# Patient Record
Sex: MALE | Race: WHITE | NOT HISPANIC OR LATINO | Employment: OTHER | URBAN - METROPOLITAN AREA
[De-identification: names, ages, dates, MRNs, and addresses within clinical notes are randomized per-mention and may not be internally consistent; named-entity substitution may affect disease eponyms.]

---

## 2017-01-12 ENCOUNTER — PATIENT MESSAGE (OUTPATIENT)
Dept: FAMILY MEDICINE | Facility: CLINIC | Age: 52
End: 2017-01-12

## 2017-01-12 DIAGNOSIS — F41.0 PANIC DISORDER: Chronic | ICD-10-CM

## 2017-01-12 RX ORDER — FLUOXETINE HYDROCHLORIDE 20 MG/1
20 CAPSULE ORAL DAILY
Qty: 30 CAPSULE | Refills: 5 | Status: SHIPPED | OUTPATIENT
Start: 2017-01-12 | End: 2017-01-19 | Stop reason: SDUPTHER

## 2017-01-16 ENCOUNTER — PATIENT MESSAGE (OUTPATIENT)
Dept: FAMILY MEDICINE | Facility: CLINIC | Age: 52
End: 2017-01-16

## 2017-01-17 ENCOUNTER — INITIAL CONSULT (OUTPATIENT)
Dept: SURGERY | Facility: CLINIC | Age: 52
End: 2017-01-17
Payer: MEDICARE

## 2017-01-17 VITALS
HEART RATE: 60 BPM | SYSTOLIC BLOOD PRESSURE: 121 MMHG | HEIGHT: 64 IN | OXYGEN SATURATION: 97 % | DIASTOLIC BLOOD PRESSURE: 80 MMHG | RESPIRATION RATE: 18 BRPM

## 2017-01-17 DIAGNOSIS — R22.1 NECK MASS: ICD-10-CM

## 2017-01-17 DIAGNOSIS — Z01.818 PREOPERATIVE TESTING: Primary | ICD-10-CM

## 2017-01-17 PROCEDURE — 99205 OFFICE O/P NEW HI 60 MIN: CPT | Mod: S$GLB,,, | Performed by: PHYSICIAN ASSISTANT

## 2017-01-17 RX ORDER — SODIUM CHLORIDE 9 MG/ML
INJECTION, SOLUTION INTRAVENOUS CONTINUOUS
Status: CANCELLED | OUTPATIENT
Start: 2017-01-23

## 2017-01-17 NOTE — MR AVS SNAPSHOT
[facility]  [address]  [address]  [place] [address]  Phone: [phone]  Fax: [phone]                  Kuldeep Alamo   2017 2:15 PM   Initial consult    Description:  Male : 1965   Provider:  Yanna Gaytan PA-C   Department:  [facility]           Reason for Visit     Cyst           Diagnoses this Visit        Comments    Preoperative testing    -  Primary     Neck mass                To Do List           Future Appointments        Provider Department Dept Phone    2017 9:40 AM MD Ryan Falk Jr. [facility] [phone]      Goals (5 Years of Data)     None      [facility]     [facility] Care [facility] -  Assistance  Registered nurses in the [facility] provide clinical advisement, health education, appointment booking, and other advisory services.  Call for this free service at [phone].             Medications           Message regarding Medications     Verify the changes and/or additions to your medication regime listed below are the same as discussed with your clinician today.  If any of these changes or additions are incorrect, please notify your healthcare provider.        STOP taking these medications     omeprazole (PRILOSEC) 20 MG capsule TAKE ONE CAPSULE BY MOUTH TWICE A DAY           Verify that the below list of medications is an accurate representation of the medications you are currently taking.  If none reported, the list may be blank. If incorrect, please contact your healthcare provider. Carry this list with you in case of emergency.           Current Medications     alprazolam (XANAX) 0.5 MG tablet Take 1 tablet (0.5 mg total) by mouth 3 (three) times daily as needed for Anxiety.    aspirin 81 MG Chew Take 1 tablet by mouth once daily.     baclofen (LIORESAL) 20 MG tablet TAKE 1.5 TABLETS (30 MG TOTAL) BY MOUTH 3 (THREE) TIMES DAILY.    BD INSULIN PEN NEEDLE UF  "SHORT 31 gauge x 5/16" Ndle USE AS DIRECTED TWICE A DAY    BD INSULIN PEN NEEDLE UF SHORT 31 X 5/16 " Ndle USE AS DIRECTED TWICE A DAY    carvedilol (COREG) 25 MG tablet TAKE 1 TABLET (25 MG TOTAL) BY MOUTH 2 (TWO) TIMES DAILY WITH MEALS.    cloNIDine (CATAPRES) 0.1 MG tablet TAKE 1 TABLET (0.1 MG TOTAL) BY MOUTH EVERY 6 (SIX) HOURS AS NEEDED.    clopidogrel (PLAVIX) 75 mg tablet Take 1 tablet (75 mg total) by mouth once daily.    fenofibrate 160 MG Tab TAKE 1 TABLET (160 MG TOTAL) BY MOUTH ONCE DAILY.    fluoxetine (PROZAC) 20 MG capsule Take 1 capsule (20 mg total) by mouth once daily.    insulin glargine (LANTUS SOLOSTAR) 100 unit/mL (3 mL) InPn pen INJECT 40 UNITS UNDER THE SKIN EVERY EVENING.    lisinopril (PRINIVIL,ZESTRIL) 40 MG tablet Take 1 tablet (40 mg total) by mouth once daily.    nitroGLYCERIN (NITROSTAT) 0.4 MG SL tablet DISSOLVE 1 TAB UNDER TONGUE EVERY 5 MINUTS AT ONSET OF CHEST PAIN-MAX 3 PER 15 MINUTES--GO TO ER    ranitidine (ZANTAC) 150 MG tablet TAKE 1 TABLET (150 MG TOTAL) BY MOUTH 2 (TWO) TIMES DAILY.    metformin (GLUCOPHAGE) 850 MG tablet Take 1 tablet (850 mg total) by mouth 2 (two) times daily with meals.           Clinical Reference Information           Vital Signs - Last Recorded  Most recent update: 1/17/2017  2:21 PM by Umu Narayan MA    BP Pulse Resp Ht SpO2       121/80 (BP Location: Right arm, Patient Position: Sitting, BP Method: Manual) 60 18 5' 4" (1.626 m) 97%       Blood Pressure          Most Recent Value    BP  121/80      Allergies as of 1/17/2017     No Known Drug Allergies      Immunizations Administered on Date of Encounter - 1/17/2017     None      Orders Placed During Today's Visit      Normal Orders This Visit    Case Request Operating Room: EXCISION-MASS of Posterior Neck/Upper Back     Future Labs/Procedures Expected by Expires    EKG 12-lead  As directed 1/17/2018      Instructions    1.  Nothing to eat or drink after midnight Sunday 1/22/2017  2.  Report " to the Surgery Department @ 6:30 am Monday 1/23/2017  3.  You may take your blood pressure medication with a sip of water Monday 1/23/2017 prior to surgery  4.  Stop taking PLAVIX two days prior to surgery ( NO PLAVIX ON Saturday 1/21/2017 or Sunday 1/22/2017)  5.  Call our office with any questions/concerns

## 2017-01-17 NOTE — PROGRESS NOTES
"History & Physical    SUBJECTIVE:     History of Present Illness:  Patient is a 51 y.o. male presents with complaints of a posterior neck/upper back mass.  Patient advises the mass has been present x several years and progressively increasing in size.  Patient also c/o pain at the site.  Patient denies erythema or drainage from the mass.  I have reviewed patient's medical issues, past surgeries, social h/o, medications and allergies with him.    Chief Complaint   Patient presents with    Cyst     Back on neck       Review of patient's allergies indicates:   Allergen Reactions    No known drug allergies        Current Outpatient Prescriptions   Medication Sig Dispense Refill    alprazolam (XANAX) 0.5 MG tablet Take 1 tablet (0.5 mg total) by mouth 3 (three) times daily as needed for Anxiety. 30 tablet 2    aspirin 81 MG Chew Take 1 tablet by mouth once daily.       baclofen (LIORESAL) 20 MG tablet TAKE 1.5 TABLETS (30 MG TOTAL) BY MOUTH 3 (THREE) TIMES DAILY. 135 tablet 11    BD INSULIN PEN NEEDLE UF SHORT 31 gauge x 5/16" Ndle USE AS DIRECTED TWICE A  each 11    BD INSULIN PEN NEEDLE UF SHORT 31 X 5/16 " Ndle USE AS DIRECTED TWICE A  each 12    carvedilol (COREG) 25 MG tablet TAKE 1 TABLET (25 MG TOTAL) BY MOUTH 2 (TWO) TIMES DAILY WITH MEALS. 60 tablet 11    cloNIDine (CATAPRES) 0.1 MG tablet TAKE 1 TABLET (0.1 MG TOTAL) BY MOUTH EVERY 6 (SIX) HOURS AS NEEDED. 30 tablet 5    clopidogrel (PLAVIX) 75 mg tablet Take 1 tablet (75 mg total) by mouth once daily. 90 tablet 3    fenofibrate 160 MG Tab TAKE 1 TABLET (160 MG TOTAL) BY MOUTH ONCE DAILY. 30 tablet 11    fluoxetine (PROZAC) 20 MG capsule Take 1 capsule (20 mg total) by mouth once daily. 30 capsule 5    insulin glargine (LANTUS SOLOSTAR) 100 unit/mL (3 mL) InPn pen INJECT 40 UNITS UNDER THE SKIN EVERY EVENING. 15 Syringe 4    lisinopril (PRINIVIL,ZESTRIL) 40 MG tablet Take 1 tablet (40 mg total) by mouth once daily. 30 tablet 11    " nitroGLYCERIN (NITROSTAT) 0.4 MG SL tablet DISSOLVE 1 TAB UNDER TONGUE EVERY 5 MINUTS AT ONSET OF CHEST PAIN-MAX 3 PER 15 MINUTES--GO TO ER 25 tablet 11    ranitidine (ZANTAC) 150 MG tablet TAKE 1 TABLET (150 MG TOTAL) BY MOUTH 2 (TWO) TIMES DAILY. 60 tablet 11    metformin (GLUCOPHAGE) 850 MG tablet Take 1 tablet (850 mg total) by mouth 2 (two) times daily with meals. 60 tablet 12     No current facility-administered medications for this visit.        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     Past Surgical History   Procedure Laterality Date    Cholecystectomy      Coronary artery bypass graft       2006    Cyst removal      Cardiac catheterization       Family History   Problem Relation Age of Onset    Heart disease Mother     Hypertension Mother     Hypertension Father     Hypertension Sister     Anemia Neg Hx     Arrhythmia Neg Hx     Asthma Neg Hx     Clotting disorder Neg Hx     Fainting Neg Hx     Heart attack Neg Hx     Heart failure Neg Hx     Hyperlipidemia Neg Hx      Social History   Substance Use Topics    Smoking status: Former Smoker    Smokeless tobacco: None    Alcohol use 12.0 oz/week     20 Cans of beer per week        Review of Systems:  Review of Systems   Constitutional: Positive for activity change. Negative for appetite change, chills, diaphoresis, fatigue and fever.        Patient is disabled and in a motorized wheelchair     HENT: Negative for congestion, postnasal drip, rhinorrhea, sinus pressure, sneezing and sore throat.    Respiratory: Positive for shortness of breath (occasionally). Negative for cough, choking, wheezing and stridor.    Cardiovascular: Negative for chest pain and  "palpitations.   Gastrointestinal: Negative for abdominal distention, abdominal pain, constipation, diarrhea, nausea and vomiting.   Musculoskeletal: Positive for arthralgias, back pain, myalgias, neck pain and neck stiffness.   Skin: Negative for color change, pallor, rash and wound.        Golf ball size mass to the posterior neck/upper back     Neurological: Positive for weakness. Negative for dizziness, seizures, speech difficulty, light-headedness and headaches.   Psychiatric/Behavioral: Negative for agitation and confusion. The patient is not nervous/anxious and is not hyperactive.        OBJECTIVE:     Vital Signs (Most Recent)  Pulse: 60 (01/17/17 1418)  Resp: 18 (01/17/17 1418)  BP: 121/80 (01/17/17 1418)  SpO2: 97 % (01/17/17 1418)  5' 4" (1.626 m)        Physical Exam:  Physical Exam   Constitutional: He is oriented to person, place, and time. He appears well-developed and well-nourished. No distress.   HENT:   Head: Normocephalic and atraumatic.   Right Ear: External ear normal.   Left Ear: External ear normal.   Nose: Nose normal.   Eyes: Conjunctivae and EOM are normal. Pupils are equal, round, and reactive to light. No scleral icterus.   Neck: Neck supple. No tracheal deviation present.   Decrease ROM in neck.  Golf ball size mass to the posterior neck/upper back.  TTP, mobile, no erythema, and no drainage noted.   Cardiovascular: Normal rate, regular rhythm and normal heart sounds.  Exam reveals no gallop and no friction rub.    No murmur heard.  Pulmonary/Chest: Effort normal. No stridor. No respiratory distress. He has wheezes (expiratory). He has no rales.   Abdominal: Soft. Bowel sounds are normal. He exhibits no distension. There is no tenderness. There is no guarding.   Musculoskeletal:   Patient has limited ROM.  Disabled and in a motorized wheelchair.   Neurological: He is alert and oriented to person, place, and time.   Skin: Skin is warm and dry. No rash noted. He is not diaphoretic. No " erythema. No pallor.   Psychiatric: He has a normal mood and affect. His behavior is normal. Judgment and thought content normal.   Nursing note and vitals reviewed.      Laboratory  CBC: Reviewed  CMP: Reviewed    Diagnostic Results:  No recent EKG    ASSESSMENT/PLAN:     1.  Posterior Neck/Upper Back Mass   2.  H/o CAD  3.  HTN  4.  IDDM  5.  HLD  6.  COPD  7.  S/P CVA    PLAN:Plan     1.  Excision of Posterior Neck/Upper Back Mass on Monday 1/23/2017.  2.  EKG for preoperative testing.  3.  Dr Cross advised of above.  4.  Patient advised to stop Rx Plavix two days prior to surgery

## 2017-01-17 NOTE — LETTER
January 17, 2017      Owen Frederick Jr., MD  [address]  [facility] [address]           [facility]  [address]  [address]  [facility] [address]  Phone: [phone]  Fax: [phone]          Patient: Kuldeep Alamo   MR Number: [id number]   YOB: 1965   Date of Visit: 1/17/2017       Dear Dr. Owen Frederick Jr.:    Thank you for referring Mr. Kuldeep Alamo to me for evaluation. Attached you will find relevant portions of my assessment and plan of care.    Impression:  Patient evaluated for golf ball size mass to the posterior neck/upper back.      Plan:  Excision of posterior neck mass/upper back on 1/23/2017 with an EKG for preoperative completion.    If you have questions, please do not hesitate to call me. I look forward to following Mr. Kuldeep Alamo along with you.    Thank you for the opportunity to assist in the care of your patient.    Sincerely,      Yanna Gaytan PA-C    Enclosure  CC:  No Recipients    If you would like to receive this communication electronically, please contact [email or web address] or [phone] to request more information on [employer] access.    For providers and/or their staff who would like to refer a patient to [facility], please contact us through our one-stop-shop provider referral line, [facility], at [phone].    If you feel you have received this communication in error or would no longer like to receive these types of communications, please e-mail [email or web address]

## 2017-01-19 ENCOUNTER — OFFICE VISIT (OUTPATIENT)
Dept: FAMILY MEDICINE | Facility: CLINIC | Age: 52
End: 2017-01-19
Payer: MEDICARE

## 2017-01-19 VITALS
DIASTOLIC BLOOD PRESSURE: 64 MMHG | BODY MASS INDEX: 34.33 KG/M2 | OXYGEN SATURATION: 96 % | SYSTOLIC BLOOD PRESSURE: 112 MMHG | HEIGHT: 64 IN | HEART RATE: 57 BPM

## 2017-01-19 DIAGNOSIS — I69.959 HEMIPLEGIA OF NONDOMINANT SIDE, LATE EFFECT OF CEREBROVASCULAR DISEASE: ICD-10-CM

## 2017-01-19 DIAGNOSIS — I63.22 OCCLUSION AND STENOSIS OF BASILAR ARTERY WITH CEREBRAL INFARCTION: ICD-10-CM

## 2017-01-19 DIAGNOSIS — Z74.09 MOBILITY IMPAIRED: Primary | ICD-10-CM

## 2017-01-19 DIAGNOSIS — F41.0 PANIC DISORDER: Chronic | ICD-10-CM

## 2017-01-19 PROCEDURE — 99213 OFFICE O/P EST LOW 20 MIN: CPT | Mod: PBBFAC,PO

## 2017-01-19 PROCEDURE — 99999 PR PBB SHADOW E&M-EST. PATIENT-LVL III: CPT | Mod: PBBFAC,,,

## 2017-01-19 PROCEDURE — 99214 OFFICE O/P EST MOD 30 MIN: CPT | Mod: S$PBB,,,

## 2017-01-19 PROCEDURE — G0372 MD SERVICE REQUIRED FOR PMD: HCPCS | Mod: PBBFAC,PO

## 2017-01-19 PROCEDURE — G0372 MD SERVICE REQUIRED FOR PMD: HCPCS | Mod: S$PBB,,,

## 2017-01-19 RX ORDER — FLUOXETINE HYDROCHLORIDE 20 MG/1
20 CAPSULE ORAL DAILY
Qty: 30 CAPSULE | Refills: 11 | Status: SHIPPED | OUTPATIENT
Start: 2017-01-19 | End: 2018-02-16 | Stop reason: SDUPTHER

## 2017-01-19 NOTE — MR AVS SNAPSHOT
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    Follow-up and Disposition History       These Medications        Disp Refills Start End    fluoxetine (PROZAC) 20 MG capsule 30 capsule 11 2017    Take 1 capsule (20 mg total) by mouth once daily. - Oral    Pharmacy: [facility]/pharmacy #[id number] - [place] - [address] AT [facility] Ph #: [phone]         [facility]     [facility] On Call Nurse Care Line - 24/7 Assistance  Registered nurses in the [facility] Call Center provide clinical advisement, health education, appointment booking, and other advisory services.  Call for this free service at [phone].             Medications           Message regarding Medications     Verify the changes and/or additions to your medication regime listed below are the same as discussed with your clinician today.  If any of these changes or additions are incorrect, please notify your healthcare provider.             Verify that the below list of medications is an accurate representation of the medications you are currently  "taking.  If none reported, the list may be blank. If incorrect, please contact your healthcare provider. Carry this list with you in case of emergency.           Current Medications     alprazolam (XANAX) 0.5 MG tablet Take 1 tablet (0.5 mg total) by mouth 3 (three) times daily as needed for Anxiety.    aspirin 81 MG Chew Take 1 tablet by mouth once daily.     baclofen (LIORESAL) 20 MG tablet TAKE 1.5 TABLETS (30 MG TOTAL) BY MOUTH 3 (THREE) TIMES DAILY.    BD INSULIN PEN NEEDLE UF SHORT 31 gauge x 5/16" Ndle USE AS DIRECTED TWICE A DAY    BD INSULIN PEN NEEDLE UF SHORT 31 X 5/16 " Ndle USE AS DIRECTED TWICE A DAY    carvedilol (COREG) 25 MG tablet TAKE 1 TABLET (25 MG TOTAL) BY MOUTH 2 (TWO) TIMES DAILY WITH MEALS.    cloNIDine (CATAPRES) 0.1 MG tablet TAKE 1 TABLET (0.1 MG TOTAL) BY MOUTH EVERY 6 (SIX) HOURS AS NEEDED.    clopidogrel (PLAVIX) 75 mg tablet Take 1 tablet (75 mg total) by mouth once daily.    fenofibrate 160 MG Tab TAKE 1 TABLET (160 MG TOTAL) BY MOUTH ONCE DAILY.    fluoxetine (PROZAC) 20 MG capsule Take 1 capsule (20 mg total) by mouth once daily.    insulin glargine (LANTUS SOLOSTAR) 100 unit/mL (3 mL) InPn pen INJECT 40 UNITS UNDER THE SKIN EVERY EVENING.    lisinopril (PRINIVIL,ZESTRIL) 40 MG tablet Take 1 tablet (40 mg total) by mouth once daily.    nitroGLYCERIN (NITROSTAT) 0.4 MG SL tablet DISSOLVE 1 TAB UNDER TONGUE EVERY 5 MINUTS AT ONSET OF CHEST PAIN-MAX 3 PER 15 MINUTES--GO TO ER    ranitidine (ZANTAC) 150 MG tablet TAKE 1 TABLET (150 MG TOTAL) BY MOUTH 2 (TWO) TIMES DAILY.    metformin (GLUCOPHAGE) 850 MG tablet Take 1 tablet (850 mg total) by mouth 2 (two) times daily with meals.           Clinical Reference Information           Vital Signs - Last Recorded  Most recent update: 1/19/2017  9:56 AM by Evangelina Manning MA    BP Pulse Ht SpO2 BMI    112/64 (BP Location: Right arm, Patient Position: Sitting, BP Method: Manual) (!) 57 5' 4" (1.626 m) 96% 34.33 kg/m2      Blood Pressure       "    Most Recent Value    BP  112/64      Allergies as of 1/19/2017     No Known Drug Allergies      Immunizations Administered on Date of Encounter - 1/19/2017     None

## 2017-01-19 NOTE — PROGRESS NOTES
Subjective:       Patient ID: Kuldeep Alamo is a 51 y.o. male.    Chief Complaint: forms    · HPI  Completed 50-1 TAR form  · A copy of the signed physician prescription  · A completed and signed [facility] 6181 form (see the following   1. Certificate of Medical Necessity information)  · For listed items: Specific medical justification for each item is   1. requested, using either the CS 6181 form or additional   2. medical documentation, such as physicians notes or therapist documentation relevant to the request.  · Medical records submitted with the TAR for wheelchairs must include the following documentation:   1. List of mobility and seating impairment to be accommodated   2. Equipment currently owned by the recipient, detailed features of the DME item and the date of purchase   3. Verification and documentation that other treatments of lesser mobility devices do not safely accommodate the recipients mobility impairment   4. Verification and documentation that the requested equipment will fit and be usable in all living areas used by recipient   5. An explanation of how the living areas will be accessed by the recipient with the requested equipment   6. Verification and documentation that the recipient and/or caregiver understand how to care for and use the requested equipment   7. Seating evaluation by a qualified therapist/Assistive  (ATP) for the following: neurological conditions; complex orthopedic along with neurological conditions; pediatric wheelchairs.      The patient has mobility impairment due to a CVA with resulting left Hemiplegia. He had a CVA in 2009 and has severe weakness of his left upper and lower extremities. He is unable to stand and is confined to a wheelchair. He lacks the strength in his left arm and leg to self propel a manual wheelchair. His home is handicap accessible with a ramp and he is able to use his wheelchair in his home. His current power wheelchair is  not functional and his batteries are not recharging. His current power wheelchair is over 5 years old. He is unable to use a walker or cane. He will use the chair to allow him to remain independent in his home and to allow him to access his kitchen and bathroom. He is able to safely use a power wheelchair. He lacks the truncal stability to use a scooter or use handlebars. He needs a standard power wheelchair without modifications.               Power Mobility  Claims for power mobility products must include documentation of the   Documentation the following:  · Least costly alternative  · Medical needs of the recipient  · Justification for the proposed item  · All other alternatives that have been investigated for the recipient and the reasons why the alternative items do not meet the medical needs of the recipient                                           Review of Systems   HENT: Positive for voice change.    Eyes: Negative.    Respiratory: Negative.    Cardiovascular: Negative.    Gastrointestinal: Negative.    Endocrine: Negative.    Genitourinary: Negative.    Musculoskeletal: Negative.    Skin: Negative.    Allergic/Immunologic: Negative.    Neurological: Positive for weakness.        Left hemiplegia    Hematological: Negative.    Psychiatric/Behavioral: Negative.    All other systems reviewed and are negative.      Objective:      Vitals:    01/19/17 0952   BP: 112/64   Pulse: (!) 57     Physical Exam   Constitutional: He is oriented to person, place, and time. He appears well-developed and well-nourished. He is cooperative. No distress.   HENT:   Head: Normocephalic and atraumatic.   Right Ear: Hearing, tympanic membrane, external ear and ear canal normal.   Left Ear: Hearing, external ear and ear canal normal.   Nose: Nose normal.   Mouth/Throat: Oropharynx is clear and moist.   Eyes: Conjunctivae are normal. Pupils are equal, round, and reactive to light.   Neck: Normal range of motion. Neck supple. No  thyromegaly present.   Cardiovascular: Normal rate, regular rhythm, normal heart sounds and intact distal pulses.    Pulmonary/Chest: Effort normal and breath sounds normal. No respiratory distress.   Musculoskeletal: Normal range of motion. He exhibits no edema or tenderness.   Lymphadenopathy:     He has no cervical adenopathy.   Neurological: He is alert and oriented to person, place, and time. He has normal strength. Coordination abnormal. Gait normal.   In wheelchair    Skin: Skin is warm, dry and intact. No cyanosis. Nails show no clubbing.   Psychiatric: He has a normal mood and affect. His speech is normal and behavior is normal. Judgment and thought content normal. Cognition and memory are normal.   Vitals reviewed.      Assessment:       1. Mobility impaired    2. Hemiplegia of nondominant side, late effect of cerebrovascular disease    3. 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  Dispense: 30 capsule; Refill: 11      Return if symptoms worsen or fail to improve.

## 2017-01-23 PROBLEM — R22.1 NECK MASS: Status: RESOLVED | Noted: 2017-01-17 | Resolved: 2017-01-23

## 2017-01-29 RX ORDER — CLOPIDOGREL BISULFATE 75 MG/1
TABLET ORAL
Qty: 90 TABLET | Refills: 3 | Status: SHIPPED | OUTPATIENT
Start: 2017-01-29 | End: 2018-01-31 | Stop reason: SDUPTHER

## 2017-02-01 ENCOUNTER — OFFICE VISIT (OUTPATIENT)
Dept: FAMILY MEDICINE | Facility: CLINIC | Age: 52
End: 2017-02-01
Payer: MEDICARE

## 2017-02-01 VITALS
OXYGEN SATURATION: 97 % | BODY MASS INDEX: 34.15 KG/M2 | RESPIRATION RATE: 18 BRPM | HEIGHT: 64 IN | SYSTOLIC BLOOD PRESSURE: 108 MMHG | TEMPERATURE: 98 F | HEART RATE: 53 BPM | WEIGHT: 200 LBS | DIASTOLIC BLOOD PRESSURE: 68 MMHG

## 2017-02-01 DIAGNOSIS — R33.9 URINARY RETENTION: Primary | ICD-10-CM

## 2017-02-01 DIAGNOSIS — S32.010A CLOSED COMPRESSION FRACTURE OF FIRST LUMBAR VERTEBRA: ICD-10-CM

## 2017-02-01 PROCEDURE — 99214 OFFICE O/P EST MOD 30 MIN: CPT | Mod: PBBFAC,PO

## 2017-02-01 PROCEDURE — 99999 PR PBB SHADOW E&M-EST. PATIENT-LVL IV: CPT | Mod: PBBFAC,,,

## 2017-02-01 PROCEDURE — 99214 OFFICE O/P EST MOD 30 MIN: CPT | Mod: S$PBB,,,

## 2017-02-01 RX ORDER — METFORMIN HYDROCHLORIDE 850 MG/1
850 TABLET ORAL 2 TIMES DAILY WITH MEALS
Status: ON HOLD | COMMUNITY
Start: 2017-01-30 | End: 2017-07-25

## 2017-02-01 NOTE — MR AVS SNAPSHOT
[place] [address] [address] [place] [address]  Phone: [phone]  Fax: [phone]                  Kuldeep Alamo   2017 9:20 AM   Office Visit    Description:  Male : 1965   Provider:  Owen Frederick Jr., MD   Department:  [facility]           Reason for Visit     Urinary Tract Infection     Back Pain           Diagnoses this Visit        Comments    Urinary retention    -  Primary     Closed compression fracture of first lumbar vertebra                To Do List           Future Appointments        Provider Department Dept Phone    2017 1:45 PM VÍCTOR Cross MD [facility] [phone]      Goals (5 Years of Data)     None      Follow-Up and Disposition     Return if symptoms worsen or fail to improve.    Follow-up and Disposition History      [facility]     [facility] -  Assistance  Registered nurses in the [facility] provide clinical advisement, health education, appointment booking, and other advisory services.  Call for this free service at [phone].             Medications           Message regarding Medications     Verify the changes and/or additions to your medication regime listed below are the same as discussed with your clinician today.  If any of these changes or additions are incorrect, please notify your healthcare provider.             Verify that the below list of medications is an accurate representation of the medications you are currently taking.  If none reported, the list may be blank. If incorrect, please contact your healthcare provider. Carry this list with you in case of emergency.           Current Medications     alprazolam (XANAX) 0.5 MG tablet Take 1 tablet (0.5 mg total) by mouth 3 (three) times daily as needed for Anxiety.    aspirin 81 MG Chew Take 1 tablet by mouth once daily.     baclofen (LIORESAL) 20 MG tablet TAKE 1.5 TABLETS (30 MG TOTAL) BY MOUTH  "3 (THREE) TIMES DAILY.    BD INSULIN PEN NEEDLE UF SHORT 31 gauge x 5/16" Ndle USE AS DIRECTED TWICE A DAY    BD INSULIN PEN NEEDLE UF SHORT 31 X 5/16 " Ndle USE AS DIRECTED TWICE A DAY    carvedilol (COREG) 25 MG tablet TAKE 1 TABLET (25 MG TOTAL) BY MOUTH 2 (TWO) TIMES DAILY WITH MEALS.    cloNIDine (CATAPRES) 0.1 MG tablet TAKE 1 TABLET (0.1 MG TOTAL) BY MOUTH EVERY 6 (SIX) HOURS AS NEEDED.    clopidogrel (PLAVIX) 75 mg tablet TAKE 1 TABLET (75 MG TOTAL) BY MOUTH ONCE DAILY.    fenofibrate 160 MG Tab TAKE 1 TABLET (160 MG TOTAL) BY MOUTH ONCE DAILY.    fluoxetine (PROZAC) 20 MG capsule Take 1 capsule (20 mg total) by mouth once daily.    insulin glargine (LANTUS SOLOSTAR) 100 unit/mL (3 mL) InPn pen INJECT 40 UNITS UNDER THE SKIN EVERY EVENING.    lisinopril (PRINIVIL,ZESTRIL) 40 MG tablet Take 1 tablet (40 mg total) by mouth once daily.    metformin (GLUCOPHAGE) 850 MG tablet     nitroGLYCERIN (NITROSTAT) 0.4 MG SL tablet DISSOLVE 1 TAB UNDER TONGUE EVERY 5 MINUTS AT ONSET OF CHEST PAIN-MAX 3 PER 15 MINUTES--GO TO ER    ranitidine (ZANTAC) 150 MG tablet TAKE 1 TABLET (150 MG TOTAL) BY MOUTH 2 (TWO) TIMES DAILY.           Clinical Reference Information           Vital Signs - Last Recorded  Most recent update: 2/1/2017  9:37 AM by Leigh Arrington MA    BP Pulse Temp Resp Ht Wt    108/68 (BP Location: Right arm, Patient Position: Sitting, BP Method: Manual) (!) 53 97.6 °F (36.4 °C) 18 5' 4" (1.626 m) 90.7 kg (200 lb)    SpO2 BMI             97% 34.33 kg/m2         Blood Pressure          Most Recent Value    BP  108/68      Allergies as of 2/1/2017     No Known Drug Allergies      Immunizations Administered on Date of Encounter - 2/1/2017     None      Orders Placed During Today's Visit      Normal Orders This Visit    Ambulatory referral to Pain Clinic     Future Labs/Procedures Expected by Expires    MRI Lumbar Spine Without Contrast  2/1/2017 2/1/2018    US Retroperitoneal Complete (Kidney and  2/1/2017 2/1/2018 "

## 2017-02-01 NOTE — PROGRESS NOTES
Subjective:       Patient ID: Kuldeep Alamo is a 51 y.o. male.    Chief Complaint: Urinary Tract Infection and Back Pain    HPI The patient is complaining of severe back pain since the day after having a soft tissue mass removed surgically. Across the mid lower. He is also hurting in the suprapubic region and he hadn't urinated all night. No fever, chills or voiding symptoms. He had a normal BM yesterday. No injury. Non-radiating. No anticholinergics or antihistamines.     Review of Systems   Constitutional: Negative.  Negative for activity change, appetite change, diaphoresis and fever.   HENT: Negative.    Respiratory: Negative.  Negative for shortness of breath.    Cardiovascular: Negative for chest pain.   Gastrointestinal: Negative for abdominal distention and abdominal pain.   Genitourinary: Positive for decreased urine volume. Negative for difficulty urinating, dysuria, flank pain, frequency and hematuria.   Musculoskeletal: Negative.    Psychiatric/Behavioral: Negative.    All other systems reviewed and are negative.      Objective:      Vitals:    02/01/17 0933   BP: 108/68   Pulse: (!) 53   Resp: 18   Temp: 97.6 °F (36.4 °C)     Physical Exam   Constitutional: He is oriented to person, place, and time. He appears well-developed and well-nourished. He is cooperative. No distress.   In wheelchair Needs replacement    HENT:   Head: Normocephalic and atraumatic.   Right Ear: Hearing, tympanic membrane, external ear and ear canal normal.   Left Ear: Hearing, external ear and ear canal normal.   Nose: Nose normal.   Mouth/Throat: Oropharynx is clear and moist.   Eyes: Conjunctivae are normal. Pupils are equal, round, and reactive to light.   Neck: Normal range of motion. Neck supple. No thyromegaly present.   Healing scar posterior neck    Cardiovascular: Normal rate, regular rhythm, normal heart sounds and intact distal pulses.    Pulmonary/Chest: Effort normal and breath sounds normal. No respiratory distress.    Musculoskeletal: Normal range of motion. He exhibits no edema or tenderness.   Lymphadenopathy:     He has no cervical adenopathy.   Neurological: He is alert and oriented to person, place, and time. He has normal strength. Coordination and gait normal.   Skin: Skin is warm, dry and intact. No cyanosis. Nails show no clubbing.   Psychiatric: He has a normal mood and affect. His speech is normal and behavior is normal. Judgment and thought content normal. Cognition and memory are normal.   Vitals reviewed.       Assessment:       1. Urinary retention    2. Closed compression fracture of first lumbar vertebra        Plan:       Urinary retention  -     Cancel: US Retroperitoneal Complete (Kidney and; Future; Expected date: 2/1/17  -     US Retroperitoneal Complete (Kidney and; Future; Expected date: 2/1/17    Closed compression fracture of first lumbar vertebra  -     MRI Lumbar Spine Without Contrast; Future; Expected date: 2/1/17  -     Ambulatory referral to Pain Clinic      Return if symptoms worsen or fail to improve.

## 2017-02-08 ENCOUNTER — PATIENT MESSAGE (OUTPATIENT)
Dept: FAMILY MEDICINE | Facility: CLINIC | Age: 52
End: 2017-02-08

## 2017-02-09 ENCOUNTER — OFFICE VISIT (OUTPATIENT)
Dept: SURGERY | Facility: CLINIC | Age: 52
End: 2017-02-09
Payer: MEDICARE

## 2017-02-09 VITALS
RESPIRATION RATE: 18 BRPM | SYSTOLIC BLOOD PRESSURE: 110 MMHG | OXYGEN SATURATION: 98 % | DIASTOLIC BLOOD PRESSURE: 70 MMHG | HEIGHT: 64 IN | HEART RATE: 60 BPM

## 2017-02-09 DIAGNOSIS — Z98.890 POST-OPERATIVE STATE: Primary | ICD-10-CM

## 2017-02-09 PROCEDURE — 99024 POSTOP FOLLOW-UP VISIT: CPT | Mod: S$GLB,,, | Performed by: EMERGENCY MEDICINE

## 2017-02-09 NOTE — PROGRESS NOTES
Posterior neck mass excised in Jan 2017  Healed nicely  Sutures removed  Wound care reviewed    Path still pending; will follow up on same  RTC as needed

## 2017-02-20 ENCOUNTER — PATIENT MESSAGE (OUTPATIENT)
Dept: FAMILY MEDICINE | Facility: CLINIC | Age: 52
End: 2017-02-20

## 2017-02-28 ENCOUNTER — PATIENT MESSAGE (OUTPATIENT)
Dept: FAMILY MEDICINE | Facility: CLINIC | Age: 52
End: 2017-02-28

## 2017-03-01 NOTE — TELEPHONE ENCOUNTER
It was signed electronically. I will request my staff check into this.   ===View-only below this line===      ----- Message -----     From: Kuldeep Alamo     Sent: 2/28/2017  9:50 AM CST       To: Owen Frederick MD  Subject: Non-Urgent Medical    Doc U not gonna believe dis dey turned down da new request for a new chair bcleandra pickering say U did not sign da face to face examination WAS NOT SIGNED.....lmbo....[facility]

## 2017-03-13 ENCOUNTER — PATIENT MESSAGE (OUTPATIENT)
Dept: FAMILY MEDICINE | Facility: CLINIC | Age: 52
End: 2017-03-13

## 2017-03-14 ENCOUNTER — TELEPHONE (OUTPATIENT)
Dept: FAMILY MEDICINE | Facility: CLINIC | Age: 52
End: 2017-03-14

## 2017-03-14 RX ORDER — HYDROCODONE BITARTRATE AND ACETAMINOPHEN 5; 325 MG/1; MG/1
1 TABLET ORAL EVERY 4 HOURS PRN
Qty: 30 TABLET | Refills: 0 | Status: SHIPPED | OUTPATIENT
Start: 2017-03-14 | End: 2017-03-31 | Stop reason: SDUPTHER

## 2017-03-14 NOTE — TELEPHONE ENCOUNTER
----- Message from Valentine Ag sent at 3/14/2017  3:11 PM CDT -----  SISTER calling on this patient   States got a message today but didn't understand it  Reach at  [phone]   [place]

## 2017-03-14 NOTE — TELEPHONE ENCOUNTER
----- Message from Paty Chavira sent at 3/14/2017  1:15 PM CDT -----  REFILLS:   Patient is requesting a medication refill.   RX name: hydrocodone  Strength:   Directions:   Pharmacy name: [facility] in [place]  Pharmacy phone #:

## 2017-03-22 ENCOUNTER — PATIENT MESSAGE (OUTPATIENT)
Dept: FAMILY MEDICINE | Facility: CLINIC | Age: 52
End: 2017-03-22

## 2017-03-27 ENCOUNTER — PATIENT MESSAGE (OUTPATIENT)
Dept: FAMILY MEDICINE | Facility: CLINIC | Age: 52
End: 2017-03-27

## 2017-03-29 ENCOUNTER — PATIENT MESSAGE (OUTPATIENT)
Dept: FAMILY MEDICINE | Facility: CLINIC | Age: 52
End: 2017-03-29

## 2017-03-31 ENCOUNTER — PATIENT MESSAGE (OUTPATIENT)
Dept: FAMILY MEDICINE | Facility: CLINIC | Age: 52
End: 2017-03-31

## 2017-03-31 RX ORDER — HYDROCODONE BITARTRATE AND ACETAMINOPHEN 5; 325 MG/1; MG/1
1 TABLET ORAL EVERY 4 HOURS PRN
Qty: 30 TABLET | Refills: 0 | Status: SHIPPED | OUTPATIENT
Start: 2017-03-31 | End: 2017-04-17 | Stop reason: SDUPTHER

## 2017-04-04 ENCOUNTER — PATIENT MESSAGE (OUTPATIENT)
Dept: FAMILY MEDICINE | Facility: CLINIC | Age: 52
End: 2017-04-04

## 2017-04-04 DIAGNOSIS — F41.0 PANIC DISORDER: Chronic | ICD-10-CM

## 2017-04-05 RX ORDER — ALPRAZOLAM 0.5 MG/1
0.5 TABLET ORAL 3 TIMES DAILY PRN
Qty: 30 TABLET | Refills: 0 | Status: SHIPPED | OUTPATIENT
Start: 2017-04-05 | End: 2017-05-29 | Stop reason: SDUPTHER

## 2017-04-11 ENCOUNTER — PATIENT MESSAGE (OUTPATIENT)
Dept: FAMILY MEDICINE | Facility: CLINIC | Age: 52
End: 2017-04-11

## 2017-04-12 ENCOUNTER — OFFICE VISIT (OUTPATIENT)
Dept: FAMILY MEDICINE | Facility: CLINIC | Age: 52
End: 2017-04-12
Payer: MEDICARE

## 2017-04-12 VITALS
OXYGEN SATURATION: 94 % | HEART RATE: 79 BPM | SYSTOLIC BLOOD PRESSURE: 90 MMHG | HEIGHT: 64 IN | DIASTOLIC BLOOD PRESSURE: 58 MMHG

## 2017-04-12 DIAGNOSIS — R39.198 DIFFICULTY URINATING: Primary | ICD-10-CM

## 2017-04-12 PROCEDURE — 99999 PR PBB SHADOW E&M-EST. PATIENT-LVL III: CPT | Mod: PBBFAC,,,

## 2017-04-12 PROCEDURE — 99213 OFFICE O/P EST LOW 20 MIN: CPT | Mod: PBBFAC,PO

## 2017-04-12 PROCEDURE — 99213 OFFICE O/P EST LOW 20 MIN: CPT | Mod: S$PBB,,,

## 2017-04-12 RX ORDER — TAMSULOSIN HYDROCHLORIDE 0.4 MG/1
0.4 CAPSULE ORAL DAILY
Qty: 30 CAPSULE | Refills: 11 | Status: SHIPPED | OUTPATIENT
Start: 2017-04-12 | End: 2017-04-18

## 2017-04-12 RX ORDER — CIPROFLOXACIN 500 MG/1
500 TABLET ORAL EVERY 12 HOURS
Qty: 20 TABLET | Refills: 0 | Status: SHIPPED | OUTPATIENT
Start: 2017-04-12 | End: 2017-04-14

## 2017-04-12 NOTE — PROGRESS NOTES
Subjective:       Patient ID: Kuldeep Alamo is a 52 y.o. male.    Chief Complaint: Urinary Tract Infection    HPI The patient presents with complaints of problems urinating since yesterday. He has urge to urinate but is unable. Felt feverish but temp was normal. Slight chills and diaphoresis. Slight headache. Not drinking as much fluids.     Review of Systems   Constitutional: Positive for appetite change, chills and diaphoresis. Negative for activity change and fatigue.   HENT: Negative.    Respiratory: Negative.  Negative for cough and shortness of breath.    Cardiovascular: Negative for chest pain.   Gastrointestinal: Negative for constipation.   Genitourinary: Positive for decreased urine volume, difficulty urinating, dysuria, enuresis, hematuria and urgency. Negative for flank pain, frequency, genital sores, penile pain, scrotal swelling and testicular pain.   Psychiatric/Behavioral: Negative.    All other systems reviewed and are negative.      Objective:      Vitals:    04/12/17 1025   BP: (!) 90/58   Pulse: 79     Physical Exam   Constitutional: He is oriented to person, place, and time. He appears well-developed and well-nourished. He is cooperative. No distress.   HENT:   Head: Normocephalic and atraumatic.   Right Ear: Hearing, tympanic membrane, external ear and ear canal normal.   Left Ear: Hearing, external ear and ear canal normal.   Nose: Nose normal.   Mouth/Throat: Oropharynx is clear and moist.   Eyes: Conjunctivae are normal. Pupils are equal, round, and reactive to light.   Neck: Normal range of motion. Neck supple. No thyromegaly present.   Cardiovascular: Normal rate, regular rhythm, normal heart sounds and intact distal pulses.    Pulmonary/Chest: Effort normal and breath sounds normal. No respiratory distress.   Genitourinary: Penis normal.   Musculoskeletal: Normal range of motion. He exhibits no edema or tenderness.   Lymphadenopathy:     He has no cervical adenopathy.   Neurological: He is  alert and oriented to person, place, and time. He has normal strength. Coordination and gait normal.   Skin: Skin is warm, dry and intact. No cyanosis. Nails show no clubbing.   Psychiatric: He has a normal mood and affect. His speech is normal and behavior is normal. Judgment and thought content normal. Cognition and memory are normal.   Vitals reviewed.      Assessment:       1. Difficulty urinating        Plan:       Difficulty urinating  -     Urine culture; Future; Expected date: 4/12/17  -     Ambulatory referral to Urology  -     Urinalysis; Future; Expected date: 4/12/17    Other orders  -     tamsulosin (FLOMAX) 0.4 mg Cp24; Take 1 capsule (0.4 mg total) by mouth once daily.  Dispense: 30 capsule; Refill: 11  -     ciprofloxacin HCl (CIPRO) 500 MG tablet; Take 1 tablet (500 mg total) by mouth every 12 (twelve) hours.  Dispense: 20 tablet; Refill: 0      Return if symptoms worsen or fail to improve.

## 2017-04-13 ENCOUNTER — PATIENT MESSAGE (OUTPATIENT)
Dept: FAMILY MEDICINE | Facility: CLINIC | Age: 52
End: 2017-04-13

## 2017-04-17 ENCOUNTER — PATIENT MESSAGE (OUTPATIENT)
Dept: FAMILY MEDICINE | Facility: CLINIC | Age: 52
End: 2017-04-17

## 2017-04-17 ENCOUNTER — TELEPHONE (OUTPATIENT)
Dept: UROLOGY | Facility: CLINIC | Age: 52
End: 2017-04-17

## 2017-04-17 RX ORDER — HYDROCODONE BITARTRATE AND ACETAMINOPHEN 5; 325 MG/1; MG/1
1 TABLET ORAL EVERY 4 HOURS PRN
Qty: 30 TABLET | Refills: 0 | Status: SHIPPED | OUTPATIENT
Start: 2017-04-17 | End: 2017-05-04 | Stop reason: SDUPTHER

## 2017-04-17 NOTE — TELEPHONE ENCOUNTER
No appointments available with Dr. Silva this week. Appointment scheduled for 4.18.17 with Dr. Gonzalez.

## 2017-04-17 NOTE — TELEPHONE ENCOUNTER
----- Message from Alethea Vasquez sent at 4/17/2017 10:47 AM CDT -----  Contact: [phone]/Dianne pt's sister   Pt its requesting a hospital follow up for this week , states he has a catheter that needs to be removed this week . Please advise

## 2017-04-18 ENCOUNTER — PATIENT MESSAGE (OUTPATIENT)
Dept: FAMILY MEDICINE | Facility: CLINIC | Age: 52
End: 2017-04-18

## 2017-04-18 ENCOUNTER — OFFICE VISIT (OUTPATIENT)
Dept: UROLOGY | Facility: CLINIC | Age: 52
End: 2017-04-18
Payer: MEDICARE

## 2017-04-18 VITALS
WEIGHT: 190 LBS | HEIGHT: 64 IN | SYSTOLIC BLOOD PRESSURE: 156 MMHG | HEART RATE: 65 BPM | DIASTOLIC BLOOD PRESSURE: 85 MMHG | BODY MASS INDEX: 32.44 KG/M2 | TEMPERATURE: 99 F

## 2017-04-18 DIAGNOSIS — I10 ESSENTIAL HYPERTENSION: ICD-10-CM

## 2017-04-18 DIAGNOSIS — I69.959 HEMIPLEGIA OF NONDOMINANT SIDE, LATE EFFECT OF CEREBROVASCULAR DISEASE: ICD-10-CM

## 2017-04-18 DIAGNOSIS — N39.0 URINARY TRACT INFECTION WITHOUT HEMATURIA, SITE UNSPECIFIED: Primary | ICD-10-CM

## 2017-04-18 PROCEDURE — 99213 OFFICE O/P EST LOW 20 MIN: CPT | Mod: PBBFAC,PO | Performed by: UROLOGY

## 2017-04-18 PROCEDURE — 99204 OFFICE O/P NEW MOD 45 MIN: CPT | Mod: S$PBB,,, | Performed by: UROLOGY

## 2017-04-18 PROCEDURE — 99999 PR PBB SHADOW E&M-EST. PATIENT-LVL III: CPT | Mod: PBBFAC,,, | Performed by: UROLOGY

## 2017-04-18 NOTE — PROGRESS NOTES
HPI:  Kuldeep Alamo is a 52 y.o. year old male that  presents with No chief complaint on file.  .  This patient is referred by his PCP for difficulty voiding.  Patient also recently seen in the emergency room with the same complaint and had documented postvoid residual of 300 cc for which a Isbell catheter was placed.  Patient is currently being treated for urinary tract infection.  Urine culture on the chart is from earlier this month which was negative however the patient had been on antibiotics at the time.    Patient has history of a stroke in 2009 with significant residual.  He uses a scooter however he is able to stand unassisted but is unable to walk.  Patient states that he his baseline voiding pattern is that he stands to void with minimal straining.  Since his stroke he is only needed one catheter prior which also was associated with a urinary tract infection.  Patient had renal ultrasound in February of this year which showed normal kidneys father this month was greater than 60.  Patient has not noticed blood in the Isbell bag but does not like the catheter in place.    The chart list prescription for tamsulosin in for oxybutynin however the patient states that he has not been taking these.    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 He watches soap operas and likes to visit with family and friends. He also has a half-brother             Past Surgical History:   Procedure Laterality Date    CARDIAC CATHETERIZATION      CHOLECYSTECTOMY      CORONARY ARTERY BYPASS GRAFT      2006    CYST REMOVAL       Family History   Problem Relation Age of Onset    Heart disease Mother     Hypertension Mother     Hypertension Father     Cancer Father     Hypertension Sister     Anemia Neg Hx     Arrhythmia Neg Hx     Asthma Neg Hx     Clotting disorder Neg Hx     Fainting Neg Hx     Heart attack Neg Hx     Heart failure Neg Hx     Hyperlipidemia Neg Hx     Prostate cancer Neg Hx     Kidney disease Neg Hx            Review of Systems  The patient has no chest pains.  The patient has no shortness of breath  Patient wears  No glasses.  Neurological consistent with CVA.  All other review of systems are negative.      Physical Exam:  BP (!) 156/85  Pulse 65  Temp 98.8 °F (37.1 °C)  Ht 5' 4" (1.626 m)  Wt 86.2 kg (190 lb)  BMI 32.61 kg/m2  General appearance: alert, cooperative, no distress.  Patient in a scooter  Constitutional:Oriented to person, place, and time.appears well-developed and well-nourished.   HEENT: Normocephalic, atraumatic, neck symmetrical, no nasal discharge   Eyes: conjunctivae/corneas clear, PERRL, EOM's intact  Lungs: clear to auscultation bilaterally, no dullness to percussion bilaterally  Heart: regular rate and rhythm without rub; no displacement " of the PMI   Abdomen: soft, non-tender; bowel sounds normoactive; no organomegaly  :Penis/perineum without lesions, scrotum without rash/cysts, epididymis nontender bilaterally, urethral meatus in normal location normal size, no penile plaques palpated, prostate:    Smooth and minimally enlarged and benign feeling                   seminal vesicles not palpated.  No rectal masses, sphincter tone normal.  Testes equal in size without masses.  Isbell catheter draining clear urine  Extremities: extremities symmetric; no clubbing, cyanosis, or edema  Integument: Skin color, texture, turgor normal; no rashes; hair distrubution normal  Neurologic: Alert and oriented X 3, consistent with CVA with residual   Psychiatric: no pressured speech; normal affect; no evidence of impaired cognition     LABS:    Complete Blood Count  Lab Results   Component Value Date    RBC 3.79 (L) 04/14/2017    HGB 11.7 (L) 04/14/2017    HCT 34.1 (L) 04/14/2017    MCV 90 04/14/2017    MCH 30.9 04/14/2017    MCHC 34.3 04/14/2017    RDW 12.3 04/14/2017     (L) 04/14/2017    MPV 10.5 04/14/2017    GRAN 6.0 04/14/2017    GRAN 82.2 (H) 04/14/2017    LYMPH 0.6 (L) 04/14/2017    LYMPH 8.2 (L) 04/14/2017    MONO 0.6 04/14/2017    MONO 8.7 04/14/2017    EOS 0.1 04/14/2017    BASO 0.01 04/14/2017    EOSINOPHIL 0.8 04/14/2017    BASOPHIL 0.1 04/14/2017    DIFFMETHOD Automated 04/14/2017       Comprehensive Metabolic Panel  Lab Results   Component Value Date     (H) 04/14/2017    BUN 19 04/14/2017    CREATININE 1.00 04/14/2017     04/14/2017    K 4.5 04/14/2017    CL 98 04/14/2017    PROT 7.6 04/12/2017    ALBUMIN 4.2 04/12/2017    BILITOT 3.3 (H) 04/12/2017    AST 24 04/12/2017    ALKPHOS 57 04/12/2017    CO2 27 04/14/2017    ALT 45 (H) 04/12/2017    ANIONGAP 14 04/14/2017    EGFRNONAA >60.0 04/14/2017    ESTGFRAFRICA >60.0 04/14/2017       PSA  No results found for: PSA      Assessment:    ICD-10-CM ICD-9-CM    1. Urinary tract  infection without hematuria, site unspecified N39.0 599.0    2. Essential hypertension I10 401.9    3. Hemiplegia of nondominant side, late effect of cerebrovascular disease I69.959 438.22      The primary encounter diagnosis was Urinary tract infection without hematuria, site unspecified. Diagnoses of Essential hypertension and Hemiplegia of nondominant side, late effect of cerebrovascular disease were also pertinent to this visit.      Plan: 1 urinary tract infection.  Plan.  Patient recommended to complete his current course of antibiotics.    #2.  Hemiplegia with remarkably minimal effects on bladder functioning.  Patient has had only 1 episode of catheterization previously.  GFR is normal as is renal ultrasound.  I think it reasonable to remove his Isbell catheter now.  Patient instructed to follow up with Dr. Silva in 3 months or sooner if issues arise, as Dr. Silva is closer to the patient's home.  at this point I do not feel that tamsulosin for oxybutynin are needed at this time.    #3.Elevated blood pressure.  Plan.  This finding pointed out to the patient.  I recommend that the patient follow up with the patient's PCP for this.    At this point follow-up with me is on a when necessary basis  No orders of the defined types were placed in this encounter.          Lan Gonzalez MD

## 2017-04-18 NOTE — LETTER
April 18, 2017      Lokesh Alamo MD  [address]  [facility] [address]           [facility]  [address] [place] [address]  [place] [address]  Phone: [phone]          Patient: Kuldeep Alamo   MR Number: [id number]   YOB: 1965   Date of Visit: 4/18/2017       Dear Dr. Lokesh Alamo:    Thank you for referring Kuldeep Alamo to me for evaluation. Attached you will find relevant portions of my assessment and plan of care.    If you have questions, please do not hesitate to call me. I look forward to following Kuldeep Alamo along with you.    Sincerely,    Lan Gonzalez MD    Enclosure  CC:  No Recipients    If you would like to receive this communication electronically, please contact [email or web address] or [phone] to request more information on [employer] Link access.    For providers and/or their staff who would like to refer a patient to [facility], please contact us through our one-stop-shop provider referral line, [facility], at [phone].    If you feel you have received this communication in error or would no longer like to receive these types of communications, please e-mail [email or web address]

## 2017-04-18 NOTE — MR AVS SNAPSHOT
"    [facility]  [address] [place], [place] [address]  [place] [address]  Phone: [phone]                  Kuldeep Alamo   2017 9:30 AM   Office Visit    Description:  Male : 1965   Provider:  Lan Gonzalez MD   Department:  [facility]                To Do List           Future Appointments        Provider Department Dept Phone    2017 11:00 AM Gabriel Camarillo MD [facility]-Inter[facility] Card [phone]      Goals (5 Years of Data)     None      [facility]     [facility]  [facility]istance  Unless otherwise directed by your provider, please contact [facility], our nurse care line that is available for  assistance.     Registered nurses in the [facility] provide: appointment scheduling, clinical advisement, health education, and other advisory services.  Call: [phone] (toll free)               Medications           Message regarding Medications     Verify the changes and/or additions to your medication regime listed below are the same as discussed with your clinician today.  If any of these changes or additions are incorrect, please notify your healthcare provider.             Verify that the below list of medications is an accurate representation of the medications you are currently taking.  If none reported, the list may be blank. If incorrect, please contact your healthcare provider. Carry this list with you in case of emergency.           Current Medications     alprazolam (XANAX) 0.5 MG tablet Take 1 tablet (0.5 mg total) by mouth 3 (three) times daily as needed for Anxiety.    aspirin 81 MG Chew Take 1 tablet by mouth once daily.     baclofen (LIORESAL) 20 MG tablet TAKE 1.5 TABLETS (30 MG TOTAL) BY MOUTH 3 (THREE) TIMES DAILY.    BD INSULIN PEN NEEDLE UF SHORT 31 gauge x 5/16" Ndle USE AS DIRECTED TWICE A DAY    BD INSULIN PEN NEEDLE UF SHORT 31 X 5/16 " Ndle USE AS DIRECTED TWICE A DAY    carvedilol (COREG) 25 MG tablet TAKE 1 " "TABLET (25 MG TOTAL) BY MOUTH 2 (TWO) TIMES DAILY WITH MEALS.    cephALEXin (KEFLEX) 500 MG capsule Take 1 capsule (500 mg total) by mouth 4 (four) times daily.    cloNIDine (CATAPRES) 0.1 MG tablet TAKE 1 TABLET (0.1 MG TOTAL) BY MOUTH EVERY 6 (SIX) HOURS AS NEEDED.    clopidogrel (PLAVIX) 75 mg tablet TAKE 1 TABLET (75 MG TOTAL) BY MOUTH ONCE DAILY.    fenofibrate 160 MG Tab TAKE 1 TABLET (160 MG TOTAL) BY MOUTH ONCE DAILY.    fluoxetine (PROZAC) 20 MG capsule Take 1 capsule (20 mg total) by mouth once daily.    hydrocodone-acetaminophen 5-325mg (NORCO) 5-325 mg per tablet Take 1 tablet by mouth every 4 (four) hours as needed for Pain.    lisinopril (PRINIVIL,ZESTRIL) 40 MG tablet Take 1 tablet (40 mg total) by mouth once daily.    metformin (GLUCOPHAGE) 850 MG tablet     nitroGLYCERIN (NITROSTAT) 0.4 MG SL tablet DISSOLVE 1 TAB UNDER TONGUE EVERY 5 MINUTS AT ONSET OF CHEST PAIN-MAX 3 PER 15 MINUTES--GO TO ER    oxybutynin (DITROPAN) 5 MG Tab Take 1 tablet (5 mg total) by mouth 3 (three) times daily.    ranitidine (ZANTAC) 150 MG tablet TAKE 1 TABLET (150 MG TOTAL) BY MOUTH 2 (TWO) TIMES DAILY.    tamsulosin (FLOMAX) 0.4 mg Cp24 Take 1 capsule (0.4 mg total) by mouth once daily.           Clinical Reference Information           Your Vitals Were     BP Pulse Temp Height Weight BMI    156/85 65 98.8 °F (37.1 °C) 5' 4" (1.626 m) 86.2 kg (190 lb) 32.61 kg/m2      Blood Pressure          Most Recent Value    BP  (!)  156/85      Allergies as of 4/18/2017     No Known Drug Allergies      Immunizations Administered on Date of Encounter - 4/18/2017     None      Language Assistance Services     ATTENTION: Language assistance services are available, free of charge. Please call [phone].      ATENCIÓN: Si jairla kari, tiene a valdivia disposición servicios gratuitos de asistencia lingüística. Llame al [phone].     [facility] Ý: N?u b?n nói Ti?ng Vi?t, có các d?ch v? h? tr? ngôn ng? mi?n phí dành cho b?n. G?i s? " [phone].         [facility] complies with applicable Federal civil rights laws and does not discriminate on the basis of race, color, national origin, age, disability, or sex.

## 2017-04-20 ENCOUNTER — PATIENT MESSAGE (OUTPATIENT)
Dept: FAMILY MEDICINE | Facility: CLINIC | Age: 52
End: 2017-04-20

## 2017-04-25 ENCOUNTER — PATIENT MESSAGE (OUTPATIENT)
Dept: FAMILY MEDICINE | Facility: CLINIC | Age: 52
End: 2017-04-25

## 2017-05-04 ENCOUNTER — PATIENT MESSAGE (OUTPATIENT)
Dept: FAMILY MEDICINE | Facility: CLINIC | Age: 52
End: 2017-05-04

## 2017-05-04 RX ORDER — HYDROCODONE BITARTRATE AND ACETAMINOPHEN 5; 325 MG/1; MG/1
1 TABLET ORAL EVERY 4 HOURS PRN
Qty: 30 TABLET | Refills: 0 | Status: SHIPPED | OUTPATIENT
Start: 2017-05-04 | End: 2017-05-19 | Stop reason: SDUPTHER

## 2017-05-18 ENCOUNTER — PATIENT MESSAGE (OUTPATIENT)
Dept: FAMILY MEDICINE | Facility: CLINIC | Age: 52
End: 2017-05-18

## 2017-05-19 RX ORDER — HYDROCODONE BITARTRATE AND ACETAMINOPHEN 5; 325 MG/1; MG/1
1 TABLET ORAL EVERY 4 HOURS PRN
Qty: 30 TABLET | Refills: 0 | Status: SHIPPED | OUTPATIENT
Start: 2017-05-19 | End: 2017-06-08 | Stop reason: SDUPTHER

## 2017-05-29 ENCOUNTER — PATIENT MESSAGE (OUTPATIENT)
Dept: FAMILY MEDICINE | Facility: CLINIC | Age: 52
End: 2017-05-29

## 2017-05-29 DIAGNOSIS — F41.0 PANIC DISORDER: Chronic | ICD-10-CM

## 2017-05-30 RX ORDER — ALPRAZOLAM 0.5 MG/1
0.5 TABLET ORAL 3 TIMES DAILY PRN
Qty: 30 TABLET | Refills: 0 | Status: SHIPPED | OUTPATIENT
Start: 2017-05-30 | End: 2017-06-15 | Stop reason: SDUPTHER

## 2017-06-07 ENCOUNTER — PATIENT MESSAGE (OUTPATIENT)
Dept: FAMILY MEDICINE | Facility: CLINIC | Age: 52
End: 2017-06-07

## 2017-06-08 ENCOUNTER — TELEPHONE (OUTPATIENT)
Dept: FAMILY MEDICINE | Facility: CLINIC | Age: 52
End: 2017-06-08

## 2017-06-08 RX ORDER — HYDROCODONE BITARTRATE AND ACETAMINOPHEN 5; 325 MG/1; MG/1
1 TABLET ORAL EVERY 4 HOURS PRN
Qty: 30 TABLET | Refills: 0 | Status: SHIPPED | OUTPATIENT
Start: 2017-06-08 | End: 2017-07-01 | Stop reason: SDUPTHER

## 2017-06-08 NOTE — TELEPHONE ENCOUNTER
----- Message from Paty Chavira sent at 6/8/2017  3:00 PM CDT -----  Contact: pt  Pt is calling to check the status on his pain medication. Please call pt.

## 2017-06-09 ENCOUNTER — PATIENT MESSAGE (OUTPATIENT)
Dept: FAMILY MEDICINE | Facility: CLINIC | Age: 52
End: 2017-06-09

## 2017-06-14 RX ORDER — LISINOPRIL 40 MG/1
40 TABLET ORAL DAILY
Qty: 30 TABLET | Refills: 11 | Status: SHIPPED | OUTPATIENT
Start: 2017-06-14 | End: 2018-05-17 | Stop reason: SDUPTHER

## 2017-06-15 ENCOUNTER — OFFICE VISIT (OUTPATIENT)
Dept: FAMILY MEDICINE | Facility: CLINIC | Age: 52
End: 2017-06-15
Payer: MEDICARE

## 2017-06-15 VITALS
DIASTOLIC BLOOD PRESSURE: 84 MMHG | OXYGEN SATURATION: 96 % | HEIGHT: 63 IN | HEART RATE: 58 BPM | SYSTOLIC BLOOD PRESSURE: 110 MMHG

## 2017-06-15 DIAGNOSIS — I25.10 CORONARY ARTERY DISEASE INVOLVING NATIVE CORONARY ARTERY OF NATIVE HEART WITHOUT ANGINA PECTORIS: Chronic | ICD-10-CM

## 2017-06-15 DIAGNOSIS — R05.4 COUGH SYNCOPE: ICD-10-CM

## 2017-06-15 DIAGNOSIS — R55 COUGH SYNCOPE: ICD-10-CM

## 2017-06-15 DIAGNOSIS — Z12.11 COLON CANCER SCREENING: ICD-10-CM

## 2017-06-15 DIAGNOSIS — E11.9 TYPE 2 DIABETES MELLITUS WITHOUT COMPLICATION, WITHOUT LONG-TERM CURRENT USE OF INSULIN: Chronic | ICD-10-CM

## 2017-06-15 DIAGNOSIS — F41.0 PANIC DISORDER: Chronic | ICD-10-CM

## 2017-06-15 DIAGNOSIS — Z29.9 PREVENTIVE MEASURE: Primary | ICD-10-CM

## 2017-06-15 PROCEDURE — 90732 PPSV23 VACC 2 YRS+ SUBQ/IM: CPT | Mod: PBBFAC,PO

## 2017-06-15 PROCEDURE — 99214 OFFICE O/P EST MOD 30 MIN: CPT | Mod: S$PBB,,,

## 2017-06-15 PROCEDURE — 99213 OFFICE O/P EST LOW 20 MIN: CPT | Mod: PBBFAC,PO

## 2017-06-15 PROCEDURE — 4010F ACE/ARB THERAPY RXD/TAKEN: CPT | Mod: ,,,

## 2017-06-15 PROCEDURE — 3044F HG A1C LEVEL LT 7.0%: CPT | Mod: ,,,

## 2017-06-15 PROCEDURE — 99999 PR PBB SHADOW E&M-EST. PATIENT-LVL III: CPT | Mod: PBBFAC,,,

## 2017-06-15 RX ORDER — ALPRAZOLAM 0.5 MG/1
0.5 TABLET ORAL 3 TIMES DAILY PRN
Qty: 30 TABLET | Refills: 0 | Status: SHIPPED | OUTPATIENT
Start: 2017-06-15 | End: 2017-08-02 | Stop reason: SDUPTHER

## 2017-06-15 NOTE — PROGRESS NOTES
Subjective:       Patient ID: Kuldeep Alamo is a 52 y.o. male.    Chief Complaint: Fall    HPI The patient fell last week at home after passing out from a coughing attack. He injured his left chest but it is improving. No head injury.        The patient presents for medical management of his chronic medical problems including diabetes mellitus. He monitors his glucoses and it is running in the low 100's.        He has a bump in his scar from previous cyst removal.     Review of Systems   Constitutional: Negative for fatigue.   Cardiovascular: Negative for chest pain.   Gastrointestinal:        No longer constipated since drinking coffee    Endocrine: Positive for polyphagia. Negative for polydipsia and polyuria.   Skin: Negative for pallor.   Neurological: Positive for weakness. Negative for dizziness, tremors, seizures, speech difficulty and headaches.   Psychiatric/Behavioral: Negative for confusion. The patient is nervous/anxious.        Objective:      Vitals:    06/15/17 0857   BP: 110/84   Pulse: (!) 58     Physical Exam   Constitutional: He is oriented to person, place, and time. He appears well-developed and well-nourished. He is cooperative. No distress.   HENT:   Head: Normocephalic and atraumatic.   Right Ear: Hearing, tympanic membrane, external ear and ear canal normal.   Left Ear: Hearing, external ear and ear canal normal.   Nose: Nose normal.   Mouth/Throat: Oropharynx is clear and moist.   Eyes: Conjunctivae are normal. Pupils are equal, round, and reactive to light.   Neck: Normal range of motion. Neck supple. No thyromegaly present.   Cardiovascular: Normal rate, regular rhythm, normal heart sounds and intact distal pulses.    Pulmonary/Chest: Effort normal and breath sounds normal. No respiratory distress.   Musculoskeletal: Normal range of motion. He exhibits no edema or tenderness.   Lymphadenopathy:     He has no cervical adenopathy.   Neurological: He is alert and oriented to person, place,  and time. He has normal strength. Coordination and gait normal.   Skin: Skin is warm, dry and intact. No cyanosis. Nails show no clubbing.   Psychiatric: He has a normal mood and affect. His speech is normal and behavior is normal. Judgment and thought content normal. Cognition and memory are normal.   Vitals reviewed.      Assessment:       1. Preventive measure    2. Cough syncope    3. Panic disorder    4. Colon cancer screening    5. Type 2 diabetes mellitus without complication, without long-term current use of insulin    6. Coronary artery disease involving native coronary artery of native heart without angina pectoris        Plan:       Preventive measure  -     (In Office Administered) Pneumococcal Polysaccharide Vaccine (23 Valent) (SQ/IM)    Cough syncope  -     Ambulatory referral to Cardiology    Panic disorder  -     alprazolam (XANAX) 0.5 MG tablet; Take 1 tablet (0.5 mg total) by mouth 3 (three) times daily as needed for Anxiety.  Dispense: 30 tablet; Refill: 0    Colon cancer screening  -     Case request GI: COLONOSCOPY    Type 2 diabetes mellitus without complication, without long-term current use of insulin    Coronary artery disease involving native coronary artery of native heart without angina pectoris      Return in about 3 months (around 9/15/2017).

## 2017-06-23 ENCOUNTER — TELEPHONE (OUTPATIENT)
Dept: GASTROENTEROLOGY | Facility: CLINIC | Age: 52
End: 2017-06-23

## 2017-06-26 ENCOUNTER — TELEPHONE (OUTPATIENT)
Dept: GASTROENTEROLOGY | Facility: CLINIC | Age: 52
End: 2017-06-26

## 2017-06-26 NOTE — TELEPHONE ENCOUNTER
Spoke with patient in regards to referral sent from PCP to schedule for Colonoscopy, patient declines at the moment and states will call back at a later time.

## 2017-06-28 ENCOUNTER — PATIENT MESSAGE (OUTPATIENT)
Dept: FAMILY MEDICINE | Facility: CLINIC | Age: 52
End: 2017-06-28

## 2017-06-30 ENCOUNTER — PATIENT MESSAGE (OUTPATIENT)
Dept: FAMILY MEDICINE | Facility: CLINIC | Age: 52
End: 2017-06-30

## 2017-07-01 RX ORDER — HYDROCODONE BITARTRATE AND ACETAMINOPHEN 5; 325 MG/1; MG/1
1 TABLET ORAL EVERY 12 HOURS PRN
Qty: 30 TABLET | Refills: 0 | Status: SHIPPED | OUTPATIENT
Start: 2017-07-01 | End: 2017-09-01 | Stop reason: SDUPTHER

## 2017-07-05 ENCOUNTER — TELEPHONE (OUTPATIENT)
Dept: GASTROENTEROLOGY | Facility: CLINIC | Age: 52
End: 2017-07-05

## 2017-07-05 NOTE — TELEPHONE ENCOUNTER
----- Message from Jenny Newman LPN sent at 7/5/2017  4:03 PM CDT -----  Contact: [phone]/Grisel pt sister      ----- Message -----  From: Luna Chavira  Sent: 7/5/2017   3:29 PM  To: [facility] Staff    Pt sister would like to speak with someone in regards to her brother scheduling procedures. Pt sister states her brother is disabled and lives alone. Please advise.

## 2017-07-11 ENCOUNTER — PATIENT MESSAGE (OUTPATIENT)
Dept: FAMILY MEDICINE | Facility: CLINIC | Age: 52
End: 2017-07-11

## 2017-07-11 DIAGNOSIS — E11.9 TYPE 2 DIABETES MELLITUS WITHOUT COMPLICATION, WITHOUT LONG-TERM CURRENT USE OF INSULIN: Primary | Chronic | ICD-10-CM

## 2017-07-13 ENCOUNTER — OFFICE VISIT (OUTPATIENT)
Dept: GASTROENTEROLOGY | Facility: CLINIC | Age: 52
End: 2017-07-13
Payer: MEDICARE

## 2017-07-13 VITALS — SYSTOLIC BLOOD PRESSURE: 115 MMHG | DIASTOLIC BLOOD PRESSURE: 79 MMHG

## 2017-07-13 DIAGNOSIS — Z12.11 COLON CANCER SCREENING: ICD-10-CM

## 2017-07-13 DIAGNOSIS — Z80.0 FAMILY HISTORY OF COLON CANCER: ICD-10-CM

## 2017-07-13 DIAGNOSIS — Z83.719 FAMILY HISTORY OF COLONIC POLYPS: ICD-10-CM

## 2017-07-13 DIAGNOSIS — Z95.1 S/P CABG (CORONARY ARTERY BYPASS GRAFT): Chronic | ICD-10-CM

## 2017-07-13 DIAGNOSIS — I25.10 CORONARY ARTERY DISEASE INVOLVING NATIVE CORONARY ARTERY OF NATIVE HEART WITHOUT ANGINA PECTORIS: Primary | Chronic | ICD-10-CM

## 2017-07-13 DIAGNOSIS — Z74.09 MOBILITY IMPAIRED: ICD-10-CM

## 2017-07-13 DIAGNOSIS — Z79.01 CURRENT USE OF LONG TERM ANTICOAGULATION: ICD-10-CM

## 2017-07-13 PROCEDURE — 99999 PR PBB SHADOW E&M-EST. PATIENT-LVL III: CPT | Mod: PBBFAC,,, | Performed by: NURSE PRACTITIONER

## 2017-07-13 PROCEDURE — 99204 OFFICE O/P NEW MOD 45 MIN: CPT | Mod: S$PBB,,, | Performed by: NURSE PRACTITIONER

## 2017-07-13 PROCEDURE — 99213 OFFICE O/P EST LOW 20 MIN: CPT | Mod: PBBFAC,PN | Performed by: NURSE PRACTITIONER

## 2017-07-13 NOTE — PROGRESS NOTES
Subjective:       Patient ID: Kuldeep Alamo is a 52 y.o. male.    Chief Complaint: Colonoscopy    HPI  Presents to discuss colonoscopy.  He has never had colonoscopy.  Father with colon cancer.  Mother with colon polyps.   He denies abdominal pain , nausea, vomiting, blood with bowel movements or black stools.   He had an episode of constipation that resolved with short term use of miralax.  He has rare reflux.  He uses ranitidine BID.  Denies dysphagia.  Appetite and weight are stable.  He had a stroke in 2009 and is in a wheelchair.  He is able to ambulate but slowly.  He had CABG in 2005 and has been on plavix since that time.  He has held this in the past with no difficulty.      Review of Systems   Constitutional: Negative.  Negative for activity change, appetite change, fatigue, fever and unexpected weight change.   HENT: Negative for sore throat and trouble swallowing.    Respiratory: Negative for shortness of breath.    Cardiovascular: Negative for chest pain.   Gastrointestinal: Negative for abdominal distention, abdominal pain, blood in stool, constipation, diarrhea, nausea and vomiting.   Genitourinary: Negative.    Musculoskeletal: Positive for arthralgias and back pain.   Skin: Negative.    Neurological: Negative.    Psychiatric/Behavioral: Negative.        Objective:      Physical Exam   Constitutional: He is oriented to person, place, and time. He appears well-developed and well-nourished. No distress.   Cardiovascular: Normal rate.    Pulmonary/Chest: Effort normal. No respiratory distress.   Abdominal: Soft. Bowel sounds are normal. He exhibits no distension and no mass. There is no tenderness.   Musculoskeletal:   In a wheelchair.  Able to move extremities but generalized weakness.   Neurological: He is alert and oriented to person, place, and time.   Skin: Skin is warm and dry. He is not diaphoretic. No pallor.   Psychiatric: He has a normal mood and affect. His behavior is normal. Judgment and  thought content normal.   Vitals reviewed.      Assessment:       1. Coronary artery disease involving native coronary artery of native heart without angina pectoris    2. S/P CABG (coronary artery bypass graft)    3. Mobility impaired    4. Family history of colon cancer    5. Family history of colonic polyps    6. Current use of long term anticoagulation    7. Colon cancer screening        Plan:         Kuldeep was seen today for colonoscopy.    Diagnoses and all orders for this visit:    Coronary artery disease involving native coronary artery of native heart without angina pectoris    S/P CABG (coronary artery bypass graft)    Mobility impaired    Family history of colon cancer    Family history of colonic polyps    Current use of long term anticoagulation    Colon cancer screening    Other orders  -     Case request GI: COLONOSCOPY         I have explained the planned procedures to the patient.The risks, benefits and alternatives of the procedure were also explained in detail. Patient verbalized understanding, all questions were answered. The patient agrees to proceed as planned    Will schedule colonoscopy.

## 2017-07-18 ENCOUNTER — PATIENT MESSAGE (OUTPATIENT)
Dept: FAMILY MEDICINE | Facility: CLINIC | Age: 52
End: 2017-07-18

## 2017-07-19 ENCOUNTER — TELEPHONE (OUTPATIENT)
Dept: CARDIOLOGY | Facility: CLINIC | Age: 52
End: 2017-07-19

## 2017-07-19 NOTE — TELEPHONE ENCOUNTER
Patient's sister called to cancel f/u appointment with .  He has established care with Dr. Galeano.

## 2017-07-25 PROBLEM — R94.39 ABNORMAL NUCLEAR STRESS TEST: Status: ACTIVE | Noted: 2017-07-25

## 2017-07-25 PROBLEM — R94.39 ABNORMAL NUCLEAR STRESS TEST: Status: RESOLVED | Noted: 2017-07-25 | Resolved: 2017-07-25

## 2017-07-27 ENCOUNTER — PATIENT MESSAGE (OUTPATIENT)
Dept: FAMILY MEDICINE | Facility: CLINIC | Age: 52
End: 2017-07-27

## 2017-08-02 ENCOUNTER — PATIENT MESSAGE (OUTPATIENT)
Dept: FAMILY MEDICINE | Facility: CLINIC | Age: 52
End: 2017-08-02

## 2017-08-02 DIAGNOSIS — F41.0 PANIC DISORDER: Chronic | ICD-10-CM

## 2017-08-02 RX ORDER — ALPRAZOLAM 0.5 MG/1
0.5 TABLET ORAL 3 TIMES DAILY PRN
Qty: 30 TABLET | Refills: 0 | Status: SHIPPED | OUTPATIENT
Start: 2017-08-02 | End: 2017-11-10 | Stop reason: SDUPTHER

## 2017-08-10 ENCOUNTER — PATIENT MESSAGE (OUTPATIENT)
Dept: FAMILY MEDICINE | Facility: CLINIC | Age: 52
End: 2017-08-10

## 2017-08-10 ENCOUNTER — PATIENT MESSAGE (OUTPATIENT)
Dept: ADMINISTRATIVE | Facility: OTHER | Age: 52
End: 2017-08-10

## 2017-08-10 DIAGNOSIS — Z29.9 PREVENTIVE MEASURE: Primary | ICD-10-CM

## 2017-08-14 ENCOUNTER — PATIENT MESSAGE (OUTPATIENT)
Dept: FAMILY MEDICINE | Facility: CLINIC | Age: 52
End: 2017-08-14

## 2017-08-30 ENCOUNTER — PATIENT MESSAGE (OUTPATIENT)
Dept: FAMILY MEDICINE | Facility: CLINIC | Age: 52
End: 2017-08-30

## 2017-08-30 RX ORDER — FENOFIBRATE 160 MG/1
TABLET ORAL
Qty: 30 TABLET | Refills: 11 | Status: SHIPPED | OUTPATIENT
Start: 2017-08-30 | End: 2018-06-25 | Stop reason: SDUPTHER

## 2017-09-01 ENCOUNTER — PATIENT MESSAGE (OUTPATIENT)
Dept: FAMILY MEDICINE | Facility: CLINIC | Age: 52
End: 2017-09-01

## 2017-09-01 RX ORDER — HYDROCODONE BITARTRATE AND ACETAMINOPHEN 5; 325 MG/1; MG/1
1 TABLET ORAL EVERY 12 HOURS PRN
Qty: 30 TABLET | Refills: 0 | Status: SHIPPED | OUTPATIENT
Start: 2017-09-01 | End: 2017-09-09

## 2017-09-20 LAB
LEFT EYE DM RETINOPATHY: NEGATIVE
RIGHT EYE DM RETINOPATHY: NEGATIVE

## 2017-09-21 ENCOUNTER — PATIENT MESSAGE (OUTPATIENT)
Dept: FAMILY MEDICINE | Facility: CLINIC | Age: 52
End: 2017-09-21

## 2017-09-22 ENCOUNTER — OFFICE VISIT (OUTPATIENT)
Dept: FAMILY MEDICINE | Facility: CLINIC | Age: 52
End: 2017-09-22
Payer: MEDICARE

## 2017-09-22 VITALS
OXYGEN SATURATION: 97 % | HEART RATE: 54 BPM | DIASTOLIC BLOOD PRESSURE: 70 MMHG | HEIGHT: 64 IN | SYSTOLIC BLOOD PRESSURE: 130 MMHG

## 2017-09-22 DIAGNOSIS — Z12.11 COLON CANCER SCREENING: ICD-10-CM

## 2017-09-22 DIAGNOSIS — L03.011 PARONYCHIA OF RIGHT THUMB: Primary | ICD-10-CM

## 2017-09-22 DIAGNOSIS — I10 ESSENTIAL HYPERTENSION: Chronic | ICD-10-CM

## 2017-09-22 PROCEDURE — 3075F SYST BP GE 130 - 139MM HG: CPT | Mod: ,,,

## 2017-09-22 PROCEDURE — 99213 OFFICE O/P EST LOW 20 MIN: CPT | Mod: PBBFAC,PO

## 2017-09-22 PROCEDURE — G0008 ADMIN INFLUENZA VIRUS VAC: HCPCS | Mod: PBBFAC,PO

## 2017-09-22 PROCEDURE — 99999 PR PBB SHADOW E&M-EST. PATIENT-LVL III: CPT | Mod: PBBFAC,,,

## 2017-09-22 PROCEDURE — 3078F DIAST BP <80 MM HG: CPT | Mod: ,,,

## 2017-09-22 PROCEDURE — 99213 OFFICE O/P EST LOW 20 MIN: CPT | Mod: S$PBB,,,

## 2017-09-22 RX ORDER — HYDROCHLOROTHIAZIDE 12.5 MG/1
12.5 TABLET ORAL DAILY
Qty: 90 TABLET | Refills: 3 | Status: SHIPPED | OUTPATIENT
Start: 2017-09-22 | End: 2017-12-07

## 2017-09-22 RX ORDER — CLONIDINE HYDROCHLORIDE 0.1 MG/1
TABLET ORAL
Qty: 30 TABLET | Refills: 5 | Status: ON HOLD | OUTPATIENT
Start: 2017-09-22 | End: 2021-02-03 | Stop reason: HOSPADM

## 2017-09-22 NOTE — PROGRESS NOTES
Subjective:       Patient ID: Kuldeep Alamo is a 52 y.o. male.    Chief Complaint: Ingrown fingernail    HPI  The patient presents with pain in his right thumb for the past week. He washes dishes. It is painful. It got worse the other night. His blood pressure has been running high and he has been having to take clonidine sometimes. It is usually in the 140 range.     Review of Systems   Constitutional: Negative.    Respiratory: Negative.  Negative for shortness of breath.    Cardiovascular: Negative for chest pain.   Psychiatric/Behavioral: Negative.    All other systems reviewed and are negative.      Objective:      Vitals:    09/22/17 1014   BP: 130/70   Pulse: (!) 54     Physical Exam   Constitutional: He is oriented to person, place, and time. He appears well-developed and well-nourished. He is cooperative. No distress.   HENT:   Head: Normocephalic and atraumatic.   Right Ear: Hearing, tympanic membrane, external ear and ear canal normal.   Left Ear: Hearing, external ear and ear canal normal.   Nose: Nose normal.   Mouth/Throat: Oropharynx is clear and moist.   Eyes: Conjunctivae are normal. Pupils are equal, round, and reactive to light.   Neck: Normal range of motion. Neck supple. No thyromegaly present.   Cardiovascular: Normal rate, regular rhythm, normal heart sounds and intact distal pulses.    Pulmonary/Chest: Effort normal and breath sounds normal. No respiratory distress.   Musculoskeletal: Normal range of motion. He exhibits no edema or tenderness.   Lymphadenopathy:     He has no cervical adenopathy.   Neurological: He is alert and oriented to person, place, and time. He has normal strength. Coordination and gait normal.   Skin: Skin is warm, dry and intact. No cyanosis. Nails show no clubbing.   Psychiatric: He has a normal mood and affect. His speech is normal and behavior is normal. Judgment and thought content normal. Cognition and memory are normal.   Vitals reviewed.              Assessment:        1. Paronychia of right thumb    2. Colon cancer screening    3. Essential hypertension        Plan:       Paronychia of right thumb    Colon cancer screening  -     Fecal Immunochemical Test (iFOBT); Future; Expected date: 09/22/2017    Essential hypertension    Other orders  -     cloNIDine (CATAPRES) 0.1 MG tablet; TAKE 1 TABLET (0.1 MG TOTAL) BY MOUTH EVERY 6 (SIX) HOURS AS NEEDED. If bp 160/100 or greater  Dispense: 30 tablet; Refill: 5  -     hydrochlorothiazide (HYDRODIURIL) 12.5 MG Tab; Take 1 tablet (12.5 mg total) by mouth once daily.  Dispense: 90 tablet; Refill: 3  -     Influenza - Quadrivalent (3 years & older) (PF)      Return if symptoms worsen or fail to improve.      Procedure note    Date and time same as visit  Procedure: I & D  Indications: Pain and infection   Patient consent: verba  Pertinent labs: None  Anesthesia: None   Procedure note: Incised and drained with 11 blade after betadine prep   Complications: None   Blood loss: None  Disposition: The patient tolerated the procedure well without complications

## 2017-09-26 ENCOUNTER — PATIENT MESSAGE (OUTPATIENT)
Dept: FAMILY MEDICINE | Facility: CLINIC | Age: 52
End: 2017-09-26

## 2017-09-26 ENCOUNTER — OUTPATIENT CASE MANAGEMENT (OUTPATIENT)
Dept: ADMINISTRATIVE | Facility: OTHER | Age: 52
End: 2017-09-26

## 2017-09-26 RX ORDER — TETANUS TOXOID, REDUCED DIPHTHERIA TOXOID AND ACELLULAR PERTUSSIS VACCINE, ADSORBED 5; 2.5; 8; 8; 2.5 [IU]/.5ML; [IU]/.5ML; UG/.5ML; UG/.5ML; UG/.5ML
SUSPENSION INTRAMUSCULAR
COMMUNITY
Start: 2017-09-20 | End: 2017-10-23

## 2017-09-26 RX ORDER — RANOLAZINE 1000 MG/1
1000 TABLET, FILM COATED, EXTENDED RELEASE ORAL 2 TIMES DAILY
COMMUNITY
Start: 2017-09-01 | End: 2020-01-22

## 2017-09-26 RX ORDER — FUROSEMIDE 40 MG/1
TABLET ORAL
COMMUNITY
Start: 2017-09-18 | End: 2017-12-07 | Stop reason: SDUPTHER

## 2017-09-26 NOTE — PROGRESS NOTES
The following patient has been assigned to Margy Chavez RN with Outpatient Complex Care Management for high risk screening.    Reason: High Risk    Please contact [facility] at [phone] with any questions.    Thank you,  Emily Slaughter

## 2017-09-27 ENCOUNTER — OUTPATIENT CASE MANAGEMENT (OUTPATIENT)
Dept: ADMINISTRATIVE | Facility: OTHER | Age: 52
End: 2017-09-27

## 2017-09-27 ENCOUNTER — PATIENT MESSAGE (OUTPATIENT)
Dept: FAMILY MEDICINE | Facility: CLINIC | Age: 52
End: 2017-09-27

## 2017-09-27 NOTE — PROGRESS NOTES
Attempt to complete initial assessment for Outpatient Care Management; pt does not have voicemail, will mail letter. Nelson HAHN-OPCM

## 2017-09-27 NOTE — LETTER
September 27, 2017    Kuldeep Millerblanc  [address] [place] [address]             [facility] [address] [place] [address] Dear Mr. Alamo,    I work with [facility] Outpatient Case Management Department. We received a referral to call you to discuss your medical history.These services are free of charge and are offered to [facility] patients who have recently been discharged from any of our facilities or who have complex medical conditions that may require the skill of a nurse to assist with management.             I am a Registered Nurse who specializes in connecting patients with available medical and financial resources as well as addressing any educational needs that may be indicated.      I attempted to reach you by telephone, but I was unsuccessful. Please call our department so that we can go over some questions with you regarding your health.    The Outpatient Case Management Department can be reached at [phone] from 8:00AM to 4:30 PM on Monday thru Friday. [facility] also has a program where a nurse is available 24/7 to answer questions or provide medical advice, their number is [phone].    Thanks,      Margy Chavez RN  Outpatient Complex Case Management

## 2017-09-29 ENCOUNTER — OUTPATIENT CASE MANAGEMENT (OUTPATIENT)
Dept: ADMINISTRATIVE | Facility: OTHER | Age: 52
End: 2017-09-29

## 2017-09-29 NOTE — LETTER
October 2, 2017    Kuldeep Millerblanc  [address] [place] [address]             [facility] [address] [place] [address] Dear Mr. Alamo :    I have chose some educational and resource material that I believe you may find useful. Please take your time to review them. Do not hesitate to call me for any questions or concerns. Remember, you may also call [facility] 24/7 Nurse Triage line at [phone] for assistance anytime.     My contact list:    Your PCP is: Dr. Frederick  General Appointment Scheduling [phone]  [facility] (nurse line) [phone]    Sincerely,     Margy Chavez, RN  Outpatient Case Management  [facility]  [phone]

## 2017-09-29 NOTE — PROGRESS NOTES
Pt is a 52 yr old male with PMH that includes falls and Hypertension. Pt reports is in a wheelchair and reports he has fallen out of it twice in the last 2 months. I will mail educational literature about fall prevention and home safety to patient/family, will review literature with patient/family at next phone call. Pt reports he lives alone in his own home but has his 2 sisters and friends that assist him as needed. Pt reports he is able to take care of himself for the most part. Pt reports family and friends assist with grocery shopping and picking up medications. Pt reports he has hypertension but would like more information on it. I will mail educational literature about Hypertension/Hypotension to patient/family, will review literature with patient/family at next phone call. Pt scored a 0 on the PHQ-2. Medication reconciliation completed, no discrepancies noted. Will follow up. ANDRES Chavez RN-OPCM    Continue to educate about hypertension. Review signs and symptoms of hypertension/hypotension. Encourage patient to follow medication and treatment regimen. Encourage patient to maintain follow up with doctors.  Continue to educate about fall prevention. Encourage patient to follow medication and treatment regimen. Encourage patient to maintain follow up with doctors.

## 2017-09-29 NOTE — LETTER
September 29, 2017    Kuldeep Millerblanc  [address] [place] [address]             [facility] [address] [place] [address] Dear Mr. Alamo :    I have chose some educational and resource material that I believe you may find useful. Please take your time to review them. Do not hesitate to call me for any questions or concerns. Remember, you may also call [facility] 24/7 Nurse Triage line at [phone] for assistance anytime.     My contact list:    Your PCP is: Dr. Frederick  General Appointment Scheduling [phone]  [facility] ([facility]urse [facility]) [phone]    Sincerely,     Margy Chavez, RN  Outpatient Case Management  [facility]  [phone]        Anxious/Appropriate/Tearful

## 2017-10-04 ENCOUNTER — PATIENT MESSAGE (OUTPATIENT)
Dept: FAMILY MEDICINE | Facility: CLINIC | Age: 52
End: 2017-10-04

## 2017-10-05 ENCOUNTER — PATIENT MESSAGE (OUTPATIENT)
Dept: FAMILY MEDICINE | Facility: CLINIC | Age: 52
End: 2017-10-05

## 2017-10-06 ENCOUNTER — OUTPATIENT CASE MANAGEMENT (OUTPATIENT)
Dept: ADMINISTRATIVE | Facility: OTHER | Age: 52
End: 2017-10-06

## 2017-10-06 NOTE — PROGRESS NOTES
Good morning. My name is Margy Chavez, I work for [facility]s Outpatient case management department with . I wanted to call and review your disaster plan with you. I also wanted to go over some crucial information and provide you with emergency phone numbers for your [place]. Pt verbalized understanding, emergency preparedness number given and has a plan in place. Will follow up. ANDRES Chavez RN-OPCM    [] Called patient, no answer, I left a message with the phone number for the                    [facility] Office of Emergency Preparedness.     [x] Please be sure to have a supply of water, non-perishable food items, flashlights and batteries with you in your house.   [x] Please be sure you bring all of your medications with you. Bring at least a five day supply. It is best to bring your medication bottles, in case you are displaced for a longer period of time, therefore you can get your medication refilled from your temporary location.   [x] Please bring sure to bring any DME that you may need. This includes walkers, wheelchairs, shower chairs, nebulizer machine, etc.   [x] If you are a diabetic- be sure to bring your glucometer and all glucometer monitoring supplies.   [x] If you have high blood pressure- be sure to bring your blood pressure cuff so you can continue to monitor.   [] If you have CHF-be sure to bring your scale so you can continue to monitor.  [] If on PEG feeding- be sure to bring tube feedings and feeding supplies.  [] If on oxygen- be sure to bring all of your oxygen supplies: Cannula, portable tanks, concentrator, etc. Also contact you Oxygen Supply Company to find out the nearest location of an oxygen supply company to where you will be located. ([employer]: [phone], [facility]: [phone], [facility]:[phone], [employer]: [phone]).   [] If you receive hemodialysis- you should already have a plan in place with your dialysis center. If you do not know where you  need to evacuate to in order to be close to a dialysis center- reach out to your dialysis center for further direction. ([facility] service line: [phone], [facility] service line [phone])  [] If receiving treatment at an infusion center- please contact the infusion center to find out which infusion center they have a contract with. Also ask your infusion center for location of the infusion center they are in contract with, so if need be you can evacuate to that area.   Office of Emergency Preparedness Phone number:  [] [facility]: [phone]  [] [place]: [phone]  [] [facility]: [phone]  [] [facility]: [phone]  [] [place]: [phone]  [] [facility]: [phone]  [] [facility]: [phone]  [] [facility]: [phone]  [] [facility]: [phone]  [] [facility]: [phone]  [] [facility]: [phone]  [] [facility]: [phone]  [] [facility]: [phone]    [] [facility]:  [phone]   [] [facility]:  [phone]   [] [facility]:  [phone]   [] [facility]:  [phone]   [] [facility]:  [phone]   [] [facility]: [phone]   [] [facility]:  [phone]   [] [facility]:  [phone]   [] [facility]:  [phone]   [] [facility]:  [phone]   [] [facility]:  [phone]   [] [facility]:  [phone]   [] [facility]:  [phone]   [] [facility]:  [phone]   [] [facility]:  [phone]   [] [facility]: [phone]   [] [facility]:  [phone]   [] [facility]: [phone]   [] [facility]: [phone]

## 2017-10-10 ENCOUNTER — DOCUMENTATION ONLY (OUTPATIENT)
Dept: FAMILY MEDICINE | Facility: CLINIC | Age: 52
End: 2017-10-10

## 2017-10-20 ENCOUNTER — PATIENT MESSAGE (OUTPATIENT)
Dept: FAMILY MEDICINE | Facility: CLINIC | Age: 52
End: 2017-10-20

## 2017-10-23 ENCOUNTER — OFFICE VISIT (OUTPATIENT)
Dept: FAMILY MEDICINE | Facility: CLINIC | Age: 52
End: 2017-10-23
Payer: MEDICARE

## 2017-10-23 ENCOUNTER — TELEPHONE (OUTPATIENT)
Dept: FAMILY MEDICINE | Facility: CLINIC | Age: 52
End: 2017-10-23

## 2017-10-23 VITALS
OXYGEN SATURATION: 98 % | SYSTOLIC BLOOD PRESSURE: 122 MMHG | HEIGHT: 64 IN | DIASTOLIC BLOOD PRESSURE: 60 MMHG | HEART RATE: 98 BPM

## 2017-10-23 DIAGNOSIS — I83.899 VENOUS STASIS ULCER WITH EDEMA OF LOWER LEG: Primary | ICD-10-CM

## 2017-10-23 DIAGNOSIS — L97.909 VENOUS STASIS ULCER WITH EDEMA OF LOWER LEG: Primary | ICD-10-CM

## 2017-10-23 DIAGNOSIS — K21.9 GASTROESOPHAGEAL REFLUX DISEASE WITHOUT ESOPHAGITIS: ICD-10-CM

## 2017-10-23 DIAGNOSIS — L97.511 DIABETIC ULCER OF TOE OF RIGHT FOOT ASSOCIATED WITH DIABETES MELLITUS DUE TO UNDERLYING CONDITION, LIMITED TO BREAKDOWN OF SKIN: ICD-10-CM

## 2017-10-23 DIAGNOSIS — E08.621 DIABETIC ULCER OF TOE OF RIGHT FOOT ASSOCIATED WITH DIABETES MELLITUS DUE TO UNDERLYING CONDITION, LIMITED TO BREAKDOWN OF SKIN: ICD-10-CM

## 2017-10-23 DIAGNOSIS — I83.009 VENOUS STASIS ULCER WITH EDEMA OF LOWER LEG: Primary | ICD-10-CM

## 2017-10-23 DIAGNOSIS — R60.9 VENOUS STASIS ULCER WITH EDEMA OF LOWER LEG: Primary | ICD-10-CM

## 2017-10-23 PROCEDURE — 99999 PR PBB SHADOW E&M-EST. PATIENT-LVL III: CPT | Mod: PBBFAC,,,

## 2017-10-23 PROCEDURE — 99213 OFFICE O/P EST LOW 20 MIN: CPT | Mod: PBBFAC,PO

## 2017-10-23 PROCEDURE — 99214 OFFICE O/P EST MOD 30 MIN: CPT | Mod: S$PBB,,,

## 2017-10-23 RX ORDER — PANTOPRAZOLE SODIUM 40 MG/1
40 TABLET, DELAYED RELEASE ORAL DAILY
Qty: 30 TABLET | Refills: 11 | Status: SHIPPED | OUTPATIENT
Start: 2017-10-23 | End: 2019-06-10 | Stop reason: SDUPTHER

## 2017-10-23 RX ORDER — OMEPRAZOLE 20 MG/1
20 CAPSULE, DELAYED RELEASE ORAL DAILY
Qty: 30 CAPSULE | Refills: 11 | Status: SHIPPED | OUTPATIENT
Start: 2017-10-23 | End: 2017-10-23

## 2017-10-23 NOTE — PROGRESS NOTES
Subjective:       Patient ID: Kuldeep Alamo is a 52 y.o. male.    Chief Complaint: sore on leg and Gastroesophageal Reflux    HPI He is complaining of a non-healing sores on his right leg. He thinks this started the second week in August and is related to falling backwards in his wheelchair going backwards down his ramp. He fell sideways and landed on concrete. He had to drag himself up the ramp.     Review of Systems   Constitutional: Negative.    Respiratory: Negative.  Negative for shortness of breath.    Cardiovascular: Negative for chest pain.   Psychiatric/Behavioral: Negative.    All other systems reviewed and are negative.      Objective:      Vitals:    10/23/17 0902   BP: 122/60   Pulse: 98     Physical Exam   Constitutional: He is oriented to person, place, and time. He appears well-developed and well-nourished. He is cooperative. No distress.   HENT:   Head: Normocephalic and atraumatic.   Right Ear: Hearing, tympanic membrane, external ear and ear canal normal.   Left Ear: Hearing, external ear and ear canal normal.   Nose: Nose normal.   Mouth/Throat: Oropharynx is clear and moist.   Eyes: Conjunctivae are normal. Pupils are equal, round, and reactive to light.   Neck: Normal range of motion. Neck supple. No thyromegaly present.   Cardiovascular: Normal rate, regular rhythm, normal heart sounds and intact distal pulses.    Pulmonary/Chest: Effort normal and breath sounds normal. No respiratory distress.   Musculoskeletal: Normal range of motion. He exhibits no edema or tenderness.   Lymphadenopathy:     He has no cervical adenopathy.   Neurological: He is alert and oriented to person, place, and time. He has normal strength. Coordination abnormal. Gait normal.   In wheelchair    Skin: Skin is warm, dry and intact. No cyanosis. Nails show no clubbing.   Psychiatric: He has a normal mood and affect. His speech is normal and behavior is normal. Judgment and thought content normal. Cognition and memory are  normal.   Vitals reviewed.      3+ edema               Assessment:       1. Venous stasis ulcer with edema of lower leg    2. Diabetic ulcer of toe of right foot associated with diabetes mellitus due to underlying condition, limited to breakdown of skin    3. Gastroesophageal reflux disease without esophagitis        Plan:       Venous stasis ulcer with edema of lower leg  -     Ambulatory referral to Podiatry  -     Ambulatory referral to Home Health    Diabetic ulcer of toe of right foot associated with diabetes mellitus due to underlying condition, limited to breakdown of skin  -     Ambulatory referral to Podiatry  -     Ambulatory referral to Home Health    Gastroesophageal reflux disease without esophagitis    Other orders  -     Discontinue: omeprazole (PRILOSEC) 20 MG capsule; Take 1 capsule (20 mg total) by mouth once daily.  Dispense: 30 capsule; Refill: 11      Return in about 4 weeks (around 11/20/2017).

## 2017-10-23 NOTE — TELEPHONE ENCOUNTER
----- Message from Paty Chavira sent at 10/23/2017 10:28 AM CDT -----  Contact: [phone]  [facility] [place] is calling about Plavix and Omeprazole, which is a drug interaction. Please call [phone].

## 2017-10-24 ENCOUNTER — TELEPHONE (OUTPATIENT)
Dept: FAMILY MEDICINE | Facility: CLINIC | Age: 52
End: 2017-10-24

## 2017-10-24 NOTE — TELEPHONE ENCOUNTER
----- Message from Paty Chavira sent at 10/24/2017  1:42 PM CDT -----  Contact: Lise [phone]  Lise with [facility] was admitted today because of wounds and they got the orders. On the orders it stated wound gel? Is the pt has to pick this up or it this something [facility] has to get. Please call [phone].

## 2017-10-24 NOTE — TELEPHONE ENCOUNTER
Received call from Valorie with [facility] [phone] need clarification on the following:  Do you want the wound to be cleaned with normal saline, pat dry prior to dressing wound? Ok with family to do with teaching?  Do you want an ace wrap for the compression dressing? How often? Ok with family to do with teaching?

## 2017-10-25 ENCOUNTER — TELEPHONE (OUTPATIENT)
Dept: FAMILY MEDICINE | Facility: CLINIC | Age: 52
End: 2017-10-25

## 2017-10-25 NOTE — TELEPHONE ENCOUNTER
----- Message from Valentine Ag sent at 10/25/2017  2:41 PM CDT -----  [facility] is calling to get a  to go out next week to the home    [phone]   Ask for cass

## 2017-10-27 ENCOUNTER — LAB VISIT (OUTPATIENT)
Dept: LAB | Facility: HOSPITAL | Age: 52
End: 2017-10-27
Payer: MEDICARE

## 2017-10-27 DIAGNOSIS — Z12.11 COLON CANCER SCREENING: ICD-10-CM

## 2017-10-27 LAB — HEMOCCULT STL QL IA: NEGATIVE

## 2017-10-27 PROCEDURE — 82274 ASSAY TEST FOR BLOOD FECAL: CPT

## 2017-11-03 ENCOUNTER — OUTPATIENT CASE MANAGEMENT (OUTPATIENT)
Dept: ADMINISTRATIVE | Facility: OTHER | Age: 52
End: 2017-11-03

## 2017-11-03 NOTE — PROGRESS NOTES
Attempt to follow up for Outpatient Care Management; left message requesting return call. Nelson RN-OPCM

## 2017-11-08 ENCOUNTER — OFFICE VISIT (OUTPATIENT)
Dept: PODIATRY | Facility: CLINIC | Age: 52
End: 2017-11-08
Payer: MEDICARE

## 2017-11-08 VITALS
SYSTOLIC BLOOD PRESSURE: 140 MMHG | DIASTOLIC BLOOD PRESSURE: 75 MMHG | HEART RATE: 62 BPM | WEIGHT: 194 LBS | HEIGHT: 64 IN | BODY MASS INDEX: 33.12 KG/M2

## 2017-11-08 DIAGNOSIS — E11.42 ONYCHOMYCOSIS OF MULTIPLE TOENAILS WITH TYPE 2 DIABETES MELLITUS AND PERIPHERAL NEUROPATHY: ICD-10-CM

## 2017-11-08 DIAGNOSIS — E11.42 DIABETIC POLYNEUROPATHY ASSOCIATED WITH TYPE 2 DIABETES MELLITUS: Primary | ICD-10-CM

## 2017-11-08 DIAGNOSIS — E11.69 ONYCHOMYCOSIS OF MULTIPLE TOENAILS WITH TYPE 2 DIABETES MELLITUS AND PERIPHERAL NEUROPATHY: ICD-10-CM

## 2017-11-08 DIAGNOSIS — B35.1 ONYCHOMYCOSIS OF MULTIPLE TOENAILS WITH TYPE 2 DIABETES MELLITUS AND PERIPHERAL NEUROPATHY: ICD-10-CM

## 2017-11-08 PROCEDURE — 99202 OFFICE O/P NEW SF 15 MIN: CPT | Mod: S$PBB,25,, | Performed by: PODIATRIST

## 2017-11-08 PROCEDURE — 11721 DEBRIDE NAIL 6 OR MORE: CPT | Mod: S$PBB,Q9,, | Performed by: PODIATRIST

## 2017-11-08 PROCEDURE — 99214 OFFICE O/P EST MOD 30 MIN: CPT | Mod: PBBFAC,PO,25 | Performed by: PODIATRIST

## 2017-11-08 PROCEDURE — 99999 PR PBB SHADOW E&M-EST. PATIENT-LVL IV: CPT | Mod: PBBFAC,,, | Performed by: PODIATRIST

## 2017-11-08 PROCEDURE — 11721 DEBRIDE NAIL 6 OR MORE: CPT | Mod: PBBFAC,PO | Performed by: PODIATRIST

## 2017-11-08 RX ORDER — HYDROCODONE BITARTRATE AND ACETAMINOPHEN 5; 325 MG/1; MG/1
1 TABLET ORAL EVERY 6 HOURS PRN
COMMUNITY
End: 2017-11-22

## 2017-11-08 NOTE — PATIENT INSTRUCTIONS
Diabetes: Inspecting Your Feet  Diabetes increases your chances of developing foot problems. So inspect your feet every day. This helps you find small skin irritations before they become serious infections. If you have trouble seeing the bottoms of your feet, use a mirror or ask a family member or friend to help.    How to check your feet  Below are tips to help you look for foot problems. Try to check your feet at the same time each day, such as when you get out of bed in the morning:  · Check the top of each foot. The tops of toes, back of the heel, and outer edge of the foot can get a lot of rubbing from poor-fitting shoes.  · Check the bottom of each foot. Daily wear and tear often leads to problems at pressure spots.  · Check the toes and nails. Fungal infections often occur between toes. Toenail problems can also be a sign of fungal infections or lead to breaks in the skin.  · Check your shoes, too. Loose objects inside a shoe can injure the foot. Use your hand to feel inside your shoes for things like kathy, loose stitching, or rough areas that could irritate your skin.  Warning signs  Look for any color changes in the foot. Redness with streaks can signal a severe infection, which needs immediate medical attention. Tell your doctor right away if you have any of these problems:  · Swelling, sometimes with color changes, may be a sign of poor blood flow or infection. Symptoms include tenderness and an increase in the size of your foot.  · Warm or hot areas on your feet may be signs of infection. A foot that is cold may not be getting enough blood.  · Sensations such as burning, tingling, or pins and needles can be signs of a problem. Also check for areas that may be numb.  · Hot spots are caused by friction or pressure. Look for hot spots in areas that get a lot of rubbing. Hot spots can turn into blisters, calluses, or sores.  · Cracks and sores are caused by dry or irritated skin. They are a sign that  the skin is breaking down, which can lead to infection.  · Toenail problems to watch for include nails growing into the skin (ingrown toenail) and causing redness or pain. Thick, yellow, or discolored nails can signal a fungal infection.  · Drainage and odor can develop from untreated sores and ulcers. Call your doctor right away if you notice white or yellow drainage, bleeding, or unpleasant odor.   © 7519-5188 [employer]. [address] [place] [address], [place] [address]. All rights reserved. This information is not intended as a substitute for professional medical care. Always follow your healthcare professional's instructions.    Diabetic Foot Care    Diabetes can lead to a number of different foot complications. Fortunately, most of these complications can be prevented with a little extra foot care. If diabetes is not well controlled, the high blood sugar can cause damage to blood vessels and result in poor circulation to the foot. When the skin does not get enough blood flow, it becomes prone to pressure sores and ulcers, which heal slowly.  High blood sugar can also damage nerves, interfering with the ability to feel pain and pressure. When you cant feel your foot normally, it is easy to injure your skin, bones and joints without knowing it. For these reasons diabetes increases the risk of fungal infections, bunions and ulcers. Deep ulcers can lead to bone infection. Gangrene is the most serious foot complication of diabetes. It usually occurs on the tips of the toes as blacked areas of skin. The black area is dead tissue. In severe cases, gangrene spreads to involve the entire toe, other toes and the entire foot. Foot or toe amputation may be required. Good foot care and blood sugar control can prevent this.    Home Care  1. Wear comfortable, proper fitting shoes.  2. Wash your feet daily with warm water and mild soap.  3. After drying, apply a moisturizing cream or lotion.  4. Check your feet daily  for skin breaks, blisters, swelling, or redness. Look between your toes also.  5. Wear cotton socks and change them every day.  6. Trim toe nails carefully and do not cut your cuticles.  7. Strive to keep your blood sugar under control with a combination of medicines, diet and activity.  8. If you smoke and have diabetes, it is very important that you stop. Smoking reduces blood flow to your foot.  9. Avoid activities that increase your risk of foot injury:  · Do not walk barefoot.  · Do not use heating pads or hot water bottles on your feet.  · Do not put your foot in a hot tub without first checking the temperature with your hand.  10) Schedule yearly foot exams.    Follow Up  with your doctor or as advised by our staff. Report any cut, puncture, scrape, other injury, blister, ingrown toenail or ulcer on your foot.    Get Prompt Medical Attention  if any of the following occur:  -- Open ulcer with pus draining from the wound  -- Increasing foot or leg pain  -- New areas of redness or swelling or tender areas of the foot    © 9507-9657 [employer]. [address] [place] [address]. All rights reserved. This information is not intended as a substitute for professional medical care. Always follow your healthcare professional's instructions.

## 2017-11-08 NOTE — LETTER
November 8, 2017      Owen Frederick Jr., MD  [address]  [place] [address]           [facility]  [address] [facility] [address]  Phone: [phone]          Patient: Kuldeep Alamo   MR Number: [id number]   YOB: 1965   Date of Visit: 11/8/2017       Dear Dr. Owen Frederick Jr.:    Thank you for referring Kuldeep Alamo to me for evaluation. Attached you will find relevant portions of my assessment and plan of care.    If you have questions, please do not hesitate to call me. I look forward to following Kuldeep Alamo along with you.    Sincerely,    Mihir Baltazar, ADA    Enclosure  CC:  No Recipients    If you would like to receive this communication electronically, please contact [email or web address] or [phone] to request more information on [employer] access.    For providers and/or their staff who would like to refer a patient to [facility], please contact us through our one-stop-shop provider referral line, [facility], at [phone].    If you feel you have received this communication in error or would no longer like to receive these types of communications, please e-mail [email or web address]

## 2017-11-09 ENCOUNTER — PATIENT MESSAGE (OUTPATIENT)
Dept: FAMILY MEDICINE | Facility: CLINIC | Age: 52
End: 2017-11-09

## 2017-11-09 DIAGNOSIS — F41.0 PANIC DISORDER: Chronic | ICD-10-CM

## 2017-11-10 DIAGNOSIS — L97.819 VENOUS STASIS ULCER OF OTHER PART OF RIGHT LOWER LEG, UNSPECIFIED ULCER STAGE, UNSPECIFIED WHETHER VARICOSE VEINS PRESENT: Primary | ICD-10-CM

## 2017-11-10 DIAGNOSIS — I83.018 VENOUS STASIS ULCER OF OTHER PART OF RIGHT LOWER LEG, UNSPECIFIED ULCER STAGE, UNSPECIFIED WHETHER VARICOSE VEINS PRESENT: Primary | ICD-10-CM

## 2017-11-10 PROBLEM — L97.909 STASIS ULCER: Status: ACTIVE | Noted: 2017-11-10

## 2017-11-10 PROBLEM — I83.009 STASIS ULCER: Status: ACTIVE | Noted: 2017-11-10

## 2017-11-10 RX ORDER — ALPRAZOLAM 0.5 MG/1
0.5 TABLET ORAL 3 TIMES DAILY PRN
Qty: 30 TABLET | Refills: 0 | Status: SHIPPED | OUTPATIENT
Start: 2017-11-10 | End: 2017-12-08 | Stop reason: SDUPTHER

## 2017-11-14 NOTE — PROGRESS NOTES
Subjective:      Patient ID: Kuldeep Alamo is a 52 y.o. male.    Chief Complaint: Diabetic Foot Exam (Dr. Frederick 10/23/17) and Nail Care    Kuldeep is a 52 y.o. male who presents to the clinic upon referral from Dr. Frederick  for evaluation and treatment of diabetic feet. 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 Patient relates no major problem with feet. Only complaints today consist of thick, fungal toenails. He denies any history of lower extremity wounds, infections and/or injury.      PCP: Owen Frederick MD    Date Last Seen by PCP: 10/23/17     Current shoe gear: Casual shoes    Hemoglobin A1C   Date Value Ref Range Status   07/13/2017 6.2 (H) 4.0 - 5.6 % Final     Comment:     According to ADA guidelines, hemoglobin A1c <7.0% represents  optimal control in non-pregnant diabetic patients. Different  metrics may apply to specific patient populations.   Standards of Medical Care in Diabetes-2016.  For the purpose of screening for the presence of diabetes:  <5.7%     Consistent with the absence of diabetes  5.7-6.4%  Consistent with increasing risk for diabetes   (prediabetes)  >or=6.5%  Consistent with diabetes  Currently, no consensus exists for use of hemoglobin A1c  for diagnosis of diabetes for children.  This Hemoglobin A1c assay has significant interference with fetal   hemoglobin   (HbF). The results are invalid for patients with abnormal amounts of   HbF,   including those with known Hereditary Persistence   of Fetal Hemoglobin. Heterozygous hemoglobin variants (HbAS, HbAC,   HbAD, HbAE, HbA2) do not significantly interfere with this assay;   however, presence of multiple variants in a sample  may impact the %   interference.     12/06/2016 5.7 4.5 - 6.2 % Final     Comment:     According to ADA guidelines, hemoglobin A1C <7.0% represents  optimal control in non-pregnant diabetic patients.  Different  metrics may apply to specific populations.   Standards of Medical Care in Diabetes - 2016.  For the purpose of screening for the presence of diabetes:  <5.7%     Consistent with the absence of diabetes  5.7-6.4%  Consistent with increasing risk for diabetes   (prediabetes)  >or=6.5%  Consistent with diabetes  Currently no consensus exists for use of hemoglobin A1C  for diagnosis of diabetes for children.     06/23/2016 5.4 4.5 - 6.2 % Final           Review of Systems   Constitution: Negative for chills, fever and malaise/fatigue.   Cardiovascular: Negative for chest pain, leg swelling, orthopnea and palpitations.   Respiratory: Negative for cough, shortness of breath and wheezing.    Skin: Positive for color change, dry skin and nail changes. Negative for itching, poor wound healing and rash.   Musculoskeletal: Negative for arthritis, gout, joint pain, joint swelling, muscle weakness and myalgias.   Neurological: Positive for numbness, paresthesias and sensory change. Negative for disturbances in coordination, dizziness, focal weakness and tremors.           Objective:      Physical Exam   Cardiovascular:   Pulses:       Dorsalis pedis pulses are 1+ on the right side, and 1+ on the left side.        Posterior tibial pulses are 1+ on the right side, and 1+ on the left side.   Dorsalis pedis and posterior tibial pulses are diminished Bilaterally. Toes are cool to touch. Feet are warm proximally.There is decreased digital hair. Skin is atrophic, slightly hyperpigmented, and mildly edematous       Musculoskeletal:   Muscle strength is 5/5 in all groups bilaterally.  Metatarsophalangeal and subtalar range of motion are within normal limits without crepitus bilaterally. There is limitation of ankle dorsiflexion  with knees extended and flexed Bilaterally.       Neurological:   [place] 5.07 monofilamant testing is diminished both feet. Sharp/dull sensation diminished Bilaterally.   Skin:   Toenails 1-5 bilaterally are elongated by 2-3 mm, thickened by 2-3 mm, discolored/yellowed, dystrophic, brittle with subungual debris. No incurvation. Mild xerosis noted, bilat. No open wounds.                 Assessment:       Encounter Diagnoses   Name Primary?    Diabetic polyneuropathy associated with type 2 diabetes mellitus Yes    Onychomycosis of multiple toenails with type 2 diabetes mellitus and peripheral neuropathy          Plan:       Kuldeep was seen today for diabetic foot exam and nail care.    Diagnoses and all orders for this visit:    Diabetic polyneuropathy associated with type 2 diabetes mellitus    Onychomycosis of multiple toenails with type 2 diabetes mellitus and peripheral neuropathy      I counseled the patient on his conditions, their implications and medical management.        - Shoe inspection. Diabetic Foot Education. Patient reminded of the importance of good nutrition and blood sugar control to help prevent podiatric complications of diabetes. Patient instructed on proper foot hygeine. We discussed wearing proper shoe gear, daily foot inspections, never walking without protective shoe gear.    - With patient's permission, nails were aggressively reduced and debrided x 10 to their soft tissue attachment mechanically and with electric , removing all offending nail and debris. Patient relates relief following the procedure.    - Discussed all treatment options such as topical, oral, laser treatments vs surgical removal of nail permanent vs non-permanent in detail pertaining to nail fungus    He will use OTC topical treatments.

## 2017-11-21 ENCOUNTER — OUTPATIENT CASE MANAGEMENT (OUTPATIENT)
Dept: ADMINISTRATIVE | Facility: OTHER | Age: 52
End: 2017-11-21

## 2017-11-21 NOTE — PROGRESS NOTES
-- 2nd attempt to follow up for OPCM, left message requesting a return call. As this is my second unsuccessful attempt to follow up OPCM, I will mail a letter with my contact information. Nelson HAHN-OPCM    Continue to educate about hypertension. Review signs and symptoms of hypertension/hypotension. Encourage patient to follow medication and treatment regimen. Encourage patient to maintain follow up with doctors.  Continue to educate about fall prevention. Encourage patient to follow medication and treatment regimen. Encourage patient to maintain follow up with doctors.

## 2017-11-22 ENCOUNTER — TELEPHONE (OUTPATIENT)
Dept: FAMILY MEDICINE | Facility: CLINIC | Age: 52
End: 2017-11-22

## 2017-11-22 RX ORDER — HYDROCODONE BITARTRATE AND ACETAMINOPHEN 7.5; 325 MG/1; MG/1
1 TABLET ORAL EVERY 6 HOURS PRN
Qty: 20 TABLET | Refills: 0 | Status: SHIPPED | OUTPATIENT
Start: 2017-11-22 | End: 2017-12-07

## 2017-11-22 NOTE — TELEPHONE ENCOUNTER
----- Message from Shirlene Josue sent at 11/22/2017  8:09 AM CST -----  Contact: [facility].[phone]  REFILLS: patient needs rx.scraped wound. Wound care  Patient is requesting a medication refill.   RX name:lidocaine  Strength: 5%  Directions:   Pharmacy name: [facility]  Pharmacy phone #:

## 2017-11-22 NOTE — TELEPHONE ENCOUNTER
----- Message from Valentine Ag sent at 11/22/2017  8:35 AM CST -----  [facility]   [phone]  Forgot to add  Patient needs something stronger for pain    The one he has is not touching his pain       And Also needs lidocaine topical for when would care scrapes the wound                 Call patient at  [phone]

## 2017-11-27 NOTE — TELEPHONE ENCOUNTER
----- Message from Paty Chavira sent at 11/27/2017  4:24 PM CST -----  Contact: Grisel fabian [phone]  Pt would like the Lidocain sent to [facility] in [place]. [facility] [place] does not supply this medication. Please call Grisel [phone].

## 2017-11-29 ENCOUNTER — PATIENT MESSAGE (OUTPATIENT)
Dept: FAMILY MEDICINE | Facility: CLINIC | Age: 52
End: 2017-11-29

## 2017-11-29 ENCOUNTER — TELEPHONE (OUTPATIENT)
Dept: FAMILY MEDICINE | Facility: CLINIC | Age: 52
End: 2017-11-29

## 2017-11-29 NOTE — TELEPHONE ENCOUNTER
----- Message from Valentine Ag sent at 11/29/2017 12:18 PM CST -----  desirae called to say lidocaine gel only comes in 2% and doctor ordered 5% gel. The 5% only comes in creams and patches and ointments. Does doctor want to change the script  And also this is not covered by insurance so will over $51 out of pocket. Does doctor suggest anything cheaper   desirae    [phone]

## 2017-11-29 NOTE — TELEPHONE ENCOUNTER
Spoke to [facility] pharmacy they recommend a script for lidocaine 4% cream would be $43 out of pocket. OTC has Alocane 4% for $8.99

## 2017-11-29 NOTE — TELEPHONE ENCOUNTER
I sent what was recommended by his wound nurse. I don't know the cost of the medication. If his pharmacist recommends a less expensive medication I can prescribe it.

## 2017-11-30 ENCOUNTER — TELEPHONE (OUTPATIENT)
Dept: FAMILY MEDICINE | Facility: CLINIC | Age: 52
End: 2017-11-30

## 2017-11-30 NOTE — TELEPHONE ENCOUNTER
Pt is stating that his pain medication was increased but it is still not touching his pain. Please advise.

## 2017-12-05 ENCOUNTER — OUTPATIENT CASE MANAGEMENT (OUTPATIENT)
Dept: ADMINISTRATIVE | Facility: OTHER | Age: 52
End: 2017-12-05

## 2017-12-05 NOTE — PROGRESS NOTES
-- 3rd Attempt to follow up for Outpatient Care Management;left message requesting a return call. A letter was mailed at the time of my 2nd attempt to follow up, requesting a return call from patient. Nelson HAHN-OPCM

## 2017-12-07 ENCOUNTER — OFFICE VISIT (OUTPATIENT)
Dept: FAMILY MEDICINE | Facility: CLINIC | Age: 52
End: 2017-12-07
Payer: MEDICARE

## 2017-12-07 VITALS
DIASTOLIC BLOOD PRESSURE: 88 MMHG | SYSTOLIC BLOOD PRESSURE: 122 MMHG | OXYGEN SATURATION: 98 % | HEIGHT: 64 IN | HEART RATE: 56 BPM

## 2017-12-07 DIAGNOSIS — I87.2 VENOUS STASIS ULCER OF RIGHT ANKLE WITH FAT LAYER EXPOSED WITHOUT VARICOSE VEINS: ICD-10-CM

## 2017-12-07 DIAGNOSIS — R60.0 LOCALIZED EDEMA: ICD-10-CM

## 2017-12-07 DIAGNOSIS — L97.312 VENOUS STASIS ULCER OF RIGHT ANKLE WITH FAT LAYER EXPOSED WITHOUT VARICOSE VEINS: ICD-10-CM

## 2017-12-07 PROBLEM — R55 COUGH SYNCOPE: Status: RESOLVED | Noted: 2017-06-15 | Resolved: 2017-12-07

## 2017-12-07 PROBLEM — R05.4 COUGH SYNCOPE: Status: RESOLVED | Noted: 2017-06-15 | Resolved: 2017-12-07

## 2017-12-07 PROCEDURE — 99999 PR PBB SHADOW E&M-EST. PATIENT-LVL III: CPT | Mod: PBBFAC,,,

## 2017-12-07 PROCEDURE — 99213 OFFICE O/P EST LOW 20 MIN: CPT | Mod: PBBFAC,PO

## 2017-12-07 PROCEDURE — 99213 OFFICE O/P EST LOW 20 MIN: CPT | Mod: S$PBB,,,

## 2017-12-07 RX ORDER — OXYCODONE AND ACETAMINOPHEN 10; 325 MG/1; MG/1
1 TABLET ORAL EVERY 4 HOURS PRN
Qty: 30 TABLET | Refills: 0 | Status: ON HOLD | OUTPATIENT
Start: 2017-12-07 | End: 2019-02-19 | Stop reason: ALTCHOICE

## 2017-12-07 RX ORDER — FUROSEMIDE 40 MG/1
40 TABLET ORAL 2 TIMES DAILY
Qty: 60 TABLET | Refills: 11 | Status: ON HOLD | OUTPATIENT
Start: 2017-12-07 | End: 2017-12-30

## 2017-12-07 NOTE — PROGRESS NOTES
Subjective:       Patient ID: Kuldeep Alamo is a 52 y.o. male.    Chief Complaint: Leg Pain and Foot Swelling    HPI The patient presents with continued complaints of severe pain in his right leg associated with edema and venous stasis ulcer. He is going to PT and having compression dressings and there has been some healing noted. I am recommended wound care and they prefer to go to [place]. He is taking hydrocodone for pain but it is not helping despite increasing from 5 mg to 7.5 mg. He has persistent edema to both feet and extremities.     Review of Systems   Constitutional: Negative.    Respiratory: Negative.  Negative for shortness of breath.    Cardiovascular: Negative for chest pain.   Psychiatric/Behavioral: Negative.    All other systems reviewed and are negative.      Objective:      Vitals:    12/07/17 1141   BP: 122/88   Pulse:      Physical Exam   Constitutional: He is oriented to person, place, and time. He appears well-developed and well-nourished. He is cooperative. No distress.   HENT:   Head: Normocephalic and atraumatic.   Right Ear: Hearing, tympanic membrane, external ear and ear canal normal.   Left Ear: Hearing, external ear and ear canal normal.   Nose: Nose normal.   Mouth/Throat: Oropharynx is clear and moist.   Eyes: Conjunctivae are normal. Pupils are equal, round, and reactive to light.   Neck: Normal range of motion. Neck supple. No thyromegaly present.   Cardiovascular: Normal rate, regular rhythm, normal heart sounds and intact distal pulses.    Pulmonary/Chest: Effort normal and breath sounds normal. No respiratory distress.   Musculoskeletal: Normal range of motion. He exhibits tenderness. He exhibits no edema.   Compression dressing in place right leg   Lymphadenopathy:     He has no cervical adenopathy.   Neurological: He is alert and oriented to person, place, and time. He has normal strength. Coordination and gait normal.   Skin: Skin is warm, dry and intact. No cyanosis. Nails  show no clubbing.   Psychiatric: He has a normal mood and affect. His speech is normal and behavior is normal. Judgment and thought content normal. Cognition and memory are normal.   Vitals reviewed.      Assessment:       1. Venous stasis ulcer of right ankle with fat layer exposed without varicose veins    2. Localized edema        Plan:       Venous stasis ulcer of right ankle with fat layer exposed without varicose veins  -     Ambulatory referral to Wound Clinic    Localized edema    Other orders  -     oxyCODONE-acetaminophen (PERCOCET)  mg per tablet; Take 1 tablet by mouth every 4 (four) hours as needed for Pain.  Dispense: 30 tablet; Refill: 0  -     furosemide (LASIX) 40 MG tablet; Take 1 tablet (40 mg total) by mouth 2 (two) times daily.  Dispense: 60 tablet; Refill: 11      Return if symptoms worsen or fail to improve.

## 2017-12-08 ENCOUNTER — PATIENT MESSAGE (OUTPATIENT)
Dept: FAMILY MEDICINE | Facility: CLINIC | Age: 52
End: 2017-12-08

## 2017-12-08 DIAGNOSIS — F41.0 PANIC DISORDER: Chronic | ICD-10-CM

## 2017-12-08 RX ORDER — ALPRAZOLAM 0.5 MG/1
0.5 TABLET ORAL 3 TIMES DAILY PRN
Qty: 30 TABLET | Refills: 0 | Status: SHIPPED | OUTPATIENT
Start: 2017-12-08 | End: 2018-01-24 | Stop reason: SDUPTHER

## 2017-12-11 ENCOUNTER — APPOINTMENT (OUTPATIENT)
Dept: WOUND CARE | Facility: HOSPITAL | Age: 52
End: 2017-12-11
Attending: SURGERY
Payer: MEDICARE

## 2017-12-11 ENCOUNTER — TELEPHONE (OUTPATIENT)
Dept: FAMILY MEDICINE | Facility: CLINIC | Age: 52
End: 2017-12-11

## 2017-12-11 NOTE — TELEPHONE ENCOUNTER
----- Message from Valentine Ag sent at 12/11/2017  4:09 PM CST -----  Sridevi with [facility]   [phone]   Needs nurse to call  ASAP about patient wound

## 2017-12-11 NOTE — TELEPHONE ENCOUNTER
Per Sridevi, Pt in route to ER because he is in terrible pain, wound looks terrible with greenish yellow discharge and eschar, dead skin falling off

## 2017-12-12 ENCOUNTER — TELEPHONE (OUTPATIENT)
Dept: FAMILY MEDICINE | Facility: CLINIC | Age: 52
End: 2017-12-12

## 2017-12-12 NOTE — TELEPHONE ENCOUNTER
----- Message from Paty Chavira sent at 12/12/2017  9:03 AM CST -----  Contact: Ceci [phone]  Ceci with [facility] is calling to get orders fax to [phone]. This is an outside company called [employer]. They can not take the orders from [facility]. Please fax H & P  And chart notes. The connect person is Izzy Aguayo [phone].

## 2017-12-14 ENCOUNTER — OFFICE VISIT (OUTPATIENT)
Dept: WOUND CARE | Facility: HOSPITAL | Age: 52
End: 2017-12-14
Attending: SURGERY
Payer: MEDICARE

## 2017-12-14 VITALS
TEMPERATURE: 97 F | HEART RATE: 69 BPM | SYSTOLIC BLOOD PRESSURE: 111 MMHG | DIASTOLIC BLOOD PRESSURE: 76 MMHG | RESPIRATION RATE: 20 BRPM

## 2017-12-14 DIAGNOSIS — S81.801A TRAUMATIC OPEN WOUND OF RIGHT LOWER LEG, INITIAL ENCOUNTER: ICD-10-CM

## 2017-12-14 DIAGNOSIS — L97.812 NON-PRESSURE CHRONIC ULCER OF OTHER PART OF RIGHT LOWER LEG WITH FAT LAYER EXPOSED: ICD-10-CM

## 2017-12-14 PROCEDURE — 27201912 HC WOUND CARE DEBRIDEMENT SUPPLIES

## 2017-12-14 PROCEDURE — 99212 OFFICE O/P EST SF 10 MIN: CPT

## 2017-12-14 PROCEDURE — 11042 DBRDMT SUBQ TIS 1ST 20SQCM/<: CPT

## 2017-12-14 NOTE — H&P
CHIEF COMPLAINT:  Nonhealing ulcer of the wounds of the right leg.    HISTORY OF PRESENT ILLNESS:  Mr. Alamo is a wheelchair bound patient due to   stroke since 2009.  About two months ago, he hit his right lateral leg and knee   while transferring and developed ulcers and wounds, which have not healed over   two months.  He has not had any prior ulcerations or prior wound care visits.    He reports that he does feel his extremities do have pain in the wounds.  He   says the knee wound is healing quite well, but the one on the right lower leg is   not progressing.  He has had no complaints of any fever or drainage from the   areas.  He has been having home health without success.  He was referred to   [facility] by Dr. Frederick for evaluation and treatment.    PAST MEDICAL HISTORY:  Pertinent for no allergies.  He has hypertension, type 2   diabetes, coronary artery disease status post CABG bypass, status post CVA and   has muscle spasticity.  He has hemiplegia of his nondominant side, spastic   hemiparesis impairing mobility.    MEDICATIONS:  Reviewed in EPIC.  He is on no blood thinners at this time, but he   is on diabetic and heart medications.    PAST SURGICAL HISTORY:  Includes cardiac catheterization, cholecystectomy, and   coronary artery bypass graft in 2006.    FAMILY HISTORY:  Pertinent for heart disease and hypertension.    SOCIAL HISTORY:  Wheelchair bound, used to smoke but quit after a stroke, does   drink about 20 cans of beer a week.    REVIEW OF SYSTEMS:  He has had no complaints of any mental changes.  No   shortness of breath, no chest pains, no irregular heart rates.  No dysuria, no   hematuria and no neurological issues.  SKIN:  See the wound sheet for documentation.    PHYSICAL EXAMINATION:  GENERAL:  He is awake, alert, oriented, in no apparent distress.  HEENT:  Shows no signs of trauma.  Pupils are equal and reactive to light.  CHEST:  Good breath sounds.  No crackles.  HEART:   Regular rate and rhythm.  No murmurs.  NEUROLOGICAL:  He is alert and oriented.  EXTREMITIES:  He has a large ulcer in the right lower leg.  He was seen in the   ER several days ago for a rash and documented as a venous stasis ulcer, but does   not have venous stasis ulcer, these are actually a traumatic wound.  On   physical exam, again the wounds as a small healing ulcer just inferior to his   knee and a larger ulcer with necrosis of the skin and subcutaneous tissue with   exposed subcutaneous tissue on the right lateral leg, which will be documented   in wound care chart.  Pictures will be obtained.    IMPRESSION:  Traumatic wound, right leg, wheelchair bound, chronic edema,   vascular problems.  He has had documented angiogram by [facility] in July of this year,   which showed some peripheral stenosis.  There were no recommendations at the   time because the patient was not ambulatory, had no real limb loss.  Today, we   were unable to get Dopplers and that is because of the same findings.  He has   stenosis of the vessels higher up with poor runoff.    IMPRESSION:  Traumatic ulcer, nonhealing, paraplegia, poor blood supply.    PLAN:  For elevation, light compression, Santyl to the wound and serial   debridements until healing.          /andres [id number] blank(s)        BGL/HN  dd: 12/14/2017 09:43:27 (CST)  td: 12/14/2017 10:39:52 (CST)  Doc ID   #[id number]  Job ID #[id number]    CC:

## 2017-12-14 NOTE — LETTER
December 14, 2017      Owen Frederick Jr., MD  [address] [place] [address]           [facility] [address] [place] [address]  Phone: [phone]  Fax: [phone]          Patient: Kuldeep Alamo   MR Number: [id number]   YOB: 1965   Date of Visit: 12/14/2017       Dear Dr. Owen Frederick Jr.:    Thank you for referring Kuldeep Alamo to me for evaluation. Attached you will find relevant portions of my assessment and plan of care.    If you have questions, please do not hesitate to call me. I look forward to following Kuldeep Alamo along with you.    Sincerely,    Ceci Preciado RN    Enclosure  CC:  No Recipients    If you would like to receive this communication electronically, please contact [email or web address] or [phone] to request more information on [employer]Care Link access.    For providers and/or their staff who would like to refer a patient to [facility], please contact us through our one-stop-shop provider referral line, [facility], at [phone].    If you feel you have received this communication in error or would no longer like to receive these types of communications, please e-mail [email or web address]

## 2017-12-15 ENCOUNTER — OUTPATIENT CASE MANAGEMENT (OUTPATIENT)
Dept: ADMINISTRATIVE | Facility: OTHER | Age: 52
End: 2017-12-15

## 2017-12-15 NOTE — PROGRESS NOTES
Attempt to follow up for Outpatient Care Management; pt asked this nurse to call back next week, will follow up. Nelson HAHN-OPCM

## 2017-12-19 DIAGNOSIS — I10 ESSENTIAL HYPERTENSION: ICD-10-CM

## 2017-12-19 RX ORDER — CARVEDILOL 25 MG/1
TABLET ORAL
Qty: 60 TABLET | Refills: 11 | Status: ON HOLD | OUTPATIENT
Start: 2017-12-19 | End: 2019-10-07 | Stop reason: HOSPADM

## 2017-12-21 ENCOUNTER — OFFICE VISIT (OUTPATIENT)
Dept: WOUND CARE | Facility: HOSPITAL | Age: 52
End: 2017-12-21
Attending: SURGERY
Payer: MEDICARE

## 2017-12-21 VITALS
DIASTOLIC BLOOD PRESSURE: 75 MMHG | HEART RATE: 64 BPM | RESPIRATION RATE: 20 BRPM | SYSTOLIC BLOOD PRESSURE: 113 MMHG | TEMPERATURE: 98 F

## 2017-12-21 DIAGNOSIS — S81.801A TRAUMATIC OPEN WOUND OF RIGHT LOWER LEG, INITIAL ENCOUNTER: Primary | ICD-10-CM

## 2017-12-21 PROCEDURE — 11042 DBRDMT SUBQ TIS 1ST 20SQCM/<: CPT

## 2017-12-21 PROCEDURE — 11045 DBRDMT SUBQ TISS EACH ADDL: CPT

## 2017-12-21 PROCEDURE — 27201912 HC WOUND CARE DEBRIDEMENT SUPPLIES

## 2017-12-21 NOTE — LETTER
December 21, 2017      Owen Frederick Jr., MD  [address] [place] [place] [address]           [facility] [address] [place] [address]  Phone: [phone]  Fax: [phone]          Patient: Kuldeep Alamo   MR Number: [id number]   YOB: 1965   Date of Visit: 12/21/2017       Dear Dr. Owen Frederick Jr.:    Thank you for referring Kuldeep Alamo to me for evaluation. Attached you will find relevant portions of my assessment and plan of care.    If you have questions, please do not hesitate to call me. I look forward to following Kuldeep Alamo along with you.    Sincerely,    Akilah Fraga LPN    Enclosure  CC:  No Recipients    If you would like to receive this communication electronically, please contact [email or web address] or [phone] to request more information on sl[employer]et Link access.    For providers and/or their staff who would like to refer a patient to [facility], please contact us through our one-stop-shop provider referral line, [facility], at [phone].    If you feel you have received this communication in error or would no longer like to receive these types of communications, please e-mail [email or web address]

## 2017-12-21 NOTE — PROGRESS NOTES
[facility]  Wound Care  Progress Note    Problem List Items Addressed This Visit     Traumatic open wound of right lower leg - Primary    Overview     Patient report trauma to right lateral leg about 2 mo. Ago. Has home health but not improving. Exam shows necrotic skin/fat. Measured in wound docs. Had evaluation of extremity by [facility] in July 2017 showing decrease inflow. Wheelchair bound since 2009 s/p CVA               See wound doc progress notes. Documents will be scanned.        Carlton Rodriguez Jr  [facility]

## 2017-12-22 ENCOUNTER — TELEPHONE (OUTPATIENT)
Dept: FAMILY MEDICINE | Facility: CLINIC | Age: 52
End: 2017-12-22

## 2017-12-22 NOTE — TELEPHONE ENCOUNTER
I have spoken to Gerda. She states the patient vomits after taking percocet. He responded well to dilaudid in the ED two weeks ago.   I have advised Gerda to tell patient to take 1/2 the tablet and go to ED if symptoms worsen.

## 2017-12-22 NOTE — TELEPHONE ENCOUNTER
----- Message from Jade Vernon sent at 12/22/2017 10:10 AM CST -----  Contact: Gerda from [facility] [phone]  Gerda from [facility] would like to speak with you about  pt's pain medication oxyCODONE is making him experience  nausea and vomiting. Gerda stated that pain medication that pt's receive in the [facility] ER two weeks ago help him. Please advise.

## 2017-12-26 ENCOUNTER — OUTPATIENT CASE MANAGEMENT (OUTPATIENT)
Dept: ADMINISTRATIVE | Facility: OTHER | Age: 52
End: 2017-12-26

## 2017-12-26 NOTE — PROGRESS NOTES
Pt unable to speak at this time, asked this nurse to call back at another time. Will follow up. ANDRES Chavez RN-OPCM    Continue to educate about hypertension. Review signs and symptoms of hypertension/hypotension. Encourage patient to follow medication and treatment regimen. Encourage patient to maintain follow up with doctors.  Continue to educate about fall prevention. Encourage patient to follow medication and treatment regimen. Encourage patient to maintain follow up with doctors.

## 2017-12-28 ENCOUNTER — OFFICE VISIT (OUTPATIENT)
Dept: WOUND CARE | Facility: HOSPITAL | Age: 52
End: 2017-12-28
Attending: SURGERY
Payer: MEDICARE

## 2017-12-28 VITALS
TEMPERATURE: 98 F | SYSTOLIC BLOOD PRESSURE: 136 MMHG | HEART RATE: 68 BPM | DIASTOLIC BLOOD PRESSURE: 69 MMHG | RESPIRATION RATE: 20 BRPM

## 2017-12-28 DIAGNOSIS — S81.801A TRAUMATIC OPEN WOUND OF RIGHT LOWER LEG, INITIAL ENCOUNTER: ICD-10-CM

## 2017-12-28 PROCEDURE — 11045 DBRDMT SUBQ TISS EACH ADDL: CPT

## 2017-12-28 PROCEDURE — 27201912 HC WOUND CARE DEBRIDEMENT SUPPLIES

## 2017-12-28 PROCEDURE — 11042 DBRDMT SUBQ TIS 1ST 20SQCM/<: CPT

## 2017-12-28 NOTE — LETTER
December 28, 2017      Owen Frederick Jr., MD  [address] [place] [address]           [facility] [address] [place] [address]  Phone: [phone]  Fax: [phone]          Patient: Kuldeep Alamo   MR Number: [id number]   YOB: 1965   Date of Visit: 12/28/2017       Dear Dr. Owen Frederick Jr.:    Thank you for referring Kuldeep Alamo to me for evaluation. Attached you will find relevant portions of my assessment and plan of care.    If you have questions, please do not hesitate to call me. I look forward to following Kuldeep Alamo along with you.    Sincerely,    Terese Raman LPN    Enclosure  CC:  No Recipients    If you would like to receive this communication electronically, please contact [email or web address] or [phone] to request more information on [email or web address] Link access.    For providers and/or their staff who would like to refer a patient to [facility], please contact us through our one-stop-shop provider referral line, [facility], at [phone].    If you feel you have received this communication in error or would no longer like to receive these types of communications, please e-mail [email or web address]

## 2017-12-29 PROBLEM — I70.229 CRITICAL LOWER LIMB ISCHEMIA: Status: ACTIVE | Noted: 2017-12-29

## 2017-12-30 PROBLEM — I50.33 ACUTE ON CHRONIC DIASTOLIC CHF (CONGESTIVE HEART FAILURE), NYHA CLASS 3: Status: ACTIVE | Noted: 2017-12-30

## 2018-01-02 DIAGNOSIS — Z79.4 TYPE 2 DIABETES MELLITUS WITHOUT COMPLICATION, WITH LONG-TERM CURRENT USE OF INSULIN: Chronic | ICD-10-CM

## 2018-01-02 DIAGNOSIS — E11.9 TYPE 2 DIABETES MELLITUS WITHOUT COMPLICATION, WITH LONG-TERM CURRENT USE OF INSULIN: Chronic | ICD-10-CM

## 2018-01-02 RX ORDER — METFORMIN HYDROCHLORIDE 850 MG/1
850 TABLET ORAL 2 TIMES DAILY WITH MEALS
Qty: 60 TABLET | Refills: 12 | Status: ON HOLD | OUTPATIENT
Start: 2018-01-02 | End: 2018-01-13

## 2018-01-04 PROBLEM — I50.32 CHRONIC DIASTOLIC CHF (CONGESTIVE HEART FAILURE): Status: ACTIVE | Noted: 2017-12-30

## 2018-01-04 PROBLEM — I73.9 PAD (PERIPHERAL ARTERY DISEASE): Status: ACTIVE | Noted: 2018-01-04

## 2018-01-08 ENCOUNTER — PATIENT OUTREACH (OUTPATIENT)
Dept: ADMINISTRATIVE | Facility: CLINIC | Age: 53
End: 2018-01-08

## 2018-01-08 ENCOUNTER — OFFICE VISIT (OUTPATIENT)
Dept: WOUND CARE | Facility: HOSPITAL | Age: 53
End: 2018-01-08
Attending: SURGERY
Payer: MEDICARE

## 2018-01-08 VITALS — DIASTOLIC BLOOD PRESSURE: 75 MMHG | RESPIRATION RATE: 20 BRPM | SYSTOLIC BLOOD PRESSURE: 114 MMHG | HEART RATE: 75 BPM

## 2018-01-08 DIAGNOSIS — S81.801D TRAUMATIC OPEN WOUND OF RIGHT LOWER LEG, SUBSEQUENT ENCOUNTER: ICD-10-CM

## 2018-01-08 PROCEDURE — 27201912 HC WOUND CARE DEBRIDEMENT SUPPLIES

## 2018-01-08 PROCEDURE — 11045 DBRDMT SUBQ TISS EACH ADDL: CPT

## 2018-01-08 PROCEDURE — 11042 DBRDMT SUBQ TIS 1ST 20SQCM/<: CPT

## 2018-01-08 NOTE — PROGRESS NOTES
[facility]  Wound Care  Progress Note    Problem List Items Addressed This Visit     Traumatic open wound of right lower leg    Overview     Patient report trauma to right lateral leg about 2 mo. Ago. Has home health but not improving. Exam shows necrotic skin/fat. Measured in wound docs. Had evaluation of extremity by [facility] in July 2017 showing decrease inflow. Wheelchair bound since 2009 s/p CVA. Knee area healed. Still large amount of necrotic yellow areas. Sensitive so limits debridement.  Patient is using Santyl to the wound.  Patient underwent angiogram with angioplasty last week.  Remains painful at the trials of granulation tissue since angiogram.               See wound doc progress notes. Documents will be scanned.        Thompson Mcdaniel  [facility]

## 2018-01-08 NOTE — PATIENT INSTRUCTIONS
Peripheral Angioplasty  Peripheral angioplasty is a procedure that helps open blockages in peripheral arteries. These vessels carry blood to your lower body, legs, and arms.  Talk with your healthcare provider about the risks and complications of angioplasty.   Before the procedure  Recommendations of what to do include:   · Tell your healthcare provider about all medicines you take including over-the-counter medicines, herbal supplements, and any allergies you may have, especially to iodine.   · Follow any directions youre given for not eating or drinking before the procedure.  · Arrange for a family member or friend to drive you home.  During the procedure    · You may get medicine through an IV (intravenous) line to relax you. An injection will numb the site your healthcare provider will use to perform the procedure. The site used is generally an artery in the groin although the wrist or arm may be used. The healthcare provider makes a tiny skin cut (incision) near an artery in your groin.  · Your healthcare provider puts a thin, flexible tube (catheter) through the incision. He or she then threads the catheter into the affected artery while using X-ray monitoring.  · Contrast dye is injected into the catheter. X-rays (angiography) are taken.  · A tiny balloon is pushed through the catheter to the blockage. Your healthcare provider inflates and deflates the balloon a few times. This compresses the plaque. A small metal or mesh tube (stent) may be put in the artery to help keep it open. The balloon and catheter are then taken out.  After the procedure  Youll be taken to a recovery area. Pressure is put on the insertion site for about 30 to 45 minutes. Your healthcare provider will tell you how long to lie down and keep the insertion site still. You will go home that day or spend the night in the facility. You will be given aftercare instructions for when you go home.   Call your healthcare provider  Call your  healthcare provider right away or get immediate medical attention if:  · You notice a lump or bleeding at the site where the catheter was inserted  · You feel increasing pain at the insertion site  · You become lightheaded or dizzy  · You have leg pain or numbness  · You have a leg that turns blue or feels cold  · You develop a fever greater than 101.5°F (38.6°C).  · You develop a skin rash  · You cannot urinate after the procedure  · You have chest pain or shortness of breath   Date Last Reviewed: 5/1/2016  © 6084-2039 [employer]. [address] [place] [address]. All rights reserved. This information is not intended as a substitute for professional medical care. Always follow your healthcare professional's instructions.

## 2018-01-08 NOTE — PROGRESS NOTES
Requested call back - [phone] RCB      By Adele Lopez, RN              C3 nurse attempted to contact patient. The following occurred:   C3 nurse attempted to contact Kuldeep Alamo for a TCC post hospital discharge follow up call. The patient is unable to conduct the call @ this time. The patient requested a callback.    The patient has a scheduled HOSFU appointment with Dr Galeano on 01/11/2018. Message sent to Physician staff.

## 2018-01-08 NOTE — PROGRESS NOTES
TCC Script completed with patient's sister Lizbeth.  Patient scheduled for PTA on 01/11/18 @ 1100 hrs with Dr Galeano

## 2018-01-09 ENCOUNTER — OUTPATIENT CASE MANAGEMENT (OUTPATIENT)
Dept: ADMINISTRATIVE | Facility: OTHER | Age: 53
End: 2018-01-09

## 2018-01-09 NOTE — PROGRESS NOTES
Spoke to pt who reports he received educational literature this nurse mailed him. Pt reports he has read educational literature and currently denies any questions. Pt denies any falls since we last spoke. Discussed fall prevention and home safety with pt. Pt reports he has been taking his Hamzah daily and logging it. Pt reports his BP today was 116/70. Discusses signs and symptoms of hypertension and hypotension. Discussed with pt importance of monitoring his BP daily. Pt reports his left leg is healing well from his angiogram on 1/2/18. Pt reports his pain has been controlled and denies any breakthrough pain. Pt reports he will be having his right leg done on 1/11/18. Pt denies any questions about procedure at this time. Pt requests that this nurse follow up with him in 2 weeks due to upcoming angiogram. Will follow up then. ANDRES Chavez RN-OPCM   Continue to educate about hypertension. Review signs and symptoms of hypertension/hypotension. Encourage patient to follow medication and treatment regimen. Encourage patient to maintain follow up with doctors.  Continue to educate about fall prevention. Encourage patient to follow medication and treatment regimen. Encourage patient to maintain follow up with doctors.

## 2018-01-11 PROBLEM — I77.0 A-V FISTULA: Status: ACTIVE | Noted: 2018-01-11

## 2018-01-12 PROBLEM — I77.0 A-V FISTULA: Status: ACTIVE | Noted: 2018-01-12

## 2018-01-15 ENCOUNTER — OFFICE VISIT (OUTPATIENT)
Dept: WOUND CARE | Facility: HOSPITAL | Age: 53
End: 2018-01-15
Attending: SURGERY
Payer: MEDICARE

## 2018-01-15 VITALS
RESPIRATION RATE: 20 BRPM | SYSTOLIC BLOOD PRESSURE: 104 MMHG | DIASTOLIC BLOOD PRESSURE: 68 MMHG | TEMPERATURE: 98 F | HEART RATE: 66 BPM

## 2018-01-15 DIAGNOSIS — I73.9 PAD (PERIPHERAL ARTERY DISEASE): Primary | ICD-10-CM

## 2018-01-15 DIAGNOSIS — S81.801A TRAUMATIC OPEN WOUND OF RIGHT LOWER LEG, INITIAL ENCOUNTER: ICD-10-CM

## 2018-01-15 DIAGNOSIS — L97.812 NON-PRESSURE CHRONIC ULCER OF OTHER PART OF RIGHT LOWER LEG WITH FAT LAYER EXPOSED: ICD-10-CM

## 2018-01-15 PROCEDURE — 11042 DBRDMT SUBQ TIS 1ST 20SQCM/<: CPT

## 2018-01-15 PROCEDURE — 11045 DBRDMT SUBQ TISS EACH ADDL: CPT

## 2018-01-16 NOTE — PROGRESS NOTES
[facility]  Wound Care  Progress Note    Problem List Items Addressed This Visit        Derm    Non-pressure chronic ulcer of other part of right lower leg with fat layer exposed       Cardiac/Vascular    PAD (peripheral artery disease) - Primary       Orthopedic    Traumatic open wound of right lower leg    Overview     Patient report trauma to right lateral leg 10/20/17 Ago. Has home health but not improving. Exam shows necrotic skin/fat. Measured in wound docs. Had evaluation of extremity by [facility] in July 2017 showing decrease inflow. Wheelchair bound since 2009 s/p CVA. Knee area healed. Patient underwent angiogram with angioplasty to improve arterial flow.  Patient continues to have some pain to the wound. Patient is using Santyl to the wound.           Current Assessment & Plan     Wound improving.               See wound doc progress notes. Documents will be scanned.        Ahmet Hart  [facility]

## 2018-01-18 ENCOUNTER — OUTPATIENT CASE MANAGEMENT (OUTPATIENT)
Dept: ADMINISTRATIVE | Facility: OTHER | Age: 53
End: 2018-01-18

## 2018-01-18 NOTE — PROGRESS NOTES
Good morning. My name is Margy, I work for [facility]s Outpatient case management department with . I wanted to call and review your disaster plan with you. I also wanted to go over some crucial information and provide you with emergency phone numbers for your [place].   [] Called patient, no answer, I left a message with the phone number for the [place] Office of Emergency Preparedness.   [x] Please be sure to have a supply of water, non-perishable food items, flashlights and batteries with you in your house.   [] Please be sure you bring all of your medications with you. Bring at least a five day supply. It is best to bring your medication bottles, in case you are displaced for a longer period of time, therefore you can get your medication refilled from your temporary location.   [] Please bring sure to bring any DME that you may need. This includes walkers, wheelchairs, shower chairs, nebulizer machine, etc.   [] If you are a diabetic- be sure to bring your glucometer and all glucometer monitoring supplies.   [] If you have high blood pressure- be sure to bring your blood pressure cuff so you can continue to monitor.   [] If you have CHF-be sure to bring your scale so you can continue to monitor.  [] If on PEG feeding- be sure to bring tube feedings and feeding supplies.  [] If on oxygen- be sure to bring all of your oxygen supplies: Cannula, portable tanks, concentrator, etc. Also contact you Oxygen Supply Company to find out the nearest location of an oxygen supply company to where you will be located. ([employer]: [phone], [facility]: [phone], [facility]:[phone], [employer]: [phone]).   [] If you receive hemodialysis- you should already have a plan in place with your dialysis center. If you do not know where you need to evacuate to in order to be close to a dialysis center- reach out to your dialysis center for further direction. ([facility] service line:  [phone], [facility] service line [phone])  [] If receiving treatment at an infusion center- please contact the infusion center to find out which infusion center they have a contract with. Also ask your infusion center for location of the infusion center they are in contract with, so if need be you can evacuate to that area.   Office of Emergency Preparedness Phone number:  [] [facility]: [phone]  [] [facility]: [phone]  [] [facility]: [phone]  [x] [facility]: [phone]  [] [facility] [place] [facility]: [phone]  [] [facility]: [phone]  [] [facility]: [phone]  [] [facility]: [phone]  [] [facility]: [phone]  [] [facility]: [phone]  [] [facility]: [phone]  [] [facility] [facility]: [phone]  [] [facility]: [phone]    [] [facility]:  [phone]   [] [facility]:  [phone]   [] [facility]:  [phone]   [] [facility]:  [phone]   [] [facility]:  [phone]   [] [facility]: [phone]   [] [facility]:  [phone]   [] [facility]:  [phone]   [] [facility]:  [phone]   [] [facility]:  [phone]   [] [facility]:  [phone]   [] [facility]:  [phone]   [] [facility]:  [phone]   [] [facility]:  [phone]   [] [facility]:  [phone]   [] [facility]: [phone]   [] [facility]:  [phone]   [] [facility]: [phone]   [] [facility]: [phone]

## 2018-01-22 ENCOUNTER — OFFICE VISIT (OUTPATIENT)
Dept: WOUND CARE | Facility: HOSPITAL | Age: 53
End: 2018-01-22
Attending: SURGERY
Payer: MEDICARE

## 2018-01-22 VITALS — RESPIRATION RATE: 20 BRPM | SYSTOLIC BLOOD PRESSURE: 111 MMHG | HEART RATE: 69 BPM | DIASTOLIC BLOOD PRESSURE: 75 MMHG

## 2018-01-22 DIAGNOSIS — S81.801D TRAUMATIC OPEN WOUND OF RIGHT LOWER LEG, SUBSEQUENT ENCOUNTER: ICD-10-CM

## 2018-01-22 PROCEDURE — 11042 DBRDMT SUBQ TIS 1ST 20SQCM/<: CPT

## 2018-01-22 PROCEDURE — 11045 DBRDMT SUBQ TISS EACH ADDL: CPT

## 2018-01-22 PROCEDURE — 27201912 HC WOUND CARE DEBRIDEMENT SUPPLIES

## 2018-01-22 NOTE — PROGRESS NOTES
[facility]  Wound Care  Progress Note    Problem List Items Addressed This Visit     Traumatic open wound of right lower leg    Overview     Patient report trauma to right lateral leg 10/20/17 Ago. Has home health but not improving. Exam shows necrotic skin/fat. Measured in wound docs. Had evaluation of extremity by [facility] in July 2017 showing decrease inflow. Wheelchair bound since 2009 s/p CVA. Knee area healed. Patient underwent angiogram with angioplasty to improve arterial flow.  Patient continues to have some pain to the wound. Patient is using Santyl to the wound.  Patient underwent angiogram with stent placement 2 weeks ago.  Patient continues to improve.                 See wound doc progress notes. Documents will be scanned.        Thompson Mcdaniel  [facility]

## 2018-01-24 DIAGNOSIS — F41.0 PANIC DISORDER: Chronic | ICD-10-CM

## 2018-01-24 RX ORDER — COLLAGENASE SANTYL 250 [ARB'U]/G
OINTMENT TOPICAL
Status: ON HOLD | COMMUNITY
Start: 2017-12-14 | End: 2019-10-07 | Stop reason: HOSPADM

## 2018-01-24 RX ORDER — FUROSEMIDE 80 MG/1
TABLET ORAL
Refills: 0 | COMMUNITY
Start: 2017-12-31 | End: 2019-05-20 | Stop reason: SDUPTHER

## 2018-01-24 RX ORDER — ALPRAZOLAM 0.5 MG/1
0.5 TABLET ORAL 3 TIMES DAILY PRN
Qty: 30 TABLET | Refills: 0 | Status: SHIPPED | OUTPATIENT
Start: 2018-01-24 | End: 2018-02-05 | Stop reason: SDUPTHER

## 2018-01-24 RX ORDER — HYDROCHLOROTHIAZIDE 12.5 MG/1
12.5 TABLET ORAL DAILY
COMMUNITY
Start: 2017-12-21 | End: 2018-03-01

## 2018-01-24 NOTE — TELEPHONE ENCOUNTER
----- Message from Jen Cervantes sent at 1/24/2018  2:49 PM CST -----  Contact: Amaya (sister)/ [phone]  Patients sister called in to let you know patients prescription should be sent to [facility] [place].    metFORMIN (GLUCOPHAGE) 850 MG tablet    ALPRAZolam (XANAX) 0.5 MG tablet    Please call and advise.

## 2018-01-26 ENCOUNTER — PATIENT MESSAGE (OUTPATIENT)
Dept: FAMILY MEDICINE | Facility: CLINIC | Age: 53
End: 2018-01-26

## 2018-01-29 ENCOUNTER — OFFICE VISIT (OUTPATIENT)
Dept: WOUND CARE | Facility: HOSPITAL | Age: 53
End: 2018-01-29
Attending: SURGERY
Payer: MEDICARE

## 2018-01-29 VITALS — HEART RATE: 67 BPM | DIASTOLIC BLOOD PRESSURE: 75 MMHG | RESPIRATION RATE: 18 BRPM | SYSTOLIC BLOOD PRESSURE: 102 MMHG

## 2018-01-29 DIAGNOSIS — S81.801D TRAUMATIC OPEN WOUND OF RIGHT LOWER LEG, SUBSEQUENT ENCOUNTER: ICD-10-CM

## 2018-01-29 DIAGNOSIS — L97.812 NON-PRESSURE CHRONIC ULCER OF OTHER PART OF RIGHT LOWER LEG WITH FAT LAYER EXPOSED: Primary | ICD-10-CM

## 2018-01-29 PROCEDURE — 27201912 HC WOUND CARE DEBRIDEMENT SUPPLIES

## 2018-01-29 PROCEDURE — 11045 DBRDMT SUBQ TISS EACH ADDL: CPT

## 2018-01-29 PROCEDURE — 11042 DBRDMT SUBQ TIS 1ST 20SQCM/<: CPT

## 2018-01-29 NOTE — PROGRESS NOTES
[facility]  Wound Care  Progress Note    Problem List Items Addressed This Visit        Derm    Non-pressure chronic ulcer of other part of right lower leg with fat layer exposed - Primary       Orthopedic    Traumatic open wound of right lower leg    Overview     Patient report trauma to right lateral leg 10/20/17 Ago. Has home health but not improving. Exam shows necrotic skin/fat. Measured in wound docs. Had evaluation of extremity by [facility] in July 2017 showing decrease inflow. Wheelchair bound since 2009 s/p CVA. Knee area healed. Patient underwent angiogram with angioplasty to improve arterial flow.  There is been significant improvement in the wound since angioplasty was performed.  Santyl is being utilized.               See wound doc progress notes. Documents will be scanned.        Ahmet Hart  [facility]

## 2018-01-31 ENCOUNTER — PATIENT MESSAGE (OUTPATIENT)
Dept: FAMILY MEDICINE | Facility: CLINIC | Age: 53
End: 2018-01-31

## 2018-01-31 DIAGNOSIS — R31.0 HEMATURIA, GROSS: Primary | ICD-10-CM

## 2018-01-31 RX ORDER — CLOPIDOGREL BISULFATE 75 MG/1
75 TABLET ORAL DAILY
Qty: 90 TABLET | Refills: 3 | Status: SHIPPED | OUTPATIENT
Start: 2018-01-31 | End: 2018-07-28 | Stop reason: SDUPTHER

## 2018-02-01 ENCOUNTER — OUTPATIENT CASE MANAGEMENT (OUTPATIENT)
Dept: ADMINISTRATIVE | Facility: OTHER | Age: 53
End: 2018-02-01

## 2018-02-02 NOTE — PROGRESS NOTES
Attempt to follow for Outpatient Care Management; left message requesting return call. Nelson RN-OPCM

## 2018-02-05 ENCOUNTER — OFFICE VISIT (OUTPATIENT)
Dept: WOUND CARE | Facility: HOSPITAL | Age: 53
End: 2018-02-05
Attending: SURGERY
Payer: MEDICARE

## 2018-02-05 ENCOUNTER — OFFICE VISIT (OUTPATIENT)
Dept: FAMILY MEDICINE | Facility: CLINIC | Age: 53
End: 2018-02-05
Payer: MEDICARE

## 2018-02-05 VITALS
HEART RATE: 65 BPM | DIASTOLIC BLOOD PRESSURE: 78 MMHG | TEMPERATURE: 98 F | SYSTOLIC BLOOD PRESSURE: 116 MMHG | RESPIRATION RATE: 18 BRPM

## 2018-02-05 VITALS
HEIGHT: 64 IN | SYSTOLIC BLOOD PRESSURE: 120 MMHG | DIASTOLIC BLOOD PRESSURE: 82 MMHG | OXYGEN SATURATION: 95 % | HEART RATE: 69 BPM

## 2018-02-05 DIAGNOSIS — S32.010S CLOSED COMPRESSION FRACTURE OF FIRST LUMBAR VERTEBRA, SEQUELA: ICD-10-CM

## 2018-02-05 DIAGNOSIS — I69.959 HEMIPLEGIA OF NONDOMINANT SIDE, LATE EFFECT OF CEREBROVASCULAR DISEASE: ICD-10-CM

## 2018-02-05 DIAGNOSIS — S81.801D TRAUMATIC OPEN WOUND OF RIGHT LOWER LEG, SUBSEQUENT ENCOUNTER: ICD-10-CM

## 2018-02-05 DIAGNOSIS — I63.00 CEREBRAL INFARCTION DUE TO THROMBOSIS OF PRECEREBRAL ARTERY: Primary | ICD-10-CM

## 2018-02-05 DIAGNOSIS — F41.0 PANIC DISORDER: Chronic | ICD-10-CM

## 2018-02-05 DIAGNOSIS — I70.229 CRITICAL LOWER LIMB ISCHEMIA: ICD-10-CM

## 2018-02-05 PROCEDURE — 99214 OFFICE O/P EST MOD 30 MIN: CPT | Mod: S$PBB,,,

## 2018-02-05 PROCEDURE — 27201912 HC WOUND CARE DEBRIDEMENT SUPPLIES

## 2018-02-05 PROCEDURE — 99213 OFFICE O/P EST LOW 20 MIN: CPT | Mod: PBBFAC,PO

## 2018-02-05 PROCEDURE — 11045 DBRDMT SUBQ TISS EACH ADDL: CPT

## 2018-02-05 PROCEDURE — 11042 DBRDMT SUBQ TIS 1ST 20SQCM/<: CPT

## 2018-02-05 PROCEDURE — 99999 PR PBB SHADOW E&M-EST. PATIENT-LVL III: CPT | Mod: PBBFAC,,,

## 2018-02-05 RX ORDER — ALPRAZOLAM 0.5 MG/1
0.5 TABLET ORAL 3 TIMES DAILY PRN
Qty: 30 TABLET | Refills: 2 | Status: SHIPPED | OUTPATIENT
Start: 2018-02-05 | End: 2018-03-15 | Stop reason: SDUPTHER

## 2018-02-05 RX ORDER — NITROFURANTOIN 25; 75 MG/1; MG/1
CAPSULE ORAL
COMMUNITY
Start: 2018-02-01 | End: 2018-03-01 | Stop reason: ALTCHOICE

## 2018-02-05 NOTE — PROGRESS NOTES
Subjective:       Patient ID: Kuldeep Alamo is a 52 y.o. male.    Chief Complaint: Wound Infection    HPI  The patient presents for medical management of his chronic medical problems including PAD, diabetes mellitus and residual effects from previous CVA. He has stents inserted in his right leg and is going to wound care at [place]. His wound is healing nicely. They are interested in a lift chair but Kuldeep is not a candidate     How to get a Medicare reimbursement check for a lift chair seat lift mechanisms.  Here are the steps and requirements to getting approved for a Medicare reimbursement.  1. If there is a green check next to your physicians name, he/she is enrolled in [employer]. His/her NPI is listed to the right of his/her name. In this example, Carlos Smith is enrolled in [employer] and his NPI is to the right of his name in blue.   Double check to make sure your physician has a valid National Provider Identifier (NPI) and is enrolled in the Medicare Provider Enrollment, Chain and Ownership System (PECOS). Only NPI and PECOS physicians can write prescriptions that are covered by Medicare.  You can check by typing in your physicians name in [email or web address]  2. Make sure you are on Medicare part B.  3. You have a prescription from your physician for a lift chair.  4. You meet the following 4 criteria that determines if a lift chair is a medical necessity:  A. You have severe arthritis in your knee or hip or you have a neuromuscular condition.  B. The lift chair must be apart of a course of treatment and is prescribed to arrest, retard, or improve your condition.  C. You must be unable to stand up from a regular arm chair or any chair in your home.  D. When you stand, you are able to walk.  5. Download the CMS-849 Certificate of Medical Necessity form, also known as the Lift Chair Medicare Form or Reimbursement Form, fill it out and have your physician fill it out.  6. Go to your local Medicare office  with your CMS-849, prescription, and lift chair receipt.  How much is Medicares lift chair recliner reimbursement?  Medicare will only reimburse for the lifting mechanism of the lift chair (the motor and levers). The reimbursement is only for 80% of the lifting mechanism. The remaining 20% of the lifting mechanism and the chair itself will not be reimbursed. Reimbursement is capped at different amounts for different stated. Below is the maximum for each state (and [place]).    Review of Systems   Constitutional: Negative.    Respiratory: Negative.  Negative for shortness of breath.    Cardiovascular: Negative for chest pain.   Psychiatric/Behavioral: Negative.    All other systems reviewed and are negative.      Objective:      Vitals:    02/05/18 1406   BP: 120/82   Pulse: 69     Physical Exam   Constitutional: He is oriented to person, place, and time. He appears well-developed and well-nourished. He is cooperative. No distress.   HENT:   Head: Normocephalic and atraumatic.   Right Ear: Hearing, tympanic membrane, external ear and ear canal normal.   Left Ear: Hearing, external ear and ear canal normal.   Nose: Nose normal.   Mouth/Throat: Oropharynx is clear and moist.   Eyes: Conjunctivae are normal. Pupils are equal, round, and reactive to light.   Neck: Normal range of motion. Neck supple. No thyromegaly present.   Cardiovascular: Normal rate, regular rhythm, normal heart sounds and intact distal pulses.    Pulmonary/Chest: Effort normal and breath sounds normal. No respiratory distress.   Musculoskeletal: Normal range of motion. He exhibits no edema or tenderness.   Lymphadenopathy:     He has no cervical adenopathy.   Neurological: He is alert and oriented to person, place, and time. He has normal strength. Coordination and gait normal.   Skin: Skin is warm, dry and intact. No cyanosis. Nails show no clubbing.   Psychiatric: He has a normal mood and affect. His speech is normal and behavior is normal.  Judgment and thought content normal. Cognition and memory are normal.   Vitals reviewed.      Assessment:       1. 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 Closed compression fracture of first lumbar vertebra, sequela    3. Critical lower limb ischemia    4. Hemiplegia of nondominant side, late effect of cerebrovascular disease    5. Traumatic open wound of right lower leg, subsequent encounter    6. 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  Dispense: 30 tablet; Refill: 2      Follow-up in about 3 months (around 5/5/2018).

## 2018-02-05 NOTE — PROGRESS NOTES
[facility]  Wound Care  Progress Note    Problem List Items Addressed This Visit     Traumatic open wound of right lower leg    Overview     Patient report trauma to right lateral leg 10/20/17 Ago. Has home health but not improving. Exam shows necrotic skin/fat. Measured in wound docs. Had evaluation of extremity by [facility] in July 2017 showing decrease inflow. Wheelchair bound since 2009 s/p CVA. Knee area healed. Patient underwent angiogram with angioplasty to improve arterial flow.  There is been significant improvement in the wound since angioplasty was performed.  Significant more granulation tissue today.  Santyl is being utilized.               See wound doc progress notes. Documents will be scanned.        Thompson Mcdaniel  [facility]

## 2018-02-09 ENCOUNTER — OFFICE VISIT (OUTPATIENT)
Dept: PODIATRY | Facility: CLINIC | Age: 53
End: 2018-02-09
Payer: MEDICARE

## 2018-02-09 VITALS
HEART RATE: 63 BPM | RESPIRATION RATE: 18 BRPM | DIASTOLIC BLOOD PRESSURE: 70 MMHG | BODY MASS INDEX: 33.8 KG/M2 | SYSTOLIC BLOOD PRESSURE: 116 MMHG | HEIGHT: 64 IN | WEIGHT: 198 LBS

## 2018-02-09 DIAGNOSIS — Z74.09 MOBILITY IMPAIRED: ICD-10-CM

## 2018-02-09 DIAGNOSIS — E11.9 COMPREHENSIVE DIABETIC FOOT EXAMINATION, TYPE 2 DM, ENCOUNTER FOR: ICD-10-CM

## 2018-02-09 DIAGNOSIS — I69.959 HEMIPLEGIA OF NONDOMINANT SIDE, LATE EFFECT OF CEREBROVASCULAR DISEASE: ICD-10-CM

## 2018-02-09 DIAGNOSIS — E66.9 OBESITY, UNSPECIFIED CLASSIFICATION, UNSPECIFIED OBESITY TYPE, UNSPECIFIED WHETHER SERIOUS COMORBIDITY PRESENT: ICD-10-CM

## 2018-02-09 DIAGNOSIS — M20.41 HAMMER TOES OF BOTH FEET: ICD-10-CM

## 2018-02-09 DIAGNOSIS — I73.9 PAD (PERIPHERAL ARTERY DISEASE): ICD-10-CM

## 2018-02-09 DIAGNOSIS — L84 CORN OR CALLUS: ICD-10-CM

## 2018-02-09 DIAGNOSIS — B35.1 ONYCHOMYCOSIS DUE TO DERMATOPHYTE: ICD-10-CM

## 2018-02-09 DIAGNOSIS — E11.49 TYPE II DIABETES MELLITUS WITH NEUROLOGICAL MANIFESTATIONS: Primary | ICD-10-CM

## 2018-02-09 DIAGNOSIS — M20.42 HAMMER TOES OF BOTH FEET: ICD-10-CM

## 2018-02-09 PROCEDURE — 99213 OFFICE O/P EST LOW 20 MIN: CPT | Mod: 25,S$GLB,, | Performed by: PODIATRIST

## 2018-02-09 PROCEDURE — 11721 DEBRIDE NAIL 6 OR MORE: CPT | Mod: Q9,S$GLB,, | Performed by: PODIATRIST

## 2018-02-09 NOTE — PATIENT INSTRUCTIONS
Diabetes: Inspecting Your Feet    Diabetes increases your chances of developing foot problems. So inspect your feet every day. This helps you find small skin irritations before they become serious ulcers or infections. If you have trouble seeing the bottoms of your feet, use a mirror or ask a family member or friend to help.  How to check your feet  Below are tips to help you look for foot problems. Try to check your feet at the same time each day, such as when you get out of bed in the morning:  · Check the top of each foot. The tops of toes, back of the heel, and outer edge of the foot can get a lot of rubbing from poor-fitting shoes.  · Check the bottom of each foot. Daily wear and tear often leads to problems at pressure spots.  · Check the toes and nails. Fungal infections often occur between toes. Toenail problems can also be a sign of fungal infections or lead to breaks in the skin.  · Check your shoes, too. Loose objects inside a shoe can injure the foot. Use your hand to feel inside your shoes for things like kathy, loose stitching, or rough areas that could irritate your skin.  Warning signs  Look for any color changes in the foot. Redness with streaks can signal a severe infection, which needs immediate medical attention. Tell your healthcare provider right away if you have any of these problems:  · Swelling, sometimes with color changes, may be a sign of poor blood flow or infection. Symptoms include tenderness and an increase in the size of your foot.  · Warm or hot areas on your feet may be signs of infection. A foot that is cold may not be getting enough blood.  · Sensations such as burning, tingling, or pins and needles can be signs of a problem. Also check for areas that may be numb.  · Hot spots are caused by friction or pressure. Look for hot spots in areas that get a lot of rubbing. Hot spots can turn into blisters, calluses, or sores.  · Cracks and sores are caused by dry or irritated  skin. They are a sign that the skin is breaking down, which can lead to infection.  · Toenail problems to watch for include nails growing into the skin (ingrown toenail) and causing redness or pain. Thick, yellow, or discolored nails can signal a fungal infection.  · Drainage and odor can develop from untreated sores and ulcers. Call your healthcare provider right away if you notice white or yellow drainage, bleeding, or unpleasant odor.   Date Last Reviewed: 6/1/2016  © 1211-6296 [employer]. [address] [place] [address]. All rights reserved. This information is not intended as a substitute for professional medical care. Always follow your healthcare professional's instructions.

## 2018-02-09 NOTE — LETTER
February 9, 2018      Owne Frederick Jr., MD  [address]  [facility] [address]           [facility]  [address]   [facility] [address]  Phone: [phone]  Fax: [phone]          Patient: Kuldeep Alamo   MR Number: [id number]   YOB: 1965   Date of Visit: 2/9/2018       Dear Dr. Owen Frederick Jr.:    Thank you for referring Kuldeep Alamo to me for evaluation. Attached you will find relevant portions of my assessment and plan of care.    If you have questions, please do not hesitate to call me. I look forward to following Kuldeep Alamo along with you.    Sincerely,    Gage Boland Jr., DPM    Enclosure  CC:  No Recipients    If you would like to receive this communication electronically, please contact [email or web address] or [phone] to request more information on [employer] Link access.    For providers and/or their staff who would like to refer a patient to [facility], please contact us through our one-stop-shop provider referral line, [facility] [place], at [phone].    If you feel you have received this communication in error or would no longer like to receive these types of communications, please e-mail [email or web address]

## 2018-02-14 ENCOUNTER — OUTPATIENT CASE MANAGEMENT (OUTPATIENT)
Dept: ADMINISTRATIVE | Facility: OTHER | Age: 53
End: 2018-02-14

## 2018-02-14 NOTE — PROGRESS NOTES
Spoke to pt who reports he has stopped taking pain medication and is able to deal with the minimal pain he is having. Denies any falls since we last spoke. Discussed fall prevention and home safety with pt. Pt reports he has been taking his BP daily and logging it. Pt reports his BP today was within normal limits. Discusses signs and symptoms of hypertension and hypotension. Discussed with pt importance of monitoring his BP daily. Pt reports he will be going back to wound care on Monday and having another debridement to his right lateral leg. Pt reports the wound is looking better. Caregiver/patient has contact information for [facility] in the event that needs develop as well as [facility] contact information. Will close case at this time. KAE Rick      Will follow up then. MARIEL RickOPCM   Continue to educate about hypertension. Review signs and symptoms of hypertension/hypotension. Encourage patient to follow medication and treatment regimen. Encourage patient to maintain follow up with doctors-Done  Continue to educate about fall prevention. Encourage patient to follow medication and treatment regimen. Encourage patient to maintain follow up with doctors.-Done

## 2018-02-16 DIAGNOSIS — F41.0 PANIC DISORDER: Chronic | ICD-10-CM

## 2018-02-16 RX ORDER — BACLOFEN 20 MG/1
TABLET ORAL
Qty: 135 TABLET | Refills: 5 | Status: ON HOLD | OUTPATIENT
Start: 2018-02-16 | End: 2020-07-29 | Stop reason: SDUPTHER

## 2018-02-16 RX ORDER — FLUOXETINE HYDROCHLORIDE 20 MG/1
20 CAPSULE ORAL DAILY
Qty: 30 CAPSULE | Refills: 11 | Status: ON HOLD | OUTPATIENT
Start: 2018-02-16 | End: 2020-10-30 | Stop reason: HOSPADM

## 2018-02-19 ENCOUNTER — OFFICE VISIT (OUTPATIENT)
Dept: WOUND CARE | Facility: HOSPITAL | Age: 53
End: 2018-02-19
Attending: SURGERY
Payer: MEDICARE

## 2018-02-19 VITALS
RESPIRATION RATE: 18 BRPM | DIASTOLIC BLOOD PRESSURE: 82 MMHG | HEART RATE: 64 BPM | SYSTOLIC BLOOD PRESSURE: 152 MMHG | TEMPERATURE: 98 F

## 2018-02-19 DIAGNOSIS — S81.801D TRAUMATIC OPEN WOUND OF RIGHT LOWER LEG, SUBSEQUENT ENCOUNTER: Primary | ICD-10-CM

## 2018-02-19 PROCEDURE — 27201912 HC WOUND CARE DEBRIDEMENT SUPPLIES

## 2018-02-19 PROCEDURE — 11042 DBRDMT SUBQ TIS 1ST 20SQCM/<: CPT

## 2018-02-19 PROCEDURE — 11045 DBRDMT SUBQ TISS EACH ADDL: CPT

## 2018-02-19 NOTE — PROGRESS NOTES
[facility]  Wound Care  Progress Note    Problem List Items Addressed This Visit     Traumatic open wound of right lower leg - Primary    Overview     Patient report trauma to right lateral leg 10/20/17 Ago. Has home health but not improving. Exam shows necrotic skin/fat. Measured in wound docs. Had evaluation of extremity by [facility] in July 2017 showing decrease inflow. Wheelchair bound since 2009 s/p CVA. Knee area healed. Patient underwent angiogram with angioplasty to improve arterial flow.  There is been significant improvement in the wound since angioplasty was performed.  Significant more granulation tissue today.  Santyl is being utilized.               See wound doc progress notes. Documents will be scanned.        Thompson Mcdaniel  [facility]

## 2018-02-23 ENCOUNTER — TELEPHONE (OUTPATIENT)
Dept: FAMILY MEDICINE | Facility: CLINIC | Age: 53
End: 2018-02-23

## 2018-02-23 NOTE — TELEPHONE ENCOUNTER
----- Message from Jen Cervantes sent at 2/23/2018  1:11 PM CST -----  Contact: Lilo from [facility]/ [phone]  Nurse called in to let you know patient is complaining of congestion and asked if it's okay for patient to take Mucinex. She also asked if theres any other order.    Please call and advise.

## 2018-02-23 NOTE — TELEPHONE ENCOUNTER
Advised rabia gibson is okay for pt to take and he can also try coricidin HPB. She reported that he is having trouble getting mucus up and his lungs sounded course. I have spoke to pts sister christina and advised to go to urgent care or ER if his symptoms get worse over the weekend. Please advise on any other advise.

## 2018-02-26 ENCOUNTER — OFFICE VISIT (OUTPATIENT)
Dept: WOUND CARE | Facility: HOSPITAL | Age: 53
End: 2018-02-26
Attending: SURGERY
Payer: MEDICARE

## 2018-02-26 VITALS — DIASTOLIC BLOOD PRESSURE: 79 MMHG | SYSTOLIC BLOOD PRESSURE: 134 MMHG | HEART RATE: 82 BPM | RESPIRATION RATE: 20 BRPM

## 2018-02-26 DIAGNOSIS — L97.812 NON-PRESSURE CHRONIC ULCER OF OTHER PART OF RIGHT LOWER LEG WITH FAT LAYER EXPOSED: Primary | ICD-10-CM

## 2018-02-26 DIAGNOSIS — S81.801A TRAUMATIC OPEN WOUND OF RIGHT LOWER LEG, INITIAL ENCOUNTER: ICD-10-CM

## 2018-02-26 PROCEDURE — 27201912 HC WOUND CARE DEBRIDEMENT SUPPLIES

## 2018-02-26 PROCEDURE — 11042 DBRDMT SUBQ TIS 1ST 20SQCM/<: CPT

## 2018-02-26 PROCEDURE — 11045 DBRDMT SUBQ TISS EACH ADDL: CPT

## 2018-02-26 NOTE — ASSESSMENT & PLAN NOTE
There is significant improvement in the character of the wound.  There is marked granulation tissue.     contin Santyl

## 2018-02-26 NOTE — PROGRESS NOTES
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 Had home health but not improving. Exam showed necrotic skin/fat upon presentation. Had evaluation of extremity by [facility] in July 2017 showing decrease inflow. Wheelchair bound since 2009 s/p CVA. Patient underwent angiogram with angioplasty to improve arterial flow.  There is been significant improvement in the wound since angioplasty was performed.  Santyl is being utilized.           Current Assessment & Plan     There is significant improvement in the character of the wound.  There is marked granulation tissue.     contin Santyl            Orthopedic    Traumatic open wound of right lower leg          See wound doc progress notes. Documents will be scanned.        Ahmet Hart  [facility]

## 2018-02-27 ENCOUNTER — OUTPATIENT CASE MANAGEMENT (OUTPATIENT)
Dept: ADMINISTRATIVE | Facility: OTHER | Age: 53
End: 2018-02-27

## 2018-02-27 NOTE — PROGRESS NOTES
Please note this patient was mailed an Outpatient Care Management Patient Satisfaction Discharge Survey on 2/27/18.    Please contact [facility] at ext. [phone] with any questions.    Thank you,    Raissa Huff, [facility]  Outpatient Complex Care Mgmt  Ext [phone]

## 2018-02-28 ENCOUNTER — PATIENT MESSAGE (OUTPATIENT)
Dept: FAMILY MEDICINE | Facility: CLINIC | Age: 53
End: 2018-02-28

## 2018-02-28 ENCOUNTER — OFFICE VISIT (OUTPATIENT)
Dept: UROLOGY | Facility: CLINIC | Age: 53
End: 2018-02-28
Payer: MEDICARE

## 2018-02-28 VITALS — HEIGHT: 64 IN | WEIGHT: 198 LBS | BODY MASS INDEX: 33.8 KG/M2

## 2018-02-28 DIAGNOSIS — N52.9 ERECTILE DYSFUNCTION, UNSPECIFIED ERECTILE DYSFUNCTION TYPE: ICD-10-CM

## 2018-02-28 DIAGNOSIS — R35.0 URINARY FREQUENCY: ICD-10-CM

## 2018-02-28 DIAGNOSIS — I63.00 CEREBRAL INFARCTION DUE TO THROMBOSIS OF PRECEREBRAL ARTERY: Primary | ICD-10-CM

## 2018-02-28 DIAGNOSIS — N30.01 ACUTE CYSTITIS WITH HEMATURIA: ICD-10-CM

## 2018-02-28 DIAGNOSIS — R35.1 BENIGN PROSTATIC HYPERPLASIA WITH NOCTURIA: ICD-10-CM

## 2018-02-28 DIAGNOSIS — I69.959 HEMIPLEGIA OF NONDOMINANT SIDE, LATE EFFECT OF CEREBROVASCULAR DISEASE: ICD-10-CM

## 2018-02-28 DIAGNOSIS — R35.1 NOCTURIA: ICD-10-CM

## 2018-02-28 DIAGNOSIS — N40.1 BENIGN PROSTATIC HYPERPLASIA WITH NOCTURIA: ICD-10-CM

## 2018-02-28 DIAGNOSIS — Z80.42 FAMILY HISTORY OF PROSTATE CANCER IN FATHER: ICD-10-CM

## 2018-02-28 PROCEDURE — 99999 PR PBB SHADOW E&M-EST. PATIENT-LVL III: CPT | Mod: PBBFAC,,, | Performed by: UROLOGY

## 2018-02-28 PROCEDURE — 99213 OFFICE O/P EST LOW 20 MIN: CPT | Mod: PBBFAC,PO | Performed by: UROLOGY

## 2018-02-28 PROCEDURE — 99215 OFFICE O/P EST HI 40 MIN: CPT | Mod: S$PBB,,, | Performed by: UROLOGY

## 2018-02-28 RX ORDER — FUROSEMIDE 40 MG/1
TABLET ORAL
COMMUNITY
Start: 2018-02-16 | End: 2018-02-28 | Stop reason: HOSPADM

## 2018-02-28 RX ORDER — SODIUM CHLORIDE 9 MG/ML
INJECTION, SOLUTION INTRAVENOUS CONTINUOUS
Status: CANCELLED | OUTPATIENT
Start: 2018-02-28

## 2018-02-28 RX ORDER — CIPROFLOXACIN 2 MG/ML
400 INJECTION, SOLUTION INTRAVENOUS
Status: CANCELLED | OUTPATIENT
Start: 2018-02-28

## 2018-02-28 RX ORDER — LIDOCAINE HYDROCHLORIDE 20 MG/ML
JELLY TOPICAL ONCE
Status: CANCELLED | OUTPATIENT
Start: 2018-02-28 | End: 2018-02-28

## 2018-02-28 NOTE — PATIENT INSTRUCTIONS
Cystoscopy with possible bladder biopsy or TURBT, Fulguration and biopsies and retrograde pyelograms.  F/U 4 weeks

## 2018-02-28 NOTE — LETTER
February 28, 2018      Owen Frederick Jr., MD  [address] [facility] [place] [address]           [facility]  [address] [place] [address] [address]  [facility] [address]  Phone: [phone]  Fax: [phone]          Patient: Kuldeep Alamo   MR Number: [id number]   YOB: 1965   Date of Visit: 2/28/2018       Dear Dr. Owen Frederick Jr.:    Thank you for referring Kuldeep Alamo to me for evaluation. Attached you will find relevant portions of my assessment and plan of care.    If you have questions, please do not hesitate to call me. I look forward to following Kuldeep Alamo along with you.    Sincerely,    Thompson Silva MD    Enclosure  CC:  No Recipients    If you would like to receive this communication electronically, please contact [email or web address] or [phone] to request more information on [employer] Link access.    For providers and/or their staff who would like to refer a patient to [facility], please contact us through our one-stop-shop provider referral line, [facility], at [phone].    If you feel you have received this communication in error or would no longer like to receive these types of communications, please e-mail [email or web address]

## 2018-02-28 NOTE — PROGRESS NOTES
Subjective:       Patient ID: Kuldeep Alamo is a 53 y.o. male.    Chief Complaint: Hematuria    54 yo WM s/p UTI with gross hematuria 4 weeks ago. CT shows possible bladder mass. Pt is paraplegic post CVA. Referred per Dr. Frederick for evaluation.      Hematuria   This is a new problem. The current episode started 1 to 4 weeks ago. The problem is unchanged. He describes the hematuria as gross hematuria. The hematuria occurs throughout his entire urinary stream. He reports clotting at the middle, beginning and end of his urine stream. His pain is at a severity of 3/10. The pain is mild. He describes his urine color as dark red. Irritative symptoms include frequency (x 4 ) and nocturia (x 6-8). Irritative symptoms do not include urgency. Pertinent negatives include no abdominal pain, chills, dysuria, facial swelling, fever, flank pain, genital pain, hesitancy, inability to urinate, nausea, vomiting or sore throat. He is not sexually active. His past medical history is significant for hypertension and UTI. There is no history of  trauma, kidney stones, recent infection, sickle cell disease, STDs or tobacco use.     Review of Systems   Constitutional: Negative for activity change, appetite change, chills, diaphoresis, fatigue, fever and unexpected weight change.   HENT: Negative for congestion, facial swelling, hearing loss, sinus pressure, sore throat and trouble swallowing.    Eyes: Negative for photophobia, pain, discharge and visual disturbance.   Respiratory: Negative for apnea, cough and shortness of breath.    Cardiovascular: Negative for chest pain, palpitations and leg swelling.   Gastrointestinal: Negative for abdominal distention, abdominal pain, anal bleeding, blood in stool, constipation, diarrhea, nausea, rectal pain and vomiting.   Endocrine: Negative for cold intolerance, heat intolerance, polydipsia, polyphagia and polyuria.   Genitourinary: Positive for frequency (x 4 ), hematuria and nocturia (x  6-8). Negative for decreased urine volume, difficulty urinating, discharge, dysuria, enuresis, flank pain, genital sores, hesitancy, penile pain, penile swelling, scrotal swelling, testicular pain and urgency.   Musculoskeletal: Negative for arthralgias, back pain and myalgias.   Skin: Negative for color change, pallor, rash and wound.   Allergic/Immunologic: Negative for environmental allergies, food allergies and immunocompromised state.   Neurological: Negative for dizziness, seizures, weakness and headaches.   Hematological: Negative for adenopathy. Does not bruise/bleed easily.   Psychiatric/Behavioral: Negative.        Objective:      Physical Exam   Nursing note and vitals reviewed.  Constitutional: He is oriented to person, place, and time. He appears well-developed and well-nourished.   HENT:   Head: Normocephalic.   Nose: Nose normal.   Mouth/Throat: Oropharynx is clear and moist.   Eyes: Conjunctivae and EOM are normal. Pupils are equal, round, and reactive to light.   Neck: Normal range of motion. Neck supple.   Cardiovascular: Normal rate, regular rhythm, normal heart sounds and intact distal pulses.    Pulmonary/Chest: Effort normal and breath sounds normal.   Abdominal: Soft. Bowel sounds are normal.   Musculoskeletal: Normal range of motion.   Neurological: He is alert and oriented to person, place, and time. He has normal reflexes.   Skin: Skin is warm and dry.     Psychiatric: He has a normal mood and affect. His behavior is normal. Judgment and thought content normal.       Assessment:       1. 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 Hemiplegia of nondominant side, late effect of cerebrovascular disease    3. Erectile dysfunction, unspecified erectile dysfunction type    4. Nocturia    5. Urinary frequency    6. Benign prostatic hyperplasia with nocturia    7. Acute cystitis with hematuria    8. Family history of prostate cancer in father        Plan:       Patient  Instructions   Cystoscopy with possible bladder biopsy or TURBT, Fulguration and biopsies and retrograde pyelograms.  F/U 4 weeks

## 2018-03-01 ENCOUNTER — PATIENT MESSAGE (OUTPATIENT)
Dept: FAMILY MEDICINE | Facility: CLINIC | Age: 53
End: 2018-03-01

## 2018-03-05 PROBLEM — I63.00: Status: ACTIVE | Noted: 2018-03-05

## 2018-03-06 ENCOUNTER — PATIENT MESSAGE (OUTPATIENT)
Dept: FAMILY MEDICINE | Facility: CLINIC | Age: 53
End: 2018-03-06

## 2018-03-06 ENCOUNTER — TELEPHONE (OUTPATIENT)
Dept: UROLOGY | Facility: CLINIC | Age: 53
End: 2018-03-06

## 2018-03-06 NOTE — TELEPHONE ENCOUNTER
----- Message from Javier Waddell sent at 3/6/2018 10:07 AM CST -----  Contact: [phone] Dianne pt sister  Patient sister advised the patient is in pain, not able to urinate and has diarrhea since the procedure and wanted to know if she should take the patient to the ER. Please call and advise.

## 2018-03-08 ENCOUNTER — TELEPHONE (OUTPATIENT)
Dept: UROLOGY | Facility: CLINIC | Age: 53
End: 2018-03-08

## 2018-03-08 NOTE — TELEPHONE ENCOUNTER
----- Message from Jade Vernon sent at 3/8/2018  3:49 PM CST -----  Contact: Reyna from [facility] [phone]  Home Health nurse would like to speak with you about pt's not urination for 6 hour. Nurse is requesting orders for new catheter.   Please advise.

## 2018-03-12 ENCOUNTER — OFFICE VISIT (OUTPATIENT)
Dept: WOUND CARE | Facility: HOSPITAL | Age: 53
End: 2018-03-12
Attending: SURGERY
Payer: MEDICARE

## 2018-03-12 VITALS
DIASTOLIC BLOOD PRESSURE: 73 MMHG | RESPIRATION RATE: 20 BRPM | SYSTOLIC BLOOD PRESSURE: 101 MMHG | HEART RATE: 70 BPM | TEMPERATURE: 98 F

## 2018-03-12 DIAGNOSIS — L97.812 NON-PRESSURE CHRONIC ULCER OF OTHER PART OF RIGHT LOWER LEG WITH FAT LAYER EXPOSED: Primary | ICD-10-CM

## 2018-03-12 PROCEDURE — 11042 DBRDMT SUBQ TIS 1ST 20SQCM/<: CPT

## 2018-03-12 PROCEDURE — 11045 DBRDMT SUBQ TISS EACH ADDL: CPT

## 2018-03-12 PROCEDURE — 27201912 HC WOUND CARE DEBRIDEMENT SUPPLIES

## 2018-03-12 NOTE — PROGRESS NOTES
[facility]  Wound Care  Progress Note    Problem List Items Addressed This Visit     Non-pressure chronic ulcer of other part of right lower leg with fat layer exposed - Primary    Overview     Patient report trauma to right lateral leg 10/20/17. Had home health but not improving. Exam showed necrotic skin/fat upon presentation. Had evaluation of extremity by [facility] in July 2017 showing decrease inflow. Wheelchair bound since 2009 s/p CVA. Patient underwent angiogram with angioplasty to improve arterial flow.  There is been significant improvement in the wound since angioplasty was performed.  Santyl is being utilized.                 See wound doc progress notes. Documents will be scanned.        Thompson Mcdaniel  [facility]

## 2018-03-15 ENCOUNTER — PATIENT MESSAGE (OUTPATIENT)
Dept: FAMILY MEDICINE | Facility: CLINIC | Age: 53
End: 2018-03-15

## 2018-03-15 DIAGNOSIS — F41.0 PANIC DISORDER: Chronic | ICD-10-CM

## 2018-03-15 RX ORDER — ALPRAZOLAM 0.5 MG/1
0.5 TABLET ORAL 3 TIMES DAILY PRN
Qty: 30 TABLET | Refills: 2 | Status: ON HOLD | OUTPATIENT
Start: 2018-03-15 | End: 2020-07-29 | Stop reason: SDUPTHER

## 2018-03-19 ENCOUNTER — TELEPHONE (OUTPATIENT)
Dept: FAMILY MEDICINE | Facility: CLINIC | Age: 53
End: 2018-03-19

## 2018-03-19 ENCOUNTER — PATIENT MESSAGE (OUTPATIENT)
Dept: FAMILY MEDICINE | Facility: CLINIC | Age: 53
End: 2018-03-19

## 2018-03-19 ENCOUNTER — OFFICE VISIT (OUTPATIENT)
Dept: WOUND CARE | Facility: HOSPITAL | Age: 53
End: 2018-03-19
Attending: SURGERY
Payer: MEDICARE

## 2018-03-19 VITALS
TEMPERATURE: 98 F | HEART RATE: 77 BPM | RESPIRATION RATE: 18 BRPM | SYSTOLIC BLOOD PRESSURE: 106 MMHG | DIASTOLIC BLOOD PRESSURE: 62 MMHG

## 2018-03-19 DIAGNOSIS — I73.9 PAD (PERIPHERAL ARTERY DISEASE): ICD-10-CM

## 2018-03-19 DIAGNOSIS — L97.812 NON-PRESSURE CHRONIC ULCER OF OTHER PART OF RIGHT LOWER LEG WITH FAT LAYER EXPOSED: Primary | ICD-10-CM

## 2018-03-19 PROBLEM — L97.909 STASIS ULCER: Status: RESOLVED | Noted: 2017-11-10 | Resolved: 2018-03-19

## 2018-03-19 PROBLEM — I87.2 VENOUS STASIS ULCER OF RIGHT ANKLE WITH FAT LAYER EXPOSED WITHOUT VARICOSE VEINS: Status: RESOLVED | Noted: 2017-12-07 | Resolved: 2018-03-19

## 2018-03-19 PROBLEM — I83.009 STASIS ULCER: Status: RESOLVED | Noted: 2017-11-10 | Resolved: 2018-03-19

## 2018-03-19 PROBLEM — L97.312 VENOUS STASIS ULCER OF RIGHT ANKLE WITH FAT LAYER EXPOSED WITHOUT VARICOSE VEINS: Status: RESOLVED | Noted: 2017-12-07 | Resolved: 2018-03-19

## 2018-03-19 PROBLEM — S81.801A TRAUMATIC OPEN WOUND OF RIGHT LOWER LEG: Status: RESOLVED | Noted: 2017-12-14 | Resolved: 2018-03-19

## 2018-03-19 PROCEDURE — 27201912 HC WOUND CARE DEBRIDEMENT SUPPLIES

## 2018-03-19 PROCEDURE — 11045 DBRDMT SUBQ TISS EACH ADDL: CPT

## 2018-03-19 PROCEDURE — 11042 DBRDMT SUBQ TIS 1ST 20SQCM/<: CPT

## 2018-03-19 NOTE — PROGRESS NOTES
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 Had home health but not improving. Exam showed necrotic skin/fat upon presentation. Had evaluation of extremity by [facility] in July 2017 showing decrease inflow. Wheelchair bound since 2009 s/p CVA. Patient underwent angiogram with angioplasty to improve arterial flow.  There is been significant improvement in the wound since angioplasty was performed.  Santyl is being utilized.           Current Assessment & Plan     Wound continues to progress well.  Patient is having less pain.            Cardiac/Vascular    PAD (peripheral artery disease)          See wound doc progress notes. Documents will be scanned.        Ahmet Hart  [facility]

## 2018-03-19 NOTE — TELEPHONE ENCOUNTER
----- Message from Jade Vernon sent at 3/19/2018  9:50 AM CDT -----  Contact: [phone]/pt's sister Grisel  Patient's sister requesting to speak with you regarding getting new prescription for pt's diabetic supply. Please advise.

## 2018-03-19 NOTE — TELEPHONE ENCOUNTER
Phoned sister, notified her I need the specific name of the supplies needed; she will have Kuldeep send a message, he text 3 times a day

## 2018-03-20 ENCOUNTER — PATIENT MESSAGE (OUTPATIENT)
Dept: FAMILY MEDICINE | Facility: CLINIC | Age: 53
End: 2018-03-20

## 2018-03-21 ENCOUNTER — PATIENT MESSAGE (OUTPATIENT)
Dept: FAMILY MEDICINE | Facility: CLINIC | Age: 53
End: 2018-03-21

## 2018-03-26 ENCOUNTER — OFFICE VISIT (OUTPATIENT)
Dept: WOUND CARE | Facility: HOSPITAL | Age: 53
End: 2018-03-26
Attending: SURGERY
Payer: MEDICARE

## 2018-03-26 VITALS
TEMPERATURE: 98 F | HEART RATE: 78 BPM | DIASTOLIC BLOOD PRESSURE: 78 MMHG | SYSTOLIC BLOOD PRESSURE: 134 MMHG | RESPIRATION RATE: 18 BRPM

## 2018-03-26 DIAGNOSIS — L97.812 NON-PRESSURE CHRONIC ULCER OF OTHER PART OF RIGHT LOWER LEG WITH FAT LAYER EXPOSED: Primary | ICD-10-CM

## 2018-03-26 PROCEDURE — 11042 DBRDMT SUBQ TIS 1ST 20SQCM/<: CPT

## 2018-03-26 PROCEDURE — 27201912 HC WOUND CARE DEBRIDEMENT SUPPLIES

## 2018-03-26 PROCEDURE — 11045 DBRDMT SUBQ TISS EACH ADDL: CPT

## 2018-03-27 ENCOUNTER — OFFICE VISIT (OUTPATIENT)
Dept: UROLOGY | Facility: CLINIC | Age: 53
End: 2018-03-27
Payer: MEDICARE

## 2018-03-27 ENCOUNTER — TELEPHONE (OUTPATIENT)
Dept: FAMILY MEDICINE | Facility: CLINIC | Age: 53
End: 2018-03-27

## 2018-03-27 ENCOUNTER — PATIENT MESSAGE (OUTPATIENT)
Dept: FAMILY MEDICINE | Facility: CLINIC | Age: 53
End: 2018-03-27

## 2018-03-27 VITALS
RESPIRATION RATE: 17 BRPM | HEART RATE: 67 BPM | HEIGHT: 64 IN | BODY MASS INDEX: 33.8 KG/M2 | WEIGHT: 198 LBS | OXYGEN SATURATION: 98 % | SYSTOLIC BLOOD PRESSURE: 94 MMHG | DIASTOLIC BLOOD PRESSURE: 60 MMHG

## 2018-03-27 DIAGNOSIS — R33.8 ACUTE URINARY RETENTION: ICD-10-CM

## 2018-03-27 DIAGNOSIS — N40.1 BENIGN PROSTATIC HYPERPLASIA WITH NOCTURIA: ICD-10-CM

## 2018-03-27 DIAGNOSIS — R35.1 BENIGN PROSTATIC HYPERPLASIA WITH NOCTURIA: ICD-10-CM

## 2018-03-27 DIAGNOSIS — R35.1 NOCTURIA: ICD-10-CM

## 2018-03-27 DIAGNOSIS — Z09 POSTOPERATIVE FOLLOW-UP: Primary | ICD-10-CM

## 2018-03-27 LAB
AMORPH CRY UR QL COMP ASSIST: ABNORMAL
BACTERIA #/AREA URNS AUTO: ABNORMAL /HPF
BILIRUB UR QL STRIP: NEGATIVE
CLARITY UR REFRACT.AUTO: ABNORMAL
COLOR UR AUTO: ABNORMAL
GLUCOSE UR QL STRIP: NEGATIVE
HGB UR QL STRIP: ABNORMAL
HYALINE CASTS UR QL AUTO: 0 /LPF
KETONES UR QL STRIP: NEGATIVE
LEUKOCYTE ESTERASE UR QL STRIP: ABNORMAL
MICROSCOPIC COMMENT: ABNORMAL
NITRITE UR QL STRIP: POSITIVE
PH UR STRIP: 7 [PH] (ref 5–8)
PROT UR QL STRIP: ABNORMAL
RBC #/AREA URNS AUTO: 2 /HPF (ref 0–4)
SP GR UR STRIP: 1.01 (ref 1–1.03)
TRI-PHOS CRY UR QL COMP ASSIST: ABNORMAL
URN SPEC COLLECT METH UR: ABNORMAL
UROBILINOGEN UR STRIP-ACNC: NEGATIVE EU/DL
WBC #/AREA URNS AUTO: 40 /HPF (ref 0–5)

## 2018-03-27 PROCEDURE — 99999 PR PBB SHADOW E&M-EST. PATIENT-LVL V: CPT | Mod: PBBFAC,,, | Performed by: NURSE PRACTITIONER

## 2018-03-27 PROCEDURE — 87186 SC STD MICRODIL/AGAR DIL: CPT

## 2018-03-27 PROCEDURE — 87077 CULTURE AEROBIC IDENTIFY: CPT

## 2018-03-27 PROCEDURE — 87086 URINE CULTURE/COLONY COUNT: CPT

## 2018-03-27 PROCEDURE — 81001 URINALYSIS AUTO W/SCOPE: CPT

## 2018-03-27 PROCEDURE — 99215 OFFICE O/P EST HI 40 MIN: CPT | Mod: PBBFAC,PO | Performed by: NURSE PRACTITIONER

## 2018-03-27 PROCEDURE — 99214 OFFICE O/P EST MOD 30 MIN: CPT | Mod: S$PBB,,, | Performed by: NURSE PRACTITIONER

## 2018-03-27 PROCEDURE — 87088 URINE BACTERIA CULTURE: CPT

## 2018-03-27 RX ORDER — HYDROCHLOROTHIAZIDE 12.5 MG/1
TABLET ORAL
Status: ON HOLD | COMMUNITY
Start: 2018-03-23 | End: 2018-08-09 | Stop reason: HOSPADM

## 2018-03-27 RX ORDER — DUTASTERIDE 0.5 MG/1
0.5 CAPSULE, LIQUID FILLED ORAL DAILY
Qty: 30 CAPSULE | Refills: 0 | Status: SHIPPED | OUTPATIENT
Start: 2018-03-27 | End: 2020-01-22

## 2018-03-27 NOTE — TELEPHONE ENCOUNTER
----- Message from Sudarshan Chavira sent at 3/27/2018  2:27 PM CDT -----  Contact: Grisel (sister)/[phone]  Patient's sister called to get a new prescription for his diabetes supplies.  Please call and advise.

## 2018-03-27 NOTE — PATIENT INSTRUCTIONS
1. U/A with C+S  2. ANNEMARIE today  3. Voiding trial today  4. Start dutasteride 0.5 mg daily  5. Continue tamsulosin 0.4 mg daily  6. Follow-up in 1 month.   7. If unable to void in 4-6 hours go to Urgent Care or ED

## 2018-03-27 NOTE — TELEPHONE ENCOUNTER
Spoke to pts sister, [facility] is still saying they have not received supply script. Will refax and call to get clarification of receiving.

## 2018-03-27 NOTE — PROGRESS NOTES
Subjective:       Patient ID: Kuldeep Alamo is a 53 y.o. male.    Chief Complaint: Post-op Evaluation; Urinary Retention; and Urinary Tract Infection    Patient is a 54 yo WM who is here today for post-op and ED follow-up. Patient had a Cystoscopy with Retrograde Pyelogram by Dr. Silva on 3/5/2018. Patient was seen in the ED for acute urinary retention and hematuria on 3/6/2018. Sims catheter was inserted at ED visit. Patient has a history of BPH with LUTS, CVA, hemiplegia of nondominant side, Type 2 DM, HTN. Patient currently taking tamsulosin 0.4 mg daily for BPH symptoms. He reports nocturia x2-3 prior to sims catheter insertion. Patient denies genital pain, burning, or blood noted in his urine. He also denies fever, chills, nausea or vomiting. Nothing aggravates patient's symptoms. He reports a history of urinary retention and UTI's. Patient has no c/o at this time and is here for a possible voiding trial.      Review of Systems   Constitutional: Positive for appetite change (decreased). Negative for chills, fatigue and fever.   Respiratory: Negative for cough, chest tightness and shortness of breath.    Cardiovascular: Positive for leg swelling. Negative for chest pain and palpitations.   Gastrointestinal: Negative for abdominal pain, blood in stool, constipation, diarrhea, nausea and vomiting.   Genitourinary: Positive for difficulty urinating (sims catheter in place). Negative for decreased urine volume, discharge, dysuria, flank pain, frequency, hematuria, penile pain, penile swelling, scrotal swelling, testicular pain and urgency.   Musculoskeletal: Positive for gait problem. Negative for back pain.   Skin: Positive for wound (right lower leg).   Neurological: Positive for weakness. Negative for dizziness, light-headedness and headaches.       Objective:      Physical Exam   Constitutional: He is oriented to person, place, and time. He appears well-developed and well-nourished. No distress.   HENT:    Head: Normocephalic and atraumatic.   Eyes: EOM are normal. Pupils are equal, round, and reactive to light.   Neck: Normal range of motion.   Cardiovascular: Normal rate.    Pulmonary/Chest: Effort normal. No respiratory distress.   Abdominal: Soft. There is no tenderness.   Genitourinary: Prostate is enlarged (firm and smoothe). Prostate is not tender.   Genitourinary Comments: Isbell catheter in place with holly colored urine in drainage bag. Urine has a strong odor.   Musculoskeletal:   Patient uses a motorized wheelchair. He's able to transfer with one person assist.   Neurological: He is alert and oriented to person, place, and time.   Skin: Skin is warm and dry.   Psychiatric: He has a normal mood and affect. His behavior is normal. Judgment and thought content normal.   Nursing note and vitals reviewed.      Assessment:       1. Postoperative follow-up    2. Acute urinary retention    3. Benign prostatic hyperplasia with nocturia    4. Nocturia        Plan:       Kuldeep was seen today for post-op evaluation, urinary retention and urinary tract infection.    Diagnoses and all orders for this visit:    Postoperative follow-up    Acute urinary retention  -     Urinalysis  -     Urine culture    Benign prostatic hyperplasia with nocturia  -     Urinalysis  -     Urine culture  -     dutasteride (AVODART) 0.5 mg capsule; Take 1 capsule (0.5 mg total) by mouth once daily.    Nocturia  -     Urinalysis  -     Urine culture  -     dutasteride (AVODART) 0.5 mg capsule; Take 1 capsule (0.5 mg total) by mouth once daily.    Other orders  1. ANNEMARIE today  2. Voiding trial today  3. Start dutasteride 0.5 mg daily  4. Continue tamsulosin 0.4 mg daily    NOTE: Voiding trial performed. 225 mL of sterile water inserted into patient's bladder via gravity by nurse (MUNIRA Tran). Patient was able to void 200 mL. Patient instructed if he's unable to void in 4-6 hours go to Urgent Care or ED. Patient verbalized understanding.       Follow-up in 1 month.     Anand Kohler, NP

## 2018-03-28 ENCOUNTER — TELEPHONE (OUTPATIENT)
Dept: FAMILY MEDICINE | Facility: CLINIC | Age: 53
End: 2018-03-28

## 2018-03-28 ENCOUNTER — CLINICAL SUPPORT (OUTPATIENT)
Dept: UROLOGY | Facility: CLINIC | Age: 53
End: 2018-03-28
Payer: MEDICARE

## 2018-03-28 ENCOUNTER — PATIENT MESSAGE (OUTPATIENT)
Dept: UROLOGY | Facility: CLINIC | Age: 53
End: 2018-03-28

## 2018-03-28 PROCEDURE — 99211 OFF/OP EST MAY X REQ PHY/QHP: CPT | Mod: PBBFAC,PO

## 2018-03-28 PROCEDURE — 99999 PR PBB SHADOW E&M-EST. PATIENT-LVL I: CPT | Mod: PBBFAC,,,

## 2018-03-28 NOTE — TELEPHONE ENCOUNTER
----- Message from Alethea Vasquez sent at 3/28/2018  2:03 PM CDT -----  Contact: [phone]/ [facility]   Pharmacy called stating pt's prescriptions for test strips and lancets need to be electronically sent they can't not take a fax because of pt's insurance . Please advise

## 2018-03-28 NOTE — TELEPHONE ENCOUNTER
Patient need a new  [facility] one touch ultra strips and one touch delica lacets and machine.

## 2018-03-29 RX ORDER — INSULIN PUMP SYRINGE, 3 ML
EACH MISCELLANEOUS
Qty: 1 EACH | Refills: 0 | Status: SHIPPED | OUTPATIENT
Start: 2018-03-29 | End: 2021-11-18

## 2018-03-29 RX ORDER — BLOOD-GLUCOSE CONTROL, NORMAL
1 EACH MISCELLANEOUS DAILY
Qty: 100 EACH | Refills: 3 | Status: SHIPPED | OUTPATIENT
Start: 2018-03-29 | End: 2018-04-03 | Stop reason: SDUPTHER

## 2018-03-29 NOTE — TELEPHONE ENCOUNTER
"----- Message from Sudarshan Chavira sent at 3/29/2018  1:29 PM CDT -----  Contact: [facility]/[phone]  Pharmacy called to state the patient's prescription for his diabetic supplies, including test strips cannot be called in. It has to be sent electroncially or a hard copy given to patient with specific directions, not "as needed" per per Medicare Part B regulations.    Please call and advise.  "

## 2018-03-29 NOTE — PROGRESS NOTES
He had his sims removed yesterday, has been urinating threw the night but only small amounts. His bladder scan shows only 18 mls in his bladder will watch for now and hold off with sims placement

## 2018-03-30 DIAGNOSIS — N30.01 ACUTE CYSTITIS WITH HEMATURIA: Primary | ICD-10-CM

## 2018-03-30 LAB — BACTERIA UR CULT: NORMAL

## 2018-03-30 RX ORDER — AMOXICILLIN AND CLAVULANATE POTASSIUM 875; 125 MG/1; MG/1
1 TABLET, FILM COATED ORAL EVERY 12 HOURS
Qty: 20 TABLET | Refills: 0 | Status: SHIPPED | OUTPATIENT
Start: 2018-03-30 | End: 2018-04-09

## 2018-04-02 ENCOUNTER — OFFICE VISIT (OUTPATIENT)
Dept: WOUND CARE | Facility: HOSPITAL | Age: 53
End: 2018-04-02
Attending: SURGERY
Payer: MEDICARE

## 2018-04-02 VITALS
HEART RATE: 74 BPM | RESPIRATION RATE: 18 BRPM | SYSTOLIC BLOOD PRESSURE: 115 MMHG | TEMPERATURE: 98 F | DIASTOLIC BLOOD PRESSURE: 73 MMHG

## 2018-04-02 DIAGNOSIS — L97.812 NON-PRESSURE CHRONIC ULCER OF OTHER PART OF RIGHT LOWER LEG WITH FAT LAYER EXPOSED: Primary | ICD-10-CM

## 2018-04-02 PROCEDURE — 11042 DBRDMT SUBQ TIS 1ST 20SQCM/<: CPT

## 2018-04-02 PROCEDURE — 11045 DBRDMT SUBQ TISS EACH ADDL: CPT

## 2018-04-02 PROCEDURE — 27201912 HC WOUND CARE DEBRIDEMENT SUPPLIES

## 2018-04-02 RX ORDER — METFORMIN HYDROCHLORIDE 850 MG/1
TABLET ORAL
COMMUNITY
Start: 2018-03-31 | End: 2018-06-26 | Stop reason: SDUPTHER

## 2018-04-02 RX ORDER — BLOOD-GLUCOSE CONTROL, NORMAL
1 EACH MISCELLANEOUS DAILY
Qty: 100 EACH | Refills: 3 | Status: CANCELLED | OUTPATIENT
Start: 2018-04-02 | End: 2019-04-02

## 2018-04-02 NOTE — PROGRESS NOTES
[facility]  Wound Care  Progress Note    Problem List Items Addressed This Visit     None          See wound doc progress notes. Documents will be scanned.        Thompson Mcdaniel  [facility]

## 2018-04-03 ENCOUNTER — PATIENT MESSAGE (OUTPATIENT)
Dept: FAMILY MEDICINE | Facility: CLINIC | Age: 53
End: 2018-04-03

## 2018-04-03 ENCOUNTER — OFFICE VISIT (OUTPATIENT)
Dept: UROLOGY | Facility: CLINIC | Age: 53
End: 2018-04-03
Payer: MEDICARE

## 2018-04-03 VITALS — BODY MASS INDEX: 33.8 KG/M2 | WEIGHT: 198 LBS | HEIGHT: 64 IN

## 2018-04-03 DIAGNOSIS — R35.1 NOCTURIA: ICD-10-CM

## 2018-04-03 DIAGNOSIS — R35.0 URINARY FREQUENCY: ICD-10-CM

## 2018-04-03 DIAGNOSIS — E11.9 TYPE 2 DIABETES MELLITUS WITHOUT COMPLICATION, WITHOUT LONG-TERM CURRENT USE OF INSULIN: Primary | Chronic | ICD-10-CM

## 2018-04-03 DIAGNOSIS — E11.9 TYPE 2 DIABETES MELLITUS WITHOUT COMPLICATION, WITHOUT LONG-TERM CURRENT USE OF INSULIN: Primary | ICD-10-CM

## 2018-04-03 DIAGNOSIS — N30.01 ACUTE CYSTITIS WITH HEMATURIA: Primary | ICD-10-CM

## 2018-04-03 PROCEDURE — 99212 OFFICE O/P EST SF 10 MIN: CPT | Mod: S$PBB,,, | Performed by: NURSE PRACTITIONER

## 2018-04-03 PROCEDURE — 99999 PR PBB SHADOW E&M-EST. PATIENT-LVL V: CPT | Mod: PBBFAC,,, | Performed by: NURSE PRACTITIONER

## 2018-04-03 PROCEDURE — 99215 OFFICE O/P EST HI 40 MIN: CPT | Mod: PBBFAC,PO | Performed by: NURSE PRACTITIONER

## 2018-04-03 RX ORDER — BLOOD-GLUCOSE CONTROL, NORMAL
1 EACH MISCELLANEOUS DAILY
Qty: 100 EACH | Refills: 3 | Status: SHIPPED | OUTPATIENT
Start: 2018-04-03 | End: 2021-01-06 | Stop reason: SDUPTHER

## 2018-04-03 NOTE — PROGRESS NOTES
Subjective:       Patient ID: Kuldeep Alamo is a 53 y.o. male.    Chief Complaint: Follow-up (retention / sims removal)    Patient is a 54 yo WM who is here to day for follow-up s/p sims catheter removal 1 week ago. He is also being treated for a UTI. Augmentin 875 mg by mouth was prescribed to patient on Friday (3/30/18). Patient reports he started antibiotic this morning. Patient reports urinary frequency. He denies, urgency, fever, dysuria, decreased urine production, and urinary retention at this time.       Review of Systems   Constitutional: Negative for appetite change, chills and fever.   Respiratory: Negative for cough, chest tightness and shortness of breath.    Cardiovascular: Positive for leg swelling. Negative for chest pain.   Gastrointestinal: Negative for abdominal distention, abdominal pain, constipation, diarrhea, nausea and vomiting.   Genitourinary: Positive for frequency. Negative for decreased urine volume, difficulty urinating, discharge, dysuria, flank pain, hematuria, penile pain, penile swelling, scrotal swelling, testicular pain and urgency.   Musculoskeletal: Negative.    Skin: Positive for wound.   Neurological: Positive for weakness. Negative for dizziness, light-headedness and headaches.   Psychiatric/Behavioral: Negative.        Objective:      Physical Exam   Constitutional: He is oriented to person, place, and time. He appears well-developed and well-nourished. No distress.   HENT:   Head: Normocephalic and atraumatic.   Eyes: EOM are normal. Pupils are equal, round, and reactive to light.   Neck: Normal range of motion. Neck supple.   Cardiovascular: Normal rate.    Pulmonary/Chest: Effort normal. No respiratory distress.   Abdominal: Soft. There is no tenderness.   Musculoskeletal: Normal range of motion. He exhibits edema.        Right lower leg: He exhibits edema.        Left lower leg: He exhibits edema.        Legs:  Patient in motorized wheelchair.   Lymphadenopathy:     He  has no cervical adenopathy.   Neurological: He is alert and oriented to person, place, and time.   Skin: Skin is warm and dry.   Psychiatric: He has a normal mood and affect. His behavior is normal. Judgment and thought content normal.   Nursing note and vitals reviewed.      Assessment:       1. Acute cystitis with hematuria    2. Urinary frequency    3. Nocturia        Plan:       Kuldeep was seen today for follow-up.    Diagnoses and all orders for this visit:    Acute cystitis with hematuria  -     Urine culture; Future    Urinary frequency    Nocturia    Other orders  1. Urine recheck (Urine C+S) on 4/12/18 or 4/13/18. Bring urine to [facility].   2. Continue Augmentin for UTI.    Follow-up in 2 weeks by telephone and as needed.    Anand Kohler NP

## 2018-04-03 NOTE — PATIENT INSTRUCTIONS
1. Urine recheck (Urine C+S) on 4/12/18 or 4/13/18. Bring urine to [facility].  2. Continue Augmentin for UTI.  3. Follow-up in 2 weeks by telephone.

## 2018-04-03 NOTE — TELEPHONE ENCOUNTER
Spoke to [facility] Meter was filled, strips and lancets need a diagnosis code. All you have to do is sign, I've added Dx codes.

## 2018-04-04 ENCOUNTER — PATIENT MESSAGE (OUTPATIENT)
Dept: FAMILY MEDICINE | Facility: CLINIC | Age: 53
End: 2018-04-04

## 2018-04-09 ENCOUNTER — OFFICE VISIT (OUTPATIENT)
Dept: WOUND CARE | Facility: HOSPITAL | Age: 53
End: 2018-04-09
Attending: SURGERY
Payer: MEDICARE

## 2018-04-09 VITALS
TEMPERATURE: 97 F | RESPIRATION RATE: 18 BRPM | DIASTOLIC BLOOD PRESSURE: 72 MMHG | SYSTOLIC BLOOD PRESSURE: 105 MMHG | HEART RATE: 65 BPM

## 2018-04-09 DIAGNOSIS — L97.812 NON-PRESSURE CHRONIC ULCER OF OTHER PART OF RIGHT LOWER LEG WITH FAT LAYER EXPOSED: Primary | ICD-10-CM

## 2018-04-09 DIAGNOSIS — I73.9 PAD (PERIPHERAL ARTERY DISEASE): ICD-10-CM

## 2018-04-09 PROCEDURE — 11042 DBRDMT SUBQ TIS 1ST 20SQCM/<: CPT

## 2018-04-09 PROCEDURE — 11045 DBRDMT SUBQ TISS EACH ADDL: CPT

## 2018-04-09 PROCEDURE — 27201912 HC WOUND CARE DEBRIDEMENT SUPPLIES

## 2018-04-09 NOTE — PROGRESS NOTES
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 Had home health but not improving. Exam showed necrotic skin/fat upon presentation. Had evaluation of extremity by [facility] in July 2017 showing decrease inflow. Wheelchair bound since 2009 s/p CVA. Patient underwent angiogram with angioplasty to improve arterial flow.  There is been significant improvement in the wound since angioplasty was performed.  Santyl is being utilized.           Current Assessment & Plan     Wound continues to make excellent improvement            Cardiac/Vascular    PAD (peripheral artery disease)          See wound doc progress notes. Documents will be scanned.        Ahmet Hart  [facility]

## 2018-04-16 ENCOUNTER — OFFICE VISIT (OUTPATIENT)
Dept: WOUND CARE | Facility: HOSPITAL | Age: 53
End: 2018-04-16
Attending: SURGERY
Payer: MEDICARE

## 2018-04-16 VITALS
HEART RATE: 67 BPM | RESPIRATION RATE: 18 BRPM | SYSTOLIC BLOOD PRESSURE: 100 MMHG | DIASTOLIC BLOOD PRESSURE: 69 MMHG | TEMPERATURE: 98 F

## 2018-04-16 DIAGNOSIS — L97.812 NON-PRESSURE CHRONIC ULCER OF OTHER PART OF RIGHT LOWER LEG WITH FAT LAYER EXPOSED: ICD-10-CM

## 2018-04-16 PROCEDURE — 11045 DBRDMT SUBQ TISS EACH ADDL: CPT

## 2018-04-16 PROCEDURE — 27201912 HC WOUND CARE DEBRIDEMENT SUPPLIES

## 2018-04-16 PROCEDURE — 11042 DBRDMT SUBQ TIS 1ST 20SQCM/<: CPT

## 2018-04-23 ENCOUNTER — PATIENT MESSAGE (OUTPATIENT)
Dept: FAMILY MEDICINE | Facility: CLINIC | Age: 53
End: 2018-04-23

## 2018-04-23 ENCOUNTER — OFFICE VISIT (OUTPATIENT)
Dept: WOUND CARE | Facility: HOSPITAL | Age: 53
End: 2018-04-23
Attending: SURGERY
Payer: MEDICARE

## 2018-04-23 VITALS
DIASTOLIC BLOOD PRESSURE: 62 MMHG | TEMPERATURE: 98 F | RESPIRATION RATE: 18 BRPM | HEART RATE: 72 BPM | SYSTOLIC BLOOD PRESSURE: 113 MMHG

## 2018-04-23 DIAGNOSIS — L97.812 NON-PRESSURE CHRONIC ULCER OF OTHER PART OF RIGHT LOWER LEG WITH FAT LAYER EXPOSED: Primary | ICD-10-CM

## 2018-04-23 PROCEDURE — 27201912 HC WOUND CARE DEBRIDEMENT SUPPLIES

## 2018-04-23 PROCEDURE — 11042 DBRDMT SUBQ TIS 1ST 20SQCM/<: CPT

## 2018-04-23 NOTE — PROGRESS NOTES
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 Had home health but not improving. Exam showed necrotic skin/fat upon presentation. Had evaluation of extremity by [facility] in July 2017 showing decrease inflow. Wheelchair bound since 2009 s/p CVA. Patient underwent angiogram with angioplasty to improve arterial flow.  There is been significant improvement in the wound since angioplasty was performed.  Santyl was being utilized.  Switch to promogram on 4/23/18.           Current Assessment & Plan     Wound continues to improve               See wound doc progress notes. Documents will be scanned.        Ahmet Hart  [facility]

## 2018-04-24 ENCOUNTER — PATIENT MESSAGE (OUTPATIENT)
Dept: UROLOGY | Facility: CLINIC | Age: 53
End: 2018-04-24

## 2018-04-27 DIAGNOSIS — F41.0 PANIC DISORDER: Chronic | ICD-10-CM

## 2018-04-27 RX ORDER — ALPRAZOLAM 0.5 MG/1
0.5 TABLET ORAL 3 TIMES DAILY PRN
Qty: 30 TABLET | Refills: 2 | OUTPATIENT
Start: 2018-04-27

## 2018-04-27 NOTE — TELEPHONE ENCOUNTER
His chart shows that he should have a refill on this medication. It was written  in march with 2 refills.

## 2018-04-27 NOTE — TELEPHONE ENCOUNTER
----- Message from Inge Morgan sent at 4/27/2018  8:38 AM CDT -----  Patient's sister, Amaya Chavez, called.  No. [phone]    Patient needs a script for Xanax 0.5mg, as needed.  [facility] in [place]

## 2018-05-07 ENCOUNTER — TELEPHONE (OUTPATIENT)
Dept: FAMILY MEDICINE | Facility: CLINIC | Age: 53
End: 2018-05-07

## 2018-05-07 ENCOUNTER — OFFICE VISIT (OUTPATIENT)
Dept: WOUND CARE | Facility: HOSPITAL | Age: 53
End: 2018-05-07
Attending: SURGERY
Payer: MEDICARE

## 2018-05-07 DIAGNOSIS — I73.9 PAD (PERIPHERAL ARTERY DISEASE): ICD-10-CM

## 2018-05-07 DIAGNOSIS — L97.812 NON-PRESSURE CHRONIC ULCER OF OTHER PART OF RIGHT LOWER LEG WITH FAT LAYER EXPOSED: Primary | ICD-10-CM

## 2018-05-07 DIAGNOSIS — E11.9 TYPE 2 DIABETES MELLITUS WITHOUT COMPLICATION, WITHOUT LONG-TERM CURRENT USE OF INSULIN: Primary | Chronic | ICD-10-CM

## 2018-05-07 PROCEDURE — 27201912 HC WOUND CARE DEBRIDEMENT SUPPLIES

## 2018-05-07 PROCEDURE — 11042 DBRDMT SUBQ TIS 1ST 20SQCM/<: CPT

## 2018-05-07 NOTE — TELEPHONE ENCOUNTER
----- Message from Sudarshan Chavira sent at 5/7/2018 12:09 PM CDT -----  Contact: Amaya Chavez (sister)/[phone]  Patient's sister called to advise of what patient's testing meter is.  It is a One Touch Ultra.    Patient would like a refill for test strips 3 times a day sent to [id number] - [place], [place] - [address] AT [address] [facility].    .

## 2018-05-07 NOTE — TELEPHONE ENCOUNTER
I have sent a prescription for sugar strips but he only needs to test once daily, so I adjusted the script to reflect that.

## 2018-05-07 NOTE — TELEPHONE ENCOUNTER
Left message we need the name of the test strips for the meter he uses to check his blood glucose 3x/ day

## 2018-05-07 NOTE — TELEPHONE ENCOUNTER
----- Message from Cheyanne Reyes sent at 5/7/2018  9:09 AM CDT -----  Contact: . [phone]  Patient would like a refill for test strips 3 times a day sent to [id number] - [place], [place] - [address] AT [address] [facility]. Please advise.

## 2018-05-07 NOTE — PROGRESS NOTES
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 Had home health but not improving. Exam showed necrotic skin/fat upon presentation. Had evaluation of extremity by [facility] in July 2017 showing decrease inflow. Wheelchair bound since 2009 s/p CVA. Patient underwent angiogram with angioplasty to improve arterial flow.  There is been significant improvement in the wound since angioplasty was performed.   Santyl was being utilized. Switch to promogram on 4/23/18.           Current Assessment & Plan     Wound continues to improve            Cardiac/Vascular    PAD (peripheral artery disease)          See wound doc progress notes. Documents will be scanned.        Ahmet Hart  [facility]

## 2018-05-08 ENCOUNTER — PATIENT MESSAGE (OUTPATIENT)
Dept: FAMILY MEDICINE | Facility: CLINIC | Age: 53
End: 2018-05-08

## 2018-05-08 ENCOUNTER — TELEPHONE (OUTPATIENT)
Dept: FAMILY MEDICINE | Facility: CLINIC | Age: 53
End: 2018-05-08

## 2018-05-08 NOTE — TELEPHONE ENCOUNTER
----- Message from Priscilla Alejandro sent at 5/8/2018 10:53 AM CDT -----  Contact: [phone]/Sister Amayaevie ChopraScott  Patient would like to speak with you about patients test strips. Please advise.

## 2018-05-08 NOTE — TELEPHONE ENCOUNTER
----- Message from Alethea Vasquez sent at 5/8/2018  2:07 PM CDT -----  Contact: [phone]/ self   pharmacy called stating they received a prescription for blood sugar diagnostic Strp with instructions of testing one time a day . Pt states he testes 3 time a day . Pharmacy its requesting a new prescription . Please advise

## 2018-05-08 NOTE — TELEPHONE ENCOUNTER
Advised that pts sister that per guidelines pt only needs to test once a day since he is not on insulin. Advised that can make an appt if they would like to discuss and needs to est care anyway. appt made. Advised to get script filled in the mean time.

## 2018-05-17 RX ORDER — LISINOPRIL 40 MG/1
40 TABLET ORAL DAILY
Qty: 30 TABLET | Refills: 0 | Status: ON HOLD | OUTPATIENT
Start: 2018-05-17 | End: 2019-10-07 | Stop reason: HOSPADM

## 2018-05-21 ENCOUNTER — OFFICE VISIT (OUTPATIENT)
Dept: WOUND CARE | Facility: HOSPITAL | Age: 53
End: 2018-05-21
Attending: SURGERY
Payer: MEDICARE

## 2018-05-21 ENCOUNTER — TELEPHONE (OUTPATIENT)
Dept: ADMINISTRATIVE | Facility: CLINIC | Age: 53
End: 2018-05-21

## 2018-05-21 VITALS
TEMPERATURE: 98 F | SYSTOLIC BLOOD PRESSURE: 107 MMHG | HEART RATE: 69 BPM | RESPIRATION RATE: 18 BRPM | DIASTOLIC BLOOD PRESSURE: 77 MMHG

## 2018-05-21 DIAGNOSIS — L97.812 NON-PRESSURE CHRONIC ULCER OF OTHER PART OF RIGHT LOWER LEG WITH FAT LAYER EXPOSED: Primary | ICD-10-CM

## 2018-05-21 PROCEDURE — 27201912 HC WOUND CARE DEBRIDEMENT SUPPLIES

## 2018-05-21 PROCEDURE — 11042 DBRDMT SUBQ TIS 1ST 20SQCM/<: CPT

## 2018-05-21 NOTE — PROGRESS NOTES
Home Health Recertification with [facility]. Dr. Owen Frederick.  services.

## 2018-05-21 NOTE — PROGRESS NOTES
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 Had home health but not improving. Exam showed necrotic skin/fat upon presentation. Had evaluation of extremity by [facility] in July 2017 showing decrease inflow. Wheelchair bound since 2009 s/p CVA. Patient underwent angiogram with angioplasty to improve arterial flow.  There is been significant improvement in the wound since angioplasty was performed.   Santyl was being utilized. Switch to promogram on 4/23/18.  On 5/21/2018, the patient had signs of Pseudomonas colonization.  Silver alginate was initiated.                 See wound doc progress notes. Documents will be scanned.        Ahmet Hart  [facility]

## 2018-06-04 ENCOUNTER — OFFICE VISIT (OUTPATIENT)
Dept: WOUND CARE | Facility: HOSPITAL | Age: 53
End: 2018-06-04
Attending: SURGERY
Payer: MEDICARE

## 2018-06-04 VITALS
TEMPERATURE: 98 F | RESPIRATION RATE: 18 BRPM | DIASTOLIC BLOOD PRESSURE: 64 MMHG | SYSTOLIC BLOOD PRESSURE: 119 MMHG | HEART RATE: 67 BPM

## 2018-06-04 DIAGNOSIS — L97.812 NON-PRESSURE CHRONIC ULCER OF OTHER PART OF RIGHT LOWER LEG WITH FAT LAYER EXPOSED: ICD-10-CM

## 2018-06-04 PROCEDURE — 27201912 HC WOUND CARE DEBRIDEMENT SUPPLIES

## 2018-06-04 PROCEDURE — 11042 DBRDMT SUBQ TIS 1ST 20SQCM/<: CPT

## 2018-06-04 NOTE — PROGRESS NOTES
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 Had home health but not improving. Exam showed necrotic skin/fat upon presentation. Had evaluation of extremity by [facility] in July 2017 showing decrease inflow. Wheelchair bound since 2009 s/p CVA. Patient underwent angiogram with angioplasty to improve arterial flow.  There is been significant improvement in the wound since angioplasty was performed.   Santyl was being utilized. Switch to promogram on 4/23/18.  On 5/21/2018, the patient had signs of Pseudomonas colonization.  Silver alginate was initiated.  Wound continues to decrease in size with no significant drainage                 See wound doc progress notes. Documents will be scanned.        Thompson Mcdaniel  [facility]

## 2018-06-18 ENCOUNTER — OFFICE VISIT (OUTPATIENT)
Dept: WOUND CARE | Facility: HOSPITAL | Age: 53
End: 2018-06-18
Attending: SURGERY
Payer: MEDICARE

## 2018-06-18 VITALS
TEMPERATURE: 98 F | SYSTOLIC BLOOD PRESSURE: 93 MMHG | DIASTOLIC BLOOD PRESSURE: 61 MMHG | RESPIRATION RATE: 18 BRPM | HEART RATE: 67 BPM

## 2018-06-18 DIAGNOSIS — L97.812 NON-PRESSURE CHRONIC ULCER OF OTHER PART OF RIGHT LOWER LEG WITH FAT LAYER EXPOSED: ICD-10-CM

## 2018-06-18 PROCEDURE — 27201912 HC WOUND CARE DEBRIDEMENT SUPPLIES

## 2018-06-18 PROCEDURE — 11042 DBRDMT SUBQ TIS 1ST 20SQCM/<: CPT

## 2018-06-18 NOTE — PROGRESS NOTES
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 Had home health but not improving. Exam showed necrotic skin/fat upon presentation. Had evaluation of extremity by [facility] in July 2017 showing decrease inflow. Wheelchair bound since 2009 s/p CVA. Patient underwent angiogram with angioplasty to improve arterial flow.  There is been significant improvement in the wound since angioplasty was performed.   Santyl was being utilized. Switch to promogram on 4/23/18.  On 5/21/2018, the patient had signs of Pseudomonas colonization.  Silver alginate was initiated.  Wound continues to decrease in size but there is increase in drainage today.  He also has a new wound in the anterior aspect of extremity due to trauma.  This appears to be partial-thickness we will start Mepilex AG                 See wound doc progress notes. Documents will be scanned.        Thompson Mcdaniel  [facility]

## 2018-06-25 RX ORDER — FENOFIBRATE 160 MG/1
TABLET ORAL
Qty: 30 TABLET | Refills: 1 | Status: SHIPPED | OUTPATIENT
Start: 2018-06-25 | End: 2021-02-23 | Stop reason: SDUPTHER

## 2018-06-26 RX ORDER — METFORMIN HYDROCHLORIDE 850 MG/1
TABLET ORAL
Qty: 60 TABLET | Refills: 0 | Status: SHIPPED | OUTPATIENT
Start: 2018-06-26 | End: 2018-08-26 | Stop reason: SDUPTHER

## 2018-06-26 NOTE — TELEPHONE ENCOUNTER
I have handled several prescriptions for this patient, but he has not established care with me. HE needs to make an appointment asap.

## 2018-07-02 ENCOUNTER — OFFICE VISIT (OUTPATIENT)
Dept: WOUND CARE | Facility: HOSPITAL | Age: 53
End: 2018-07-02
Attending: SURGERY
Payer: MEDICARE

## 2018-07-02 VITALS — SYSTOLIC BLOOD PRESSURE: 119 MMHG | DIASTOLIC BLOOD PRESSURE: 60 MMHG | HEART RATE: 68 BPM

## 2018-07-02 DIAGNOSIS — L97.812 NON-PRESSURE CHRONIC ULCER OF OTHER PART OF RIGHT LOWER LEG WITH FAT LAYER EXPOSED: Primary | ICD-10-CM

## 2018-07-02 DIAGNOSIS — L97.811 ULCER OF RIGHT PRETIBIAL REGION, LIMITED TO BREAKDOWN OF SKIN: ICD-10-CM

## 2018-07-02 PROCEDURE — 27201912 HC WOUND CARE DEBRIDEMENT SUPPLIES

## 2018-07-02 PROCEDURE — 99499 UNLISTED E&M SERVICE: CPT | Mod: ,,, | Performed by: SURGERY

## 2018-07-02 PROCEDURE — 11042 DBRDMT SUBQ TIS 1ST 20SQCM/<: CPT

## 2018-07-02 NOTE — ASSESSMENT & PLAN NOTE
The new wound site may be due to tight elastic wrap.  The patient is applying a tight elastic graft since the dressing is not staying.  The leg will be wrapped from the toes to the knees with Kerlix and a Tubigrip on top to assist in the dressing remaining in place without undue pressure.

## 2018-07-02 NOTE — PROGRESS NOTES
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 Had home health but not improving. Exam showed necrotic skin/fat upon presentation. Had evaluation of extremity by [facility] in July 2017 showing decrease inflow. Wheelchair bound since 2009 s/p CVA. Patient underwent angiogram with angioplasty to improve arterial flow.  There is been significant improvement in the wound since angioplasty was performed.   Santyl was being utilized. Switch to promogram on 4/23/18.  On 5/21/2018, the patient had signs of Pseudomonas colonization.  Silver alginate was initiated.  Wound continues to decrease in size.  He also has a new wound in the anterior aspect of extremity due to trauma on 6/18/18.  This appears to be partial-thickness. Mepilex Ag was started.  Patient presented on 7/2/2018 with a very tight dressing wrapped around his leg with an elastic wrap.         Current Assessment & Plan     The new wound site may be due to tight elastic wrap.  The patient is applying a tight elastic graft since the dressing is not staying.  The leg will be wrapped from the toes to the knees with Kerlix and a Tubigrip on top to assist in the dressing remaining in place without undue pressure.         Ulcer of right pretibial region, limited to breakdown of skin    Overview     Wound identified 6/18/2018 that is most likely related to tight dressing.           Current Assessment & Plan     Wound identified 6/18/2018 that is most likely related to tight dressing.               See wound doc progress notes. Documents will be scanned.        Ahmet Hart  [facility]

## 2018-07-16 ENCOUNTER — OFFICE VISIT (OUTPATIENT)
Dept: WOUND CARE | Facility: HOSPITAL | Age: 53
End: 2018-07-16
Attending: SURGERY
Payer: MEDICARE

## 2018-07-16 VITALS — HEART RATE: 76 BPM | DIASTOLIC BLOOD PRESSURE: 74 MMHG | SYSTOLIC BLOOD PRESSURE: 138 MMHG | RESPIRATION RATE: 20 BRPM

## 2018-07-16 DIAGNOSIS — L97.812 NON-PRESSURE CHRONIC ULCER OF OTHER PART OF RIGHT LOWER LEG WITH FAT LAYER EXPOSED: Primary | ICD-10-CM

## 2018-07-16 DIAGNOSIS — L97.811 ULCER OF RIGHT PRETIBIAL REGION, LIMITED TO BREAKDOWN OF SKIN: ICD-10-CM

## 2018-07-16 PROCEDURE — 99499 UNLISTED E&M SERVICE: CPT | Mod: ,,, | Performed by: SURGERY

## 2018-07-16 PROCEDURE — 27201912 HC WOUND CARE DEBRIDEMENT SUPPLIES

## 2018-07-16 PROCEDURE — 11042 DBRDMT SUBQ TIS 1ST 20SQCM/<: CPT

## 2018-07-16 PROCEDURE — 97597 DBRDMT OPN WND 1ST 20 CM/<: CPT

## 2018-07-16 NOTE — PROGRESS NOTES
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 Had home health but not improving. Exam showed necrotic skin/fat upon presentation. Had evaluation of extremity by [facility] in July 2017 showing decrease inflow. Wheelchair bound since 2009 s/p CVA. Patient underwent angiogram with angioplasty to improve arterial flow.  There is been significant improvement in the wound since angioplasty was performed.   Santyl was being utilized. Switch to promogram on 4/23/18.  On 5/21/2018, the patient had signs of Pseudomonas colonization.  Silver alginate was initiated.  Wound continues to decrease in size.  He also has a new wound in the anterior aspect of extremity due to trauma on 6/18/18.  This appears to be partial-thickness. Mepilex Ag was started.  Patient presented on 7/2/2018 with a very tight dressing wrapped around his leg with an elastic wrap.         Current Assessment & Plan     Wound is rapidly improving now.         Ulcer of right pretibial region, limited to breakdown of skin    Overview     Wound identified 6/18/2018 that is most likely related to tight dressing.           Current Assessment & Plan     Wound improving               See wound doc progress notes. Documents will be scanned.        Ahmet Hart  [facility]

## 2018-07-29 RX ORDER — CLOPIDOGREL BISULFATE 75 MG/1
TABLET ORAL
Qty: 30 TABLET | Refills: 0 | Status: SHIPPED | OUTPATIENT
Start: 2018-07-29 | End: 2021-12-02 | Stop reason: SDUPTHER

## 2018-07-29 RX ORDER — HYDROCHLOROTHIAZIDE 12.5 MG/1
12.5 TABLET ORAL DAILY
Qty: 30 TABLET | Refills: 0 | Status: ON HOLD | OUTPATIENT
Start: 2018-07-29 | End: 2019-02-19

## 2018-07-30 ENCOUNTER — OFFICE VISIT (OUTPATIENT)
Dept: WOUND CARE | Facility: HOSPITAL | Age: 53
End: 2018-07-30
Attending: SURGERY
Payer: MEDICARE

## 2018-07-30 VITALS
SYSTOLIC BLOOD PRESSURE: 140 MMHG | HEART RATE: 80 BPM | TEMPERATURE: 98 F | RESPIRATION RATE: 18 BRPM | DIASTOLIC BLOOD PRESSURE: 78 MMHG

## 2018-07-30 DIAGNOSIS — L97.811 ULCER OF RIGHT PRETIBIAL REGION, LIMITED TO BREAKDOWN OF SKIN: Primary | ICD-10-CM

## 2018-07-30 DIAGNOSIS — I73.9 PAD (PERIPHERAL ARTERY DISEASE): ICD-10-CM

## 2018-07-30 DIAGNOSIS — L97.812 NON-PRESSURE CHRONIC ULCER OF OTHER PART OF RIGHT LOWER LEG WITH FAT LAYER EXPOSED: ICD-10-CM

## 2018-07-30 PROCEDURE — 99499 UNLISTED E&M SERVICE: CPT | Mod: ,,, | Performed by: SURGERY

## 2018-07-30 PROCEDURE — 27201912 HC WOUND CARE DEBRIDEMENT SUPPLIES

## 2018-07-30 PROCEDURE — 11042 DBRDMT SUBQ TIS 1ST 20SQCM/<: CPT

## 2018-07-30 NOTE — PROGRESS NOTES
[facility]  Wound Care  Progress Note    Problem List Items Addressed This Visit        Derm    Non-pressure chronic ulcer of other part of right lower leg with fat layer exposed    Overview     Patient report trauma to right lateral leg 10/20/17. Had home health but not improving. Exam showed necrotic skin/fat upon presentation. Had evaluation of extremity by [facility] in July 2017 showing decrease inflow. Wheelchair bound since 2009 s/p CVA. Patient underwent angiogram with angioplasty to improve arterial flow.  There is been significant improvement in the wound since angioplasty was performed.   Santyl was being utilized. Switch to promogram on 4/23/18.  On 5/21/2018, the patient had signs of Pseudomonas colonization.  Silver alginate was initiated.    He developed a new wound in the anterior aspect of extremity due to trauma on 6/18/18.  This appears to be partial-thickness. Mepilex Ag was started.  Patient presented on 7/2/2018 with a very tight dressing wrapped around his leg with an elastic wrap.  On 07/30/2018, patient was noted to have increased pain in his wound with enlargement of the wound. He was evaluated by Dr. Galeano who is planning an angiogram as there is concern he has arterial ischemia causing decreased wound healing and worsening of the wound.         Current Assessment & Plan     Patient was noted to have increased pain in his wound with enlargement of the wound. He was evaluated by Dr. Galeano who is planning an angiogram as there is concern he has arterial ischemia causing decreased wound healing and worsening of the wound.  Proceed with angiogram.  Reassess after angiogram.         Ulcer of right pretibial region, limited to breakdown of skin - Primary    Overview     Wound identified 6/18/2018 that is most likely related to tight dressing.              Cardiac/Vascular    PAD (peripheral artery disease)          See wound doc progress notes. Documents will be scanned.        Ahmet  [facility]

## 2018-07-30 NOTE — ASSESSMENT & PLAN NOTE
Patient was noted to have increased pain in his wound with enlargement of the wound. He was evaluated by Dr. Galeano who is planning an angiogram as there is concern he has arterial ischemia causing decreased wound healing and worsening of the wound.  Proceed with angiogram.  Reassess after angiogram.

## 2018-07-31 ENCOUNTER — PATIENT MESSAGE (OUTPATIENT)
Dept: ADMINISTRATIVE | Facility: HOSPITAL | Age: 53
End: 2018-07-31

## 2018-08-02 ENCOUNTER — PATIENT MESSAGE (OUTPATIENT)
Dept: FAMILY MEDICINE | Facility: CLINIC | Age: 53
End: 2018-08-02

## 2018-08-13 ENCOUNTER — OFFICE VISIT (OUTPATIENT)
Dept: WOUND CARE | Facility: HOSPITAL | Age: 53
End: 2018-08-13
Attending: SURGERY
Payer: MEDICARE

## 2018-08-13 VITALS
HEART RATE: 76 BPM | RESPIRATION RATE: 20 BRPM | SYSTOLIC BLOOD PRESSURE: 138 MMHG | DIASTOLIC BLOOD PRESSURE: 74 MMHG | TEMPERATURE: 98 F

## 2018-08-13 DIAGNOSIS — L97.812 NON-PRESSURE CHRONIC ULCER OF OTHER PART OF RIGHT LOWER LEG WITH FAT LAYER EXPOSED: ICD-10-CM

## 2018-08-13 PROCEDURE — 11042 DBRDMT SUBQ TIS 1ST 20SQCM/<: CPT

## 2018-08-13 PROCEDURE — 27201912 HC WOUND CARE DEBRIDEMENT SUPPLIES

## 2018-08-13 PROCEDURE — 99499 UNLISTED E&M SERVICE: CPT | Mod: ,,, | Performed by: SURGERY

## 2018-08-13 NOTE — PROGRESS NOTES
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 Had home health but not improving. Exam showed necrotic skin/fat upon presentation. Had evaluation of extremity by [facility] in July 2017 showing decrease inflow. Wheelchair bound since 2009 s/p CVA. Patient underwent angiogram with angioplasty to improve arterial flow.  There is been significant improvement in the wound since angioplasty was performed.   Santyl was being utilized. Switch to promogram on 4/23/18.  On 5/21/2018, the patient had signs of Pseudomonas colonization.  Silver alginate was initiated.    He developed a new wound in the anterior aspect of extremity due to trauma on 6/18/18.  This appears to be partial-thickness. Mepilex Ag was started.  Patient presented on 7/2/2018 with a very tight dressing wrapped around his leg with an elastic wrap.  On 07/30/2018, patient was noted to have increased pain in his wound with enlargement of the wound. He was evaluated by Dr. Galeano who is planning an angiogram as there is concern he has arterial ischemia causing decreased wound healing and worsening of the wound. The patient underwent angiogram with stent placement last week.               See wound doc progress notes. Documents will be scanned.        Thompson Mcdaniel  [facility]

## 2018-08-20 ENCOUNTER — OFFICE VISIT (OUTPATIENT)
Dept: WOUND CARE | Facility: HOSPITAL | Age: 53
End: 2018-08-20
Attending: SURGERY
Payer: MEDICARE

## 2018-08-20 VITALS — DIASTOLIC BLOOD PRESSURE: 71 MMHG | SYSTOLIC BLOOD PRESSURE: 121 MMHG | HEART RATE: 62 BPM | RESPIRATION RATE: 20 BRPM

## 2018-08-20 DIAGNOSIS — L97.811 ULCER OF RIGHT PRETIBIAL REGION, LIMITED TO BREAKDOWN OF SKIN: Primary | ICD-10-CM

## 2018-08-20 DIAGNOSIS — L97.812 NON-PRESSURE CHRONIC ULCER OF OTHER PART OF RIGHT LOWER LEG WITH FAT LAYER EXPOSED: ICD-10-CM

## 2018-08-20 DIAGNOSIS — I73.9 PAD (PERIPHERAL ARTERY DISEASE): ICD-10-CM

## 2018-08-20 PROCEDURE — 27201912 HC WOUND CARE DEBRIDEMENT SUPPLIES

## 2018-08-20 PROCEDURE — 11045 DBRDMT SUBQ TISS EACH ADDL: CPT

## 2018-08-20 PROCEDURE — 11042 DBRDMT SUBQ TIS 1ST 20SQCM/<: CPT

## 2018-08-20 PROCEDURE — 99499 UNLISTED E&M SERVICE: CPT | Mod: ,,, | Performed by: SURGERY

## 2018-08-20 NOTE — PROGRESS NOTES
[facility]  Wound Care  Progress Note    Problem List Items Addressed This Visit        Derm    Non-pressure chronic ulcer of other part of right lower leg with fat layer exposed    Overview     Patient report trauma to right lateral leg 10/20/17. Had home health but not improving. Exam showed necrotic skin/fat upon presentation. Had evaluation of extremity by [facility] in July 2017 showing decrease inflow. Wheelchair bound since 2009 s/p CVA. Patient underwent angiogram with angioplasty to improve arterial flow.  There is been significant improvement in the wound since angioplasty was performed.   Santyl was being utilized. Switch to promogram on 4/23/18.  On 5/21/2018, the patient had signs of Pseudomonas colonization.  Silver alginate was initiated.    He developed a new wound in the anterior aspect of extremity due to trauma on 6/18/18.  This appeared to be partial-thickness. Mepilex Ag was started.  Patient presented on 7/2/2018 with a very tight dressing wrapped around his leg with an elastic wrap.  On 07/30/2018, patient was noted to have increased pain in his wound with enlargement of the wound. He was evaluated by Dr. Galeano who planned an angiogram as there was concern he has arterial ischemia causing decreased wound healing and worsening of the wound. The patient underwent angioplasy with DCB on 8/7/2018 of right SFA and Popliteal artery.         Current Assessment & Plan     Expect improvement in the wound with improved arterial flow.  Continue Santyl.         Ulcer of right pretibial region, limited to breakdown of skin - Primary    Overview     Wound identified 6/18/2018 that is most likely related to tight dressing.              Cardiac/Vascular    PAD (peripheral artery disease)          See wound doc progress notes. Documents will be scanned.        Ahmet Hart  [facility]

## 2018-08-27 ENCOUNTER — OFFICE VISIT (OUTPATIENT)
Dept: WOUND CARE | Facility: HOSPITAL | Age: 53
End: 2018-08-27
Attending: SURGERY
Payer: MEDICARE

## 2018-08-27 VITALS — SYSTOLIC BLOOD PRESSURE: 104 MMHG | DIASTOLIC BLOOD PRESSURE: 64 MMHG | RESPIRATION RATE: 20 BRPM | HEART RATE: 70 BPM

## 2018-08-27 DIAGNOSIS — L97.812 NON-PRESSURE CHRONIC ULCER OF OTHER PART OF RIGHT LOWER LEG WITH FAT LAYER EXPOSED: ICD-10-CM

## 2018-08-27 PROCEDURE — 11045 DBRDMT SUBQ TISS EACH ADDL: CPT

## 2018-08-27 PROCEDURE — 99499 UNLISTED E&M SERVICE: CPT | Mod: ,,, | Performed by: SURGERY

## 2018-08-27 PROCEDURE — 27201912 HC WOUND CARE DEBRIDEMENT SUPPLIES

## 2018-08-27 PROCEDURE — 11042 DBRDMT SUBQ TIS 1ST 20SQCM/<: CPT

## 2018-08-27 RX ORDER — METFORMIN HYDROCHLORIDE 850 MG/1
TABLET ORAL
Qty: 60 TABLET | Refills: 0 | Status: ON HOLD | OUTPATIENT
Start: 2018-08-27 | End: 2019-02-20 | Stop reason: SDUPTHER

## 2018-08-27 NOTE — PROGRESS NOTES
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 Had home health but not improving. Exam showed necrotic skin/fat upon presentation. Had evaluation of extremity by [facility] in July 2017 showing decrease inflow. Wheelchair bound since 2009 s/p CVA. Patient underwent angiogram with angioplasty to improve arterial flow.  There is been significant improvement in the wound since angioplasty was performed.   Santyl was being utilized. Switch to promogram on 4/23/18.  On 5/21/2018, the patient had signs of Pseudomonas colonization.  Silver alginate was initiated.    He developed a new wound in the anterior aspect of extremity due to trauma on 6/18/18.  This appeared to be partial-thickness. Mepilex Ag was started.  Patient presented on 7/2/2018 with a very tight dressing wrapped around his leg with an elastic wrap.  On 07/30/2018, patient was noted to have increased pain in his wound with enlargement of the wound. He was evaluated by Dr. Galeano who planned an angiogram as there was concern he has arterial ischemia causing decreased wound healing and worsening of the wound. The patient underwent angioplasy with DCB on 8/7/2018 of right SFA and Popliteal artery.  Wound has progressed today.  There is macerations surrounding the wound. Patient has increased amount of swelling in the lower extremity.  Hesitant to use compression due to history of vascular disease.  Patient instructed to elevate.               See wound doc progress notes. Documents will be scanned.        Thompson Mcdaniel  [facility]

## 2018-09-04 RX ORDER — FENOFIBRATE 160 MG/1
TABLET ORAL
Qty: 30 TABLET | Refills: 1 | OUTPATIENT
Start: 2018-09-04

## 2018-09-04 NOTE — TELEPHONE ENCOUNTER
He sees Sr. Mello according to the chart. I have never seen him. He needs to call his pcp for refills.

## 2018-09-10 ENCOUNTER — OFFICE VISIT (OUTPATIENT)
Dept: WOUND CARE | Facility: HOSPITAL | Age: 53
End: 2018-09-10
Attending: SURGERY
Payer: MEDICARE

## 2018-09-10 VITALS — SYSTOLIC BLOOD PRESSURE: 113 MMHG | RESPIRATION RATE: 18 BRPM | DIASTOLIC BLOOD PRESSURE: 80 MMHG | HEART RATE: 64 BPM

## 2018-09-10 DIAGNOSIS — L97.812 NON-PRESSURE CHRONIC ULCER OF OTHER PART OF RIGHT LOWER LEG WITH FAT LAYER EXPOSED: Primary | ICD-10-CM

## 2018-09-10 DIAGNOSIS — I73.9 PAD (PERIPHERAL ARTERY DISEASE): ICD-10-CM

## 2018-09-10 PROCEDURE — 11042 DBRDMT SUBQ TIS 1ST 20SQCM/<: CPT

## 2018-09-10 PROCEDURE — 99499 UNLISTED E&M SERVICE: CPT | Mod: ,,, | Performed by: SURGERY

## 2018-09-10 PROCEDURE — 27201912 HC WOUND CARE DEBRIDEMENT SUPPLIES

## 2018-09-10 NOTE — PROGRESS NOTES
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 Had home health but not improving. Exam showed necrotic skin/fat upon presentation. Had evaluation of extremity by [facility] in July 2017 showing decrease inflow. Wheelchair bound since 2009 s/p CVA. Patient underwent angiogram with angioplasty to improve arterial flow.  There is been significant improvement in the wound since angioplasty was performed.   Santyl was being utilized. Switch to promogram on 4/23/18.  On 5/21/2018, the patient had signs of Pseudomonas colonization.  Silver alginate was initiated.    He developed a new wound in the anterior aspect of extremity due to trauma on 6/18/18.  This appeared to be partial-thickness. Mepilex Ag was started.  Patient presented on 7/2/2018 with a very tight dressing wrapped around his leg with an elastic wrap.  On 07/30/2018, patient was noted to have increased pain in his wound with enlargement of the wound. He was evaluated by Dr. Galeano who planned an angiogram as there was concern he has arterial ischemia causing decreased wound healing and worsening of the wound. The patient underwent angioplasy with DCB on 8/7/2018 of right SFA and Popliteal artery.  LINNEA performed 9/10/2018 was 0.92 There is substantial macerations surrounding the wound and extending to the foot from drainage.  The patient has been utilizing Santyl. Patient has decreased amount of swelling in the lower extremity from last visit.           Current Assessment & Plan     Patient has significant drainage and maceration of the skin.  Stop Santyl.  Began silver alginate.  Leg elevation has been stressed with very limited sitting.  Two layer compression given an LINNEA of 0.92. There will be close monitoring.            Cardiac/Vascular     PAD (peripheral artery disease)          See wound doc progress notes. Documents will be scanned.        Ahmet BARRETO Hebert [facility]

## 2018-09-10 NOTE — ASSESSMENT & PLAN NOTE
Patient has significant drainage and maceration of the skin.  Stop Santyl.  Began silver alginate.  Leg elevation has been stressed with very limited sitting.  Two layer compression given an LINNEA of 0.92. There will be close monitoring.

## 2018-09-17 ENCOUNTER — OFFICE VISIT (OUTPATIENT)
Dept: WOUND CARE | Facility: HOSPITAL | Age: 53
End: 2018-09-17
Attending: SURGERY
Payer: MEDICARE

## 2018-09-17 VITALS — SYSTOLIC BLOOD PRESSURE: 126 MMHG | HEART RATE: 63 BPM | DIASTOLIC BLOOD PRESSURE: 64 MMHG | RESPIRATION RATE: 18 BRPM

## 2018-09-17 DIAGNOSIS — L97.812 NON-PRESSURE CHRONIC ULCER OF OTHER PART OF RIGHT LOWER LEG WITH FAT LAYER EXPOSED: ICD-10-CM

## 2018-09-17 PROCEDURE — 11045 DBRDMT SUBQ TISS EACH ADDL: CPT

## 2018-09-17 PROCEDURE — 27201912 HC WOUND CARE DEBRIDEMENT SUPPLIES

## 2018-09-17 PROCEDURE — 11042 DBRDMT SUBQ TIS 1ST 20SQCM/<: CPT

## 2018-09-17 PROCEDURE — 99499 UNLISTED E&M SERVICE: CPT | Mod: ,,, | Performed by: SURGERY

## 2018-09-17 NOTE — PROGRESS NOTES
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 Had home health but not improving. Exam showed necrotic skin/fat upon presentation. Had evaluation of extremity by [facility] in July 2017 showing decrease inflow. Wheelchair bound since 2009 s/p CVA. Patient underwent angiogram with angioplasty to improve arterial flow.  There is been significant improvement in the wound since angioplasty was performed.   Santyl was being utilized. Switch to promogram on 4/23/18.  On 5/21/2018, the patient had signs of Pseudomonas colonization.  Silver alginate was initiated.    He developed a new wound in the anterior aspect of extremity due to trauma on 6/18/18.  This appeared to be partial-thickness. Mepilex Ag was started.  Patient presented on 7/2/2018 with a very tight dressing wrapped around his leg with an elastic wrap.  On 07/30/2018, patient was noted to have increased pain in his wound with enlargement of the wound. He was evaluated by Dr. Galeano who planned an angiogram as there was concern he has arterial ischemia causing decreased wound healing and worsening of the wound. The patient underwent angioplasy with DCB on 8/7/2018 of right SFA and Popliteal artery.  LINNEA performed 9/10/2018 was 0.92 There is substantial macerations surrounding the wound and extending to the foot from drainage.  Patient will use the compression with silver alginate. lower extremity edema much better controlled.  Patient states he has been elevating at home.               See wound doc progress notes. Documents will be scanned.        Thompson Mcdaniel  [facility]

## 2018-09-24 ENCOUNTER — OFFICE VISIT (OUTPATIENT)
Dept: WOUND CARE | Facility: HOSPITAL | Age: 53
End: 2018-09-24
Attending: SURGERY
Payer: MEDICARE

## 2018-09-24 VITALS
TEMPERATURE: 98 F | RESPIRATION RATE: 18 BRPM | DIASTOLIC BLOOD PRESSURE: 68 MMHG | HEART RATE: 66 BPM | SYSTOLIC BLOOD PRESSURE: 124 MMHG

## 2018-09-24 DIAGNOSIS — L97.812 NON-PRESSURE CHRONIC ULCER OF OTHER PART OF RIGHT LOWER LEG WITH FAT LAYER EXPOSED: Primary | ICD-10-CM

## 2018-09-24 PROBLEM — L97.811: Status: RESOLVED | Noted: 2018-07-02 | Resolved: 2018-09-24

## 2018-09-24 PROCEDURE — 99499 UNLISTED E&M SERVICE: CPT | Mod: ,,, | Performed by: SURGERY

## 2018-09-24 PROCEDURE — 11045 DBRDMT SUBQ TISS EACH ADDL: CPT

## 2018-09-24 PROCEDURE — 11042 DBRDMT SUBQ TIS 1ST 20SQCM/<: CPT

## 2018-09-24 PROCEDURE — 27201912 HC WOUND CARE DEBRIDEMENT SUPPLIES

## 2018-09-24 NOTE — ASSESSMENT & PLAN NOTE
Wound is much improved.  Continue compression and silver alginate.  Gentian violet to the areas of maceration.

## 2018-09-24 NOTE — PROGRESS NOTES
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 Had home health but not improving. Exam showed necrotic skin/fat upon presentation. Had evaluation of extremity by [facility] in July 2017 showing decrease inflow. Wheelchair bound since 2009 s/p CVA. Patient underwent angiogram with angioplasty to improve arterial flow.  There is been significant improvement in the wound since angioplasty was performed.   Santyl was being utilized. Switch to promogram on 4/23/18.  On 5/21/2018, the patient had signs of Pseudomonas colonization.  Silver alginate was initiated.    He developed a new wound in the anterior aspect of extremity due to trauma on 6/18/18.  This appeared to be partial-thickness. Mepilex Ag was started.  Patient presented on 7/2/2018 with a very tight dressing wrapped around his leg with an elastic wrap.  On 07/30/2018, patient was noted to have increased pain in his wound with enlargement of the wound. He was evaluated by Dr. Galeano who planned an angiogram as there was concern he has arterial ischemia causing decreased wound healing and worsening of the wound. The patient underwent angioplasy with DCB on 8/7/2018 of right SFA and Popliteal artery.  LINNEA performed 9/10/2018 was 0.92.   There is macerations surrounding the wound and extending to the foot from drainage.           Current Assessment & Plan     Wound is much improved.  Continue compression and silver alginate.  Gentian violet to the areas of maceration.               See wound doc progress notes. Documents will be scanned.        Ahmet Hart  [facility]

## 2018-10-01 ENCOUNTER — OFFICE VISIT (OUTPATIENT)
Dept: WOUND CARE | Facility: HOSPITAL | Age: 53
End: 2018-10-01
Attending: SURGERY
Payer: MEDICARE

## 2018-10-01 VITALS
HEART RATE: 84 BPM | TEMPERATURE: 98 F | RESPIRATION RATE: 18 BRPM | DIASTOLIC BLOOD PRESSURE: 64 MMHG | SYSTOLIC BLOOD PRESSURE: 107 MMHG

## 2018-10-01 DIAGNOSIS — L97.812 NON-PRESSURE CHRONIC ULCER OF OTHER PART OF RIGHT LOWER LEG WITH FAT LAYER EXPOSED: ICD-10-CM

## 2018-10-01 PROCEDURE — 11045 DBRDMT SUBQ TISS EACH ADDL: CPT

## 2018-10-01 PROCEDURE — 27201912 HC WOUND CARE DEBRIDEMENT SUPPLIES

## 2018-10-01 PROCEDURE — 11042 DBRDMT SUBQ TIS 1ST 20SQCM/<: CPT

## 2018-10-01 PROCEDURE — 99499 UNLISTED E&M SERVICE: CPT | Mod: ,,, | Performed by: SURGERY

## 2018-10-01 NOTE — ASSESSMENT & PLAN NOTE
Wound is improving.  Continue debridement to maintain active phase in wound healing.  Continue compression.

## 2018-10-01 NOTE — PROGRESS NOTES
[facility]  Wound Care  Progress Note    Problem List Items Addressed This Visit        Derm    Non-pressure chronic ulcer of other part of right lower leg with fat layer exposed    Overview     Patient report trauma to right lateral leg 10/20/17. Had home health but not improving. Exam showed necrotic skin/fat upon presentation. Had evaluation of extremity by [facility] in July 2017 showing decrease inflow. Wheelchair bound since 2009 s/p CVA. Patient underwent angiogram with angioplasty to improve arterial flow.  There is been significant improvement in the wound since angioplasty was performed.   Santyl was being utilized. Switch to promogram on 4/23/18.  On 5/21/2018, the patient had signs of Pseudomonas colonization.  Silver alginate was initiated.    He developed a new wound in the anterior aspect of extremity due to trauma on 6/18/18.  This appeared to be partial-thickness. Mepilex Ag was started.  Patient presented on 7/2/2018 with a very tight dressing wrapped around his leg with an elastic wrap.  On 07/30/2018, patient was noted to have increased pain in his wound with enlargement of the wound. He was evaluated by Dr. Galeano who planned an angiogram as there was concern he has arterial ischemia causing decreased wound healing and worsening of the wound. The patient underwent angioplasy with DCB on 8/7/2018 of right SFA and Popliteal artery.  LINNEA performed 9/10/2018 was 0.92.   There is macerations surrounding the wound and extending to the foot from drainage.           Current Assessment & Plan     Wound is improving.  Continue debridement to maintain active phase in wound healing.  Continue compression.               See wound doc progress notes. Documents will be scanned.        Ahmet Hart  [facility]

## 2018-10-15 ENCOUNTER — OFFICE VISIT (OUTPATIENT)
Dept: WOUND CARE | Facility: HOSPITAL | Age: 53
End: 2018-10-15
Attending: SURGERY
Payer: MEDICARE

## 2018-10-15 VITALS — DIASTOLIC BLOOD PRESSURE: 78 MMHG | RESPIRATION RATE: 20 BRPM | HEART RATE: 70 BPM | SYSTOLIC BLOOD PRESSURE: 131 MMHG

## 2018-10-15 DIAGNOSIS — R60.0 LOCALIZED EDEMA: ICD-10-CM

## 2018-10-15 DIAGNOSIS — L97.812 NON-PRESSURE CHRONIC ULCER OF OTHER PART OF RIGHT LOWER LEG WITH FAT LAYER EXPOSED: Primary | ICD-10-CM

## 2018-10-15 PROCEDURE — 99499 UNLISTED E&M SERVICE: CPT | Mod: ,,, | Performed by: SURGERY

## 2018-10-15 PROCEDURE — 11042 DBRDMT SUBQ TIS 1ST 20SQCM/<: CPT

## 2018-10-15 PROCEDURE — 27201912 HC WOUND CARE DEBRIDEMENT SUPPLIES

## 2018-10-15 RX ORDER — TRIAMCINOLONE ACETONIDE 1 MG/ML
LOTION TOPICAL 2 TIMES DAILY
Status: ON HOLD | COMMUNITY
End: 2020-07-29 | Stop reason: HOSPADM

## 2018-10-15 NOTE — PROGRESS NOTES
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 Had home health but not improving. Exam showed necrotic skin/fat upon presentation. Had evaluation of extremity by [facility] in July 2017 showing decrease inflow. Wheelchair bound since 2009 s/p CVA. Patient underwent angiogram with angioplasty to improve arterial flow.  There is been significant improvement in the wound since angioplasty was performed.   Santyl was being utilized. Switch to promogram on 4/23/18.  On 5/21/2018, the patient had signs of Pseudomonas colonization.  Silver alginate was initiated.    He developed a new wound in the anterior aspect of extremity due to trauma on 6/18/18.  This appeared to be partial-thickness. Mepilex Ag was started.  Patient presented on 7/2/2018 with a very tight dressing wrapped around his leg with an elastic wrap.  On 07/30/2018, patient was noted to have increased pain in his wound with enlargement of the wound. He was evaluated by Dr. Galeano who planned an angiogram as there was concern he has arterial ischemia causing decreased wound healing and worsening of the wound. The patient underwent angioplasy with DCB on 8/7/2018 of right SFA and Popliteal artery.  LINNEA performed 9/10/2018 was 0.92.   There is macerations surrounding the wound and extending to the foot from drainage that is improving steadily.           Current Assessment & Plan     Wound is improving rapidly.  Edema is much improved  Continue debridement to maintain active phase wound healing.  Continue leg elevation and compression for edema control.            Other    Localized edema of right lower leg    Current Assessment & Plan     Edema is much improved               See wound doc progress notes. Documents will be scanned.        Ahmet  MARY LOU Hebert [facility]

## 2018-10-15 NOTE — ASSESSMENT & PLAN NOTE
Wound is improving rapidly.  Edema is much improved  Continue debridement to maintain active phase wound healing.  Continue leg elevation and compression for edema control.

## 2018-10-22 ENCOUNTER — OFFICE VISIT (OUTPATIENT)
Dept: WOUND CARE | Facility: HOSPITAL | Age: 53
End: 2018-10-22
Attending: SURGERY
Payer: MEDICARE

## 2018-10-22 VITALS — RESPIRATION RATE: 18 BRPM | HEART RATE: 64 BPM | SYSTOLIC BLOOD PRESSURE: 142 MMHG | DIASTOLIC BLOOD PRESSURE: 81 MMHG

## 2018-10-22 DIAGNOSIS — L97.812 NON-PRESSURE CHRONIC ULCER OF OTHER PART OF RIGHT LOWER LEG WITH FAT LAYER EXPOSED: ICD-10-CM

## 2018-10-22 PROCEDURE — 27201912 HC WOUND CARE DEBRIDEMENT SUPPLIES

## 2018-10-22 PROCEDURE — 11042 DBRDMT SUBQ TIS 1ST 20SQCM/<: CPT

## 2018-10-22 PROCEDURE — 99499 UNLISTED E&M SERVICE: CPT | Mod: ,,, | Performed by: SURGERY

## 2018-10-22 NOTE — PROGRESS NOTES
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 Had home health but not improving. Exam showed necrotic skin/fat upon presentation. Had evaluation of extremity by [facility] in July 2017 showing decrease inflow. Wheelchair bound since 2009 s/p CVA. Patient underwent angiogram with angioplasty to improve arterial flow.  There is been significant improvement in the wound since angioplasty was performed.   Santyl was being utilized. Switch to promogram on 4/23/18.  On 5/21/2018, the patient had signs of Pseudomonas colonization.  Silver alginate was initiated.    He developed a new wound in the anterior aspect of extremity due to trauma on 6/18/18.  This appeared to be partial-thickness. Mepilex Ag was started.  Patient presented on 7/2/2018 with a very tight dressing wrapped around his leg with an elastic wrap.  On 07/30/2018, patient was noted to have increased pain in his wound with enlargement of the wound. He was evaluated by Dr. Galeano who planned an angiogram as there was concern he has arterial ischemia causing decreased wound healing and worsening of the wound. The patient underwent angioplasy with DCB on 8/7/2018 of right SFA and Popliteal artery.  LINNEA performed 9/10/2018 was 0.92.   There is macerations surrounding the wound and extending to the foot from drainage that is improving steadily.  Patient's edema is much better controlled.  The wound has decreased in size with minimal slough.  The patient states he has been compliant with elevation                 See wound doc progress notes. Documents will be scanned.        Thompson Mcdaniel  [facility]

## 2018-10-29 ENCOUNTER — OFFICE VISIT (OUTPATIENT)
Dept: WOUND CARE | Facility: HOSPITAL | Age: 53
End: 2018-10-29
Attending: SURGERY
Payer: MEDICARE

## 2018-10-29 VITALS
SYSTOLIC BLOOD PRESSURE: 131 MMHG | TEMPERATURE: 98 F | HEART RATE: 69 BPM | RESPIRATION RATE: 18 BRPM | DIASTOLIC BLOOD PRESSURE: 85 MMHG

## 2018-10-29 DIAGNOSIS — L97.812 NON-PRESSURE CHRONIC ULCER OF OTHER PART OF RIGHT LOWER LEG WITH FAT LAYER EXPOSED: Primary | ICD-10-CM

## 2018-10-29 PROCEDURE — 11042 DBRDMT SUBQ TIS 1ST 20SQCM/<: CPT

## 2018-10-29 PROCEDURE — 27201912 HC WOUND CARE DEBRIDEMENT SUPPLIES

## 2018-10-29 PROCEDURE — 99499 UNLISTED E&M SERVICE: CPT | Mod: ,,, | Performed by: SURGERY

## 2018-10-29 NOTE — PROGRESS NOTES
[facility]  Wound Care  Progress Note    Problem List Items Addressed This Visit     Non-pressure chronic ulcer of other part of right lower leg with fat layer exposed - Primary    Overview     Patient report trauma to right lateral leg 10/20/17. Had home health but not improving. Exam showed necrotic skin/fat upon presentation. Had evaluation of extremity by [facility] in July 2017 showing decrease inflow. Wheelchair bound since 2009 s/p CVA. Patient underwent angiogram with angioplasty to improve arterial flow.  There is been significant improvement in the wound since angioplasty was performed.   Santyl was being utilized. Switch to promogram on 4/23/18.  On 5/21/2018, the patient had signs of Pseudomonas colonization.  Silver alginate was initiated.    He developed a new wound in the anterior aspect of extremity due to trauma on 6/18/18.  This appeared to be partial-thickness. Mepilex Ag was started.  Patient presented on 7/2/2018 with a very tight dressing wrapped around his leg with an elastic wrap.  On 07/30/2018, patient was noted to have increased pain in his wound with enlargement of the wound. He was evaluated by Dr. Galeano who planned an angiogram as there was concern he has arterial ischemia causing decreased wound healing and worsening of the wound. The patient underwent angioplasy with DCB on 8/7/2018 of right SFA and Popliteal artery.  LINNEA performed 9/10/2018 was 0.92.   There is macerations surrounding the wound and extending to the foot from drainage that is improving steadily.  Patient's edema is much better controlled.  The wound has decreased in size with minimal slough.  The patient states he has been compliant with elevation                 See wound doc progress notes. Documents will be scanned.        Thompson Mcdaniel  [facility]

## 2018-11-05 ENCOUNTER — OFFICE VISIT (OUTPATIENT)
Dept: WOUND CARE | Facility: HOSPITAL | Age: 53
End: 2018-11-05
Attending: SURGERY
Payer: MEDICARE

## 2018-11-05 VITALS
TEMPERATURE: 98 F | SYSTOLIC BLOOD PRESSURE: 130 MMHG | DIASTOLIC BLOOD PRESSURE: 92 MMHG | HEART RATE: 64 BPM | RESPIRATION RATE: 18 BRPM

## 2018-11-05 DIAGNOSIS — L97.812 NON-PRESSURE CHRONIC ULCER OF OTHER PART OF RIGHT LOWER LEG WITH FAT LAYER EXPOSED: ICD-10-CM

## 2018-11-05 PROCEDURE — 99499 UNLISTED E&M SERVICE: CPT | Mod: ,,, | Performed by: SURGERY

## 2018-11-05 PROCEDURE — 27201912 HC WOUND CARE DEBRIDEMENT SUPPLIES

## 2018-11-05 PROCEDURE — 11042 DBRDMT SUBQ TIS 1ST 20SQCM/<: CPT

## 2018-11-05 NOTE — PROGRESS NOTES
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 Had home health but not improving. Exam showed necrotic skin/fat upon presentation. Had evaluation of extremity by [facility] in July 2017 showing decrease inflow. Wheelchair bound since 2009 s/p CVA. Patient underwent angiogram with angioplasty to improve arterial flow.  There is been significant improvement in the wound since angioplasty was performed.   Santyl was being utilized. Switch to promogram on 4/23/18.  On 5/21/2018, the patient had signs of Pseudomonas colonization.  Silver alginate was initiated.    He developed a new wound in the anterior aspect of extremity due to trauma on 6/18/18.  This appeared to be partial-thickness. Mepilex Ag was started.  Patient presented on 7/2/2018 with a very tight dressing wrapped around his leg with an elastic wrap.  On 07/30/2018, patient was noted to have increased pain in his wound with enlargement of the wound. He was evaluated by Dr. Galeano who planned an angiogram as there was concern he has arterial ischemia causing decreased wound healing and worsening of the wound. The patient underwent angioplasy with DCB on 8/7/2018 of right SFA and Popliteal artery.  LINNEA performed 9/10/2018 was 0.92.   There is macerations surrounding the wound and extending to the foot from drainage that is improving steadily. The wound has decreased in size with minimal slough.  The patient states he has been compliant with elevation .  His edema is much better controlled               See wound doc progress notes. Documents will be scanned.        Thompson Mcdaniel  [facility]

## 2018-11-12 ENCOUNTER — OFFICE VISIT (OUTPATIENT)
Dept: WOUND CARE | Facility: HOSPITAL | Age: 53
End: 2018-11-12
Attending: SURGERY
Payer: MEDICARE

## 2018-11-12 VITALS — SYSTOLIC BLOOD PRESSURE: 111 MMHG | DIASTOLIC BLOOD PRESSURE: 70 MMHG | RESPIRATION RATE: 20 BRPM | HEART RATE: 71 BPM

## 2018-11-12 DIAGNOSIS — L97.812 NON-PRESSURE CHRONIC ULCER OF OTHER PART OF RIGHT LOWER LEG WITH FAT LAYER EXPOSED: Primary | ICD-10-CM

## 2018-11-12 PROCEDURE — 99499 UNLISTED E&M SERVICE: CPT | Mod: ,,, | Performed by: SURGERY

## 2018-11-12 PROCEDURE — 11042 DBRDMT SUBQ TIS 1ST 20SQCM/<: CPT

## 2018-11-12 PROCEDURE — 27201912 HC WOUND CARE DEBRIDEMENT SUPPLIES

## 2018-11-12 NOTE — PROGRESS NOTES
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 Had home health but not improving. Exam showed necrotic skin/fat upon presentation. Had evaluation of extremity by [facility] in July 2017 showing decrease inflow. Wheelchair bound since 2009 s/p CVA. Patient underwent angiogram with angioplasty to improve arterial flow.  There is been significant improvement in the wound since angioplasty was performed.   Santyl was being utilized. Switch to promogram on 4/23/18.  On 5/21/2018, the patient had signs of Pseudomonas colonization.  Silver alginate was initiated.    He developed a new wound in the anterior aspect of extremity due to trauma on 6/18/18.  This appeared to be partial-thickness. Mepilex Ag was started.  Patient presented on 7/2/2018 with a very tight dressing wrapped around his leg with an elastic wrap.  On 07/30/2018, patient was noted to have increased pain in his wound with enlargement of the wound. He was evaluated by Dr. Galeano who planned an angiogram as there was concern he has arterial ischemia causing decreased wound healing and worsening of the wound. The patient underwent angioplasy with DCB on 8/7/2018 of right SFA and Popliteal artery.  LINNEA performed 9/10/2018 was 0.92.   There is macerations surrounding the wound and extending to the foot from drainage that is improving steadily. The wound has decreased in size.         Current Assessment & Plan     The wound continues to improve substantially.  Continue debridement to maintain active phase wound healing.  Continue leg elevation.               See wound doc progress notes. Documents will be scanned.        Ahmet Hart  [facility]

## 2018-11-12 NOTE — ASSESSMENT & PLAN NOTE
The wound continues to improve substantially.  Continue debridement to maintain active phase wound healing.  Continue leg elevation.

## 2018-11-19 ENCOUNTER — OFFICE VISIT (OUTPATIENT)
Dept: WOUND CARE | Facility: HOSPITAL | Age: 53
End: 2018-11-19
Attending: SURGERY
Payer: MEDICARE

## 2018-11-19 VITALS — SYSTOLIC BLOOD PRESSURE: 119 MMHG | HEART RATE: 74 BPM | RESPIRATION RATE: 20 BRPM | DIASTOLIC BLOOD PRESSURE: 71 MMHG

## 2018-11-19 DIAGNOSIS — L97.812 NON-PRESSURE CHRONIC ULCER OF OTHER PART OF RIGHT LOWER LEG WITH FAT LAYER EXPOSED: ICD-10-CM

## 2018-11-19 PROCEDURE — 11042 DBRDMT SUBQ TIS 1ST 20SQCM/<: CPT

## 2018-11-19 PROCEDURE — 27201912 HC WOUND CARE DEBRIDEMENT SUPPLIES

## 2018-11-19 PROCEDURE — 99499 UNLISTED E&M SERVICE: CPT | Mod: ,,, | Performed by: SURGERY

## 2018-11-26 ENCOUNTER — OFFICE VISIT (OUTPATIENT)
Dept: WOUND CARE | Facility: HOSPITAL | Age: 53
End: 2018-11-26
Attending: SURGERY
Payer: MEDICARE

## 2018-11-26 VITALS — DIASTOLIC BLOOD PRESSURE: 70 MMHG | SYSTOLIC BLOOD PRESSURE: 110 MMHG | RESPIRATION RATE: 18 BRPM | HEART RATE: 65 BPM

## 2018-11-26 DIAGNOSIS — L97.812 NON-PRESSURE CHRONIC ULCER OF OTHER PART OF RIGHT LOWER LEG WITH FAT LAYER EXPOSED: Primary | ICD-10-CM

## 2018-11-26 PROCEDURE — 27201912 HC WOUND CARE DEBRIDEMENT SUPPLIES

## 2018-11-26 PROCEDURE — 11042 DBRDMT SUBQ TIS 1ST 20SQCM/<: CPT

## 2018-11-26 PROCEDURE — 99499 UNLISTED E&M SERVICE: CPT | Mod: ,,, | Performed by: SURGERY

## 2018-11-28 NOTE — PROGRESS NOTES
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 Had home health but not improving. Exam showed necrotic skin/fat upon presentation. Had evaluation of extremity by [facility] in July 2017 showing decrease inflow. Wheelchair bound since 2009 s/p CVA. Patient underwent angiogram with angioplasty to improve arterial flow.  There is been significant improvement in the wound since angioplasty was performed.   Santyl was being utilized. Switch to promogram on 4/23/18.  On 5/21/2018, the patient had signs of Pseudomonas colonization.  Silver alginate was initiated.    He developed a new wound in the anterior aspect of extremity due to trauma on 6/18/18.  This appeared to be partial-thickness. Mepilex Ag was started.  Patient presented on 7/2/2018 with a very tight dressing wrapped around his leg with an elastic wrap.  On 07/30/2018, patient was noted to have increased pain in his wound with enlargement of the wound. He was evaluated by Dr. Galeano who planned an angiogram as there was concern he has arterial ischemia causing decreased wound healing and worsening of the wound. The patient underwent angioplasy with DCB on 8/7/2018 of right SFA and Popliteal artery.  LINNEA performed 9/10/2018 was 0.92.            Current Assessment & Plan     Steady wound improvement.  Debridement to maintain active phase of wound healing.                 See wound doc progress notes. Documents will be scanned.        Ahmet Hart  [facility]

## 2018-12-03 ENCOUNTER — OFFICE VISIT (OUTPATIENT)
Dept: WOUND CARE | Facility: HOSPITAL | Age: 53
End: 2018-12-03
Attending: SURGERY
Payer: MEDICARE

## 2018-12-03 VITALS — RESPIRATION RATE: 18 BRPM | HEART RATE: 77 BPM | DIASTOLIC BLOOD PRESSURE: 64 MMHG | SYSTOLIC BLOOD PRESSURE: 119 MMHG

## 2018-12-03 DIAGNOSIS — L97.812 NON-PRESSURE CHRONIC ULCER OF OTHER PART OF RIGHT LOWER LEG WITH FAT LAYER EXPOSED: ICD-10-CM

## 2018-12-03 PROCEDURE — 99499 UNLISTED E&M SERVICE: CPT | Mod: ,,, | Performed by: SURGERY

## 2018-12-03 PROCEDURE — 11042 DBRDMT SUBQ TIS 1ST 20SQCM/<: CPT

## 2018-12-03 PROCEDURE — 27201912 HC WOUND CARE DEBRIDEMENT SUPPLIES

## 2018-12-03 NOTE — PROGRESS NOTES
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 Had home health but not improving. Exam showed necrotic skin/fat upon presentation. Had evaluation of extremity by [facility] in July 2017 showing decrease inflow. Wheelchair bound since 2009 s/p CVA. Patient underwent angiogram with angioplasty to improve arterial flow.  There is been significant improvement in the wound since angioplasty was performed.   Santyl was being utilized. Switch to promogram on 4/23/18.  On 5/21/2018, the patient had signs of Pseudomonas colonization.  Silver alginate was initiated.    He developed a new wound in the anterior aspect of extremity due to trauma on 6/18/18.  This appeared to be partial-thickness. Mepilex Ag was started.  Patient presented on 7/2/2018 with a very tight dressing wrapped around his leg with an elastic wrap.  On 07/30/2018, patient was noted to have increased pain in his wound with enlargement of the wound. He was evaluated by Dr. Galeano who planned an angiogram as there was concern he has arterial ischemia causing decreased wound healing and worsening of the wound. The patient underwent angioplasy with DCB on 8/7/2018 of right SFA and Popliteal artery.  LINNEA performed 9/10/2018 was 0.92.  Patient's edema much better controlled recently.  Wound continues to improve Patient more compliant with treatment plan.  Continue current treatment plan.                 See wound doc progress notes. Documents will be scanned.        Thompson Mcdaniel  [facility]

## 2018-12-03 NOTE — PROGRESS NOTES
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 Had home health but not improving. Exam showed necrotic skin/fat upon presentation. Had evaluation of extremity by [facility] in July 2017 showing decrease inflow. Wheelchair bound since 2009 s/p CVA. Patient underwent angiogram with angioplasty to improve arterial flow.  There is been significant improvement in the wound since angioplasty was performed.   Santyl was being utilized. Switch to promogram on 4/23/18.  On 5/21/2018, the patient had signs of Pseudomonas colonization.  Silver alginate was initiated.    He developed a new wound in the anterior aspect of extremity due to trauma on 6/18/18.  This appeared to be partial-thickness. Mepilex Ag was started.  Patient presented on 7/2/2018 with a very tight dressing wrapped around his leg with an elastic wrap.  On 07/30/2018, patient was noted to have increased pain in his wound with enlargement of the wound. He was evaluated by Dr. Galeano who planned an angiogram as there was concern he has arterial ischemia causing decreased wound healing and worsening of the wound. The patient underwent angioplasy with DCB on 8/7/2018 of right SFA and Popliteal artery.  LINNEA performed 9/10/2018 was 0.92.  Patient's edema much better controlled recently.  Wound has shown good progress.  Patient more compliant with treatment plan.  Continue current treatment plan.                 See wound doc progress notes. Documents will be scanned.        Thompson Mcdaniel  [facility]

## 2018-12-10 ENCOUNTER — OFFICE VISIT (OUTPATIENT)
Dept: WOUND CARE | Facility: HOSPITAL | Age: 53
End: 2018-12-10
Attending: SURGERY
Payer: MEDICARE

## 2018-12-10 VITALS
SYSTOLIC BLOOD PRESSURE: 105 MMHG | HEART RATE: 71 BPM | DIASTOLIC BLOOD PRESSURE: 83 MMHG | TEMPERATURE: 99 F | RESPIRATION RATE: 18 BRPM

## 2018-12-10 DIAGNOSIS — L97.812 NON-PRESSURE CHRONIC ULCER OF OTHER PART OF RIGHT LOWER LEG WITH FAT LAYER EXPOSED: Primary | ICD-10-CM

## 2018-12-10 DIAGNOSIS — I73.9 PAD (PERIPHERAL ARTERY DISEASE): ICD-10-CM

## 2018-12-10 PROCEDURE — 99499 UNLISTED E&M SERVICE: CPT | Mod: ,,, | Performed by: SURGERY

## 2018-12-10 PROCEDURE — 27201912 HC WOUND CARE DEBRIDEMENT SUPPLIES

## 2018-12-10 PROCEDURE — 11042 DBRDMT SUBQ TIS 1ST 20SQCM/<: CPT

## 2018-12-10 NOTE — PROGRESS NOTES
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 Had home health but not improving. Exam showed necrotic skin/fat upon presentation. Had evaluation of extremity by [facility] in July 2017 showing decrease inflow. Wheelchair bound since 2009 s/p CVA. Patient underwent angiogram with angioplasty to improve arterial flow.  There is been significant improvement in the wound since angioplasty was performed.   Santyl was being utilized. Switch to promogram on 4/23/18.  On 5/21/2018, the patient had signs of Pseudomonas colonization.  Silver alginate was initiated.    He developed a new wound in the anterior aspect of extremity due to trauma on 6/18/18.  This appeared to be partial-thickness. Mepilex Ag was started.  Patient presented on 7/2/2018 with a very tight dressing wrapped around his leg with an elastic wrap.  On 07/30/2018, patient was noted to have increased pain in his wound with enlargement of the wound. He was evaluated by Dr. Galeano who planned an angiogram as there was concern he has arterial ischemia causing decreased wound healing and worsening of the wound. The patient underwent angioplasy with DCB on 8/7/2018 of right SFA and Popliteal artery.  LINNEA performed 9/10/2018 was 0.92.  Patient's edema much better controlled.           Current Assessment & Plan     Wound continues to make good progress.  Continue debridement to maintain active phase of wound healing.  Continue leg elevation and edema control.            Cardiac/Vascular    PAD (peripheral artery disease)          See wound doc progress notes. Documents will be scanned.        Ahmet Hart  [facility]   Tele box requested.

## 2018-12-10 NOTE — ASSESSMENT & PLAN NOTE
Wound continues to make good progress.  Continue debridement to maintain active phase of wound healing.  Continue leg elevation and edema control.

## 2018-12-17 ENCOUNTER — OFFICE VISIT (OUTPATIENT)
Dept: WOUND CARE | Facility: HOSPITAL | Age: 53
End: 2018-12-17
Attending: SURGERY
Payer: MEDICARE

## 2018-12-17 VITALS
TEMPERATURE: 98 F | DIASTOLIC BLOOD PRESSURE: 72 MMHG | HEART RATE: 86 BPM | SYSTOLIC BLOOD PRESSURE: 134 MMHG | RESPIRATION RATE: 18 BRPM

## 2018-12-17 DIAGNOSIS — L97.812 NON-PRESSURE CHRONIC ULCER OF OTHER PART OF RIGHT LOWER LEG WITH FAT LAYER EXPOSED: ICD-10-CM

## 2018-12-17 PROCEDURE — 99499 UNLISTED E&M SERVICE: CPT | Mod: ,,, | Performed by: SURGERY

## 2018-12-17 PROCEDURE — 27201912 HC WOUND CARE DEBRIDEMENT SUPPLIES

## 2018-12-17 PROCEDURE — 11042 DBRDMT SUBQ TIS 1ST 20SQCM/<: CPT

## 2018-12-17 NOTE — PROGRESS NOTES
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 Had home health but not improving. Exam showed necrotic skin/fat upon presentation. Had evaluation of extremity by [facility] in July 2017 showing decrease inflow. Wheelchair bound since 2009 s/p CVA. Patient underwent angiogram with angioplasty to improve arterial flow.  There is been significant improvement in the wound since angioplasty was performed.   Santyl was being utilized. Switch to promogram on 4/23/18.  On 5/21/2018, the patient had signs of Pseudomonas colonization.  Silver alginate was initiated.    He developed a new wound in the anterior aspect of extremity due to trauma on 6/18/18.  This appeared to be partial-thickness. Mepilex Ag was started.  Patient presented on 7/2/2018 with a very tight dressing wrapped around his leg with an elastic wrap.  On 07/30/2018, patient was noted to have increased pain in his wound with enlargement of the wound. He was evaluated by Dr. Galeano who planned an angiogram as there was concern he has arterial ischemia causing decreased wound healing and worsening of the wound. The patient underwent angioplasy with DCB on 8/7/2018 of right SFA and Popliteal artery.  LINNEA performed 9/10/2018 was 0.92.  Patient's edema much better controlled.  Wound continues to improve               See wound doc progress notes. Documents will be scanned.        Thompson Mcdaniel  [facility]

## 2019-01-07 ENCOUNTER — OFFICE VISIT (OUTPATIENT)
Dept: WOUND CARE | Facility: HOSPITAL | Age: 54
End: 2019-01-07
Attending: SURGERY
Payer: MEDICARE

## 2019-01-07 VITALS
DIASTOLIC BLOOD PRESSURE: 73 MMHG | TEMPERATURE: 99 F | RESPIRATION RATE: 18 BRPM | HEART RATE: 77 BPM | SYSTOLIC BLOOD PRESSURE: 120 MMHG

## 2019-01-07 DIAGNOSIS — L97.812 NON-PRESSURE CHRONIC ULCER OF OTHER PART OF RIGHT LOWER LEG WITH FAT LAYER EXPOSED: ICD-10-CM

## 2019-01-07 PROCEDURE — 99499 NO LOS: ICD-10-PCS | Mod: ,,, | Performed by: SURGERY

## 2019-01-07 PROCEDURE — 99499 UNLISTED E&M SERVICE: CPT | Mod: ,,, | Performed by: SURGERY

## 2019-01-07 PROCEDURE — 11042 DBRDMT SUBQ TIS 1ST 20SQCM/<: CPT

## 2019-01-07 PROCEDURE — 27201912 HC WOUND CARE DEBRIDEMENT SUPPLIES

## 2019-01-07 NOTE — PROGRESS NOTES
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 Had home health but not improving. Exam showed necrotic skin/fat upon presentation. Had evaluation of extremity by [facility] in July 2017 showing decrease inflow. Wheelchair bound since 2009 s/p CVA. Patient underwent angiogram with angioplasty to improve arterial flow.  There is been significant improvement in the wound since angioplasty was performed.   Santyl was being utilized. Switch to promogram on 4/23/18.  On 5/21/2018, the patient had signs of Pseudomonas colonization.  Silver alginate was initiated.    He developed a new wound in the anterior aspect of extremity due to trauma on 6/18/18.  This appeared to be partial-thickness. Mepilex Ag was started.  Patient presented on 7/2/2018 with a very tight dressing wrapped around his leg with an elastic wrap.  On 07/30/2018, patient was noted to have increased pain in his wound with enlargement of the wound. He was evaluated by Dr. Galeano who planned an angiogram as there was concern he has arterial ischemia causing decreased wound healing and worsening of the wound. The patient underwent angioplasy with DCB on 8/7/2018 of right SFA and Popliteal artery.  LINNEA performed 9/10/2018 was 0.92.  Patient's edema much better controlled.  Wound continues to improve and decrease in size               See wound doc progress notes. Documents will be scanned.        Thompson Mcdaniel  [facility]

## 2019-01-21 ENCOUNTER — OFFICE VISIT (OUTPATIENT)
Dept: WOUND CARE | Facility: HOSPITAL | Age: 54
End: 2019-01-21
Attending: SURGERY
Payer: MEDICARE

## 2019-01-21 VITALS
RESPIRATION RATE: 18 BRPM | TEMPERATURE: 98 F | HEART RATE: 71 BPM | DIASTOLIC BLOOD PRESSURE: 61 MMHG | SYSTOLIC BLOOD PRESSURE: 103 MMHG

## 2019-01-21 DIAGNOSIS — L97.812 NON-PRESSURE CHRONIC ULCER OF OTHER PART OF RIGHT LOWER LEG WITH FAT LAYER EXPOSED: Primary | ICD-10-CM

## 2019-01-21 PROCEDURE — 27201912 HC WOUND CARE DEBRIDEMENT SUPPLIES

## 2019-01-21 PROCEDURE — 99499 UNLISTED E&M SERVICE: CPT | Mod: ,,, | Performed by: SURGERY

## 2019-01-21 PROCEDURE — 11042 DBRDMT SUBQ TIS 1ST 20SQCM/<: CPT

## 2019-01-21 PROCEDURE — 99499 NO LOS: ICD-10-PCS | Mod: ,,, | Performed by: SURGERY

## 2019-01-21 NOTE — PROGRESS NOTES
[facility]  Wound Care  Progress Note    Problem List Items Addressed This Visit     Non-pressure chronic ulcer of other part of right lower leg with fat layer exposed - Primary    Overview     Patient report trauma to right lateral leg 10/20/17. Had home health but not improving. Exam showed necrotic skin/fat upon presentation. Had evaluation of extremity by [facility] in July 2017 showing decrease inflow. Wheelchair bound since 2009 s/p CVA. Patient underwent angiogram with angioplasty to improve arterial flow.  There is been significant improvement in the wound since angioplasty was performed.   Santyl was being utilized. Switch to promogram on 4/23/18.  On 5/21/2018, the patient had signs of Pseudomonas colonization.  Silver alginate was initiated.    He developed a new wound in the anterior aspect of extremity due to trauma on 6/18/18.  This appeared to be partial-thickness. Mepilex Ag was started.  Patient presented on 7/2/2018 with a very tight dressing wrapped around his leg with an elastic wrap.  On 07/30/2018, patient was noted to have increased pain in his wound with enlargement of the wound. He was evaluated by Dr. Galeano who planned an angiogram as there was concern he has arterial ischemia causing decreased wound healing and worsening of the wound. The patient underwent angioplasy with DCB on 8/7/2018 of right SFA and Popliteal artery.  LINNEA performed 9/10/2018 was 0.92.  Patient's edema much better controlled.  Wound continues to improve and decrease in size               See wound doc progress notes. Documents will be scanned.        Thompson Mcdaniel  [facility]

## 2019-01-28 ENCOUNTER — OFFICE VISIT (OUTPATIENT)
Dept: WOUND CARE | Facility: HOSPITAL | Age: 54
End: 2019-01-28
Attending: SURGERY
Payer: MEDICARE

## 2019-01-28 VITALS
SYSTOLIC BLOOD PRESSURE: 136 MMHG | TEMPERATURE: 98 F | HEART RATE: 76 BPM | RESPIRATION RATE: 18 BRPM | DIASTOLIC BLOOD PRESSURE: 76 MMHG

## 2019-01-28 DIAGNOSIS — L97.812 NON-PRESSURE CHRONIC ULCER OF OTHER PART OF RIGHT LOWER LEG WITH FAT LAYER EXPOSED: ICD-10-CM

## 2019-01-28 DIAGNOSIS — I73.9 PAD (PERIPHERAL ARTERY DISEASE): ICD-10-CM

## 2019-01-28 PROCEDURE — 11042 DBRDMT SUBQ TIS 1ST 20SQCM/<: CPT

## 2019-01-28 PROCEDURE — 99499 UNLISTED E&M SERVICE: CPT | Mod: ,,, | Performed by: SURGERY

## 2019-01-28 PROCEDURE — 99499 NO LOS: ICD-10-PCS | Mod: ,,, | Performed by: SURGERY

## 2019-01-28 PROCEDURE — 27201912 HC WOUND CARE DEBRIDEMENT SUPPLIES

## 2019-01-28 NOTE — ASSESSMENT & PLAN NOTE
Hold compression wraps due to pain and the concern for occlusion of the patient's stent.  Return to see Dr. Galeano for evaluation of recurrence stent occlusion.

## 2019-01-28 NOTE — ASSESSMENT & PLAN NOTE
Wound is stable.  Continue debridement to maintain active phase of wound healing.  Hold compression wraps due to pain and the concern for occlusion of the patient's stent.  Return to see Dr. Galeano for evaluation of recurrence stent occlusion.

## 2019-01-28 NOTE — PROGRESS NOTES
[facility]  Wound Care  Progress Note    Problem List Items Addressed This Visit        Derm    Non-pressure chronic ulcer of other part of right lower leg with fat layer exposed    Overview     Patient report trauma to right lateral leg 10/20/17. Had home health but not improving. Exam showed necrotic skin/fat upon presentation. Had evaluation of extremity by [facility] in July 2017 showing decrease inflow. Wheelchair bound since 2009 s/p CVA. Patient underwent angiogram with angioplasty to improve arterial flow.  There is been significant improvement in the wound since angioplasty was performed.   Santyl was being utilized. Switch to promogram on 4/23/18.  On 5/21/2018, the patient had signs of Pseudomonas colonization.  Silver alginate was initiated.    He developed a new wound in the anterior aspect of extremity due to trauma on 6/18/18.  This appeared to be partial-thickness. Mepilex Ag was started.  Patient presented on 7/2/2018 with a very tight dressing wrapped around his leg with an elastic wrap.  On 07/30/2018, patient was noted to have increased pain in his wound with enlargement of the wound. He was evaluated by Dr. Galeano who planned an angiogram as there was concern he has arterial ischemia causing decreased wound healing and worsening of the wound. The patient underwent angioplasy with DCB on 8/7/2018 of right SFA and Popliteal artery.  LINNEA performed 9/10/2018 was 0.92.  Wound had been progressing well.  Patient presented 01/28/2019 with pain in his right foot.  He has been experiencing pain with foot elevation and his pain has been markedly worse this past weekend.  He did remove the compression wraps due to pain. Attempts to obtain reliable Doppler signals and his posterior tibial and dorsalis pedis regions of the right foot were not successful.         Current Assessment & Plan     Wound is stable.  Continue debridement to maintain active phase of wound healing.  Hold compression wraps  due to pain and the concern for occlusion of the patient's stent.  Return to see Dr. Galeano for evaluation of recurrence stent occlusion.            Cardiac/Vascular    PAD (peripheral artery disease)    Current Assessment & Plan     Hold compression wraps due to pain and the concern for occlusion of the patient's stent.  Return to see Dr. Galeano for evaluation of recurrence stent occlusion.                 See wound doc progress notes. Documents will be scanned.        Ahmet BARRETO Hebert [facility]

## 2019-02-11 ENCOUNTER — OFFICE VISIT (OUTPATIENT)
Dept: WOUND CARE | Facility: HOSPITAL | Age: 54
End: 2019-02-11
Attending: SURGERY
Payer: MEDICARE

## 2019-02-11 VITALS
SYSTOLIC BLOOD PRESSURE: 105 MMHG | HEART RATE: 77 BPM | DIASTOLIC BLOOD PRESSURE: 65 MMHG | TEMPERATURE: 98 F | RESPIRATION RATE: 18 BRPM

## 2019-02-11 DIAGNOSIS — I73.9 PAD (PERIPHERAL ARTERY DISEASE): ICD-10-CM

## 2019-02-11 DIAGNOSIS — L97.812 NON-PRESSURE CHRONIC ULCER OF OTHER PART OF RIGHT LOWER LEG WITH FAT LAYER EXPOSED: ICD-10-CM

## 2019-02-11 PROCEDURE — 11042 DBRDMT SUBQ TIS 1ST 20SQCM/<: CPT

## 2019-02-11 PROCEDURE — 99499 NO LOS: ICD-10-PCS | Mod: ,,, | Performed by: SURGERY

## 2019-02-11 PROCEDURE — 99499 UNLISTED E&M SERVICE: CPT | Mod: ,,, | Performed by: SURGERY

## 2019-02-11 PROCEDURE — 27201912 HC WOUND CARE DEBRIDEMENT SUPPLIES

## 2019-02-11 NOTE — PROGRESS NOTES
[facility]  Wound Care  Progress Note    Problem List Items Addressed This Visit        Derm    Non-pressure chronic ulcer of other part of right lower leg with fat layer exposed    Overview     Patient report trauma to right lateral leg 10/20/17. Had home health but not improving. Exam showed necrotic skin/fat upon presentation. Had evaluation of extremity by [facility] in July 2017 showing decrease inflow. Wheelchair bound since 2009 s/p CVA. Patient underwent angiogram with angioplasty to improve arterial flow.  There is been significant improvement in the wound since angioplasty was performed.   Santyl was being utilized. Switch to promogram on 4/23/18.  On 5/21/2018, the patient had signs of Pseudomonas colonization.  Silver alginate was initiated.    He developed a new wound in the anterior aspect of extremity due to trauma on 6/18/18.  This appeared to be partial-thickness. Mepilex Ag was started.  Patient presented on 7/2/2018 with a very tight dressing wrapped around his leg with an elastic wrap.  On 07/30/2018, patient was noted to have increased pain in his wound with enlargement of the wound. He was evaluated by Dr. Galeano who planned an angiogram as there was concern he has arterial ischemia causing decreased wound healing and worsening of the wound. The patient underwent angioplasy with DCB on 8/7/2018 of right SFA and Popliteal artery.  LINNEA performed 9/10/2018 was 0.92.  Wound had been progressing well.  Patient presented 01/28/2019 with pain in his right foot.  He has been experiencing pain with foot elevation and his pain has been markedly worse this past weekend.  He did remove the compression wraps due to pain. Attempts to obtain reliable Doppler signals and his posterior tibial and dorsalis pedis regions of the right foot were not successful.         Current Assessment & Plan     Wound is only slightly larger.  Continue debridement to maintain active phase wound healing.  Await  intervention by Dr. Galeano regarding arterial ischemia.            Cardiac/Vascular    PAD (peripheral artery disease)    Current Assessment & Plan     Patient has ongoing evidence arterial ischemia.  Right foot with dependent rubor and pallor when elevated.  Dr. Galeano is scheduled to perform an intervention.               See wound doc progress notes. Documents will be scanned.        Ahmet MARY LOU Hebert [facility]

## 2019-02-18 ENCOUNTER — OFFICE VISIT (OUTPATIENT)
Dept: WOUND CARE | Facility: HOSPITAL | Age: 54
End: 2019-02-18
Attending: SURGERY
Payer: MEDICARE

## 2019-02-18 VITALS — HEART RATE: 64 BPM | SYSTOLIC BLOOD PRESSURE: 118 MMHG | DIASTOLIC BLOOD PRESSURE: 71 MMHG | RESPIRATION RATE: 20 BRPM

## 2019-02-18 DIAGNOSIS — L97.812 NON-PRESSURE CHRONIC ULCER OF OTHER PART OF RIGHT LOWER LEG WITH FAT LAYER EXPOSED: ICD-10-CM

## 2019-02-18 PROCEDURE — 99499 UNLISTED E&M SERVICE: CPT | Mod: ,,, | Performed by: SURGERY

## 2019-02-18 PROCEDURE — 11042 DBRDMT SUBQ TIS 1ST 20SQCM/<: CPT

## 2019-02-18 PROCEDURE — 99499 NO LOS: ICD-10-PCS | Mod: ,,, | Performed by: SURGERY

## 2019-02-18 PROCEDURE — 27201912 HC WOUND CARE DEBRIDEMENT SUPPLIES

## 2019-02-18 NOTE — PROGRESS NOTES
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 Had home health but not improving. Exam showed necrotic skin/fat upon presentation. Had evaluation of extremity by [facility] in July 2017 showing decrease inflow. Wheelchair bound since 2009 s/p CVA. Patient underwent angiogram with angioplasty to improve arterial flow.  There is been significant improvement in the wound since angioplasty was performed.   Santyl was being utilized. Switch to promogram on 4/23/18.  On 5/21/2018, the patient had signs of Pseudomonas colonization.  Silver alginate was initiated.    He developed a new wound in the anterior aspect of extremity due to trauma on 6/18/18.  This appeared to be partial-thickness. Mepilex Ag was started.  Patient presented on 7/2/2018 with a very tight dressing wrapped around his leg with an elastic wrap.  On 07/30/2018, patient was noted to have increased pain in his wound with enlargement of the wound. He was evaluated by Dr. Galeano who planned an angiogram as there was concern he has arterial ischemia causing decreased wound healing and worsening of the wound. The patient underwent angioplasy with DCB on 8/7/2018 of right SFA and Popliteal artery.  LINNEA performed 9/10/2018 was 0.92.  Wound had been progressing well.  Patient presented 01/28/2019 with pain in his right foot.  He has been experiencing pain with foot elevation and his pain has been markedly worse this past weekend.  He did remove the compression wraps due to pain. Attempts to obtain reliable Doppler signals and his posterior tibial and dorsalis pedis regions of the right foot were not successful.  [facility].  Lower extremity angiogram planned for later this week.               See wound doc progress notes. Documents will be  scanned.        Thompson MAN Mcdaniel  [facility]

## 2019-02-25 ENCOUNTER — OFFICE VISIT (OUTPATIENT)
Dept: WOUND CARE | Facility: HOSPITAL | Age: 54
End: 2019-02-25
Attending: SURGERY
Payer: MEDICARE

## 2019-02-25 VITALS — RESPIRATION RATE: 20 BRPM | SYSTOLIC BLOOD PRESSURE: 102 MMHG | DIASTOLIC BLOOD PRESSURE: 60 MMHG | HEART RATE: 60 BPM

## 2019-02-25 DIAGNOSIS — R60.0 LOCALIZED EDEMA: ICD-10-CM

## 2019-02-25 DIAGNOSIS — I73.9 PAD (PERIPHERAL ARTERY DISEASE): Primary | ICD-10-CM

## 2019-02-25 DIAGNOSIS — L97.812 NON-PRESSURE CHRONIC ULCER OF OTHER PART OF RIGHT LOWER LEG WITH FAT LAYER EXPOSED: ICD-10-CM

## 2019-02-25 PROCEDURE — 99499 UNLISTED E&M SERVICE: CPT | Mod: ,,, | Performed by: SURGERY

## 2019-02-25 PROCEDURE — 27201912 HC WOUND CARE DEBRIDEMENT SUPPLIES

## 2019-02-25 PROCEDURE — 99499 NO LOS: ICD-10-PCS | Mod: ,,, | Performed by: SURGERY

## 2019-02-25 PROCEDURE — 11042 DBRDMT SUBQ TIS 1ST 20SQCM/<: CPT

## 2019-02-25 NOTE — ASSESSMENT & PLAN NOTE
Status post PTA of right superficial femoral artery.  LINNEA is now 0.86.  Compression therapy for control of edema.  Continue debridement to maintain active phase of wound healing.  Monitor closely for improvement.

## 2019-02-25 NOTE — PROGRESS NOTES
[facility]  Wound Care  Progress Note    Problem List Items Addressed This Visit        Derm    Non-pressure chronic ulcer of other part of right lower leg with fat layer exposed    Overview     Patient report trauma to right lateral leg 10/20/17. Had home health but not improving. Exam showed necrotic skin/fat upon presentation. Had evaluation of extremity by [facility] in July 2017 showing decrease inflow. Wheelchair bound since 2009 s/p CVA. Patient underwent angiogram with angioplasty to improve arterial flow.  There is been significant improvement in the wound since angioplasty was performed.   Santyl was being utilized. Switch to promogram on 4/23/18.  On 5/21/2018, the patient had signs of Pseudomonas colonization.  Silver alginate was initiated.    He developed a new wound in the anterior aspect of extremity due to trauma on 6/18/18.  This appeared to be partial-thickness. Mepilex Ag was started.  Patient presented on 7/2/2018 with a very tight dressing wrapped around his leg with an elastic wrap.  On 07/30/2018, patient was noted to have increased pain in his wound with enlargement of the wound. He was evaluated by Dr. Galeano who planned an angiogram as there was concern he has arterial ischemia causing decreased wound healing and worsening of the wound. The patient underwent angioplasy with DCB on 8/7/2018 of right SFA and Popliteal artery.  LINNEA performed 9/10/2018 was 0.92.  Wound had been progressing well.  Patient presented 01/28/2019 with pain in his right foot.  He has been experiencing pain with foot elevation and his pain has been markedly worse this past weekend.  He did remove the compression wraps due to pain. Attempts to obtain reliable Doppler signals and his posterior tibial and dorsalis pedis regions of the right foot were not successful.  Patient underwent PTA of a total occluded right superficial femoral artery, stenting of the right popliteal artery and PTA of an occluded  peroneal artery on 02/19/2019.  At least single-vessel runoff was established to the right foot.  LINNEA on 02/25/2019 was 0.86.         Current Assessment & Plan     Status post PTA of right superficial femoral artery.  LINNEA is now 0.86.  Compression therapy for control of edema.  Continue debridement to maintain active phase of wound healing.  Monitor closely for improvement.            Cardiac/Vascular    PAD (peripheral artery disease) - Primary       Other    Localized edema of right lower leg          See wound doc progress notes. Documents will be scanned.        Ahmet Hart  [facility]

## 2019-03-11 ENCOUNTER — OFFICE VISIT (OUTPATIENT)
Dept: WOUND CARE | Facility: HOSPITAL | Age: 54
End: 2019-03-11
Attending: SURGERY
Payer: MEDICARE

## 2019-03-11 VITALS — RESPIRATION RATE: 18 BRPM | HEART RATE: 72 BPM | DIASTOLIC BLOOD PRESSURE: 80 MMHG | SYSTOLIC BLOOD PRESSURE: 127 MMHG

## 2019-03-11 DIAGNOSIS — L97.812 NON-PRESSURE CHRONIC ULCER OF OTHER PART OF RIGHT LOWER LEG WITH FAT LAYER EXPOSED: Primary | ICD-10-CM

## 2019-03-11 DIAGNOSIS — I73.9 PAD (PERIPHERAL ARTERY DISEASE): ICD-10-CM

## 2019-03-11 PROCEDURE — 99499 NO LOS: ICD-10-PCS | Mod: ,,, | Performed by: SURGERY

## 2019-03-11 PROCEDURE — 99499 UNLISTED E&M SERVICE: CPT | Mod: ,,, | Performed by: SURGERY

## 2019-03-11 PROCEDURE — 11042 DBRDMT SUBQ TIS 1ST 20SQCM/<: CPT

## 2019-03-11 PROCEDURE — 27201912 HC WOUND CARE DEBRIDEMENT SUPPLIES

## 2019-03-11 NOTE — PROGRESS NOTES
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 Had home health but not improving. Exam showed necrotic skin/fat upon presentation. Had evaluation of extremity by [facility] in July 2017 showing decrease inflow. Wheelchair bound since 2009 s/p CVA. Patient underwent angiogram with angioplasty to improve arterial flow.  There is been significant improvement in the wound since angioplasty was performed.   Santyl was being utilized. Switch to promogram on 4/23/18.  On 5/21/2018, the patient had signs of Pseudomonas colonization.  Silver alginate was initiated.    He developed a new wound in the anterior aspect of extremity due to trauma on 6/18/18.  This appeared to be partial-thickness. Mepilex Ag was started.  Patient presented on 7/2/2018 with a very tight dressing wrapped around his leg with an elastic wrap.  On 07/30/2018, patient was noted to have increased pain in his wound with enlargement of the wound. He was evaluated by Dr. Galeano who planned an angiogram as there was concern he has arterial ischemia causing decreased wound healing and worsening of the wound. The patient underwent angioplasy with DCB on 8/7/2018 of right SFA and Popliteal artery.  LINNEA performed 9/10/2018 was 0.92.  Wound had been progressing well.  Patient presented 01/28/2019 with pain in his right foot.  He has been experiencing pain with foot elevation and his pain has been markedly worse this past weekend.  He did remove the compression wraps due to pain. Attempts to obtain reliable Doppler signals and his posterior tibial and dorsalis pedis regions of the right foot were not successful.  Patient underwent PTA of a total occluded right superficial femoral artery, stenting of the right popliteal artery and PTA of an  occluded peroneal artery on 02/19/2019.  At least single-vessel runoff was established to the right foot.  LINNEA on 02/25/2019 was 0.86.         Current Assessment & Plan     Wound doing well.  Continue debridement to maintain active phase of wound healing.  Continue leg elevation.            Cardiac/Vascular    PAD (peripheral artery disease)          See wound doc progress notes. Documents will be scanned.        Ahmet BARRETO Hebert [facility]

## 2019-03-11 NOTE — ASSESSMENT & PLAN NOTE
Wound doing well.  Continue debridement to maintain active phase of wound healing.  Continue leg elevation.

## 2019-03-13 RX ORDER — LISINOPRIL 40 MG/1
40 TABLET ORAL DAILY
Qty: 30 TABLET | Refills: 0 | OUTPATIENT
Start: 2019-03-13

## 2019-03-18 ENCOUNTER — OFFICE VISIT (OUTPATIENT)
Dept: WOUND CARE | Facility: HOSPITAL | Age: 54
End: 2019-03-18
Attending: SURGERY
Payer: MEDICARE

## 2019-03-18 VITALS — DIASTOLIC BLOOD PRESSURE: 74 MMHG | HEART RATE: 65 BPM | SYSTOLIC BLOOD PRESSURE: 113 MMHG | RESPIRATION RATE: 18 BRPM

## 2019-03-18 DIAGNOSIS — L97.812 NON-PRESSURE CHRONIC ULCER OF OTHER PART OF RIGHT LOWER LEG WITH FAT LAYER EXPOSED: ICD-10-CM

## 2019-03-18 PROCEDURE — 11042 DBRDMT SUBQ TIS 1ST 20SQCM/<: CPT

## 2019-03-18 PROCEDURE — 99499 UNLISTED E&M SERVICE: CPT | Mod: ,,, | Performed by: SURGERY

## 2019-03-18 PROCEDURE — 99499 NO LOS: ICD-10-PCS | Mod: ,,, | Performed by: SURGERY

## 2019-03-18 RX ORDER — GABAPENTIN 100 MG/1
100 CAPSULE ORAL 3 TIMES DAILY
Status: ON HOLD | COMMUNITY
End: 2021-05-02 | Stop reason: HOSPADM

## 2019-03-18 NOTE — PROGRESS NOTES
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 Had home health but not improving. Exam showed necrotic skin/fat upon presentation. Had evaluation of extremity by [facility] in July 2017 showing decrease inflow. Wheelchair bound since 2009 s/p CVA. Patient underwent angiogram with angioplasty to improve arterial flow.  There is been significant improvement in the wound since angioplasty was performed.   Santyl was being utilized. Switch to promogram on 4/23/18.  On 5/21/2018, the patient had signs of Pseudomonas colonization.  Silver alginate was initiated.    He developed a new wound in the anterior aspect of extremity due to trauma on 6/18/18.  This appeared to be partial-thickness. Mepilex Ag was started.  Patient presented on 7/2/2018 with a very tight dressing wrapped around his leg with an elastic wrap.  On 07/30/2018, patient was noted to have increased pain in his wound with enlargement of the wound. He was evaluated by Dr. Galeano who planned an angiogram as there was concern he has arterial ischemia causing decreased wound healing and worsening of the wound. The patient underwent angioplasy with DCB on 8/7/2018 of right SFA and Popliteal artery.  LINNEA performed 9/10/2018 was 0.92.  Wound had been progressing well.  Patient presented 01/28/2019 with pain in his right foot.  He has been experiencing pain with foot elevation and his pain has been markedly worse this past weekend.  He did remove the compression wraps due to pain. Attempts to obtain reliable Doppler signals and his posterior tibial and dorsalis pedis regions of the right foot were not successful.  Patient underwent PTA of a total occluded right superficial femoral artery, stenting of the right popliteal artery and PTA of an occluded peroneal artery  on 02/19/2019.  At least single-vessel runoff was established to the right foot.  LINNEA on 02/25/2019 was 0.86.  Wound today is mostly slough which was easily removed with a curette.  There are few buds of granulation tissue.  There is no sign of infection.  We will add Santyl to his regimen.               See wound doc progress notes. Documents will be scanned.        Thompson MAN Marino [facility]

## 2019-03-25 ENCOUNTER — OFFICE VISIT (OUTPATIENT)
Dept: WOUND CARE | Facility: HOSPITAL | Age: 54
End: 2019-03-25
Attending: SURGERY
Payer: MEDICARE

## 2019-03-25 DIAGNOSIS — I73.9 PAD (PERIPHERAL ARTERY DISEASE): ICD-10-CM

## 2019-03-25 DIAGNOSIS — L97.812 NON-PRESSURE CHRONIC ULCER OF OTHER PART OF RIGHT LOWER LEG WITH FAT LAYER EXPOSED: Primary | ICD-10-CM

## 2019-03-25 PROCEDURE — 27201912 HC WOUND CARE DEBRIDEMENT SUPPLIES

## 2019-03-25 PROCEDURE — 99499 UNLISTED E&M SERVICE: CPT | Mod: ,,, | Performed by: SURGERY

## 2019-03-25 PROCEDURE — 11042 DBRDMT SUBQ TIS 1ST 20SQCM/<: CPT

## 2019-03-25 PROCEDURE — 99499 NO LOS: ICD-10-PCS | Mod: ,,, | Performed by: SURGERY

## 2019-03-25 NOTE — ASSESSMENT & PLAN NOTE
Patient is doing well.  Wound is improving.  Continue Santyl.  Continue debridement to maintain active phase wound healing.

## 2019-03-25 NOTE — PROGRESS NOTES
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 Had home health but not improving. Exam showed necrotic skin/fat upon presentation. Had evaluation of extremity by [facility] in July 2017 showing decrease inflow. Wheelchair bound since 2009 s/p CVA. Patient underwent angiogram with angioplasty to improve arterial flow.  There is been significant improvement in the wound since angioplasty was performed.   Santyl was being utilized. Switch to promogram on 4/23/18.  On 5/21/2018, the patient had signs of Pseudomonas colonization.  Silver alginate was initiated.    He developed a new wound in the anterior aspect of extremity due to trauma on 6/18/18.  This appeared to be partial-thickness. Mepilex Ag was started.  Patient presented on 7/2/2018 with a very tight dressing wrapped around his leg with an elastic wrap.  On 07/30/2018, patient was noted to have increased pain in his wound with enlargement of the wound. He was evaluated by Dr. Galeano who planned an angiogram as there was concern he has arterial ischemia causing decreased wound healing and worsening of the wound. The patient underwent angioplasy with DCB on 8/7/2018 of right SFA and Popliteal artery.  LINNEA performed 9/10/2018 was 0.92.  Wound had been progressing well.  Patient presented 01/28/2019 with pain in his right foot.  He has been experiencing pain with foot elevation and his pain has been markedly worse this past weekend.  He did remove the compression wraps due to pain. Attempts to obtain reliable Doppler signals and his posterior tibial and dorsalis pedis regions of the right foot were not successful.  Patient underwent PTA of a total occluded right superficial femoral artery, stenting of the right popliteal artery and PTA of an  occluded peroneal artery on 02/19/2019.  At least single-vessel runoff was established to the right foot.  LINNEA on 02/25/2019 was 0.86.  Santyl is being utilized.         Current Assessment & Plan     Patient is doing well.  Wound is improving.  Continue Santyl.  Continue debridement to maintain active phase wound healing.            Cardiac/Vascular    PAD (peripheral artery disease)          See wound doc progress notes. Documents will be scanned.        Ahmet BARRETO Hart  [facility]

## 2019-04-01 ENCOUNTER — OFFICE VISIT (OUTPATIENT)
Dept: WOUND CARE | Facility: HOSPITAL | Age: 54
End: 2019-04-01
Attending: SURGERY
Payer: MEDICARE

## 2019-04-01 VITALS — DIASTOLIC BLOOD PRESSURE: 62 MMHG | SYSTOLIC BLOOD PRESSURE: 101 MMHG | HEART RATE: 61 BPM | RESPIRATION RATE: 18 BRPM

## 2019-04-01 DIAGNOSIS — L97.812 NON-PRESSURE CHRONIC ULCER OF OTHER PART OF RIGHT LOWER LEG WITH FAT LAYER EXPOSED: ICD-10-CM

## 2019-04-01 PROCEDURE — 99499 NO LOS: ICD-10-PCS | Mod: ,,, | Performed by: SURGERY

## 2019-04-01 PROCEDURE — 11042 DBRDMT SUBQ TIS 1ST 20SQCM/<: CPT

## 2019-04-01 PROCEDURE — 99499 UNLISTED E&M SERVICE: CPT | Mod: ,,, | Performed by: SURGERY

## 2019-04-01 PROCEDURE — 27201912 HC WOUND CARE DEBRIDEMENT SUPPLIES

## 2019-04-01 NOTE — PROGRESS NOTES
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 Had home health but not improving. Exam showed necrotic skin/fat upon presentation. Had evaluation of extremity by [facility] in July 2017 showing decrease inflow. Wheelchair bound since 2009 s/p CVA. Patient underwent angiogram with angioplasty to improve arterial flow.  There is been significant improvement in the wound since angioplasty was performed.   Santyl was being utilized. Switch to promogram on 4/23/18.  On 5/21/2018, the patient had signs of Pseudomonas colonization.  Silver alginate was initiated.    He developed a new wound in the anterior aspect of extremity due to trauma on 6/18/18.  This appeared to be partial-thickness. Mepilex Ag was started.  Patient presented on 7/2/2018 with a very tight dressing wrapped around his leg with an elastic wrap.  On 07/30/2018, patient was noted to have increased pain in his wound with enlargement of the wound. He was evaluated by Dr. Galeano who planned an angiogram as there was concern he has arterial ischemia causing decreased wound healing and worsening of the wound. The patient underwent angioplasy with DCB on 8/7/2018 of right SFA and Popliteal artery.  LINNEA performed 9/10/2018 was 0.92.  Wound had been progressing well.  Patient presented 01/28/2019 with pain in his right foot.  He has been experiencing pain with foot elevation and his pain has been markedly worse this past weekend.  He did remove the compression wraps due to pain. Attempts to obtain reliable Doppler signals and his posterior tibial and dorsalis pedis regions of the right foot were not successful.  Patient underwent PTA of a total occluded right superficial femoral artery, stenting of the right popliteal artery and PTA of an occluded peroneal artery  on 02/19/2019.  At least single-vessel runoff was established to the right foot.  LINNEA on 02/25/2019 was 0.86.  Silver alginate is being used we will add Santyl and light compression               See wound doc progress notes. Documents will be scanned.        Thompson MAN Mcdaniel  [facility]

## 2019-04-15 ENCOUNTER — OFFICE VISIT (OUTPATIENT)
Dept: WOUND CARE | Facility: HOSPITAL | Age: 54
End: 2019-04-15
Attending: SURGERY
Payer: MEDICARE

## 2019-04-15 VITALS — RESPIRATION RATE: 18 BRPM | SYSTOLIC BLOOD PRESSURE: 98 MMHG | HEART RATE: 64 BPM | DIASTOLIC BLOOD PRESSURE: 62 MMHG

## 2019-04-15 DIAGNOSIS — L97.812 NON-PRESSURE CHRONIC ULCER OF OTHER PART OF RIGHT LOWER LEG WITH FAT LAYER EXPOSED: ICD-10-CM

## 2019-04-15 PROCEDURE — 99499 NO LOS: ICD-10-PCS | Mod: ,,, | Performed by: SURGERY

## 2019-04-15 PROCEDURE — 11042 DBRDMT SUBQ TIS 1ST 20SQCM/<: CPT

## 2019-04-15 PROCEDURE — 99499 UNLISTED E&M SERVICE: CPT | Mod: ,,, | Performed by: SURGERY

## 2019-04-15 PROCEDURE — 27201912 HC WOUND CARE DEBRIDEMENT SUPPLIES

## 2019-04-15 NOTE — PROGRESS NOTES
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 Had home health but not improving. Exam showed necrotic skin/fat upon presentation. Had evaluation of extremity by [facility] in July 2017 showing decrease inflow. Wheelchair bound since 2009 s/p CVA. Patient underwent angiogram with angioplasty to improve arterial flow.  There is been significant improvement in the wound since angioplasty was performed.   Santyl was being utilized. Switch to promogram on 4/23/18.  On 5/21/2018, the patient had signs of Pseudomonas colonization.  Silver alginate was initiated.    He developed a new wound in the anterior aspect of extremity due to trauma on 6/18/18.  This appeared to be partial-thickness. Mepilex Ag was started.  Patient presented on 7/2/2018 with a very tight dressing wrapped around his leg with an elastic wrap.  On 07/30/2018, patient was noted to have increased pain in his wound with enlargement of the wound. He was evaluated by Dr. Galeano who planned an angiogram as there was concern he has arterial ischemia causing decreased wound healing and worsening of the wound. The patient underwent angioplasy with DCB on 8/7/2018 of right SFA and Popliteal artery.  LINNEA performed 9/10/2018 was 0.92.  Wound had been progressing well.  Patient presented 01/28/2019 with pain in his right foot.  He has been experiencing pain with foot elevation and his pain has been markedly worse this past weekend.  He did remove the compression wraps due to pain. Attempts to obtain reliable Doppler signals and his posterior tibial and dorsalis pedis regions of the right foot were not successful.  Patient underwent PTA of a total occluded right superficial femoral artery, stenting of the right popliteal artery and PTA of an occluded peroneal artery  on 02/19/2019.  At least single-vessel runoff was established to the right foot.  LINNEA on 02/25/2019 was 0.86.  Silver alginate is being used we will add Santyl and light compression wound has improved since recent intervention.  There is decreased drainage.  There is decreased edema.  The wound is starting to granulate.         Current Assessment & Plan     Continue debridement is needed.  Continue Santyl                 See wound doc progress notes. Documents will be scanned.        Thompson MAN Marino [facility]

## 2019-04-29 ENCOUNTER — OFFICE VISIT (OUTPATIENT)
Dept: WOUND CARE | Facility: HOSPITAL | Age: 54
End: 2019-04-29
Attending: SURGERY
Payer: MEDICARE

## 2019-04-29 VITALS — HEART RATE: 73 BPM | SYSTOLIC BLOOD PRESSURE: 119 MMHG | DIASTOLIC BLOOD PRESSURE: 81 MMHG | RESPIRATION RATE: 18 BRPM

## 2019-04-29 DIAGNOSIS — L97.812 NON-PRESSURE CHRONIC ULCER OF OTHER PART OF RIGHT LOWER LEG WITH FAT LAYER EXPOSED: ICD-10-CM

## 2019-04-29 PROCEDURE — 11042 DBRDMT SUBQ TIS 1ST 20SQCM/<: CPT

## 2019-04-29 PROCEDURE — 27201912 HC WOUND CARE DEBRIDEMENT SUPPLIES

## 2019-04-29 PROCEDURE — 99499 NO LOS: ICD-10-PCS | Mod: ,,, | Performed by: SURGERY

## 2019-04-29 PROCEDURE — 99499 UNLISTED E&M SERVICE: CPT | Mod: ,,, | Performed by: SURGERY

## 2019-04-29 NOTE — PROGRESS NOTES
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 Had home health but not improving. Exam showed necrotic skin/fat upon presentation. Had evaluation of extremity by [facility] in July 2017 showing decrease inflow. Wheelchair bound since 2009 s/p CVA. Patient underwent angiogram with angioplasty to improve arterial flow.  There is been significant improvement in the wound since angioplasty was performed.   Santyl was being utilized. Switch to promogram on 4/23/18.  On 5/21/2018, the patient had signs of Pseudomonas colonization.  Silver alginate was initiated.    He developed a new wound in the anterior aspect of extremity due to trauma on 6/18/18.  This appeared to be partial-thickness. Mepilex Ag was started.  Patient presented on 7/2/2018 with a very tight dressing wrapped around his leg with an elastic wrap.  On 07/30/2018, patient was noted to have increased pain in his wound with enlargement of the wound. He was evaluated by Dr. Galeano who planned an angiogram as there was concern he has arterial ischemia causing decreased wound healing and worsening of the wound. The patient underwent angioplasy with DCB on 8/7/2018 of right SFA and Popliteal artery.  LINNEA performed 9/10/2018 was 0.92.  Wound had been progressing well.  Patient presented 01/28/2019 with pain in his right foot.  He has been experiencing pain with foot elevation and his pain has been markedly worse this past weekend.  He did remove the compression wraps due to pain. Attempts to obtain reliable Doppler signals and his posterior tibial and dorsalis pedis regions of the right foot were not successful.  Patient underwent PTA of a total occluded right superficial femoral artery, stenting of the right popliteal artery and PTA of an occluded peroneal artery  on 02/19/2019.  At least single-vessel runoff was established to the right foot.  LINNEA on 02/25/2019 was 0.86.  Silver alginate is being used we will add Santyl and light compression wound has improved since recent intervention.  There is decreased drainage.  There is decreased edema.  The wound is starting to granulate.  Fair amount of slough remains continue collagenase               See wound doc progress notes. Documents will be scanned.        Thompson MAN Mcdaniel  [facility]

## 2019-05-07 ENCOUNTER — OFFICE VISIT (OUTPATIENT)
Dept: FAMILY MEDICINE | Facility: CLINIC | Age: 54
End: 2019-05-07
Attending: FAMILY MEDICINE
Payer: MEDICARE

## 2019-05-07 VITALS
OXYGEN SATURATION: 97 % | BODY MASS INDEX: 31.51 KG/M2 | DIASTOLIC BLOOD PRESSURE: 66 MMHG | HEART RATE: 74 BPM | WEIGHT: 189.13 LBS | SYSTOLIC BLOOD PRESSURE: 126 MMHG | HEIGHT: 65 IN

## 2019-05-07 DIAGNOSIS — E11.9 TYPE 2 DIABETES MELLITUS WITHOUT COMPLICATION, WITHOUT LONG-TERM CURRENT USE OF INSULIN: ICD-10-CM

## 2019-05-07 DIAGNOSIS — I73.9 PAD (PERIPHERAL ARTERY DISEASE): ICD-10-CM

## 2019-05-07 DIAGNOSIS — Z12.11 ENCOUNTER FOR FIT (FECAL IMMUNOCHEMICAL TEST) SCREENING: ICD-10-CM

## 2019-05-07 DIAGNOSIS — Z00.00 ROUTINE GENERAL MEDICAL EXAMINATION AT A HEALTH CARE FACILITY: Primary | ICD-10-CM

## 2019-05-07 DIAGNOSIS — I10 ESSENTIAL HYPERTENSION: ICD-10-CM

## 2019-05-07 DIAGNOSIS — Z12.5 ENCOUNTER FOR SCREENING FOR MALIGNANT NEOPLASM OF PROSTATE: ICD-10-CM

## 2019-05-07 DIAGNOSIS — I25.10 CORONARY ARTERY DISEASE INVOLVING NATIVE CORONARY ARTERY WITHOUT ANGINA PECTORIS, UNSPECIFIED WHETHER NATIVE OR TRANSPLANTED HEART: Chronic | ICD-10-CM

## 2019-05-07 DIAGNOSIS — E78.5 DYSLIPIDEMIA: ICD-10-CM

## 2019-05-07 DIAGNOSIS — I50.32 CHRONIC DIASTOLIC CHF (CONGESTIVE HEART FAILURE): ICD-10-CM

## 2019-05-07 DIAGNOSIS — D64.9 ANEMIA, UNSPECIFIED TYPE: ICD-10-CM

## 2019-05-07 PROCEDURE — 99999 PR PBB SHADOW E&M-EST. PATIENT-LVL III: CPT | Mod: PBBFAC,,, | Performed by: FAMILY MEDICINE

## 2019-05-07 PROCEDURE — 99999 PR PBB SHADOW E&M-EST. PATIENT-LVL III: ICD-10-PCS | Mod: PBBFAC,,, | Performed by: FAMILY MEDICINE

## 2019-05-07 PROCEDURE — 99213 PR OFFICE/OUTPT VISIT, EST, LEVL III, 20-29 MIN: ICD-10-PCS | Mod: S$PBB,,, | Performed by: FAMILY MEDICINE

## 2019-05-07 PROCEDURE — 99213 OFFICE O/P EST LOW 20 MIN: CPT | Mod: S$PBB,,, | Performed by: FAMILY MEDICINE

## 2019-05-07 PROCEDURE — 99213 OFFICE O/P EST LOW 20 MIN: CPT | Mod: PBBFAC,PO | Performed by: FAMILY MEDICINE

## 2019-05-07 RX ORDER — ACETAMINOPHEN, DIPHENHYDRAMINE HCL, PHENYLEPHRINE HCL 325; 25; 5 MG/1; MG/1; MG/1
1 TABLET ORAL NIGHTLY PRN
Status: ON HOLD | COMMUNITY
End: 2020-07-29 | Stop reason: SDUPTHER

## 2019-05-07 RX ORDER — OXYCODONE AND ACETAMINOPHEN 5; 325 MG/1; MG/1
1 TABLET ORAL 2 TIMES DAILY
Status: ON HOLD | COMMUNITY
End: 2019-07-19 | Stop reason: ALTCHOICE

## 2019-05-07 RX ORDER — GLIPIZIDE 10 MG/1
10 TABLET ORAL
Qty: 180 TABLET | Refills: 3 | Status: SHIPPED | OUTPATIENT
Start: 2019-05-07 | End: 2020-04-27 | Stop reason: SDUPTHER

## 2019-05-09 ENCOUNTER — PATIENT MESSAGE (OUTPATIENT)
Dept: FAMILY MEDICINE | Facility: CLINIC | Age: 54
End: 2019-05-09

## 2019-05-13 ENCOUNTER — OFFICE VISIT (OUTPATIENT)
Dept: WOUND CARE | Facility: HOSPITAL | Age: 54
End: 2019-05-13
Attending: SURGERY
Payer: MEDICARE

## 2019-05-13 VITALS — SYSTOLIC BLOOD PRESSURE: 97 MMHG | HEART RATE: 66 BPM | DIASTOLIC BLOOD PRESSURE: 66 MMHG | RESPIRATION RATE: 20 BRPM

## 2019-05-13 DIAGNOSIS — L97.812 NON-PRESSURE CHRONIC ULCER OF OTHER PART OF RIGHT LOWER LEG WITH FAT LAYER EXPOSED: ICD-10-CM

## 2019-05-13 PROCEDURE — 99499 NO LOS: ICD-10-PCS | Mod: ,,, | Performed by: SURGERY

## 2019-05-13 PROCEDURE — 99499 UNLISTED E&M SERVICE: CPT | Mod: ,,, | Performed by: SURGERY

## 2019-05-13 PROCEDURE — 27201912 HC WOUND CARE DEBRIDEMENT SUPPLIES

## 2019-05-13 PROCEDURE — 11042 DBRDMT SUBQ TIS 1ST 20SQCM/<: CPT

## 2019-05-13 NOTE — PROGRESS NOTES
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 Had home health but not improving. Exam showed necrotic skin/fat upon presentation. Had evaluation of extremity by [facility] in July 2017 showing decrease inflow. Wheelchair bound since 2009 s/p CVA. Patient underwent angiogram with angioplasty to improve arterial flow.  There is been significant improvement in the wound since angioplasty was performed.   Santyl was being utilized. Switch to promogram on 4/23/18.  On 5/21/2018, the patient had signs of Pseudomonas colonization.  Silver alginate was initiated.    He developed a new wound in the anterior aspect of extremity due to trauma on 6/18/18.  This appeared to be partial-thickness. Mepilex Ag was started.  Patient presented on 7/2/2018 with a very tight dressing wrapped around his leg with an elastic wrap.  On 07/30/2018, patient was noted to have increased pain in his wound with enlargement of the wound. He was evaluated by Dr. Galeano who planned an angiogram as there was concern he has arterial ischemia causing decreased wound healing and worsening of the wound. The patient underwent angioplasy with DCB on 8/7/2018 of right SFA and Popliteal artery.  LINNEA performed 9/10/2018 was 0.92.  Wound had been progressing well.  Patient presented 01/28/2019 with pain in his right foot.  He has been experiencing pain with foot elevation and his pain has been markedly worse this past weekend.  He did remove the compression wraps due to pain. Attempts to obtain reliable Doppler signals and his posterior tibial and dorsalis pedis regions of the right foot were not successful.  Patient underwent PTA of a total occluded right superficial femoral artery, stenting of the right popliteal artery and PTA of an occluded peroneal artery  on 02/19/2019.  At least single-vessel runoff was established to the right foot.  LINNEA on 02/25/2019 was 0.86.  Silver alginate is being used we will add Santyl and light compression wound has improved since recent intervention.  There is decreased drainage.  There is decreased edema.  The wound is starting to granulate.  Fair amount of slough remains but there is increased drainage.         Current Assessment & Plan     Plan silver and hold Santyl.                 See wound doc progress notes. Documents will be scanned.        Thompson MAN Mcdaniel  [facility]

## 2019-05-14 ENCOUNTER — LAB VISIT (OUTPATIENT)
Dept: LAB | Facility: HOSPITAL | Age: 54
End: 2019-05-14
Attending: FAMILY MEDICINE
Payer: MEDICARE

## 2019-05-14 DIAGNOSIS — Z12.11 ENCOUNTER FOR FIT (FECAL IMMUNOCHEMICAL TEST) SCREENING: ICD-10-CM

## 2019-05-14 LAB — HEMOCCULT STL QL IA: NEGATIVE

## 2019-05-14 PROCEDURE — 82274 ASSAY TEST FOR BLOOD FECAL: CPT

## 2019-05-21 RX ORDER — FUROSEMIDE 80 MG/1
TABLET ORAL
Qty: 180 TABLET | Refills: 1 | Status: ON HOLD | OUTPATIENT
Start: 2019-05-21 | End: 2020-10-27

## 2019-05-27 ENCOUNTER — OFFICE VISIT (OUTPATIENT)
Dept: WOUND CARE | Facility: HOSPITAL | Age: 54
End: 2019-05-27
Attending: SURGERY
Payer: MEDICARE

## 2019-05-27 VITALS — SYSTOLIC BLOOD PRESSURE: 158 MMHG | RESPIRATION RATE: 20 BRPM | HEART RATE: 77 BPM | DIASTOLIC BLOOD PRESSURE: 89 MMHG

## 2019-05-27 DIAGNOSIS — L97.812 NON-PRESSURE CHRONIC ULCER OF OTHER PART OF RIGHT LOWER LEG WITH FAT LAYER EXPOSED: ICD-10-CM

## 2019-05-27 PROCEDURE — 11042 DBRDMT SUBQ TIS 1ST 20SQCM/<: CPT

## 2019-05-27 PROCEDURE — 99499 UNLISTED E&M SERVICE: CPT | Mod: ,,, | Performed by: SURGERY

## 2019-05-27 PROCEDURE — 99499 NO LOS: ICD-10-PCS | Mod: ,,, | Performed by: SURGERY

## 2019-05-27 PROCEDURE — 27201912 HC WOUND CARE DEBRIDEMENT SUPPLIES

## 2019-05-27 NOTE — ASSESSMENT & PLAN NOTE
Will stop Santyl and implement Hydrofera Blue classic for anti microbial  Apply Tubigrip to help with edema control.  Repeat LINNEA today.  If no significant improvement over the next couple weeks will culture the wound bed and refer for repeat vascular workup.

## 2019-05-27 NOTE — PROGRESS NOTES
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 Had home health but not improving. Exam showed necrotic skin/fat upon presentation. Had evaluation of extremity by [facility] in July 2017 showing decrease inflow. Wheelchair bound since 2009 s/p CVA. Patient underwent angiogram with angioplasty to improve arterial flow.  There is been significant improvement in the wound since angioplasty was performed.   Santyl was being utilized. Switch to promogram on 4/23/18.  On 5/21/2018, the patient had signs of Pseudomonas colonization.  Silver alginate was initiated.    He developed a new wound in the anterior aspect of extremity due to trauma on 6/18/18.  This appeared to be partial-thickness. Mepilex Ag was started.  Patient presented on 7/2/2018 with a very tight dressing wrapped around his leg with an elastic wrap.  On 07/30/2018, patient was noted to have increased pain in his wound with enlargement of the wound. He was evaluated by Dr. Galeano who planned an angiogram as there was concern he has arterial ischemia causing decreased wound healing and worsening of the wound. The patient underwent angioplasy with DCB on 8/7/2018 of right SFA and Popliteal artery.  LINNEA performed 9/10/2018 was 0.92.  Wound had been progressing well.  Patient presented 01/28/2019 with pain in his right foot.  He has been experiencing pain with foot elevation and his pain has been markedly worse this past weekend.  He did remove the compression wraps due to pain. Attempts to obtain reliable Doppler signals and his posterior tibial and dorsalis pedis regions of the right foot were not successful.  Patient underwent PTA of a total occluded right superficial femoral artery, stenting of the right popliteal artery and PTA of an occluded peroneal artery  on 02/19/2019.  At least single-vessel runoff was established to the right foot.  LINNEA on 02/25/2019 was 0.86.  Patient's wound is enlarged.  There remains the moderate amount of slough present.  The manan wound area is macerated.  He has some edema in both lower extremities.         Current Assessment & Plan     Will stop Santyl and implement Hydrofera Blue classic for anti microbial  Apply Tubigrip to help with edema control.  Repeat LINNEA today.  If no significant improvement over the next couple weeks will culture the wound bed and refer for repeat vascular workup.               See wound doc progress notes. Documents will be scanned.        Thompson MAN Marino [facility]

## 2019-06-03 ENCOUNTER — OFFICE VISIT (OUTPATIENT)
Dept: WOUND CARE | Facility: HOSPITAL | Age: 54
End: 2019-06-03
Attending: SURGERY
Payer: MEDICARE

## 2019-06-03 VITALS — SYSTOLIC BLOOD PRESSURE: 95 MMHG | HEART RATE: 67 BPM | RESPIRATION RATE: 18 BRPM | DIASTOLIC BLOOD PRESSURE: 71 MMHG

## 2019-06-03 DIAGNOSIS — L97.812 NON-PRESSURE CHRONIC ULCER OF OTHER PART OF RIGHT LOWER LEG WITH FAT LAYER EXPOSED: ICD-10-CM

## 2019-06-03 PROCEDURE — 99499 UNLISTED E&M SERVICE: CPT | Mod: ,,, | Performed by: SURGERY

## 2019-06-03 PROCEDURE — 27201912 HC WOUND CARE DEBRIDEMENT SUPPLIES

## 2019-06-03 PROCEDURE — 11042 DBRDMT SUBQ TIS 1ST 20SQCM/<: CPT

## 2019-06-03 PROCEDURE — 99499 NO LOS: ICD-10-PCS | Mod: ,,, | Performed by: SURGERY

## 2019-06-03 NOTE — PROGRESS NOTES
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 Had home health but not improving. Exam showed necrotic skin/fat upon presentation. Had evaluation of extremity by [facility] in July 2017 showing decrease inflow. Wheelchair bound since 2009 s/p CVA. Patient underwent angiogram with angioplasty to improve arterial flow.  There is been significant improvement in the wound since angioplasty was performed.   Santyl was being utilized. Switch to promogram on 4/23/18.  On 5/21/2018, the patient had signs of Pseudomonas colonization.  Silver alginate was initiated.    He developed a new wound in the anterior aspect of extremity due to trauma on 6/18/18.  This appeared to be partial-thickness. Mepilex Ag was started.  Patient presented on 7/2/2018 with a very tight dressing wrapped around his leg with an elastic wrap.  On 07/30/2018, patient was noted to have increased pain in his wound with enlargement of the wound. He was evaluated by Dr. Galeano who planned an angiogram as there was concern he has arterial ischemia causing decreased wound healing and worsening of the wound. The patient underwent angioplasy with DCB on 8/7/2018 of right SFA and Popliteal artery.  LINNEA performed 9/10/2018 was 0.92.  Wound had been progressing well.  Patient presented 01/28/2019 with pain in his right foot.  He has been experiencing pain with foot elevation and his pain has been markedly worse this past weekend.  He did remove the compression wraps due to pain. Attempts to obtain reliable Doppler signals and his posterior tibial and dorsalis pedis regions of the right foot were not successful.  Patient underwent PTA of a total occluded right superficial femoral artery, stenting of the right popliteal artery and PTA of an occluded peroneal artery  on 02/19/2019.  At least single-vessel runoff was established to the right foot.  LINNEA on 02/25/2019 was 0.86.  Patient's wound is enlarged.  Last week 05/27/2019 patient had moderate amount of slough present.  The manan wound area is macerated.  He has some edema in both lower extremities.  Patient returns today 06/03/2019 with improvement.  The edema is better controlled.  There is less maceration surrounding the wound.  The wound bed has significantly decreased amount of slough and shows signs of granulation.         Current Assessment & Plan     Continue Hydrofera Blue and compression sock.               See wound doc progress notes. Documents will be scanned.        Thompson MAN Marino [facility]

## 2019-06-10 ENCOUNTER — OFFICE VISIT (OUTPATIENT)
Dept: WOUND CARE | Facility: HOSPITAL | Age: 54
End: 2019-06-10
Attending: SURGERY
Payer: MEDICARE

## 2019-06-10 VITALS — RESPIRATION RATE: 18 BRPM | DIASTOLIC BLOOD PRESSURE: 75 MMHG | HEART RATE: 74 BPM | SYSTOLIC BLOOD PRESSURE: 102 MMHG

## 2019-06-10 DIAGNOSIS — L97.812 NON-PRESSURE CHRONIC ULCER OF OTHER PART OF RIGHT LOWER LEG WITH FAT LAYER EXPOSED: Primary | ICD-10-CM

## 2019-06-10 PROCEDURE — 99499 UNLISTED E&M SERVICE: CPT | Mod: ,,, | Performed by: SURGERY

## 2019-06-10 PROCEDURE — 99499 NO LOS: ICD-10-PCS | Mod: ,,, | Performed by: SURGERY

## 2019-06-10 PROCEDURE — 11045 DBRDMT SUBQ TISS EACH ADDL: CPT

## 2019-06-10 PROCEDURE — 27201912 HC WOUND CARE DEBRIDEMENT SUPPLIES

## 2019-06-10 PROCEDURE — 11042 DBRDMT SUBQ TIS 1ST 20SQCM/<: CPT

## 2019-06-10 NOTE — PROGRESS NOTES
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 Had home health but not improving. Exam showed necrotic skin/fat upon presentation. Had evaluation of extremity by [facility] in July 2017 showing decrease inflow. Wheelchair bound since 2009 s/p CVA. Patient underwent angiogram with angioplasty to improve arterial flow.  There is been significant improvement in the wound since angioplasty was performed.   Santyl was being utilized. Switch to promogram on 4/23/18.  On 5/21/2018, the patient had signs of Pseudomonas colonization.  Silver alginate was initiated.    He developed a new wound in the anterior aspect of extremity due to trauma on 6/18/18.  This appeared to be partial-thickness. Mepilex Ag was started.  Patient presented on 7/2/2018 with a very tight dressing wrapped around his leg with an elastic wrap.  On 07/30/2018, patient was noted to have increased pain in his wound with enlargement of the wound. He was evaluated by Dr. Galeano who planned an angiogram as there was concern he has arterial ischemia causing decreased wound healing and worsening of the wound. The patient underwent angioplasy with DCB on 8/7/2018 of right SFA and Popliteal artery.  LINNEA performed 9/10/2018 was 0.92.  Wound had been progressing well.  Patient presented 01/28/2019 with pain in his right foot.  He has been experiencing pain with foot elevation and his pain has been markedly worse this past weekend.  He did remove the compression wraps due to pain. Attempts to obtain reliable Doppler signals and his posterior tibial and dorsalis pedis regions of the right foot were not successful.  Patient underwent PTA of a total occluded right superficial femoral artery, stenting of the right popliteal artery and PTA of an  occluded peroneal artery on 02/19/2019.  At least single-vessel runoff was established to the right foot.  LINNEA on 02/25/2019 was 0.86.   On 06/10/2019, the patient was noted to have green 10 on the drainage consistent with a Pseudomonas colonization.  There was a large amount of biofilm removed. There was a large amount of drainage and periwound wound skin maceration.  Mepilex Ag foam was initiated for improved drainage control with application of gentian violet.               See wound doc progress notes. Documents will be scanned.        Ahmet BARRETO Hart  [facility]

## 2019-06-11 RX ORDER — PANTOPRAZOLE SODIUM 40 MG/1
40 TABLET, DELAYED RELEASE ORAL DAILY
Qty: 30 TABLET | Refills: 3 | Status: SHIPPED | OUTPATIENT
Start: 2019-06-11 | End: 2019-10-16 | Stop reason: SDUPTHER

## 2019-06-17 ENCOUNTER — OFFICE VISIT (OUTPATIENT)
Dept: WOUND CARE | Facility: HOSPITAL | Age: 54
End: 2019-06-17
Attending: SURGERY
Payer: MEDICARE

## 2019-06-17 VITALS — DIASTOLIC BLOOD PRESSURE: 78 MMHG | SYSTOLIC BLOOD PRESSURE: 134 MMHG | HEART RATE: 84 BPM

## 2019-06-17 DIAGNOSIS — L97.812 NON-PRESSURE CHRONIC ULCER OF OTHER PART OF RIGHT LOWER LEG WITH FAT LAYER EXPOSED: ICD-10-CM

## 2019-06-17 PROCEDURE — 99499 UNLISTED E&M SERVICE: CPT | Mod: ,,, | Performed by: SURGERY

## 2019-06-17 PROCEDURE — 27201912 HC WOUND CARE DEBRIDEMENT SUPPLIES

## 2019-06-17 PROCEDURE — 99499 NO LOS: ICD-10-PCS | Mod: ,,, | Performed by: SURGERY

## 2019-06-17 PROCEDURE — 11042 DBRDMT SUBQ TIS 1ST 20SQCM/<: CPT

## 2019-06-17 NOTE — PROGRESS NOTES
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 Had home health but not improving. Exam showed necrotic skin/fat upon presentation. Had evaluation of extremity by [facility] in July 2017 showing decrease inflow. Wheelchair bound since 2009 s/p CVA. Patient underwent angiogram with angioplasty to improve arterial flow.  There is been significant improvement in the wound since angioplasty was performed.   Santyl was being utilized. Switch to promogram on 4/23/18.  On 5/21/2018, the patient had signs of Pseudomonas colonization.  Silver alginate was initiated.    He developed a new wound in the anterior aspect of extremity due to trauma on 6/18/18.  This appeared to be partial-thickness. Mepilex Ag was started.  Patient presented on 7/2/2018 with a very tight dressing wrapped around his leg with an elastic wrap.  On 07/30/2018, patient was noted to have increased pain in his wound with enlargement of the wound. He was evaluated by Dr. Galeano who planned an angiogram as there was concern he has arterial ischemia causing decreased wound healing and worsening of the wound. The patient underwent angioplasy with DCB on 8/7/2018 of right SFA and Popliteal artery.  LINNEA performed 9/10/2018 was 0.92.  Wound had been progressing well.  Patient presented 01/28/2019 with pain in his right foot.  He has been experiencing pain with foot elevation and his pain has been markedly worse this past weekend.  He did remove the compression wraps due to pain. Attempts to obtain reliable Doppler signals and his posterior tibial and dorsalis pedis regions of the right foot were not successful.  Patient underwent PTA of a total occluded right superficial femoral artery, stenting of the right popliteal artery and PTA of an occluded peroneal artery  on 02/19/2019.  At least single-vessel runoff was established to the right foot.  LINNEA on 02/25/2019 was 0.86.   On 06/10/2019, the patient was noted to have green 10 on the drainage consistent with a Pseudomonas colonization.  There was a large amount of biofilm removed. There was a large amount of drainage and periwound wound skin maceration.  Mepilex Ag foam was initiated for improved drainage control with application of gentian violet.  Patient returns today 06/17/2019 with progression of the wound.  There is more slough present. He has edema in the leg.  Hesitant to implement compression due to patient's history of peripheral vascular disease.  Unable to obtain an LINNEA today.  We will implement Mesalt today.  He will be scheduled for vascular evaluation with [facility] and return to clinic next week               See wound doc progress notes. Documents will be scanned.        hTompson MAN Mcdaniel  [facility]

## 2019-06-24 ENCOUNTER — OFFICE VISIT (OUTPATIENT)
Dept: WOUND CARE | Facility: HOSPITAL | Age: 54
End: 2019-06-24
Attending: SURGERY
Payer: MEDICARE

## 2019-06-24 VITALS — SYSTOLIC BLOOD PRESSURE: 98 MMHG | RESPIRATION RATE: 18 BRPM | HEART RATE: 84 BPM | DIASTOLIC BLOOD PRESSURE: 68 MMHG

## 2019-06-24 DIAGNOSIS — I73.9 PAD (PERIPHERAL ARTERY DISEASE): ICD-10-CM

## 2019-06-24 DIAGNOSIS — L97.812 NON-PRESSURE CHRONIC ULCER OF OTHER PART OF RIGHT LOWER LEG WITH FAT LAYER EXPOSED: Primary | ICD-10-CM

## 2019-06-24 PROCEDURE — 99499 UNLISTED E&M SERVICE: CPT | Mod: ,,, | Performed by: SURGERY

## 2019-06-24 PROCEDURE — 11042 DBRDMT SUBQ TIS 1ST 20SQCM/<: CPT

## 2019-06-24 PROCEDURE — 99499 NO LOS: ICD-10-PCS | Mod: ,,, | Performed by: SURGERY

## 2019-06-24 PROCEDURE — 27201912 HC WOUND CARE DEBRIDEMENT SUPPLIES

## 2019-06-24 NOTE — PROGRESS NOTES
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 Had home health but not improving. Exam showed necrotic skin/fat upon presentation. Had evaluation of extremity by [facility] in July 2017 showing decrease inflow. Wheelchair bound since 2009 s/p CVA. Patient underwent angiogram with angioplasty to improve arterial flow.  There is been significant improvement in the wound since angioplasty was performed.   Santyl was being utilized. Switch to promogram on 4/23/18.  On 5/21/2018, the patient had signs of Pseudomonas colonization.  Silver alginate was initiated.    He developed a new wound in the anterior aspect of extremity due to trauma on 6/18/18.  This appeared to be partial-thickness. Mepilex Ag was started.  Patient presented on 7/2/2018 with a very tight dressing wrapped around his leg with an elastic wrap.  On 07/30/2018, patient was noted to have increased pain in his wound with enlargement of the wound. He was evaluated by Dr. Galeano who planned an angiogram as there was concern he has arterial ischemia causing decreased wound healing and worsening of the wound. The patient underwent angioplasy with DCB on 8/7/2018 of right SFA and Popliteal artery.  LINNEA performed 9/10/2018 was 0.92.  Wound had been progressing well.  Patient presented 01/28/2019 with pain in his right foot.  He has been experiencing pain with foot elevation and his pain has been markedly worse this past weekend.  He did remove the compression wraps due to pain. Attempts to obtain reliable Doppler signals and his posterior tibial and dorsalis pedis regions of the right foot were not successful.  Patient underwent PTA of a total occluded right superficial femoral artery, stenting of the right popliteal artery and PTA of an  occluded peroneal artery on 02/19/2019.  At least single-vessel runoff was established to the right foot.  LINNEA on 02/25/2019 was 0.86.   On 06/10/2019, the patient was noted to have green 10 on the drainage consistent with a Pseudomonas colonization.  There was a large amount of biofilm removed. There was a large amount of drainage and periwound wound skin maceration.  Mepilex Ag foam was initiated for improved drainage control with application of gentian violet.  Patient returns today 06/17/2019 with progression of the wound.  There is more slough present. He has edema in the leg.  Hesitant to implement compression due to patient's history of peripheral vascular disease.  Unable to obtain an LINNEA 06/17/2019. He will be scheduled for vascular evaluation with [facility].         Current Assessment & Plan     Wound is much drier today.  Continue debridement to maintain active phase wound healing.  Mepilex Ag foam to be initiated.  Awaiting vascular evaluation.            Cardiac/Vascular    PAD (peripheral artery disease)          See wound doc progress notes. Documents will be scanned.        Ahmet Hart  [facility]

## 2019-06-24 NOTE — ASSESSMENT & PLAN NOTE
Wound is much drier today.  Continue debridement to maintain active phase wound healing.  Mepilex Ag foam to be initiated.  Awaiting vascular evaluation.

## 2019-06-26 LAB
LEFT EYE DM RETINOPATHY: NEGATIVE
RIGHT EYE DM RETINOPATHY: NEGATIVE

## 2019-07-01 ENCOUNTER — OFFICE VISIT (OUTPATIENT)
Dept: WOUND CARE | Facility: HOSPITAL | Age: 54
End: 2019-07-01
Attending: SURGERY
Payer: MEDICARE

## 2019-07-01 VITALS — SYSTOLIC BLOOD PRESSURE: 144 MMHG | HEART RATE: 89 BPM | DIASTOLIC BLOOD PRESSURE: 95 MMHG

## 2019-07-01 DIAGNOSIS — L97.812 NON-PRESSURE CHRONIC ULCER OF OTHER PART OF RIGHT LOWER LEG WITH FAT LAYER EXPOSED: ICD-10-CM

## 2019-07-01 PROCEDURE — 99499 NO LOS: ICD-10-PCS | Mod: ,,, | Performed by: SURGERY

## 2019-07-01 PROCEDURE — 99499 UNLISTED E&M SERVICE: CPT | Mod: ,,, | Performed by: SURGERY

## 2019-07-01 PROCEDURE — 11045 DBRDMT SUBQ TISS EACH ADDL: CPT

## 2019-07-01 PROCEDURE — 11042 DBRDMT SUBQ TIS 1ST 20SQCM/<: CPT

## 2019-07-01 PROCEDURE — 27201912 HC WOUND CARE DEBRIDEMENT SUPPLIES

## 2019-07-01 NOTE — PROGRESS NOTES
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 Had home health but not improving. Exam showed necrotic skin/fat upon presentation. Had evaluation of extremity by [facility] in July 2017 showing decrease inflow. Wheelchair bound since 2009 s/p CVA. Patient underwent angiogram with angioplasty to improve arterial flow.  There is been significant improvement in the wound since angioplasty was performed.   Santyl was being utilized. Switch to promogram on 4/23/18.  On 5/21/2018, the patient had signs of Pseudomonas colonization.  Silver alginate was initiated.    He developed a new wound in the anterior aspect of extremity due to trauma on 6/18/18.  This appeared to be partial-thickness. Mepilex Ag was started.  Patient presented on 7/2/2018 with a very tight dressing wrapped around his leg with an elastic wrap.  On 07/30/2018, patient was noted to have increased pain in his wound with enlargement of the wound. He was evaluated by Dr. Galeano who planned an angiogram as there was concern he has arterial ischemia causing decreased wound healing and worsening of the wound. The patient underwent angioplasy with DCB on 8/7/2018 of right SFA and Popliteal artery.  LINNEA performed 9/10/2018 was 0.92.  Wound had been progressing well.  Patient presented 01/28/2019 with pain in his right foot.  He has been experiencing pain with foot elevation and his pain has been markedly worse this past weekend.  He did remove the compression wraps due to pain. Attempts to obtain reliable Doppler signals and his posterior tibial and dorsalis pedis regions of the right foot were not successful.  Patient underwent PTA of a total occluded right superficial femoral artery, stenting of the right popliteal artery and PTA of an occluded peroneal artery  on 02/19/2019.  At least single-vessel runoff was established to the right foot.  LINNEA on 02/25/2019 was 0.86.   On 06/10/2019, the patient was noted to have green 10 on the drainage consistent with a Pseudomonas colonization.  There was a large amount of biofilm removed. There was a large amount of drainage and periwound wound skin maceration.  Mepilex Ag foam was initiated for improved drainage control with application of gentian violet.  Patient returns today 06/17/2019 with progression of the wound.  There is more slough present. He has edema in the leg.  Hesitant to implement compression due to patient's history of peripheral vascular disease.  Unable to obtain an LINNEA 06/17/2019.  His edema is better controlled today.  He states that he has been elevating his leg. The wound bed consists mostly of fibrous nonviable tissue.  The drainage has decreased.  Patient states that he went to [facility] this morning and had noninvasive vascular studies               See wound doc progress notes. Documents will be scanned.        Thompson MAN Mcdaniel  [facility]

## 2019-07-05 PROBLEM — I99.8 LOWER LIMB ISCHEMIA: Status: ACTIVE | Noted: 2019-07-05

## 2019-07-08 ENCOUNTER — OFFICE VISIT (OUTPATIENT)
Dept: WOUND CARE | Facility: HOSPITAL | Age: 54
End: 2019-07-08
Attending: SURGERY
Payer: MEDICARE

## 2019-07-08 VITALS — RESPIRATION RATE: 18 BRPM | DIASTOLIC BLOOD PRESSURE: 94 MMHG | HEART RATE: 70 BPM | SYSTOLIC BLOOD PRESSURE: 122 MMHG

## 2019-07-08 DIAGNOSIS — L97.812 NON-PRESSURE CHRONIC ULCER OF OTHER PART OF RIGHT LOWER LEG WITH FAT LAYER EXPOSED: Primary | ICD-10-CM

## 2019-07-08 DIAGNOSIS — I73.9 PAD (PERIPHERAL ARTERY DISEASE): ICD-10-CM

## 2019-07-08 PROCEDURE — 99499 NO LOS: ICD-10-PCS | Mod: ,,, | Performed by: SURGERY

## 2019-07-08 PROCEDURE — 99499 UNLISTED E&M SERVICE: CPT | Mod: ,,, | Performed by: SURGERY

## 2019-07-08 PROCEDURE — 27201912 HC WOUND CARE DEBRIDEMENT SUPPLIES

## 2019-07-08 PROCEDURE — 11042 DBRDMT SUBQ TIS 1ST 20SQCM/<: CPT

## 2019-07-08 NOTE — ASSESSMENT & PLAN NOTE
Atherosclerotic occlusive disease of the right lower extremity.  Intervention planned for Thursday with Dr. Galeano

## 2019-07-08 NOTE — ASSESSMENT & PLAN NOTE
Wound fairly stable.  Edema is better controlled.  Still with nonviable biofilm and fibrin.  Debridement to maintain active phase wound healing remove nonviable tissue.

## 2019-07-08 NOTE — PROGRESS NOTES
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 Had home health but not improving. Exam showed necrotic skin/fat upon presentation. Had evaluation of extremity by [facility] in July 2017 showing decrease inflow. Wheelchair bound since 2009 s/p CVA. Patient underwent angiogram with angioplasty to improve arterial flow.  There is been significant improvement in the wound since angioplasty was performed.   Santyl was being utilized. Switch to promogram on 4/23/18.  On 5/21/2018, the patient had signs of Pseudomonas colonization.  Silver alginate was initiated.    He developed a new wound in the anterior aspect of extremity due to trauma on 6/18/18.  This appeared to be partial-thickness. Mepilex Ag was started.  Patient presented on 7/2/2018 with a very tight dressing wrapped around his leg with an elastic wrap.  On 07/30/2018, patient was noted to have increased pain in his wound with enlargement of the wound. He was evaluated by Dr. Galeano who planned an angiogram as there was concern he has arterial ischemia causing decreased wound healing and worsening of the wound. The patient underwent angioplasy with DCB on 8/7/2018 of right SFA and Popliteal artery.  LINNEA performed 9/10/2018 was 0.92.  Wound had been progressing well.  Patient presented 01/28/2019 with pain in his right foot.  He has been experiencing pain with foot elevation and his pain has been markedly worse this past weekend.  He did remove the compression wraps due to pain. Attempts to obtain reliable Doppler signals and his posterior tibial and dorsalis pedis regions of the right foot were not successful.  Patient underwent PTA of a total occluded right superficial femoral artery, stenting of the right popliteal artery and PTA of an  occluded peroneal artery on 02/19/2019.  At least single-vessel runoff was established to the right foot.  LNINEA on 02/25/2019 was 0.86.   On 06/10/2019, the patient was noted to have green 10 on the drainage consistent with a Pseudomonas colonization.  There was a large amount of biofilm removed. There was a large amount of drainage and periwound wound skin maceration.  Mepilex Ag foam was initiated for improved drainage control with application of gentian violet.  Patient returns today 06/17/2019 with progression of the wound.  There is more slough present. He has edema in the leg.  Hesitant to implement compression due to patient's history of peripheral vascular disease.  Unable to obtain an LINNEA 06/17/2019.    Patient is to have intervention by Dr. Galeano on 07/11/2019.         Current Assessment & Plan     Wound fairly stable.  Edema is better controlled.  Still with nonviable biofilm and fibrin.  Debridement to maintain active phase wound healing remove nonviable tissue.              Cardiac/Vascular    PAD (peripheral artery disease)    Current Assessment & Plan     Atherosclerotic occlusive disease of the right lower extremity.  Intervention planned for Thursday with Dr. Galeano               See wound doc progress notes. Documents will be scanned.        Ahmet BARRETO Hart  [facility]

## 2019-07-15 ENCOUNTER — OFFICE VISIT (OUTPATIENT)
Dept: WOUND CARE | Facility: HOSPITAL | Age: 54
End: 2019-07-15
Attending: SURGERY
Payer: MEDICARE

## 2019-07-15 VITALS — DIASTOLIC BLOOD PRESSURE: 72 MMHG | SYSTOLIC BLOOD PRESSURE: 139 MMHG | RESPIRATION RATE: 18 BRPM | HEART RATE: 78 BPM

## 2019-07-15 DIAGNOSIS — L97.812 NON-PRESSURE CHRONIC ULCER OF OTHER PART OF RIGHT LOWER LEG WITH FAT LAYER EXPOSED: ICD-10-CM

## 2019-07-15 PROCEDURE — 99499 UNLISTED E&M SERVICE: CPT | Mod: ,,, | Performed by: SURGERY

## 2019-07-15 PROCEDURE — 27201912 HC WOUND CARE DEBRIDEMENT SUPPLIES

## 2019-07-15 PROCEDURE — 99499 NO LOS: ICD-10-PCS | Mod: ,,, | Performed by: SURGERY

## 2019-07-15 PROCEDURE — 11042 DBRDMT SUBQ TIS 1ST 20SQCM/<: CPT
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
## 2019-07-22 ENCOUNTER — OFFICE VISIT (OUTPATIENT)
Dept: WOUND CARE | Facility: HOSPITAL | Age: 54
End: 2019-07-22
Attending: SURGERY
Payer: MEDICARE

## 2019-07-22 VITALS — SYSTOLIC BLOOD PRESSURE: 107 MMHG | RESPIRATION RATE: 18 BRPM | HEART RATE: 67 BPM | DIASTOLIC BLOOD PRESSURE: 67 MMHG

## 2019-07-22 DIAGNOSIS — I73.9 PAD (PERIPHERAL ARTERY DISEASE): ICD-10-CM

## 2019-07-22 DIAGNOSIS — L97.812 NON-PRESSURE CHRONIC ULCER OF OTHER PART OF RIGHT LOWER LEG WITH FAT LAYER EXPOSED: Primary | ICD-10-CM

## 2019-07-22 PROCEDURE — 11042 DBRDMT SUBQ TIS 1ST 20SQCM/<: CPT

## 2019-07-22 PROCEDURE — 99499 NO LOS: ICD-10-PCS | Mod: ,,, | Performed by: SURGERY

## 2019-07-22 PROCEDURE — 99499 UNLISTED E&M SERVICE: CPT | Mod: ,,, | Performed by: SURGERY

## 2019-07-22 PROCEDURE — 27201912 HC WOUND CARE DEBRIDEMENT SUPPLIES

## 2019-07-22 NOTE — ASSESSMENT & PLAN NOTE
Wound is already improving following PTA.  Continue debridement to maintain active phase wound healing.  Continue leg elevation.

## 2019-07-22 NOTE — PROGRESS NOTES
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 Had home health but not improving. Exam showed necrotic skin/fat upon presentation. Had evaluation of extremity by [facility] in July 2017 showing decrease inflow. Wheelchair bound since 2009 s/p CVA. Patient underwent angiogram with angioplasty to improve arterial flow.  There is been significant improvement in the wound since angioplasty was performed.   Santyl was being utilized. Switch to promogram on 4/23/18.  On 5/21/2018, the patient had signs of Pseudomonas colonization.  Silver alginate was initiated.    He developed a new wound in the anterior aspect of extremity due to trauma on 6/18/18.  This appeared to be partial-thickness. Mepilex Ag was started.  Patient presented on 7/2/2018 with a very tight dressing wrapped around his leg with an elastic wrap.  On 07/30/2018, patient was noted to have increased pain in his wound with enlargement of the wound. He was evaluated by Dr. Galeano who planned an angiogram as there was concern he has arterial ischemia causing decreased wound healing and worsening of the wound. The patient underwent angioplasy with DCB on 8/7/2018 of right SFA and Popliteal artery.  LINNEA performed 9/10/2018 was 0.92.  Wound had been progressing well.  Patient presented 01/28/2019 with pain in his right foot.  He has been experiencing pain with foot elevation and his pain has been markedly worse this past weekend.  He did remove the compression wraps due to pain. Attempts to obtain reliable Doppler signals and his posterior tibial and dorsalis pedis regions of the right foot were not successful.  Patient underwent PTA of a total occluded right superficial femoral artery, stenting of the right popliteal artery and PTA of an  occluded peroneal artery on 02/19/2019.  At least single-vessel runoff was established to the right foot.  LINNEA on 02/25/2019 was 0.86.   On 06/10/2019, the patient was noted to have green 10 on the drainage consistent with a Pseudomonas colonization.  There was a large amount of biofilm removed. There was a large amount of drainage and periwound wound skin maceration.  Mepilex Ag foam was initiated for improved drainage control with application of gentian violet.  Patient returns today 06/17/2019 with progression of the wound.  There is more slough present. He has edema in the leg.  Hesitant to implement compression due to patient's history of peripheral vascular disease.  Unable to obtain an LINNEA 06/17/2019.    Patient completed PTA of right superficial femoral, popliteal and posterior tibial arteries on 07/19/2019.         Current Assessment & Plan     Wound is already improving following PTA.  Continue debridement to maintain active phase wound healing.  Continue leg elevation.            Cardiac/Vascular    PAD (peripheral artery disease)    Overview     Status post PTA of right superficial femoral, popliteal and posterior tibial arteries on 07/19/2019.               See wound doc progress notes. Documents will be scanned.        Ahmet Hart  [facility]

## 2019-07-29 ENCOUNTER — OFFICE VISIT (OUTPATIENT)
Dept: WOUND CARE | Facility: HOSPITAL | Age: 54
End: 2019-07-29
Attending: SURGERY
Payer: MEDICARE

## 2019-07-29 VITALS — SYSTOLIC BLOOD PRESSURE: 142 MMHG | RESPIRATION RATE: 18 BRPM | DIASTOLIC BLOOD PRESSURE: 78 MMHG | HEART RATE: 84 BPM

## 2019-07-29 DIAGNOSIS — L97.812 NON-PRESSURE CHRONIC ULCER OF OTHER PART OF RIGHT LOWER LEG WITH FAT LAYER EXPOSED: ICD-10-CM

## 2019-07-29 PROCEDURE — 27201912 HC WOUND CARE DEBRIDEMENT SUPPLIES

## 2019-07-29 PROCEDURE — 99499 UNLISTED E&M SERVICE: CPT | Mod: ,,, | Performed by: SURGERY

## 2019-07-29 PROCEDURE — 99499 NO LOS: ICD-10-PCS | Mod: ,,, | Performed by: SURGERY

## 2019-07-29 PROCEDURE — 11042 DBRDMT SUBQ TIS 1ST 20SQCM/<: CPT

## 2019-07-29 PROCEDURE — 11045 DBRDMT SUBQ TISS EACH ADDL: CPT

## 2019-07-29 NOTE — ASSESSMENT & PLAN NOTE
Wound is starting to granulate.  There is decreased amount of slough.  There is still a fair amount of edema.  Patient came in today with no compression wrap.  Continue debridement is needed.  Continue compression for edema control

## 2019-07-29 NOTE — PROGRESS NOTES
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 Had home health but not improving. Exam showed necrotic skin/fat upon presentation. Had evaluation of extremity by [facility] in July 2017 showing decrease inflow. Wheelchair bound since 2009 s/p CVA. Patient underwent angiogram with angioplasty to improve arterial flow.  There is been significant improvement in the wound since angioplasty was performed.   Santyl was being utilized. Switch to promogram on 4/23/18.  On 5/21/2018, the patient had signs of Pseudomonas colonization.  Silver alginate was initiated.    He developed a new wound in the anterior aspect of extremity due to trauma on 6/18/18.  This appeared to be partial-thickness. Mepilex Ag was started.  Patient presented on 7/2/2018 with a very tight dressing wrapped around his leg with an elastic wrap.  On 07/30/2018, patient was noted to have increased pain in his wound with enlargement of the wound. He was evaluated by Dr. Galeano who planned an angiogram as there was concern he has arterial ischemia causing decreased wound healing and worsening of the wound. The patient underwent angioplasy with DCB on 8/7/2018 of right SFA and Popliteal artery.  LINNEA performed 9/10/2018 was 0.92.  Wound had been progressing well.  Patient presented 01/28/2019 with pain in his right foot.  He has been experiencing pain with foot elevation and his pain has been markedly worse this past weekend.  He did remove the compression wraps due to pain. Attempts to obtain reliable Doppler signals and his posterior tibial and dorsalis pedis regions of the right foot were not successful.  Patient underwent PTA of a total occluded right superficial femoral artery, stenting of the right popliteal artery and PTA of an occluded peroneal artery  on 02/19/2019.  At least single-vessel runoff was established to the right foot.  LINNEA on 02/25/2019 was 0.86.   On 06/10/2019, the patient was noted to have green 10 on the drainage consistent with a Pseudomonas colonization.  There was a large amount of biofilm removed. There was a large amount of drainage and periwound wound skin maceration.  Mepilex Ag foam was initiated for improved drainage control with application of gentian violet.  Patient returns today 06/17/2019 with progression of the wound.  There is more slough present. He has edema in the leg.  Hesitant to implement compression due to patient's history of peripheral vascular disease.  Unable to obtain an LINNEA 06/17/2019.    Patient completed PTA of right superficial femoral, popliteal and posterior tibial arteries on 07/19/2019.         Current Assessment & Plan     Wound is starting to granulate.  There is decreased amount of slough.  There is still a fair amount of edema.  Patient came in today with no compression wrap.  Continue debridement is needed.  Continue compression for edema control               See wound doc progress notes. Documents will be scanned.        Thompson MAN Mcdaniel  [facility]

## 2019-08-05 ENCOUNTER — OFFICE VISIT (OUTPATIENT)
Dept: WOUND CARE | Facility: HOSPITAL | Age: 54
End: 2019-08-05
Attending: SURGERY
Payer: MEDICARE

## 2019-08-05 VITALS — SYSTOLIC BLOOD PRESSURE: 103 MMHG | DIASTOLIC BLOOD PRESSURE: 70 MMHG | RESPIRATION RATE: 18 BRPM | HEART RATE: 70 BPM

## 2019-08-05 DIAGNOSIS — L97.812 NON-PRESSURE CHRONIC ULCER OF OTHER PART OF RIGHT LOWER LEG WITH FAT LAYER EXPOSED: ICD-10-CM

## 2019-08-05 PROCEDURE — 11042 DBRDMT SUBQ TIS 1ST 20SQCM/<: CPT

## 2019-08-05 PROCEDURE — 99499 UNLISTED E&M SERVICE: CPT | Mod: ,,, | Performed by: SURGERY

## 2019-08-05 PROCEDURE — 99499 NO LOS: ICD-10-PCS | Mod: ,,, | Performed by: SURGERY

## 2019-08-05 PROCEDURE — 27201912 HC WOUND CARE DEBRIDEMENT SUPPLIES

## 2019-08-05 NOTE — ASSESSMENT & PLAN NOTE
Wound showing improvement since angioplasty.  Edema is controlled.  Wound shows islands of granulation tissue.  There still remains moderate slough present. Continue spend agreed for edema control.  Continue current silver dressing. Continued sharp debridement is needed.

## 2019-08-05 NOTE — PROGRESS NOTES
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 Had home health but not improving. Exam showed necrotic skin/fat upon presentation. Had evaluation of extremity by [facility] in July 2017 showing decrease inflow. Wheelchair bound since 2009 s/p CVA. Patient underwent angiogram with angioplasty to improve arterial flow.  There is been significant improvement in the wound since angioplasty was performed.   Santyl was being utilized. Switch to promogram on 4/23/18.  On 5/21/2018, the patient had signs of Pseudomonas colonization.  Silver alginate was initiated.    He developed a new wound in the anterior aspect of extremity due to trauma on 6/18/18.  This appeared to be partial-thickness. Mepilex Ag was started.  Patient presented on 7/2/2018 with a very tight dressing wrapped around his leg with an elastic wrap.  On 07/30/2018, patient was noted to have increased pain in his wound with enlargement of the wound. He was evaluated by Dr. Galeano who planned an angiogram as there was concern he has arterial ischemia causing decreased wound healing and worsening of the wound. The patient underwent angioplasy with DCB on 8/7/2018 of right SFA and Popliteal artery.  LINNEA performed 9/10/2018 was 0.92.  Wound had been progressing well.  Patient presented 01/28/2019 with pain in his right foot.  He has been experiencing pain with foot elevation and his pain has been markedly worse this past weekend.  He did remove the compression wraps due to pain. Attempts to obtain reliable Doppler signals and his posterior tibial and dorsalis pedis regions of the right foot were not successful.  Patient underwent PTA of a total occluded right superficial femoral artery, stenting of the right popliteal artery and PTA of an occluded peroneal artery  on 02/19/2019.  At least single-vessel runoff was established to the right foot.  LINNEA on 02/25/2019 was 0.86.   On 06/10/2019, the patient was noted to have green 10 on the drainage consistent with a Pseudomonas colonization.  There was a large amount of biofilm removed. There was a large amount of drainage and periwound wound skin maceration.  Mepilex Ag foam was initiated for improved drainage control with application of gentian violet.  Patient returns today 06/17/2019 with progression of the wound.  There is more slough present. He has edema in the leg.  Hesitant to implement compression due to patient's history of peripheral vascular disease.  Unable to obtain an LINNEA 06/17/2019.    Patient completed PTA of right superficial femoral, popliteal and posterior tibial arteries on 07/19/2019.         Current Assessment & Plan     Wound showing improvement since angioplasty.  Edema is controlled.  Wound shows islands of granulation tissue.  There still remains moderate slough present. Continue spend agreed for edema control.  Continue current silver dressing. Continued sharp debridement is needed.               See wound doc progress notes. Documents will be scanned.        Thompson MAN Marino [facility]

## 2019-08-06 NOTE — PATIENT INSTRUCTIONS
Please see wound care instructions which have been provided separately.     
patient to tolerate meals and maintain weight

## 2019-08-12 ENCOUNTER — OFFICE VISIT (OUTPATIENT)
Dept: WOUND CARE | Facility: HOSPITAL | Age: 54
End: 2019-08-12
Attending: SURGERY
Payer: MEDICARE

## 2019-08-12 VITALS — HEART RATE: 64 BPM | RESPIRATION RATE: 20 BRPM | SYSTOLIC BLOOD PRESSURE: 113 MMHG | DIASTOLIC BLOOD PRESSURE: 75 MMHG

## 2019-08-12 DIAGNOSIS — L97.812 NON-PRESSURE CHRONIC ULCER OF OTHER PART OF RIGHT LOWER LEG WITH FAT LAYER EXPOSED: ICD-10-CM

## 2019-08-12 PROCEDURE — 27201912 HC WOUND CARE DEBRIDEMENT SUPPLIES

## 2019-08-12 PROCEDURE — 99499 UNLISTED E&M SERVICE: CPT | Mod: ,,, | Performed by: SURGERY

## 2019-08-12 PROCEDURE — 11042 DBRDMT SUBQ TIS 1ST 20SQCM/<: CPT

## 2019-08-12 PROCEDURE — 99499 NO LOS: ICD-10-PCS | Mod: ,,, | Performed by: SURGERY

## 2019-08-12 NOTE — PROGRESS NOTES
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 Had home health but not improving. Exam showed necrotic skin/fat upon presentation. Had evaluation of extremity by [facility] in July 2017 showing decrease inflow. Wheelchair bound since 2009 s/p CVA. Patient underwent angiogram with angioplasty to improve arterial flow.  There is been significant improvement in the wound since angioplasty was performed.   Santyl was being utilized. Switch to promogram on 4/23/18.  On 5/21/2018, the patient had signs of Pseudomonas colonization.  Silver alginate was initiated.    He developed a new wound in the anterior aspect of extremity due to trauma on 6/18/18.  This appeared to be partial-thickness. Mepilex Ag was started.  Patient presented on 7/2/2018 with a very tight dressing wrapped around his leg with an elastic wrap.  On 07/30/2018, patient was noted to have increased pain in his wound with enlargement of the wound. He was evaluated by Dr. Galeano who planned an angiogram as there was concern he has arterial ischemia causing decreased wound healing and worsening of the wound. The patient underwent angioplasy with DCB on 8/7/2018 of right SFA and Popliteal artery.  LINNEA performed 9/10/2018 was 0.92.  Wound had been progressing well.  Patient presented 01/28/2019 with pain in his right foot.  He has been experiencing pain with foot elevation and his pain has been markedly worse this past weekend.  He did remove the compression wraps due to pain. Attempts to obtain reliable Doppler signals and his posterior tibial and dorsalis pedis regions of the right foot were not successful.  Patient underwent PTA of a total occluded right superficial femoral artery, stenting of the right popliteal artery and PTA of an occluded peroneal artery  on 02/19/2019.  At least single-vessel runoff was established to the right foot.  LINNEA on 02/25/2019 was 0.86.   On 06/10/2019, the patient was noted to have green 10 on the drainage consistent with a Pseudomonas colonization.  There was a large amount of biofilm removed. There was a large amount of drainage and periwound wound skin maceration.  Mepilex Ag foam was initiated for improved drainage control with application of gentian violet.  Patient returns today 06/17/2019 with progression of the wound.  There is more slough present. He has edema in the leg.  Hesitant to implement compression due to patient's history of peripheral vascular disease.  Unable to obtain an LINNEA 06/17/2019.    Patient completed PTA of right superficial femoral, popliteal and posterior tibial arteries on 07/19/2019.         Current Assessment & Plan     Wound does measure larger today.  The wound bed however has decreased amount of slough.  There is several areas of granulation tissue and epithelialization noted wound edges.  Patient needs better edema control. Hesitant to wrap the patient's leg due to peripheral vascular disease.  Will apply a light spandi                See wound doc progress notes. Documents will be scanned.        Thompson MAN Marino [facility]

## 2019-08-12 NOTE — ASSESSMENT & PLAN NOTE
Wound does measure larger today.  The wound bed however has decreased amount of slough.  There is several areas of granulation tissue and epithelialization noted wound edges.  Patient needs better edema control. Hesitant to wrap the patient's leg due to peripheral vascular disease.  Will apply a light spandi

## 2019-08-19 ENCOUNTER — OFFICE VISIT (OUTPATIENT)
Dept: WOUND CARE | Facility: HOSPITAL | Age: 54
End: 2019-08-19
Attending: SURGERY
Payer: MEDICARE

## 2019-08-19 VITALS — SYSTOLIC BLOOD PRESSURE: 127 MMHG | DIASTOLIC BLOOD PRESSURE: 79 MMHG | HEART RATE: 67 BPM

## 2019-08-19 DIAGNOSIS — L97.812 NON-PRESSURE CHRONIC ULCER OF OTHER PART OF RIGHT LOWER LEG WITH FAT LAYER EXPOSED: Primary | ICD-10-CM

## 2019-08-19 PROCEDURE — 99499 UNLISTED E&M SERVICE: CPT | Mod: ,,, | Performed by: SURGERY

## 2019-08-19 PROCEDURE — 27201912 HC WOUND CARE DEBRIDEMENT SUPPLIES

## 2019-08-19 PROCEDURE — 11042 DBRDMT SUBQ TIS 1ST 20SQCM/<: CPT

## 2019-08-19 PROCEDURE — 99499 NO LOS: ICD-10-PCS | Mod: ,,, | Performed by: SURGERY

## 2019-08-19 NOTE — ASSESSMENT & PLAN NOTE
Wound continues to improve significantly.  Continue debridement to maintain active phase wound healing.  Repeat vascular evaluation if the wound regresses.

## 2019-08-19 NOTE — PROGRESS NOTES
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 Had home health but not improving. Exam showed necrotic skin/fat upon presentation. Had evaluation of extremity by [facility] in July 2017 showing decrease inflow. Wheelchair bound since 2009 s/p CVA. Patient underwent angiogram with angioplasty to improve arterial flow.  There is been significant improvement in the wound since angioplasty was performed.   Santyl was being utilized. Switch to promogram on 4/23/18.  On 5/21/2018, the patient had signs of Pseudomonas colonization.  Silver alginate was initiated.    He developed a new wound in the anterior aspect of extremity due to trauma on 6/18/18.  This appeared to be partial-thickness. Mepilex Ag was started.  Patient presented on 7/2/2018 with a very tight dressing wrapped around his leg with an elastic wrap.  On 07/30/2018, patient was noted to have increased pain in his wound with enlargement of the wound. He was evaluated by Dr. Galeano who planned an angiogram as there was concern he has arterial ischemia causing decreased wound healing and worsening of the wound. The patient underwent angioplasy with DCB on 8/7/2018 of right SFA and Popliteal artery.  LINNEA performed 9/10/2018 was 0.92.  Wound had been progressing well.  Patient presented 01/28/2019 with pain in his right foot.  He has been experiencing pain with foot elevation and his pain has been markedly worse this past weekend.  He did remove the compression wraps due to pain. Attempts to obtain reliable Doppler signals and his posterior tibial and dorsalis pedis regions of the right foot were not successful.  Patient underwent PTA of a total occluded right superficial femoral artery, stenting of the right popliteal artery and PTA of an  occluded peroneal artery on 02/19/2019.  At least single-vessel runoff was established to the right foot.  LINNEA on 02/25/2019 was 0.86.   On 06/10/2019, the patient was noted to have green 10 on the drainage consistent with a Pseudomonas colonization.  There was a large amount of biofilm removed. There was a large amount of drainage and periwound wound skin maceration.  Mepilex Ag foam was initiated for improved drainage control with application of gentian violet.  Patient returns today 06/17/2019 with progression of the wound.  There is more slough present. He has edema in the leg.  Hesitant to implement compression due to patient's history of peripheral vascular disease.  Unable to obtain an LINNEA 06/17/2019.    Patient completed PTA of right superficial femoral, popliteal and posterior tibial arteries on 07/19/2019.         Current Assessment & Plan     Wound continues to improve significantly.  Continue debridement to maintain active phase wound healing.  Repeat vascular evaluation if the wound regresses.               See wound doc progress notes. Documents will be scanned.        Ahmet BARRETO Hart  [facility]

## 2019-08-26 ENCOUNTER — OFFICE VISIT (OUTPATIENT)
Dept: WOUND CARE | Facility: HOSPITAL | Age: 54
End: 2019-08-26
Attending: SURGERY
Payer: MEDICARE

## 2019-08-26 VITALS — DIASTOLIC BLOOD PRESSURE: 76 MMHG | SYSTOLIC BLOOD PRESSURE: 144 MMHG | RESPIRATION RATE: 18 BRPM | HEART RATE: 72 BPM

## 2019-08-26 DIAGNOSIS — L97.812 NON-PRESSURE CHRONIC ULCER OF OTHER PART OF RIGHT LOWER LEG WITH FAT LAYER EXPOSED: Primary | ICD-10-CM

## 2019-08-26 PROCEDURE — 99499 UNLISTED E&M SERVICE: CPT | Mod: ,,, | Performed by: SURGERY

## 2019-08-26 PROCEDURE — 99499 NO LOS: ICD-10-PCS | Mod: ,,, | Performed by: SURGERY

## 2019-08-26 PROCEDURE — 27201912 HC WOUND CARE DEBRIDEMENT SUPPLIES

## 2019-08-26 PROCEDURE — 11042 DBRDMT SUBQ TIS 1ST 20SQCM/<: CPT

## 2019-08-26 NOTE — ASSESSMENT & PLAN NOTE
Wound doing extremely well and improving.  Continue debridement to maintain active phase wound healing.  Continue leg elevation and Tubigrip to control edema.

## 2019-08-26 NOTE — PROGRESS NOTES
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 Had home health but not improving. Exam showed necrotic skin/fat upon presentation. Had evaluation of extremity by [facility] in July 2017 showing decrease inflow. Wheelchair bound since 2009 s/p CVA. Patient underwent angiogram with angioplasty to improve arterial flow.  There is been significant improvement in the wound since angioplasty was performed.   Santyl was being utilized. Switch to promogram on 4/23/18.  On 5/21/2018, the patient had signs of Pseudomonas colonization.  Silver alginate was initiated.    He developed a new wound in the anterior aspect of extremity due to trauma on 6/18/18.  This appeared to be partial-thickness. Mepilex Ag was started.  Patient presented on 7/2/2018 with a very tight dressing wrapped around his leg with an elastic wrap.  On 07/30/2018, patient was noted to have increased pain in his wound with enlargement of the wound. He was evaluated by Dr. Galeano who planned an angiogram as there was concern he has arterial ischemia causing decreased wound healing and worsening of the wound. The patient underwent angioplasy with DCB on 8/7/2018 of right SFA and Popliteal artery.  LINNEA performed 9/10/2018 was 0.92.  Wound had been progressing well.  Patient presented 01/28/2019 with pain in his right foot.  He has been experiencing pain with foot elevation and his pain has been markedly worse this past weekend.  He did remove the compression wraps due to pain. Attempts to obtain reliable Doppler signals and his posterior tibial and dorsalis pedis regions of the right foot were not successful.  Patient underwent PTA of a total occluded right superficial femoral artery, stenting of the right popliteal artery and PTA of an  occluded peroneal artery on 02/19/2019.  At least single-vessel runoff was established to the right foot.  LINNEA on 02/25/2019 was 0.86.   On 06/10/2019, the patient was noted to have green 10 on the drainage consistent with a Pseudomonas colonization.  There was a large amount of biofilm removed. There was a large amount of drainage and periwound wound skin maceration.  Mepilex Ag foam was initiated for improved drainage control with application of gentian violet.  Patient returns today 06/17/2019 with progression of the wound.  There is more slough present. He has edema in the leg.  Hesitant to implement compression due to patient's history of peripheral vascular disease.  Unable to obtain an LINNEA 06/17/2019.    Patient completed PTA of right superficial femoral, popliteal and posterior tibial arteries on 07/19/2019.         Current Assessment & Plan     Wound doing extremely well and improving.  Continue debridement to maintain active phase wound healing.  Continue leg elevation and Tubigrip to control edema.               See wound doc progress notes. Documents will be scanned.        Ahmet Hart  [facility]

## 2019-09-09 ENCOUNTER — OFFICE VISIT (OUTPATIENT)
Dept: WOUND CARE | Facility: HOSPITAL | Age: 54
End: 2019-09-09
Attending: SURGERY
Payer: MEDICARE

## 2019-09-09 VITALS
RESPIRATION RATE: 20 BRPM | SYSTOLIC BLOOD PRESSURE: 106 MMHG | DIASTOLIC BLOOD PRESSURE: 69 MMHG | HEART RATE: 68 BPM | TEMPERATURE: 98 F

## 2019-09-09 DIAGNOSIS — L97.812 NON-PRESSURE CHRONIC ULCER OF OTHER PART OF RIGHT LOWER LEG WITH FAT LAYER EXPOSED: ICD-10-CM

## 2019-09-09 PROCEDURE — 27201912 HC WOUND CARE DEBRIDEMENT SUPPLIES

## 2019-09-09 PROCEDURE — 11042 DBRDMT SUBQ TIS 1ST 20SQCM/<: CPT

## 2019-09-09 PROCEDURE — 99499 NO LOS: ICD-10-PCS | Mod: ,,, | Performed by: SURGERY

## 2019-09-09 PROCEDURE — 99499 UNLISTED E&M SERVICE: CPT | Mod: ,,, | Performed by: SURGERY

## 2019-09-16 ENCOUNTER — OFFICE VISIT (OUTPATIENT)
Dept: WOUND CARE | Facility: HOSPITAL | Age: 54
End: 2019-09-16
Attending: SURGERY
Payer: MEDICARE

## 2019-09-16 VITALS — SYSTOLIC BLOOD PRESSURE: 137 MMHG | HEART RATE: 59 BPM | DIASTOLIC BLOOD PRESSURE: 79 MMHG

## 2019-09-16 DIAGNOSIS — L97.812 NON-PRESSURE CHRONIC ULCER OF OTHER PART OF RIGHT LOWER LEG WITH FAT LAYER EXPOSED: Primary | ICD-10-CM

## 2019-09-16 DIAGNOSIS — I73.9 PAD (PERIPHERAL ARTERY DISEASE): ICD-10-CM

## 2019-09-16 PROCEDURE — 99499 NO LOS: ICD-10-PCS | Mod: ,,, | Performed by: SURGERY

## 2019-09-16 PROCEDURE — 27201912 HC WOUND CARE DEBRIDEMENT SUPPLIES

## 2019-09-16 PROCEDURE — 11042 DBRDMT SUBQ TIS 1ST 20SQCM/<: CPT

## 2019-09-16 PROCEDURE — 99499 UNLISTED E&M SERVICE: CPT | Mod: ,,, | Performed by: SURGERY

## 2019-09-16 NOTE — ASSESSMENT & PLAN NOTE
Patient had significant maceration around the wound. Maggots were identified and removed.  Debridement nonviable tissue was performed to maintain active phase wound healing.  Measures for moisture controlled were initiated.  Patient instructed to maintain wound coverage.

## 2019-09-16 NOTE — PROGRESS NOTES
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 Had home health but not improving. Exam showed necrotic skin/fat upon presentation. Had evaluation of extremity by [facility] in July 2017 showing decrease inflow. Wheelchair bound since 2009 s/p CVA. Patient underwent angiogram with angioplasty to improve arterial flow.  There is been significant improvement in the wound since angioplasty was performed.   Santyl was being utilized. Switch to promogram on 4/23/18.  On 5/21/2018, the patient had signs of Pseudomonas colonization.  Silver alginate was initiated.    He developed a new wound in the anterior aspect of extremity due to trauma on 6/18/18.  This appeared to be partial-thickness. Mepilex Ag was started.  Patient presented on 7/2/2018 with a very tight dressing wrapped around his leg with an elastic wrap.  On 07/30/2018, patient was noted to have increased pain in his wound with enlargement of the wound. He was evaluated by Dr. Galeano who planned an angiogram as there was concern he has arterial ischemia causing decreased wound healing and worsening of the wound. The patient underwent angioplasy with DCB on 8/7/2018 of right SFA and Popliteal artery.  LINNEA performed 9/10/2018 was 0.92.  Wound had been progressing well.  Patient presented 01/28/2019 with pain in his right foot.  He has been experiencing pain with foot elevation and his pain has been markedly worse this past weekend.  He did remove the compression wraps due to pain. Attempts to obtain reliable Doppler signals and his posterior tibial and dorsalis pedis regions of the right foot were not successful.  Patient underwent PTA of a total occluded right superficial femoral artery, stenting of the right popliteal artery and PTA of an  occluded peroneal artery on 02/19/2019.  At least single-vessel runoff was established to the right foot.  LINNEA on 02/25/2019 was 0.86.   On 06/10/2019, the patient was noted to have green 10 on the drainage consistent with a Pseudomonas colonization.  There was a large amount of biofilm removed. There was a large amount of drainage and periwound wound skin maceration.  Mepilex Ag foam was initiated for improved drainage control with application of gentian violet.  Patient returns today 06/17/2019 with progression of the wound.  There is more slough present. He has edema in the leg.  Hesitant to implement compression due to patient's history of peripheral vascular disease.  Unable to obtain an LINNEA 06/17/2019.    Patient completed PTA of right superficial femoral, popliteal and posterior tibial arteries on 07/19/2019.  Patient was found to have maggots in his wound on the visit of 09/16/2019.         Current Assessment & Plan     Patient had significant maceration around the wound. Maggots were identified and removed.  Debridement nonviable tissue was performed to maintain active phase wound healing.  Measures for moisture controlled were initiated.  Patient instructed to maintain wound coverage.            Cardiac/Vascular    PAD (peripheral artery disease)    Overview     Status post PTA of right superficial femoral, popliteal and posterior tibial arteries on 07/19/2019.               See wound doc progress notes. Documents will be scanned.        Ahmet BARRETO Hart  [facility]

## 2019-09-23 ENCOUNTER — OFFICE VISIT (OUTPATIENT)
Dept: WOUND CARE | Facility: HOSPITAL | Age: 54
End: 2019-09-23
Attending: SURGERY
Payer: MEDICARE

## 2019-09-23 VITALS — SYSTOLIC BLOOD PRESSURE: 96 MMHG | HEART RATE: 68 BPM | DIASTOLIC BLOOD PRESSURE: 56 MMHG

## 2019-09-23 DIAGNOSIS — L97.812 NON-PRESSURE CHRONIC ULCER OF OTHER PART OF RIGHT LOWER LEG WITH FAT LAYER EXPOSED: ICD-10-CM

## 2019-09-23 PROCEDURE — 99499 NO LOS: ICD-10-PCS | Mod: ,,, | Performed by: SURGERY

## 2019-09-23 PROCEDURE — 11042 DBRDMT SUBQ TIS 1ST 20SQCM/<: CPT

## 2019-09-23 PROCEDURE — 99499 UNLISTED E&M SERVICE: CPT | Mod: ,,, | Performed by: SURGERY

## 2019-09-23 PROCEDURE — 27201912 HC WOUND CARE DEBRIDEMENT SUPPLIES

## 2019-09-23 NOTE — PROGRESS NOTES
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 Had home health but not improving. Exam showed necrotic skin/fat upon presentation. Had evaluation of extremity by [facility] in July 2017 showing decrease inflow. Wheelchair bound since 2009 s/p CVA. Patient underwent angiogram with angioplasty to improve arterial flow.  There is been significant improvement in the wound since angioplasty was performed.   Santyl was being utilized. Switch to promogram on 4/23/18.  On 5/21/2018, the patient had signs of Pseudomonas colonization.  Silver alginate was initiated.    He developed a new wound in the anterior aspect of extremity due to trauma on 6/18/18.  This appeared to be partial-thickness. Mepilex Ag was started.  Patient presented on 7/2/2018 with a very tight dressing wrapped around his leg with an elastic wrap.  On 07/30/2018, patient was noted to have increased pain in his wound with enlargement of the wound. He was evaluated by Dr. Galeano who planned an angiogram as there was concern he has arterial ischemia causing decreased wound healing and worsening of the wound. The patient underwent angioplasy with DCB on 8/7/2018 of right SFA and Popliteal artery.  LINNEA performed 9/10/2018 was 0.92.  Wound had been progressing well.  Patient presented 01/28/2019 with pain in his right foot.  He has been experiencing pain with foot elevation and his pain has been markedly worse this past weekend.  He did remove the compression wraps due to pain. Attempts to obtain reliable Doppler signals and his posterior tibial and dorsalis pedis regions of the right foot were not successful.  Patient underwent PTA of a total occluded right superficial femoral artery, stenting of the right popliteal artery and PTA of an occluded peroneal artery  on 02/19/2019.  At least single-vessel runoff was established to the right foot.  LINNEA on 02/25/2019 was 0.86.   On 06/10/2019, the patient was noted to have green 10 on the drainage consistent with a Pseudomonas colonization.  There was a large amount of biofilm removed. There was a large amount of drainage and periwound wound skin maceration.  Mepilex Ag foam was initiated for improved drainage control with application of gentian violet.  Patient returns today 06/17/2019 with progression of the wound.  There is more slough present. He has edema in the leg.  Hesitant to implement compression due to patient's history of peripheral vascular disease.  Unable to obtain an LINNEA 06/17/2019.    Patient completed PTA of right superficial femoral, popliteal and posterior tibial arteries on 07/19/2019.  Patient was found to have maggots in his wound on the visit of 09/16/2019.         Current Assessment & Plan     Wound is improved since most recent vascular intervention.  The wound bed is mostly granulating.  There is minimal slough present.  Edema is fairly well controlled.  Continue current management.               See wound doc progress notes. Documents will be scanned.        Thompson MAN Mcdaniel  [facility]

## 2019-09-25 ENCOUNTER — PATIENT OUTREACH (OUTPATIENT)
Dept: ADMINISTRATIVE | Facility: HOSPITAL | Age: 54
End: 2019-09-25

## 2019-09-30 ENCOUNTER — OFFICE VISIT (OUTPATIENT)
Dept: WOUND CARE | Facility: HOSPITAL | Age: 54
End: 2019-09-30
Attending: SURGERY
Payer: MEDICARE

## 2019-09-30 VITALS
RESPIRATION RATE: 20 BRPM | TEMPERATURE: 98 F | DIASTOLIC BLOOD PRESSURE: 66 MMHG | SYSTOLIC BLOOD PRESSURE: 98 MMHG | HEART RATE: 64 BPM

## 2019-09-30 DIAGNOSIS — L97.812 NON-PRESSURE CHRONIC ULCER OF OTHER PART OF RIGHT LOWER LEG WITH FAT LAYER EXPOSED: Primary | ICD-10-CM

## 2019-09-30 DIAGNOSIS — I73.9 PAD (PERIPHERAL ARTERY DISEASE): ICD-10-CM

## 2019-09-30 PROCEDURE — 99499 UNLISTED E&M SERVICE: CPT | Mod: ,,, | Performed by: SURGERY

## 2019-09-30 PROCEDURE — 11042 DBRDMT SUBQ TIS 1ST 20SQCM/<: CPT

## 2019-09-30 PROCEDURE — 99499 NO LOS: ICD-10-PCS | Mod: ,,, | Performed by: SURGERY

## 2019-09-30 PROCEDURE — 27201912 HC WOUND CARE DEBRIDEMENT SUPPLIES

## 2019-09-30 NOTE — ASSESSMENT & PLAN NOTE
Wound improving.  Continue debridement to maintain active phase of wound healing.  Continue close monitoring of blood flow.

## 2019-09-30 NOTE — PROGRESS NOTES
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 Had home health but not improving. Exam showed necrotic skin/fat upon presentation. Had evaluation of extremity by [facility] in July 2017 showing decrease inflow. Wheelchair bound since 2009 s/p CVA. Patient underwent angiogram with angioplasty to improve arterial flow.  There is been significant improvement in the wound since angioplasty was performed.   Santyl was being utilized. Switch to promogram on 4/23/18.  On 5/21/2018, the patient had signs of Pseudomonas colonization.  Silver alginate was initiated.    He developed a new wound in the anterior aspect of extremity due to trauma on 6/18/18.  This appeared to be partial-thickness. Mepilex Ag was started.  Patient presented on 7/2/2018 with a very tight dressing wrapped around his leg with an elastic wrap.  On 07/30/2018, patient was noted to have increased pain in his wound with enlargement of the wound. He was evaluated by Dr. Galeano who planned an angiogram as there was concern he has arterial ischemia causing decreased wound healing and worsening of the wound. The patient underwent angioplasy with DCB on 8/7/2018 of right SFA and Popliteal artery.  LINNEA performed 9/10/2018 was 0.92.  Wound had been progressing well.  Patient presented 01/28/2019 with pain in his right foot.  He has been experiencing pain with foot elevation and his pain has been markedly worse this past weekend.  He did remove the compression wraps due to pain. Attempts to obtain reliable Doppler signals and his posterior tibial and dorsalis pedis regions of the right foot were not successful.  Patient underwent PTA of a total occluded right superficial femoral artery, stenting of the right popliteal artery and PTA of an  occluded peroneal artery on 02/19/2019.  At least single-vessel runoff was established to the right foot.  LINNEA on 02/25/2019 was 0.86.   On 06/10/2019, the patient was noted to have green 10 on the drainage consistent with a Pseudomonas colonization.  There was a large amount of biofilm removed. There was a large amount of drainage and periwound wound skin maceration.  Mepilex Ag foam was initiated for improved drainage control with application of gentian violet.  Patient returns today 06/17/2019 with progression of the wound.  There is more slough present. He has edema in the leg.  Hesitant to implement compression due to patient's history of peripheral vascular disease.  Unable to obtain an LINNEA 06/17/2019.    Patient completed PTA of right superficial femoral, popliteal and posterior tibial arteries on 07/19/2019.  Patient was found to have maggots in his wound on the visit of 09/16/2019.  LINNEA right DP artery 0.84 on 9/30/19.         Current Assessment & Plan     Wound improving.  Continue debridement to maintain active phase of wound healing.  Continue close monitoring of blood flow.            Cardiac/Vascular    PAD (peripheral artery disease)    Overview     Status post PTA of right superficial femoral, popliteal and posterior tibial arteries on 07/19/2019.               See wound doc progress notes. Documents will be scanned.        Ahmet BARRETO Hart  [facility]

## 2019-10-03 PROBLEM — N17.9 AKI (ACUTE KIDNEY INJURY): Status: ACTIVE | Noted: 2019-10-03

## 2019-10-04 PROBLEM — I82.411 ACUTE DEEP VEIN THROMBOSIS (DVT) OF FEMORAL VEIN OF RIGHT LOWER EXTREMITY: Status: ACTIVE | Noted: 2019-10-04

## 2019-10-04 PROBLEM — D72.829 LEUKOCYTOSIS: Status: ACTIVE | Noted: 2019-10-04

## 2019-10-04 PROBLEM — R53.1 WEAKNESS GENERALIZED: Status: ACTIVE | Noted: 2019-10-04

## 2019-10-05 PROBLEM — E86.0 DEHYDRATION: Status: ACTIVE | Noted: 2019-10-05

## 2019-10-07 PROBLEM — D72.829 LEUKOCYTOSIS: Status: RESOLVED | Noted: 2019-10-04 | Resolved: 2019-10-07

## 2019-10-07 NOTE — PROGRESS NOTES
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 Had home health but not improving. Exam showed necrotic skin/fat upon presentation. Had evaluation of extremity by [facility] in July 2017 showing decrease inflow. Wheelchair bound since 2009 s/p CVA. Patient underwent angiogram with angioplasty to improve arterial flow.  There is been significant improvement in the wound since angioplasty was performed.   Santyl was being utilized. Switch to promogram on 4/23/18.  On 5/21/2018, the patient had signs of Pseudomonas colonization.  Silver alginate was initiated.    He developed a new wound in the anterior aspect of extremity due to trauma on 6/18/18.  This appeared to be partial-thickness. Mepilex Ag was started.  Patient presented on 7/2/2018 with a very tight dressing wrapped around his leg with an elastic wrap.  On 07/30/2018, patient was noted to have increased pain in his wound with enlargement of the wound. He was evaluated by Dr. Galeano who planned an angiogram as there was concern he has arterial ischemia causing decreased wound healing and worsening of the wound. The patient underwent angioplasy with DCB on 8/7/2018 of right SFA and Popliteal artery.  LINNEA performed 9/10/2018 was 0.92.  Wound had been progressing well.  Patient presented 01/28/2019 with pain in his right foot.  He has been experiencing pain with foot elevation and his pain has been markedly worse this past weekend.  He did remove the compression wraps due to pain. Attempts to obtain reliable Doppler signals and his posterior tibial and dorsalis pedis regions of the right foot were not successful.  Patient underwent PTA of a total occluded right superficial femoral artery, stenting of the right popliteal artery and PTA of an occluded peroneal artery  on 02/19/2019.  At least single-vessel runoff was established to the right foot.  LINNEA on 02/25/2019 was 0.86.   On 06/10/2019, the patient was noted to have green 10 on the drainage consistent with a Pseudomonas colonization.  There was a large amount of biofilm removed. There was a large amount of drainage and periwound wound skin maceration.  Mepilex Ag foam was initiated for improved drainage control with application of gentian violet.  Patient returns today 06/17/2019 with progression of the wound.  There is more slough present. He has edema in the leg.  Hesitant to implement compression due to patient's history of peripheral vascular disease.  Unable to obtain an LINNEA 06/17/2019.    Patient completed PTA of right superficial femoral, popliteal and posterior tibial arteries on 07/19/2019.  Patient was found to have maggots in his wound on the visit of 09/16/2019.  LINNEA right DP artery 0.84 on 9/30/19.               See wound doc progress notes. Documents will be scanned.        Thompson MAN Mcdaniel  [facility]

## 2019-10-09 ENCOUNTER — OFFICE VISIT (OUTPATIENT)
Dept: FAMILY MEDICINE | Facility: CLINIC | Age: 54
End: 2019-10-09
Attending: FAMILY MEDICINE
Payer: MEDICARE

## 2019-10-09 VITALS
WEIGHT: 186.75 LBS | TEMPERATURE: 99 F | DIASTOLIC BLOOD PRESSURE: 56 MMHG | SYSTOLIC BLOOD PRESSURE: 92 MMHG | HEIGHT: 65 IN | HEART RATE: 70 BPM | BODY MASS INDEX: 31.11 KG/M2 | OXYGEN SATURATION: 97 %

## 2019-10-09 DIAGNOSIS — L97.511 DIABETIC ULCER OF TOE OF RIGHT FOOT ASSOCIATED WITH DIABETES MELLITUS DUE TO UNDERLYING CONDITION, LIMITED TO BREAKDOWN OF SKIN: ICD-10-CM

## 2019-10-09 DIAGNOSIS — L97.819 VENOUS STASIS ULCER OF OTHER PART OF RIGHT LOWER LEG, UNSPECIFIED ULCER STAGE, UNSPECIFIED WHETHER VARICOSE VEINS PRESENT: ICD-10-CM

## 2019-10-09 DIAGNOSIS — I25.10 CORONARY ARTERY DISEASE INVOLVING NATIVE CORONARY ARTERY WITHOUT ANGINA PECTORIS, UNSPECIFIED WHETHER NATIVE OR TRANSPLANTED HEART: Chronic | ICD-10-CM

## 2019-10-09 DIAGNOSIS — E08.621 DIABETIC ULCER OF TOE OF RIGHT FOOT ASSOCIATED WITH DIABETES MELLITUS DUE TO UNDERLYING CONDITION, LIMITED TO BREAKDOWN OF SKIN: ICD-10-CM

## 2019-10-09 DIAGNOSIS — I69.959 HEMIPLEGIA OF NONDOMINANT SIDE, LATE EFFECT OF CEREBROVASCULAR DISEASE: ICD-10-CM

## 2019-10-09 DIAGNOSIS — E11.8 TYPE 2 DIABETES MELLITUS WITH COMPLICATION, WITHOUT LONG-TERM CURRENT USE OF INSULIN: Chronic | ICD-10-CM

## 2019-10-09 DIAGNOSIS — I10 ESSENTIAL HYPERTENSION: Chronic | ICD-10-CM

## 2019-10-09 DIAGNOSIS — Z09 HOSPITAL DISCHARGE FOLLOW-UP: Primary | ICD-10-CM

## 2019-10-09 DIAGNOSIS — I83.018 VENOUS STASIS ULCER OF OTHER PART OF RIGHT LOWER LEG, UNSPECIFIED ULCER STAGE, UNSPECIFIED WHETHER VARICOSE VEINS PRESENT: ICD-10-CM

## 2019-10-09 DIAGNOSIS — I73.9 PAD (PERIPHERAL ARTERY DISEASE): ICD-10-CM

## 2019-10-09 PROCEDURE — 99999 PR PBB SHADOW E&M-EST. PATIENT-LVL IV: ICD-10-PCS | Mod: PBBFAC,,, | Performed by: FAMILY MEDICINE

## 2019-10-09 PROCEDURE — 99496 TRANSITIONAL CARE MANAGE SERVICE 7 DAY DISCHARGE: ICD-10-PCS | Mod: S$PBB,,, | Performed by: FAMILY MEDICINE

## 2019-10-09 PROCEDURE — 99999 PR PBB SHADOW E&M-EST. PATIENT-LVL IV: CPT | Mod: PBBFAC,,, | Performed by: FAMILY MEDICINE

## 2019-10-09 PROCEDURE — 99496 TRANSJ CARE MGMT HIGH F2F 7D: CPT | Mod: PBBFAC,PO | Performed by: FAMILY MEDICINE

## 2019-10-09 PROCEDURE — 99214 OFFICE O/P EST MOD 30 MIN: CPT | Mod: PBBFAC,PO | Performed by: FAMILY MEDICINE

## 2019-10-09 PROCEDURE — 99496 TRANSJ CARE MGMT HIGH F2F 7D: CPT | Mod: S$PBB,,, | Performed by: FAMILY MEDICINE

## 2019-10-09 RX ORDER — ASPIRIN 81 MG/1
81 TABLET ORAL DAILY
COMMUNITY
End: 2020-01-22

## 2019-10-14 ENCOUNTER — OFFICE VISIT (OUTPATIENT)
Dept: WOUND CARE | Facility: HOSPITAL | Age: 54
End: 2019-10-14
Attending: SURGERY
Payer: MEDICARE

## 2019-10-14 VITALS
DIASTOLIC BLOOD PRESSURE: 82 MMHG | RESPIRATION RATE: 20 BRPM | TEMPERATURE: 98 F | SYSTOLIC BLOOD PRESSURE: 116 MMHG | HEART RATE: 68 BPM

## 2019-10-14 DIAGNOSIS — I73.9 PAD (PERIPHERAL ARTERY DISEASE): ICD-10-CM

## 2019-10-14 DIAGNOSIS — L97.812 NON-PRESSURE CHRONIC ULCER OF OTHER PART OF RIGHT LOWER LEG WITH FAT LAYER EXPOSED: Primary | ICD-10-CM

## 2019-10-14 PROCEDURE — 27201912 HC WOUND CARE DEBRIDEMENT SUPPLIES

## 2019-10-14 PROCEDURE — 99499 NO LOS: ICD-10-PCS | Mod: ,,, | Performed by: SURGERY

## 2019-10-14 PROCEDURE — 11042 DBRDMT SUBQ TIS 1ST 20SQCM/<: CPT

## 2019-10-14 PROCEDURE — 99499 UNLISTED E&M SERVICE: CPT | Mod: ,,, | Performed by: SURGERY

## 2019-10-14 NOTE — PROGRESS NOTES
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 Had home health but not improving. Exam showed necrotic skin/fat upon presentation. Had evaluation of extremity by [facility] in July 2017 showing decrease inflow. Wheelchair bound since 2009 s/p CVA. Patient underwent angiogram with angioplasty to improve arterial flow.  There is been significant improvement in the wound since angioplasty was performed.   Santyl was being utilized. Switch to promogram on 4/23/18.  On 5/21/2018, the patient had signs of Pseudomonas colonization.  Silver alginate was initiated.    He developed a new wound in the anterior aspect of extremity due to trauma on 6/18/18.  This appeared to be partial-thickness. Mepilex Ag was started.  Patient presented on 7/2/2018 with a very tight dressing wrapped around his leg with an elastic wrap.  On 07/30/2018, patient was noted to have increased pain in his wound with enlargement of the wound. He was evaluated by Dr. Galeano who planned an angiogram as there was concern he has arterial ischemia causing decreased wound healing and worsening of the wound. The patient underwent angioplasy with DCB on 8/7/2018 of right SFA and Popliteal artery.  LINNEA performed 9/10/2018 was 0.92.  Wound had been progressing well.  Patient presented 01/28/2019 with pain in his right foot.  He has been experiencing pain with foot elevation and his pain has been markedly worse this past weekend.  He did remove the compression wraps due to pain. Attempts to obtain reliable Doppler signals and his posterior tibial and dorsalis pedis regions of the right foot were not successful.  Patient underwent PTA of a total occluded right superficial femoral artery, stenting of the right popliteal artery and PTA of an  occluded peroneal artery on 02/19/2019.  At least single-vessel runoff was established to the right foot.  LINNEA on 02/25/2019 was 0.86.   On 06/10/2019, the patient was noted to have green 10 on the drainage consistent with a Pseudomonas colonization.  There was a large amount of biofilm removed. There was a large amount of drainage and periwound wound skin maceration.  Mepilex Ag foam was initiated for improved drainage control with application of gentian violet.  Patient returns today 06/17/2019 with progression of the wound.  There is more slough present. He has edema in the leg.  Hesitant to implement compression due to patient's history of peripheral vascular disease.  Unable to obtain an LINNEA 06/17/2019.    Patient completed PTA of right superficial femoral, popliteal and posterior tibial arteries on 07/19/2019.  Patient was found to have maggots in his wound on the visit of 09/16/2019.  LINNEA right DP artery 0.84 on 9/30/19.  Patient was hospitalized week of 10/07/2019 due to cellulitis of the right leg.  He developed significant swelling of the leg and redness extending into the thigh.  He was treated with IV antibiotics.  Redness and swelling have resolved.         Current Assessment & Plan     Wound is improving.  Recent bout of cellulitis has resolved.  Continue debridement to maintain active phase wound healing.  Continue leg elevation.  Monitor closely for any symptoms of ischemia.            Cardiac/Vascular    PAD (peripheral artery disease)    Overview     Status post PTA of right superficial femoral, popliteal and posterior tibial arteries on 07/19/2019.               See wound doc progress notes. Documents will be scanned.        Ahmet BARRETO Hart  [facility]

## 2019-10-14 NOTE — ASSESSMENT & PLAN NOTE
Wound is improving.  Recent bout of cellulitis has resolved.  Continue debridement to maintain active phase wound healing.  Continue leg elevation.  Monitor closely for any symptoms of ischemia.

## 2019-10-15 NOTE — TELEPHONE ENCOUNTER
----- Message from Ness Mccord sent at 10/15/2019 10:31 AM CDT -----  Contact: [facility]/ [address] [phone]  Gerda is requesting to speak with you regarding pantoprazole (PROTONIX) 40 MG tablet. Pt was told by pharmacy that Dr. Olguin had discontinued this medication. Please advise.

## 2019-10-16 RX ORDER — PANTOPRAZOLE SODIUM 40 MG/1
40 TABLET, DELAYED RELEASE ORAL DAILY
Qty: 90 TABLET | Refills: 1 | Status: SHIPPED | OUTPATIENT
Start: 2019-10-16 | End: 2020-04-12 | Stop reason: SDUPTHER

## 2019-10-21 ENCOUNTER — OFFICE VISIT (OUTPATIENT)
Dept: WOUND CARE | Facility: HOSPITAL | Age: 54
End: 2019-10-21
Attending: SURGERY
Payer: MEDICARE

## 2019-10-21 VITALS
DIASTOLIC BLOOD PRESSURE: 78 MMHG | HEART RATE: 72 BPM | SYSTOLIC BLOOD PRESSURE: 142 MMHG | TEMPERATURE: 98 F | RESPIRATION RATE: 20 BRPM

## 2019-10-21 DIAGNOSIS — L97.812 NON-PRESSURE CHRONIC ULCER OF OTHER PART OF RIGHT LOWER LEG WITH FAT LAYER EXPOSED: ICD-10-CM

## 2019-10-21 PROCEDURE — 27201912 HC WOUND CARE DEBRIDEMENT SUPPLIES

## 2019-10-21 PROCEDURE — 99499 NO LOS: ICD-10-PCS | Mod: ,,, | Performed by: SURGERY

## 2019-10-21 PROCEDURE — 99499 UNLISTED E&M SERVICE: CPT | Mod: ,,, | Performed by: SURGERY

## 2019-10-21 PROCEDURE — 11042 DBRDMT SUBQ TIS 1ST 20SQCM/<: CPT

## 2019-10-21 NOTE — ASSESSMENT & PLAN NOTE
Wound continues to improve.  Wound is mostly granulating.  There is epithelialization at the wound edges.  There is minimal slough present.  Edema is fairly well controlled.  There is no sign of cellulitis.  Continue elevation and current wound management

## 2019-10-21 NOTE — PROGRESS NOTES
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 Had home health but not improving. Exam showed necrotic skin/fat upon presentation. Had evaluation of extremity by [facility] in July 2017 showing decrease inflow. Wheelchair bound since 2009 s/p CVA. Patient underwent angiogram with angioplasty to improve arterial flow.  There is been significant improvement in the wound since angioplasty was performed.   Santyl was being utilized. Switch to promogram on 4/23/18.  On 5/21/2018, the patient had signs of Pseudomonas colonization.  Silver alginate was initiated.    He developed a new wound in the anterior aspect of extremity due to trauma on 6/18/18.  This appeared to be partial-thickness. Mepilex Ag was started.  Patient presented on 7/2/2018 with a very tight dressing wrapped around his leg with an elastic wrap.  On 07/30/2018, patient was noted to have increased pain in his wound with enlargement of the wound. He was evaluated by Dr. Galeano who planned an angiogram as there was concern he has arterial ischemia causing decreased wound healing and worsening of the wound. The patient underwent angioplasy with DCB on 8/7/2018 of right SFA and Popliteal artery.  LINNEA performed 9/10/2018 was 0.92.  Wound had been progressing well.  Patient presented 01/28/2019 with pain in his right foot.  He has been experiencing pain with foot elevation and his pain has been markedly worse this past weekend.  He did remove the compression wraps due to pain. Attempts to obtain reliable Doppler signals and his posterior tibial and dorsalis pedis regions of the right foot were not successful.  Patient underwent PTA of a total occluded right superficial femoral artery, stenting of the right popliteal artery and PTA of an occluded peroneal artery  on 02/19/2019.  At least single-vessel runoff was established to the right foot.  LINNEA on 02/25/2019 was 0.86.   On 06/10/2019, the patient was noted to have green 10 on the drainage consistent with a Pseudomonas colonization.  There was a large amount of biofilm removed. There was a large amount of drainage and periwound wound skin maceration.  Mepilex Ag foam was initiated for improved drainage control with application of gentian violet.  Patient returns today 06/17/2019 with progression of the wound.  There is more slough present. He has edema in the leg.  Hesitant to implement compression due to patient's history of peripheral vascular disease.  Unable to obtain an LINNEA 06/17/2019.    Patient completed PTA of right superficial femoral, popliteal and posterior tibial arteries on 07/19/2019.  Patient was found to have maggots in his wound on the visit of 09/16/2019.  LINNEA right DP artery 0.84 on 9/30/19.  Patient was hospitalized week of 10/07/2019 due to cellulitis of the right leg.  He developed significant swelling of the leg and redness extending into the thigh.  He was treated with IV antibiotics.  Redness and swelling have resolved.         Current Assessment & Plan     Wound continues to improve.  Wound is mostly granulating.  There is epithelialization at the wound edges.  There is minimal slough present.  Edema is fairly well controlled.  There is no sign of cellulitis.  Continue elevation and current wound management               See wound doc progress notes. Documents will be scanned.        Thompson MAN Mcdaniel  [facility]

## 2019-10-28 ENCOUNTER — OFFICE VISIT (OUTPATIENT)
Dept: WOUND CARE | Facility: HOSPITAL | Age: 54
End: 2019-10-28
Attending: SURGERY
Payer: MEDICARE

## 2019-10-28 VITALS
TEMPERATURE: 98 F | DIASTOLIC BLOOD PRESSURE: 73 MMHG | SYSTOLIC BLOOD PRESSURE: 127 MMHG | RESPIRATION RATE: 20 BRPM | HEART RATE: 90 BPM

## 2019-10-28 DIAGNOSIS — I73.9 PAD (PERIPHERAL ARTERY DISEASE): ICD-10-CM

## 2019-10-28 DIAGNOSIS — L97.812 NON-PRESSURE CHRONIC ULCER OF OTHER PART OF RIGHT LOWER LEG WITH FAT LAYER EXPOSED: Primary | ICD-10-CM

## 2019-10-28 PROCEDURE — 99499 NO LOS: ICD-10-PCS | Mod: ,,, | Performed by: SURGERY

## 2019-10-28 PROCEDURE — 11042 DBRDMT SUBQ TIS 1ST 20SQCM/<: CPT

## 2019-10-28 PROCEDURE — 27201912 HC WOUND CARE DEBRIDEMENT SUPPLIES

## 2019-10-28 PROCEDURE — 99499 UNLISTED E&M SERVICE: CPT | Mod: ,,, | Performed by: SURGERY

## 2019-10-28 NOTE — PROGRESS NOTES
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 Had home health but not improving. Exam showed necrotic skin/fat upon presentation. Had evaluation of extremity by [facility] in July 2017 showing decrease inflow. Wheelchair bound since 2009 s/p CVA. Patient underwent angiogram with angioplasty to improve arterial flow.  There is been significant improvement in the wound since angioplasty was performed.   Santyl was being utilized. Switch to promogram on 4/23/18.  On 5/21/2018, the patient had signs of Pseudomonas colonization.  Silver alginate was initiated.    He developed a new wound in the anterior aspect of extremity due to trauma on 6/18/18.  This appeared to be partial-thickness. Mepilex Ag was started.  Patient presented on 7/2/2018 with a very tight dressing wrapped around his leg with an elastic wrap.  On 07/30/2018, patient was noted to have increased pain in his wound with enlargement of the wound. He was evaluated by Dr. Galeano who planned an angiogram as there was concern he has arterial ischemia causing decreased wound healing and worsening of the wound. The patient underwent angioplasy with DCB on 8/7/2018 of right SFA and Popliteal artery.  LINNEA performed 9/10/2018 was 0.92.  Wound had been progressing well.  Patient presented 01/28/2019 with pain in his right foot.  He has been experiencing pain with foot elevation and his pain has been markedly worse this past weekend.  He did remove the compression wraps due to pain. Attempts to obtain reliable Doppler signals and his posterior tibial and dorsalis pedis regions of the right foot were not successful.  Patient underwent PTA of a total occluded right superficial femoral artery, stenting of the right popliteal artery and PTA of an  occluded peroneal artery on 02/19/2019.  At least single-vessel runoff was established to the right foot.  LINNEA on 02/25/2019 was 0.86.   On 06/10/2019, the patient was noted to have green 10 on the drainage consistent with a Pseudomonas colonization.  There was a large amount of biofilm removed. There was a large amount of drainage and periwound wound skin maceration.  Mepilex Ag foam was initiated for improved drainage control with application of gentian violet.  Patient returns today 06/17/2019 with progression of the wound.  There is more slough present. He has edema in the leg.  Hesitant to implement compression due to patient's history of peripheral vascular disease.  Unable to obtain an LINNEA 06/17/2019.    Patient completed PTA of right superficial femoral, popliteal and posterior tibial arteries on 07/19/2019.  Patient was found to have maggots in his wound on the visit of 09/16/2019.  LINNEA right DP artery 0.84 on 9/30/19.  Patient was hospitalized week of 10/07/2019 due to cellulitis of the right leg.  He developed significant swelling of the leg and redness extending into the thigh.  He was treated with IV antibiotics.  Redness and swelling have resolved.         Current Assessment & Plan     Wound continues to improve.  Continue debridement to maintain active phase wound healing.  Continue leg elevation.            Cardiac/Vascular    PAD (peripheral artery disease)    Overview     Status post PTA of right superficial femoral, popliteal and posterior tibial arteries on 07/19/2019.               See wound doc progress notes. Documents will be scanned.        Ahmet BARRETO Hart  [facility]

## 2019-10-28 NOTE — ASSESSMENT & PLAN NOTE
Wound continues to improve.  Continue debridement to maintain active phase wound healing.  Continue leg elevation.

## 2019-11-04 ENCOUNTER — OFFICE VISIT (OUTPATIENT)
Dept: WOUND CARE | Facility: HOSPITAL | Age: 54
End: 2019-11-04
Attending: SURGERY
Payer: MEDICARE

## 2019-11-04 VITALS — SYSTOLIC BLOOD PRESSURE: 124 MMHG | HEART RATE: 65 BPM | DIASTOLIC BLOOD PRESSURE: 78 MMHG | TEMPERATURE: 97 F

## 2019-11-04 DIAGNOSIS — L97.812 NON-PRESSURE CHRONIC ULCER OF OTHER PART OF RIGHT LOWER LEG WITH FAT LAYER EXPOSED: ICD-10-CM

## 2019-11-04 PROCEDURE — 27201912 HC WOUND CARE DEBRIDEMENT SUPPLIES

## 2019-11-04 PROCEDURE — 99499 NO LOS: ICD-10-PCS | Mod: ,,, | Performed by: SURGERY

## 2019-11-04 PROCEDURE — 11042 DBRDMT SUBQ TIS 1ST 20SQCM/<: CPT

## 2019-11-04 PROCEDURE — 99499 UNLISTED E&M SERVICE: CPT | Mod: ,,, | Performed by: SURGERY

## 2019-11-04 NOTE — PROGRESS NOTES
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 Had home health but not improving. Exam showed necrotic skin/fat upon presentation. Had evaluation of extremity by [facility] in July 2017 showing decrease inflow. Wheelchair bound since 2009 s/p CVA. Patient underwent angiogram with angioplasty to improve arterial flow.  There is been significant improvement in the wound since angioplasty was performed.   Santyl was being utilized. Switch to promogram on 4/23/18.  On 5/21/2018, the patient had signs of Pseudomonas colonization.  Silver alginate was initiated.    He developed a new wound in the anterior aspect of extremity due to trauma on 6/18/18.  This appeared to be partial-thickness. Mepilex Ag was started.  Patient presented on 7/2/2018 with a very tight dressing wrapped around his leg with an elastic wrap.  On 07/30/2018, patient was noted to have increased pain in his wound with enlargement of the wound. He was evaluated by Dr. Galeano who planned an angiogram as there was concern he has arterial ischemia causing decreased wound healing and worsening of the wound. The patient underwent angioplasy with DCB on 8/7/2018 of right SFA and Popliteal artery.  LINNEA performed 9/10/2018 was 0.92.  Wound had been progressing well.  Patient presented 01/28/2019 with pain in his right foot.  He has been experiencing pain with foot elevation and his pain has been markedly worse this past weekend.  He did remove the compression wraps due to pain. Attempts to obtain reliable Doppler signals and his posterior tibial and dorsalis pedis regions of the right foot were not successful.  Patient underwent PTA of a total occluded right superficial femoral artery, stenting of the right popliteal artery and PTA of an occluded peroneal artery  on 02/19/2019.  At least single-vessel runoff was established to the right foot.  LINNEA on 02/25/2019 was 0.86.   On 06/10/2019, the patient was noted to have green 10 on the drainage consistent with a Pseudomonas colonization.  There was a large amount of biofilm removed. There was a large amount of drainage and periwound wound skin maceration.  Mepilex Ag foam was initiated for improved drainage control with application of gentian violet.  Patient returns today 06/17/2019 with progression of the wound.  There is more slough present. He has edema in the leg.  Hesitant to implement compression due to patient's history of peripheral vascular disease.  Unable to obtain an LINNEA 06/17/2019.    Patient completed PTA of right superficial femoral, popliteal and posterior tibial arteries on 07/19/2019.  Patient was found to have maggots in his wound on the visit of 09/16/2019.  LINNEA right DP artery 0.84 on 9/30/19.  Patient was hospitalized week of 10/07/2019 due to cellulitis of the right leg.  He developed significant swelling of the leg and redness extending into the thigh.  He was treated with IV antibiotics.  Redness and swelling have resolved.  Since most recent vascular intervention wound is drastically improved.  He continues to decrease in size and granulate.         Current Assessment & Plan     Wound continues to improve.  Minimal slough present. Patient does have increased lower extremity edema today but no significant drainage from the wound.  Patient instructed to elevate the leg as much as possible.  Continue current dressing change.               See wound doc progress notes. Documents will be scanned.        Thompson MAN Mcdaniel  [facility]

## 2019-11-04 NOTE — ASSESSMENT & PLAN NOTE
Wound continues to improve.  Minimal slough present. Patient does have increased lower extremity edema today but no significant drainage from the wound.  Patient instructed to elevate the leg as much as possible.  Continue current dressing change.

## 2019-11-11 ENCOUNTER — OFFICE VISIT (OUTPATIENT)
Dept: WOUND CARE | Facility: HOSPITAL | Age: 54
End: 2019-11-11
Attending: SURGERY
Payer: MEDICARE

## 2019-11-11 VITALS
HEART RATE: 67 BPM | TEMPERATURE: 98 F | DIASTOLIC BLOOD PRESSURE: 78 MMHG | RESPIRATION RATE: 20 BRPM | SYSTOLIC BLOOD PRESSURE: 119 MMHG

## 2019-11-11 DIAGNOSIS — L97.812 NON-PRESSURE CHRONIC ULCER OF OTHER PART OF RIGHT LOWER LEG WITH FAT LAYER EXPOSED: Primary | ICD-10-CM

## 2019-11-11 PROCEDURE — 99499 UNLISTED E&M SERVICE: CPT | Mod: ,,, | Performed by: SURGERY

## 2019-11-11 PROCEDURE — 27201912 HC WOUND CARE DEBRIDEMENT SUPPLIES

## 2019-11-11 PROCEDURE — 11042 DBRDMT SUBQ TIS 1ST 20SQCM/<: CPT

## 2019-11-11 PROCEDURE — 99499 NO LOS: ICD-10-PCS | Mod: ,,, | Performed by: SURGERY

## 2019-11-11 NOTE — PROGRESS NOTES
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 Had home health but not improving. Exam showed necrotic skin/fat upon presentation. Had evaluation of extremity by [facility] in July 2017 showing decrease inflow. Wheelchair bound since 2009 s/p CVA. Patient underwent angiogram with angioplasty to improve arterial flow.  There is been significant improvement in the wound since angioplasty was performed.   Santyl was being utilized. Switch to promogram on 4/23/18.  On 5/21/2018, the patient had signs of Pseudomonas colonization.  Silver alginate was initiated.    He developed a new wound in the anterior aspect of extremity due to trauma on 6/18/18.  This appeared to be partial-thickness. Mepilex Ag was started.  Patient presented on 7/2/2018 with a very tight dressing wrapped around his leg with an elastic wrap.  On 07/30/2018, patient was noted to have increased pain in his wound with enlargement of the wound. He was evaluated by Dr. Galeano who planned an angiogram as there was concern he has arterial ischemia causing decreased wound healing and worsening of the wound. The patient underwent angioplasy with DCB on 8/7/2018 of right SFA and Popliteal artery.  LINNEA performed 9/10/2018 was 0.92.  Wound had been progressing well.  Patient presented 01/28/2019 with pain in his right foot.  He has been experiencing pain with foot elevation and his pain has been markedly worse this past weekend.  He did remove the compression wraps due to pain. Attempts to obtain reliable Doppler signals and his posterior tibial and dorsalis pedis regions of the right foot were not successful.  Patient underwent PTA of a total occluded right superficial femoral artery, stenting of the right popliteal artery and PTA of an  occluded peroneal artery on 02/19/2019.  At least single-vessel runoff was established to the right foot.  LINNEA on 02/25/2019 was 0.86.   On 06/10/2019, the patient was noted to have green 10 on the drainage consistent with a Pseudomonas colonization.  There was a large amount of biofilm removed. There was a large amount of drainage and periwound wound skin maceration.  Mepilex Ag foam was initiated for improved drainage control with application of gentian violet.  Patient returns today 06/17/2019 with progression of the wound.  There is more slough present. He has edema in the leg.  Hesitant to implement compression due to patient's history of peripheral vascular disease.  Unable to obtain an LINNEA 06/17/2019.    Patient completed PTA of right superficial femoral, popliteal and posterior tibial arteries on 07/19/2019.  Patient was found to have maggots in his wound on the visit of 09/16/2019.  LINNEA right DP artery 0.84 on 9/30/19.  Patient was hospitalized week of 10/07/2019 due to cellulitis of the right leg.  He developed significant swelling of the leg and redness extending into the thigh.  He was treated with IV antibiotics.  Redness and swelling have resolved.  Since most recent vascular intervention wound is drastically improved.          Current Assessment & Plan     Wound continues to make excellent progress.  Continue debridement to maintain active phase of wound healing.                 See wound doc progress notes. Documents will be scanned.        Ahmet Hart  [facility]

## 2019-11-11 NOTE — ASSESSMENT & PLAN NOTE
Wound continues to make excellent progress.  Continue debridement to maintain active phase of wound healing.

## 2019-11-18 ENCOUNTER — OFFICE VISIT (OUTPATIENT)
Dept: WOUND CARE | Facility: HOSPITAL | Age: 54
End: 2019-11-18
Attending: SURGERY
Payer: MEDICARE

## 2019-11-18 VITALS — SYSTOLIC BLOOD PRESSURE: 107 MMHG | TEMPERATURE: 97 F | HEART RATE: 66 BPM | DIASTOLIC BLOOD PRESSURE: 67 MMHG

## 2019-11-18 DIAGNOSIS — L97.812 NON-PRESSURE CHRONIC ULCER OF OTHER PART OF RIGHT LOWER LEG WITH FAT LAYER EXPOSED: ICD-10-CM

## 2019-11-18 PROCEDURE — 27201912 HC WOUND CARE DEBRIDEMENT SUPPLIES

## 2019-11-18 PROCEDURE — 11042 DBRDMT SUBQ TIS 1ST 20SQCM/<: CPT

## 2019-11-18 PROCEDURE — 99499 NO LOS: ICD-10-PCS | Mod: ,,, | Performed by: SURGERY

## 2019-11-18 PROCEDURE — 99499 UNLISTED E&M SERVICE: CPT | Mod: ,,, | Performed by: SURGERY

## 2019-11-18 NOTE — PROGRESS NOTES
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 Had home health but not improving. Exam showed necrotic skin/fat upon presentation. Had evaluation of extremity by [facility] in July 2017 showing decrease inflow. Wheelchair bound since 2009 s/p CVA. Patient underwent angiogram with angioplasty to improve arterial flow.  There is been significant improvement in the wound since angioplasty was performed.   Santyl was being utilized. Switch to promogram on 4/23/18.  On 5/21/2018, the patient had signs of Pseudomonas colonization.  Silver alginate was initiated.    He developed a new wound in the anterior aspect of extremity due to trauma on 6/18/18.  This appeared to be partial-thickness. Mepilex Ag was started.  Patient presented on 7/2/2018 with a very tight dressing wrapped around his leg with an elastic wrap.  On 07/30/2018, patient was noted to have increased pain in his wound with enlargement of the wound. He was evaluated by Dr. Galeano who planned an angiogram as there was concern he has arterial ischemia causing decreased wound healing and worsening of the wound. The patient underwent angioplasy with DCB on 8/7/2018 of right SFA and Popliteal artery.  LINNEA performed 9/10/2018 was 0.92.  Wound had been progressing well.  Patient presented 01/28/2019 with pain in his right foot.  He has been experiencing pain with foot elevation and his pain has been markedly worse this past weekend.  He did remove the compression wraps due to pain. Attempts to obtain reliable Doppler signals and his posterior tibial and dorsalis pedis regions of the right foot were not successful.  Patient underwent PTA of a total occluded right superficial femoral artery, stenting of the right popliteal artery and PTA of an occluded peroneal artery  on 02/19/2019.  At least single-vessel runoff was established to the right foot.  LINNEA on 02/25/2019 was 0.86.   On 06/10/2019, the patient was noted to have green 10 on the drainage consistent with a Pseudomonas colonization.  There was a large amount of biofilm removed. There was a large amount of drainage and periwound wound skin maceration.  Mepilex Ag foam was initiated for improved drainage control with application of gentian violet.  Patient returns today 06/17/2019 with progression of the wound.  There is more slough present. He has edema in the leg.  Hesitant to implement compression due to patient's history of peripheral vascular disease.  Unable to obtain an LINNEA 06/17/2019.    Patient completed PTA of right superficial femoral, popliteal and posterior tibial arteries on 07/19/2019.  Patient was found to have maggots in his wound on the visit of 09/16/2019.  LINNEA right DP artery 0.84 on 9/30/19.  Patient was hospitalized week of 10/07/2019 due to cellulitis of the right leg.  He developed significant swelling of the leg and redness extending into the thigh.  He was treated with IV antibiotics.  Redness and swelling have resolved.  Since most recent vascular intervention wound is drastically improved.          Current Assessment & Plan     Wound continues to improve.  It is significantly decreased in size for the past several weeks.  His edema is well controlled.  There is minimal slough.  The wound is granulating.  Continue current management               See wound doc progress notes. Documents will be scanned.        Thompson MAN Marino [facility]

## 2019-11-25 ENCOUNTER — OFFICE VISIT (OUTPATIENT)
Dept: WOUND CARE | Facility: HOSPITAL | Age: 54
End: 2019-11-25
Attending: SURGERY
Payer: MEDICARE

## 2019-11-25 VITALS — HEART RATE: 59 BPM | SYSTOLIC BLOOD PRESSURE: 132 MMHG | DIASTOLIC BLOOD PRESSURE: 85 MMHG | TEMPERATURE: 98 F

## 2019-11-25 DIAGNOSIS — I73.9 PAD (PERIPHERAL ARTERY DISEASE): ICD-10-CM

## 2019-11-25 DIAGNOSIS — L97.812 NON-PRESSURE CHRONIC ULCER OF OTHER PART OF RIGHT LOWER LEG WITH FAT LAYER EXPOSED: Primary | ICD-10-CM

## 2019-11-25 PROCEDURE — 27201912 HC WOUND CARE DEBRIDEMENT SUPPLIES

## 2019-11-25 PROCEDURE — 99499 NO LOS: ICD-10-PCS | Mod: ,,, | Performed by: SURGERY

## 2019-11-25 PROCEDURE — 99499 UNLISTED E&M SERVICE: CPT | Mod: ,,, | Performed by: SURGERY

## 2019-11-25 PROCEDURE — 11042 DBRDMT SUBQ TIS 1ST 20SQCM/<: CPT

## 2019-11-25 NOTE — ASSESSMENT & PLAN NOTE
Wound continues to make excellent progress.  Continue debridement to maintain active phase wound healing.  Continue triamcinolone cream to manan wound skin.

## 2019-11-25 NOTE — PROGRESS NOTES
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 Had home health but not improving. Exam showed necrotic skin/fat upon presentation. Had evaluation of extremity by [facility] in July 2017 showing decrease inflow. Wheelchair bound since 2009 s/p CVA. Patient underwent angiogram with angioplasty to improve arterial flow.  There is been significant improvement in the wound since angioplasty was performed.   Santyl was being utilized. Switch to promogram on 4/23/18.  On 5/21/2018, the patient had signs of Pseudomonas colonization.  Silver alginate was initiated.    He developed a new wound in the anterior aspect of extremity due to trauma on 6/18/18.  This appeared to be partial-thickness. Mepilex Ag was started.  Patient presented on 7/2/2018 with a very tight dressing wrapped around his leg with an elastic wrap.  On 07/30/2018, patient was noted to have increased pain in his wound with enlargement of the wound. He was evaluated by Dr. Galeano who planned an angiogram as there was concern he has arterial ischemia causing decreased wound healing and worsening of the wound. The patient underwent angioplasy with DCB on 8/7/2018 of right SFA and Popliteal artery.  LINNEA performed 9/10/2018 was 0.92.  Wound had been progressing well.  Patient presented 01/28/2019 with pain in his right foot.  He has been experiencing pain with foot elevation and his pain has been markedly worse this past weekend.  He did remove the compression wraps due to pain. Attempts to obtain reliable Doppler signals and his posterior tibial and dorsalis pedis regions of the right foot were not successful.  Patient underwent PTA of a total occluded right superficial femoral artery, stenting of the right popliteal artery and PTA of an  occluded peroneal artery on 02/19/2019.  At least single-vessel runoff was established to the right foot.  LINNEA on 02/25/2019 was 0.86.   On 06/10/2019, the patient was noted to have green 10 on the drainage consistent with a Pseudomonas colonization.  There was a large amount of biofilm removed. There was a large amount of drainage and periwound wound skin maceration.  Mepilex Ag foam was initiated for improved drainage control with application of gentian violet.  Patient returns today 06/17/2019 with progression of the wound.  There is more slough present. He has edema in the leg.  Hesitant to implement compression due to patient's history of peripheral vascular disease.  Unable to obtain an LINNEA 06/17/2019.    Patient completed PTA of right superficial femoral, popliteal and posterior tibial arteries on 07/19/2019.  Patient was found to have maggots in his wound on the visit of 09/16/2019.  LINNEA right DP artery 0.84 on 9/30/19.  Patient was hospitalized week of 10/07/2019 due to cellulitis of the right leg.  He developed significant swelling of the leg and redness extending into the thigh.  He was treated with IV antibiotics.  Redness and swelling have resolved.  Since most recent vascular intervention wound is drastically improved.          Current Assessment & Plan     Wound continues to make excellent progress.  Continue debridement to maintain active phase wound healing.  Continue triamcinolone cream to manan wound skin.            Cardiac/Vascular    PAD (peripheral artery disease)    Overview     Status post PTA of right superficial femoral, popliteal and posterior tibial arteries on 07/19/2019.               See wound doc progress notes. Documents will be scanned.        Ahmet Hart  [facility]

## 2019-11-27 ENCOUNTER — TELEPHONE (OUTPATIENT)
Dept: WOUND CARE | Facility: HOSPITAL | Age: 54
End: 2019-11-27

## 2019-12-02 ENCOUNTER — OFFICE VISIT (OUTPATIENT)
Dept: WOUND CARE | Facility: HOSPITAL | Age: 54
End: 2019-12-02
Attending: SURGERY
Payer: MEDICARE

## 2019-12-02 VITALS
TEMPERATURE: 97 F | RESPIRATION RATE: 20 BRPM | SYSTOLIC BLOOD PRESSURE: 120 MMHG | DIASTOLIC BLOOD PRESSURE: 73 MMHG | HEART RATE: 60 BPM

## 2019-12-02 DIAGNOSIS — L97.812 NON-PRESSURE CHRONIC ULCER OF OTHER PART OF RIGHT LOWER LEG WITH FAT LAYER EXPOSED: ICD-10-CM

## 2019-12-02 PROCEDURE — 11042 DBRDMT SUBQ TIS 1ST 20SQCM/<: CPT

## 2019-12-02 PROCEDURE — 27201912 HC WOUND CARE DEBRIDEMENT SUPPLIES

## 2019-12-02 PROCEDURE — 99499 NO LOS: ICD-10-PCS | Mod: ,,, | Performed by: SURGERY

## 2019-12-02 PROCEDURE — 99499 UNLISTED E&M SERVICE: CPT | Mod: ,,, | Performed by: SURGERY

## 2019-12-02 NOTE — PROGRESS NOTES
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 Had home health but not improving. Exam showed necrotic skin/fat upon presentation. Had evaluation of extremity by [facility] in July 2017 showing decrease inflow. Wheelchair bound since 2009 s/p CVA. Patient underwent angiogram with angioplasty to improve arterial flow.  There is been significant improvement in the wound since angioplasty was performed.   Santyl was being utilized. Switch to promogram on 4/23/18.  On 5/21/2018, the patient had signs of Pseudomonas colonization.  Silver alginate was initiated.    He developed a new wound in the anterior aspect of extremity due to trauma on 6/18/18.  This appeared to be partial-thickness. Mepilex Ag was started.  Patient presented on 7/2/2018 with a very tight dressing wrapped around his leg with an elastic wrap.  On 07/30/2018, patient was noted to have increased pain in his wound with enlargement of the wound. He was evaluated by Dr. Galeano who planned an angiogram as there was concern he has arterial ischemia causing decreased wound healing and worsening of the wound. The patient underwent angioplasy with DCB on 8/7/2018 of right SFA and Popliteal artery.  LINNEA performed 9/10/2018 was 0.92.  Wound had been progressing well.  Patient presented 01/28/2019 with pain in his right foot.  He has been experiencing pain with foot elevation and his pain has been markedly worse this past weekend.  He did remove the compression wraps due to pain. Attempts to obtain reliable Doppler signals and his posterior tibial and dorsalis pedis regions of the right foot were not successful.  Patient underwent PTA of a total occluded right superficial femoral artery, stenting of the right popliteal artery and PTA of an occluded peroneal artery  on 02/19/2019.  At least single-vessel runoff was established to the right foot.  LINNEA on 02/25/2019 was 0.86.   On 06/10/2019, the patient was noted to have green 10 on the drainage consistent with a Pseudomonas colonization.  There was a large amount of biofilm removed. There was a large amount of drainage and periwound wound skin maceration.  Mepilex Ag foam was initiated for improved drainage control with application of gentian violet.  Patient returns today 06/17/2019 with progression of the wound.  There is more slough present. He has edema in the leg.  Hesitant to implement compression due to patient's history of peripheral vascular disease.  Unable to obtain an LINNEA 06/17/2019.    Patient completed PTA of right superficial femoral, popliteal and posterior tibial arteries on 07/19/2019.  Patient was found to have maggots in his wound on the visit of 09/16/2019.  LINNEA right DP artery 0.84 on 9/30/19.  Patient was hospitalized week of 10/07/2019 due to cellulitis of the right leg.  He developed significant swelling of the leg and redness extending into the thigh.  He was treated with IV antibiotics.  Redness and swelling have resolved.  Since most recent vascular intervention wound is drastically improved.          Current Assessment & Plan     Wound continues to decrease in size.  Wound bed remains fairly clean.  The area is granulating.  There is some edema in the right lower extremity that is increased from previous visit.  Patient encouraged to continue compliance with Tubigrip.  Continue current management of wound.               See wound doc progress notes. Documents will be scanned.        Thompson MAN Marino [facility]

## 2019-12-02 NOTE — ASSESSMENT & PLAN NOTE
Wound continues to decrease in size.  Wound bed remains fairly clean.  The area is granulating.  There is some edema in the right lower extremity that is increased from previous visit.  Patient encouraged to continue compliance with Tubigrip.  Continue current management of wound.

## 2019-12-09 ENCOUNTER — OFFICE VISIT (OUTPATIENT)
Dept: WOUND CARE | Facility: HOSPITAL | Age: 54
End: 2019-12-09
Attending: SURGERY
Payer: MEDICARE

## 2019-12-09 VITALS — RESPIRATION RATE: 18 BRPM | TEMPERATURE: 98 F

## 2019-12-09 DIAGNOSIS — L97.812 NON-PRESSURE CHRONIC ULCER OF OTHER PART OF RIGHT LOWER LEG WITH FAT LAYER EXPOSED: Primary | ICD-10-CM

## 2019-12-09 PROCEDURE — 27201912 HC WOUND CARE DEBRIDEMENT SUPPLIES

## 2019-12-09 PROCEDURE — 99499 NO LOS: ICD-10-PCS | Mod: ,,, | Performed by: SURGERY

## 2019-12-09 PROCEDURE — 99499 UNLISTED E&M SERVICE: CPT | Mod: ,,, | Performed by: SURGERY

## 2019-12-09 PROCEDURE — 11042 DBRDMT SUBQ TIS 1ST 20SQCM/<: CPT

## 2019-12-09 NOTE — ASSESSMENT & PLAN NOTE
Wound continues to make excellent progress.  Patient has some edema today.  Continue debridement to maintain active phase wound healing.  Continue offloading.  Leg elevation.

## 2019-12-09 NOTE — PROGRESS NOTES
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 Had home health but not improving. Exam showed necrotic skin/fat upon presentation. Had evaluation of extremity by [facility] in July 2017 showing decrease inflow. Wheelchair bound since 2009 s/p CVA. Patient underwent angiogram with angioplasty to improve arterial flow.  There is been significant improvement in the wound since angioplasty was performed.   Santyl was being utilized. Switch to promogram on 4/23/18.  On 5/21/2018, the patient had signs of Pseudomonas colonization.  Silver alginate was initiated.    He developed a new wound in the anterior aspect of extremity due to trauma on 6/18/18.  This appeared to be partial-thickness. Mepilex Ag was started.  Patient presented on 7/2/2018 with a very tight dressing wrapped around his leg with an elastic wrap.  On 07/30/2018, patient was noted to have increased pain in his wound with enlargement of the wound. He was evaluated by Dr. Galeano who planned an angiogram as there was concern he has arterial ischemia causing decreased wound healing and worsening of the wound. The patient underwent angioplasy with DCB on 8/7/2018 of right SFA and Popliteal artery.  LINNEA performed 9/10/2018 was 0.92.  Wound had been progressing well.  Patient presented 01/28/2019 with pain in his right foot.  He has been experiencing pain with foot elevation and his pain has been markedly worse this past weekend.  He did remove the compression wraps due to pain. Attempts to obtain reliable Doppler signals and his posterior tibial and dorsalis pedis regions of the right foot were not successful.  Patient underwent PTA of a total occluded right superficial femoral artery, stenting of the right popliteal artery and PTA of an  occluded peroneal artery on 02/19/2019.  At least single-vessel runoff was established to the right foot.  LINNEA on 02/25/2019 was 0.86.   On 06/10/2019, the patient was noted to have green 10 on the drainage consistent with a Pseudomonas colonization.  There was a large amount of biofilm removed. There was a large amount of drainage and periwound wound skin maceration.  Mepilex Ag foam was initiated for improved drainage control with application of gentian violet.  Patient returns today 06/17/2019 with progression of the wound.  There is more slough present. He has edema in the leg.  Hesitant to implement compression due to patient's history of peripheral vascular disease.  Unable to obtain an LINNEA 06/17/2019.    Patient completed PTA of right superficial femoral, popliteal and posterior tibial arteries on 07/19/2019.  Patient was found to have maggots in his wound on the visit of 09/16/2019.  LINNEA right DP artery 0.84 on 9/30/19.  Patient was hospitalized week of 10/07/2019 due to cellulitis of the right leg.  He developed significant swelling of the leg and redness extending into the thigh.  He was treated with IV antibiotics.  Redness and swelling have resolved.  Since most recent vascular intervention wound is drastically improved.          Current Assessment & Plan     Wound continues to make excellent progress.  Patient has some edema today.  Continue debridement to maintain active phase wound healing.  Continue offloading.  Leg elevation.               See wound doc progress notes. Documents will be scanned.        Ahmet Hart  [facility]

## 2019-12-23 ENCOUNTER — OFFICE VISIT (OUTPATIENT)
Dept: WOUND CARE | Facility: HOSPITAL | Age: 54
End: 2019-12-23
Attending: SURGERY
Payer: MEDICARE

## 2019-12-23 VITALS
HEART RATE: 64 BPM | TEMPERATURE: 98 F | SYSTOLIC BLOOD PRESSURE: 112 MMHG | DIASTOLIC BLOOD PRESSURE: 68 MMHG | RESPIRATION RATE: 18 BRPM

## 2019-12-23 DIAGNOSIS — L97.812 NON-PRESSURE CHRONIC ULCER OF OTHER PART OF RIGHT LOWER LEG WITH FAT LAYER EXPOSED: Primary | ICD-10-CM

## 2019-12-23 DIAGNOSIS — S61.411A LACERATION OF RIGHT HAND WITHOUT FOREIGN BODY, INITIAL ENCOUNTER: ICD-10-CM

## 2019-12-23 PROCEDURE — 11042 DBRDMT SUBQ TIS 1ST 20SQCM/<: CPT

## 2019-12-23 PROCEDURE — 99499 UNLISTED E&M SERVICE: CPT | Mod: ,,, | Performed by: SURGERY

## 2019-12-23 PROCEDURE — 99499 NO LOS: ICD-10-PCS | Mod: ,,, | Performed by: SURGERY

## 2019-12-23 PROCEDURE — 27201912 HC WOUND CARE DEBRIDEMENT SUPPLIES

## 2019-12-24 NOTE — PROGRESS NOTES
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 Had home health but not improving. Exam showed necrotic skin/fat upon presentation. Had evaluation of extremity by [facility] in July 2017 showing decrease inflow. Wheelchair bound since 2009 s/p CVA. Patient underwent angiogram with angioplasty to improve arterial flow.  There is been significant improvement in the wound since angioplasty was performed.   Santyl was being utilized. Switch to promogram on 4/23/18.  On 5/21/2018, the patient had signs of Pseudomonas colonization.  Silver alginate was initiated.    He developed a new wound in the anterior aspect of extremity due to trauma on 6/18/18.  This appeared to be partial-thickness. Mepilex Ag was started.  Patient presented on 7/2/2018 with a very tight dressing wrapped around his leg with an elastic wrap.  On 07/30/2018, patient was noted to have increased pain in his wound with enlargement of the wound. He was evaluated by Dr. Galeano who planned an angiogram as there was concern he has arterial ischemia causing decreased wound healing and worsening of the wound. The patient underwent angioplasy with DCB on 8/7/2018 of right SFA and Popliteal artery.  LINNEA performed 9/10/2018 was 0.92.  Wound had been progressing well.  Patient presented 01/28/2019 with pain in his right foot.  He has been experiencing pain with foot elevation and his pain has been markedly worse this past weekend.  He did remove the compression wraps due to pain. Attempts to obtain reliable Doppler signals and his posterior tibial and dorsalis pedis regions of the right foot were not successful.  Patient underwent PTA of a total occluded right superficial femoral artery, stenting of the right popliteal artery and PTA of an  occluded peroneal artery on 02/19/2019.  At least single-vessel runoff was established to the right foot.  LINNEA on 02/25/2019 was 0.86.   On 06/10/2019, the patient was noted to have green 10 on the drainage consistent with a Pseudomonas colonization.  There was a large amount of biofilm removed. There was a large amount of drainage and periwound wound skin maceration.  Mepilex Ag foam was initiated for improved drainage control with application of gentian violet.  Patient returns today 06/17/2019 with progression of the wound.  There is more slough present. He has edema in the leg.  Hesitant to implement compression due to patient's history of peripheral vascular disease.  Unable to obtain an LINNEA 06/17/2019.    Patient completed PTA of right superficial femoral, popliteal and posterior tibial arteries on 07/19/2019.  Patient was found to have maggots in his wound on the visit of 09/16/2019.  LINNEA right DP artery 0.84 on 9/30/19.  Patient was hospitalized week of 10/07/2019 due to cellulitis of the right leg.  He developed significant swelling of the leg and redness extending into the thigh.  He was treated with IV antibiotics.  Redness and swelling have resolved.  Since most recent vascular intervention wound is drastically improved.          Current Assessment & Plan     Wound progressing well. Nearly healed.  Continue debridement to maintain active phase of wound healing.            Orthopedic    Laceration of right hand without foreign body    Overview     Patient sustained injury to dorsum of right hand due to ronda. No significant pain. No significant drainage. No fever or chills         Current Assessment & Plan     Wound is clean with no sign of infection.  Begin santyl daily.               See wound doc progress notes. Documents will be scanned.        Ahmet BARRETO Hart  [facility]

## 2019-12-24 NOTE — ASSESSMENT & PLAN NOTE
Wound progressing well. Nearly healed.  Continue debridement to maintain active phase of wound healing.

## 2019-12-30 ENCOUNTER — OFFICE VISIT (OUTPATIENT)
Dept: WOUND CARE | Facility: HOSPITAL | Age: 54
End: 2019-12-30
Attending: SURGERY
Payer: MEDICARE

## 2019-12-30 VITALS — SYSTOLIC BLOOD PRESSURE: 138 MMHG | HEART RATE: 88 BPM | DIASTOLIC BLOOD PRESSURE: 78 MMHG

## 2019-12-30 DIAGNOSIS — L97.812 NON-PRESSURE CHRONIC ULCER OF OTHER PART OF RIGHT LOWER LEG WITH FAT LAYER EXPOSED: ICD-10-CM

## 2019-12-30 PROCEDURE — 99499 UNLISTED E&M SERVICE: CPT | Mod: ,,, | Performed by: SURGERY

## 2019-12-30 PROCEDURE — 99499 NO LOS: ICD-10-PCS | Mod: ,,, | Performed by: SURGERY

## 2019-12-30 PROCEDURE — 27201912 HC WOUND CARE DEBRIDEMENT SUPPLIES

## 2019-12-30 PROCEDURE — 11042 DBRDMT SUBQ TIS 1ST 20SQCM/<: CPT

## 2019-12-30 NOTE — ASSESSMENT & PLAN NOTE
Edema fairly well controlled.  Wound decreased in size and mostly granulating.  Minimal slough present. No sign of infection.  Continue current therapy

## 2019-12-30 NOTE — PROGRESS NOTES
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 Had home health but not improving. Exam showed necrotic skin/fat upon presentation. Had evaluation of extremity by [facility] in July 2017 showing decrease inflow. Wheelchair bound since 2009 s/p CVA. Patient underwent angiogram with angioplasty to improve arterial flow.  There is been significant improvement in the wound since angioplasty was performed.   Santyl was being utilized. Switch to promogram on 4/23/18.  On 5/21/2018, the patient had signs of Pseudomonas colonization.  Silver alginate was initiated.    He developed a new wound in the anterior aspect of extremity due to trauma on 6/18/18.  This appeared to be partial-thickness. Mepilex Ag was started.  Patient presented on 7/2/2018 with a very tight dressing wrapped around his leg with an elastic wrap.  On 07/30/2018, patient was noted to have increased pain in his wound with enlargement of the wound. He was evaluated by Dr. Galeano who planned an angiogram as there was concern he has arterial ischemia causing decreased wound healing and worsening of the wound. The patient underwent angioplasy with DCB on 8/7/2018 of right SFA and Popliteal artery.  LINNEA performed 9/10/2018 was 0.92.  Wound had been progressing well.  Patient presented 01/28/2019 with pain in his right foot.  He has been experiencing pain with foot elevation and his pain has been markedly worse this past weekend.  He did remove the compression wraps due to pain. Attempts to obtain reliable Doppler signals and his posterior tibial and dorsalis pedis regions of the right foot were not successful.  Patient underwent PTA of a total occluded right superficial femoral artery, stenting of the right popliteal artery and PTA of an occluded peroneal artery  on 02/19/2019.  At least single-vessel runoff was established to the right foot.  LINNEA on 02/25/2019 was 0.86.   On 06/10/2019, the patient was noted to have green 10 on the drainage consistent with a Pseudomonas colonization.  There was a large amount of biofilm removed. There was a large amount of drainage and periwound wound skin maceration.  Mepilex Ag foam was initiated for improved drainage control with application of gentian violet.  Patient returns today 06/17/2019 with progression of the wound.  There is more slough present. He has edema in the leg.  Hesitant to implement compression due to patient's history of peripheral vascular disease.  Unable to obtain an LINNEA 06/17/2019.    Patient completed PTA of right superficial femoral, popliteal and posterior tibial arteries on 07/19/2019.  Patient was found to have maggots in his wound on the visit of 09/16/2019.  LINNEA right DP artery 0.84 on 9/30/19.  Patient was hospitalized week of 10/07/2019 due to cellulitis of the right leg.  He developed significant swelling of the leg and redness extending into the thigh.  He was treated with IV antibiotics.  Redness and swelling have resolved.  Since most recent vascular intervention wound is drastically improved.          Current Assessment & Plan     Edema fairly well controlled.  Wound decreased in size and mostly granulating.  Minimal slough present. No sign of infection.  Continue current therapy               See wound doc progress notes. Documents will be scanned.        Thompson MAN Mcdaniel  [facility]

## 2020-01-06 ENCOUNTER — OFFICE VISIT (OUTPATIENT)
Dept: WOUND CARE | Facility: HOSPITAL | Age: 55
End: 2020-01-06
Attending: SURGERY
Payer: MEDICARE

## 2020-01-06 VITALS — HEART RATE: 66 BPM | SYSTOLIC BLOOD PRESSURE: 142 MMHG | DIASTOLIC BLOOD PRESSURE: 81 MMHG

## 2020-01-06 DIAGNOSIS — L97.812 NON-PRESSURE CHRONIC ULCER OF OTHER PART OF RIGHT LOWER LEG WITH FAT LAYER EXPOSED: Primary | ICD-10-CM

## 2020-01-06 PROCEDURE — 99499 UNLISTED E&M SERVICE: CPT | Mod: ,,, | Performed by: SURGERY

## 2020-01-06 PROCEDURE — 11042 DBRDMT SUBQ TIS 1ST 20SQCM/<: CPT

## 2020-01-06 PROCEDURE — 99499 NO LOS: ICD-10-PCS | Mod: ,,, | Performed by: SURGERY

## 2020-01-06 PROCEDURE — 27201912 HC WOUND CARE DEBRIDEMENT SUPPLIES

## 2020-01-06 NOTE — PROGRESS NOTES
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 Had home health but not improving. Exam showed necrotic skin/fat upon presentation. Had evaluation of extremity by [facility] in July 2017 showing decrease inflow. Wheelchair bound since 2009 s/p CVA. Patient underwent angiogram with angioplasty to improve arterial flow.  There is been significant improvement in the wound since angioplasty was performed.   Santyl was being utilized. Switch to promogram on 4/23/18.  On 5/21/2018, the patient had signs of Pseudomonas colonization.  Silver alginate was initiated.    He developed a new wound in the anterior aspect of extremity due to trauma on 6/18/18.  This appeared to be partial-thickness. Mepilex Ag was started.  Patient presented on 7/2/2018 with a very tight dressing wrapped around his leg with an elastic wrap.  On 07/30/2018, patient was noted to have increased pain in his wound with enlargement of the wound. He was evaluated by Dr. Galeano who planned an angiogram as there was concern he has arterial ischemia causing decreased wound healing and worsening of the wound. The patient underwent angioplasy with DCB on 8/7/2018 of right SFA and Popliteal artery.  LINNEA performed 9/10/2018 was 0.92.  Wound had been progressing well.  Patient presented 01/28/2019 with pain in his right foot.  He has been experiencing pain with foot elevation and his pain has been markedly worse this past weekend.  He did remove the compression wraps due to pain. Attempts to obtain reliable Doppler signals and his posterior tibial and dorsalis pedis regions of the right foot were not successful.  Patient underwent PTA of a total occluded right superficial femoral artery, stenting of the right popliteal artery and PTA of an  occluded peroneal artery on 02/19/2019.  At least single-vessel runoff was established to the right foot.  LINNEA on 02/25/2019 was 0.86.   On 06/10/2019, the patient was noted to have green 10 on the drainage consistent with a Pseudomonas colonization.  There was a large amount of biofilm removed. There was a large amount of drainage and periwound wound skin maceration.  Mepilex Ag foam was initiated for improved drainage control with application of gentian violet.  Patient returns today 06/17/2019 with progression of the wound.  There is more slough present. He has edema in the leg.  Hesitant to implement compression due to patient's history of peripheral vascular disease.  Unable to obtain an LINNEA 06/17/2019.    Patient completed PTA of right superficial femoral, popliteal and posterior tibial arteries on 07/19/2019.  Patient was found to have maggots in his wound on the visit of 09/16/2019.  LINNEA right DP artery 0.84 on 9/30/19.  Patient was hospitalized week of 10/07/2019 due to cellulitis of the right leg.  He developed significant swelling of the leg and redness extending into the thigh.  He was treated with IV antibiotics.  Redness and swelling have resolved.  Since most recent vascular intervention wound is drastically improved.          Current Assessment & Plan     Wound continues to improve significantly.  There is periwound dermatitis.  Continue debridement to maintain active phase wound healing.  Triamcinolone cream to manan wound region.  Patient likely be healed on next visit               See wound doc progress notes. Documents will be scanned.        Ahmet BARRETO Hart  [facility]

## 2020-01-06 NOTE — ASSESSMENT & PLAN NOTE
Wound continues to improve significantly.  There is periwound dermatitis.  Continue debridement to maintain active phase wound healing.  Triamcinolone cream to manan wound region.  Patient likely be healed on next visit

## 2020-01-13 ENCOUNTER — OFFICE VISIT (OUTPATIENT)
Dept: WOUND CARE | Facility: HOSPITAL | Age: 55
End: 2020-01-13
Attending: SURGERY
Payer: MEDICARE

## 2020-01-13 VITALS — DIASTOLIC BLOOD PRESSURE: 78 MMHG | SYSTOLIC BLOOD PRESSURE: 115 MMHG | HEART RATE: 58 BPM

## 2020-01-13 DIAGNOSIS — L97.812 NON-PRESSURE CHRONIC ULCER OF OTHER PART OF RIGHT LOWER LEG WITH FAT LAYER EXPOSED: ICD-10-CM

## 2020-01-13 PROCEDURE — 27201912 HC WOUND CARE DEBRIDEMENT SUPPLIES

## 2020-01-13 PROCEDURE — 99499 NO LOS: ICD-10-PCS | Mod: ,,, | Performed by: SURGERY

## 2020-01-13 PROCEDURE — 11042 DBRDMT SUBQ TIS 1ST 20SQCM/<: CPT

## 2020-01-13 PROCEDURE — 99499 UNLISTED E&M SERVICE: CPT | Mod: ,,, | Performed by: SURGERY

## 2020-01-13 NOTE — PROGRESS NOTES
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 Had home health but not improving. Exam showed necrotic skin/fat upon presentation. Had evaluation of extremity by [facility] in July 2017 showing decrease inflow. Wheelchair bound since 2009 s/p CVA. Patient underwent angiogram with angioplasty to improve arterial flow.  There is been significant improvement in the wound since angioplasty was performed.   Santyl was being utilized. Switch to promogram on 4/23/18.  On 5/21/2018, the patient had signs of Pseudomonas colonization.  Silver alginate was initiated.    He developed a new wound in the anterior aspect of extremity due to trauma on 6/18/18.  This appeared to be partial-thickness. Mepilex Ag was started.  Patient presented on 7/2/2018 with a very tight dressing wrapped around his leg with an elastic wrap.  On 07/30/2018, patient was noted to have increased pain in his wound with enlargement of the wound. He was evaluated by Dr. Galeano who planned an angiogram as there was concern he has arterial ischemia causing decreased wound healing and worsening of the wound. The patient underwent angioplasy with DCB on 8/7/2018 of right SFA and Popliteal artery.  LINNEA performed 9/10/2018 was 0.92.  Wound had been progressing well.  Patient presented 01/28/2019 with pain in his right foot.  He has been experiencing pain with foot elevation and his pain has been markedly worse this past weekend.  He did remove the compression wraps due to pain. Attempts to obtain reliable Doppler signals and his posterior tibial and dorsalis pedis regions of the right foot were not successful.  Patient underwent PTA of a total occluded right superficial femoral artery, stenting of the right popliteal artery and PTA of an occluded peroneal artery  on 02/19/2019.  At least single-vessel runoff was established to the right foot.  LINNEA on 02/25/2019 was 0.86.   On 06/10/2019, the patient was noted to have green 10 on the drainage consistent with a Pseudomonas colonization.  There was a large amount of biofilm removed. There was a large amount of drainage and periwound wound skin maceration.  Mepilex Ag foam was initiated for improved drainage control with application of gentian violet.  Patient returns today 06/17/2019 with progression of the wound.  There is more slough present. He has edema in the leg.  Hesitant to implement compression due to patient's history of peripheral vascular disease.  Unable to obtain an LINNEA 06/17/2019.    Patient completed PTA of right superficial femoral, popliteal and posterior tibial arteries on 07/19/2019.  Patient was found to have maggots in his wound on the visit of 09/16/2019.  LINNEA right DP artery 0.84 on 9/30/19.  Patient was hospitalized week of 10/07/2019 due to cellulitis of the right leg.  He developed significant swelling of the leg and redness extending into the thigh.  He was treated with IV antibiotics.  Redness and swelling have resolved.  Since most recent vascular intervention wound is drastically improved.          Current Assessment & Plan     Wound markedly improved.  Wound decreased in size.  Edema well controlled.  Continue current management.               See wound doc progress notes. Documents will be scanned.        Thompson MAN Mcdaniel  [facility]

## 2020-01-13 NOTE — ASSESSMENT & PLAN NOTE
Wound markedly improved.  Wound decreased in size.  Edema well controlled.  Continue current management.

## 2020-01-20 ENCOUNTER — OFFICE VISIT (OUTPATIENT)
Dept: WOUND CARE | Facility: HOSPITAL | Age: 55
End: 2020-01-20
Attending: SURGERY
Payer: MEDICARE

## 2020-01-20 VITALS
SYSTOLIC BLOOD PRESSURE: 108 MMHG | RESPIRATION RATE: 20 BRPM | DIASTOLIC BLOOD PRESSURE: 73 MMHG | HEART RATE: 67 BPM | TEMPERATURE: 98 F

## 2020-01-20 DIAGNOSIS — I73.9 PAD (PERIPHERAL ARTERY DISEASE): ICD-10-CM

## 2020-01-20 DIAGNOSIS — L97.812 NON-PRESSURE CHRONIC ULCER OF OTHER PART OF RIGHT LOWER LEG WITH FAT LAYER EXPOSED: Primary | ICD-10-CM

## 2020-01-20 PROBLEM — S61.411A LACERATION OF RIGHT HAND WITHOUT FOREIGN BODY: Status: RESOLVED | Noted: 2019-12-23 | Resolved: 2020-01-20

## 2020-01-20 PROCEDURE — 11042 DBRDMT SUBQ TIS 1ST 20SQCM/<: CPT

## 2020-01-20 PROCEDURE — 99499 UNLISTED E&M SERVICE: CPT | Mod: ,,, | Performed by: SURGERY

## 2020-01-20 PROCEDURE — 27201912 HC WOUND CARE DEBRIDEMENT SUPPLIES

## 2020-01-20 PROCEDURE — 99499 NO LOS: ICD-10-PCS | Mod: ,,, | Performed by: SURGERY

## 2020-01-20 NOTE — ASSESSMENT & PLAN NOTE
Wound is nearly healed.  Continue debridement to maintain active phase wound healing.  Continue steroid cream to manan wound skin.

## 2020-01-20 NOTE — PROGRESS NOTES
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 Had home health but not improving. Exam showed necrotic skin/fat upon presentation. Had evaluation of extremity by [facility] in July 2017 showing decrease inflow. Wheelchair bound since 2009 s/p CVA. Patient underwent angiogram with angioplasty to improve arterial flow.  There is been significant improvement in the wound since angioplasty was performed.   Santyl was being utilized. Switch to promogram on 4/23/18.  On 5/21/2018, the patient had signs of Pseudomonas colonization.  Silver alginate was initiated.    He developed a new wound in the anterior aspect of extremity due to trauma on 6/18/18.  This appeared to be partial-thickness. Mepilex Ag was started.  Patient presented on 7/2/2018 with a very tight dressing wrapped around his leg with an elastic wrap.  On 07/30/2018, patient was noted to have increased pain in his wound with enlargement of the wound. He was evaluated by Dr. Galeano who planned an angiogram as there was concern he has arterial ischemia causing decreased wound healing and worsening of the wound. The patient underwent angioplasy with DCB on 8/7/2018 of right SFA and Popliteal artery.  LINNEA performed 9/10/2018 was 0.92.  Wound had been progressing well.  Patient presented 01/28/2019 with pain in his right foot.  He has been experiencing pain with foot elevation and his pain has been markedly worse this past weekend.  He did remove the compression wraps due to pain. Attempts to obtain reliable Doppler signals and his posterior tibial and dorsalis pedis regions of the right foot were not successful.  Patient underwent PTA of a total occluded right superficial femoral artery, stenting of the right popliteal artery and PTA of an  occluded peroneal artery on 02/19/2019.  At least single-vessel runoff was established to the right foot.  LINNEA on 02/25/2019 was 0.86.   On 06/10/2019, the patient was noted to have green 10 on the drainage consistent with a Pseudomonas colonization.  There was a large amount of biofilm removed. There was a large amount of drainage and periwound wound skin maceration.  Mepilex Ag foam was initiated for improved drainage control with application of gentian violet.  Patient returns today 06/17/2019 with progression of the wound.  There is more slough present. He has edema in the leg.  Hesitant to implement compression due to patient's history of peripheral vascular disease.  Unable to obtain an LINNEA 06/17/2019.    Patient completed PTA of right superficial femoral, popliteal and posterior tibial arteries on 07/19/2019.  Patient was found to have maggots in his wound on the visit of 09/16/2019.  LINNEA right DP artery 0.84 on 9/30/19.  Patient was hospitalized week of 10/07/2019 due to cellulitis of the right leg.  He developed significant swelling of the leg and redness extending into the thigh.  He was treated with IV antibiotics.  Redness and swelling have resolved.  Since most recent vascular intervention wound is drastically improved.          Current Assessment & Plan     Wound is nearly healed.  Continue debridement to maintain active phase wound healing.  Continue steroid cream to manan wound skin.            Cardiac/Vascular    PAD (peripheral artery disease)    Overview     Status post PTA of right superficial femoral, popliteal and posterior tibial arteries on 07/19/2019.               See wound doc progress notes. Documents will be scanned.        Ahmet BARRETO Hart  [facility]

## 2020-01-22 ENCOUNTER — OFFICE VISIT (OUTPATIENT)
Dept: FAMILY MEDICINE | Facility: CLINIC | Age: 55
End: 2020-01-22
Attending: FAMILY MEDICINE
Payer: MEDICARE

## 2020-01-22 ENCOUNTER — LAB VISIT (OUTPATIENT)
Dept: LAB | Facility: HOSPITAL | Age: 55
End: 2020-01-22
Attending: FAMILY MEDICINE
Payer: MEDICARE

## 2020-01-22 VITALS
BODY MASS INDEX: 30.82 KG/M2 | WEIGHT: 185 LBS | HEIGHT: 65 IN | SYSTOLIC BLOOD PRESSURE: 90 MMHG | HEART RATE: 62 BPM | OXYGEN SATURATION: 95 % | DIASTOLIC BLOOD PRESSURE: 60 MMHG

## 2020-01-22 DIAGNOSIS — I10 ESSENTIAL HYPERTENSION: Chronic | ICD-10-CM

## 2020-01-22 DIAGNOSIS — E78.2 MIXED HYPERLIPIDEMIA: ICD-10-CM

## 2020-01-22 DIAGNOSIS — F41.0 PANIC DISORDER: Chronic | ICD-10-CM

## 2020-01-22 DIAGNOSIS — I73.9 PAD (PERIPHERAL ARTERY DISEASE): ICD-10-CM

## 2020-01-22 DIAGNOSIS — M62.838 MUSCLE SPASTICITY: ICD-10-CM

## 2020-01-22 DIAGNOSIS — R35.1 BENIGN PROSTATIC HYPERPLASIA WITH NOCTURIA: ICD-10-CM

## 2020-01-22 DIAGNOSIS — I10 ESSENTIAL HYPERTENSION: Primary | Chronic | ICD-10-CM

## 2020-01-22 DIAGNOSIS — I25.10 CORONARY ARTERY DISEASE INVOLVING NATIVE CORONARY ARTERY WITHOUT ANGINA PECTORIS, UNSPECIFIED WHETHER NATIVE OR TRANSPLANTED HEART: Chronic | ICD-10-CM

## 2020-01-22 DIAGNOSIS — I63.00 CEREBRAL INFARCTION DUE TO THROMBOSIS OF PRECEREBRAL ARTERY: ICD-10-CM

## 2020-01-22 DIAGNOSIS — E11.69 TYPE 2 DIABETES MELLITUS WITH OTHER SPECIFIED COMPLICATION, WITHOUT LONG-TERM CURRENT USE OF INSULIN: Chronic | ICD-10-CM

## 2020-01-22 DIAGNOSIS — K21.9 GASTROESOPHAGEAL REFLUX DISEASE WITHOUT ESOPHAGITIS: ICD-10-CM

## 2020-01-22 DIAGNOSIS — I50.32 CHRONIC DIASTOLIC CHF (CONGESTIVE HEART FAILURE): ICD-10-CM

## 2020-01-22 DIAGNOSIS — N40.1 BENIGN PROSTATIC HYPERPLASIA WITH NOCTURIA: ICD-10-CM

## 2020-01-22 LAB
ALBUMIN SERPL BCP-MCNC: 4.3 G/DL (ref 3.5–5.2)
ALP SERPL-CCNC: 63 U/L (ref 55–135)
ALT SERPL W/O P-5'-P-CCNC: 34 U/L (ref 10–44)
ANION GAP SERPL CALC-SCNC: 10 MMOL/L (ref 8–16)
AST SERPL-CCNC: 19 U/L (ref 10–40)
BASOPHILS # BLD AUTO: 0.03 K/UL (ref 0–0.2)
BASOPHILS NFR BLD: 0.7 % (ref 0–1.9)
BILIRUB SERPL-MCNC: 0.4 MG/DL (ref 0.1–1)
BUN SERPL-MCNC: 26 MG/DL (ref 6–20)
CALCIUM SERPL-MCNC: 10.7 MG/DL (ref 8.7–10.5)
CHLORIDE SERPL-SCNC: 103 MMOL/L (ref 95–110)
CO2 SERPL-SCNC: 30 MMOL/L (ref 23–29)
CREAT SERPL-MCNC: 1.3 MG/DL (ref 0.5–1.4)
DIFFERENTIAL METHOD: ABNORMAL
EOSINOPHIL # BLD AUTO: 0.1 K/UL (ref 0–0.5)
EOSINOPHIL NFR BLD: 2.2 % (ref 0–8)
ERYTHROCYTE [DISTWIDTH] IN BLOOD BY AUTOMATED COUNT: 14.3 % (ref 11.5–14.5)
EST. GFR  (AFRICAN AMERICAN): >60 ML/MIN/1.73 M^2
EST. GFR  (NON AFRICAN AMERICAN): >60 ML/MIN/1.73 M^2
ESTIMATED AVG GLUCOSE: 151 MG/DL (ref 68–131)
GLUCOSE SERPL-MCNC: 161 MG/DL (ref 70–110)
HBA1C MFR BLD HPLC: 6.9 % (ref 4–5.6)
HCT VFR BLD AUTO: 37.9 % (ref 40–54)
HGB BLD-MCNC: 11.6 G/DL (ref 14–18)
LYMPHOCYTES # BLD AUTO: 1.2 K/UL (ref 1–4.8)
LYMPHOCYTES NFR BLD: 26.9 % (ref 18–48)
MCH RBC QN AUTO: 28.3 PG (ref 27–31)
MCHC RBC AUTO-ENTMCNC: 30.6 G/DL (ref 32–36)
MCV RBC AUTO: 92 FL (ref 82–98)
MONOCYTES # BLD AUTO: 0.5 K/UL (ref 0.3–1)
MONOCYTES NFR BLD: 10.1 % (ref 4–15)
NEUTROPHILS # BLD AUTO: 2.7 K/UL (ref 1.8–7.7)
NEUTROPHILS NFR BLD: 60.1 % (ref 38–73)
PLATELET # BLD AUTO: 223 K/UL (ref 150–350)
PMV BLD AUTO: 10.7 FL (ref 9.2–12.9)
POTASSIUM SERPL-SCNC: 4.7 MMOL/L (ref 3.5–5.1)
PROT SERPL-MCNC: 8.2 G/DL (ref 6–8.4)
RBC # BLD AUTO: 4.1 M/UL (ref 4.6–6.2)
SODIUM SERPL-SCNC: 143 MMOL/L (ref 136–145)
WBC # BLD AUTO: 4.46 K/UL (ref 3.9–12.7)

## 2020-01-22 PROCEDURE — 99214 OFFICE O/P EST MOD 30 MIN: CPT | Mod: S$PBB,,, | Performed by: FAMILY MEDICINE

## 2020-01-22 PROCEDURE — 99213 OFFICE O/P EST LOW 20 MIN: CPT | Mod: PBBFAC,PO | Performed by: FAMILY MEDICINE

## 2020-01-22 PROCEDURE — 36415 COLL VENOUS BLD VENIPUNCTURE: CPT

## 2020-01-22 PROCEDURE — 99214 PR OFFICE/OUTPT VISIT, EST, LEVL IV, 30-39 MIN: ICD-10-PCS | Mod: S$PBB,,, | Performed by: FAMILY MEDICINE

## 2020-01-22 PROCEDURE — 83036 HEMOGLOBIN GLYCOSYLATED A1C: CPT

## 2020-01-22 PROCEDURE — 99999 PR PBB SHADOW E&M-EST. PATIENT-LVL III: ICD-10-PCS | Mod: PBBFAC,,, | Performed by: FAMILY MEDICINE

## 2020-01-22 PROCEDURE — 99999 PR PBB SHADOW E&M-EST. PATIENT-LVL III: CPT | Mod: PBBFAC,,, | Performed by: FAMILY MEDICINE

## 2020-01-22 PROCEDURE — 85025 COMPLETE CBC W/AUTO DIFF WBC: CPT

## 2020-01-22 PROCEDURE — 80053 COMPREHEN METABOLIC PANEL: CPT

## 2020-01-22 NOTE — PROGRESS NOTES
Subjective:       Patient ID: Kuldeep Alamo is a 54 y.o. male.    Chief Complaint: Follow-up    54 yr old pleasant white male with CVA, DM II, right leg ulcer, HTN, HLD, CAD, and other co morbidities presents today as new patient to me and for establishing primary care and also his annual wellness check and lab work. His main concerns is diabetes management. His sugars running around 200 s.     CVA - with deficits - on wheelchair - on ASA      DM II - currently uncontrolled - on metformin 850 BID - compliant - lab due - A1C                   6.6 (H)             08/07/2018    - foot n eye screen UTD      HTN - controlled - on lisinopril, coreg, clonidine - compliant - no side effects      HLD - LDLCALC                  117.4               12/06/2016                - on statin - lab due    CAD s/p CABG - follows Dr. Cesar, Cardiology - no CP or SOB - on ranexa      Wound right leg - follows podiatry and wound care in [place] - no new issues       History as below - reviewed     Diabetes   He presents for his follow-up diabetic visit. He has type 2 diabetes mellitus. His disease course has been worsening. Pertinent negatives for hypoglycemia include no dizziness, headaches, mood changes, nervousness/anxiousness, seizures, sleepiness, speech difficulty or tremors. Pertinent negatives for diabetes include no blurred vision, no chest pain, no foot ulcerations, no polyuria, no weakness and no weight loss. There are no hypoglycemic complications. Symptoms are stable. Diabetic complications include a CVA. Pertinent negatives for diabetic complications include no autonomic neuropathy, heart disease, impotence, peripheral neuropathy or PVD. Risk factors for coronary artery disease include dyslipidemia, diabetes mellitus, obesity, male sex and sedentary lifestyle. Current diabetic treatment includes oral agent (monotherapy). He is compliant with treatment most of the time. He is following a diabetic and generally healthy diet.  Meal planning includes avoidance of concentrated sweets. He rarely participates in exercise. There is no change in his home blood glucose trend. An ACE inhibitor/angiotensin II receptor blocker is being taken. He sees a podiatrist.Eye exam is current.   Hypertension   This is a chronic problem. The current episode started more than 1 year ago. The problem has been gradually improving since onset. The problem is controlled. Pertinent negatives include no anxiety, blurred vision, chest pain, headaches, malaise/fatigue or palpitations. There are no associated agents to hypertension. Risk factors for coronary artery disease include diabetes mellitus, male gender, obesity, sedentary lifestyle and dyslipidemia. Past treatments include angiotensin blockers and beta blockers. The current treatment provides moderate improvement. There are no compliance problems.  Hypertensive end-organ damage includes CAD/MI and CVA. There is no history of PVD. There is no history of chronic renal disease, hyperaldosteronism, hyperparathyroidism, a hypertension causing med, pheochromocytoma or a thyroid problem.   Hyperlipidemia   This is a chronic problem. The current episode started more than 1 year ago. The problem is controlled. Recent lipid tests were reviewed and are normal. He has no history of chronic renal disease. There are no known factors aggravating his hyperlipidemia. Pertinent negatives include no chest pain or myalgias. Current antihyperlipidemic treatment includes statins. The current treatment provides moderate improvement of lipids. Compliance problems include adherence to exercise.  Risk factors for coronary artery disease include diabetes mellitus, dyslipidemia, hypertension, male sex, obesity and a sedentary lifestyle.     Review of Systems   Constitutional: Negative.  Negative for activity change, diaphoresis, malaise/fatigue, unexpected weight change and weight loss.   HENT: Negative.  Negative for congestion, ear  pain, mouth sores, rhinorrhea and voice change.    Eyes: Negative.  Negative for blurred vision, pain, discharge and visual disturbance.   Respiratory: Negative.  Negative for apnea, cough and wheezing.    Cardiovascular: Negative.  Negative for chest pain and palpitations.   Gastrointestinal: Negative.  Negative for abdominal distention, anal bleeding, diarrhea and vomiting.   Endocrine: Negative.  Negative for cold intolerance and polyuria.   Genitourinary: Negative.  Negative for decreased urine volume, difficulty urinating, discharge, frequency, impotence and scrotal swelling.   Musculoskeletal: Negative.  Negative for back pain, myalgias and neck stiffness.   Skin: Positive for rash and wound. Negative for color change.   Allergic/Immunologic: Negative.  Negative for environmental allergies and immunocompromised state.   Neurological: Negative.  Negative for dizziness, tremors, seizures, speech difficulty, weakness, light-headedness and headaches.   Hematological: Negative.    Psychiatric/Behavioral: Negative.  Negative for agitation, dysphoric mood and suicidal ideas. The patient is not nervous/anxious.        PMH/PSH/FH/SH/MED/ALLERGY reviewed    Objective:       Vitals:    01/22/20 0932   BP: 90/60   Pulse: 62       Physical Exam   Constitutional: He is oriented to person, place, and time. He appears well-developed and well-nourished.   HENT:   Head: Normocephalic and atraumatic.   Right Ear: External ear normal.   Left Ear: External ear normal.   Nose: Nose normal.   Mouth/Throat: Oropharynx is clear and moist. No oropharyngeal exudate.   Eyes: Pupils are equal, round, and reactive to light. Conjunctivae and EOM are normal. Right eye exhibits no discharge. Left eye exhibits no discharge. No scleral icterus.   Neck: Normal range of motion. Neck supple. No JVD present. No tracheal deviation present. No thyromegaly present.   Cardiovascular: Normal rate, regular rhythm, normal heart sounds and intact distal  pulses. Exam reveals no gallop and no friction rub.   No murmur heard.  Pulmonary/Chest: Effort normal and breath sounds normal. No stridor. No respiratory distress. He has no wheezes. He has no rales. He exhibits no tenderness.   Abdominal: Soft. Bowel sounds are normal. He exhibits no distension and no mass. There is no tenderness. There is no rebound and no guarding. No hernia.   Musculoskeletal: Normal range of motion. He exhibits no edema or tenderness.   Lymphadenopathy:     He has no cervical adenopathy.   Neurological: He is alert and oriented to person, place, and time. He has normal reflexes. He displays normal reflexes. No cranial nerve deficit. He exhibits abnormal muscle tone. Coordination normal.   Skin: Skin is warm and dry. Capillary refill takes less than 2 seconds. Rash noted. There is erythema. No pallor.   Peeling skin B/L legs and also 4 cm diabetes ulcer wound right leg covered in bandage   Psychiatric: He has a normal mood and affect. His behavior is normal. Judgment and thought content normal.       Lab Results   Component Value Date    HGBA1C 6.3 (H) 10/04/2019     Assessment:       1. Essential hypertension    2. Coronary artery disease involving native coronary artery without angina pectoris, unspecified whether native or transplanted heart    3. Benign prostatic hyperplasia with nocturia    4. 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 Chronic diastolic CHF (congestive heart failure)    6. Gastroesophageal reflux disease without esophagitis    7. Mixed hyperlipidemia    8. Muscle spasticity    9. PAD (peripheral artery disease)    10. Panic disorder    11. Type 2 diabetes mellitus with other specified complication, without long-term current use of insulin        Plan:           Kuldeep was seen today for follow-up.    Diagnoses and all orders for this visit:    Essential hypertension  -     CBC auto differential; Future  -     Comprehensive metabolic panel;  Future    Coronary artery disease involving native coronary artery without angina pectoris, unspecified whether native or transplanted heart    Benign prostatic hyperplasia with nocturia    Cerebral infarction due to thrombosis of precerebral artery    Chronic diastolic CHF (congestive heart failure)    Gastroesophageal reflux disease without esophagitis    Mixed hyperlipidemia  -     CBC auto differential; Future  -     Comprehensive metabolic panel; Future    Muscle spasticity    PAD (peripheral artery disease)    Panic disorder    Type 2 diabetes mellitus with other specified complication, without long-term current use of insulin  -     CBC auto differential; Future  -     Comprehensive metabolic panel; Future  -     Hemoglobin A1c; Future      DM II  -controlled  -continue metformin and add glipizide 10 mg BID    HTN  -controlled    HLD  -continue statin    Right lef wound  -follow wound care    CAD  -controlled  -follow Cardiology    Spent adequate time in obtaining history and explaining differentials    25 minutes spent during this visit of which greater than 50% devoted to face-face counseling and coordination of care regarding diagnosis and management plan    Follow up in about 3 months (around 4/22/2020), or if symptoms worsen or fail to improve.

## 2020-01-27 ENCOUNTER — OFFICE VISIT (OUTPATIENT)
Dept: WOUND CARE | Facility: HOSPITAL | Age: 55
End: 2020-01-27
Attending: SURGERY
Payer: MEDICARE

## 2020-01-27 VITALS
DIASTOLIC BLOOD PRESSURE: 100 MMHG | TEMPERATURE: 98 F | RESPIRATION RATE: 20 BRPM | HEART RATE: 84 BPM | SYSTOLIC BLOOD PRESSURE: 163 MMHG

## 2020-01-27 DIAGNOSIS — L97.812 NON-PRESSURE CHRONIC ULCER OF OTHER PART OF RIGHT LOWER LEG WITH FAT LAYER EXPOSED: ICD-10-CM

## 2020-01-27 PROCEDURE — 27201912 HC WOUND CARE DEBRIDEMENT SUPPLIES

## 2020-01-27 PROCEDURE — 11042 DBRDMT SUBQ TIS 1ST 20SQCM/<: CPT

## 2020-01-27 PROCEDURE — 99499 NO LOS: ICD-10-PCS | Mod: ,,, | Performed by: SURGERY

## 2020-01-27 PROCEDURE — 99499 UNLISTED E&M SERVICE: CPT | Mod: ,,, | Performed by: SURGERY

## 2020-01-27 NOTE — PROGRESS NOTES
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 Had home health but not improving. Exam showed necrotic skin/fat upon presentation. Had evaluation of extremity by [facility] in July 2017 showing decrease inflow. Wheelchair bound since 2009 s/p CVA. Patient underwent angiogram with angioplasty to improve arterial flow.  There is been significant improvement in the wound since angioplasty was performed.   Santyl was being utilized. Switch to promogram on 4/23/18.  On 5/21/2018, the patient had signs of Pseudomonas colonization.  Silver alginate was initiated.    He developed a new wound in the anterior aspect of extremity due to trauma on 6/18/18.  This appeared to be partial-thickness. Mepilex Ag was started.  Patient presented on 7/2/2018 with a very tight dressing wrapped around his leg with an elastic wrap.  On 07/30/2018, patient was noted to have increased pain in his wound with enlargement of the wound. He was evaluated by Dr. Galeano who planned an angiogram as there was concern he has arterial ischemia causing decreased wound healing and worsening of the wound. The patient underwent angioplasy with DCB on 8/7/2018 of right SFA and Popliteal artery.  LINNEA performed 9/10/2018 was 0.92.  Wound had been progressing well.  Patient presented 01/28/2019 with pain in his right foot.  He has been experiencing pain with foot elevation and his pain has been markedly worse this past weekend.  He did remove the compression wraps due to pain. Attempts to obtain reliable Doppler signals and his posterior tibial and dorsalis pedis regions of the right foot were not successful.  Patient underwent PTA of a total occluded right superficial femoral artery, stenting of the right popliteal artery and PTA of an occluded peroneal artery  on 02/19/2019.  At least single-vessel runoff was established to the right foot.  LINNEA on 02/25/2019 was 0.86.   On 06/10/2019, the patient was noted to have green 10 on the drainage consistent with a Pseudomonas colonization.  There was a large amount of biofilm removed. There was a large amount of drainage and periwound wound skin maceration.  Mepilex Ag foam was initiated for improved drainage control with application of gentian violet.  Patient returns today 06/17/2019 with progression of the wound.  There is more slough present. He has edema in the leg.  Hesitant to implement compression due to patient's history of peripheral vascular disease.  Unable to obtain an LINNEA 06/17/2019.    Patient completed PTA of right superficial femoral, popliteal and posterior tibial arteries on 07/19/2019.  Patient was found to have maggots in his wound on the visit of 09/16/2019.  LINNEA right DP artery 0.84 on 9/30/19.  Patient was hospitalized week of 10/07/2019 due to cellulitis of the right leg.  He developed significant swelling of the leg and redness extending into the thigh.  He was treated with IV antibiotics.  Redness and swelling have resolved.  Since most recent vascular intervention wound is drastically improved.          Current Assessment & Plan     One of patient's satellite wounds has resolved.  The other wound continues to improve.  The wound is superficial in nearly resolved.  Continue current management               See wound doc progress notes. Documents will be scanned.        Thompson MAN Mcdaniel  [facility]

## 2020-01-27 NOTE — ASSESSMENT & PLAN NOTE
One of patient's satellite wounds has resolved.  The other wound continues to improve.  The wound is superficial in nearly resolved.  Continue current management

## 2020-02-03 ENCOUNTER — OFFICE VISIT (OUTPATIENT)
Dept: WOUND CARE | Facility: HOSPITAL | Age: 55
End: 2020-02-03
Attending: SURGERY
Payer: MEDICARE

## 2020-02-03 VITALS
TEMPERATURE: 98 F | HEART RATE: 69 BPM | RESPIRATION RATE: 20 BRPM | SYSTOLIC BLOOD PRESSURE: 102 MMHG | DIASTOLIC BLOOD PRESSURE: 73 MMHG

## 2020-02-03 DIAGNOSIS — L97.812 NON-PRESSURE CHRONIC ULCER OF OTHER PART OF RIGHT LOWER LEG WITH FAT LAYER EXPOSED: ICD-10-CM

## 2020-02-03 PROCEDURE — 99499 NO LOS: ICD-10-PCS | Mod: ,,, | Performed by: SURGERY

## 2020-02-03 PROCEDURE — 99213 OFFICE O/P EST LOW 20 MIN: CPT

## 2020-02-03 PROCEDURE — 99499 UNLISTED E&M SERVICE: CPT | Mod: ,,, | Performed by: SURGERY

## 2020-02-03 NOTE — ASSESSMENT & PLAN NOTE
Wound is nearly healed.  Debridement was not required.  There is manan wound excoriation from moisture with dermatitis.  Daily dressing changes with Mepilex Ag foam to control moisture.

## 2020-02-03 NOTE — PROGRESS NOTES
[facility]  Wound Care  Progress Note    Problem List Items Addressed This Visit        Derm    Non-pressure chronic ulcer of other part of right lower leg with fat layer exposed    Overview     Patient report trauma to right lateral leg 10/20/17. Had home health but not improving. Exam showed necrotic skin/fat upon presentation. Had evaluation of extremity by [facility] in July 2017 showing decrease inflow. Wheelchair bound since 2009 s/p CVA. Patient underwent angiogram with angioplasty to improve arterial flow.  There is been significant improvement in the wound since angioplasty was performed.   Santyl was being utilized. Switch to promogram on 4/23/18.  On 5/21/2018, the patient had signs of Pseudomonas colonization.  Silver alginate was initiated.    He developed a new wound in the anterior aspect of extremity due to trauma on 6/18/18.  This appeared to be partial-thickness. Mepilex Ag was started.  Patient presented on 7/2/2018 with a very tight dressing wrapped around his leg with an elastic wrap.  On 07/30/2018, patient was noted to have increased pain in his wound with enlargement of the wound. He was evaluated by Dr. Galeano who planned an angiogram as there was concern he has arterial ischemia causing decreased wound healing and worsening of the wound. The patient underwent angioplasy with DCB on 8/7/2018 of right SFA and Popliteal artery.  LINNEA performed 9/10/2018 was 0.92.  Wound had been progressing well.  Patient presented 01/28/2019 with pain in his right foot.  He has been experiencing pain with foot elevation and his pain has been markedly worse this past weekend.  He did remove the compression wraps due to pain. Attempts to obtain reliable Doppler signals and his posterior tibial and dorsalis pedis regions of the right foot were not successful.  Patient underwent PTA of a total occluded right superficial femoral artery, stenting of the right popliteal artery and PTA of an occluded  peroneal artery on 02/19/2019.  At least single-vessel runoff was established to the right foot.  LINNEA on 02/25/2019 was 0.86.   On 06/10/2019, the patient was noted to have green 10 on the drainage consistent with a Pseudomonas colonization.  There was a large amount of biofilm removed. There was a large amount of drainage and periwound wound skin maceration.  Mepilex Ag foam was initiated for improved drainage control with application of gentian violet.  Patient returns today 06/17/2019 with progression of the wound.  There is more slough present. He has edema in the leg.  Hesitant to implement compression due to patient's history of peripheral vascular disease.  Unable to obtain an LINNEA 06/17/2019.    Patient completed PTA of right superficial femoral, popliteal and posterior tibial arteries on 07/19/2019.  Patient was found to have maggots in his wound on the visit of 09/16/2019.  LINNEA right DP artery 0.84 on 9/30/19.  Patient was hospitalized week of 10/07/2019 due to cellulitis of the right leg.  He developed significant swelling of the leg and redness extending into the thigh.  He was treated with IV antibiotics.  Redness and swelling have resolved.  Since most recent vascular intervention wound is drastically improved.          Current Assessment & Plan     Wound is nearly healed.  Debridement was not required.  There is manan wound excoriation from moisture with dermatitis.  Daily dressing changes with Mepilex Ag foam to control moisture.               See wound doc progress notes. Documents will be scanned.        Ahmet BARRETO Hart  [facility]

## 2020-02-06 ENCOUNTER — TELEPHONE (OUTPATIENT)
Dept: FAMILY MEDICINE | Facility: CLINIC | Age: 55
End: 2020-02-06

## 2020-02-10 ENCOUNTER — OFFICE VISIT (OUTPATIENT)
Dept: WOUND CARE | Facility: HOSPITAL | Age: 55
End: 2020-02-10
Attending: SURGERY
Payer: MEDICARE

## 2020-02-10 VITALS — SYSTOLIC BLOOD PRESSURE: 100 MMHG | DIASTOLIC BLOOD PRESSURE: 71 MMHG | RESPIRATION RATE: 20 BRPM | HEART RATE: 71 BPM

## 2020-02-10 DIAGNOSIS — L97.812 NON-PRESSURE CHRONIC ULCER OF OTHER PART OF RIGHT LOWER LEG WITH FAT LAYER EXPOSED: ICD-10-CM

## 2020-02-10 PROCEDURE — 11042 DBRDMT SUBQ TIS 1ST 20SQCM/<: CPT

## 2020-02-10 PROCEDURE — 99499 NO LOS: ICD-10-PCS | Mod: ,,, | Performed by: SURGERY

## 2020-02-10 PROCEDURE — 99499 UNLISTED E&M SERVICE: CPT | Mod: ,,, | Performed by: SURGERY

## 2020-02-10 PROCEDURE — 27201912 HC WOUND CARE DEBRIDEMENT SUPPLIES

## 2020-02-10 NOTE — PROGRESS NOTES
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 Had home health but not improving. Exam showed necrotic skin/fat upon presentation. Had evaluation of extremity by [facility] in July 2017 showing decrease inflow. Wheelchair bound since 2009 s/p CVA. Patient underwent angiogram with angioplasty to improve arterial flow.  There is been significant improvement in the wound since angioplasty was performed.   Santyl was being utilized. Switch to promogram on 4/23/18.  On 5/21/2018, the patient had signs of Pseudomonas colonization.  Silver alginate was initiated.    He developed a new wound in the anterior aspect of extremity due to trauma on 6/18/18.  This appeared to be partial-thickness. Mepilex Ag was started.  Patient presented on 7/2/2018 with a very tight dressing wrapped around his leg with an elastic wrap.  On 07/30/2018, patient was noted to have increased pain in his wound with enlargement of the wound. He was evaluated by Dr. Galeano who planned an angiogram as there was concern he has arterial ischemia causing decreased wound healing and worsening of the wound. The patient underwent angioplasy with DCB on 8/7/2018 of right SFA and Popliteal artery.  LINNEA performed 9/10/2018 was 0.92.  Wound had been progressing well.  Patient presented 01/28/2019 with pain in his right foot.  He has been experiencing pain with foot elevation and his pain has been markedly worse this past weekend.  He did remove the compression wraps due to pain. Attempts to obtain reliable Doppler signals and his posterior tibial and dorsalis pedis regions of the right foot were not successful.  Patient underwent PTA of a total occluded right superficial femoral artery, stenting of the right popliteal artery and PTA of an occluded peroneal artery  on 02/19/2019.  At least single-vessel runoff was established to the right foot.  LINNEA on 02/25/2019 was 0.86.   On 06/10/2019, the patient was noted to have green 10 on the drainage consistent with a Pseudomonas colonization.  There was a large amount of biofilm removed. There was a large amount of drainage and periwound wound skin maceration.  Mepilex Ag foam was initiated for improved drainage control with application of gentian violet.  Patient returns today 06/17/2019 with progression of the wound.  There is more slough present. He has edema in the leg.  Hesitant to implement compression due to patient's history of peripheral vascular disease.  Unable to obtain an LINNEA 06/17/2019.    Patient completed PTA of right superficial femoral, popliteal and posterior tibial arteries on 07/19/2019.  Patient was found to have maggots in his wound on the visit of 09/16/2019.  LINNEA right DP artery 0.84 on 9/30/19.  Patient was hospitalized week of 10/07/2019 due to cellulitis of the right leg.  He developed significant swelling of the leg and redness extending into the thigh.  He was treated with IV antibiotics.  Redness and swelling have resolved.  Since most recent vascular intervention wound is drastically improved.          Current Assessment & Plan     Patient with some increased swelling in the lower extremity this week.  The wound remains open but very superficial.  Patient encouraged to elevate for edema control. Will continue Mepilex Ag and triamcinolone.               See wound doc progress notes. Documents will be scanned.        Thompson MAN Marino [facility]

## 2020-02-10 NOTE — ASSESSMENT & PLAN NOTE
Patient with some increased swelling in the lower extremity this week.  The wound remains open but very superficial.  Patient encouraged to elevate for edema control. Will continue Mepilex Ag and triamcinolone.

## 2020-02-14 PROBLEM — R60.0 EDEMA OF RIGHT LOWER EXTREMITY: Status: ACTIVE | Noted: 2020-02-14

## 2020-02-17 PROBLEM — I87.1 MAY-THURNER SYNDROME: Status: ACTIVE | Noted: 2020-02-17

## 2020-03-02 ENCOUNTER — OFFICE VISIT (OUTPATIENT)
Dept: WOUND CARE | Facility: HOSPITAL | Age: 55
End: 2020-03-02
Attending: SURGERY
Payer: MEDICARE

## 2020-03-02 VITALS
HEART RATE: 62 BPM | RESPIRATION RATE: 18 BRPM | SYSTOLIC BLOOD PRESSURE: 119 MMHG | TEMPERATURE: 98 F | DIASTOLIC BLOOD PRESSURE: 82 MMHG

## 2020-03-02 DIAGNOSIS — L97.812 NON-PRESSURE CHRONIC ULCER OF OTHER PART OF RIGHT LOWER LEG WITH FAT LAYER EXPOSED: Primary | ICD-10-CM

## 2020-03-02 DIAGNOSIS — I73.9 PAD (PERIPHERAL ARTERY DISEASE): ICD-10-CM

## 2020-03-02 PROCEDURE — 99499 UNLISTED E&M SERVICE: CPT | Mod: ,,, | Performed by: SURGERY

## 2020-03-02 PROCEDURE — 99213 OFFICE O/P EST LOW 20 MIN: CPT

## 2020-03-02 PROCEDURE — 99499 NO LOS: ICD-10-PCS | Mod: ,,, | Performed by: SURGERY

## 2020-03-02 NOTE — ASSESSMENT & PLAN NOTE
As of 03/02/2020, the wound is healed.  Follow-up in 2 weeks for surveillance visit to ensure the wound remains healed.

## 2020-03-02 NOTE — PROGRESS NOTES
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 Had home health but not improving. Exam showed necrotic skin/fat upon presentation. Had evaluation of extremity by [facility] in July 2017 showing decrease inflow. Wheelchair bound since 2009 s/p CVA. Patient underwent angiogram with angioplasty to improve arterial flow.  There is been significant improvement in the wound since angioplasty was performed.   Santyl was being utilized. Switch to promogram on 4/23/18.  On 5/21/2018, the patient had signs of Pseudomonas colonization.  Silver alginate was initiated.    He developed a new wound in the anterior aspect of extremity due to trauma on 6/18/18.  This appeared to be partial-thickness. Mepilex Ag was started.  Patient presented on 7/2/2018 with a very tight dressing wrapped around his leg with an elastic wrap.  On 07/30/2018, patient was noted to have increased pain in his wound with enlargement of the wound. He was evaluated by Dr. Galeano who planned an angiogram as there was concern he has arterial ischemia causing decreased wound healing and worsening of the wound. The patient underwent angioplasy with DCB on 8/7/2018 of right SFA and Popliteal artery.  LINNEA performed 9/10/2018 was 0.92.  Wound had been progressing well.  Patient presented 01/28/2019 with pain in his right foot.  He has been experiencing pain with foot elevation and his pain has been markedly worse this past weekend.  He did remove the compression wraps due to pain. Attempts to obtain reliable Doppler signals and his posterior tibial and dorsalis pedis regions of the right foot were not successful.  Patient underwent PTA of a total occluded right superficial femoral artery, stenting of the right popliteal artery and PTA of an  occluded peroneal artery on 02/19/2019.  At least single-vessel runoff was established to the right foot.  LINNEA on 02/25/2019 was 0.86.   On 06/10/2019, the patient was noted to have green 10 on the drainage consistent with a Pseudomonas colonization.  There was a large amount of biofilm removed. There was a large amount of drainage and periwound wound skin maceration.  Mepilex Ag foam was initiated for improved drainage control with application of gentian violet.  Patient returns today 06/17/2019 with progression of the wound.  There is more slough present. He has edema in the leg.  Hesitant to implement compression due to patient's history of peripheral vascular disease.  Unable to obtain an LINNEA 06/17/2019.    Patient completed PTA of right superficial femoral, popliteal and posterior tibial arteries on 07/19/2019.  Patient was found to have maggots in his wound on the visit of 09/16/2019.  LINNEA right DP artery 0.84 on 9/30/19.  Patient was hospitalized week of 10/07/2019 due to cellulitis of the right leg.  He developed significant swelling of the leg and redness extending into the thigh.  He was treated with IV antibiotics.  Redness and swelling have resolved.  Since most recent vascular intervention wound is drastically improved.   As of 03/02/2020, the wound is healed.         Current Assessment & Plan     As of 03/02/2020, the wound is healed.  Follow-up in 2 weeks for surveillance visit to ensure the wound remains healed.            Cardiac/Vascular    PAD (peripheral artery disease)    Overview     Status post PTA of right superficial femoral, popliteal and posterior tibial arteries on 07/19/2019.               See wound doc progress notes. Documents will be scanned.        Ahmet Hart  [facility]

## 2020-04-08 ENCOUNTER — PATIENT OUTREACH (OUTPATIENT)
Dept: ADMINISTRATIVE | Facility: HOSPITAL | Age: 55
End: 2020-04-08

## 2020-04-12 RX ORDER — PANTOPRAZOLE SODIUM 40 MG/1
TABLET, DELAYED RELEASE ORAL
Qty: 90 TABLET | Refills: 1 | Status: SHIPPED | OUTPATIENT
Start: 2020-04-12 | End: 2020-10-14 | Stop reason: SDUPTHER

## 2020-04-21 ENCOUNTER — TELEPHONE (OUTPATIENT)
Dept: FAMILY MEDICINE | Facility: CLINIC | Age: 55
End: 2020-04-21

## 2020-04-21 NOTE — TELEPHONE ENCOUNTER
----- Message from Caroline Wilburn sent at 4/21/2020 12:09 PM CDT -----  Contact: [phone]/ Natalie with [employer] with [employer] would like to speak with you in regards to oxygen and medication form that was faxed over to your office. Please call.

## 2020-04-27 DIAGNOSIS — E11.9 TYPE 2 DIABETES MELLITUS WITHOUT COMPLICATION, WITHOUT LONG-TERM CURRENT USE OF INSULIN: ICD-10-CM

## 2020-04-27 RX ORDER — GLIPIZIDE 10 MG/1
10 TABLET ORAL
Qty: 180 TABLET | Refills: 3 | Status: SHIPPED | OUTPATIENT
Start: 2020-04-27 | End: 2021-01-06

## 2020-05-11 ENCOUNTER — OFFICE VISIT (OUTPATIENT)
Dept: WOUND CARE | Facility: HOSPITAL | Age: 55
End: 2020-05-11
Attending: SURGERY
Payer: MEDICARE

## 2020-05-11 DIAGNOSIS — L97.919 CHRONIC VENOUS HYPERTENSION W ULCER OF R LOW EXTREM: ICD-10-CM

## 2020-05-11 DIAGNOSIS — L97.812 NON-PRESSURE CHRONIC ULCER OF OTHER PART OF RIGHT LOWER LEG WITH FAT LAYER EXPOSED: Primary | ICD-10-CM

## 2020-05-11 DIAGNOSIS — I87.311 CHRONIC VENOUS HYPERTENSION W ULCER OF R LOW EXTREM: ICD-10-CM

## 2020-05-11 PROCEDURE — 27201912 HC WOUND CARE DEBRIDEMENT SUPPLIES

## 2020-05-11 PROCEDURE — 99214 OFFICE O/P EST MOD 30 MIN: CPT | Mod: 25

## 2020-05-11 PROCEDURE — 99499 UNLISTED E&M SERVICE: CPT | Mod: ,,, | Performed by: SURGERY

## 2020-05-11 PROCEDURE — 11042 DBRDMT SUBQ TIS 1ST 20SQCM/<: CPT

## 2020-05-11 PROCEDURE — 99499 NO LOS: ICD-10-PCS | Mod: ,,, | Performed by: SURGERY

## 2020-05-11 NOTE — PROGRESS NOTES
[facility]  Wound Care  History and Physical    Problem List Items Addressed This Visit     Non-pressure chronic ulcer of other part of right lower leg with fat layer exposed - Primary    Overview     Patient report trauma to right lateral leg 10/20/17. Had home health but not improving. Exam showed necrotic skin/fat upon presentation. Had evaluation of extremity by [facility] in July 2017 showing decrease inflow. Wheelchair bound since 2009 s/p CVA. Patient underwent angiogram with angioplasty to improve arterial flow.  There is been significant improvement in the wound since angioplasty was performed.   Santyl was being utilized. Switch to promogram on 4/23/18.  On 5/21/2018, the patient had signs of Pseudomonas colonization.  Silver alginate was initiated.    He developed a new wound in the anterior aspect of extremity due to trauma on 6/18/18.  This appeared to be partial-thickness. Mepilex Ag was started.  Patient presented on 7/2/2018 with a very tight dressing wrapped around his leg with an elastic wrap.  On 07/30/2018, patient was noted to have increased pain in his wound with enlargement of the wound. He was evaluated by Dr. Galeano who planned an angiogram as there was concern he has arterial ischemia causing decreased wound healing and worsening of the wound. The patient underwent angioplasy with DCB on 8/7/2018 of right SFA and Popliteal artery.  LINNEA performed 9/10/2018 was 0.92.  Wound had been progressing well.  Patient presented 01/28/2019 with pain in his right foot.  He has been experiencing pain with foot elevation and his pain has been markedly worse this past weekend.  He did remove the compression wraps due to pain. Attempts to obtain reliable Doppler signals and his posterior tibial and dorsalis pedis regions of the right foot were not successful.  Patient underwent PTA of a total occluded right superficial femoral artery, stenting of the right popliteal artery and PTA of an occluded  peroneal artery on 02/19/2019.  At least single-vessel runoff was established to the right foot.  LINNEA on 02/25/2019 was 0.86.   On 06/10/2019, the patient was noted to have green 10 on the drainage consistent with a Pseudomonas colonization.  There was a large amount of biofilm removed. There was a large amount of drainage and periwound wound skin maceration.  Mepilex Ag foam was initiated for improved drainage control with application of gentian violet.  Patient returns today 06/17/2019 with progression of the wound.  There is more slough present. He has edema in the leg.  Hesitant to implement compression due to patient's history of peripheral vascular disease.  Unable to obtain an LINNEA 06/17/2019.    Patient completed PTA of right superficial femoral, popliteal and posterior tibial arteries on 07/19/2019.  Patient was found to have maggots in his wound on the visit of 09/16/2019.  LINNEA right DP artery 0.84 on 9/30/19.  Patient was hospitalized week of 10/07/2019 due to cellulitis of the right leg.  He developed significant swelling of the leg and redness extending into the thigh.  He was treated with IV antibiotics.  Redness and swelling have resolved.  Since most recent vascular intervention wound is drastically improved.   As of 03/02/2020, the wound is healed.  Today 05/11/2020 Patient was discharged from the Wound Center about 2 months ago.  Sister called today stating he had 2 new wounds on the right lower extremity.  Patient says they been present for about a week.  He says they started as blisters and then developed into full-thickness wounds.  He denies any fever chills.  Denies any significant drainage.  She denies any odor.  Patient has been applying gentian violet.  Patient scheduled to see primary care physician later this week for hemoglobin A1c         Current Assessment & Plan     Patient well known Wound Center long history of right lower extremity wound.  Patient with significant lower extremity  vascular disease.  Patient with 2 new wounds which are clean and granulating.  Minimal swelling present in the lower extremity.  Will implement Mepilex Ag along with Tubigrip for compression                 History:    Past Medical History:   Diagnosis Date    Acute on chronic diastolic CHF (congestive heart failure), NYHA class 3 12/30/2017    CAD (coronary artery disease) 1/7/2013    Cerebral vascular accident     COPD (chronic obstructive pulmonary disease)     Cough syncope 6/15/2017    Diabetes mellitus     Hyperlipidemia     Hypertension     Laceration of right hand without foreign body 12/23/2019    Patient sustained injury to dorsum of right hand due to ronda. No significant pain. No significant drainage. No fever or chills    S/P CABG (coronary artery bypass graft) 1/7/2013    Ulcer of right pretibial region, limited to breakdown of skin 7/2/2018    Wound identified 6/18/2018 that is most likely related to tight dressing.    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       Past Surgical History:   Procedure Laterality Date    ANGIOGRAPHY OF LOWER EXTREMITY Right 2/19/2019    Procedure: Angiogram Extremity Unilateral;  Surgeon: Rudi Galeano MD;  Location: [facility] CATH LAB;  Service: Cardiology;  Laterality: Right;    ANGIOGRAPHY OF LOWER EXTREMITY Right 7/19/2019    Procedure: Angiogram Extremity Unilateral;  Surgeon: Rudi Galaeno MD;  Location: [facility] CATH LAB;  Service: Cardiology;  Laterality: Right;    CARDIAC CATHETERIZATION      CARDIAC CATHETERIZATION  02/2018    stent to right leg x 5 ( 1 stent placed weekly since late jan 2018)    CHOLECYSTECTOMY      CORONARY ARTERY BYPASS GRAFT      2006    CYST REMOVAL      PHLEBOGRAPHY Right 2/17/2020    Procedure: Venogram;  Surgeon: Rudi Galeano MD;  Location: [facility] CATH LAB;  Service: Cardiology;  Laterality: Right;  Patient needs anesthesia for sedation       Family History   Problem  Relation Age of Onset    Heart disease Mother     Hypertension Mother     Hypertension Father     Cancer Father     Hypertension Sister     Anemia Neg Hx     Arrhythmia Neg Hx     Asthma Neg Hx     Clotting disorder Neg Hx     Fainting Neg Hx     Heart attack Neg Hx     Heart failure Neg Hx     Hyperlipidemia Neg Hx     Prostate cancer Neg Hx     Kidney disease Neg Hx         reports that he quit smoking about 11 years ago. He has never used smokeless tobacco. He reports that he drinks alcohol. He reports that he does not use drugs.    has a current medication list which includes the following prescription(s): alprazolam, apixaban, atorvastatin, baclofen, blood sugar diagnostic, blood-glucose meter, carvedilol, clonidine, clopidogrel, fenofibrate, fluoxetine, furosemide, gabapentin, glipizide, lancets, lisinopril, melatonin, metformin, nitroglycerin, pantoprazole, and triamcinolone acetonide 0.1%.    Allergies:  Sulfa (sulfonamide antibiotics)    Review of Systems:  ROS      There were no vitals filed for this visit.      BMI:  There is no height or weight on file to calculate BMI.    Physical Exam:  Physical Exam  Patient is sitting up in bed.  He is in no distress.  Patient answers questions appropriately.  Examination of the lower extremities he has some contraction.  There is bilateral lower extremity edema.  There is no palpable pedal pulse.  There is full-thickness wounds in the posterior aspect of the right calf and medial aspect of the right calf.  There is no sign of infection.  A1C:  No results for input(s): HGBA1C in the last 2160 hours.  Patient is scheduled to see primary care physician this week for hemoglobin A1c drawn  BMP:  Recent Labs   Lab Result Units 02/18/20  0648   Glucose mg/dL 111*   Sodium mmol/L 145   Potassium mmol/L 4.1   Chloride mmol/L 107   CO2 mmol/L 27   BUN, Bld mg/dL 24*   Creatinine mg/dL 1.01   Calcium mg/dL 9.0      CBC:  Recent Labs   Lab Result Units  02/17/20  1040 02/18/20  0648   WBC K/uL 4.86 6.20   RBC M/uL 3.96* 3.67*   Hemoglobin g/dL 11.7* 10.8*   Hematocrit % 36.5* 33.6*   Platelets K/uL 174 163   Mean Corpuscular Volume fL 92 92   Mean Corpuscular Hemoglobin pg 29.5 29.4   Mean Corpuscular Hemoglobin Conc g/dL 32.1 32.1     CMP:  Recent Labs   Lab Result Units 02/18/20  0648   Glucose mg/dL 111*   Calcium mg/dL 9.0   Sodium mmol/L 145   Potassium mmol/L 4.1   CO2 mmol/L 27   Chloride mmol/L 107   BUN, Bld mg/dL 24*     PREALBUMIN:  No results for input(s): PREALBUMIN in the last 2160 hours.  WOUND CULTURES:  No results for input(s): LABAERO in the last 2160 hours.        Plan:  See Wound Docs note for plan and follow up.        Thompson MAN Mcdaniel  [facility]

## 2020-05-11 NOTE — ASSESSMENT & PLAN NOTE
Patient well known Wound Center long history of right lower extremity wound.  Patient with significant lower extremity vascular disease.  Patient with 2 new wounds which are clean and granulating.  Minimal swelling present in the lower extremity.  Will implement Mepilex Ag along with Tubigrip for compression

## 2020-05-15 DIAGNOSIS — E11.9 TYPE 2 DIABETES MELLITUS WITHOUT COMPLICATION, UNSPECIFIED WHETHER LONG TERM INSULIN USE: ICD-10-CM

## 2020-05-15 LAB
CHOLEST SERPL-MSCNC: 175 MG/DL (ref 0–200)
HBA1C MFR BLD: 7.5 %
HDL/CHOLESTEROL RATIO: 3.8
HDLC SERPL-MCNC: 46 MG/DL
LDLC SERPL CALC-MCNC: 95 MG/DL
NON HDL CHOL (CALC): 129
TRIGL SERPL-MCNC: 279 MG/DL
VLDL CHOLESTEROL: 56 MG/DL

## 2020-05-18 ENCOUNTER — OFFICE VISIT (OUTPATIENT)
Dept: WOUND CARE | Facility: HOSPITAL | Age: 55
End: 2020-05-18
Attending: SURGERY
Payer: MEDICARE

## 2020-05-18 VITALS — HEART RATE: 64 BPM | DIASTOLIC BLOOD PRESSURE: 77 MMHG | TEMPERATURE: 98 F | SYSTOLIC BLOOD PRESSURE: 116 MMHG

## 2020-05-18 DIAGNOSIS — I73.9 PAD (PERIPHERAL ARTERY DISEASE): ICD-10-CM

## 2020-05-18 DIAGNOSIS — L97.812 NON-PRESSURE CHRONIC ULCER OF OTHER PART OF RIGHT LOWER LEG WITH FAT LAYER EXPOSED: Primary | ICD-10-CM

## 2020-05-18 DIAGNOSIS — R60.0 EDEMA OF RIGHT LOWER EXTREMITY: ICD-10-CM

## 2020-05-18 PROCEDURE — 99499 UNLISTED E&M SERVICE: CPT | Mod: ,,, | Performed by: SURGERY

## 2020-05-18 PROCEDURE — 11042 DBRDMT SUBQ TIS 1ST 20SQCM/<: CPT

## 2020-05-18 PROCEDURE — 27201912 HC WOUND CARE DEBRIDEMENT SUPPLIES

## 2020-05-18 PROCEDURE — 99499 NO LOS: ICD-10-PCS | Mod: ,,, | Performed by: SURGERY

## 2020-05-18 NOTE — ASSESSMENT & PLAN NOTE
Wounds markedly improved with decreased swelling in the leg.  Continue compression to resolve edema.  Continue debridement to maintain active phase wound healing.

## 2020-05-22 DIAGNOSIS — Z12.11 COLON CANCER SCREENING: ICD-10-CM

## 2020-05-25 ENCOUNTER — OFFICE VISIT (OUTPATIENT)
Dept: WOUND CARE | Facility: HOSPITAL | Age: 55
End: 2020-05-25
Attending: SURGERY
Payer: MEDICARE

## 2020-05-25 VITALS
HEART RATE: 68 BPM | RESPIRATION RATE: 20 BRPM | DIASTOLIC BLOOD PRESSURE: 75 MMHG | SYSTOLIC BLOOD PRESSURE: 97 MMHG | TEMPERATURE: 98 F

## 2020-05-25 DIAGNOSIS — L97.812 NON-PRESSURE CHRONIC ULCER OF OTHER PART OF RIGHT LOWER LEG WITH FAT LAYER EXPOSED: ICD-10-CM

## 2020-05-25 PROCEDURE — 99499 NO LOS: ICD-10-PCS | Mod: ,,, | Performed by: SURGERY

## 2020-05-25 PROCEDURE — 11042 DBRDMT SUBQ TIS 1ST 20SQCM/<: CPT

## 2020-05-25 PROCEDURE — 27201912 HC WOUND CARE DEBRIDEMENT SUPPLIES

## 2020-05-25 PROCEDURE — 99499 UNLISTED E&M SERVICE: CPT | Mod: ,,, | Performed by: SURGERY

## 2020-05-25 NOTE — ASSESSMENT & PLAN NOTE
Wound markedly improved.  Swelling well controlled.  Sharp debridement is needed.  Stop alginate and implement silver gel.  Continue compression.

## 2020-05-25 NOTE — PROGRESS NOTES
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 Had home health but not improving. Exam showed necrotic skin/fat upon presentation. Had evaluation of extremity by [facility] in July 2017 showing decrease inflow. Wheelchair bound since 2009 s/p CVA. Patient underwent angiogram with angioplasty to improve arterial flow.  There is been significant improvement in the wound since angioplasty was performed.   Santyl was being utilized. Switch to promogram on 4/23/18.  On 5/21/2018, the patient had signs of Pseudomonas colonization.  Silver alginate was initiated.    He developed a new wound in the anterior aspect of extremity due to trauma on 6/18/18.  This appeared to be partial-thickness. Mepilex Ag was started.  Patient presented on 7/2/2018 with a very tight dressing wrapped around his leg with an elastic wrap.  On 07/30/2018, patient was noted to have increased pain in his wound with enlargement of the wound. He was evaluated by Dr. Galeano who planned an angiogram as there was concern he has arterial ischemia causing decreased wound healing and worsening of the wound. The patient underwent angioplasy with DCB on 8/7/2018 of right SFA and Popliteal artery.  LINNEA performed 9/10/2018 was 0.92.  Wound had been progressing well.  Patient presented 01/28/2019 with pain in his right foot.  He has been experiencing pain with foot elevation and his pain has been markedly worse this past weekend.  He did remove the compression wraps due to pain. Attempts to obtain reliable Doppler signals and his posterior tibial and dorsalis pedis regions of the right foot were not successful.  Patient underwent PTA of a total occluded right superficial femoral artery, stenting of the right popliteal artery and PTA of an occluded peroneal artery  on 02/19/2019.  At least single-vessel runoff was established to the right foot.  LINNEA on 02/25/2019 was 0.86.   On 06/10/2019, the patient was noted to have green 10 on the drainage consistent with a Pseudomonas colonization.  There was a large amount of biofilm removed. There was a large amount of drainage and periwound wound skin maceration.  Mepilex Ag foam was initiated for improved drainage control with application of gentian violet.  Patient returns today 06/17/2019 with progression of the wound.  There is more slough present. He has edema in the leg.  Hesitant to implement compression due to patient's history of peripheral vascular disease.  Unable to obtain an LINNEA 06/17/2019.    Patient completed PTA of right superficial femoral, popliteal and posterior tibial arteries on 07/19/2019.  Patient was found to have maggots in his wound on the visit of 09/16/2019.  LINNEA right DP artery 0.84 on 9/30/19.  Patient was hospitalized week of 10/07/2019 due to cellulitis of the right leg.  He developed significant swelling of the leg and redness extending into the thigh.  He was treated with IV antibiotics.  Redness and swelling have resolved.  Since most recent vascular intervention wound is drastically improved.   As of 03/02/2020, the wound is healed.  5/11/2020, Patient re-evaluated in the Wound Center.  Sister called stating he had 2 new wounds on the right lower extremity.  Patient says they been present for about a week.  He says they started as blisters and then developed into full-thickness wounds.  He denies any fever chills.  Denies any significant drainage.  She denies any odor.  Patient has been applying gentian violet.  Patient scheduled to see primary care physician later this week for hemoglobin A1c         Current Assessment & Plan     Wound markedly improved.  Swelling well controlled.  Sharp debridement is needed.  Stop alginate and implement silver gel.  Continue compression.               See wound  doc progress notes. Documents will be scanned.        Thompson MAN Mcdaniel  [facility]

## 2020-06-01 ENCOUNTER — OFFICE VISIT (OUTPATIENT)
Dept: WOUND CARE | Facility: HOSPITAL | Age: 55
End: 2020-06-01
Attending: SURGERY
Payer: MEDICARE

## 2020-06-01 VITALS
DIASTOLIC BLOOD PRESSURE: 82 MMHG | TEMPERATURE: 98 F | SYSTOLIC BLOOD PRESSURE: 116 MMHG | RESPIRATION RATE: 18 BRPM | HEART RATE: 82 BPM

## 2020-06-01 DIAGNOSIS — L97.812 NON-PRESSURE CHRONIC ULCER OF OTHER PART OF RIGHT LOWER LEG WITH FAT LAYER EXPOSED: ICD-10-CM

## 2020-06-01 PROCEDURE — 99499 NO LOS: ICD-10-PCS | Mod: ,,, | Performed by: SURGERY

## 2020-06-01 PROCEDURE — 99499 UNLISTED E&M SERVICE: CPT | Mod: ,,, | Performed by: SURGERY

## 2020-06-01 PROCEDURE — 27201912 HC WOUND CARE DEBRIDEMENT SUPPLIES

## 2020-06-01 PROCEDURE — 11042 DBRDMT SUBQ TIS 1ST 20SQCM/<: CPT

## 2020-06-01 NOTE — ASSESSMENT & PLAN NOTE
Wound is markedly improved.  There is some areas that have resolved.  Wound bed is clean and mostly granulating.  Periwound area much improved.  Continue current management

## 2020-06-01 NOTE — PROGRESS NOTES
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 Had home health but not improving. Exam showed necrotic skin/fat upon presentation. Had evaluation of extremity by [facility] in July 2017 showing decrease inflow. Wheelchair bound since 2009 s/p CVA. Patient underwent angiogram with angioplasty to improve arterial flow.  There is been significant improvement in the wound since angioplasty was performed.   Santyl was being utilized. Switch to promogram on 4/23/18.  On 5/21/2018, the patient had signs of Pseudomonas colonization.  Silver alginate was initiated.    He developed a new wound in the anterior aspect of extremity due to trauma on 6/18/18.  This appeared to be partial-thickness. Mepilex Ag was started.  Patient presented on 7/2/2018 with a very tight dressing wrapped around his leg with an elastic wrap.  On 07/30/2018, patient was noted to have increased pain in his wound with enlargement of the wound. He was evaluated by Dr. Galeano who planned an angiogram as there was concern he has arterial ischemia causing decreased wound healing and worsening of the wound. The patient underwent angioplasy with DCB on 8/7/2018 of right SFA and Popliteal artery.  LINNEA performed 9/10/2018 was 0.92.  Wound had been progressing well.  Patient presented 01/28/2019 with pain in his right foot.  He has been experiencing pain with foot elevation and his pain has been markedly worse this past weekend.  He did remove the compression wraps due to pain. Attempts to obtain reliable Doppler signals and his posterior tibial and dorsalis pedis regions of the right foot were not successful.  Patient underwent PTA of a total occluded right superficial femoral artery, stenting of the right popliteal artery and PTA of an occluded peroneal artery  on 02/19/2019.  At least single-vessel runoff was established to the right foot.  LINNEA on 02/25/2019 was 0.86.   On 06/10/2019, the patient was noted to have green 10 on the drainage consistent with a Pseudomonas colonization.  There was a large amount of biofilm removed. There was a large amount of drainage and periwound wound skin maceration.  Mepilex Ag foam was initiated for improved drainage control with application of gentian violet.  Patient returns today 06/17/2019 with progression of the wound.  There is more slough present. He has edema in the leg.  Hesitant to implement compression due to patient's history of peripheral vascular disease.  Unable to obtain an LINNEA 06/17/2019.    Patient completed PTA of right superficial femoral, popliteal and posterior tibial arteries on 07/19/2019.  Patient was found to have maggots in his wound on the visit of 09/16/2019.  LINNEA right DP artery 0.84 on 9/30/19.  Patient was hospitalized week of 10/07/2019 due to cellulitis of the right leg.  He developed significant swelling of the leg and redness extending into the thigh.  He was treated with IV antibiotics.  Redness and swelling have resolved.  Since most recent vascular intervention wound is drastically improved.   As of 03/02/2020, the wound is healed.  5/11/2020, Patient re-evaluated in the Wound Center.  Sister called stating he had 2 new wounds on the right lower extremity.  Patient says they been present for about a week.  He says they started as blisters and then developed into full-thickness wounds.  He denies any fever chills.  Denies any significant drainage.  She denies any odor.  Patient has been applying gentian violet.  Patient scheduled to see primary care physician later this week for hemoglobin A1c         Current Assessment & Plan     Wound is markedly improved.  There is some areas that have resolved.  Wound bed is clean and mostly granulating.  Periwound area much improved.  Continue current  management               See wound doc progress notes. Documents will be scanned.        Thompson MAN Mcdaniel  [facility]

## 2020-06-02 ENCOUNTER — OFFICE VISIT (OUTPATIENT)
Dept: FAMILY MEDICINE | Facility: CLINIC | Age: 55
End: 2020-06-02
Attending: FAMILY MEDICINE
Payer: MEDICARE

## 2020-06-02 VITALS
OXYGEN SATURATION: 98 % | HEIGHT: 65 IN | SYSTOLIC BLOOD PRESSURE: 116 MMHG | BODY MASS INDEX: 31.49 KG/M2 | DIASTOLIC BLOOD PRESSURE: 68 MMHG | HEART RATE: 65 BPM | WEIGHT: 189 LBS | TEMPERATURE: 98 F

## 2020-06-02 DIAGNOSIS — Z12.5 ENCOUNTER FOR SCREENING FOR MALIGNANT NEOPLASM OF PROSTATE: ICD-10-CM

## 2020-06-02 DIAGNOSIS — I10 ESSENTIAL HYPERTENSION: Chronic | ICD-10-CM

## 2020-06-02 DIAGNOSIS — I69.959 HEMIPLEGIA OF NONDOMINANT SIDE, LATE EFFECT OF CEREBROVASCULAR DISEASE: ICD-10-CM

## 2020-06-02 DIAGNOSIS — I25.10 CORONARY ARTERY DISEASE INVOLVING NATIVE CORONARY ARTERY WITHOUT ANGINA PECTORIS, UNSPECIFIED WHETHER NATIVE OR TRANSPLANTED HEART: Chronic | ICD-10-CM

## 2020-06-02 DIAGNOSIS — E78.2 MIXED HYPERLIPIDEMIA: ICD-10-CM

## 2020-06-02 DIAGNOSIS — E11.69 TYPE 2 DIABETES MELLITUS WITH OTHER SPECIFIED COMPLICATION, WITHOUT LONG-TERM CURRENT USE OF INSULIN: Primary | Chronic | ICD-10-CM

## 2020-06-02 PROBLEM — N17.9 AKI (ACUTE KIDNEY INJURY): Status: RESOLVED | Noted: 2019-10-03 | Resolved: 2020-06-02

## 2020-06-02 PROBLEM — R33.8 ACUTE URINARY RETENTION: Status: RESOLVED | Noted: 2018-03-27 | Resolved: 2020-06-02

## 2020-06-02 PROBLEM — N30.01 ACUTE CYSTITIS WITH HEMATURIA: Status: RESOLVED | Noted: 2018-02-28 | Resolved: 2020-06-02

## 2020-06-02 PROCEDURE — 99999 PR PBB SHADOW E&M-EST. PATIENT-LVL III: CPT | Mod: PBBFAC,,, | Performed by: FAMILY MEDICINE

## 2020-06-02 PROCEDURE — 99213 OFFICE O/P EST LOW 20 MIN: CPT | Mod: PBBFAC,PO | Performed by: FAMILY MEDICINE

## 2020-06-02 PROCEDURE — 99214 OFFICE O/P EST MOD 30 MIN: CPT | Mod: S$PBB,,, | Performed by: FAMILY MEDICINE

## 2020-06-02 PROCEDURE — 99214 PR OFFICE/OUTPT VISIT, EST, LEVL IV, 30-39 MIN: ICD-10-PCS | Mod: S$PBB,,, | Performed by: FAMILY MEDICINE

## 2020-06-02 PROCEDURE — 99999 PR PBB SHADOW E&M-EST. PATIENT-LVL III: ICD-10-PCS | Mod: PBBFAC,,, | Performed by: FAMILY MEDICINE

## 2020-06-02 RX ORDER — ATORVASTATIN CALCIUM 40 MG/1
40 TABLET, FILM COATED ORAL DAILY
Qty: 90 TABLET | Refills: 3 | Status: SHIPPED | OUTPATIENT
Start: 2020-06-02 | End: 2021-02-23 | Stop reason: SDUPTHER

## 2020-06-02 NOTE — PROGRESS NOTES
Subjective:       Patient ID: Kuldeep Alamo is a 55 y.o. male.    Chief Complaint: Diabetes    54 yr old pleasant white male with CVA, DM II, right leg ulcer, HTN, HLD, CAD, and other co morbidities presents today for his 6 m follow up. No new concerns.    CVA - with deficits - on wheelchair - on ASA      DM II - currently controlled - on metformin 850 BID - compliant - A1C                   6.9 (H)             01/22/2020           - foot n eye screen UTD      HTN - controlled - on lisinopril, coreg, clonidine - compliant - no side effects      HLD - controlled -    LDLCALC                  85.6                05/14/2019                         - on statin - lab due    CAD s/p CABG - follows Dr. Cesar, Cardiology - no CP or SOB - on ranexa      Wound right leg - follows podiatry and wound care in [place] - no new issues       History as below - reviewed     Diabetes   He presents for his follow-up diabetic visit. He has type 2 diabetes mellitus. His disease course has been worsening. Pertinent negatives for hypoglycemia include no dizziness, headaches, mood changes, nervousness/anxiousness, seizures, sleepiness, speech difficulty or tremors. Pertinent negatives for diabetes include no blurred vision, no chest pain, no foot ulcerations, no polyuria, no weakness and no weight loss. There are no hypoglycemic complications. Symptoms are stable. Diabetic complications include a CVA. Pertinent negatives for diabetic complications include no autonomic neuropathy, heart disease, impotence, peripheral neuropathy or PVD. Risk factors for coronary artery disease include dyslipidemia, diabetes mellitus, obesity, male sex and sedentary lifestyle. Current diabetic treatment includes oral agent (monotherapy). He is compliant with treatment most of the time. He is following a diabetic and generally healthy diet. Meal planning includes avoidance of concentrated sweets. He rarely participates in exercise. There is no change in his  home blood glucose trend. An ACE inhibitor/angiotensin II receptor blocker is being taken. He sees a podiatrist.Eye exam is current.   Hypertension   This is a chronic problem. The current episode started more than 1 year ago. The problem has been gradually improving since onset. The problem is controlled. Pertinent negatives include no anxiety, blurred vision, chest pain, headaches, malaise/fatigue or palpitations. There are no associated agents to hypertension. Risk factors for coronary artery disease include diabetes mellitus, male gender, obesity, sedentary lifestyle and dyslipidemia. Past treatments include angiotensin blockers and beta blockers. The current treatment provides moderate improvement. There are no compliance problems.  Hypertensive end-organ damage includes CAD/MI and CVA. There is no history of PVD. There is no history of chronic renal disease, hyperaldosteronism, hyperparathyroidism, a hypertension causing med, pheochromocytoma or a thyroid problem.   Hyperlipidemia   This is a chronic problem. The current episode started more than 1 year ago. The problem is controlled. Recent lipid tests were reviewed and are normal. He has no history of chronic renal disease. There are no known factors aggravating his hyperlipidemia. Pertinent negatives include no chest pain or myalgias. Current antihyperlipidemic treatment includes statins. The current treatment provides moderate improvement of lipids. Compliance problems include adherence to exercise.  Risk factors for coronary artery disease include diabetes mellitus, dyslipidemia, hypertension, male sex, obesity and a sedentary lifestyle.     Review of Systems   Constitutional: Negative.  Negative for activity change, diaphoresis, malaise/fatigue, unexpected weight change and weight loss.   HENT: Negative.  Negative for congestion, ear pain, mouth sores, rhinorrhea and voice change.    Eyes: Negative.  Negative for blurred vision, pain, discharge and  visual disturbance.   Respiratory: Negative.  Negative for apnea, cough and wheezing.    Cardiovascular: Negative.  Negative for chest pain and palpitations.   Gastrointestinal: Negative.  Negative for abdominal distention, anal bleeding, diarrhea and vomiting.   Endocrine: Negative.  Negative for cold intolerance and polyuria.   Genitourinary: Negative.  Negative for decreased urine volume, difficulty urinating, discharge, frequency, impotence and scrotal swelling.   Musculoskeletal: Negative.  Negative for back pain, myalgias and neck stiffness.   Skin: Negative.  Negative for color change and rash.   Allergic/Immunologic: Negative.  Negative for environmental allergies and immunocompromised state.   Neurological: Negative.  Negative for dizziness, tremors, seizures, speech difficulty, weakness, light-headedness and headaches.   Hematological: Negative.    Psychiatric/Behavioral: Negative.  Negative for agitation, dysphoric mood and suicidal ideas. The patient is not nervous/anxious.        PMH/PSH/FH/SH/MED/ALLERGY reviewed    Objective:       Vitals:    06/02/20 0945   BP: 116/68   Pulse: 65   Temp: 98.3 °F (36.8 °C)       Physical Exam   Constitutional: He is oriented to person, place, and time. He appears well-developed and well-nourished.   HENT:   Head: Normocephalic and atraumatic.   Right Ear: External ear normal.   Left Ear: External ear normal.   Nose: Nose normal.   Mouth/Throat: Oropharynx is clear and moist. No oropharyngeal exudate.   Eyes: Pupils are equal, round, and reactive to light. Conjunctivae and EOM are normal. Right eye exhibits no discharge. Left eye exhibits no discharge. No scleral icterus.   Neck: Normal range of motion. Neck supple. No JVD present. No tracheal deviation present. No thyromegaly present.   Cardiovascular: Normal rate, regular rhythm, normal heart sounds and intact distal pulses. Exam reveals no gallop and no friction rub.   No murmur heard.  Pulmonary/Chest: Effort  normal and breath sounds normal. No stridor. No respiratory distress. He has no wheezes. He has no rales. He exhibits no tenderness.   Abdominal: Soft. Bowel sounds are normal. He exhibits no distension and no mass. There is no tenderness. There is no rebound and no guarding. No hernia.   Musculoskeletal: Normal range of motion. He exhibits no edema or tenderness.   Lymphadenopathy:     He has no cervical adenopathy.   Neurological: He is alert and oriented to person, place, and time. He has normal reflexes. He displays normal reflexes. No cranial nerve deficit. He exhibits abnormal muscle tone. Coordination normal.   Skin: Skin is warm and dry. Capillary refill takes less than 2 seconds. Rash noted. There is erythema. No pallor.   Peeling skin B/L legs and also 4 cm diabetes ulcer wound right leg covered in bandage   Psychiatric: He has a normal mood and affect. His behavior is normal. Judgment and thought content normal.       Assessment:       1. Type 2 diabetes mellitus with other specified complication, without long-term current use of insulin    2. Mixed hyperlipidemia    3. Hemiplegia of nondominant side, late effect of cerebrovascular disease    4. Essential hypertension    5. Coronary artery disease involving native coronary artery without angina pectoris, unspecified whether native or transplanted heart    6. Encounter for screening for malignant neoplasm of prostate        Plan:           Kuldeep was seen today for diabetes.    Diagnoses and all orders for this visit:    Type 2 diabetes mellitus with other specified complication, without long-term current use of insulin  -     CBC auto differential; Future  -     Comprehensive metabolic panel; Future  -     Lipid Panel; Future  -     Hemoglobin A1C; Future  -     Urinalysis; Future  -     Microalbumin/creatinine urine ratio; Future    Mixed hyperlipidemia  -     atorvastatin (LIPITOR) 40 MG tablet; Take 1 tablet (40 mg total) by mouth once daily.  -     CBC  auto differential; Future  -     Comprehensive metabolic panel; Future  -     Lipid Panel; Future    Hemiplegia of nondominant side, late effect of cerebrovascular disease    Essential hypertension  -     CBC auto differential; Future  -     Comprehensive metabolic panel; Future  -     Lipid Panel; Future    Coronary artery disease involving native coronary artery without angina pectoris, unspecified whether native or transplanted heart  -     CBC auto differential; Future  -     Comprehensive metabolic panel; Future  -     Lipid Panel; Future    Encounter for screening for malignant neoplasm of prostate  -     PSA, Screening; Future      DM II  -controlled  -continue metformin and add glipizide 10 mg BID    HTN  -controlled    HLD  -continue statin    Right lef wound  -follow wound care    CAD  -controlled  -follow Cardiology    Spent adequate time in obtaining history and explaining differentials    25 minutes spent during this visit of which greater than 50% devoted to face-face counseling and coordination of care regarding diagnosis and management plan    Follow up in about 4 weeks (around 6/30/2020), or if symptoms worsen or fail to improve.

## 2020-06-05 RX ORDER — METFORMIN HYDROCHLORIDE 850 MG/1
850 TABLET ORAL 2 TIMES DAILY WITH MEALS
Start: 2020-06-05 | End: 2020-12-17

## 2020-06-05 NOTE — TELEPHONE ENCOUNTER
----- Message from Ninfa Mcadams sent at 6/5/2020 11:43 AM CDT -----  Contact: Mal ( Sister)-[phone]  Mal ( Sister)-[phone] is requesting a call back regarding a new prescription for her Brother's medication for metFORMIN (GLUCOPHAGE) 850 MG tablet, pt has been out of his medication for 2 days. Please call

## 2020-06-09 ENCOUNTER — TELEPHONE (OUTPATIENT)
Dept: FAMILY MEDICINE | Facility: CLINIC | Age: 55
End: 2020-06-09

## 2020-06-09 NOTE — TELEPHONE ENCOUNTER
Spoke to Pharmacist Danielle at [facility] and verbally called in Metformin 850mg 1 tablet twice daily with meals #180 with 1 additional refills. Informed pt's sister that pt's medicaiton was verbally called in to the pharmacy.

## 2020-06-09 NOTE — TELEPHONE ENCOUNTER
----- Message from Jennie BARRETOJuan Hager sent at 6/9/2020  9:57 AM CDT -----  Contact: [phone]  Pt's sister is requesting to speak with you re: ew prescription for metformin. She states that the pharmacy never received it. Please advise

## 2020-06-15 ENCOUNTER — OFFICE VISIT (OUTPATIENT)
Dept: WOUND CARE | Facility: HOSPITAL | Age: 55
End: 2020-06-15
Attending: SURGERY
Payer: MEDICARE

## 2020-06-15 VITALS
SYSTOLIC BLOOD PRESSURE: 117 MMHG | TEMPERATURE: 98 F | DIASTOLIC BLOOD PRESSURE: 80 MMHG | HEART RATE: 72 BPM | RESPIRATION RATE: 20 BRPM

## 2020-06-15 DIAGNOSIS — L97.812 NON-PRESSURE CHRONIC ULCER OF OTHER PART OF RIGHT LOWER LEG WITH FAT LAYER EXPOSED: ICD-10-CM

## 2020-06-15 PROCEDURE — 27201912 HC WOUND CARE DEBRIDEMENT SUPPLIES

## 2020-06-15 PROCEDURE — 11042 DBRDMT SUBQ TIS 1ST 20SQCM/<: CPT

## 2020-06-15 PROCEDURE — 99499 UNLISTED E&M SERVICE: CPT | Mod: ,,, | Performed by: SURGERY

## 2020-06-15 PROCEDURE — 99499 NO LOS: ICD-10-PCS | Mod: ,,, | Performed by: SURGERY

## 2020-06-15 NOTE — ASSESSMENT & PLAN NOTE
Patient returns today 06/15/2020 with an new wound just superior to the lateral right leg wound.  This wound is partial thickness.  There is no sign of infection.  Wound is clean and granulating.  Edema in the lower extremities fairly well controlled.  Continue silver gel.  Continue sharp debridement is needed.  Continue compression for edema control

## 2020-06-15 NOTE — PROGRESS NOTES
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 Had home health but not improving. Exam showed necrotic skin/fat upon presentation. Had evaluation of extremity by [facility] in July 2017 showing decrease inflow. Wheelchair bound since 2009 s/p CVA. Patient underwent angiogram with angioplasty to improve arterial flow.  There is been significant improvement in the wound since angioplasty was performed.   Santyl was being utilized. Switch to promogram on 4/23/18.  On 5/21/2018, the patient had signs of Pseudomonas colonization.  Silver alginate was initiated.    He developed a new wound in the anterior aspect of extremity due to trauma on 6/18/18.  This appeared to be partial-thickness. Mepilex Ag was started.  Patient presented on 7/2/2018 with a very tight dressing wrapped around his leg with an elastic wrap.  On 07/30/2018, patient was noted to have increased pain in his wound with enlargement of the wound. He was evaluated by Dr. Galeano who planned an angiogram as there was concern he has arterial ischemia causing decreased wound healing and worsening of the wound. The patient underwent angioplasy with DCB on 8/7/2018 of right SFA and Popliteal artery.  LINNEA performed 9/10/2018 was 0.92.  Wound had been progressing well.  Patient presented 01/28/2019 with pain in his right foot.  He has been experiencing pain with foot elevation and his pain has been markedly worse this past weekend.  He did remove the compression wraps due to pain. Attempts to obtain reliable Doppler signals and his posterior tibial and dorsalis pedis regions of the right foot were not successful.  Patient underwent PTA of a total occluded right superficial femoral artery, stenting of the right popliteal artery and PTA of an occluded peroneal artery  on 02/19/2019.  At least single-vessel runoff was established to the right foot.  LINNEA on 02/25/2019 was 0.86.   On 06/10/2019, the patient was noted to have green 10 on the drainage consistent with a Pseudomonas colonization.  There was a large amount of biofilm removed. There was a large amount of drainage and periwound wound skin maceration.  Mepilex Ag foam was initiated for improved drainage control with application of gentian violet.  Patient returns today 06/17/2019 with progression of the wound.  There is more slough present. He has edema in the leg.  Hesitant to implement compression due to patient's history of peripheral vascular disease.  Unable to obtain an LINNEA 06/17/2019.    Patient completed PTA of right superficial femoral, popliteal and posterior tibial arteries on 07/19/2019.  Patient was found to have maggots in his wound on the visit of 09/16/2019.  LINNEA right DP artery 0.84 on 9/30/19.  Patient was hospitalized week of 10/07/2019 due to cellulitis of the right leg.  He developed significant swelling of the leg and redness extending into the thigh.  He was treated with IV antibiotics.  Redness and swelling have resolved.  Since most recent vascular intervention wound is drastically improved.   As of 03/02/2020, the wound is healed.  5/11/2020, Patient re-evaluated in the Wound Center.  Sister called stating he had 2 new wounds on the right lower extremity.  Patient says they been present for about a week.  He says they started as blisters and then developed into full-thickness wounds.  He denies any fever chills.  Denies any significant drainage.  She denies any odor.  Patient has been applying gentian violet.  Patient scheduled to see primary care physician later this week for hemoglobin A1c         Current Assessment & Plan     Patient returns today 06/15/2020 with an new wound just superior to the lateral right leg wound.  This wound is partial thickness.  There is no sign of infection.  Wound is  clean and granulating.  Edema in the lower extremities fairly well controlled.  Continue silver gel.  Continue sharp debridement is needed.  Continue compression for edema control               See wound doc progress notes. Documents will be scanned.        Thompson MAN Mcdaniel  [facility]

## 2020-06-29 ENCOUNTER — OFFICE VISIT (OUTPATIENT)
Dept: WOUND CARE | Facility: HOSPITAL | Age: 55
End: 2020-06-29
Attending: SURGERY
Payer: MEDICARE

## 2020-06-29 VITALS
HEART RATE: 74 BPM | TEMPERATURE: 98 F | DIASTOLIC BLOOD PRESSURE: 78 MMHG | SYSTOLIC BLOOD PRESSURE: 123 MMHG | RESPIRATION RATE: 20 BRPM

## 2020-06-29 DIAGNOSIS — L97.812 NON-PRESSURE CHRONIC ULCER OF OTHER PART OF RIGHT LOWER LEG WITH FAT LAYER EXPOSED: ICD-10-CM

## 2020-06-29 DIAGNOSIS — I87.311 CHRONIC VENOUS HYPERTENSION W ULCER OF R LOW EXTREM: Primary | ICD-10-CM

## 2020-06-29 DIAGNOSIS — R60.0 EDEMA OF RIGHT LOWER EXTREMITY: ICD-10-CM

## 2020-06-29 DIAGNOSIS — L97.822 ULCER OF LEFT PRETIBIAL REGION WITH FAT LAYER EXPOSED: ICD-10-CM

## 2020-06-29 DIAGNOSIS — L97.929 IDIOPATHIC CHRONIC VENOUS HYPERTENSION OF LEFT LOWER EXTREMITY WITH ULCER: ICD-10-CM

## 2020-06-29 DIAGNOSIS — L97.919 CHRONIC VENOUS HYPERTENSION W ULCER OF R LOW EXTREM: Primary | ICD-10-CM

## 2020-06-29 DIAGNOSIS — I87.312 IDIOPATHIC CHRONIC VENOUS HYPERTENSION OF LEFT LOWER EXTREMITY WITH ULCER: ICD-10-CM

## 2020-06-29 PROCEDURE — 99499 UNLISTED E&M SERVICE: CPT | Mod: ,,, | Performed by: SURGERY

## 2020-06-29 PROCEDURE — 11042 DBRDMT SUBQ TIS 1ST 20SQCM/<: CPT

## 2020-06-29 PROCEDURE — 27201912 HC WOUND CARE DEBRIDEMENT SUPPLIES

## 2020-06-29 PROCEDURE — 99499 NO LOS: ICD-10-PCS | Mod: ,,, | Performed by: SURGERY

## 2020-06-29 PROCEDURE — 99213 OFFICE O/P EST LOW 20 MIN: CPT | Mod: 25

## 2020-06-29 NOTE — PROGRESS NOTES
[facility]  Wound Care  Progress Note    Problem List Items Addressed This Visit        Derm    Non-pressure chronic ulcer of other part of right lower leg with fat layer exposed    Overview     Patient report trauma to right lateral leg 10/20/17. Had home health but not improving. Exam showed necrotic skin/fat upon presentation. Had evaluation of extremity by [facility] in July 2017 showing decrease inflow. Wheelchair bound since 2009 s/p CVA. Patient underwent angiogram with angioplasty to improve arterial flow.  There is been significant improvement in the wound since angioplasty was performed.   Santyl was being utilized. Switch to promogram on 4/23/18.  On 5/21/2018, the patient had signs of Pseudomonas colonization.  Silver alginate was initiated.    He developed a new wound in the anterior aspect of extremity due to trauma on 6/18/18.  This appeared to be partial-thickness. Mepilex Ag was started.  Patient presented on 7/2/2018 with a very tight dressing wrapped around his leg with an elastic wrap.  On 07/30/2018, patient was noted to have increased pain in his wound with enlargement of the wound. He was evaluated by Dr. Galeano who planned an angiogram as there was concern he has arterial ischemia causing decreased wound healing and worsening of the wound. The patient underwent angioplasy with DCB on 8/7/2018 of right SFA and Popliteal artery.  LINNEA performed 9/10/2018 was 0.92.  Wound had been progressing well.  Patient presented 01/28/2019 with pain in his right foot.  He has been experiencing pain with foot elevation and his pain has been markedly worse this past weekend.  He did remove the compression wraps due to pain. Attempts to obtain reliable Doppler signals and his posterior tibial and dorsalis pedis regions of the right foot were not successful.  Patient underwent PTA of a total occluded right superficial femoral artery, stenting of the right popliteal artery and PTA of an occluded  peroneal artery on 02/19/2019.  At least single-vessel runoff was established to the right foot.  LINNEA on 02/25/2019 was 0.86.   On 06/10/2019, the patient was noted to have green 10 on the drainage consistent with a Pseudomonas colonization.  There was a large amount of biofilm removed. There was a large amount of drainage and periwound wound skin maceration.  Mepilex Ag foam was initiated for improved drainage control with application of gentian violet.  Patient returns today 06/17/2019 with progression of the wound.  There is more slough present. He has edema in the leg.  Hesitant to implement compression due to patient's history of peripheral vascular disease.  Unable to obtain an LINNEA 06/17/2019.    Patient completed PTA of right superficial femoral, popliteal and posterior tibial arteries on 07/19/2019.  Patient was found to have maggots in his wound on the visit of 09/16/2019.  LINNEA right DP artery 0.84 on 9/30/19.  Patient was hospitalized week of 10/07/2019 due to cellulitis of the right leg.  He developed significant swelling of the leg and redness extending into the thigh.  He was treated with IV antibiotics.  Redness and swelling have resolved.  Since most recent vascular intervention wound is drastically improved.   As of 03/02/2020, the wound is healed.  5/11/2020, Patient re-evaluated in the Wound Center.  Sister called stating he had 2 new wounds on the right lower extremity.  Patient says they been present for about a week.  He says they started as blisters and then developed into full-thickness wounds.  He denies any fever chills.    Wounds have worsened significantly with increased swelling of the leg.  LINNEA is noted for no identifiable flow in the right foot arteries.         Current Assessment & Plan     Worsening wounds of the right lower extremity with significant edema.  Patient indicates he has been sitting in his wheelchair with his legs down 12 hr a day.  This accounts for marked increase in  swelling.  There is a concern however that his arterial flow has now worsened once again.  Patient has been instructed to get out of his wheelchair during the day and elevate his legs in his lounge chair.  At this time this will be the only mechanism for keeping his swelling under control until he sees Dr. Galeano next week.  Dr. Galeano will evaluate his blood flow once again.         Chronic venous hypertension w ulcer of r low extrem - Primary    Ulcer of left pretibial region with fat layer exposed    Overview     Patient presented with a new wound the left lower extremity associated with edema on 06/29/2020.  The wound had been present for 1 week.         Idiopathic chronic venous hypertension of left lower extremity with ulcer       Other    Edema of bilateral lower extremity    Overview     Patient with marked worsening of lower extremity edema from sitting 12 hr a day in a wheelchair.         Current Assessment & Plan     Patient indicates he has been sitting in his wheelchair with his legs down 12 hr a day.  This accounts for marked increase in swelling.  There is a concern however that his arterial flow has now worsened once again.  Patient has been instructed to get out of his wheelchair during the day and elevate his legs in his lounge chair.  At this time this will be the only mechanism for keeping his swelling under control until he sees Dr. Galeano next week.  Dr. Galeano will evaluate his blood flow once again.                 See wound doc progress notes. Documents will be scanned.        Ahmet BARRETO Hart  [facility]

## 2020-06-29 NOTE — ASSESSMENT & PLAN NOTE
Worsening wounds of the right lower extremity with significant edema.  Patient indicates he has been sitting in his wheelchair with his legs down 12 hr a day.  This accounts for marked increase in swelling.  There is a concern however that his arterial flow has now worsened once again.  Patient has been instructed to get out of his wheelchair during the day and elevate his legs in his lounge chair.  At this time this will be the only mechanism for keeping his swelling under control until he sees Dr. Galeano next week.  Dr. Galeano will evaluate his blood flow once again.

## 2020-06-29 NOTE — ASSESSMENT & PLAN NOTE
Patient indicates he has been sitting in his wheelchair with his legs down 12 hr a day.  This accounts for marked increase in swelling.  There is a concern however that his arterial flow has now worsened once again.  Patient has been instructed to get out of his wheelchair during the day and elevate his legs in his lounge chair.  At this time this will be the only mechanism for keeping his swelling under control until he sees Dr. Galeano next week.  Dr. Galeano will evaluate his blood flow once again.

## 2020-07-08 ENCOUNTER — TELEPHONE (OUTPATIENT)
Dept: FAMILY MEDICINE | Facility: CLINIC | Age: 55
End: 2020-07-08

## 2020-07-08 NOTE — TELEPHONE ENCOUNTER
----- Message from Mikaela Posadas sent at 7/8/2020 10:57 AM CDT -----  Regarding: questions  Pt sister elsa is requesting a call to [phone] on behalf of her brother the patient .     Thanks

## 2020-07-10 ENCOUNTER — PATIENT OUTREACH (OUTPATIENT)
Dept: ADMINISTRATIVE | Facility: HOSPITAL | Age: 55
End: 2020-07-10

## 2020-07-10 NOTE — PROGRESS NOTES
Updated 5/15/20 Hemoglobin A1C & Lipid Panel.   Scanned 5/15/20 CMP & CBC in .   Updated 2017 & 2019 Diabetic Eye Exams from .

## 2020-07-20 ENCOUNTER — OFFICE VISIT (OUTPATIENT)
Dept: WOUND CARE | Facility: HOSPITAL | Age: 55
End: 2020-07-20
Attending: SURGERY
Payer: MEDICARE

## 2020-07-20 VITALS — HEART RATE: 86 BPM | SYSTOLIC BLOOD PRESSURE: 138 MMHG | DIASTOLIC BLOOD PRESSURE: 80 MMHG | TEMPERATURE: 98 F

## 2020-07-20 DIAGNOSIS — L97.812 NON-PRESSURE CHRONIC ULCER OF OTHER PART OF RIGHT LOWER LEG WITH FAT LAYER EXPOSED: ICD-10-CM

## 2020-07-20 DIAGNOSIS — L89.610 DECUBITUS ULCER OF HEEL, RIGHT, UNSTAGEABLE: ICD-10-CM

## 2020-07-20 PROCEDURE — 99499 UNLISTED E&M SERVICE: CPT | Mod: ,,, | Performed by: SURGERY

## 2020-07-20 PROCEDURE — 99499 NO LOS: ICD-10-PCS | Mod: ,,, | Performed by: SURGERY

## 2020-07-20 PROCEDURE — 11042 DBRDMT SUBQ TIS 1ST 20SQCM/<: CPT

## 2020-07-20 PROCEDURE — 27201912 HC WOUND CARE DEBRIDEMENT SUPPLIES

## 2020-07-20 NOTE — ASSESSMENT & PLAN NOTE
Wounds are significantly worse.  He has significant increase in size of the wounds along with significantly more slough present.  There is serous type drainage.  Patient is also developed a pressure injury to his heel.  At this time we will use [employer].  We will offload the heel with a phone boot.  We will obtain records from Cardiology and [facility].

## 2020-07-20 NOTE — PROGRESS NOTES
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 Had home health but not improving. Exam showed necrotic skin/fat upon presentation. Had evaluation of extremity by [facility] in July 2017 showing decrease inflow. Wheelchair bound since 2009 s/p CVA. Patient underwent angiogram with angioplasty to improve arterial flow.  There is been significant improvement in the wound since angioplasty was performed.   Santyl was being utilized. Switch to promogram on 4/23/18.  On 5/21/2018, the patient had signs of Pseudomonas colonization.  Silver alginate was initiated.    He developed a new wound in the anterior aspect of extremity due to trauma on 6/18/18.  This appeared to be partial-thickness. Mepilex Ag was started.  Patient presented on 7/2/2018 with a very tight dressing wrapped around his leg with an elastic wrap.  On 07/30/2018, patient was noted to have increased pain in his wound with enlargement of the wound. He was evaluated by Dr. Galeano who planned an angiogram as there was concern he has arterial ischemia causing decreased wound healing and worsening of the wound. The patient underwent angioplasy with DCB on 8/7/2018 of right SFA and Popliteal artery.  LINNEA performed 9/10/2018 was 0.92.  Wound had been progressing well.  Patient presented 01/28/2019 with pain in his right foot.  He has been experiencing pain with foot elevation and his pain has been markedly worse this past weekend.  He did remove the compression wraps due to pain. Attempts to obtain reliable Doppler signals and his posterior tibial and dorsalis pedis regions of the right foot were not successful.  Patient underwent PTA of a total occluded right superficial femoral artery, stenting of the right popliteal artery and PTA of an occluded peroneal artery  on 02/19/2019.  At least single-vessel runoff was established to the right foot.  LINNEA on 02/25/2019 was 0.86.   On 06/10/2019, the patient was noted to have green 10 on the drainage consistent with a Pseudomonas colonization.  There was a large amount of biofilm removed. There was a large amount of drainage and periwound wound skin maceration.  Mepilex Ag foam was initiated for improved drainage control with application of gentian violet.  Patient returns today 06/17/2019 with progression of the wound.  There is more slough present. He has edema in the leg.  Hesitant to implement compression due to patient's history of peripheral vascular disease.  Unable to obtain an LINNEA 06/17/2019.    Patient completed PTA of right superficial femoral, popliteal and posterior tibial arteries on 07/19/2019.  Patient was found to have maggots in his wound on the visit of 09/16/2019.  LINNEA right DP artery 0.84 on 9/30/19.  Patient was hospitalized week of 10/07/2019 due to cellulitis of the right leg.  He developed significant swelling of the leg and redness extending into the thigh.  He was treated with IV antibiotics.  Redness and swelling have resolved.  Since most recent vascular intervention wound is drastically improved.   As of 03/02/2020, the wound is healed.  5/11/2020, Patient re-evaluated in the [facility].  Sister called stating he had 2 new wounds on the right lower extremity.  Patient says they been present for about a week.  He says they started as blisters and then developed into full-thickness wounds.  He denies any fever chills.    Wounds have worsened significantly with increased swelling of the leg.  LINNEA is noted for no identifiable flow in the right foot arteries.  7/20/20.  Patient underwent repeat evaluation by Cardiology Dr. Galeano and underwent right lower extremity intervention.  Findings and report are not available at this time.  Patient states that he ended up with significant pain in the lower extremity  and was admitted to the hospital last week.  It is unclear what treatment had while he was an inpatient at [facility].  Patient states that he did not require any further procedures.  Patient has been having spasms and pain in his leg since.         Current Assessment & Plan     Wounds are significantly worse.  He has significant increase in size of the wounds along with significantly more slough present.  There is serous type drainage.  Patient is also developed a pressure injury to his heel.  At this time we will use Mesalt.  We will offload the heel with a phone boot.  We will obtain records from Cardiology and [facility].               See wound doc progress notes. Documents will be scanned.        Thompson MAN Mcdaniel  [facility]

## 2020-07-22 PROBLEM — S81.801A LEG WOUND, RIGHT, INITIAL ENCOUNTER: Status: RESOLVED | Noted: 2020-07-22 | Resolved: 2020-07-22

## 2020-07-22 PROBLEM — L03.115 CELLULITIS OF RIGHT LOWER EXTREMITY: Status: ACTIVE | Noted: 2020-07-22

## 2020-07-22 PROBLEM — S81.801A LEG WOUND, RIGHT, INITIAL ENCOUNTER: Status: ACTIVE | Noted: 2020-07-22

## 2020-07-22 PROBLEM — Z86.718 HISTORY OF DVT OF LOWER EXTREMITY: Status: ACTIVE | Noted: 2020-07-22

## 2020-07-24 PROBLEM — S81.801A LEG WOUND, RIGHT, INITIAL ENCOUNTER: Status: ACTIVE | Noted: 2020-07-24

## 2020-07-24 PROBLEM — I96 DRY GANGRENE: Status: ACTIVE | Noted: 2020-07-24

## 2020-08-04 ENCOUNTER — PATIENT OUTREACH (OUTPATIENT)
Dept: ADMINISTRATIVE | Facility: OTHER | Age: 55
End: 2020-08-04

## 2020-08-04 PROBLEM — N17.9 AKI (ACUTE KIDNEY INJURY): Status: RESOLVED | Noted: 2019-10-03 | Resolved: 2020-08-04

## 2020-08-04 NOTE — PROGRESS NOTES
Requested updates within Care Everywhere.  Patient's chart was reviewed for overdue JUAN PABLO topics.  Immunizations reconciled.    Orders placed:  Tasked appts:  Labs Linked:

## 2020-08-06 ENCOUNTER — OFFICE VISIT (OUTPATIENT)
Dept: PODIATRY | Facility: CLINIC | Age: 55
End: 2020-08-06
Payer: MEDICARE

## 2020-08-06 VITALS — HEART RATE: 84 BPM | SYSTOLIC BLOOD PRESSURE: 144 MMHG | DIASTOLIC BLOOD PRESSURE: 79 MMHG

## 2020-08-06 DIAGNOSIS — S81.801A WOUND OF RIGHT LOWER EXTREMITY, INITIAL ENCOUNTER: ICD-10-CM

## 2020-08-06 DIAGNOSIS — I73.9 PAD (PERIPHERAL ARTERY DISEASE): ICD-10-CM

## 2020-08-06 DIAGNOSIS — I70.229 CRITICAL LOWER LIMB ISCHEMIA: ICD-10-CM

## 2020-08-06 DIAGNOSIS — L97.419 HEEL ULCERATION, RIGHT, WITH UNSPECIFIED SEVERITY: Primary | ICD-10-CM

## 2020-08-06 PROCEDURE — 11043 DBRDMT MUSC&/FSCA 1ST 20/<: CPT | Mod: S$PBB,,, | Performed by: PODIATRIST

## 2020-08-06 PROCEDURE — 99999 PR PBB SHADOW E&M-EST. PATIENT-LVL V: ICD-10-PCS | Mod: PBBFAC,,, | Performed by: PODIATRIST

## 2020-08-06 PROCEDURE — 99999 PR PBB SHADOW E&M-EST. PATIENT-LVL V: CPT | Mod: PBBFAC,,, | Performed by: PODIATRIST

## 2020-08-06 PROCEDURE — 99213 OFFICE O/P EST LOW 20 MIN: CPT | Mod: 25,S$PBB,, | Performed by: PODIATRIST

## 2020-08-06 PROCEDURE — 99213 PR OFFICE/OUTPT VISIT, EST, LEVL III, 20-29 MIN: ICD-10-PCS | Mod: 25,S$PBB,, | Performed by: PODIATRIST

## 2020-08-06 PROCEDURE — 99215 OFFICE O/P EST HI 40 MIN: CPT | Mod: PBBFAC,PN | Performed by: PODIATRIST

## 2020-08-06 PROCEDURE — 11046 DBRDMT MUSC&/FSCA EA ADDL: CPT | Mod: PBBFAC,PN | Performed by: PODIATRIST

## 2020-08-06 PROCEDURE — 11046 WOUND DEBRIDEMENT: ICD-10-PCS | Mod: S$PBB,,, | Performed by: PODIATRIST

## 2020-08-06 PROCEDURE — 11043 WOUND DEBRIDEMENT: ICD-10-PCS | Mod: S$PBB,,, | Performed by: PODIATRIST

## 2020-08-06 RX ORDER — OXYCODONE AND ACETAMINOPHEN 7.5; 325 MG/1; MG/1
1 TABLET ORAL EVERY 6 HOURS PRN
Qty: 20 TABLET | Refills: 0 | Status: ON HOLD | OUTPATIENT
Start: 2020-08-06 | End: 2020-11-09 | Stop reason: HOSPADM

## 2020-08-06 NOTE — PROGRESS NOTES
Subjective:      Patient ID: Kuldeep Alamo is a 55 y.o. male.    Chief Complaint: Post-op Problem (right foot. VAc.) and SX 07/22/2020 (right foot. )      55 y.o. male presenting with status post right lower extremity wound debridement with right heel excision of the wound with VAC application (DOS: 7/22/20 GP: 8/1/20) Known to Dr. Galeano status post revascularization of the right lower extremity recently.  Patient is from [facility].  Presenting with VAC of the heel ulcer.  Complains of pain along the right lower extremity and right heel.     Review of Systems   Constitution: Negative for chills, decreased appetite, fever and malaise/fatigue.   HENT: Negative for congestion, ear discharge and sore throat.    Eyes: Negative for discharge and pain.   Cardiovascular: Negative for chest pain, claudication and leg swelling.   Respiratory: Negative for cough and shortness of breath.    Skin: Positive for color change and poor wound healing. Negative for nail changes and rash.   Musculoskeletal: Positive for muscle weakness and stiffness. Negative for arthritis, joint pain and joint swelling.   Gastrointestinal: Negative for bloating, abdominal pain, diarrhea, nausea and vomiting.   Genitourinary: Negative for flank pain and hematuria.   Neurological: Positive for focal weakness, loss of balance, numbness and sensory change. Negative for headaches and weakness.   Psychiatric/Behavioral: Negative for altered mental status.             Past Medical History:   Diagnosis Date    Acute on chronic diastolic CHF (congestive heart failure), NYHA class 3 12/30/2017    CAD (coronary artery disease) 1/7/2013    Cerebral vascular accident     COPD (chronic obstructive pulmonary disease)     Cough syncope 6/15/2017    Diabetes mellitus     Hyperlipidemia     Hypertension     Laceration of right hand without foreign body 12/23/2019    Patient sustained injury to dorsum of right hand due to ronda. No significant  pain. No significant drainage. No fever or chills    Localized edema of right lower leg 12/7/2017    S/P CABG (coronary artery bypass graft) 1/7/2013    Ulcer of right pretibial region, limited to breakdown of skin 7/2/2018    Wound identified 6/18/2018 that is most likely related to tight dressing.    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       Past Surgical History:   Procedure Laterality Date    ANGIOGRAPHY OF LOWER EXTREMITY Right 2/19/2019    Procedure: Angiogram Extremity Unilateral;  Surgeon: Rudi Galeano MD;  Location: [facility] CATH LAB;  Service: Cardiology;  Laterality: Right;    ANGIOGRAPHY OF LOWER EXTREMITY Right 7/19/2019    Procedure: Angiogram Extremity Unilateral;  Surgeon: Rudi Galeano MD;  Location: [facility] CATH LAB;  Service: Cardiology;  Laterality: Right;    APPLICATION OF WOUND VACUUM-ASSISTED CLOSURE DEVICE Right 7/28/2020    Procedure: APPLICATION, WOUND VAC;  Surgeon: Yolanda Meyers DPM;  Location: [facility] OR;  Service: Podiatry;  Laterality: Right;    BONE BIOPSY Right 7/28/2020    Procedure: BIOPSY, BONE;  Surgeon: Yolanda Meyers DPM;  Location: [facility] OR;  Service: Podiatry;  Laterality: Right;    CARDIAC CATHETERIZATION      CARDIAC CATHETERIZATION  02/2018    stent to right leg x 5 ( 1 stent placed weekly since late jan 2018)    CHOLECYSTECTOMY      CORONARY ARTERY BYPASS GRAFT      2006    CYST REMOVAL      DEBRIDEMENT OF FOOT Right 7/28/2020    Procedure: DEBRIDEMENT, FOOT;  Surgeon: Yolanda Meyers DPM;  Location: [facility] OR;  Service: Podiatry;  Laterality: Right;    PHLEBOGRAPHY Right 2/17/2020    Procedure: Venogram;  Surgeon: Rudi Galeano MD;  Location: [facility] CATH LAB;  Service: Cardiology;  Laterality: Right;  Patient needs anesthesia for sedation       Family History   Problem Relation Age of Onset    Heart disease Mother     Hypertension Mother     Hypertension Father     Cancer Father     Hypertension Sister      Anemia Neg Hx     Arrhythmia Neg Hx     Asthma Neg Hx     Clotting disorder Neg Hx     Fainting Neg Hx     Heart attack Neg Hx     Heart failure Neg Hx     Hyperlipidemia Neg Hx     Prostate cancer Neg Hx     Kidney disease Neg Hx        Social History     Socioeconomic History    Marital status: Single     Spouse name: Not on file    Number of children: Not on file    Years of education: Not on file    Highest education level: Not on file   Occupational History    Not on file   Social Needs    Financial resource strain: Not on file    Food insecurity     Worry: Not on file     Inability: Not on file    Transportation needs     Medical: Not on file     Non-medical: Not on file   Tobacco Use    Smoking status: Former Smoker     Quit date: 2009     Years since quittin.5    Smokeless tobacco: Never Used    Tobacco comment: quit 9 yrs ago   Substance and Sexual Activity    Alcohol use: Yes     Comment: social on weekends    Drug use: No    Sexual activity: Never   Lifestyle    Physical activity     Days per week: Not on file     Minutes per session: Not on file    Stress: Not on file   Relationships    Social connections     Talks on phone: Not on file     Gets together: Not on file     Attends Congregational service: Not on file     Active member of club or organization: Not on file     Attends meetings of clubs or organizations: Not on file     Relationship status: Not on file   Other Topics Concern    Not on file   Social History Narrative    Lives in [place] alone; He has a sister who helps him; He has 4 sisters; He is disabled; He worked [employer] prior to his stroke; 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 He watches soap operas and likes to visit with family and friends. He also has a half-brother           Current Outpatient Medications   Medication Sig Dispense Refill     ALPRAZolam (XANAX) 0.5 MG tablet Take 1 tablet (0.5 mg total) by mouth 3 (three) times daily as needed for Anxiety. 10 tablet 0    amoxicillin-clavulanate 875-125mg (AUGMENTIN) 875-125 mg per tablet Take 1 tablet by mouth every 12 (twelve) hours. FOR 10 MORE DAYS      apixaban (ELIQUIS) 5 mg Tab Take 1 tablet (5 mg total) by mouth 2 (two) times daily.      atorvastatin (LIPITOR) 40 MG tablet Take 1 tablet (40 mg total) by mouth once daily. 90 tablet 3    baclofen (LIORESAL) 20 MG tablet Take 1 tablet (20 mg total) by mouth 2 (two) times daily as needed (muscle spasm).      blood sugar diagnostic Strp Test sugar once daily as needed 100 strip 3    carvediloL (COREG) 6.25 MG tablet Take 1 tablet (6.25 mg total) by mouth 2 (two) times daily.      cloNIDine (CATAPRES) 0.1 MG tablet TAKE 1 TABLET (0.1 MG TOTAL) BY MOUTH EVERY 6 (SIX) HOURS AS NEEDED. If bp 160/100 or greater 30 tablet 5    clopidogrel (PLAVIX) 75 mg tablet TAKE 1 TABLET BY MOUTH ONCE A DAY 30 tablet 0    fenofibrate 160 MG Tab TAKE 1 TABLET (160 MG TOTAL) BY MOUTH ONCE DAILY. 30 tablet 1    furosemide (LASIX) 80 MG tablet TAKE 1 TABLET BY MOUTH TWICE A  tablet 1    gabapentin (NEURONTIN) 100 MG capsule Take 100 mg by mouth 3 (three) times daily.      glipiZIDE (GLUCOTROL) 10 MG tablet TAKE 1 TABLET (10 MG TOTAL) BY MOUTH 2 (TWO) TIMES DAILY BEFORE MEALS. 180 tablet 3    HYDROcodone-acetaminophen (NORCO)  mg per tablet Take 1 tablet by mouth every 4 (four) hours as needed. 10 tablet 0    insulin aspart U-100 (NOVOLOG) 100 unit/mL (3 mL) InPn pen Inject 1-10 Units into the skin before meals and at bedtime as needed (Hyperglycemia). **MODERATE CORRECTION DOSE**    Blood Glucose  mg/dL                  Pre-meal                Bedtime  151-200                2 units                    1 unit  201-250                4 units                    2 units    251-300                6 units                    3 units    301-350                 8 units                    4 units   >350                     10 units                  5 units  Administer subcutaneously if needed at times designated by monitoring schedule.  0    lisinopril (PRINIVIL,ZESTRIL) 20 MG tablet Take 1 tablet (20 mg total) by mouth once daily. 90 tablet 3    melatonin 10 mg Tab Take 1 tablet (10 mg total) by mouth nightly as needed (insomnia).      metFORMIN (GLUCOPHAGE) 850 MG tablet Take 1 tablet (850 mg total) by mouth 2 (two) times daily with meals.      neomycin-bacitracin-polymyxin (NEOSPORIN) ointment Apply topically once daily.  0    nitroGLYCERIN (NITROSTAT) 0.4 MG SL tablet DISSOLVE 1 TAB UNDER TONGUE EVERY 5 MINUTS AT ONSET OF CHEST PAIN-MAX 3 PER 15 MINUTES--GO TO ER 25 tablet 11    pantoprazole (PROTONIX) 40 MG tablet TAKE 1 TABLET BY MOUTH EVERY DAY 90 tablet 1    potassium chloride SA (K-DUR,KLOR-CON) 20 MEQ tablet Take 20 mEq by mouth once daily.      spironolactone (ALDACTONE) 25 MG tablet Take 25 mg by mouth once daily.      blood-glucose meter kit Use as instructed 1 each 0    FLUoxetine (PROZAC) 20 MG capsule TAKE 1 CAPSULE (20 MG TOTAL) BY MOUTH ONCE DAILY. 30 capsule 11    lancets 30 gauge Misc 1 lancet by Misc.(Non-Drug; Combo Route) route once daily. 100 each 3    oxyCODONE-acetaminophen (PERCOCET) 7.5-325 mg per tablet Take 1 tablet by mouth every 6 (six) hours as needed for Pain. 20 tablet 0     No current facility-administered medications for this visit.        Review of patient's allergies indicates:   Allergen Reactions    Sulfa (sulfonamide antibiotics) Nausea And Vomiting       Vitals:    08/06/20 0958   BP: (!) 144/79   Pulse: 84   PainSc: 10-Worst pain ever   PainLoc: Foot       Objective:      Physical Exam  Constitutional:       General: He is not in acute distress.     Appearance: He is well-developed.   HENT:      Nose: Nose normal.   Eyes:      Conjunctiva/sclera: Conjunctivae normal.   Pulmonary:      Effort: Pulmonary effort is  normal.   Chest:      Chest wall: No tenderness.   Abdominal:      Tenderness: There is no abdominal tenderness.   Neurological:      Mental Status: He is alert and oriented to person, place, and time.   Psychiatric:         Behavior: Behavior normal.         Vascular: Distal DP/PT pulses palpable 0/4. No vericosities noted to LEs. Hair growth absent LE, warm to touch LE and cool to touch to toes   Right lower extremity: + edema noted to LE.     Dermatologic:   Right lower extremity:  Multiple wounds lower leg with fibrotic wound base, minimal drainage, no pus, no crepitus, +rubor without erythema.  Surgically created wound of the right heel with granular wound base.  Mild periwound maceration.  No drainage, no rubor, no erythema, no signs of infection.  Wound size of the heel approximally 5cm by 5 cm by 1.5cm.      Musculoskeletal:   Right lower extremity : 3/5 MMT of the foot and ankle joints. Pain distal leg over wounds and heel.      Neurological:   Light touch, proprioception, and sharp/dull sensation are decreased. Protective threshold with the Dixon-Wienstein monofilament is decreased. Vibratory sensation decreased.           Assessment:       Encounter Diagnoses   Name Primary?    PAD (peripheral artery disease)     Critical lower limb ischemia     Wound of right lower extremity, initial encounter     Heel ulceration, right, with unspecified severity Yes         Plan:       Kuldeep was seen today for post-op problem and sx 07/22/2020.    Diagnoses and all orders for this visit:    Heel ulceration, right, with unspecified severity    PAD (peripheral artery disease)    Critical lower limb ischemia    Wound of right lower extremity, initial encounter    Other orders  -     oxyCODONE-acetaminophen (PERCOCET) 7.5-325 mg per tablet; Take 1 tablet by mouth every 6 (six) hours as needed for Pain.      I counseled the patient on his conditions, their implications and medical management.    55 y.o. male with status  post right lower extremity wound debridement with heel necrotic wound excision with VAC application.     -rx. Percocet 7.5mg.  -VAC was taken down and right lower extremity wounds were closely inspected.  Healing uneventfully of the right lower extremity wounds.  I cleaned with saline.  Xeroform applied over the distal aspect of the right lower extremity and dressed with dry sterile dressing, Kerlix and ACE.  I recommend local wound care with triple ointment for lower leg wound.  No acute signs of infection noted.   -status post right heel wound debridement.  See procedure note.  Patient tolerated well.  Complains of ischemic/wound pain.  I am going to prescribe pain medication.  VAC is to be placed back at [facility].   -stressed the importance of offloading the right heel.  Recommend put pillows/blanket underneath the calf to offload the heel.     -The nature of the condition, options for management, as well as potential risks and complications were discussed in detail with patient. Patient was amenable to my recommendations and left my office fully informed and will follow up as instructed or sooner if necessary.    -Patient was advised of signs and symptoms of infection including redness, drainage, purulence, odor, streaking, fever, chills and I advised patient to seek medical attention (ER or urgent care) if these symptoms arise.   -f/u 3 weeks     Note dictated with voice recognition software, please excuse any grammatical errors.

## 2020-08-06 NOTE — PROCEDURES
"Wound Debridement    Date/Time: 8/6/2020 10:30 AM  Performed by: Yolanda Meyers DPM  Authorized by: Yolanda Meyers DPM     Time out: Immediately prior to procedure a "time out" was called to verify the correct patient, procedure, equipment, support staff and site/side marked as required.    Consent Done?:  Yes (Verbal)    Preparation: Patient was prepped and draped in usual sterile fashion    Local anesthesia used?: Yes    Local anesthetic:  Topical anesthetic    Wound Details:    Location:  Right foot    Location:  Right Heel    Type of Debridement:  Excisional       Length (cm):  5       Area (sq cm):  25       Width (cm):  5       Percent Debrided (%):  100       Depth (cm):  1.5       Total Area Debrided (sq cm):  25    Depth of debridement:  Muscle/fascia/tendon    Tissue debrided:  Subcutaneous, Fascia and Muscle    Devitalized tissue debrided:  Callus and Biofilm    Instruments:  Blade and Curette    Bleeding:  Minimal  Hemostasis Achieved: Yes    Method Used:  Pressure  Patient tolerance:  Patient tolerated the procedure well with no immediate complications     Time-out was performed with the patient the room      "

## 2020-08-21 ENCOUNTER — TELEPHONE (OUTPATIENT)
Dept: PODIATRY | Facility: CLINIC | Age: 55
End: 2020-08-21

## 2020-08-21 NOTE — TELEPHONE ENCOUNTER
Spoke with patients sister called and states that he is at a skilled facility and was unsure if they have to evacuate and if they need orders for him to leave the facility. I explained to her that I would need to check with Dr. Meyers to see if he would write those orders but they will need to ensure that his wound vac is being taken care of. She states that they are not leaving unless it is a mandatory evacuation. If she needs anything else she will give us a call.

## 2020-08-21 NOTE — TELEPHONE ENCOUNTER
----- Message from Cristiano Flores sent at 8/21/2020  3:58 PM CDT -----  Regarding: patient advice  Contact: patient sister  Pt sister called in regards to pt     Pt sister stated that if pt wanted to leave the facility due to bad weather coming he would need orders from doctor      Pt sister can be reached at [phone]

## 2020-08-27 ENCOUNTER — OFFICE VISIT (OUTPATIENT)
Dept: PODIATRY | Facility: CLINIC | Age: 55
End: 2020-08-27
Payer: MEDICARE

## 2020-08-27 VITALS — SYSTOLIC BLOOD PRESSURE: 124 MMHG | HEART RATE: 88 BPM | DIASTOLIC BLOOD PRESSURE: 78 MMHG

## 2020-08-27 DIAGNOSIS — R60.9 VENOUS STASIS ULCER WITH EDEMA OF LOWER LEG: ICD-10-CM

## 2020-08-27 DIAGNOSIS — I83.899 VENOUS STASIS ULCER WITH EDEMA OF LOWER LEG: ICD-10-CM

## 2020-08-27 DIAGNOSIS — E11.51 TYPE II DIABETES MELLITUS WITH PERIPHERAL CIRCULATORY DISORDER: ICD-10-CM

## 2020-08-27 DIAGNOSIS — L89.610 DECUBITUS ULCER OF HEEL, RIGHT, UNSTAGEABLE: Primary | ICD-10-CM

## 2020-08-27 DIAGNOSIS — I73.9 PAD (PERIPHERAL ARTERY DISEASE): ICD-10-CM

## 2020-08-27 DIAGNOSIS — I83.009 VENOUS STASIS ULCER WITH EDEMA OF LOWER LEG: ICD-10-CM

## 2020-08-27 DIAGNOSIS — L97.909 VENOUS STASIS ULCER WITH EDEMA OF LOWER LEG: ICD-10-CM

## 2020-08-27 PROBLEM — L03.115 CELLULITIS OF RIGHT LOWER EXTREMITY: Status: RESOLVED | Noted: 2020-07-22 | Resolved: 2020-08-27

## 2020-08-27 PROBLEM — I96 GANGRENE OF RIGHT FOOT: Status: RESOLVED | Noted: 2020-07-24 | Resolved: 2020-08-27

## 2020-08-27 PROCEDURE — 99214 OFFICE O/P EST MOD 30 MIN: CPT | Mod: PBBFAC,PN,25 | Performed by: PODIATRIST

## 2020-08-27 PROCEDURE — 11045 DBRDMT SUBQ TISS EACH ADDL: CPT | Mod: PBBFAC,PN | Performed by: PODIATRIST

## 2020-08-27 PROCEDURE — 11042 WOUND DEBRIDEMENT: ICD-10-PCS | Mod: S$PBB,,, | Performed by: PODIATRIST

## 2020-08-27 PROCEDURE — 11045 WOUND DEBRIDEMENT: ICD-10-PCS | Mod: S$PBB,,, | Performed by: PODIATRIST

## 2020-08-27 PROCEDURE — 99999 PR PBB SHADOW E&M-EST. PATIENT-LVL IV: ICD-10-PCS | Mod: PBBFAC,,, | Performed by: PODIATRIST

## 2020-08-27 PROCEDURE — 99999 PR PBB SHADOW E&M-EST. PATIENT-LVL IV: CPT | Mod: PBBFAC,,, | Performed by: PODIATRIST

## 2020-08-27 PROCEDURE — 11042 DBRDMT SUBQ TIS 1ST 20SQCM/<: CPT | Mod: S$PBB,,, | Performed by: PODIATRIST

## 2020-08-27 PROCEDURE — 99499 NO LOS: ICD-10-PCS | Mod: S$PBB,,, | Performed by: PODIATRIST

## 2020-08-27 PROCEDURE — 99499 UNLISTED E&M SERVICE: CPT | Mod: S$PBB,,, | Performed by: PODIATRIST

## 2020-08-27 NOTE — PROCEDURES
"Wound Debridement    Date/Time: 8/27/2020 10:30 AM  Performed by: Yolanda Meyers DPM  Authorized by: Yolanda Meyers DPM     Time out: Immediately prior to procedure a "time out" was called to verify the correct patient, procedure, equipment, support staff and site/side marked as required.    Consent Done?:  Yes (Verbal)    Preparation: Patient was prepped and draped in usual sterile fashion    Local anesthesia used?: No      Wound Details:    Location:  Right foot    Location:  Right Heel    Type of Debridement:  Excisional       Length (cm):  7       Area (sq cm):  42       Width (cm):  6       Percent Debrided (%):  100       Depth (cm):  0.3       Total Area Debrided (sq cm):  42    Depth of debridement:  Subcutaneous tissue    Tissue debrided:  Subcutaneous    Devitalized tissue debrided:  Biofilm and Callus    Instruments:  Blade and Curette    Additional wounds:  1    2nd Wound Details:     Location:  Right leg    Type of Debridement:  Excisional       Length (cm):  6       Area (sq cm):  12       Width (cm):  2       Percent Debrided (%):  100       Depth (cm):  0.2       Total Area Debrided (sq cm):  12    Depth of debridement:  Subcutaneous tissue    Tissue debrided:  Hypergranulation and Subcutaneous    Devitalized tissue debrided:  Biofilm and Callus    Instruments:  Curette    3rd Wound Details:     Location:  Right leg    Type of Debridement:  Excisional       Length (cm):  3       Area (sq cm):  3       Width (cm):  1       Percent Debrided (%):  100       Depth (cm):  0.2       Total Area Debrided (sq cm):  3    Depth of debridement:  Subcutaneous tissue    Tissue debrided:  Subcutaneous and Hypergranulation    Devitalized tissue debrided:  Callus and Biofilm    Instruments:  Curette    Bleeding:  Minimal  Hemostasis Achieved: Yes    Method Used:  Pressure  Patient tolerance:  Patient tolerated the procedure well with no immediate complications     Timeout was performed w/ pt in the room       "

## 2020-08-27 NOTE — PROGRESS NOTES
Subjective:      Patient ID: Kuldeep Alamo is a 55 y.o. male.    Chief Complaint: Follow-up (wound care right foot)      55 y.o. male presenting with status post right lower extremity wound debridement with right heel excision of the wound with VAC application (DOS: 7/22/20 GP: 8/1/20) Known to Dr. Galeano status post revascularization of the right lower extremity recently.  Patient is from [facility].  Presenting with VAC of the heel ulcer.  Complains of pain along the right lower extremity and right heel.     8/27/20  F/u for R heel and RLE wound. Has been treating with wound VAC for R heel wound. With his family member today. He is staying in [facility] now. Has been applying topical triple ointment on RLE wounds. Pt is known to Dr. Galeano. Has not been evaluated by him recently.     Review of Systems   Constitution: Negative for chills, decreased appetite, fever and malaise/fatigue.   HENT: Negative for congestion, ear discharge and sore throat.    Eyes: Negative for discharge and pain.   Cardiovascular: Negative for chest pain, claudication and leg swelling.   Respiratory: Negative for cough and shortness of breath.    Skin: Positive for color change and poor wound healing. Negative for nail changes and rash.   Musculoskeletal: Positive for muscle weakness and stiffness. Negative for arthritis, joint pain and joint swelling.   Gastrointestinal: Negative for bloating, abdominal pain, diarrhea, nausea and vomiting.   Genitourinary: Negative for flank pain and hematuria.   Neurological: Positive for focal weakness, loss of balance, numbness and sensory change. Negative for headaches and weakness.   Psychiatric/Behavioral: Negative for altered mental status.     Hemoglobin A1C   Date Value Ref Range Status   05/15/2020 7.5 % Final   01/22/2020 6.9 (H) 4.0 - 5.6 % Final     Comment:     ADA Screening Guidelines:  5.7-6.4%  Consistent with prediabetes  >or=6.5%  Consistent with diabetes  High levels of  fetal hemoglobin interfere with the HbA1C  assay. Heterozygous hemoglobin variants (HbS, HgC, etc)do  not significantly interfere with this assay.   However, presence of multiple variants may affect accuracy.     10/04/2019 6.3 (H) 4.0 - 5.6 % Final     Comment:     ADA Screening Guidelines:  5.7-6.4%  Consistent with prediabetes  >or=6.5%  Consistent with diabetes  High levels of fetal hemoglobin interfere with the HbA1C  assay. Heterozygous hemoglobin variants (HbS, HgC, etc)do  not significantly interfere with this assay.   However, presence of multiple variants may affect accuracy.     05/14/2019 6.4 (H) 4.0 - 5.6 % Final     Comment:     ADA Screening Guidelines:  5.7-6.4%  Consistent with prediabetes  >or=6.5%  Consistent with diabetes  High levels of fetal hemoglobin interfere with the HbA1C  assay. Heterozygous hemoglobin variants (HbS, HgC, etc)do  not significantly interfere with this assay.   However, presence of multiple variants may affect accuracy.               Past Medical History:   Diagnosis Date    Acute on chronic diastolic CHF (congestive heart failure), NYHA class 3 12/30/2017    CAD (coronary artery disease) 1/7/2013    Cerebral vascular accident     COPD (chronic obstructive pulmonary disease)     Cough syncope 6/15/2017    Diabetes mellitus     Hyperlipidemia     Hypertension     Laceration of right hand without foreign body 12/23/2019    Patient sustained injury to dorsum of right hand due to ronda. No significant pain. No significant drainage. No fever or chills    Localized edema of right lower leg 12/7/2017    S/P CABG (coronary artery bypass graft) 1/7/2013    Ulcer of right pretibial region, limited to breakdown of skin 7/2/2018    Wound identified 6/18/2018 that is most likely related to tight dressing.    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       Past Surgical History:   Procedure Laterality Date    ANGIOGRAPHY OF LOWER EXTREMITY  Right 2/19/2019    Procedure: Angiogram Extremity Unilateral;  Surgeon: Rudi Galeano MD;  Location: [facility] CATH LAB;  Service: Cardiology;  Laterality: Right;    ANGIOGRAPHY OF LOWER EXTREMITY Right 7/19/2019    Procedure: Angiogram Extremity Unilateral;  Surgeon: Rudi Galeano MD;  Location: [facility] CATH LAB;  Service: Cardiology;  Laterality: Right;    APPLICATION OF WOUND VACUUM-ASSISTED CLOSURE DEVICE Right 7/28/2020    Procedure: APPLICATION, WOUND VAC;  Surgeon: Yolanda Meyers DPM;  Location: [facility] OR;  Service: Podiatry;  Laterality: Right;    BONE BIOPSY Right 7/28/2020    Procedure: BIOPSY, BONE;  Surgeon: Yolanda Meyers DPM;  Location: [facility] OR;  Service: Podiatry;  Laterality: Right;    CARDIAC CATHETERIZATION      CARDIAC CATHETERIZATION  02/2018    stent to right leg x 5 ( 1 stent placed weekly since late jan 2018)    CHOLECYSTECTOMY      CORONARY ARTERY BYPASS GRAFT      2006    CYST REMOVAL      DEBRIDEMENT OF FOOT Right 7/28/2020    Procedure: DEBRIDEMENT, FOOT;  Surgeon: Yolanda Meyers DPM;  Location: [facility] OR;  Service: Podiatry;  Laterality: Right;    PHLEBOGRAPHY Right 2/17/2020    Procedure: Venogram;  Surgeon: Rudi Galeano MD;  Location: [facility] CATH LAB;  Service: Cardiology;  Laterality: Right;  Patient needs anesthesia for sedation       Family History   Problem Relation Age of Onset    Heart disease Mother     Hypertension Mother     Hypertension Father     Cancer Father     Hypertension Sister     Anemia Neg Hx     Arrhythmia Neg Hx     Asthma Neg Hx     Clotting disorder Neg Hx     Fainting Neg Hx     Heart attack Neg Hx     Heart failure Neg Hx     Hyperlipidemia Neg Hx     Prostate cancer Neg Hx     Kidney disease Neg Hx        Social History     Socioeconomic History    Marital status: Single     Spouse name: Not on file    Number of children: Not on file    Years of education: Not on file    Highest education level: Not on file   Occupational  History    Not on file   Social Needs    Financial resource strain: Not on file    Food insecurity     Worry: Not on file     Inability: Not on file    Transportation needs     Medical: Not on file     Non-medical: Not on file   Tobacco Use    Smoking status: Former Smoker     Quit date: 2009     Years since quittin.6    Smokeless tobacco: Never Used    Tobacco comment: quit 9 yrs ago   Substance and Sexual Activity    Alcohol use: Yes     Comment: social on weekends    Drug use: No    Sexual activity: Never   Lifestyle    Physical activity     Days per week: Not on file     Minutes per session: Not on file    Stress: Not on file   Relationships    Social connections     Talks on phone: Not on file     Gets together: Not on file     Attends Sikh service: Not on file     Active member of club or organization: Not on file     Attends meetings of clubs or organizations: Not on file     Relationship status: Not on file   Other Topics Concern    Not on file   Social History Narrative    Lives in [place] alone; He has a sister who helps him; He has 4 sisters; He is disabled; He worked [employer] prior to his stroke; 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 He watches soap operas and likes to visit with family and friends. He also has a half-brother           Current Outpatient Medications   Medication Sig Dispense Refill    ALPRAZolam (XANAX) 0.5 MG tablet Take 1 tablet (0.5 mg total) by mouth 3 (three) times daily as needed for Anxiety. 10 tablet 0    amoxicillin-clavulanate 875-125mg (AUGMENTIN) 875-125 mg per tablet Take 1 tablet by mouth every 12 (twelve) hours. FOR 10 MORE DAYS      apixaban (ELIQUIS) 5 mg Tab Take 1 tablet (5 mg total) by mouth 2 (two) times daily.      atorvastatin (LIPITOR) 40 MG tablet Take 1 tablet (40 mg total) by mouth once daily. 90 tablet 3     baclofen (LIORESAL) 20 MG tablet Take 1 tablet (20 mg total) by mouth 2 (two) times daily as needed (muscle spasm).      blood sugar diagnostic Strp Test sugar once daily as needed 100 strip 3    carvediloL (COREG) 6.25 MG tablet Take 1 tablet (6.25 mg total) by mouth 2 (two) times daily.      cloNIDine (CATAPRES) 0.1 MG tablet TAKE 1 TABLET (0.1 MG TOTAL) BY MOUTH EVERY 6 (SIX) HOURS AS NEEDED. If bp 160/100 or greater 30 tablet 5    clopidogrel (PLAVIX) 75 mg tablet TAKE 1 TABLET BY MOUTH ONCE A DAY 30 tablet 0    fenofibrate 160 MG Tab TAKE 1 TABLET (160 MG TOTAL) BY MOUTH ONCE DAILY. 30 tablet 1    furosemide (LASIX) 80 MG tablet TAKE 1 TABLET BY MOUTH TWICE A  tablet 1    gabapentin (NEURONTIN) 100 MG capsule Take 100 mg by mouth 3 (three) times daily.      glipiZIDE (GLUCOTROL) 10 MG tablet TAKE 1 TABLET (10 MG TOTAL) BY MOUTH 2 (TWO) TIMES DAILY BEFORE MEALS. 180 tablet 3    HYDROcodone-acetaminophen (NORCO)  mg per tablet Take 1 tablet by mouth every 4 (four) hours as needed. 10 tablet 0    insulin aspart U-100 (NOVOLOG) 100 unit/mL (3 mL) InPn pen Inject 1-10 Units into the skin before meals and at bedtime as needed (Hyperglycemia). **MODERATE CORRECTION DOSE**    Blood Glucose  mg/dL                  Pre-meal                Bedtime  151-200                2 units                    1 unit  201-250                4 units                    2 units    251-300                6 units                    3 units    301-350                8 units                    4 units   >350                     10 units                  5 units  Administer subcutaneously if needed at times designated by monitoring schedule.  0    lisinopril (PRINIVIL,ZESTRIL) 20 MG tablet Take 1 tablet (20 mg total) by mouth once daily. 90 tablet 3    melatonin 10 mg Tab Take 1 tablet (10 mg total) by mouth nightly as needed (insomnia).      metFORMIN (GLUCOPHAGE) 850 MG tablet Take 1 tablet (850 mg total) by mouth  2 (two) times daily with meals.      neomycin-bacitracin-polymyxin (NEOSPORIN) ointment Apply topically once daily.  0    nitroGLYCERIN (NITROSTAT) 0.4 MG SL tablet DISSOLVE 1 TAB UNDER TONGUE EVERY 5 MINUTS AT ONSET OF CHEST PAIN-MAX 3 PER 15 MINUTES--GO TO ER 25 tablet 11    oxyCODONE-acetaminophen (PERCOCET) 7.5-325 mg per tablet Take 1 tablet by mouth every 6 (six) hours as needed for Pain. 20 tablet 0    pantoprazole (PROTONIX) 40 MG tablet TAKE 1 TABLET BY MOUTH EVERY DAY 90 tablet 1    potassium chloride SA (K-DUR,KLOR-CON) 20 MEQ tablet Take 20 mEq by mouth once daily.      spironolactone (ALDACTONE) 25 MG tablet Take 25 mg by mouth once daily.      blood-glucose meter kit Use as instructed 1 each 0    FLUoxetine (PROZAC) 20 MG capsule TAKE 1 CAPSULE (20 MG TOTAL) BY MOUTH ONCE DAILY. 30 capsule 11    lancets 30 gauge Misc 1 lancet by Misc.(Non-Drug; Combo Route) route once daily. 100 each 3     No current facility-administered medications for this visit.        Review of patient's allergies indicates:   Allergen Reactions    Sulfa (sulfonamide antibiotics) Nausea And Vomiting       Vitals:    08/27/20 1029   BP: 124/78   Pulse: 88   PainSc:   4   PainLoc: Foot       Objective:      Physical Exam  Constitutional:       General: He is not in acute distress.     Appearance: He is well-developed.   HENT:      Nose: Nose normal.   Eyes:      Conjunctiva/sclera: Conjunctivae normal.   Pulmonary:      Effort: Pulmonary effort is normal.   Chest:      Chest wall: No tenderness.   Abdominal:      Tenderness: There is no abdominal tenderness.   Neurological:      Mental Status: He is alert and oriented to person, place, and time.   Psychiatric:         Behavior: Behavior normal.         Vascular: Distal DP/PT pulses palpable 0/4. No vericosities noted to LEs. Hair growth absent LE, warm to touch LE and cool to touch to toes   Right lower extremity: + edema noted to LE.     Dermatologic:   Right lower  extremity:  Multiple wounds lower leg with granular wound base, minimal drainage, no pus, no crepitus, -rubor, -erythema.  Surgically created wound of the right heel with granular wound base. no signs of infection.      Heel wound: 7cm x 6cm x 0.3cm  Distal leg wound: 6cm x 2cm x 0.1cm  Proximal leg wound: 3cm x 1cm x 0.1cm      Musculoskeletal:   Right lower extremity : 3/5 MMT of the foot and ankle joints. Pain distal leg over wounds and heel.      Neurological:   Light touch, proprioception, and sharp/dull sensation are decreased. Protective threshold with the [place]-Wienstein monofilament is decreased. Vibratory sensation decreased.                 Assessment:       Encounter Diagnoses   Name Primary?    PAD (peripheral artery disease)     Decubitus ulcer of heel, right, unstageable - Right Foot Yes    Venous stasis ulcer with edema of lower leg - Right Foot     Type II diabetes mellitus with peripheral circulatory disorder          Plan:       Kuldeep was seen today for follow-up.    Diagnoses and all orders for this visit:    Decubitus ulcer of heel, right, unstageable - Right Foot    PAD (peripheral artery disease)    Venous stasis ulcer with edema of lower leg - Right Foot    Type II diabetes mellitus with peripheral circulatory disorder      I counseled the patient on his conditions, their implications and medical management.    55 y.o. male with status post right lower extremity wound debridement with heel necrotic wound excision with VAC application.     -wound beds appear to be granular and healthy. Adequate bleeding noted after debridement. May consider graft to facilitate healing of the wound.   -VAC was taken down and right lower extremity wounds were closely inspected.  Healing uneventfully of the right lower extremity wounds.  I cleaned with saline.  Xeroform applied over the distal aspect of the right lower extremity and dressed with dry sterile dressing, Kerlix and ACE.  I recommend local wound  care with triple ointment for lower leg wound.  No acute signs of infection noted.   -status post right heel wound debridement.  See procedure note.  Patient tolerated well.  VAC is to be placed back at [facility].   -stressed the importance of offloading the right heel.  Recommend put pillows/blanket underneath the calf to offload the heel.     -The nature of the condition, options for management, as well as potential risks and complications were discussed in detail with patient. Patient was amenable to my recommendations and left my office fully informed and will follow up as instructed or sooner if necessary.    -Patient was advised of signs and symptoms of infection including redness, drainage, purulence, odor, streaking, fever, chills and I advised patient to seek medical attention (ER or urgent care) if these symptoms arise.   -f/u 3 weeks     Note dictated with voice recognition software, please excuse any grammatical errors.

## 2020-08-28 ENCOUNTER — TELEPHONE (OUTPATIENT)
Dept: PODIATRY | Facility: CLINIC | Age: 55
End: 2020-08-28

## 2020-08-28 NOTE — TELEPHONE ENCOUNTER
----- Message from Yolanda Meyers DPM sent at 8/27/2020  5:32 PM CDT -----  Contact: Anjali @ [facility]-[phone]  Please call anjali. She would like to know ways to send VAC paperwork to our office    Thank you  ----- Message -----  From: Priscilla Palma LPN  Sent: 8/27/2020   2:14 PM CDT  To: Yolanda Meyers DPM    Would you be able to talk to her?  ----- Message -----  From: Ninfa Mcadams  Sent: 8/27/2020   2:06 PM CDT  To: [facility] Staff    Type:  Needs Medical Advice    Who Called:  Anjali @ [facility]  Reason  for Call: speaking with the nurse regarding the pt being Discharged  Would the patient rather a call back or a response via [facility]?  Call back  Best Call Back Number: [phone]

## 2020-08-28 NOTE — TELEPHONE ENCOUNTER
----- Message from Ness Mccord sent at 8/28/2020 10:19 AM CDT -----  Contact: Anjali de paz/ [facility] [phone]  Anjali is requesting to speak with nurse regarding pt's paperwork. Please advise.

## 2020-08-28 NOTE — TELEPHONE ENCOUNTER
Spoke with Anjali regarding paperworkt hat was faxed over from [facility] for [employer] and wound vac. Will fax over when Dr. Meyers is finished filling them out

## 2020-09-09 ENCOUNTER — PATIENT OUTREACH (OUTPATIENT)
Dept: ADMINISTRATIVE | Facility: OTHER | Age: 55
End: 2020-09-09

## 2020-09-09 NOTE — PROGRESS NOTES
Health Maintenance Due   Topic Date Due    HIV Screening  02/16/1978    Sign Pain Contract  02/16/1981    Complete Opioid Risk Tool  02/16/1981    Urine Drug Screen  02/16/1981    Naloxone Prescription  02/16/1981    Shingles Vaccine (1 of 2) 02/16/2013    PROSTATE-SPECIFIC ANTIGEN  05/14/2020    Eye Exam  06/26/2020    Influenza Vaccine (1) 08/01/2020     Updates were requested from care everywhere.  Chart was reviewed for overdue Proactive [facility] Encounters (JUAN PABLO) topics (CRS, Breast Cancer Screening, Eye exam)  Health Maintenance has been updated.  LINKS immunization registry triggered.  Immunizations were reconciled.

## 2020-09-11 ENCOUNTER — OFFICE VISIT (OUTPATIENT)
Dept: PODIATRY | Facility: CLINIC | Age: 55
End: 2020-09-11
Payer: MEDICARE

## 2020-09-11 VITALS — DIASTOLIC BLOOD PRESSURE: 68 MMHG | HEART RATE: 78 BPM | SYSTOLIC BLOOD PRESSURE: 120 MMHG

## 2020-09-11 DIAGNOSIS — L89.610 DECUBITUS ULCER OF HEEL, RIGHT, UNSTAGEABLE: Primary | ICD-10-CM

## 2020-09-11 DIAGNOSIS — I73.9 PAD (PERIPHERAL ARTERY DISEASE): ICD-10-CM

## 2020-09-11 DIAGNOSIS — S81.801A LEG WOUND, RIGHT, INITIAL ENCOUNTER: ICD-10-CM

## 2020-09-11 DIAGNOSIS — L97.812 NON-PRESSURE CHRONIC ULCER OF OTHER PART OF RIGHT LOWER LEG WITH FAT LAYER EXPOSED: ICD-10-CM

## 2020-09-11 PROCEDURE — 11043 WOUND DEBRIDEMENT: ICD-10-PCS | Mod: S$PBB,,, | Performed by: PODIATRIST

## 2020-09-11 PROCEDURE — 99999 PR PBB SHADOW E&M-EST. PATIENT-LVL IV: CPT | Mod: PBBFAC,,, | Performed by: PODIATRIST

## 2020-09-11 PROCEDURE — 99499 UNLISTED E&M SERVICE: CPT | Mod: S$PBB,,, | Performed by: PODIATRIST

## 2020-09-11 PROCEDURE — 99499 NO LOS: ICD-10-PCS | Mod: S$PBB,,, | Performed by: PODIATRIST

## 2020-09-11 PROCEDURE — 99999 PR PBB SHADOW E&M-EST. PATIENT-LVL IV: ICD-10-PCS | Mod: PBBFAC,,, | Performed by: PODIATRIST

## 2020-09-11 PROCEDURE — 99214 OFFICE O/P EST MOD 30 MIN: CPT | Mod: PBBFAC,PN | Performed by: PODIATRIST

## 2020-09-11 PROCEDURE — 11043 DBRDMT MUSC&/FSCA 1ST 20/<: CPT | Mod: PBBFAC,PN | Performed by: PODIATRIST

## 2020-09-11 NOTE — PROCEDURES
"Wound Debridement    Date/Time: 9/11/2020 9:00 AM  Performed by: Yolanda Meyers DPM  Authorized by: Yolanda Meyers DPM     Time out: Immediately prior to procedure a "time out" was called to verify the correct patient, procedure, equipment, support staff and site/side marked as required.    Consent Done?:  Yes (Verbal)    Preparation: Patient was prepped and draped in usual sterile fashion    Local anesthesia used?: No      Wound Details:    Location:  Right foot    Location:  Right Heel    Type of Debridement:  Excisional       Length (cm):  6       Area (sq cm):  18       Width (cm):  3       Percent Debrided (%):  100       Depth (cm):  0.3       Total Area Debrided (sq cm):  18    Depth of debridement:  Muscle/fascia/tendon    Tissue debrided:  Subcutaneous, Fascia and Muscle    Devitalized tissue debrided:  Biofilm and Callus    Instruments:  Curette and Blade    Bleeding:  Minimal  Hemostasis Achieved: Yes    Method Used:  Pressure  Patient tolerance:  Patient tolerated the procedure well with no immediate complications     Timeout was performed with pt in the room. Patient, side, laterality were confirmed.       "

## 2020-09-11 NOTE — PROGRESS NOTES
"Subjective:      Patient ID: Kuldeep Alamo is a 55 y.o. male.    Chief Complaint: Follow-up (right foot), Foot Pain (right foot), and SX 07/28/2020 (right foot)      55 y.o. male presenting with status post right lower extremity wound debridement with right heel excision of the wound with VAC application (DOS: 7/22/20 GP: 8/1/20) Known to Dr. Galeano status post revascularization of the right lower extremity recently.  Patient is from [facility].  Presenting with VAC of the heel ulcer.  Complains of pain along the right lower extremity and right heel.     8/27/20  F/u for R heel and RLE wound. Has been treating with wound VAC for R heel wound. With his family member today. He is staying in [facility] now. Has been applying topical triple ointment on RLE wounds. Pt is known to Dr. Galeano. Has not been evaluated by him recently.     9/11/20 f/u for R heel and RLE wounds. Presenting with VAC on right heel. LWC for RLE wound with triple abx. With his family member. He was discharged from [facility]. Was seen by Dr. Galeano yesterday and ultrasound was done. Was told that "everything is good" from his standpoint. Wound VAC has been changed by home nurse three time a week.       Review of Systems   Constitution: Negative for chills, decreased appetite, fever and malaise/fatigue.   HENT: Negative for congestion, ear discharge and sore throat.    Eyes: Negative for discharge and pain.   Cardiovascular: Negative for chest pain, claudication and leg swelling.   Respiratory: Negative for cough and shortness of breath.    Skin: Positive for color change and poor wound healing. Negative for nail changes and rash.   Musculoskeletal: Positive for muscle weakness and stiffness. Negative for arthritis, joint pain and joint swelling.   Gastrointestinal: Negative for bloating, abdominal pain, diarrhea, nausea and vomiting.   Genitourinary: Negative for flank pain and hematuria.   Neurological: Positive for focal weakness, " loss of balance, numbness and sensory change. Negative for headaches and weakness.   Psychiatric/Behavioral: Negative for altered mental status.       Hemoglobin A1C   Date Value Ref Range Status   05/15/2020 7.5 % Final   01/22/2020 6.9 (H) 4.0 - 5.6 % Final     Comment:     ADA Screening Guidelines:  5.7-6.4%  Consistent with prediabetes  >or=6.5%  Consistent with diabetes  High levels of fetal hemoglobin interfere with the HbA1C  assay. Heterozygous hemoglobin variants (HbS, HgC, etc)do  not significantly interfere with this assay.   However, presence of multiple variants may affect accuracy.     10/04/2019 6.3 (H) 4.0 - 5.6 % Final     Comment:     ADA Screening Guidelines:  5.7-6.4%  Consistent with prediabetes  >or=6.5%  Consistent with diabetes  High levels of fetal hemoglobin interfere with the HbA1C  assay. Heterozygous hemoglobin variants (HbS, HgC, etc)do  not significantly interfere with this assay.   However, presence of multiple variants may affect accuracy.     05/14/2019 6.4 (H) 4.0 - 5.6 % Final     Comment:     ADA Screening Guidelines:  5.7-6.4%  Consistent with prediabetes  >or=6.5%  Consistent with diabetes  High levels of fetal hemoglobin interfere with the HbA1C  assay. Heterozygous hemoglobin variants (HbS, HgC, etc)do  not significantly interfere with this assay.   However, presence of multiple variants may affect accuracy.             Past Medical History:   Diagnosis Date    Acute on chronic diastolic CHF (congestive heart failure), NYHA class 3 12/30/2017    CAD (coronary artery disease) 1/7/2013    Cerebral vascular accident     COPD (chronic obstructive pulmonary disease)     Cough syncope 6/15/2017    Diabetes mellitus     Hyperlipidemia     Hypertension     Laceration of right hand without foreign body 12/23/2019    Patient sustained injury to dorsum of right hand due to ronda. No significant pain. No significant drainage. No fever or chills    Localized edema of right  lower leg 12/7/2017    S/P CABG (coronary artery bypass graft) 1/7/2013    Ulcer of right pretibial region, limited to breakdown of skin 7/2/2018    Wound identified 6/18/2018 that is most likely related to tight dressing.    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       Past Surgical History:   Procedure Laterality Date    ANGIOGRAPHY OF LOWER EXTREMITY Right 2/19/2019    Procedure: Angiogram Extremity Unilateral;  Surgeon: Rudi Galeano MD;  Location: [facility] CATH LAB;  Service: Cardiology;  Laterality: Right;    ANGIOGRAPHY OF LOWER EXTREMITY Right 7/19/2019    Procedure: Angiogram Extremity Unilateral;  Surgeon: Rudi Galeano MD;  Location: [facility] CATH LAB;  Service: Cardiology;  Laterality: Right;    APPLICATION OF WOUND VACUUM-ASSISTED CLOSURE DEVICE Right 7/28/2020    Procedure: APPLICATION, WOUND VAC;  Surgeon: Yolanda Meyers DPM;  Location: [facility] OR;  Service: Podiatry;  Laterality: Right;    BONE BIOPSY Right 7/28/2020    Procedure: BIOPSY, BONE;  Surgeon: Yolanda Meyers DPM;  Location: [facility] OR;  Service: Podiatry;  Laterality: Right;    CARDIAC CATHETERIZATION      CARDIAC CATHETERIZATION  02/2018    stent to right leg x 5 ( 1 stent placed weekly since late jan 2018)    CHOLECYSTECTOMY      CORONARY ARTERY BYPASS GRAFT      2006    CYST REMOVAL      DEBRIDEMENT OF FOOT Right 7/28/2020    Procedure: DEBRIDEMENT, FOOT;  Surgeon: Yolanda Meyers DPM;  Location: [facility] OR;  Service: Podiatry;  Laterality: Right;    PHLEBOGRAPHY Right 2/17/2020    Procedure: Venogram;  Surgeon: Rudi Galeano MD;  Location: [facility] CATH LAB;  Service: Cardiology;  Laterality: Right;  Patient needs anesthesia for sedation       Family History   Problem Relation Age of Onset    Heart disease Mother     Hypertension Mother     Hypertension Father     Cancer Father     Hypertension Sister     Anemia Neg Hx     Arrhythmia Neg Hx     Asthma Neg Hx     Clotting disorder  Neg Hx     Fainting Neg Hx     Heart attack Neg Hx     Heart failure Neg Hx     Hyperlipidemia Neg Hx     Prostate cancer Neg Hx     Kidney disease Neg Hx        Social History     Socioeconomic History    Marital status: Single     Spouse name: Not on file    Number of children: Not on file    Years of education: Not on file    Highest education level: Not on file   Occupational History    Not on file   Social Needs    Financial resource strain: Not on file    Food insecurity     Worry: Not on file     Inability: Not on file    Transportation needs     Medical: Not on file     Non-medical: Not on file   Tobacco Use    Smoking status: Former Smoker     Quit date: 2009     Years since quittin.6    Smokeless tobacco: Never Used    Tobacco comment: quit 9 yrs ago   Substance and Sexual Activity    Alcohol use: Yes     Comment: social on weekends    Drug use: No    Sexual activity: Never   Lifestyle    Physical activity     Days per week: Not on file     Minutes per session: Not on file    Stress: Not on file   Relationships    Social connections     Talks on phone: Not on file     Gets together: Not on file     Attends Voodoo service: Not on file     Active member of club or organization: Not on file     Attends meetings of clubs or organizations: Not on file     Relationship status: Not on file   Other Topics Concern    Not on file   Social History Narrative    Lives in [place] alone; He has a sister who helps him; He has 4 sisters; He is disabled; He worked [employer] prior to his stroke; 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 He watches soap operas and likes to visit with family and friends. He also has a half-brother           Current Outpatient Medications   Medication Sig Dispense Refill    ALPRAZolam (XANAX) 0.5 MG tablet Take 1 tablet (0.5 mg total) by mouth 3  (three) times daily as needed for Anxiety. 10 tablet 0    amoxicillin-clavulanate 875-125mg (AUGMENTIN) 875-125 mg per tablet Take 1 tablet by mouth every 12 (twelve) hours. FOR 10 MORE DAYS      apixaban (ELIQUIS) 5 mg Tab Take 1 tablet (5 mg total) by mouth 2 (two) times daily.      atorvastatin (LIPITOR) 40 MG tablet Take 1 tablet (40 mg total) by mouth once daily. 90 tablet 3    baclofen (LIORESAL) 20 MG tablet Take 1 tablet (20 mg total) by mouth 2 (two) times daily as needed (muscle spasm).      blood sugar diagnostic Strp Test sugar once daily as needed 100 strip 3    carvediloL (COREG) 6.25 MG tablet Take 1 tablet (6.25 mg total) by mouth 2 (two) times daily.      cloNIDine (CATAPRES) 0.1 MG tablet TAKE 1 TABLET (0.1 MG TOTAL) BY MOUTH EVERY 6 (SIX) HOURS AS NEEDED. If bp 160/100 or greater 30 tablet 5    clopidogrel (PLAVIX) 75 mg tablet TAKE 1 TABLET BY MOUTH ONCE A DAY 30 tablet 0    fenofibrate 160 MG Tab TAKE 1 TABLET (160 MG TOTAL) BY MOUTH ONCE DAILY. 30 tablet 1    furosemide (LASIX) 80 MG tablet TAKE 1 TABLET BY MOUTH TWICE A  tablet 1    gabapentin (NEURONTIN) 100 MG capsule Take 100 mg by mouth 3 (three) times daily.      glipiZIDE (GLUCOTROL) 10 MG tablet TAKE 1 TABLET (10 MG TOTAL) BY MOUTH 2 (TWO) TIMES DAILY BEFORE MEALS. 180 tablet 3    HYDROcodone-acetaminophen (NORCO)  mg per tablet Take 1 tablet by mouth every 4 (four) hours as needed. 10 tablet 0    insulin aspart U-100 (NOVOLOG) 100 unit/mL (3 mL) InPn pen Inject 1-10 Units into the skin before meals and at bedtime as needed (Hyperglycemia). **MODERATE CORRECTION DOSE**    Blood Glucose  mg/dL                  Pre-meal                Bedtime  151-200                2 units                    1 unit  201-250                4 units                    2 units    251-300                6 units                    3 units    301-350                8 units                    4 units   >350                     10 units                   5 units  Administer subcutaneously if needed at times designated by monitoring schedule.  0    lisinopril (PRINIVIL,ZESTRIL) 20 MG tablet Take 1 tablet (20 mg total) by mouth once daily. 90 tablet 3    melatonin 10 mg Tab Take 1 tablet (10 mg total) by mouth nightly as needed (insomnia).      metFORMIN (GLUCOPHAGE) 850 MG tablet Take 1 tablet (850 mg total) by mouth 2 (two) times daily with meals.      neomycin-bacitracin-polymyxin (NEOSPORIN) ointment Apply topically once daily.  0    nitroGLYCERIN (NITROSTAT) 0.4 MG SL tablet DISSOLVE 1 TAB UNDER TONGUE EVERY 5 MINUTS AT ONSET OF CHEST PAIN-MAX 3 PER 15 MINUTES--GO TO ER 25 tablet 11    oxyCODONE-acetaminophen (PERCOCET) 7.5-325 mg per tablet Take 1 tablet by mouth every 6 (six) hours as needed for Pain. 20 tablet 0    pantoprazole (PROTONIX) 40 MG tablet TAKE 1 TABLET BY MOUTH EVERY DAY 90 tablet 1    potassium chloride SA (K-DUR,KLOR-CON) 20 MEQ tablet Take 20 mEq by mouth once daily.      spironolactone (ALDACTONE) 25 MG tablet Take 25 mg by mouth once daily.      blood-glucose meter kit Use as instructed 1 each 0    FLUoxetine (PROZAC) 20 MG capsule TAKE 1 CAPSULE (20 MG TOTAL) BY MOUTH ONCE DAILY. 30 capsule 11    lancets 30 gauge Misc 1 lancet by Misc.(Non-Drug; Combo Route) route once daily. 100 each 3     No current facility-administered medications for this visit.        Review of patient's allergies indicates:   Allergen Reactions    Sulfa (sulfonamide antibiotics) Nausea And Vomiting       Vitals:    09/11/20 0928   BP: 120/68   Pulse: 78   PainSc:   4   PainLoc: Foot       Objective:      Physical Exam  Constitutional:       General: He is not in acute distress.     Appearance: He is well-developed.   HENT:      Nose: Nose normal.   Eyes:      Conjunctiva/sclera: Conjunctivae normal.   Pulmonary:      Effort: Pulmonary effort is normal.   Chest:      Chest wall: No tenderness.   Abdominal:      Tenderness: There is no  abdominal tenderness.   Neurological:      Mental Status: He is alert and oriented to person, place, and time.   Psychiatric:         Behavior: Behavior normal.         Vascular: Distal DP/PT pulses palpable 0/4. No vericosities noted to LEs. Hair growth absent LE, warm to touch LE and cool to touch to toes   Right lower extremity: + edema noted to LE.     Dermatologic:   Right lower extremity:  Multiple wounds lower leg with granular wound base, minimal drainage, no pus, no crepitus, -rubor, -erythema.  Surgically created wound of the right heel with granular wound base. no signs of infection.      Heel wound: 6cm x 3cm x 0.3cm     Musculoskeletal:   Right lower extremity : 3/5 MMT of the foot and ankle joints. Pain distal leg over wounds and heel.      Neurological:   Light touch, proprioception, and sharp/dull sensation are decreased. Protective threshold with the Harmony-Wienstein monofilament is decreased. Vibratory sensation decreased.               Assessment:       Encounter Diagnoses   Name Primary?    Decubitus ulcer of heel, right, unstageable Yes    PAD (peripheral artery disease)     Leg wound, right, initial encounter     Non-pressure chronic ulcer of other part of right lower leg with fat layer exposed          Plan:       Kuldeep was seen today for follow-up, foot pain and sx 07/28/2020.    Diagnoses and all orders for this visit:    Decubitus ulcer of heel, right, unstageable    PAD (peripheral artery disease)    Leg wound, right, initial encounter    Non-pressure chronic ulcer of other part of right lower leg with fat layer exposed      I counseled the patient on his conditions, their implications and medical management.    55 y.o. male with status post right lower extremity wound debridement with heel necrotic wound excision with VAC application.     -wound beds appear to be granular and healthy. Adequate bleeding noted after debridement. I think he will be a good candidate for graft to facilitate  healing of the wound.   -VAC was taken down and right lower extremity wounds were closely inspected.  Healing uneventfully of the right lower extremity wounds.  I cleaned with saline.  Xeroform applied over the distal aspect of the right lower extremity and dressed with dry sterile dressing, Kerlix and ACE.  I recommend local wound care with triple ointment for lower leg wound.  No acute signs of infection noted.   -status post right heel wound debridement.  See procedure note.  Patient tolerated well.  VAC is to be placed back at [facility].   -stressed the importance of offloading the right heel.  Recommend put pillows/blanket underneath the calf to offload the heel.     -The nature of the condition, options for management, as well as potential risks and complications were discussed in detail with patient. Patient was amenable to my recommendations and left my office fully informed and will follow up as instructed or sooner if necessary.    -Patient was advised of signs and symptoms of infection including redness, drainage, purulence, odor, streaking, fever, chills and I advised patient to seek medical attention (ER or urgent care) if these symptoms arise.   -f/u 3 weeks     Note dictated with voice recognition software, please excuse any grammatical errors.

## 2020-09-16 ENCOUNTER — OFFICE VISIT (OUTPATIENT)
Dept: PODIATRY | Facility: CLINIC | Age: 55
End: 2020-09-16
Payer: MEDICARE

## 2020-09-16 VITALS — SYSTOLIC BLOOD PRESSURE: 101 MMHG | HEART RATE: 70 BPM | DIASTOLIC BLOOD PRESSURE: 68 MMHG

## 2020-09-16 DIAGNOSIS — L89.629 PRESSURE INJURY OF SKIN OF LEFT HEEL, UNSPECIFIED INJURY STAGE: Primary | ICD-10-CM

## 2020-09-16 DIAGNOSIS — S81.801A LEG WOUND, RIGHT, INITIAL ENCOUNTER: ICD-10-CM

## 2020-09-16 DIAGNOSIS — L97.412 CHRONIC ULCER OF RIGHT HEEL WITH FAT LAYER EXPOSED: ICD-10-CM

## 2020-09-16 PROBLEM — L97.812 NON-PRESSURE CHRONIC ULCER OF OTHER PART OF RIGHT LOWER LEG WITH FAT LAYER EXPOSED: Status: RESOLVED | Noted: 2017-12-14 | Resolved: 2020-09-16

## 2020-09-16 PROCEDURE — 99999 PR PBB SHADOW E&M-EST. PATIENT-LVL IV: ICD-10-PCS | Mod: PBBFAC,,, | Performed by: PODIATRIST

## 2020-09-16 PROCEDURE — 99999 PR PBB SHADOW E&M-EST. PATIENT-LVL IV: CPT | Mod: PBBFAC,,, | Performed by: PODIATRIST

## 2020-09-16 PROCEDURE — 99213 OFFICE O/P EST LOW 20 MIN: CPT | Mod: S$PBB,,, | Performed by: PODIATRIST

## 2020-09-16 PROCEDURE — 99213 PR OFFICE/OUTPT VISIT, EST, LEVL III, 20-29 MIN: ICD-10-PCS | Mod: S$PBB,,, | Performed by: PODIATRIST

## 2020-09-16 PROCEDURE — 99214 OFFICE O/P EST MOD 30 MIN: CPT | Mod: PBBFAC,PN | Performed by: PODIATRIST

## 2020-09-16 NOTE — PROGRESS NOTES
"Subjective:      Patient ID: Kuldeep Alamo is a 55 y.o. male.    Chief Complaint: Follow-up (right heel) and Foot Ulcer (left foot)      55 y.o. male presenting with status post right lower extremity wound debridement with right heel excision of the wound with VAC application (DOS: 7/22/20 GP: 8/1/20) Known to Dr. Galeano status post revascularization of the right lower extremity recently.  Patient is from [facility].  Presenting with VAC of the heel ulcer.  Complains of pain along the right lower extremity and right heel.     8/27/20  F/u for R heel and RLE wound. Has been treating with wound VAC for R heel wound. With his family member today. He is staying in [facility] now. Has been applying topical triple ointment on RLE wounds. Pt is known to Dr. Galeano. Has not been evaluated by him recently.     9/11/20 f/u for R heel and RLE wounds. Presenting with VAC on right heel. LWC for RLE wound with triple abx. With his family member. He was discharged from [facility]. Was seen by Dr. Galeano yesterday and ultrasound was done. Was told that "everything is good" from his standpoint. Wound VAC has been changed by home nurse three time a week.     9/16/20 f/u for new lesion on left heel. Noticed an open wound on left heel the other day. He was concerned and decided to come in today. With his family member today. Not presenting with VAC on left heel.     Review of Systems   Constitution: Negative for chills, decreased appetite, fever and malaise/fatigue.   HENT: Negative for congestion, ear discharge and sore throat.    Eyes: Negative for discharge and pain.   Cardiovascular: Negative for chest pain, claudication and leg swelling.   Respiratory: Negative for cough and shortness of breath.    Skin: Positive for color change and poor wound healing. Negative for nail changes and rash.   Musculoskeletal: Positive for muscle weakness and stiffness. Negative for arthritis, joint pain and joint swelling. "   Gastrointestinal: Negative for bloating, abdominal pain, diarrhea, nausea and vomiting.   Genitourinary: Negative for flank pain and hematuria.   Neurological: Positive for focal weakness, loss of balance, numbness and sensory change. Negative for headaches and weakness.   Psychiatric/Behavioral: Negative for altered mental status.       Hemoglobin A1C   Date Value Ref Range Status   05/15/2020 7.5 % Final   01/22/2020 6.9 (H) 4.0 - 5.6 % Final     Comment:     ADA Screening Guidelines:  5.7-6.4%  Consistent with prediabetes  >or=6.5%  Consistent with diabetes  High levels of fetal hemoglobin interfere with the HbA1C  assay. Heterozygous hemoglobin variants (HbS, HgC, etc)do  not significantly interfere with this assay.   However, presence of multiple variants may affect accuracy.     10/04/2019 6.3 (H) 4.0 - 5.6 % Final     Comment:     ADA Screening Guidelines:  5.7-6.4%  Consistent with prediabetes  >or=6.5%  Consistent with diabetes  High levels of fetal hemoglobin interfere with the HbA1C  assay. Heterozygous hemoglobin variants (HbS, HgC, etc)do  not significantly interfere with this assay.   However, presence of multiple variants may affect accuracy.     05/14/2019 6.4 (H) 4.0 - 5.6 % Final     Comment:     ADA Screening Guidelines:  5.7-6.4%  Consistent with prediabetes  >or=6.5%  Consistent with diabetes  High levels of fetal hemoglobin interfere with the HbA1C  assay. Heterozygous hemoglobin variants (HbS, HgC, etc)do  not significantly interfere with this assay.   However, presence of multiple variants may affect accuracy.             Past Medical History:   Diagnosis Date    Acute on chronic diastolic CHF (congestive heart failure), NYHA class 3 12/30/2017    CAD (coronary artery disease) 1/7/2013    Cerebral vascular accident     COPD (chronic obstructive pulmonary disease)     Cough syncope 6/15/2017    Diabetes mellitus     Hyperlipidemia     Hypertension     Laceration of right hand  without foreign body 12/23/2019    Patient sustained injury to dorsum of right hand due to ronda. No significant pain. No significant drainage. No fever or chills    Localized edema of right lower leg 12/7/2017    S/P CABG (coronary artery bypass graft) 1/7/2013    Ulcer of right pretibial region, limited to breakdown of skin 7/2/2018    Wound identified 6/18/2018 that is most likely related to tight dressing.    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       Past Surgical History:   Procedure Laterality Date    ANGIOGRAPHY OF LOWER EXTREMITY Right 2/19/2019    Procedure: Angiogram Extremity Unilateral;  Surgeon: Rudi Galeano MD;  Location: [facility] CATH LAB;  Service: Cardiology;  Laterality: Right;    ANGIOGRAPHY OF LOWER EXTREMITY Right 7/19/2019    Procedure: Angiogram Extremity Unilateral;  Surgeon: Rudi Galeano MD;  Location: [facility] CATH LAB;  Service: Cardiology;  Laterality: Right;    APPLICATION OF WOUND VACUUM-ASSISTED CLOSURE DEVICE Right 7/28/2020    Procedure: APPLICATION, WOUND VAC;  Surgeon: Yolanda Meyers DPM;  Location: [facility] OR;  Service: Podiatry;  Laterality: Right;    BONE BIOPSY Right 7/28/2020    Procedure: BIOPSY, BONE;  Surgeon: Yolanda Meyers DPM;  Location: [facility] OR;  Service: Podiatry;  Laterality: Right;    CARDIAC CATHETERIZATION      CARDIAC CATHETERIZATION  02/2018    stent to right leg x 5 ( 1 stent placed weekly since late jan 2018)    CHOLECYSTECTOMY      CORONARY ARTERY BYPASS GRAFT      2006    CYST REMOVAL      DEBRIDEMENT OF FOOT Right 7/28/2020    Procedure: DEBRIDEMENT, FOOT;  Surgeon: Yolanda Meyers DPM;  Location: [facility] OR;  Service: Podiatry;  Laterality: Right;    PHLEBOGRAPHY Right 2/17/2020    Procedure: Venogram;  Surgeon: Rudi Galeano MD;  Location: [facility] CATH LAB;  Service: Cardiology;  Laterality: Right;  Patient needs anesthesia for sedation       Family History   Problem Relation Age of Onset    Heart  disease Mother     Hypertension Mother     Hypertension Father     Cancer Father     Hypertension Sister     Anemia Neg Hx     Arrhythmia Neg Hx     Asthma Neg Hx     Clotting disorder Neg Hx     Fainting Neg Hx     Heart attack Neg Hx     Heart failure Neg Hx     Hyperlipidemia Neg Hx     Prostate cancer Neg Hx     Kidney disease Neg Hx        Social History     Socioeconomic History    Marital status: Single     Spouse name: Not on file    Number of children: Not on file    Years of education: Not on file    Highest education level: Not on file   Occupational History    Not on file   Social Needs    Financial resource strain: Not on file    Food insecurity     Worry: Not on file     Inability: Not on file    Transportation needs     Medical: Not on file     Non-medical: Not on file   Tobacco Use    Smoking status: Former Smoker     Quit date: 2009     Years since quittin.6    Smokeless tobacco: Never Used    Tobacco comment: quit 9 yrs ago   Substance and Sexual Activity    Alcohol use: Yes     Comment: social on weekends    Drug use: No    Sexual activity: Never   Lifestyle    Physical activity     Days per week: Not on file     Minutes per session: Not on file    Stress: Not on file   Relationships    Social connections     Talks on phone: Not on file     Gets together: Not on file     Attends Holiness service: Not on file     Active member of club or organization: Not on file     Attends meetings of clubs or organizations: Not on file     Relationship status: Not on file   Other Topics Concern    Not on file   Social History Narrative    Lives in [place] alone; He has a sister who helps him; He has 4 sisters; He is disabled; He worked [employer] prior to his stroke; 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 He watches soap operas and likes to visit with family  and friends. He also has a half-brother           Current Outpatient Medications   Medication Sig Dispense Refill    ALPRAZolam (XANAX) 0.5 MG tablet Take 1 tablet (0.5 mg total) by mouth 3 (three) times daily as needed for Anxiety. 10 tablet 0    amoxicillin-clavulanate 875-125mg (AUGMENTIN) 875-125 mg per tablet Take 1 tablet by mouth every 12 (twelve) hours. FOR 10 MORE DAYS      apixaban (ELIQUIS) 5 mg Tab Take 1 tablet (5 mg total) by mouth 2 (two) times daily.      atorvastatin (LIPITOR) 40 MG tablet Take 1 tablet (40 mg total) by mouth once daily. 90 tablet 3    baclofen (LIORESAL) 20 MG tablet Take 1 tablet (20 mg total) by mouth 2 (two) times daily as needed (muscle spasm).      blood sugar diagnostic Strp Test sugar once daily as needed 100 strip 3    carvediloL (COREG) 6.25 MG tablet Take 1 tablet (6.25 mg total) by mouth 2 (two) times daily.      cloNIDine (CATAPRES) 0.1 MG tablet TAKE 1 TABLET (0.1 MG TOTAL) BY MOUTH EVERY 6 (SIX) HOURS AS NEEDED. If bp 160/100 or greater 30 tablet 5    clopidogrel (PLAVIX) 75 mg tablet TAKE 1 TABLET BY MOUTH ONCE A DAY 30 tablet 0    fenofibrate 160 MG Tab TAKE 1 TABLET (160 MG TOTAL) BY MOUTH ONCE DAILY. 30 tablet 1    furosemide (LASIX) 80 MG tablet TAKE 1 TABLET BY MOUTH TWICE A  tablet 1    gabapentin (NEURONTIN) 100 MG capsule Take 100 mg by mouth 3 (three) times daily.      glipiZIDE (GLUCOTROL) 10 MG tablet TAKE 1 TABLET (10 MG TOTAL) BY MOUTH 2 (TWO) TIMES DAILY BEFORE MEALS. 180 tablet 3    HYDROcodone-acetaminophen (NORCO)  mg per tablet Take 1 tablet by mouth every 4 (four) hours as needed. 10 tablet 0    insulin aspart U-100 (NOVOLOG) 100 unit/mL (3 mL) InPn pen Inject 1-10 Units into the skin before meals and at bedtime as needed (Hyperglycemia). **MODERATE CORRECTION DOSE**    Blood Glucose  mg/dL                  Pre-meal                Bedtime  151-200                2 units                    1 unit  201-250                4  units                    2 units    251-300                6 units                    3 units    301-350                8 units                    4 units   >350                     10 units                  5 units  Administer subcutaneously if needed at times designated by monitoring schedule.  0    lisinopril (PRINIVIL,ZESTRIL) 20 MG tablet Take 1 tablet (20 mg total) by mouth once daily. 90 tablet 3    melatonin 10 mg Tab Take 1 tablet (10 mg total) by mouth nightly as needed (insomnia).      metFORMIN (GLUCOPHAGE) 850 MG tablet Take 1 tablet (850 mg total) by mouth 2 (two) times daily with meals.      neomycin-bacitracin-polymyxin (NEOSPORIN) ointment Apply topically once daily.  0    nitroGLYCERIN (NITROSTAT) 0.4 MG SL tablet DISSOLVE 1 TAB UNDER TONGUE EVERY 5 MINUTS AT ONSET OF CHEST PAIN-MAX 3 PER 15 MINUTES--GO TO ER 25 tablet 11    oxyCODONE-acetaminophen (PERCOCET) 7.5-325 mg per tablet Take 1 tablet by mouth every 6 (six) hours as needed for Pain. 20 tablet 0    pantoprazole (PROTONIX) 40 MG tablet TAKE 1 TABLET BY MOUTH EVERY DAY 90 tablet 1    potassium chloride SA (K-DUR,KLOR-CON) 20 MEQ tablet Take 20 mEq by mouth once daily.      spironolactone (ALDACTONE) 25 MG tablet Take 25 mg by mouth once daily.      blood-glucose meter kit Use as instructed 1 each 0    FLUoxetine (PROZAC) 20 MG capsule TAKE 1 CAPSULE (20 MG TOTAL) BY MOUTH ONCE DAILY. 30 capsule 11    lancets 30 gauge Misc 1 lancet by Misc.(Non-Drug; Combo Route) route once daily. 100 each 3     No current facility-administered medications for this visit.        Review of patient's allergies indicates:   Allergen Reactions    Sulfa (sulfonamide antibiotics) Nausea And Vomiting       Vitals:    09/16/20 1435   BP: 101/68   Pulse: 70   PainSc:   2   PainLoc: Foot       Objective:      Physical Exam  Constitutional:       General: He is not in acute distress.     Appearance: He is well-developed.   HENT:      Nose: Nose normal.    Eyes:      Conjunctiva/sclera: Conjunctivae normal.   Pulmonary:      Effort: Pulmonary effort is normal.   Chest:      Chest wall: No tenderness.   Abdominal:      Tenderness: There is no abdominal tenderness.   Neurological:      Mental Status: He is alert and oriented to person, place, and time.   Psychiatric:         Behavior: Behavior normal.         Vascular: Distal DP/PT pulses palpable 0/4. No vericosities noted to LEs. Hair growth absent LE, warm to touch LE and cool to touch to toes   b/l lower extremity: + edema noted to LE.     Dermatologic:   Right lower extremity:  Multiple wounds lower leg with granular wound base, minimal drainage, no pus, no crepitus, +rubor, -erythema.  Surgically created wound of the right heel with granular wound base. no signs of infection.      Heel wound: 6cm x 3cm x 0.3cm with periwound maceration.     L heel lesion: open wound plantar posterior heel. Mild rubor, no erythema, no sinus tract, mild undermining, no deep wound noted. No probing to bone. Partial thickness.     Musculoskeletal:   Right lower extremity : 3/5 MMT of the foot and ankle joints. Pain distal leg over wounds and heel.      Neurological:   Light touch, proprioception, and sharp/dull sensation are decreased. Protective threshold with the McKenzie-Wienstein monofilament is decreased. Vibratory sensation decreased.      9/16/20           Assessment:       Encounter Diagnoses   Name Primary?    Pressure injury of skin of left heel, unspecified injury stage Yes    Chronic ulcer of right heel with fat layer exposed     Leg wound, right, initial encounter          Plan:       Kuldeep was seen today for follow-up and foot ulcer.    Diagnoses and all orders for this visit:    Pressure injury of skin of left heel, unspecified injury stage    Chronic ulcer of right heel with fat layer exposed    Leg wound, right, initial encounter      I counseled the patient on his conditions, their implications and medical  management.    55 y.o. male with status post right lower extremity wound debridement with heel necrotic wound excision with VAC application.     -R heel: periwound maceration from the VAC. VAC holiday during this week. Betadine wet to dry applied to dry out the skin. Recommend saline wet to dry during this week and resume VAC on next Monday.   -L heel: partial thickness wound without signs of infection. Strictly offloading of the heel. Recommend complete off loading by placing pillows/blankets underneath the calf. He has heel protector but it will not achieve complete offloading by itself.   -c/w LWC for RLE wounds. I recommend local wound care with triple ointment for lower leg wound.  No acute signs of infection noted.   -stressed the importance of offloading the right heel.  Recommend put pillows/blanket underneath the calf to offload the heel.     -The nature of the condition, options for management, as well as potential risks and complications were discussed in detail with patient. Patient was amenable to my recommendations and left my office fully informed and will follow up as instructed or sooner if necessary.    -Patient was advised of signs and symptoms of infection including redness, drainage, purulence, odor, streaking, fever, chills and I advised patient to seek medical attention (ER or urgent care) if these symptoms arise.   -f/u 3 weeks     Note dictated with voice recognition software, please excuse any grammatical errors.

## 2020-09-22 ENCOUNTER — TELEPHONE (OUTPATIENT)
Dept: PODIATRY | Facility: CLINIC | Age: 55
End: 2020-09-22

## 2020-09-22 NOTE — TELEPHONE ENCOUNTER
Spoke with Dr. Meyers he said that wet to dry with betadine is ok since the wound VAC won't stick. Called Gerda and gave verbal orders for patient.

## 2020-09-22 NOTE — TELEPHONE ENCOUNTER
----- Message from Chuck Beltrán sent at 9/22/2020  3:59 PM CDT -----  Contact: [phone] Gerda de paz/[facility]  Gerda de paz/[facility] is calling to speak with you regarding the patient wound vac not sticking and she would like other orders for dressings.  She is there with the patient   Please advise

## 2020-09-24 ENCOUNTER — DOCUMENT SCAN (OUTPATIENT)
Dept: HOME HEALTH SERVICES | Facility: HOSPITAL | Age: 55
End: 2020-09-24
Payer: MEDICARE

## 2020-10-01 ENCOUNTER — TELEPHONE (OUTPATIENT)
Dept: FAMILY MEDICINE | Facility: CLINIC | Age: 55
End: 2020-10-01

## 2020-10-01 ENCOUNTER — PATIENT MESSAGE (OUTPATIENT)
Dept: OTHER | Facility: OTHER | Age: 55
End: 2020-10-01

## 2020-10-01 NOTE — TELEPHONE ENCOUNTER
Spoke to Natalie and informed her that Dr. Olguin received pt's Certificate of Medical Necessity. [employer] Natalie that the pt has not been completed yet.

## 2020-10-05 ENCOUNTER — TELEPHONE (OUTPATIENT)
Dept: FAMILY MEDICINE | Facility: CLINIC | Age: 55
End: 2020-10-05

## 2020-10-05 ENCOUNTER — PATIENT MESSAGE (OUTPATIENT)
Dept: ADMINISTRATIVE | Facility: HOSPITAL | Age: 55
End: 2020-10-05

## 2020-10-05 NOTE — TELEPHONE ENCOUNTER
----- Message from Ness Mccord sent at 10/5/2020 11:24 AM CDT -----  Contact: Natalie de paz/ [facility] [phone]  Natalie is requesting to speak with nurse to get an update on pt's oxygen form. Please advise.

## 2020-10-07 ENCOUNTER — OFFICE VISIT (OUTPATIENT)
Dept: PODIATRY | Facility: CLINIC | Age: 55
End: 2020-10-07
Payer: MEDICARE

## 2020-10-07 VITALS — DIASTOLIC BLOOD PRESSURE: 69 MMHG | SYSTOLIC BLOOD PRESSURE: 113 MMHG | HEART RATE: 76 BPM

## 2020-10-07 DIAGNOSIS — E11.51 TYPE II DIABETES MELLITUS WITH PERIPHERAL CIRCULATORY DISORDER: ICD-10-CM

## 2020-10-07 DIAGNOSIS — L97.419: Primary | ICD-10-CM

## 2020-10-07 PROCEDURE — 11042 DBRDMT SUBQ TIS 1ST 20SQCM/<: CPT | Mod: PBBFAC,PN | Performed by: PODIATRIST

## 2020-10-07 PROCEDURE — 99214 OFFICE O/P EST MOD 30 MIN: CPT | Mod: PBBFAC,PN | Performed by: PODIATRIST

## 2020-10-07 PROCEDURE — 99999 PR PBB SHADOW E&M-EST. PATIENT-LVL IV: CPT | Mod: PBBFAC,,, | Performed by: PODIATRIST

## 2020-10-07 PROCEDURE — 99999 PR PBB SHADOW E&M-EST. PATIENT-LVL IV: ICD-10-PCS | Mod: PBBFAC,,, | Performed by: PODIATRIST

## 2020-10-07 PROCEDURE — 99499 UNLISTED E&M SERVICE: CPT | Mod: S$PBB,,, | Performed by: PODIATRIST

## 2020-10-07 PROCEDURE — 99499 NO LOS: ICD-10-PCS | Mod: S$PBB,,, | Performed by: PODIATRIST

## 2020-10-07 PROCEDURE — 11042 WOUND DEBRIDEMENT: ICD-10-PCS | Mod: S$PBB,,, | Performed by: PODIATRIST

## 2020-10-07 RX ORDER — COLLAGENASE SANTYL 250 [ARB'U]/G
OINTMENT TOPICAL DAILY
Qty: 30 G | Refills: 0 | Status: SHIPPED | OUTPATIENT
Start: 2020-10-07 | End: 2020-12-23 | Stop reason: SDUPTHER

## 2020-10-07 NOTE — PROCEDURES
"Wound Debridement    Date/Time: 10/7/2020 2:30 PM  Performed by: Yolanda Meyers DPM  Authorized by: Yolanda Meyers DPM     Time out: Immediately prior to procedure a "time out" was called to verify the correct patient, procedure, equipment, support staff and site/side marked as required.    Consent Done?:  Yes (Verbal)    Preparation: Patient was prepped and draped in usual sterile fashion    Local anesthesia used?: No      Wound Details:    Location:  Right foot    Location:  Right Heel    Type of Debridement:  Excisional       Length (cm):  4       Area (sq cm):  8       Width (cm):  2       Percent Debrided (%):  100       Depth (cm):  0.3       Total Area Debrided (sq cm):  8    Depth of debridement:  Subcutaneous tissue    Tissue debrided:  Subcutaneous    Devitalized tissue debrided:  Callus and Biofilm    Instruments:  Blade and Curette    Bleeding:  Minimal  Hemostasis Achieved: Yes    Method Used:  Pressure  Patient tolerance:  Patient tolerated the procedure well with no immediate complications     Timeout was performed w/ pt in the room. Side/laterality was confirmed before procedure.       "

## 2020-10-07 NOTE — PROGRESS NOTES
"Subjective:      Patient ID: Kuldeep Alamo is a 55 y.o. male.    Chief Complaint: Follow-up (right foot)      55 y.o. male presenting with status post right lower extremity wound debridement with right heel excision of the wound with VAC application (DOS: 7/22/20 GP: 8/1/20) Known to Dr. Galeano status post revascularization of the right lower extremity recently.  Patient is from [facility].  Presenting with VAC of the heel ulcer.  Complains of pain along the right lower extremity and right heel.     8/27/20  F/u for R heel and RLE wound. Has been treating with wound VAC for R heel wound. With his family member today. He is staying in [facility] now. Has been applying topical triple ointment on RLE wounds. Pt is known to Dr. Galeano. Has not been evaluated by him recently.     9/11/20 f/u for R heel and RLE wounds. Presenting with VAC on right heel. LWC for RLE wound with triple abx. With his family member. He was discharged from [facility]. Was seen by Dr. Galeano yesterday and ultrasound was done. Was told that "everything is good" from his standpoint. Wound VAC has been changed by home nurse three time a week.     9/16/20 f/u for new lesion on left heel. Noticed an open wound on left heel the other day. He was concerned and decided to come in today. With his family member today. Not presenting with VAC on left heel.     10/7/20 f/u for RLE wounds. VAC was stopped due to soft tissue maceration over the right heel. Left heel open wound closed.     Review of Systems   Constitution: Negative for chills, decreased appetite, fever and malaise/fatigue.   HENT: Negative for congestion, ear discharge and sore throat.    Eyes: Negative for discharge and pain.   Cardiovascular: Negative for chest pain, claudication and leg swelling.   Respiratory: Negative for cough and shortness of breath.    Skin: Positive for color change and poor wound healing. Negative for nail changes and rash.   Musculoskeletal: Positive " for muscle weakness and stiffness. Negative for arthritis, joint pain and joint swelling.   Gastrointestinal: Negative for bloating, abdominal pain, diarrhea, nausea and vomiting.   Genitourinary: Negative for flank pain and hematuria.   Neurological: Positive for focal weakness, loss of balance, numbness and sensory change. Negative for headaches and weakness.   Psychiatric/Behavioral: Negative for altered mental status.       Hemoglobin A1C   Date Value Ref Range Status   05/15/2020 7.5 % Final   01/22/2020 6.9 (H) 4.0 - 5.6 % Final     Comment:     ADA Screening Guidelines:  5.7-6.4%  Consistent with prediabetes  >or=6.5%  Consistent with diabetes  High levels of fetal hemoglobin interfere with the HbA1C  assay. Heterozygous hemoglobin variants (HbS, HgC, etc)do  not significantly interfere with this assay.   However, presence of multiple variants may affect accuracy.     10/04/2019 6.3 (H) 4.0 - 5.6 % Final     Comment:     ADA Screening Guidelines:  5.7-6.4%  Consistent with prediabetes  >or=6.5%  Consistent with diabetes  High levels of fetal hemoglobin interfere with the HbA1C  assay. Heterozygous hemoglobin variants (HbS, HgC, etc)do  not significantly interfere with this assay.   However, presence of multiple variants may affect accuracy.     05/14/2019 6.4 (H) 4.0 - 5.6 % Final     Comment:     ADA Screening Guidelines:  5.7-6.4%  Consistent with prediabetes  >or=6.5%  Consistent with diabetes  High levels of fetal hemoglobin interfere with the HbA1C  assay. Heterozygous hemoglobin variants (HbS, HgC, etc)do  not significantly interfere with this assay.   However, presence of multiple variants may affect accuracy.             Past Medical History:   Diagnosis Date    Acute on chronic diastolic CHF (congestive heart failure), NYHA class 3 12/30/2017    CAD (coronary artery disease) 1/7/2013    Cerebral vascular accident     COPD (chronic obstructive pulmonary disease)     Cough syncope 6/15/2017     Diabetes mellitus     Hyperlipidemia     Hypertension     Laceration of right hand without foreign body 12/23/2019    Patient sustained injury to dorsum of right hand due to ronda. No significant pain. No significant drainage. No fever or chills    Localized edema of right lower leg 12/7/2017    S/P CABG (coronary artery bypass graft) 1/7/2013    Ulcer of right pretibial region, limited to breakdown of skin 7/2/2018    Wound identified 6/18/2018 that is most likely related to tight dressing.    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       Past Surgical History:   Procedure Laterality Date    ANGIOGRAPHY OF LOWER EXTREMITY Right 2/19/2019    Procedure: Angiogram Extremity Unilateral;  Surgeon: Rudi Galeano MD;  Location: [facility] CATH LAB;  Service: Cardiology;  Laterality: Right;    ANGIOGRAPHY OF LOWER EXTREMITY Right 7/19/2019    Procedure: Angiogram Extremity Unilateral;  Surgeon: Rudi Galeano MD;  Location: [facility] CATH LAB;  Service: Cardiology;  Laterality: Right;    APPLICATION OF WOUND VACUUM-ASSISTED CLOSURE DEVICE Right 7/28/2020    Procedure: APPLICATION, WOUND VAC;  Surgeon: Yolanda Meyers DPM;  Location: [facility] OR;  Service: Podiatry;  Laterality: Right;    BONE BIOPSY Right 7/28/2020    Procedure: BIOPSY, BONE;  Surgeon: Yolanda Meyers DPM;  Location: [facility] OR;  Service: Podiatry;  Laterality: Right;    CARDIAC CATHETERIZATION      CARDIAC CATHETERIZATION  02/2018    stent to right leg x 5 ( 1 stent placed weekly since late jan 2018)    CHOLECYSTECTOMY      CORONARY ARTERY BYPASS GRAFT      2006    CYST REMOVAL      DEBRIDEMENT OF FOOT Right 7/28/2020    Procedure: DEBRIDEMENT, FOOT;  Surgeon: Yolanda Meyers DPM;  Location: [facility] OR;  Service: Podiatry;  Laterality: Right;    PHLEBOGRAPHY Right 2/17/2020    Procedure: Venogram;  Surgeon: Rudi Galeano MD;  Location: [facility] CATH LAB;  Service: Cardiology;  Laterality: Right;  Patient needs  anesthesia for sedation       Family History   Problem Relation Age of Onset    Heart disease Mother     Hypertension Mother     Hypertension Father     Cancer Father     Hypertension Sister     Anemia Neg Hx     Arrhythmia Neg Hx     Asthma Neg Hx     Clotting disorder Neg Hx     Fainting Neg Hx     Heart attack Neg Hx     Heart failure Neg Hx     Hyperlipidemia Neg Hx     Prostate cancer Neg Hx     Kidney disease Neg Hx        Social History     Socioeconomic History    Marital status: Single     Spouse name: Not on file    Number of children: Not on file    Years of education: Not on file    Highest education level: Not on file   Occupational History    Not on file   Social Needs    Financial resource strain: Not on file    Food insecurity     Worry: Not on file     Inability: Not on file    Transportation needs     Medical: Not on file     Non-medical: Not on file   Tobacco Use    Smoking status: Former Smoker     Quit date: 2009     Years since quittin.7    Smokeless tobacco: Never Used    Tobacco comment: quit 9 yrs ago   Substance and Sexual Activity    Alcohol use: Yes     Comment: social on weekends    Drug use: No    Sexual activity: Never   Lifestyle    Physical activity     Days per week: Not on file     Minutes per session: Not on file    Stress: Not on file   Relationships    Social connections     Talks on phone: Not on file     Gets together: Not on file     Attends Jehovah's witness service: Not on file     Active member of club or organization: Not on file     Attends meetings of clubs or organizations: Not on file     Relationship status: Not on file   Other Topics Concern    Not on file   Social History Narrative    Lives in [place] alone; He has a sister who helps him; He has 4 sisters; He is disabled; He worked [employer] prior to his stroke; 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 He watches soap operas and likes to visit with family and friends. He also has a half-brother           Current Outpatient Medications   Medication Sig Dispense Refill    ALPRAZolam (XANAX) 0.5 MG tablet Take 1 tablet (0.5 mg total) by mouth 3 (three) times daily as needed for Anxiety. 10 tablet 0    amoxicillin-clavulanate 875-125mg (AUGMENTIN) 875-125 mg per tablet Take 1 tablet by mouth every 12 (twelve) hours. FOR 10 MORE DAYS      apixaban (ELIQUIS) 5 mg Tab Take 1 tablet (5 mg total) by mouth 2 (two) times daily.      atorvastatin (LIPITOR) 40 MG tablet Take 1 tablet (40 mg total) by mouth once daily. 90 tablet 3    baclofen (LIORESAL) 20 MG tablet Take 1 tablet (20 mg total) by mouth 2 (two) times daily as needed (muscle spasm).      blood sugar diagnostic Strp Test sugar once daily as needed 100 strip 3    carvediloL (COREG) 6.25 MG tablet Take 1 tablet (6.25 mg total) by mouth 2 (two) times daily.      cloNIDine (CATAPRES) 0.1 MG tablet TAKE 1 TABLET (0.1 MG TOTAL) BY MOUTH EVERY 6 (SIX) HOURS AS NEEDED. If bp 160/100 or greater 30 tablet 5    clopidogrel (PLAVIX) 75 mg tablet TAKE 1 TABLET BY MOUTH ONCE A DAY 30 tablet 0    fenofibrate 160 MG Tab TAKE 1 TABLET (160 MG TOTAL) BY MOUTH ONCE DAILY. 30 tablet 1    furosemide (LASIX) 80 MG tablet TAKE 1 TABLET BY MOUTH TWICE A  tablet 1    gabapentin (NEURONTIN) 100 MG capsule Take 100 mg by mouth 3 (three) times daily.      glipiZIDE (GLUCOTROL) 10 MG tablet TAKE 1 TABLET (10 MG TOTAL) BY MOUTH 2 (TWO) TIMES DAILY BEFORE MEALS. 180 tablet 3    HYDROcodone-acetaminophen (NORCO)  mg per tablet Take 1 tablet by mouth every 4 (four) hours as needed. 10 tablet 0    insulin aspart U-100 (NOVOLOG) 100 unit/mL (3 mL) InPn pen Inject 1-10 Units into the skin before meals and at bedtime as needed (Hyperglycemia). **MODERATE CORRECTION DOSE**    Blood Glucose  mg/dL                  Pre-meal                 Bedtime  151-200                2 units                    1 unit  201-250                4 units                    2 units    251-300                6 units                    3 units    301-350                8 units                    4 units   >350                     10 units                  5 units  Administer subcutaneously if needed at times designated by monitoring schedule.  0    melatonin 10 mg Tab Take 1 tablet (10 mg total) by mouth nightly as needed (insomnia).      metFORMIN (GLUCOPHAGE) 850 MG tablet Take 1 tablet (850 mg total) by mouth 2 (two) times daily with meals.      neomycin-bacitracin-polymyxin (NEOSPORIN) ointment Apply topically once daily.  0    nitroGLYCERIN (NITROSTAT) 0.4 MG SL tablet DISSOLVE 1 TAB UNDER TONGUE EVERY 5 MINUTS AT ONSET OF CHEST PAIN-MAX 3 PER 15 MINUTES--GO TO ER 25 tablet 11    oxyCODONE-acetaminophen (PERCOCET) 7.5-325 mg per tablet Take 1 tablet by mouth every 6 (six) hours as needed for Pain. 20 tablet 0    pantoprazole (PROTONIX) 40 MG tablet TAKE 1 TABLET BY MOUTH EVERY DAY 90 tablet 1    potassium chloride SA (K-DUR,KLOR-CON) 20 MEQ tablet Take 20 mEq by mouth once daily.      spironolactone (ALDACTONE) 25 MG tablet Take 25 mg by mouth once daily.      blood-glucose meter kit Use as instructed 1 each 0    collagenase (SANTYL) ointment Apply topically once daily. 30 g 0    FLUoxetine (PROZAC) 20 MG capsule TAKE 1 CAPSULE (20 MG TOTAL) BY MOUTH ONCE DAILY. 30 capsule 11    lancets 30 gauge Misc 1 lancet by Misc.(Non-Drug; Combo Route) route once daily. 100 each 3    lisinopril (PRINIVIL,ZESTRIL) 20 MG tablet Take 1 tablet (20 mg total) by mouth once daily. 90 tablet 3     No current facility-administered medications for this visit.        Review of patient's allergies indicates:   Allergen Reactions    Sulfa (sulfonamide antibiotics) Nausea And Vomiting       Vitals:    10/07/20 1455   BP: 113/69   Pulse: 76   PainSc:   4   PainLoc: Foot        Objective:      Physical Exam  Constitutional:       General: He is not in acute distress.     Appearance: He is well-developed.   HENT:      Nose: Nose normal.   Eyes:      Conjunctiva/sclera: Conjunctivae normal.   Pulmonary:      Effort: Pulmonary effort is normal.   Chest:      Chest wall: No tenderness.   Abdominal:      Tenderness: There is no abdominal tenderness.   Neurological:      Mental Status: He is alert and oriented to person, place, and time.   Psychiatric:         Behavior: Behavior normal.         Vascular: Distal DP/PT pulses palpable 0/4. No vericosities noted to LEs. Hair growth absent LE, warm to touch LE and cool to touch to toes   b/l lower extremity: + edema noted to LE.     Dermatologic:   Right lower extremity:  Multiple wounds lower leg with granular wound base, minimal drainage, no pus, no crepitus, +rubor, -erythema.  Surgically created wound of the right heel with granular wound base. no signs of infection.      Heel wound: 4cm x 2cm x 0.3cm with periwound maceration.      L heel lesion: no open lesion.     Musculoskeletal:   Right lower extremity : 3/5 MMT of the foot and ankle joints. Pain distal leg over wounds and heel.      Neurological:   Light touch, proprioception, and sharp/dull sensation are decreased. Protective threshold with the Newport-Wienstein monofilament is decreased. Vibratory sensation decreased.      9/16/20           Assessment:       Encounter Diagnoses   Name Primary?    Ulcer of right heel and midfoot, unspecified ulcer stage Yes    Type II diabetes mellitus with peripheral circulatory disorder          Plan:       Kuldeep was seen today for follow-up.    Diagnoses and all orders for this visit:    Ulcer of right heel and midfoot, unspecified ulcer stage    Type II diabetes mellitus with peripheral circulatory disorder    Other orders  -     collagenase (SANTYL) ointment; Apply topically once daily.      I counseled the patient on his conditions, their  implications and medical management.    55 y.o. male with status post right lower extremity wound debridement with heel necrotic wound excision.    -rx. santyl  -R heel: s/p wound debridement. See procedure note. Smaller compared to last visit. No need for VAC anymore. Recommend LWC with santyl.   -L heel lesion completely healed. Recommend aggressive offloading of bilateral heel.   -c/w LWC for RLE wounds. I recommend local wound care with triple ointment for lower leg wound.  No acute signs of infection noted.     -The nature of the condition, options for management, as well as potential risks and complications were discussed in detail with patient. Patient was amenable to my recommendations and left my office fully informed and will follow up as instructed or sooner if necessary.    -Patient was advised of signs and symptoms of infection including redness, drainage, purulence, odor, streaking, fever, chills and I advised patient to seek medical attention (ER or urgent care) if these symptoms arise.   -f/u 3 weeks     Note dictated with voice recognition software, please excuse any grammatical errors.

## 2020-10-12 LAB
CHOL/HDLC RATIO: 4.6
CHOLEST SERPL-MSCNC: 173 MG/DL (ref 0–200)
HDLC SERPL-MCNC: 38 MG/DL
LDLC SERPL CALC-MCNC: 98 MG/DL
NON-HDL CHOLESTEROL: 135
TRIGLYCERIDE (LIPID PAN): 391
VLDL CHOLESTEROL: 78 MG/DL

## 2020-10-19 ENCOUNTER — DOCUMENT SCAN (OUTPATIENT)
Dept: HOME HEALTH SERVICES | Facility: HOSPITAL | Age: 55
End: 2020-10-19
Payer: MEDICARE

## 2020-10-20 ENCOUNTER — TELEPHONE (OUTPATIENT)
Dept: PODIATRY | Facility: CLINIC | Age: 55
End: 2020-10-20

## 2020-10-20 NOTE — TELEPHONE ENCOUNTER
Left voicemail stating current treatment is fine for all areas and to call back if she had anymore questions

## 2020-10-20 NOTE — TELEPHONE ENCOUNTER
----- Message from Yolanda Meyers DPM sent at 10/20/2020  4:18 PM CDT -----  Yes  ----- Message -----  From: Priscilla Palma LPN  Sent: 10/20/2020   2:02 PM CDT  To: Yolanda Meyers DPM      ----- Message -----  From: Yenny Sewell  Sent: 10/20/2020   1:51 PM CDT  To: Mira Guerrero Staff    CALL TYPE: HOME HEALTH REQUEST    Name/Agency: [facility]  What is the request in detail: treating right lower extremity, more than one area... Is it ok to continue same treatment to all areas?  Call Back Number: [phone]

## 2020-10-21 ENCOUNTER — DOCUMENT SCAN (OUTPATIENT)
Dept: HOME HEALTH SERVICES | Facility: HOSPITAL | Age: 55
End: 2020-10-21
Payer: MEDICARE

## 2020-10-22 ENCOUNTER — DOCUMENT SCAN (OUTPATIENT)
Dept: HOME HEALTH SERVICES | Facility: HOSPITAL | Age: 55
End: 2020-10-22
Payer: MEDICARE

## 2020-10-27 ENCOUNTER — TELEPHONE (OUTPATIENT)
Dept: PODIATRY | Facility: CLINIC | Age: 55
End: 2020-10-27

## 2020-10-27 ENCOUNTER — DOCUMENT SCAN (OUTPATIENT)
Dept: HOME HEALTH SERVICES | Facility: HOSPITAL | Age: 55
End: 2020-10-27
Payer: MEDICARE

## 2020-10-27 ENCOUNTER — PATIENT OUTREACH (OUTPATIENT)
Dept: ADMINISTRATIVE | Facility: OTHER | Age: 55
End: 2020-10-27

## 2020-10-27 NOTE — TELEPHONE ENCOUNTER
Spoke to Ms. Chavez, she stated Mr. Alamo is currently in the hospital and to cancel his appointment for mikie. She said he is in hospital due to swelling and fluid build up. Sent to hospital by Dr. Galeano.

## 2020-10-27 NOTE — PROGRESS NOTES
Health Maintenance Due   Topic Date Due    HIV Screening  02/16/1980    Shingles Vaccine (1 of 2) 02/16/2013    PROSTATE-SPECIFIC ANTIGEN  05/14/2020    Urine Microalbumin  05/14/2020    Eye Exam  06/26/2020    Influenza Vaccine (1) 08/01/2020     Updates were requested from care everywhere.  Chart was reviewed for overdue Proactive [facility] Encounters ([facility]) topics (CRS, Breast Cancer Screening, Eye exam)  Health Maintenance has been updated.  LINKS immunization registry triggered.  Immunizations were reconciled.

## 2020-10-28 PROBLEM — N18.2 CKD (CHRONIC KIDNEY DISEASE), STAGE II: Status: ACTIVE | Noted: 2020-10-28

## 2020-10-29 PROBLEM — N18.2 CKD (CHRONIC KIDNEY DISEASE), STAGE II: Status: RESOLVED | Noted: 2020-10-28 | Resolved: 2020-10-29

## 2020-11-02 ENCOUNTER — TELEPHONE (OUTPATIENT)
Dept: FAMILY MEDICINE | Facility: CLINIC | Age: 55
End: 2020-11-02

## 2020-11-02 PROBLEM — N17.9 AKI (ACUTE KIDNEY INJURY): Status: RESOLVED | Noted: 2019-10-03 | Resolved: 2020-11-02

## 2020-11-02 NOTE — TELEPHONE ENCOUNTER
----- Message from Anitha Ceja sent at 11/2/2020  9:55 AM CST -----  Contact: [phone]  Ilene is requesting a callback in regards to scheduling a 1 week follow-up appt for the pt.        Please call and advise

## 2020-11-03 ENCOUNTER — DOCUMENT SCAN (OUTPATIENT)
Dept: HOME HEALTH SERVICES | Facility: HOSPITAL | Age: 55
End: 2020-11-03
Payer: MEDICARE

## 2020-11-03 ENCOUNTER — PATIENT OUTREACH (OUTPATIENT)
Dept: ADMINISTRATIVE | Facility: CLINIC | Age: 55
End: 2020-11-03

## 2020-11-03 NOTE — PATIENT INSTRUCTIONS
Discharge Instructions for Acute Kidney Injury  You have been diagnosed with acute kidney injury. This means that your kidneys are not working properly. When both kidneys are healthy, they help filter out fluid and waste from the blood and body. Acute kidney injury has many causes. These include urinary blockages, infection, lack of enough blood supply, and medicines that can injure kidneys. In some cases, acute kidney injury is short-term (temporary), lasting several days to a few months. This is because the kidney can repair itself. But acute kidney injury can also result in chronic kidney disease or end stage renal failure. Here are some instructions for you to follow as you recover.  Home care  · Follow any instructions for eating and drinking given to you by your healthcare provider.  ¨ Drink less fluid, if instructed by your healthcare provider.  ¨ Keep a record of everything you eat and drink.  · Measure the amount of urine and stool you have each day.  · Weigh yourself every day, at the same time of day, and in the same kind of clothes. Keep a daily record of your daily weights.  · Take your temperature every day. Keep a record of the results.  · Learn to take your own blood pressure. Keep a record of your results. Ask your healthcare provider when you should seek emergency medical attention. Your provider will tell you which blood pressure reading is dangerous.  · Avoid contact with people who have infections (colds, bronchitis, or skin conditions).  · Practice good personal hygiene. This is especially important if you have a catheter in place when you leave the hospital. Doing so helps keep you safe from infection.  · Take your medicines exactly as directed.  · You may require frequent blood and urine tests to monitor your kidney function.  Follow-up care  Follow up with your healthcare provider, or as advised.  When to seek medical care  Call your healthcare provider right away if you have any of the  following:  · Signs of bladder infection (urinating more often than usual, or burning, pain, bleeding, or hesitancy when you urinate)  · Signs of infection around your catheter (redness, swelling, warmth, or drainage)  · Rapid weight loss or weight gain, such as 3 pounds or more in 24 hours or 6 pounds or more in 7 days  · Fever above 100.4°F (38.0°C) or chills  · Muscle aches  · Night sweats  · Very little or no urine output  · Swelling of your hands, legs, or feet  · Back pain  · Abdominal pain  · Extreme tiredness   Date Last Reviewed: 2/1/2017  © 0461-2347 [employer]. [address] [place] [address], [place] [address]. All rights reserved. This information is not intended as a substitute for professional medical care. Always follow your healthcare professional's instructions.

## 2020-11-05 PROCEDURE — G0179 MD RECERTIFICATION HHA PT: HCPCS | Mod: ,,, | Performed by: FAMILY MEDICINE

## 2020-11-05 PROCEDURE — G0179 PR HOME HEALTH MD RECERTIFICATION: ICD-10-PCS | Mod: ,,, | Performed by: FAMILY MEDICINE

## 2020-11-06 ENCOUNTER — DOCUMENT SCAN (OUTPATIENT)
Dept: HOME HEALTH SERVICES | Facility: HOSPITAL | Age: 55
End: 2020-11-06
Payer: MEDICARE

## 2020-11-07 PROBLEM — R33.9 URINE RETENTION: Status: ACTIVE | Noted: 2018-03-27

## 2020-11-07 PROBLEM — I95.9 HYPOTENSION: Status: ACTIVE | Noted: 2020-11-07

## 2020-11-09 ENCOUNTER — DOCUMENT SCAN (OUTPATIENT)
Dept: HOME HEALTH SERVICES | Facility: HOSPITAL | Age: 55
End: 2020-11-09
Payer: MEDICARE

## 2020-11-09 PROBLEM — G57.93 NEUROPATHIC PAIN OF BOTH LEGS: Status: ACTIVE | Noted: 2020-11-09

## 2020-11-10 ENCOUNTER — DOCUMENT SCAN (OUTPATIENT)
Dept: HOME HEALTH SERVICES | Facility: HOSPITAL | Age: 55
End: 2020-11-10
Payer: MEDICARE

## 2020-11-11 ENCOUNTER — EXTERNAL HOME HEALTH (OUTPATIENT)
Dept: HOME HEALTH SERVICES | Facility: HOSPITAL | Age: 55
End: 2020-11-11
Payer: MEDICARE

## 2020-11-11 ENCOUNTER — DOCUMENT SCAN (OUTPATIENT)
Dept: HOME HEALTH SERVICES | Facility: HOSPITAL | Age: 55
End: 2020-11-11
Payer: MEDICARE

## 2020-11-12 ENCOUNTER — DOCUMENT SCAN (OUTPATIENT)
Dept: HOME HEALTH SERVICES | Facility: HOSPITAL | Age: 55
End: 2020-11-12
Payer: MEDICARE

## 2020-11-16 ENCOUNTER — TELEPHONE (OUTPATIENT)
Dept: FAMILY MEDICINE | Facility: CLINIC | Age: 55
End: 2020-11-16

## 2020-11-16 NOTE — TELEPHONE ENCOUNTER
----- Message from Kylah Feliciano sent at 11/16/2020 10:08 AM CST -----  Contact: stacia hess  Would like to get a call back to speak with someone to get form filled out correctly     Please call to discuss [phone]

## 2020-11-23 ENCOUNTER — DOCUMENT SCAN (OUTPATIENT)
Dept: HOME HEALTH SERVICES | Facility: HOSPITAL | Age: 55
End: 2020-11-23
Payer: MEDICARE

## 2020-12-03 ENCOUNTER — TELEPHONE (OUTPATIENT)
Dept: FAMILY MEDICINE | Facility: CLINIC | Age: 55
End: 2020-12-03

## 2020-12-03 NOTE — TELEPHONE ENCOUNTER
Spoke to Moriah with [facility] and wanted to know if pt's CMN paperwork was received. Moriah stated that she will refax.

## 2020-12-03 NOTE — TELEPHONE ENCOUNTER
----- Message from Shweta Guerin sent at 12/3/2020  9:46 AM CST -----  Regarding: information about oxygen  Name of Who is Calling: Moriah Garcia What is the request in detail: Moriah is requesting information for the test that was done on 6/2/20 that was 80%  at rest from [facility] 10/2/19. Fax number is [phone] Can the clinic reply by [facility]: No What Number to Call Back if not in [facility]:  Moriah phone number [phone] [facility] [address]

## 2020-12-07 ENCOUNTER — DOCUMENT SCAN (OUTPATIENT)
Dept: HOME HEALTH SERVICES | Facility: HOSPITAL | Age: 55
End: 2020-12-07
Payer: MEDICARE

## 2020-12-11 ENCOUNTER — PATIENT MESSAGE (OUTPATIENT)
Dept: OTHER | Facility: OTHER | Age: 55
End: 2020-12-11

## 2020-12-14 PROBLEM — I95.9 HYPOTENSION: Status: RESOLVED | Noted: 2020-11-07 | Resolved: 2020-12-14

## 2020-12-14 PROBLEM — R33.9 URINE RETENTION: Status: RESOLVED | Noted: 2018-03-27 | Resolved: 2020-12-14

## 2020-12-17 ENCOUNTER — TELEPHONE (OUTPATIENT)
Dept: PODIATRY | Facility: CLINIC | Age: 55
End: 2020-12-17

## 2020-12-17 NOTE — TELEPHONE ENCOUNTER
----- Message from Chuck Beltrán sent at 12/17/2020  1:51 PM CST -----  Contact: [phone]/ patient wife  Patient wife requesting a new Rx for TRIAMCINOLONE ACECONIDE OINTMENT   Please advise

## 2020-12-21 ENCOUNTER — PATIENT OUTREACH (OUTPATIENT)
Dept: ADMINISTRATIVE | Facility: OTHER | Age: 55
End: 2020-12-21

## 2020-12-21 NOTE — PROGRESS NOTES
Updates were requested from care everywhere.  Chart was reviewed for overdue [facility]active [facility] Encounters ([facility]) topics (CRS, Breast Cancer Screening, Eye exam)  Health Maintenance has been updated.  LINKS not responding.

## 2020-12-23 ENCOUNTER — OFFICE VISIT (OUTPATIENT)
Dept: FAMILY MEDICINE | Facility: CLINIC | Age: 55
End: 2020-12-23
Payer: MEDICARE

## 2020-12-23 ENCOUNTER — CLINICAL SUPPORT (OUTPATIENT)
Dept: FAMILY MEDICINE | Facility: CLINIC | Age: 55
End: 2020-12-23
Payer: MEDICARE

## 2020-12-23 VITALS
TEMPERATURE: 97 F | SYSTOLIC BLOOD PRESSURE: 110 MMHG | HEART RATE: 82 BPM | OXYGEN SATURATION: 95 % | DIASTOLIC BLOOD PRESSURE: 60 MMHG

## 2020-12-23 DIAGNOSIS — E78.2 MIXED HYPERLIPIDEMIA: ICD-10-CM

## 2020-12-23 DIAGNOSIS — L30.9 ECZEMA, UNSPECIFIED TYPE: ICD-10-CM

## 2020-12-23 DIAGNOSIS — E11.9 TYPE 2 DIABETES MELLITUS WITHOUT COMPLICATION, WITHOUT LONG-TERM CURRENT USE OF INSULIN: Primary | ICD-10-CM

## 2020-12-23 DIAGNOSIS — I25.10 CORONARY ARTERY DISEASE INVOLVING NATIVE CORONARY ARTERY WITHOUT ANGINA PECTORIS, UNSPECIFIED WHETHER NATIVE OR TRANSPLANTED HEART: ICD-10-CM

## 2020-12-23 DIAGNOSIS — I10 ESSENTIAL HYPERTENSION: ICD-10-CM

## 2020-12-23 DIAGNOSIS — Z86.73 HISTORY OF STROKE: ICD-10-CM

## 2020-12-23 DIAGNOSIS — Z23 NEED FOR PROPHYLACTIC VACCINATION AND INOCULATION AGAINST INFLUENZA: ICD-10-CM

## 2020-12-23 DIAGNOSIS — I50.32 CHRONIC DIASTOLIC CHF (CONGESTIVE HEART FAILURE): ICD-10-CM

## 2020-12-23 PROCEDURE — 90686 IIV4 VACC NO PRSV 0.5 ML IM: CPT | Mod: PBBFAC,PN

## 2020-12-23 PROCEDURE — 99215 OFFICE O/P EST HI 40 MIN: CPT | Mod: PBBFAC,27,PN | Performed by: INTERNAL MEDICINE

## 2020-12-23 PROCEDURE — 99999 PR PBB SHADOW E&M-EST. PATIENT-LVL V: CPT | Mod: PBBFAC,,, | Performed by: INTERNAL MEDICINE

## 2020-12-23 PROCEDURE — 99999 PR PBB SHADOW E&M-EST. PATIENT-LVL V: ICD-10-PCS | Mod: PBBFAC,,, | Performed by: INTERNAL MEDICINE

## 2020-12-23 PROCEDURE — 99214 OFFICE O/P EST MOD 30 MIN: CPT | Mod: S$PBB,,, | Performed by: INTERNAL MEDICINE

## 2020-12-23 PROCEDURE — 99211 OFF/OP EST MAY X REQ PHY/QHP: CPT | Mod: PBBFAC,PN,25

## 2020-12-23 PROCEDURE — 99214 PR OFFICE/OUTPT VISIT, EST, LEVL IV, 30-39 MIN: ICD-10-PCS | Mod: S$PBB,,, | Performed by: INTERNAL MEDICINE

## 2020-12-23 PROCEDURE — 99999 PR PBB SHADOW E&M-EST. PATIENT-LVL I: CPT | Mod: PBBFAC,,,

## 2020-12-23 PROCEDURE — 99999 PR PBB SHADOW E&M-EST. PATIENT-LVL I: ICD-10-PCS | Mod: PBBFAC,,,

## 2020-12-23 RX ORDER — FINASTERIDE 5 MG/1
TABLET, FILM COATED ORAL
COMMUNITY
Start: 2020-12-03 | End: 2021-02-23 | Stop reason: SDUPTHER

## 2020-12-23 RX ORDER — COLLAGENASE SANTYL 250 [ARB'U]/G
OINTMENT TOPICAL DAILY
Qty: 30 G | Refills: 11 | Status: ON HOLD | OUTPATIENT
Start: 2020-12-23 | End: 2021-03-15 | Stop reason: HOSPADM

## 2020-12-23 RX ORDER — INSULIN GLARGINE 100 [IU]/ML
10 INJECTION, SOLUTION SUBCUTANEOUS NIGHTLY
Qty: 9 ML | Refills: 6 | Status: SHIPPED | OUTPATIENT
Start: 2020-12-23 | End: 2020-12-24

## 2020-12-23 RX ORDER — TRIAMCINOLONE ACETONIDE 5 MG/G
OINTMENT TOPICAL 2 TIMES DAILY
Qty: 454 G | Refills: 11 | Status: SHIPPED | OUTPATIENT
Start: 2020-12-23 | End: 2021-01-27

## 2020-12-23 RX ORDER — LISINOPRIL 20 MG/1
10 TABLET ORAL DAILY
Status: ON HOLD | COMMUNITY
End: 2021-02-03 | Stop reason: HOSPADM

## 2020-12-23 NOTE — PROGRESS NOTES
Attempted to do a diabetic eye cam on pt. Unable to get clear pictures due to pt being in a motorized wheelchair. The patient was unable to position himself in the wheelchair. Informed Dr. Bolden of result.
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
## 2020-12-24 ENCOUNTER — TELEPHONE (OUTPATIENT)
Dept: PODIATRY | Facility: CLINIC | Age: 55
End: 2020-12-24

## 2020-12-24 DIAGNOSIS — E11.9 TYPE 2 DIABETES MELLITUS WITHOUT COMPLICATION, WITHOUT LONG-TERM CURRENT USE OF INSULIN: ICD-10-CM

## 2020-12-24 RX ORDER — INSULIN GLARGINE 100 [IU]/ML
10 INJECTION, SOLUTION SUBCUTANEOUS DAILY
Qty: 21 ML | Refills: 0 | Status: SHIPPED | OUTPATIENT
Start: 2020-12-24 | End: 2021-01-06 | Stop reason: SDUPTHER

## 2020-12-24 NOTE — TELEPHONE ENCOUNTER
----- Message from Ambar Davis sent at 12/24/2020 11:33 AM CST -----  Contact: christina(sister) [phone]  Pts sister states the pts insurance will not cover the cost of the insulin (LANTUS SOLOSTAR U-100 INSULIN) glargine 100 units/mL (3mL) SubQ pen. Pts sister states the pharmacy has tried to call and they have not received a call back. Please call and advise. Thank you

## 2020-12-24 NOTE — TELEPHONE ENCOUNTER
Spoke to [facility] they stated Lantus not covered they cover one of these........Basaglar, tresiba, levemir

## 2021-01-04 ENCOUNTER — PATIENT MESSAGE (OUTPATIENT)
Dept: ADMINISTRATIVE | Facility: HOSPITAL | Age: 56
End: 2021-01-04

## 2021-01-04 PROCEDURE — G0179 PR HOME HEALTH MD RECERTIFICATION: ICD-10-PCS | Mod: ,,, | Performed by: FAMILY MEDICINE

## 2021-01-04 PROCEDURE — G0179 MD RECERTIFICATION HHA PT: HCPCS | Mod: ,,, | Performed by: FAMILY MEDICINE

## 2021-01-06 ENCOUNTER — OFFICE VISIT (OUTPATIENT)
Dept: ENDOCRINOLOGY | Facility: CLINIC | Age: 56
End: 2021-01-06
Payer: MEDICARE

## 2021-01-06 VITALS — HEIGHT: 64 IN | BODY MASS INDEX: 31.6 KG/M2 | SYSTOLIC BLOOD PRESSURE: 140 MMHG | DIASTOLIC BLOOD PRESSURE: 80 MMHG

## 2021-01-06 DIAGNOSIS — Z79.4 TYPE 2 DIABETES MELLITUS WITH HYPERGLYCEMIA, WITH LONG-TERM CURRENT USE OF INSULIN: ICD-10-CM

## 2021-01-06 DIAGNOSIS — I50.32 CHRONIC DIASTOLIC CHF (CONGESTIVE HEART FAILURE): ICD-10-CM

## 2021-01-06 DIAGNOSIS — E11.65 TYPE 2 DIABETES MELLITUS WITH HYPERGLYCEMIA, WITH LONG-TERM CURRENT USE OF INSULIN: ICD-10-CM

## 2021-01-06 DIAGNOSIS — I25.10 CORONARY ARTERY DISEASE INVOLVING NATIVE CORONARY ARTERY WITHOUT ANGINA PECTORIS, UNSPECIFIED WHETHER NATIVE OR TRANSPLANTED HEART: Primary | Chronic | ICD-10-CM

## 2021-01-06 DIAGNOSIS — E11.9 TYPE 2 DIABETES MELLITUS WITHOUT COMPLICATION, WITHOUT LONG-TERM CURRENT USE OF INSULIN: ICD-10-CM

## 2021-01-06 DIAGNOSIS — I73.9 PAD (PERIPHERAL ARTERY DISEASE): ICD-10-CM

## 2021-01-06 PROCEDURE — 99999 PR PBB SHADOW E&M-EST. PATIENT-LVL V: CPT | Mod: PBBFAC,,, | Performed by: INTERNAL MEDICINE

## 2021-01-06 PROCEDURE — 99204 OFFICE O/P NEW MOD 45 MIN: CPT | Mod: S$PBB,,, | Performed by: INTERNAL MEDICINE

## 2021-01-06 PROCEDURE — 99204 PR OFFICE/OUTPT VISIT, NEW, LEVL IV, 45-59 MIN: ICD-10-PCS | Mod: S$PBB,,, | Performed by: INTERNAL MEDICINE

## 2021-01-06 PROCEDURE — 99215 OFFICE O/P EST HI 40 MIN: CPT | Mod: PBBFAC | Performed by: INTERNAL MEDICINE

## 2021-01-06 PROCEDURE — 99999 PR PBB SHADOW E&M-EST. PATIENT-LVL V: ICD-10-PCS | Mod: PBBFAC,,, | Performed by: INTERNAL MEDICINE

## 2021-01-06 RX ORDER — INSULIN GLARGINE 100 [IU]/ML
24 INJECTION, SOLUTION SUBCUTANEOUS DAILY
Qty: 21.6 ML | Refills: 3 | Status: ON HOLD | OUTPATIENT
Start: 2021-01-06 | End: 2021-03-15 | Stop reason: SDUPTHER

## 2021-01-06 RX ORDER — METFORMIN HYDROCHLORIDE 500 MG/1
500 TABLET ORAL 2 TIMES DAILY WITH MEALS
Qty: 180 TABLET | Refills: 3 | Status: ON HOLD | OUTPATIENT
Start: 2021-01-06 | End: 2021-02-03 | Stop reason: HOSPADM

## 2021-01-06 RX ORDER — INSULIN ASPART 100 [IU]/ML
8 INJECTION, SOLUTION INTRAVENOUS; SUBCUTANEOUS
Qty: 15 ML | Refills: 11 | Status: ON HOLD | OUTPATIENT
Start: 2021-01-06 | End: 2021-03-15 | Stop reason: SDUPTHER

## 2021-01-06 RX ORDER — BLOOD-GLUCOSE CONTROL, NORMAL
1 EACH MISCELLANEOUS
Qty: 200 EACH | Refills: 11 | Status: SHIPPED | OUTPATIENT
Start: 2021-01-06 | End: 2021-01-06

## 2021-01-07 ENCOUNTER — OFFICE VISIT (OUTPATIENT)
Dept: PODIATRY | Facility: CLINIC | Age: 56
End: 2021-01-07
Payer: MEDICARE

## 2021-01-07 ENCOUNTER — TELEPHONE (OUTPATIENT)
Dept: ADMINISTRATIVE | Facility: HOSPITAL | Age: 56
End: 2021-01-07

## 2021-01-07 DIAGNOSIS — R60.9 VENOUS STASIS ULCER WITH EDEMA OF LOWER LEG: ICD-10-CM

## 2021-01-07 DIAGNOSIS — I83.009 VENOUS STASIS ULCER WITH EDEMA OF LOWER LEG: ICD-10-CM

## 2021-01-07 DIAGNOSIS — M79.661 PAIN OF RIGHT CALF: ICD-10-CM

## 2021-01-07 DIAGNOSIS — E11.51 TYPE II DIABETES MELLITUS WITH PERIPHERAL CIRCULATORY DISORDER: ICD-10-CM

## 2021-01-07 DIAGNOSIS — L97.419: Primary | ICD-10-CM

## 2021-01-07 DIAGNOSIS — L97.412 CHRONIC ULCER OF RIGHT HEEL WITH FAT LAYER EXPOSED: ICD-10-CM

## 2021-01-07 DIAGNOSIS — L97.909 VENOUS STASIS ULCER WITH EDEMA OF LOWER LEG: ICD-10-CM

## 2021-01-07 DIAGNOSIS — I83.899 VENOUS STASIS ULCER WITH EDEMA OF LOWER LEG: ICD-10-CM

## 2021-01-07 PROCEDURE — 99213 OFFICE O/P EST LOW 20 MIN: CPT | Mod: S$PBB,,, | Performed by: PODIATRIST

## 2021-01-07 PROCEDURE — 99999 PR PBB SHADOW E&M-EST. PATIENT-LVL III: ICD-10-PCS | Mod: PBBFAC,,, | Performed by: PODIATRIST

## 2021-01-07 PROCEDURE — 99213 PR OFFICE/OUTPT VISIT, EST, LEVL III, 20-29 MIN: ICD-10-PCS | Mod: S$PBB,,, | Performed by: PODIATRIST

## 2021-01-07 PROCEDURE — 99213 OFFICE O/P EST LOW 20 MIN: CPT | Mod: PBBFAC,PN | Performed by: PODIATRIST

## 2021-01-07 PROCEDURE — 99999 PR PBB SHADOW E&M-EST. PATIENT-LVL III: CPT | Mod: PBBFAC,,, | Performed by: PODIATRIST

## 2021-01-08 ENCOUNTER — EXTERNAL HOME HEALTH (OUTPATIENT)
Dept: HOME HEALTH SERVICES | Facility: HOSPITAL | Age: 56
End: 2021-01-08
Payer: MEDICARE

## 2021-01-08 ENCOUNTER — TELEPHONE (OUTPATIENT)
Dept: ENDOCRINOLOGY | Facility: CLINIC | Age: 56
End: 2021-01-08

## 2021-01-08 DIAGNOSIS — E11.9 TYPE 2 DIABETES MELLITUS WITHOUT COMPLICATION, WITHOUT LONG-TERM CURRENT USE OF INSULIN: ICD-10-CM

## 2021-01-08 RX ORDER — LANCETS
1 EACH MISCELLANEOUS
Qty: 200 EACH | Refills: 11 | Status: SHIPPED | OUTPATIENT
Start: 2021-01-08 | End: 2022-02-28 | Stop reason: SDUPTHER

## 2021-01-13 ENCOUNTER — TELEPHONE (OUTPATIENT)
Dept: ENDOCRINOLOGY | Facility: CLINIC | Age: 56
End: 2021-01-13

## 2021-01-13 ENCOUNTER — NURSE TRIAGE (OUTPATIENT)
Dept: ADMINISTRATIVE | Facility: CLINIC | Age: 56
End: 2021-01-13

## 2021-01-20 ENCOUNTER — DOCUMENT SCAN (OUTPATIENT)
Dept: HOME HEALTH SERVICES | Facility: HOSPITAL | Age: 56
End: 2021-01-20
Payer: MEDICARE

## 2021-01-20 ENCOUNTER — OFFICE VISIT (OUTPATIENT)
Dept: FAMILY MEDICINE | Facility: CLINIC | Age: 56
End: 2021-01-20
Payer: MEDICARE

## 2021-01-20 VITALS
DIASTOLIC BLOOD PRESSURE: 60 MMHG | OXYGEN SATURATION: 95 % | TEMPERATURE: 98 F | SYSTOLIC BLOOD PRESSURE: 102 MMHG | HEART RATE: 100 BPM

## 2021-01-20 DIAGNOSIS — I50.32 CHRONIC DIASTOLIC CHF (CONGESTIVE HEART FAILURE): ICD-10-CM

## 2021-01-20 DIAGNOSIS — E78.2 MIXED HYPERLIPIDEMIA: ICD-10-CM

## 2021-01-20 DIAGNOSIS — I10 ESSENTIAL HYPERTENSION: ICD-10-CM

## 2021-01-20 DIAGNOSIS — R35.1 BENIGN PROSTATIC HYPERPLASIA WITH NOCTURIA: ICD-10-CM

## 2021-01-20 DIAGNOSIS — F33.0 MILD RECURRENT MAJOR DEPRESSION: ICD-10-CM

## 2021-01-20 DIAGNOSIS — E11.65 TYPE 2 DIABETES MELLITUS WITH HYPERGLYCEMIA, WITH LONG-TERM CURRENT USE OF INSULIN: Primary | ICD-10-CM

## 2021-01-20 DIAGNOSIS — I87.2: ICD-10-CM

## 2021-01-20 DIAGNOSIS — N40.1 BENIGN PROSTATIC HYPERPLASIA WITH NOCTURIA: ICD-10-CM

## 2021-01-20 DIAGNOSIS — L97.805: ICD-10-CM

## 2021-01-20 DIAGNOSIS — I25.10 CORONARY ARTERY DISEASE INVOLVING NATIVE CORONARY ARTERY WITHOUT ANGINA PECTORIS, UNSPECIFIED WHETHER NATIVE OR TRANSPLANTED HEART: ICD-10-CM

## 2021-01-20 DIAGNOSIS — Z79.4 TYPE 2 DIABETES MELLITUS WITH HYPERGLYCEMIA, WITH LONG-TERM CURRENT USE OF INSULIN: Primary | ICD-10-CM

## 2021-01-20 DIAGNOSIS — I73.9 PAD (PERIPHERAL ARTERY DISEASE): ICD-10-CM

## 2021-01-20 PROCEDURE — 99214 PR OFFICE/OUTPT VISIT, EST, LEVL IV, 30-39 MIN: ICD-10-PCS | Mod: S$PBB,,, | Performed by: INTERNAL MEDICINE

## 2021-01-20 PROCEDURE — 99999 PR PBB SHADOW E&M-EST. PATIENT-LVL IV: CPT | Mod: PBBFAC,,, | Performed by: INTERNAL MEDICINE

## 2021-01-20 PROCEDURE — 99214 OFFICE O/P EST MOD 30 MIN: CPT | Mod: S$PBB,,, | Performed by: INTERNAL MEDICINE

## 2021-01-20 PROCEDURE — 99214 OFFICE O/P EST MOD 30 MIN: CPT | Mod: PBBFAC,PN | Performed by: INTERNAL MEDICINE

## 2021-01-20 PROCEDURE — 99999 PR PBB SHADOW E&M-EST. PATIENT-LVL IV: ICD-10-PCS | Mod: PBBFAC,,, | Performed by: INTERNAL MEDICINE

## 2021-01-21 ENCOUNTER — TELEPHONE (OUTPATIENT)
Dept: FAMILY MEDICINE | Facility: CLINIC | Age: 56
End: 2021-01-21

## 2021-01-21 DIAGNOSIS — N18.4 STAGE 4 CHRONIC KIDNEY DISEASE: Primary | ICD-10-CM

## 2021-01-22 ENCOUNTER — TELEPHONE (OUTPATIENT)
Dept: PRIMARY CARE CLINIC | Facility: CLINIC | Age: 56
End: 2021-01-22

## 2021-01-22 ENCOUNTER — DOCUMENT SCAN (OUTPATIENT)
Dept: HOME HEALTH SERVICES | Facility: HOSPITAL | Age: 56
End: 2021-01-22
Payer: MEDICARE

## 2021-01-23 ENCOUNTER — TELEPHONE (OUTPATIENT)
Dept: FAMILY MEDICINE | Facility: CLINIC | Age: 56
End: 2021-01-23

## 2021-01-25 ENCOUNTER — TELEPHONE (OUTPATIENT)
Dept: FAMILY MEDICINE | Facility: CLINIC | Age: 56
End: 2021-01-25

## 2021-01-25 DIAGNOSIS — I87.311 CHRONIC VENOUS HYPERTENSION W ULCER OF R LOW EXTREM: ICD-10-CM

## 2021-01-25 DIAGNOSIS — I73.9 PAD (PERIPHERAL ARTERY DISEASE): ICD-10-CM

## 2021-01-25 DIAGNOSIS — I25.10 CORONARY ARTERY DISEASE INVOLVING NATIVE CORONARY ARTERY WITHOUT ANGINA PECTORIS, UNSPECIFIED WHETHER NATIVE OR TRANSPLANTED HEART: Primary | ICD-10-CM

## 2021-01-25 DIAGNOSIS — L97.919 CHRONIC VENOUS HYPERTENSION W ULCER OF R LOW EXTREM: ICD-10-CM

## 2021-01-25 DIAGNOSIS — I87.2 CHRONIC VENOUS INSUFFICIENCY: ICD-10-CM

## 2021-01-25 RX ORDER — BUMETANIDE 1 MG/1
1 TABLET ORAL DAILY
Qty: 30 TABLET | Refills: 1 | Status: SHIPPED | OUTPATIENT
Start: 2021-01-25 | End: 2021-01-27

## 2021-01-26 ENCOUNTER — TELEPHONE (OUTPATIENT)
Dept: CARDIOLOGY | Facility: CLINIC | Age: 56
End: 2021-01-26

## 2021-01-26 ENCOUNTER — DOCUMENT SCAN (OUTPATIENT)
Dept: HOME HEALTH SERVICES | Facility: HOSPITAL | Age: 56
End: 2021-01-26
Payer: MEDICARE

## 2021-01-26 ENCOUNTER — PATIENT OUTREACH (OUTPATIENT)
Dept: ADMINISTRATIVE | Facility: OTHER | Age: 56
End: 2021-01-26

## 2021-01-26 DIAGNOSIS — I25.10 CORONARY ARTERY DISEASE INVOLVING NATIVE CORONARY ARTERY WITHOUT ANGINA PECTORIS, UNSPECIFIED WHETHER NATIVE OR TRANSPLANTED HEART: ICD-10-CM

## 2021-01-26 DIAGNOSIS — E86.0 DEHYDRATION: ICD-10-CM

## 2021-01-26 DIAGNOSIS — I70.229 CRITICAL LOWER LIMB ISCHEMIA: ICD-10-CM

## 2021-01-26 DIAGNOSIS — I50.32 CHRONIC DIASTOLIC CHF (CONGESTIVE HEART FAILURE): ICD-10-CM

## 2021-01-26 DIAGNOSIS — I73.9 PAD (PERIPHERAL ARTERY DISEASE): Primary | ICD-10-CM

## 2021-01-26 DIAGNOSIS — N17.9 AKI (ACUTE KIDNEY INJURY): ICD-10-CM

## 2021-01-26 DIAGNOSIS — R60.0 EDEMA OF RIGHT LOWER EXTREMITY: ICD-10-CM

## 2021-01-26 DIAGNOSIS — L89.610 DECUBITUS ULCER OF HEEL, RIGHT, UNSTAGEABLE: ICD-10-CM

## 2021-01-26 DIAGNOSIS — M79.604 PAIN IN BOTH LOWER EXTREMITIES: ICD-10-CM

## 2021-01-26 DIAGNOSIS — E78.2 MIXED HYPERLIPIDEMIA: ICD-10-CM

## 2021-01-26 DIAGNOSIS — I87.311 CHRONIC VENOUS HYPERTENSION W ULCER OF R LOW EXTREM: ICD-10-CM

## 2021-01-26 DIAGNOSIS — L97.822 ULCER OF LEFT PRETIBIAL REGION WITH FAT LAYER EXPOSED: ICD-10-CM

## 2021-01-26 DIAGNOSIS — Z86.718 HISTORY OF DVT OF LOWER EXTREMITY: ICD-10-CM

## 2021-01-26 DIAGNOSIS — M79.605 PAIN IN BOTH LOWER EXTREMITIES: ICD-10-CM

## 2021-01-26 DIAGNOSIS — L97.919 CHRONIC VENOUS HYPERTENSION W ULCER OF R LOW EXTREM: ICD-10-CM

## 2021-01-26 DIAGNOSIS — S81.801A LEG WOUND, RIGHT, INITIAL ENCOUNTER: ICD-10-CM

## 2021-01-26 DIAGNOSIS — I10 ESSENTIAL HYPERTENSION: ICD-10-CM

## 2021-01-27 ENCOUNTER — HOSPITAL ENCOUNTER (INPATIENT)
Facility: HOSPITAL | Age: 56
LOS: 6 days | Discharge: HOME HEALTH | DRG: 871 | End: 2021-02-03
Attending: EMERGENCY MEDICINE | Admitting: FAMILY MEDICINE
Payer: MEDICARE

## 2021-01-27 ENCOUNTER — OFFICE VISIT (OUTPATIENT)
Dept: CARDIOLOGY | Facility: CLINIC | Age: 56
End: 2021-01-27
Payer: MEDICARE

## 2021-01-27 VITALS
SYSTOLIC BLOOD PRESSURE: 116 MMHG | BODY MASS INDEX: 31.92 KG/M2 | DIASTOLIC BLOOD PRESSURE: 67 MMHG | HEART RATE: 76 BPM | HEIGHT: 64 IN | WEIGHT: 187 LBS

## 2021-01-27 DIAGNOSIS — Z74.09 MOBILITY IMPAIRED: ICD-10-CM

## 2021-01-27 DIAGNOSIS — R60.0 EDEMA OF RIGHT LOWER EXTREMITY: ICD-10-CM

## 2021-01-27 DIAGNOSIS — L97.822 ULCER OF LEFT PRETIBIAL REGION WITH FAT LAYER EXPOSED: ICD-10-CM

## 2021-01-27 DIAGNOSIS — Z79.4 TYPE 2 DIABETES MELLITUS WITH HYPERGLYCEMIA, WITH LONG-TERM CURRENT USE OF INSULIN: ICD-10-CM

## 2021-01-27 DIAGNOSIS — I70.229 CRITICAL LOWER LIMB ISCHEMIA: ICD-10-CM

## 2021-01-27 DIAGNOSIS — M79.604 BILATERAL LEG PAIN: ICD-10-CM

## 2021-01-27 DIAGNOSIS — S81.801A LEG WOUND, RIGHT, INITIAL ENCOUNTER: Primary | ICD-10-CM

## 2021-01-27 DIAGNOSIS — R60.0 EDEMA OF RIGHT LOWER EXTREMITY: Primary | ICD-10-CM

## 2021-01-27 DIAGNOSIS — I87.311 CHRONIC VENOUS HYPERTENSION W ULCER OF R LOW EXTREM: ICD-10-CM

## 2021-01-27 DIAGNOSIS — B95.61 STAPHYLOCOCCUS AUREUS BACTEREMIA: ICD-10-CM

## 2021-01-27 DIAGNOSIS — L97.919 CHRONIC VENOUS HYPERTENSION W ULCER OF R LOW EXTREM: ICD-10-CM

## 2021-01-27 DIAGNOSIS — I25.10 CORONARY ARTERY DISEASE INVOLVING NATIVE CORONARY ARTERY WITHOUT ANGINA PECTORIS, UNSPECIFIED WHETHER NATIVE OR TRANSPLANTED HEART: ICD-10-CM

## 2021-01-27 DIAGNOSIS — Z95.1 S/P CABG (CORONARY ARTERY BYPASS GRAFT): Chronic | ICD-10-CM

## 2021-01-27 DIAGNOSIS — I99.8 LOWER LIMB ISCHEMIA: ICD-10-CM

## 2021-01-27 DIAGNOSIS — L03.116 CELLULITIS OF LEG, LEFT: ICD-10-CM

## 2021-01-27 DIAGNOSIS — I82.411 ACUTE DEEP VEIN THROMBOSIS (DVT) OF FEMORAL VEIN OF RIGHT LOWER EXTREMITY: ICD-10-CM

## 2021-01-27 DIAGNOSIS — R78.81 STAPHYLOCOCCUS AUREUS BACTEREMIA: ICD-10-CM

## 2021-01-27 DIAGNOSIS — Z86.718 HISTORY OF DVT OF LOWER EXTREMITY: ICD-10-CM

## 2021-01-27 DIAGNOSIS — R53.1 WEAKNESS GENERALIZED: ICD-10-CM

## 2021-01-27 DIAGNOSIS — E78.2 MIXED HYPERLIPIDEMIA: ICD-10-CM

## 2021-01-27 DIAGNOSIS — M79.605 BILATERAL LEG PAIN: ICD-10-CM

## 2021-01-27 DIAGNOSIS — L97.412 CHRONIC ULCER OF RIGHT HEEL WITH FAT LAYER EXPOSED: ICD-10-CM

## 2021-01-27 DIAGNOSIS — E11.65 TYPE 2 DIABETES MELLITUS WITH HYPERGLYCEMIA, WITH LONG-TERM CURRENT USE OF INSULIN: ICD-10-CM

## 2021-01-27 DIAGNOSIS — I50.32 CHRONIC DIASTOLIC CHF (CONGESTIVE HEART FAILURE): ICD-10-CM

## 2021-01-27 DIAGNOSIS — R07.9 CHEST PAIN: ICD-10-CM

## 2021-01-27 DIAGNOSIS — L03.115 CELLULITIS OF RIGHT LOWER EXTREMITY: ICD-10-CM

## 2021-01-27 DIAGNOSIS — L89.629 PRESSURE INJURY OF SKIN OF LEFT HEEL, UNSPECIFIED INJURY STAGE: ICD-10-CM

## 2021-01-27 DIAGNOSIS — I69.959 HEMIPLEGIA OF NONDOMINANT SIDE, LATE EFFECT OF CEREBROVASCULAR DISEASE: ICD-10-CM

## 2021-01-27 DIAGNOSIS — I73.9 PAD (PERIPHERAL ARTERY DISEASE): ICD-10-CM

## 2021-01-27 DIAGNOSIS — R60.9 SWELLING: ICD-10-CM

## 2021-01-27 DIAGNOSIS — I87.1 MAY-THURNER SYNDROME: ICD-10-CM

## 2021-01-27 LAB
ALBUMIN SERPL BCP-MCNC: 3.6 G/DL (ref 3.5–5.2)
ALP SERPL-CCNC: 71 U/L (ref 55–135)
ALT SERPL W/O P-5'-P-CCNC: 27 U/L (ref 10–44)
ANION GAP SERPL CALC-SCNC: 12 MMOL/L (ref 8–16)
APTT BLDCRRT: 29 SEC (ref 21–32)
AST SERPL-CCNC: 16 U/L (ref 10–40)
BASOPHILS # BLD AUTO: 0.06 K/UL (ref 0–0.2)
BASOPHILS NFR BLD: 0.8 % (ref 0–1.9)
BILIRUB SERPL-MCNC: 0.4 MG/DL (ref 0.1–1)
BNP SERPL-MCNC: 19 PG/ML (ref 0–99)
BUN SERPL-MCNC: 30 MG/DL (ref 6–20)
CALCIUM SERPL-MCNC: 9.3 MG/DL (ref 8.7–10.5)
CHLORIDE SERPL-SCNC: 91 MMOL/L (ref 95–110)
CO2 SERPL-SCNC: 28 MMOL/L (ref 23–29)
CREAT SERPL-MCNC: 1.6 MG/DL (ref 0.5–1.4)
CTP QC/QA: YES
DIFFERENTIAL METHOD: ABNORMAL
EOSINOPHIL # BLD AUTO: 0.2 K/UL (ref 0–0.5)
EOSINOPHIL NFR BLD: 2.1 % (ref 0–8)
ERYTHROCYTE [DISTWIDTH] IN BLOOD BY AUTOMATED COUNT: 18.4 % (ref 11.5–14.5)
EST. GFR  (AFRICAN AMERICAN): 54 ML/MIN/1.73 M^2
EST. GFR  (NON AFRICAN AMERICAN): 47 ML/MIN/1.73 M^2
GLUCOSE SERPL-MCNC: 234 MG/DL (ref 70–110)
HCT VFR BLD AUTO: 29.4 % (ref 40–54)
HGB BLD-MCNC: 9.1 G/DL (ref 14–18)
IMM GRANULOCYTES # BLD AUTO: 0.02 K/UL (ref 0–0.04)
IMM GRANULOCYTES NFR BLD AUTO: 0.3 % (ref 0–0.5)
INR PPP: 1 (ref 0.8–1.2)
LYMPHOCYTES # BLD AUTO: 1.5 K/UL (ref 1–4.8)
LYMPHOCYTES NFR BLD: 20.9 % (ref 18–48)
MCH RBC QN AUTO: 24.7 PG (ref 27–31)
MCHC RBC AUTO-ENTMCNC: 31 G/DL (ref 32–36)
MCV RBC AUTO: 80 FL (ref 82–98)
MONOCYTES # BLD AUTO: 0.6 K/UL (ref 0.3–1)
MONOCYTES NFR BLD: 8.6 % (ref 4–15)
NEUTROPHILS # BLD AUTO: 4.8 K/UL (ref 1.8–7.7)
NEUTROPHILS NFR BLD: 67.3 % (ref 38–73)
NRBC BLD-RTO: 0 /100 WBC
PLATELET # BLD AUTO: 333 K/UL (ref 150–350)
PMV BLD AUTO: 10.2 FL (ref 9.2–12.9)
POTASSIUM SERPL-SCNC: 3.6 MMOL/L (ref 3.5–5.1)
PROT SERPL-MCNC: 8.3 G/DL (ref 6–8.4)
PROTHROMBIN TIME: 10.8 SEC (ref 9–12.5)
RBC # BLD AUTO: 3.69 M/UL (ref 4.6–6.2)
SARS-COV-2 RDRP RESP QL NAA+PROBE: NEGATIVE
SODIUM SERPL-SCNC: 131 MMOL/L (ref 136–145)
TSH SERPL DL<=0.005 MIU/L-ACNC: 0.59 UIU/ML (ref 0.4–4)
WBC # BLD AUTO: 7.08 K/UL (ref 3.9–12.7)

## 2021-01-27 PROCEDURE — 63600175 PHARM REV CODE 636 W HCPCS: Performed by: EMERGENCY MEDICINE

## 2021-01-27 PROCEDURE — 85730 THROMBOPLASTIN TIME PARTIAL: CPT

## 2021-01-27 PROCEDURE — 85025 COMPLETE CBC W/AUTO DIFF WBC: CPT

## 2021-01-27 PROCEDURE — 99285 EMERGENCY DEPT VISIT HI MDM: CPT | Mod: 25,27

## 2021-01-27 PROCEDURE — 25000003 PHARM REV CODE 250: Performed by: NURSE PRACTITIONER

## 2021-01-27 PROCEDURE — 99215 OFFICE O/P EST HI 40 MIN: CPT | Mod: PBBFAC,PO | Performed by: INTERNAL MEDICINE

## 2021-01-27 PROCEDURE — 25000003 PHARM REV CODE 250: Performed by: EMERGENCY MEDICINE

## 2021-01-27 PROCEDURE — G0378 HOSPITAL OBSERVATION PER HR: HCPCS

## 2021-01-27 PROCEDURE — 87186 SC STD MICRODIL/AGAR DIL: CPT

## 2021-01-27 PROCEDURE — 99205 PR OFFICE/OUTPT VISIT, NEW, LEVL V, 60-74 MIN: ICD-10-PCS | Mod: S$PBB,,, | Performed by: INTERNAL MEDICINE

## 2021-01-27 PROCEDURE — 83880 ASSAY OF NATRIURETIC PEPTIDE: CPT

## 2021-01-27 PROCEDURE — 99999 PR PBB SHADOW E&M-EST. PATIENT-LVL V: CPT | Mod: PBBFAC,,, | Performed by: INTERNAL MEDICINE

## 2021-01-27 PROCEDURE — S0171 BUMETANIDE 0.5 MG: HCPCS | Performed by: EMERGENCY MEDICINE

## 2021-01-27 PROCEDURE — 96365 THER/PROPH/DIAG IV INF INIT: CPT

## 2021-01-27 PROCEDURE — 99999 PR PBB SHADOW E&M-EST. PATIENT-LVL V: ICD-10-PCS | Mod: PBBFAC,,, | Performed by: INTERNAL MEDICINE

## 2021-01-27 PROCEDURE — 80053 COMPREHEN METABOLIC PANEL: CPT

## 2021-01-27 PROCEDURE — 87077 CULTURE AEROBIC IDENTIFY: CPT

## 2021-01-27 PROCEDURE — U0002 COVID-19 LAB TEST NON-CDC: HCPCS | Performed by: EMERGENCY MEDICINE

## 2021-01-27 PROCEDURE — 96375 TX/PRO/DX INJ NEW DRUG ADDON: CPT

## 2021-01-27 PROCEDURE — 87040 BLOOD CULTURE FOR BACTERIA: CPT

## 2021-01-27 PROCEDURE — 96376 TX/PRO/DX INJ SAME DRUG ADON: CPT

## 2021-01-27 PROCEDURE — 85610 PROTHROMBIN TIME: CPT

## 2021-01-27 PROCEDURE — 99205 OFFICE O/P NEW HI 60 MIN: CPT | Mod: S$PBB,,, | Performed by: INTERNAL MEDICINE

## 2021-01-27 PROCEDURE — 84443 ASSAY THYROID STIM HORMONE: CPT

## 2021-01-27 RX ORDER — INSULIN ASPART 100 [IU]/ML
0-5 INJECTION, SOLUTION INTRAVENOUS; SUBCUTANEOUS
Status: DISCONTINUED | OUTPATIENT
Start: 2021-01-27 | End: 2021-01-28

## 2021-01-27 RX ORDER — OXYCODONE AND ACETAMINOPHEN 10; 325 MG/1; MG/1
1 TABLET ORAL EVERY 6 HOURS PRN
Status: DISCONTINUED | OUTPATIENT
Start: 2021-01-27 | End: 2021-02-03 | Stop reason: HOSPADM

## 2021-01-27 RX ORDER — SPIRONOLACTONE 25 MG/1
25 TABLET ORAL
COMMUNITY
End: 2021-02-23

## 2021-01-27 RX ORDER — GLUCAGON 1 MG
1 KIT INJECTION
Status: DISCONTINUED | OUTPATIENT
Start: 2021-01-27 | End: 2021-01-28 | Stop reason: SDUPTHER

## 2021-01-27 RX ORDER — HYDROMORPHONE HYDROCHLORIDE 1 MG/ML
1 INJECTION, SOLUTION INTRAMUSCULAR; INTRAVENOUS; SUBCUTANEOUS
Status: COMPLETED | OUTPATIENT
Start: 2021-01-27 | End: 2021-01-27

## 2021-01-27 RX ORDER — IBUPROFEN 200 MG
24 TABLET ORAL
Status: DISCONTINUED | OUTPATIENT
Start: 2021-01-27 | End: 2021-02-03 | Stop reason: HOSPADM

## 2021-01-27 RX ORDER — IBUPROFEN 200 MG
16 TABLET ORAL
Status: DISCONTINUED | OUTPATIENT
Start: 2021-01-27 | End: 2021-02-03 | Stop reason: HOSPADM

## 2021-01-27 RX ORDER — ACETAMINOPHEN 325 MG/1
650 TABLET ORAL EVERY 4 HOURS PRN
Status: DISCONTINUED | OUTPATIENT
Start: 2021-01-27 | End: 2021-02-03 | Stop reason: HOSPADM

## 2021-01-27 RX ORDER — SODIUM CHLORIDE 0.9 % (FLUSH) 0.9 %
10 SYRINGE (ML) INJECTION
Status: DISCONTINUED | OUTPATIENT
Start: 2021-01-27 | End: 2021-02-03 | Stop reason: HOSPADM

## 2021-01-27 RX ORDER — METOLAZONE 5 MG/1
TABLET ORAL
COMMUNITY
Start: 2020-12-29 | End: 2021-02-23

## 2021-01-27 RX ORDER — BUMETANIDE 0.25 MG/ML
1 INJECTION INTRAMUSCULAR; INTRAVENOUS
Status: COMPLETED | OUTPATIENT
Start: 2021-01-27 | End: 2021-01-27

## 2021-01-27 RX ORDER — SODIUM CHLORIDE 9 MG/ML
INJECTION, SOLUTION INTRAVENOUS
Status: COMPLETED | OUTPATIENT
Start: 2021-01-27 | End: 2021-01-27

## 2021-01-27 RX ORDER — CARVEDILOL 25 MG/1
25 TABLET ORAL 2 TIMES DAILY WITH MEALS
COMMUNITY
End: 2021-02-23

## 2021-01-27 RX ORDER — TALC
6 POWDER (GRAM) TOPICAL NIGHTLY PRN
Status: DISCONTINUED | OUTPATIENT
Start: 2021-01-27 | End: 2021-02-03 | Stop reason: HOSPADM

## 2021-01-27 RX ORDER — HYDROMORPHONE HYDROCHLORIDE 1 MG/ML
1 INJECTION, SOLUTION INTRAMUSCULAR; INTRAVENOUS; SUBCUTANEOUS EVERY 6 HOURS PRN
Status: DISCONTINUED | OUTPATIENT
Start: 2021-01-27 | End: 2021-01-31

## 2021-01-27 RX ORDER — ONDANSETRON 2 MG/ML
4 INJECTION INTRAMUSCULAR; INTRAVENOUS EVERY 8 HOURS PRN
Status: DISCONTINUED | OUTPATIENT
Start: 2021-01-27 | End: 2021-02-03 | Stop reason: HOSPADM

## 2021-01-27 RX ORDER — ALPRAZOLAM 1 MG/1
1 TABLET ORAL 2 TIMES DAILY PRN
Status: ON HOLD | COMMUNITY
Start: 2021-01-21 | End: 2021-02-03 | Stop reason: HOSPADM

## 2021-01-27 RX ADMIN — PIPERACILLIN AND TAZOBACTAM 4.5 G: 4; .5 INJECTION, POWDER, LYOPHILIZED, FOR SOLUTION INTRAVENOUS; PARENTERAL at 08:01

## 2021-01-27 RX ADMIN — SODIUM CHLORIDE: 0.9 INJECTION, SOLUTION INTRAVENOUS at 06:01

## 2021-01-27 RX ADMIN — HYDROMORPHONE HYDROCHLORIDE 1 MG: 1 INJECTION, SOLUTION INTRAMUSCULAR; INTRAVENOUS; SUBCUTANEOUS at 05:01

## 2021-01-27 RX ADMIN — BUMETANIDE 1 MG: 0.25 INJECTION INTRAMUSCULAR; INTRAVENOUS at 05:01

## 2021-01-27 RX ADMIN — VANCOMYCIN HYDROCHLORIDE 2000 MG: 100 INJECTION, POWDER, LYOPHILIZED, FOR SOLUTION INTRAVENOUS at 09:01

## 2021-01-27 RX ADMIN — OXYCODONE HYDROCHLORIDE AND ACETAMINOPHEN 1 TABLET: 10; 325 TABLET ORAL at 10:01

## 2021-01-28 ENCOUNTER — DOCUMENT SCAN (OUTPATIENT)
Dept: HOME HEALTH SERVICES | Facility: HOSPITAL | Age: 56
End: 2021-01-28
Payer: MEDICARE

## 2021-01-28 PROBLEM — L03.116 CELLULITIS OF LEG, LEFT: Status: ACTIVE | Noted: 2021-01-28

## 2021-01-28 PROBLEM — R23.9 ALTERATION IN SKIN INTEGRITY: Status: ACTIVE | Noted: 2021-01-28

## 2021-01-28 LAB
ALBUMIN SERPL BCP-MCNC: 3.5 G/DL (ref 3.5–5.2)
ALP SERPL-CCNC: 96 U/L (ref 55–135)
ALT SERPL W/O P-5'-P-CCNC: 32 U/L (ref 10–44)
ANION GAP SERPL CALC-SCNC: 14 MMOL/L (ref 8–16)
AORTIC ROOT ANNULUS: 3.23 CM
ASCENDING AORTA: 2.99 CM
AST SERPL-CCNC: 32 U/L (ref 10–40)
AV INDEX (PROSTH): 0.65
AV MEAN GRADIENT: 7 MMHG
AV PEAK GRADIENT: 11 MMHG
AV VALVE AREA: 2.23 CM2
AV VELOCITY RATIO: 0.63
BASOPHILS # BLD AUTO: 0.05 K/UL (ref 0–0.2)
BASOPHILS NFR BLD: 0.6 % (ref 0–1.9)
BILIRUB SERPL-MCNC: 0.6 MG/DL (ref 0.1–1)
BILIRUB UR QL STRIP: NEGATIVE
BSA FOR ECHO PROCEDURE: 1.93 M2
BUN SERPL-MCNC: 29 MG/DL (ref 6–20)
CALCIUM SERPL-MCNC: 9.2 MG/DL (ref 8.7–10.5)
CHLORIDE SERPL-SCNC: 96 MMOL/L (ref 95–110)
CLARITY UR: CLEAR
CO2 SERPL-SCNC: 24 MMOL/L (ref 23–29)
COLOR UR: ABNORMAL
CREAT SERPL-MCNC: 1.4 MG/DL (ref 0.5–1.4)
CV ECHO LV RWT: 0.51 CM
DIFFERENTIAL METHOD: ABNORMAL
DOP CALC AO PEAK VEL: 1.67 M/S
DOP CALC AO VTI: 34.55 CM
DOP CALC LVOT AREA: 3.4 CM2
DOP CALC LVOT DIAMETER: 2.09 CM
DOP CALC LVOT PEAK VEL: 1.06 M/S
DOP CALC LVOT STROKE VOLUME: 77.15 CM3
DOP CALCLVOT PEAK VEL VTI: 22.5 CM
E WAVE DECELERATION TIME: 206.49 MSEC
E/A RATIO: 1.07
E/E' RATIO: 14.63 M/S
ECHO LV POSTERIOR WALL: 1.2 CM (ref 0.6–1.1)
EOSINOPHIL # BLD AUTO: 0.1 K/UL (ref 0–0.5)
EOSINOPHIL NFR BLD: 1.3 % (ref 0–8)
ERYTHROCYTE [DISTWIDTH] IN BLOOD BY AUTOMATED COUNT: 17.9 % (ref 11.5–14.5)
EST. GFR  (AFRICAN AMERICAN): >60 ML/MIN/1.73 M^2
EST. GFR  (NON AFRICAN AMERICAN): 55 ML/MIN/1.73 M^2
ESTIMATED AVG GLUCOSE: 203 MG/DL (ref 68–131)
FRACTIONAL SHORTENING: 24 % (ref 28–44)
GLUCOSE SERPL-MCNC: 168 MG/DL (ref 70–110)
GLUCOSE UR QL STRIP: ABNORMAL
HBA1C MFR BLD HPLC: 8.7 % (ref 4–5.6)
HCT VFR BLD AUTO: 34 % (ref 40–54)
HGB BLD-MCNC: 10.4 G/DL (ref 14–18)
HGB UR QL STRIP: NEGATIVE
IMM GRANULOCYTES # BLD AUTO: 0.02 K/UL (ref 0–0.04)
IMM GRANULOCYTES NFR BLD AUTO: 0.2 % (ref 0–0.5)
INTERVENTRICULAR SEPTUM: 1.19 CM (ref 0.6–1.1)
KETONES UR QL STRIP: NEGATIVE
LA MAJOR: 4.6 CM
LA MINOR: 4.39 CM
LA WIDTH: 3.57 CM
LEFT ATRIUM SIZE: 3.91 CM
LEFT ATRIUM VOLUME INDEX MOD: 17.4 ML/M2
LEFT ATRIUM VOLUME INDEX: 28.5 ML/M2
LEFT ATRIUM VOLUME MOD: 32.57 CM3
LEFT ATRIUM VOLUME: 53.3 CM3
LEFT INTERNAL DIMENSION IN SYSTOLE: 3.58 CM (ref 2.1–4)
LEFT VENTRICLE DIASTOLIC VOLUME INDEX: 54.63 ML/M2
LEFT VENTRICLE DIASTOLIC VOLUME: 102.15 ML
LEFT VENTRICLE MASS INDEX: 113 G/M2
LEFT VENTRICLE SYSTOLIC VOLUME INDEX: 28.7 ML/M2
LEFT VENTRICLE SYSTOLIC VOLUME: 53.61 ML
LEFT VENTRICULAR INTERNAL DIMENSION IN DIASTOLE: 4.7 CM (ref 3.5–6)
LEFT VENTRICULAR MASS: 210.74 G
LEUKOCYTE ESTERASE UR QL STRIP: NEGATIVE
LV LATERAL E/E' RATIO: 10.64 M/S
LV SEPTAL E/E' RATIO: 23.4 M/S
LYMPHOCYTES # BLD AUTO: 0.8 K/UL (ref 1–4.8)
LYMPHOCYTES NFR BLD: 8.7 % (ref 18–48)
MAGNESIUM SERPL-MCNC: 1.7 MG/DL (ref 1.6–2.6)
MCH RBC QN AUTO: 24.8 PG (ref 27–31)
MCHC RBC AUTO-ENTMCNC: 30.6 G/DL (ref 32–36)
MCV RBC AUTO: 81 FL (ref 82–98)
MONOCYTES # BLD AUTO: 0.8 K/UL (ref 0.3–1)
MONOCYTES NFR BLD: 8.9 % (ref 4–15)
MV PEAK A VEL: 1.09 M/S
MV PEAK E VEL: 1.17 M/S
NEUTROPHILS # BLD AUTO: 7.2 K/UL (ref 1.8–7.7)
NEUTROPHILS NFR BLD: 80.3 % (ref 38–73)
NITRITE UR QL STRIP: NEGATIVE
NRBC BLD-RTO: 0 /100 WBC
PH UR STRIP: 5 [PH] (ref 5–8)
PLATELET # BLD AUTO: 325 K/UL (ref 150–350)
PMV BLD AUTO: 9.5 FL (ref 9.2–12.9)
POCT GLUCOSE: 178 MG/DL (ref 70–110)
POCT GLUCOSE: 261 MG/DL (ref 70–110)
POTASSIUM SERPL-SCNC: 3.6 MMOL/L (ref 3.5–5.1)
PROT SERPL-MCNC: 8.2 G/DL (ref 6–8.4)
PROT UR QL STRIP: NEGATIVE
PV PEAK VELOCITY: 1.3 CM/S
RA MAJOR: 4.59 CM
RA PRESSURE: 8 MMHG
RA WIDTH: 3.18 CM
RBC # BLD AUTO: 4.2 M/UL (ref 4.6–6.2)
RIGHT VENTRICULAR END-DIASTOLIC DIMENSION: 2.22 CM
RV TISSUE DOPPLER FREE WALL SYSTOLIC VELOCITY 1 (APICAL 4 CHAMBER VIEW): 9.52 CM/S
SODIUM SERPL-SCNC: 134 MMOL/L (ref 136–145)
SP GR UR STRIP: 1.01 (ref 1–1.03)
STJ: 2.58 CM
TDI LATERAL: 0.11 M/S
TDI SEPTAL: 0.05 M/S
TDI: 0.08 M/S
TRICUSPID ANNULAR PLANE SYSTOLIC EXCURSION: 1.45 CM
URN SPEC COLLECT METH UR: ABNORMAL
UROBILINOGEN UR STRIP-ACNC: NEGATIVE EU/DL
WBC # BLD AUTO: 8.98 K/UL (ref 3.9–12.7)

## 2021-01-28 PROCEDURE — 85025 COMPLETE CBC W/AUTO DIFF WBC: CPT

## 2021-01-28 PROCEDURE — 80053 COMPREHEN METABOLIC PANEL: CPT

## 2021-01-28 PROCEDURE — 25000003 PHARM REV CODE 250: Performed by: FAMILY MEDICINE

## 2021-01-28 PROCEDURE — 99223 1ST HOSP IP/OBS HIGH 75: CPT | Mod: ,,, | Performed by: INTERNAL MEDICINE

## 2021-01-28 PROCEDURE — 96372 THER/PROPH/DIAG INJ SC/IM: CPT

## 2021-01-28 PROCEDURE — 63600175 PHARM REV CODE 636 W HCPCS: Performed by: FAMILY MEDICINE

## 2021-01-28 PROCEDURE — S0171 BUMETANIDE 0.5 MG: HCPCS | Performed by: NURSE PRACTITIONER

## 2021-01-28 PROCEDURE — 87186 SC STD MICRODIL/AGAR DIL: CPT

## 2021-01-28 PROCEDURE — 87077 CULTURE AEROBIC IDENTIFY: CPT

## 2021-01-28 PROCEDURE — 99223 PR INITIAL HOSPITAL CARE,LEVL III: ICD-10-PCS | Mod: ,,, | Performed by: INTERNAL MEDICINE

## 2021-01-28 PROCEDURE — 81003 URINALYSIS AUTO W/O SCOPE: CPT

## 2021-01-28 PROCEDURE — 96376 TX/PRO/DX INJ SAME DRUG ADON: CPT

## 2021-01-28 PROCEDURE — 25000003 PHARM REV CODE 250: Performed by: NURSE PRACTITIONER

## 2021-01-28 PROCEDURE — 87075 CULTR BACTERIA EXCEPT BLOOD: CPT

## 2021-01-28 PROCEDURE — C9399 UNCLASSIFIED DRUGS OR BIOLOG: HCPCS | Performed by: NURSE PRACTITIONER

## 2021-01-28 PROCEDURE — 63600175 PHARM REV CODE 636 W HCPCS: Performed by: NURSE PRACTITIONER

## 2021-01-28 PROCEDURE — 11000001 HC ACUTE MED/SURG PRIVATE ROOM

## 2021-01-28 PROCEDURE — 25500020 PHARM REV CODE 255: Performed by: INTERNAL MEDICINE

## 2021-01-28 PROCEDURE — 83036 HEMOGLOBIN GLYCOSYLATED A1C: CPT

## 2021-01-28 PROCEDURE — 96375 TX/PRO/DX INJ NEW DRUG ADDON: CPT

## 2021-01-28 PROCEDURE — 83735 ASSAY OF MAGNESIUM: CPT

## 2021-01-28 PROCEDURE — 87070 CULTURE OTHR SPECIMN AEROBIC: CPT

## 2021-01-28 RX ORDER — ATORVASTATIN CALCIUM 20 MG/1
20 TABLET, FILM COATED ORAL DAILY
Status: DISCONTINUED | OUTPATIENT
Start: 2021-01-28 | End: 2021-02-03 | Stop reason: HOSPADM

## 2021-01-28 RX ORDER — BUMETANIDE 0.25 MG/ML
1 INJECTION INTRAMUSCULAR; INTRAVENOUS EVERY 12 HOURS
Status: DISCONTINUED | OUTPATIENT
Start: 2021-01-28 | End: 2021-01-30

## 2021-01-28 RX ORDER — GABAPENTIN 100 MG/1
100 CAPSULE ORAL 3 TIMES DAILY
Status: DISCONTINUED | OUTPATIENT
Start: 2021-01-28 | End: 2021-01-30

## 2021-01-28 RX ORDER — ALPRAZOLAM 0.25 MG/1
0.5 TABLET ORAL 3 TIMES DAILY PRN
Status: DISCONTINUED | OUTPATIENT
Start: 2021-01-28 | End: 2021-02-03 | Stop reason: HOSPADM

## 2021-01-28 RX ORDER — FUROSEMIDE 10 MG/ML
40 INJECTION INTRAMUSCULAR; INTRAVENOUS
Status: DISCONTINUED | OUTPATIENT
Start: 2021-01-28 | End: 2021-01-28

## 2021-01-28 RX ORDER — PANTOPRAZOLE SODIUM 40 MG/1
40 TABLET, DELAYED RELEASE ORAL DAILY
Status: DISCONTINUED | OUTPATIENT
Start: 2021-01-28 | End: 2021-02-03 | Stop reason: HOSPADM

## 2021-01-28 RX ORDER — FENOFIBRATE 145 MG/1
145 TABLET, FILM COATED ORAL DAILY
Status: DISCONTINUED | OUTPATIENT
Start: 2021-01-28 | End: 2021-02-03 | Stop reason: HOSPADM

## 2021-01-28 RX ORDER — FINASTERIDE 5 MG/1
5 TABLET, FILM COATED ORAL DAILY
Status: DISCONTINUED | OUTPATIENT
Start: 2021-01-28 | End: 2021-02-03 | Stop reason: HOSPADM

## 2021-01-28 RX ORDER — INSULIN ASPART 100 [IU]/ML
0-5 INJECTION, SOLUTION INTRAVENOUS; SUBCUTANEOUS EVERY 6 HOURS PRN
Status: DISCONTINUED | OUTPATIENT
Start: 2021-01-28 | End: 2021-02-03 | Stop reason: HOSPADM

## 2021-01-28 RX ORDER — CARVEDILOL 25 MG/1
25 TABLET ORAL 2 TIMES DAILY WITH MEALS
Status: DISCONTINUED | OUTPATIENT
Start: 2021-01-28 | End: 2021-01-29

## 2021-01-28 RX ORDER — GLUCAGON 1 MG
1 KIT INJECTION
Status: DISCONTINUED | OUTPATIENT
Start: 2021-01-28 | End: 2021-02-03 | Stop reason: HOSPADM

## 2021-01-28 RX ORDER — SPIRONOLACTONE 25 MG/1
25 TABLET ORAL DAILY
Status: DISCONTINUED | OUTPATIENT
Start: 2021-01-28 | End: 2021-02-03 | Stop reason: HOSPADM

## 2021-01-28 RX ORDER — FENOFIBRATE 160 MG/1
160 TABLET ORAL DAILY
Status: DISCONTINUED | OUTPATIENT
Start: 2021-01-28 | End: 2021-01-28

## 2021-01-28 RX ORDER — CLOPIDOGREL BISULFATE 75 MG/1
75 TABLET ORAL DAILY
Status: DISCONTINUED | OUTPATIENT
Start: 2021-01-28 | End: 2021-02-03 | Stop reason: HOSPADM

## 2021-01-28 RX ORDER — TAMSULOSIN HYDROCHLORIDE 0.4 MG/1
0.4 CAPSULE ORAL DAILY
Status: DISCONTINUED | OUTPATIENT
Start: 2021-01-28 | End: 2021-02-03 | Stop reason: HOSPADM

## 2021-01-28 RX ORDER — CITALOPRAM 20 MG/1
20 TABLET, FILM COATED ORAL DAILY
Status: DISCONTINUED | OUTPATIENT
Start: 2021-01-28 | End: 2021-02-03 | Stop reason: HOSPADM

## 2021-01-28 RX ORDER — SODIUM CHLORIDE 9 MG/ML
INJECTION, SOLUTION INTRAVENOUS CONTINUOUS
Status: DISCONTINUED | OUTPATIENT
Start: 2021-01-28 | End: 2021-01-29

## 2021-01-28 RX ORDER — LISINOPRIL 10 MG/1
10 TABLET ORAL DAILY
Status: DISCONTINUED | OUTPATIENT
Start: 2021-01-28 | End: 2021-01-29

## 2021-01-28 RX ADMIN — SPIRONOLACTONE 25 MG: 25 TABLET ORAL at 08:01

## 2021-01-28 RX ADMIN — HYDROMORPHONE HYDROCHLORIDE 1 MG: 1 INJECTION, SOLUTION INTRAMUSCULAR; INTRAVENOUS; SUBCUTANEOUS at 08:01

## 2021-01-28 RX ADMIN — LISINOPRIL 10 MG: 10 TABLET ORAL at 08:01

## 2021-01-28 RX ADMIN — VANCOMYCIN HYDROCHLORIDE 1750 MG: 750 INJECTION, POWDER, LYOPHILIZED, FOR SOLUTION INTRAVENOUS at 09:01

## 2021-01-28 RX ADMIN — FINASTERIDE 5 MG: 5 TABLET, FILM COATED ORAL at 08:01

## 2021-01-28 RX ADMIN — CARVEDILOL 25 MG: 25 TABLET, FILM COATED ORAL at 08:01

## 2021-01-28 RX ADMIN — CITALOPRAM HYDROBROMIDE 20 MG: 20 TABLET ORAL at 08:01

## 2021-01-28 RX ADMIN — OXYCODONE HYDROCHLORIDE AND ACETAMINOPHEN 1 TABLET: 10; 325 TABLET ORAL at 05:01

## 2021-01-28 RX ADMIN — ALPRAZOLAM 0.5 MG: 0.25 TABLET ORAL at 05:01

## 2021-01-28 RX ADMIN — PIPERACILLIN AND TAZOBACTAM 4.5 G: 4; .5 INJECTION, POWDER, LYOPHILIZED, FOR SOLUTION INTRAVENOUS; PARENTERAL at 02:01

## 2021-01-28 RX ADMIN — HUMAN ALBUMIN MICROSPHERES AND PERFLUTREN 0.11 MG: 10; .22 INJECTION, SOLUTION INTRAVENOUS at 11:01

## 2021-01-28 RX ADMIN — SODIUM CHLORIDE: 0.9 INJECTION, SOLUTION INTRAVENOUS at 06:01

## 2021-01-28 RX ADMIN — APIXABAN 5 MG: 5 TABLET, FILM COATED ORAL at 09:01

## 2021-01-28 RX ADMIN — APIXABAN 5 MG: 5 TABLET, FILM COATED ORAL at 08:01

## 2021-01-28 RX ADMIN — GABAPENTIN 100 MG: 100 CAPSULE ORAL at 09:01

## 2021-01-28 RX ADMIN — HYDROMORPHONE HYDROCHLORIDE 1 MG: 1 INJECTION, SOLUTION INTRAMUSCULAR; INTRAVENOUS; SUBCUTANEOUS at 01:01

## 2021-01-28 RX ADMIN — FENOFIBRATE 145 MG: 145 TABLET, FILM COATED ORAL at 08:01

## 2021-01-28 RX ADMIN — INSULIN DETEMIR 10 UNITS: 100 INJECTION, SOLUTION SUBCUTANEOUS at 08:01

## 2021-01-28 RX ADMIN — PANTOPRAZOLE SODIUM 40 MG: 40 TABLET, DELAYED RELEASE ORAL at 08:01

## 2021-01-28 RX ADMIN — TAMSULOSIN HYDROCHLORIDE 0.4 MG: 0.4 CAPSULE ORAL at 08:01

## 2021-01-28 RX ADMIN — BUMETANIDE 1 MG: 0.25 INJECTION INTRAMUSCULAR; INTRAVENOUS at 05:01

## 2021-01-28 RX ADMIN — BACLOFEN 20 MG: 5 TABLET ORAL at 02:01

## 2021-01-28 RX ADMIN — CLOPIDOGREL 75 MG: 75 TABLET, FILM COATED ORAL at 08:01

## 2021-01-28 RX ADMIN — GABAPENTIN 100 MG: 100 CAPSULE ORAL at 08:01

## 2021-01-28 RX ADMIN — ATORVASTATIN CALCIUM 20 MG: 20 TABLET, FILM COATED ORAL at 08:01

## 2021-01-28 RX ADMIN — GABAPENTIN 100 MG: 100 CAPSULE ORAL at 02:01

## 2021-01-29 LAB
ALBUMIN SERPL BCP-MCNC: 2.8 G/DL (ref 3.5–5.2)
ALP SERPL-CCNC: 69 U/L (ref 55–135)
ALT SERPL W/O P-5'-P-CCNC: 25 U/L (ref 10–44)
ANION GAP SERPL CALC-SCNC: 10 MMOL/L (ref 8–16)
AST SERPL-CCNC: 20 U/L (ref 10–40)
BASOPHILS # BLD AUTO: 0.05 K/UL (ref 0–0.2)
BASOPHILS NFR BLD: 0.7 % (ref 0–1.9)
BILIRUB SERPL-MCNC: 0.5 MG/DL (ref 0.1–1)
BUN SERPL-MCNC: 27 MG/DL (ref 6–20)
CALCIUM SERPL-MCNC: 8.5 MG/DL (ref 8.7–10.5)
CHLORIDE SERPL-SCNC: 99 MMOL/L (ref 95–110)
CO2 SERPL-SCNC: 29 MMOL/L (ref 23–29)
CREAT SERPL-MCNC: 1.3 MG/DL (ref 0.5–1.4)
DIFFERENTIAL METHOD: ABNORMAL
EOSINOPHIL # BLD AUTO: 0.1 K/UL (ref 0–0.5)
EOSINOPHIL NFR BLD: 1.9 % (ref 0–8)
ERYTHROCYTE [DISTWIDTH] IN BLOOD BY AUTOMATED COUNT: 17.8 % (ref 11.5–14.5)
EST. GFR  (AFRICAN AMERICAN): >60 ML/MIN/1.73 M^2
EST. GFR  (NON AFRICAN AMERICAN): >60 ML/MIN/1.73 M^2
GLUCOSE SERPL-MCNC: 163 MG/DL (ref 70–110)
HCT VFR BLD AUTO: 24.7 % (ref 40–54)
HGB BLD-MCNC: 7.6 G/DL (ref 14–18)
IMM GRANULOCYTES # BLD AUTO: 0.03 K/UL (ref 0–0.04)
IMM GRANULOCYTES NFR BLD AUTO: 0.4 % (ref 0–0.5)
LYMPHOCYTES # BLD AUTO: 0.8 K/UL (ref 1–4.8)
LYMPHOCYTES NFR BLD: 12 % (ref 18–48)
MAGNESIUM SERPL-MCNC: 1.9 MG/DL (ref 1.6–2.6)
MCH RBC QN AUTO: 24.3 PG (ref 27–31)
MCHC RBC AUTO-ENTMCNC: 30.8 G/DL (ref 32–36)
MCV RBC AUTO: 79 FL (ref 82–98)
MONOCYTES # BLD AUTO: 0.6 K/UL (ref 0.3–1)
MONOCYTES NFR BLD: 9.2 % (ref 4–15)
NEUTROPHILS # BLD AUTO: 5.2 K/UL (ref 1.8–7.7)
NEUTROPHILS NFR BLD: 75.8 % (ref 38–73)
NRBC BLD-RTO: 0 /100 WBC
PLATELET # BLD AUTO: 270 K/UL (ref 150–350)
PMV BLD AUTO: 10 FL (ref 9.2–12.9)
POCT GLUCOSE: 154 MG/DL (ref 70–110)
POCT GLUCOSE: 219 MG/DL (ref 70–110)
POCT GLUCOSE: 252 MG/DL (ref 70–110)
POCT GLUCOSE: 285 MG/DL (ref 70–110)
POCT GLUCOSE: 290 MG/DL (ref 70–110)
POTASSIUM SERPL-SCNC: 3 MMOL/L (ref 3.5–5.1)
PROT SERPL-MCNC: 6.7 G/DL (ref 6–8.4)
RBC # BLD AUTO: 3.13 M/UL (ref 4.6–6.2)
SODIUM SERPL-SCNC: 138 MMOL/L (ref 136–145)
VANCOMYCIN TROUGH SERPL-MCNC: 11.8 UG/ML (ref 10–22)
WBC # BLD AUTO: 6.92 K/UL (ref 3.9–12.7)

## 2021-01-29 PROCEDURE — 25000003 PHARM REV CODE 250: Performed by: NURSE PRACTITIONER

## 2021-01-29 PROCEDURE — 80053 COMPREHEN METABOLIC PANEL: CPT

## 2021-01-29 PROCEDURE — 36415 COLL VENOUS BLD VENIPUNCTURE: CPT

## 2021-01-29 PROCEDURE — 80202 ASSAY OF VANCOMYCIN: CPT

## 2021-01-29 PROCEDURE — 97802 MEDICAL NUTRITION INDIV IN: CPT

## 2021-01-29 PROCEDURE — 63600175 PHARM REV CODE 636 W HCPCS: Performed by: FAMILY MEDICINE

## 2021-01-29 PROCEDURE — 11000001 HC ACUTE MED/SURG PRIVATE ROOM

## 2021-01-29 PROCEDURE — 63600175 PHARM REV CODE 636 W HCPCS: Performed by: NURSE PRACTITIONER

## 2021-01-29 PROCEDURE — S0171 BUMETANIDE 0.5 MG: HCPCS | Performed by: NURSE PRACTITIONER

## 2021-01-29 PROCEDURE — 83735 ASSAY OF MAGNESIUM: CPT

## 2021-01-29 PROCEDURE — 25000003 PHARM REV CODE 250: Performed by: FAMILY MEDICINE

## 2021-01-29 PROCEDURE — 85025 COMPLETE CBC W/AUTO DIFF WBC: CPT

## 2021-01-29 RX ORDER — MIDODRINE HYDROCHLORIDE 5 MG/1
5 TABLET ORAL 2 TIMES DAILY WITH MEALS
Status: DISCONTINUED | OUTPATIENT
Start: 2021-01-29 | End: 2021-02-03 | Stop reason: HOSPADM

## 2021-01-29 RX ORDER — POTASSIUM CHLORIDE 20 MEQ/1
40 TABLET, EXTENDED RELEASE ORAL DAILY
Status: DISCONTINUED | OUTPATIENT
Start: 2021-01-29 | End: 2021-02-03 | Stop reason: HOSPADM

## 2021-01-29 RX ORDER — CARVEDILOL 6.25 MG/1
6.25 TABLET ORAL 2 TIMES DAILY WITH MEALS
Status: DISCONTINUED | OUTPATIENT
Start: 2021-01-29 | End: 2021-02-03 | Stop reason: HOSPADM

## 2021-01-29 RX ORDER — MUPIROCIN 20 MG/G
OINTMENT TOPICAL 2 TIMES DAILY
Status: ACTIVE | OUTPATIENT
Start: 2021-01-29 | End: 2021-02-03

## 2021-01-29 RX ORDER — MIDODRINE HYDROCHLORIDE 5 MG/1
10 TABLET ORAL ONCE
Status: COMPLETED | OUTPATIENT
Start: 2021-01-29 | End: 2021-01-29

## 2021-01-29 RX ADMIN — MUPIROCIN: 20 OINTMENT TOPICAL at 08:01

## 2021-01-29 RX ADMIN — GABAPENTIN 100 MG: 100 CAPSULE ORAL at 09:01

## 2021-01-29 RX ADMIN — ACETAMINOPHEN 650 MG: 325 TABLET ORAL at 09:01

## 2021-01-29 RX ADMIN — PIPERACILLIN AND TAZOBACTAM 4.5 G: 4; .5 INJECTION, POWDER, LYOPHILIZED, FOR SOLUTION INTRAVENOUS; PARENTERAL at 09:01

## 2021-01-29 RX ADMIN — INSULIN DETEMIR 10 UNITS: 100 INJECTION, SOLUTION SUBCUTANEOUS at 09:01

## 2021-01-29 RX ADMIN — POTASSIUM CHLORIDE 40 MEQ: 1500 TABLET, EXTENDED RELEASE ORAL at 12:01

## 2021-01-29 RX ADMIN — TAMSULOSIN HYDROCHLORIDE 0.4 MG: 0.4 CAPSULE ORAL at 09:01

## 2021-01-29 RX ADMIN — VANCOMYCIN HYDROCHLORIDE 1750 MG: 750 INJECTION, POWDER, LYOPHILIZED, FOR SOLUTION INTRAVENOUS at 09:01

## 2021-01-29 RX ADMIN — CARVEDILOL 6.25 MG: 6.25 TABLET, FILM COATED ORAL at 09:01

## 2021-01-29 RX ADMIN — CARVEDILOL 6.25 MG: 6.25 TABLET, FILM COATED ORAL at 05:01

## 2021-01-29 RX ADMIN — SPIRONOLACTONE 25 MG: 25 TABLET ORAL at 09:01

## 2021-01-29 RX ADMIN — BUMETANIDE 1 MG: 0.25 INJECTION INTRAMUSCULAR; INTRAVENOUS at 10:01

## 2021-01-29 RX ADMIN — MIDODRINE HYDROCHLORIDE 5 MG: 5 TABLET ORAL at 05:01

## 2021-01-29 RX ADMIN — BACLOFEN 20 MG: 5 TABLET ORAL at 12:01

## 2021-01-29 RX ADMIN — GABAPENTIN 100 MG: 100 CAPSULE ORAL at 03:01

## 2021-01-29 RX ADMIN — APIXABAN 5 MG: 5 TABLET, FILM COATED ORAL at 08:01

## 2021-01-29 RX ADMIN — ALPRAZOLAM 0.5 MG: 0.25 TABLET ORAL at 12:01

## 2021-01-29 RX ADMIN — CITALOPRAM HYDROBROMIDE 20 MG: 20 TABLET ORAL at 09:01

## 2021-01-29 RX ADMIN — HYDROMORPHONE HYDROCHLORIDE 1 MG: 1 INJECTION, SOLUTION INTRAMUSCULAR; INTRAVENOUS; SUBCUTANEOUS at 10:01

## 2021-01-29 RX ADMIN — BUMETANIDE 1 MG: 0.25 INJECTION INTRAMUSCULAR; INTRAVENOUS at 09:01

## 2021-01-29 RX ADMIN — PANTOPRAZOLE SODIUM 40 MG: 40 TABLET, DELAYED RELEASE ORAL at 09:01

## 2021-01-29 RX ADMIN — HYDROMORPHONE HYDROCHLORIDE 1 MG: 1 INJECTION, SOLUTION INTRAMUSCULAR; INTRAVENOUS; SUBCUTANEOUS at 03:01

## 2021-01-29 RX ADMIN — INSULIN ASPART 1 UNITS: 100 INJECTION, SOLUTION INTRAVENOUS; SUBCUTANEOUS at 09:01

## 2021-01-29 RX ADMIN — FENOFIBRATE 145 MG: 145 TABLET, FILM COATED ORAL at 10:01

## 2021-01-29 RX ADMIN — GABAPENTIN 100 MG: 100 CAPSULE ORAL at 08:01

## 2021-01-29 RX ADMIN — APIXABAN 5 MG: 5 TABLET, FILM COATED ORAL at 09:01

## 2021-01-29 RX ADMIN — CLOPIDOGREL 75 MG: 75 TABLET, FILM COATED ORAL at 09:01

## 2021-01-29 RX ADMIN — FINASTERIDE 5 MG: 5 TABLET, FILM COATED ORAL at 09:01

## 2021-01-29 RX ADMIN — ATORVASTATIN CALCIUM 20 MG: 20 TABLET, FILM COATED ORAL at 09:01

## 2021-01-29 RX ADMIN — PIPERACILLIN AND TAZOBACTAM 4.5 G: 4; .5 INJECTION, POWDER, LYOPHILIZED, FOR SOLUTION INTRAVENOUS; PARENTERAL at 12:01

## 2021-01-29 RX ADMIN — MIDODRINE HYDROCHLORIDE 10 MG: 5 TABLET ORAL at 09:01

## 2021-01-29 RX ADMIN — PIPERACILLIN AND TAZOBACTAM 4.5 G: 4; .5 INJECTION, POWDER, LYOPHILIZED, FOR SOLUTION INTRAVENOUS; PARENTERAL at 05:01

## 2021-01-29 RX ADMIN — MIDODRINE HYDROCHLORIDE 5 MG: 5 TABLET ORAL at 09:01

## 2021-01-30 LAB
ALBUMIN SERPL BCP-MCNC: 2.8 G/DL (ref 3.5–5.2)
ALP SERPL-CCNC: 66 U/L (ref 55–135)
ALT SERPL W/O P-5'-P-CCNC: 24 U/L (ref 10–44)
ANION GAP SERPL CALC-SCNC: 10 MMOL/L (ref 8–16)
AST SERPL-CCNC: 18 U/L (ref 10–40)
BACTERIA SPEC AEROBE CULT: ABNORMAL
BASOPHILS # BLD AUTO: 0.05 K/UL (ref 0–0.2)
BASOPHILS NFR BLD: 0.7 % (ref 0–1.9)
BILIRUB SERPL-MCNC: 0.4 MG/DL (ref 0.1–1)
BUN SERPL-MCNC: 24 MG/DL (ref 6–20)
CALCIUM SERPL-MCNC: 8.8 MG/DL (ref 8.7–10.5)
CHLORIDE SERPL-SCNC: 99 MMOL/L (ref 95–110)
CO2 SERPL-SCNC: 29 MMOL/L (ref 23–29)
CREAT SERPL-MCNC: 1.5 MG/DL (ref 0.5–1.4)
DIFFERENTIAL METHOD: ABNORMAL
EOSINOPHIL # BLD AUTO: 0.1 K/UL (ref 0–0.5)
EOSINOPHIL NFR BLD: 1.6 % (ref 0–8)
ERYTHROCYTE [DISTWIDTH] IN BLOOD BY AUTOMATED COUNT: 17.7 % (ref 11.5–14.5)
EST. GFR  (AFRICAN AMERICAN): 59 ML/MIN/1.73 M^2
EST. GFR  (NON AFRICAN AMERICAN): 51 ML/MIN/1.73 M^2
GLUCOSE SERPL-MCNC: 168 MG/DL (ref 70–110)
HCT VFR BLD AUTO: 26.3 % (ref 40–54)
HGB BLD-MCNC: 8.1 G/DL (ref 14–18)
IMM GRANULOCYTES # BLD AUTO: 0.02 K/UL (ref 0–0.04)
IMM GRANULOCYTES NFR BLD AUTO: 0.3 % (ref 0–0.5)
LYMPHOCYTES # BLD AUTO: 1.1 K/UL (ref 1–4.8)
LYMPHOCYTES NFR BLD: 14.5 % (ref 18–48)
MAGNESIUM SERPL-MCNC: 1.8 MG/DL (ref 1.6–2.6)
MCH RBC QN AUTO: 24.5 PG (ref 27–31)
MCHC RBC AUTO-ENTMCNC: 30.8 G/DL (ref 32–36)
MCV RBC AUTO: 80 FL (ref 82–98)
MONOCYTES # BLD AUTO: 0.6 K/UL (ref 0.3–1)
MONOCYTES NFR BLD: 8.3 % (ref 4–15)
NEUTROPHILS # BLD AUTO: 5.6 K/UL (ref 1.8–7.7)
NEUTROPHILS NFR BLD: 74.6 % (ref 38–73)
NRBC BLD-RTO: 0 /100 WBC
PLATELET # BLD AUTO: 289 K/UL (ref 150–350)
PMV BLD AUTO: 10 FL (ref 9.2–12.9)
POCT GLUCOSE: 168 MG/DL (ref 70–110)
POCT GLUCOSE: 215 MG/DL (ref 70–110)
POTASSIUM SERPL-SCNC: 3.4 MMOL/L (ref 3.5–5.1)
PROT SERPL-MCNC: 7.4 G/DL (ref 6–8.4)
RBC # BLD AUTO: 3.31 M/UL (ref 4.6–6.2)
SODIUM SERPL-SCNC: 138 MMOL/L (ref 136–145)
WBC # BLD AUTO: 7.47 K/UL (ref 3.9–12.7)

## 2021-01-30 PROCEDURE — 25000003 PHARM REV CODE 250: Performed by: FAMILY MEDICINE

## 2021-01-30 PROCEDURE — 85025 COMPLETE CBC W/AUTO DIFF WBC: CPT

## 2021-01-30 PROCEDURE — S0171 BUMETANIDE 0.5 MG: HCPCS | Performed by: NURSE PRACTITIONER

## 2021-01-30 PROCEDURE — 25000003 PHARM REV CODE 250: Performed by: NURSE PRACTITIONER

## 2021-01-30 PROCEDURE — 63600175 PHARM REV CODE 636 W HCPCS: Performed by: FAMILY MEDICINE

## 2021-01-30 PROCEDURE — 83735 ASSAY OF MAGNESIUM: CPT

## 2021-01-30 PROCEDURE — 63600175 PHARM REV CODE 636 W HCPCS: Performed by: INTERNAL MEDICINE

## 2021-01-30 PROCEDURE — 80053 COMPREHEN METABOLIC PANEL: CPT

## 2021-01-30 PROCEDURE — 11000001 HC ACUTE MED/SURG PRIVATE ROOM

## 2021-01-30 PROCEDURE — 63600175 PHARM REV CODE 636 W HCPCS: Performed by: NURSE PRACTITIONER

## 2021-01-30 PROCEDURE — 36415 COLL VENOUS BLD VENIPUNCTURE: CPT

## 2021-01-30 PROCEDURE — 25000003 PHARM REV CODE 250: Performed by: INTERNAL MEDICINE

## 2021-01-30 PROCEDURE — 87040 BLOOD CULTURE FOR BACTERIA: CPT

## 2021-01-30 RX ORDER — METRONIDAZOLE 500 MG/1
500 TABLET ORAL EVERY 8 HOURS
Status: DISCONTINUED | OUTPATIENT
Start: 2021-01-30 | End: 2021-02-01

## 2021-01-30 RX ORDER — CEFEPIME HYDROCHLORIDE 1 G/50ML
2 INJECTION, SOLUTION INTRAVENOUS
Status: DISCONTINUED | OUTPATIENT
Start: 2021-01-30 | End: 2021-01-31

## 2021-01-30 RX ORDER — GABAPENTIN 300 MG/1
300 CAPSULE ORAL 3 TIMES DAILY
Status: DISCONTINUED | OUTPATIENT
Start: 2021-01-30 | End: 2021-01-30

## 2021-01-30 RX ORDER — BUMETANIDE 1 MG/1
2 TABLET ORAL DAILY
Status: DISCONTINUED | OUTPATIENT
Start: 2021-01-31 | End: 2021-01-31

## 2021-01-30 RX ADMIN — HYDROMORPHONE HYDROCHLORIDE 1 MG: 1 INJECTION, SOLUTION INTRAMUSCULAR; INTRAVENOUS; SUBCUTANEOUS at 05:01

## 2021-01-30 RX ADMIN — APIXABAN 5 MG: 5 TABLET, FILM COATED ORAL at 09:01

## 2021-01-30 RX ADMIN — GABAPENTIN 100 MG: 100 CAPSULE ORAL at 09:01

## 2021-01-30 RX ADMIN — OXYCODONE HYDROCHLORIDE AND ACETAMINOPHEN 1 TABLET: 10; 325 TABLET ORAL at 09:01

## 2021-01-30 RX ADMIN — ATORVASTATIN CALCIUM 20 MG: 20 TABLET, FILM COATED ORAL at 09:01

## 2021-01-30 RX ADMIN — HYDROMORPHONE HYDROCHLORIDE 1 MG: 1 INJECTION, SOLUTION INTRAMUSCULAR; INTRAVENOUS; SUBCUTANEOUS at 04:01

## 2021-01-30 RX ADMIN — INSULIN DETEMIR 10 UNITS: 100 INJECTION, SOLUTION SUBCUTANEOUS at 09:01

## 2021-01-30 RX ADMIN — GABAPENTIN 300 MG: 300 CAPSULE ORAL at 11:01

## 2021-01-30 RX ADMIN — TAMSULOSIN HYDROCHLORIDE 0.4 MG: 0.4 CAPSULE ORAL at 09:01

## 2021-01-30 RX ADMIN — MIDODRINE HYDROCHLORIDE 5 MG: 5 TABLET ORAL at 05:01

## 2021-01-30 RX ADMIN — VANCOMYCIN HYDROCHLORIDE 1750 MG: 750 INJECTION, POWDER, LYOPHILIZED, FOR SOLUTION INTRAVENOUS at 09:01

## 2021-01-30 RX ADMIN — CITALOPRAM HYDROBROMIDE 20 MG: 20 TABLET ORAL at 09:01

## 2021-01-30 RX ADMIN — CARVEDILOL 6.25 MG: 6.25 TABLET, FILM COATED ORAL at 05:01

## 2021-01-30 RX ADMIN — PIPERACILLIN AND TAZOBACTAM 4.5 G: 4; .5 INJECTION, POWDER, LYOPHILIZED, FOR SOLUTION INTRAVENOUS; PARENTERAL at 09:01

## 2021-01-30 RX ADMIN — MIDODRINE HYDROCHLORIDE 5 MG: 5 TABLET ORAL at 09:01

## 2021-01-30 RX ADMIN — CEFEPIME HYDROCHLORIDE 2 G: 2 INJECTION, SOLUTION INTRAVENOUS at 03:01

## 2021-01-30 RX ADMIN — FINASTERIDE 5 MG: 5 TABLET, FILM COATED ORAL at 09:01

## 2021-01-30 RX ADMIN — METRONIDAZOLE 500 MG: 500 TABLET ORAL at 09:01

## 2021-01-30 RX ADMIN — FENOFIBRATE 145 MG: 145 TABLET, FILM COATED ORAL at 09:01

## 2021-01-30 RX ADMIN — SPIRONOLACTONE 25 MG: 25 TABLET ORAL at 09:01

## 2021-01-30 RX ADMIN — CARVEDILOL 6.25 MG: 6.25 TABLET, FILM COATED ORAL at 09:01

## 2021-01-30 RX ADMIN — INSULIN ASPART 2 UNITS: 100 INJECTION, SOLUTION INTRAVENOUS; SUBCUTANEOUS at 05:01

## 2021-01-30 RX ADMIN — POTASSIUM CHLORIDE 40 MEQ: 1500 TABLET, EXTENDED RELEASE ORAL at 09:01

## 2021-01-30 RX ADMIN — MUPIROCIN: 20 OINTMENT TOPICAL at 09:01

## 2021-01-30 RX ADMIN — OXYCODONE HYDROCHLORIDE AND ACETAMINOPHEN 1 TABLET: 10; 325 TABLET ORAL at 03:01

## 2021-01-30 RX ADMIN — HYDROMORPHONE HYDROCHLORIDE 1 MG: 1 INJECTION, SOLUTION INTRAMUSCULAR; INTRAVENOUS; SUBCUTANEOUS at 11:01

## 2021-01-30 RX ADMIN — BUMETANIDE 1 MG: 0.25 INJECTION INTRAMUSCULAR; INTRAVENOUS at 09:01

## 2021-01-30 RX ADMIN — CLOPIDOGREL 75 MG: 75 TABLET, FILM COATED ORAL at 09:01

## 2021-01-30 RX ADMIN — PIPERACILLIN AND TAZOBACTAM 4.5 G: 4; .5 INJECTION, POWDER, LYOPHILIZED, FOR SOLUTION INTRAVENOUS; PARENTERAL at 12:01

## 2021-01-30 RX ADMIN — METRONIDAZOLE 500 MG: 500 TABLET ORAL at 03:01

## 2021-01-30 RX ADMIN — PANTOPRAZOLE SODIUM 40 MG: 40 TABLET, DELAYED RELEASE ORAL at 09:01

## 2021-01-31 LAB
ALBUMIN SERPL BCP-MCNC: 2.8 G/DL (ref 3.5–5.2)
ALP SERPL-CCNC: 61 U/L (ref 55–135)
ALT SERPL W/O P-5'-P-CCNC: 21 U/L (ref 10–44)
ANION GAP SERPL CALC-SCNC: 10 MMOL/L (ref 8–16)
AST SERPL-CCNC: 15 U/L (ref 10–40)
BACTERIA BLD CULT: ABNORMAL
BASOPHILS # BLD AUTO: 0.05 K/UL (ref 0–0.2)
BASOPHILS NFR BLD: 0.9 % (ref 0–1.9)
BILIRUB SERPL-MCNC: 0.3 MG/DL (ref 0.1–1)
BUN SERPL-MCNC: 25 MG/DL (ref 6–20)
CALCIUM SERPL-MCNC: 8.9 MG/DL (ref 8.7–10.5)
CHLORIDE SERPL-SCNC: 102 MMOL/L (ref 95–110)
CO2 SERPL-SCNC: 27 MMOL/L (ref 23–29)
CREAT SERPL-MCNC: 1.4 MG/DL (ref 0.5–1.4)
DIFFERENTIAL METHOD: ABNORMAL
EOSINOPHIL # BLD AUTO: 0.2 K/UL (ref 0–0.5)
EOSINOPHIL NFR BLD: 2.8 % (ref 0–8)
ERYTHROCYTE [DISTWIDTH] IN BLOOD BY AUTOMATED COUNT: 17.8 % (ref 11.5–14.5)
EST. GFR  (AFRICAN AMERICAN): >60 ML/MIN/1.73 M^2
EST. GFR  (NON AFRICAN AMERICAN): 55 ML/MIN/1.73 M^2
GLUCOSE SERPL-MCNC: 166 MG/DL (ref 70–110)
HCT VFR BLD AUTO: 26 % (ref 40–54)
HGB BLD-MCNC: 8.2 G/DL (ref 14–18)
IMM GRANULOCYTES # BLD AUTO: 0.01 K/UL (ref 0–0.04)
IMM GRANULOCYTES NFR BLD AUTO: 0.2 % (ref 0–0.5)
LYMPHOCYTES # BLD AUTO: 1.1 K/UL (ref 1–4.8)
LYMPHOCYTES NFR BLD: 19.8 % (ref 18–48)
MAGNESIUM SERPL-MCNC: 1.8 MG/DL (ref 1.6–2.6)
MCH RBC QN AUTO: 24.8 PG (ref 27–31)
MCHC RBC AUTO-ENTMCNC: 31.5 G/DL (ref 32–36)
MCV RBC AUTO: 79 FL (ref 82–98)
MONOCYTES # BLD AUTO: 0.5 K/UL (ref 0.3–1)
MONOCYTES NFR BLD: 9.5 % (ref 4–15)
NEUTROPHILS # BLD AUTO: 3.8 K/UL (ref 1.8–7.7)
NEUTROPHILS NFR BLD: 66.8 % (ref 38–73)
NRBC BLD-RTO: 0 /100 WBC
PLATELET # BLD AUTO: 289 K/UL (ref 150–350)
PMV BLD AUTO: 9.7 FL (ref 9.2–12.9)
POCT GLUCOSE: 157 MG/DL (ref 70–110)
POCT GLUCOSE: 160 MG/DL (ref 70–110)
POCT GLUCOSE: 169 MG/DL (ref 70–110)
POCT GLUCOSE: 252 MG/DL (ref 70–110)
POTASSIUM SERPL-SCNC: 3.6 MMOL/L (ref 3.5–5.1)
PROT SERPL-MCNC: 7.5 G/DL (ref 6–8.4)
RBC # BLD AUTO: 3.3 M/UL (ref 4.6–6.2)
SODIUM SERPL-SCNC: 139 MMOL/L (ref 136–145)
WBC # BLD AUTO: 5.66 K/UL (ref 3.9–12.7)

## 2021-01-31 PROCEDURE — 36415 COLL VENOUS BLD VENIPUNCTURE: CPT

## 2021-01-31 PROCEDURE — 25000003 PHARM REV CODE 250: Performed by: INTERNAL MEDICINE

## 2021-01-31 PROCEDURE — 63600175 PHARM REV CODE 636 W HCPCS: Performed by: FAMILY MEDICINE

## 2021-01-31 PROCEDURE — 25000003 PHARM REV CODE 250: Performed by: NURSE PRACTITIONER

## 2021-01-31 PROCEDURE — 63600175 PHARM REV CODE 636 W HCPCS: Performed by: NURSE PRACTITIONER

## 2021-01-31 PROCEDURE — 85025 COMPLETE CBC W/AUTO DIFF WBC: CPT

## 2021-01-31 PROCEDURE — 83735 ASSAY OF MAGNESIUM: CPT

## 2021-01-31 PROCEDURE — 11000001 HC ACUTE MED/SURG PRIVATE ROOM

## 2021-01-31 PROCEDURE — 97166 OT EVAL MOD COMPLEX 45 MIN: CPT

## 2021-01-31 PROCEDURE — 63600175 PHARM REV CODE 636 W HCPCS: Performed by: INTERNAL MEDICINE

## 2021-01-31 PROCEDURE — 97161 PT EVAL LOW COMPLEX 20 MIN: CPT

## 2021-01-31 PROCEDURE — 25000003 PHARM REV CODE 250: Performed by: FAMILY MEDICINE

## 2021-01-31 PROCEDURE — 94761 N-INVAS EAR/PLS OXIMETRY MLT: CPT

## 2021-01-31 PROCEDURE — 80053 COMPREHEN METABOLIC PANEL: CPT

## 2021-01-31 RX ORDER — TALC
6 POWDER (GRAM) TOPICAL NIGHTLY PRN
Status: DISCONTINUED | OUTPATIENT
Start: 2021-01-31 | End: 2021-01-31

## 2021-01-31 RX ORDER — BUMETANIDE 1 MG/1
1 TABLET ORAL DAILY
Status: DISCONTINUED | OUTPATIENT
Start: 2021-02-01 | End: 2021-02-01

## 2021-01-31 RX ORDER — GABAPENTIN 300 MG/1
300 CAPSULE ORAL 3 TIMES DAILY
Status: DISCONTINUED | OUTPATIENT
Start: 2021-01-31 | End: 2021-02-03 | Stop reason: HOSPADM

## 2021-01-31 RX ORDER — DOCUSATE SODIUM 100 MG/1
100 CAPSULE, LIQUID FILLED ORAL 2 TIMES DAILY
Status: DISCONTINUED | OUTPATIENT
Start: 2021-01-31 | End: 2021-02-03 | Stop reason: HOSPADM

## 2021-01-31 RX ORDER — SIMETHICONE 125 MG
125 TABLET,CHEWABLE ORAL EVERY 6 HOURS PRN
Status: DISCONTINUED | OUTPATIENT
Start: 2021-01-31 | End: 2021-02-03 | Stop reason: HOSPADM

## 2021-01-31 RX ORDER — HYDROMORPHONE HYDROCHLORIDE 1 MG/ML
1 INJECTION, SOLUTION INTRAMUSCULAR; INTRAVENOUS; SUBCUTANEOUS EVERY 8 HOURS PRN
Status: DISCONTINUED | OUTPATIENT
Start: 2021-01-31 | End: 2021-02-03 | Stop reason: HOSPADM

## 2021-01-31 RX ORDER — SODIUM CHLORIDE 0.9 % (FLUSH) 0.9 %
10 SYRINGE (ML) INJECTION
Status: DISCONTINUED | OUTPATIENT
Start: 2021-01-31 | End: 2021-02-03 | Stop reason: HOSPADM

## 2021-01-31 RX ADMIN — OXYCODONE HYDROCHLORIDE AND ACETAMINOPHEN 1 TABLET: 10; 325 TABLET ORAL at 03:01

## 2021-01-31 RX ADMIN — CARVEDILOL 6.25 MG: 6.25 TABLET, FILM COATED ORAL at 08:01

## 2021-01-31 RX ADMIN — APIXABAN 5 MG: 5 TABLET, FILM COATED ORAL at 08:01

## 2021-01-31 RX ADMIN — PANTOPRAZOLE SODIUM 40 MG: 40 TABLET, DELAYED RELEASE ORAL at 08:01

## 2021-01-31 RX ADMIN — HYDROMORPHONE HYDROCHLORIDE 1 MG: 1 INJECTION, SOLUTION INTRAMUSCULAR; INTRAVENOUS; SUBCUTANEOUS at 09:01

## 2021-01-31 RX ADMIN — HYDROMORPHONE HYDROCHLORIDE 1 MG: 1 INJECTION, SOLUTION INTRAMUSCULAR; INTRAVENOUS; SUBCUTANEOUS at 05:01

## 2021-01-31 RX ADMIN — HYDROMORPHONE HYDROCHLORIDE 1 MG: 1 INJECTION, SOLUTION INTRAMUSCULAR; INTRAVENOUS; SUBCUTANEOUS at 01:01

## 2021-01-31 RX ADMIN — METRONIDAZOLE 500 MG: 500 TABLET ORAL at 01:01

## 2021-01-31 RX ADMIN — Medication 6 MG: at 09:01

## 2021-01-31 RX ADMIN — APIXABAN 5 MG: 5 TABLET, FILM COATED ORAL at 09:01

## 2021-01-31 RX ADMIN — SPIRONOLACTONE 25 MG: 25 TABLET ORAL at 08:01

## 2021-01-31 RX ADMIN — GABAPENTIN 300 MG: 300 CAPSULE ORAL at 09:01

## 2021-01-31 RX ADMIN — OXYCODONE HYDROCHLORIDE AND ACETAMINOPHEN 1 TABLET: 10; 325 TABLET ORAL at 08:01

## 2021-01-31 RX ADMIN — TAMSULOSIN HYDROCHLORIDE 0.4 MG: 0.4 CAPSULE ORAL at 08:01

## 2021-01-31 RX ADMIN — INSULIN DETEMIR 10 UNITS: 100 INJECTION, SOLUTION SUBCUTANEOUS at 08:01

## 2021-01-31 RX ADMIN — FENOFIBRATE 145 MG: 145 TABLET, FILM COATED ORAL at 08:01

## 2021-01-31 RX ADMIN — POTASSIUM CHLORIDE 40 MEQ: 1500 TABLET, EXTENDED RELEASE ORAL at 08:01

## 2021-01-31 RX ADMIN — METRONIDAZOLE 500 MG: 500 TABLET ORAL at 09:01

## 2021-01-31 RX ADMIN — MIDODRINE HYDROCHLORIDE 5 MG: 5 TABLET ORAL at 01:01

## 2021-01-31 RX ADMIN — CITALOPRAM HYDROBROMIDE 20 MG: 20 TABLET ORAL at 08:01

## 2021-01-31 RX ADMIN — VANCOMYCIN HYDROCHLORIDE 1750 MG: 750 INJECTION, POWDER, LYOPHILIZED, FOR SOLUTION INTRAVENOUS at 09:01

## 2021-01-31 RX ADMIN — GABAPENTIN 300 MG: 300 CAPSULE ORAL at 03:01

## 2021-01-31 RX ADMIN — MUPIROCIN: 20 OINTMENT TOPICAL at 09:01

## 2021-01-31 RX ADMIN — DOCUSATE SODIUM 100 MG: 100 CAPSULE, LIQUID FILLED ORAL at 11:01

## 2021-01-31 RX ADMIN — CLOPIDOGREL 75 MG: 75 TABLET, FILM COATED ORAL at 08:01

## 2021-01-31 RX ADMIN — ATORVASTATIN CALCIUM 20 MG: 20 TABLET, FILM COATED ORAL at 08:01

## 2021-01-31 RX ADMIN — BUMETANIDE 2 MG: 1 TABLET ORAL at 08:01

## 2021-01-31 RX ADMIN — MUPIROCIN: 20 OINTMENT TOPICAL at 08:01

## 2021-01-31 RX ADMIN — CEFTRIAXONE 2 G: 2 INJECTION, SOLUTION INTRAVENOUS at 01:01

## 2021-01-31 RX ADMIN — INSULIN ASPART 1 UNITS: 100 INJECTION, SOLUTION INTRAVENOUS; SUBCUTANEOUS at 12:01

## 2021-01-31 RX ADMIN — FINASTERIDE 5 MG: 5 TABLET, FILM COATED ORAL at 08:01

## 2021-01-31 RX ADMIN — CEFEPIME HYDROCHLORIDE 2 G: 2 INJECTION, SOLUTION INTRAVENOUS at 01:01

## 2021-01-31 RX ADMIN — METRONIDAZOLE 500 MG: 500 TABLET ORAL at 05:01

## 2021-01-31 RX ADMIN — DOCUSATE SODIUM 100 MG: 100 CAPSULE, LIQUID FILLED ORAL at 09:01

## 2021-01-31 RX ADMIN — SIMETHICONE 125 MG: 125 TABLET, CHEWABLE ORAL at 11:01

## 2021-01-31 RX ADMIN — CARVEDILOL 6.25 MG: 6.25 TABLET, FILM COATED ORAL at 04:01

## 2021-02-01 ENCOUNTER — DOCUMENT SCAN (OUTPATIENT)
Dept: HOME HEALTH SERVICES | Facility: HOSPITAL | Age: 56
End: 2021-02-01
Payer: MEDICARE

## 2021-02-01 LAB
ALBUMIN SERPL BCP-MCNC: 3 G/DL (ref 3.5–5.2)
ALP SERPL-CCNC: 61 U/L (ref 55–135)
ALT SERPL W/O P-5'-P-CCNC: 18 U/L (ref 10–44)
ANION GAP SERPL CALC-SCNC: 12 MMOL/L (ref 8–16)
AST SERPL-CCNC: 15 U/L (ref 10–40)
BACTERIA SPEC ANAEROBE CULT: NORMAL
BASOPHILS # BLD AUTO: 0.07 K/UL (ref 0–0.2)
BASOPHILS NFR BLD: 1.3 % (ref 0–1.9)
BILIRUB SERPL-MCNC: 0.3 MG/DL (ref 0.1–1)
BUN SERPL-MCNC: 30 MG/DL (ref 6–20)
CALCIUM SERPL-MCNC: 9.4 MG/DL (ref 8.7–10.5)
CHLORIDE SERPL-SCNC: 99 MMOL/L (ref 95–110)
CO2 SERPL-SCNC: 28 MMOL/L (ref 23–29)
CREAT SERPL-MCNC: 1.6 MG/DL (ref 0.5–1.4)
DIFFERENTIAL METHOD: ABNORMAL
EOSINOPHIL # BLD AUTO: 0.2 K/UL (ref 0–0.5)
EOSINOPHIL NFR BLD: 3.7 % (ref 0–8)
ERYTHROCYTE [DISTWIDTH] IN BLOOD BY AUTOMATED COUNT: 17.5 % (ref 11.5–14.5)
EST. GFR  (AFRICAN AMERICAN): 54 ML/MIN/1.73 M^2
EST. GFR  (NON AFRICAN AMERICAN): 47 ML/MIN/1.73 M^2
GLUCOSE SERPL-MCNC: 129 MG/DL (ref 70–110)
HCT VFR BLD AUTO: 28.9 % (ref 40–54)
HGB BLD-MCNC: 9 G/DL (ref 14–18)
IMM GRANULOCYTES # BLD AUTO: 0.02 K/UL (ref 0–0.04)
IMM GRANULOCYTES NFR BLD AUTO: 0.4 % (ref 0–0.5)
LYMPHOCYTES # BLD AUTO: 1.6 K/UL (ref 1–4.8)
LYMPHOCYTES NFR BLD: 28.9 % (ref 18–48)
MAGNESIUM SERPL-MCNC: 1.9 MG/DL (ref 1.6–2.6)
MCH RBC QN AUTO: 24.7 PG (ref 27–31)
MCHC RBC AUTO-ENTMCNC: 31.1 G/DL (ref 32–36)
MCV RBC AUTO: 79 FL (ref 82–98)
MONOCYTES # BLD AUTO: 0.7 K/UL (ref 0.3–1)
MONOCYTES NFR BLD: 12.2 % (ref 4–15)
NEUTROPHILS # BLD AUTO: 2.9 K/UL (ref 1.8–7.7)
NEUTROPHILS NFR BLD: 53.5 % (ref 38–73)
NRBC BLD-RTO: 0 /100 WBC
OB PNL STL: NEGATIVE
PLATELET # BLD AUTO: 340 K/UL (ref 150–350)
PMV BLD AUTO: 9.9 FL (ref 9.2–12.9)
POCT GLUCOSE: 138 MG/DL (ref 70–110)
POCT GLUCOSE: 165 MG/DL (ref 70–110)
POCT GLUCOSE: 203 MG/DL (ref 70–110)
POCT GLUCOSE: 207 MG/DL (ref 70–110)
POTASSIUM SERPL-SCNC: 3.8 MMOL/L (ref 3.5–5.1)
PROT SERPL-MCNC: 8 G/DL (ref 6–8.4)
RBC # BLD AUTO: 3.64 M/UL (ref 4.6–6.2)
SODIUM SERPL-SCNC: 139 MMOL/L (ref 136–145)
VANCOMYCIN TROUGH SERPL-MCNC: 15.6 UG/ML (ref 10–22)
WBC # BLD AUTO: 5.4 K/UL (ref 3.9–12.7)

## 2021-02-01 PROCEDURE — 97535 SELF CARE MNGMENT TRAINING: CPT

## 2021-02-01 PROCEDURE — 83735 ASSAY OF MAGNESIUM: CPT

## 2021-02-01 PROCEDURE — 85025 COMPLETE CBC W/AUTO DIFF WBC: CPT

## 2021-02-01 PROCEDURE — 63600175 PHARM REV CODE 636 W HCPCS: Performed by: INTERNAL MEDICINE

## 2021-02-01 PROCEDURE — 11000001 HC ACUTE MED/SURG PRIVATE ROOM

## 2021-02-01 PROCEDURE — 99233 SBSQ HOSP IP/OBS HIGH 50: CPT | Mod: ,,, | Performed by: INTERNAL MEDICINE

## 2021-02-01 PROCEDURE — 80053 COMPREHEN METABOLIC PANEL: CPT

## 2021-02-01 PROCEDURE — 25000003 PHARM REV CODE 250: Performed by: INTERNAL MEDICINE

## 2021-02-01 PROCEDURE — 25000003 PHARM REV CODE 250: Performed by: NURSE PRACTITIONER

## 2021-02-01 PROCEDURE — 63600175 PHARM REV CODE 636 W HCPCS: Performed by: FAMILY MEDICINE

## 2021-02-01 PROCEDURE — 97803 MED NUTRITION INDIV SUBSEQ: CPT

## 2021-02-01 PROCEDURE — 25000003 PHARM REV CODE 250: Performed by: FAMILY MEDICINE

## 2021-02-01 PROCEDURE — 80202 ASSAY OF VANCOMYCIN: CPT

## 2021-02-01 PROCEDURE — 99233 PR SUBSEQUENT HOSPITAL CARE,LEVL III: ICD-10-PCS | Mod: ,,, | Performed by: INTERNAL MEDICINE

## 2021-02-01 PROCEDURE — 97530 THERAPEUTIC ACTIVITIES: CPT

## 2021-02-01 PROCEDURE — 36415 COLL VENOUS BLD VENIPUNCTURE: CPT

## 2021-02-01 PROCEDURE — 94761 N-INVAS EAR/PLS OXIMETRY MLT: CPT

## 2021-02-01 PROCEDURE — 82272 OCCULT BLD FECES 1-3 TESTS: CPT

## 2021-02-01 RX ORDER — BUMETANIDE 0.5 MG/1
0.5 TABLET ORAL DAILY
Status: DISCONTINUED | OUTPATIENT
Start: 2021-02-03 | End: 2021-02-03 | Stop reason: HOSPADM

## 2021-02-01 RX ORDER — GLIPIZIDE 10 MG/1
TABLET ORAL
Status: ON HOLD | COMMUNITY
Start: 2021-01-28 | End: 2021-02-03 | Stop reason: HOSPADM

## 2021-02-01 RX ADMIN — OXYCODONE HYDROCHLORIDE AND ACETAMINOPHEN 1 TABLET: 10; 325 TABLET ORAL at 08:02

## 2021-02-01 RX ADMIN — MIDODRINE HYDROCHLORIDE 5 MG: 5 TABLET ORAL at 05:02

## 2021-02-01 RX ADMIN — POTASSIUM CHLORIDE 40 MEQ: 1500 TABLET, EXTENDED RELEASE ORAL at 08:02

## 2021-02-01 RX ADMIN — APIXABAN 5 MG: 5 TABLET, FILM COATED ORAL at 09:02

## 2021-02-01 RX ADMIN — HYDROMORPHONE HYDROCHLORIDE 1 MG: 1 INJECTION, SOLUTION INTRAMUSCULAR; INTRAVENOUS; SUBCUTANEOUS at 04:02

## 2021-02-01 RX ADMIN — BUMETANIDE 1 MG: 1 TABLET ORAL at 08:02

## 2021-02-01 RX ADMIN — FENOFIBRATE 145 MG: 145 TABLET, FILM COATED ORAL at 08:02

## 2021-02-01 RX ADMIN — METRONIDAZOLE 500 MG: 500 TABLET ORAL at 05:02

## 2021-02-01 RX ADMIN — GABAPENTIN 300 MG: 300 CAPSULE ORAL at 08:02

## 2021-02-01 RX ADMIN — ALPRAZOLAM 0.5 MG: 0.25 TABLET ORAL at 10:02

## 2021-02-01 RX ADMIN — GABAPENTIN 300 MG: 300 CAPSULE ORAL at 05:02

## 2021-02-01 RX ADMIN — ATORVASTATIN CALCIUM 20 MG: 20 TABLET, FILM COATED ORAL at 08:02

## 2021-02-01 RX ADMIN — HYDROMORPHONE HYDROCHLORIDE 1 MG: 1 INJECTION, SOLUTION INTRAMUSCULAR; INTRAVENOUS; SUBCUTANEOUS at 09:02

## 2021-02-01 RX ADMIN — APIXABAN 5 MG: 5 TABLET, FILM COATED ORAL at 08:02

## 2021-02-01 RX ADMIN — OXYCODONE HYDROCHLORIDE AND ACETAMINOPHEN 1 TABLET: 10; 325 TABLET ORAL at 12:02

## 2021-02-01 RX ADMIN — CARVEDILOL 6.25 MG: 6.25 TABLET, FILM COATED ORAL at 05:02

## 2021-02-01 RX ADMIN — VANCOMYCIN HYDROCHLORIDE 1750 MG: 750 INJECTION, POWDER, LYOPHILIZED, FOR SOLUTION INTRAVENOUS at 10:02

## 2021-02-01 RX ADMIN — INSULIN ASPART 2 UNITS: 100 INJECTION, SOLUTION INTRAVENOUS; SUBCUTANEOUS at 05:02

## 2021-02-01 RX ADMIN — CLOPIDOGREL 75 MG: 75 TABLET, FILM COATED ORAL at 08:02

## 2021-02-01 RX ADMIN — CARVEDILOL 6.25 MG: 6.25 TABLET, FILM COATED ORAL at 08:02

## 2021-02-01 RX ADMIN — DOCUSATE SODIUM 100 MG: 100 CAPSULE, LIQUID FILLED ORAL at 08:02

## 2021-02-01 RX ADMIN — FINASTERIDE 5 MG: 5 TABLET, FILM COATED ORAL at 08:02

## 2021-02-01 RX ADMIN — Medication 6 MG: at 09:02

## 2021-02-01 RX ADMIN — MUPIROCIN: 20 OINTMENT TOPICAL at 08:02

## 2021-02-01 RX ADMIN — HYDROMORPHONE HYDROCHLORIDE 1 MG: 1 INJECTION, SOLUTION INTRAMUSCULAR; INTRAVENOUS; SUBCUTANEOUS at 12:02

## 2021-02-01 RX ADMIN — PANTOPRAZOLE SODIUM 40 MG: 40 TABLET, DELAYED RELEASE ORAL at 08:02

## 2021-02-01 RX ADMIN — GABAPENTIN 300 MG: 300 CAPSULE ORAL at 09:02

## 2021-02-01 RX ADMIN — OXYCODONE HYDROCHLORIDE AND ACETAMINOPHEN 1 TABLET: 10; 325 TABLET ORAL at 10:02

## 2021-02-01 RX ADMIN — CITALOPRAM HYDROBROMIDE 20 MG: 20 TABLET ORAL at 08:02

## 2021-02-01 RX ADMIN — SPIRONOLACTONE 25 MG: 25 TABLET ORAL at 08:02

## 2021-02-01 RX ADMIN — INSULIN DETEMIR 10 UNITS: 100 INJECTION, SOLUTION SUBCUTANEOUS at 08:02

## 2021-02-01 RX ADMIN — INSULIN ASPART 1 UNITS: 100 INJECTION, SOLUTION INTRAVENOUS; SUBCUTANEOUS at 12:02

## 2021-02-01 RX ADMIN — TAMSULOSIN HYDROCHLORIDE 0.4 MG: 0.4 CAPSULE ORAL at 08:02

## 2021-02-01 RX ADMIN — MUPIROCIN: 20 OINTMENT TOPICAL at 09:02

## 2021-02-01 RX ADMIN — CEFTRIAXONE 2 G: 2 INJECTION, SOLUTION INTRAVENOUS at 12:02

## 2021-02-02 LAB
BACTERIA BLD CULT: NORMAL
POCT GLUCOSE: 180 MG/DL (ref 70–110)
POCT GLUCOSE: 255 MG/DL (ref 70–110)
POCT GLUCOSE: 277 MG/DL (ref 70–110)
POCT GLUCOSE: 281 MG/DL (ref 70–110)

## 2021-02-02 PROCEDURE — 63600175 PHARM REV CODE 636 W HCPCS: Performed by: INTERNAL MEDICINE

## 2021-02-02 PROCEDURE — 63600175 PHARM REV CODE 636 W HCPCS: Performed by: FAMILY MEDICINE

## 2021-02-02 PROCEDURE — 97530 THERAPEUTIC ACTIVITIES: CPT

## 2021-02-02 PROCEDURE — A4216 STERILE WATER/SALINE, 10 ML: HCPCS | Performed by: HOSPITALIST

## 2021-02-02 PROCEDURE — 11000001 HC ACUTE MED/SURG PRIVATE ROOM

## 2021-02-02 PROCEDURE — 25000003 PHARM REV CODE 250: Performed by: NURSE PRACTITIONER

## 2021-02-02 PROCEDURE — 25000003 PHARM REV CODE 250: Performed by: HOSPITALIST

## 2021-02-02 PROCEDURE — 94761 N-INVAS EAR/PLS OXIMETRY MLT: CPT

## 2021-02-02 PROCEDURE — 25000003 PHARM REV CODE 250: Performed by: FAMILY MEDICINE

## 2021-02-02 PROCEDURE — 36569 INSJ PICC 5 YR+ W/O IMAGING: CPT

## 2021-02-02 PROCEDURE — C1751 CATH, INF, PER/CENT/MIDLINE: HCPCS

## 2021-02-02 RX ORDER — SODIUM CHLORIDE 0.9 % (FLUSH) 0.9 %
10 SYRINGE (ML) INJECTION
Status: DISCONTINUED | OUTPATIENT
Start: 2021-02-02 | End: 2021-02-03 | Stop reason: HOSPADM

## 2021-02-02 RX ORDER — SODIUM CHLORIDE 0.9 % (FLUSH) 0.9 %
10 SYRINGE (ML) INJECTION EVERY 6 HOURS
Status: DISCONTINUED | OUTPATIENT
Start: 2021-02-02 | End: 2021-02-03 | Stop reason: HOSPADM

## 2021-02-02 RX ADMIN — Medication 6 MG: at 08:02

## 2021-02-02 RX ADMIN — POTASSIUM CHLORIDE 40 MEQ: 1500 TABLET, EXTENDED RELEASE ORAL at 08:02

## 2021-02-02 RX ADMIN — APIXABAN 5 MG: 5 TABLET, FILM COATED ORAL at 08:02

## 2021-02-02 RX ADMIN — OXYCODONE HYDROCHLORIDE AND ACETAMINOPHEN 1 TABLET: 10; 325 TABLET ORAL at 05:02

## 2021-02-02 RX ADMIN — INSULIN ASPART 3 UNITS: 100 INJECTION, SOLUTION INTRAVENOUS; SUBCUTANEOUS at 05:02

## 2021-02-02 RX ADMIN — GABAPENTIN 300 MG: 300 CAPSULE ORAL at 08:02

## 2021-02-02 RX ADMIN — FENOFIBRATE 145 MG: 145 TABLET, FILM COATED ORAL at 08:02

## 2021-02-02 RX ADMIN — ATORVASTATIN CALCIUM 20 MG: 20 TABLET, FILM COATED ORAL at 08:02

## 2021-02-02 RX ADMIN — ALPRAZOLAM 0.5 MG: 0.25 TABLET ORAL at 11:02

## 2021-02-02 RX ADMIN — CITALOPRAM HYDROBROMIDE 20 MG: 20 TABLET ORAL at 08:02

## 2021-02-02 RX ADMIN — PANTOPRAZOLE SODIUM 40 MG: 40 TABLET, DELAYED RELEASE ORAL at 08:02

## 2021-02-02 RX ADMIN — Medication 10 ML: at 06:02

## 2021-02-02 RX ADMIN — CLOPIDOGREL 75 MG: 75 TABLET, FILM COATED ORAL at 08:02

## 2021-02-02 RX ADMIN — CEFTRIAXONE 2 G: 2 INJECTION, SOLUTION INTRAVENOUS at 11:02

## 2021-02-02 RX ADMIN — INSULIN DETEMIR 10 UNITS: 100 INJECTION, SOLUTION SUBCUTANEOUS at 08:02

## 2021-02-02 RX ADMIN — SPIRONOLACTONE 25 MG: 25 TABLET ORAL at 08:02

## 2021-02-02 RX ADMIN — VANCOMYCIN HYDROCHLORIDE 1750 MG: 750 INJECTION, POWDER, LYOPHILIZED, FOR SOLUTION INTRAVENOUS at 10:02

## 2021-02-02 RX ADMIN — HYDROMORPHONE HYDROCHLORIDE 1 MG: 1 INJECTION, SOLUTION INTRAMUSCULAR; INTRAVENOUS; SUBCUTANEOUS at 02:02

## 2021-02-02 RX ADMIN — CARVEDILOL 6.25 MG: 6.25 TABLET, FILM COATED ORAL at 05:02

## 2021-02-02 RX ADMIN — FINASTERIDE 5 MG: 5 TABLET, FILM COATED ORAL at 08:02

## 2021-02-02 RX ADMIN — HYDROMORPHONE HYDROCHLORIDE 1 MG: 1 INJECTION, SOLUTION INTRAMUSCULAR; INTRAVENOUS; SUBCUTANEOUS at 10:02

## 2021-02-02 RX ADMIN — OXYCODONE HYDROCHLORIDE AND ACETAMINOPHEN 1 TABLET: 10; 325 TABLET ORAL at 09:02

## 2021-02-02 RX ADMIN — INSULIN ASPART 3 UNITS: 100 INJECTION, SOLUTION INTRAVENOUS; SUBCUTANEOUS at 12:02

## 2021-02-02 RX ADMIN — CARVEDILOL 6.25 MG: 6.25 TABLET, FILM COATED ORAL at 09:02

## 2021-02-02 RX ADMIN — MUPIROCIN: 20 OINTMENT TOPICAL at 08:02

## 2021-02-02 RX ADMIN — GABAPENTIN 300 MG: 300 CAPSULE ORAL at 02:02

## 2021-02-02 RX ADMIN — ALPRAZOLAM 0.5 MG: 0.25 TABLET ORAL at 10:02

## 2021-02-02 RX ADMIN — TAMSULOSIN HYDROCHLORIDE 0.4 MG: 0.4 CAPSULE ORAL at 08:02

## 2021-02-02 RX ADMIN — HYDROMORPHONE HYDROCHLORIDE 1 MG: 1 INJECTION, SOLUTION INTRAMUSCULAR; INTRAVENOUS; SUBCUTANEOUS at 06:02

## 2021-02-03 ENCOUNTER — TELEPHONE (OUTPATIENT)
Dept: FAMILY MEDICINE | Facility: CLINIC | Age: 56
End: 2021-02-03

## 2021-02-03 VITALS
HEIGHT: 64 IN | HEART RATE: 66 BPM | OXYGEN SATURATION: 96 % | BODY MASS INDEX: 38.91 KG/M2 | RESPIRATION RATE: 18 BRPM | DIASTOLIC BLOOD PRESSURE: 58 MMHG | SYSTOLIC BLOOD PRESSURE: 105 MMHG | TEMPERATURE: 98 F | WEIGHT: 227.94 LBS

## 2021-02-03 LAB
POCT GLUCOSE: 299 MG/DL (ref 70–110)
POCT GLUCOSE: 329 MG/DL (ref 70–110)
POCT GLUCOSE: 348 MG/DL (ref 70–110)

## 2021-02-03 PROCEDURE — 94761 N-INVAS EAR/PLS OXIMETRY MLT: CPT

## 2021-02-03 PROCEDURE — 25000003 PHARM REV CODE 250: Performed by: HOSPITALIST

## 2021-02-03 PROCEDURE — 97803 MED NUTRITION INDIV SUBSEQ: CPT

## 2021-02-03 PROCEDURE — 63600175 PHARM REV CODE 636 W HCPCS: Performed by: HOSPITALIST

## 2021-02-03 PROCEDURE — 25000003 PHARM REV CODE 250: Performed by: FAMILY MEDICINE

## 2021-02-03 PROCEDURE — 25000003 PHARM REV CODE 250: Performed by: NURSE PRACTITIONER

## 2021-02-03 PROCEDURE — 63600175 PHARM REV CODE 636 W HCPCS: Performed by: FAMILY MEDICINE

## 2021-02-03 PROCEDURE — A4216 STERILE WATER/SALINE, 10 ML: HCPCS | Performed by: HOSPITALIST

## 2021-02-03 PROCEDURE — 97535 SELF CARE MNGMENT TRAINING: CPT

## 2021-02-03 PROCEDURE — 63600175 PHARM REV CODE 636 W HCPCS: Performed by: INTERNAL MEDICINE

## 2021-02-03 RX ORDER — MIDODRINE HYDROCHLORIDE 5 MG/1
5 TABLET ORAL 2 TIMES DAILY WITH MEALS
Qty: 60 TABLET | Refills: 0 | Status: SHIPPED | OUTPATIENT
Start: 2021-02-03 | End: 2021-02-23

## 2021-02-03 RX ORDER — VANCOMYCIN HCL IN 5 % DEXTROSE 1G/250ML
1000 PLASTIC BAG, INJECTION (ML) INTRAVENOUS ONCE
Status: COMPLETED | OUTPATIENT
Start: 2021-02-03 | End: 2021-02-03

## 2021-02-03 RX ORDER — SODIUM CHLORIDE 0.9 % (FLUSH) 0.9 %
10 SYRINGE (ML) INJECTION EVERY 6 HOURS
Start: 2021-02-03 | End: 2021-02-23

## 2021-02-03 RX ORDER — OXYCODONE AND ACETAMINOPHEN 10; 325 MG/1; MG/1
1 TABLET ORAL EVERY 6 HOURS PRN
Qty: 28 TABLET | Refills: 0 | Status: SHIPPED | OUTPATIENT
Start: 2021-02-03 | End: 2021-02-23 | Stop reason: SDUPTHER

## 2021-02-03 RX ORDER — SODIUM CHLORIDE 0.9 % (FLUSH) 0.9 %
10 SYRINGE (ML) INJECTION
Start: 2021-02-03 | End: 2021-02-23

## 2021-02-03 RX ORDER — OXYCODONE AND ACETAMINOPHEN 10; 325 MG/1; MG/1
1 TABLET ORAL EVERY 6 HOURS PRN
Qty: 28 TABLET | Refills: 0 | Status: SHIPPED | OUTPATIENT
Start: 2021-02-03 | End: 2021-02-03

## 2021-02-03 RX ADMIN — INSULIN DETEMIR 10 UNITS: 100 INJECTION, SOLUTION SUBCUTANEOUS at 09:02

## 2021-02-03 RX ADMIN — FENOFIBRATE 145 MG: 145 TABLET, FILM COATED ORAL at 09:02

## 2021-02-03 RX ADMIN — FINASTERIDE 5 MG: 5 TABLET, FILM COATED ORAL at 09:02

## 2021-02-03 RX ADMIN — SPIRONOLACTONE 25 MG: 25 TABLET ORAL at 09:02

## 2021-02-03 RX ADMIN — Medication 10 ML: at 12:02

## 2021-02-03 RX ADMIN — HYDROMORPHONE HYDROCHLORIDE 1 MG: 1 INJECTION, SOLUTION INTRAMUSCULAR; INTRAVENOUS; SUBCUTANEOUS at 03:02

## 2021-02-03 RX ADMIN — MIDODRINE HYDROCHLORIDE 5 MG: 5 TABLET ORAL at 09:02

## 2021-02-03 RX ADMIN — GABAPENTIN 300 MG: 300 CAPSULE ORAL at 03:02

## 2021-02-03 RX ADMIN — BACLOFEN 20 MG: 5 TABLET ORAL at 12:02

## 2021-02-03 RX ADMIN — GABAPENTIN 300 MG: 300 CAPSULE ORAL at 09:02

## 2021-02-03 RX ADMIN — BUMETANIDE 0.5 MG: 0.5 TABLET ORAL at 09:02

## 2021-02-03 RX ADMIN — VANCOMYCIN HYDROCHLORIDE 1000 MG: 1 INJECTION, POWDER, LYOPHILIZED, FOR SOLUTION INTRAVENOUS at 02:02

## 2021-02-03 RX ADMIN — TAMSULOSIN HYDROCHLORIDE 0.4 MG: 0.4 CAPSULE ORAL at 09:02

## 2021-02-03 RX ADMIN — Medication 10 ML: at 05:02

## 2021-02-03 RX ADMIN — OXYCODONE HYDROCHLORIDE AND ACETAMINOPHEN 1 TABLET: 10; 325 TABLET ORAL at 12:02

## 2021-02-03 RX ADMIN — HYDROMORPHONE HYDROCHLORIDE 1 MG: 1 INJECTION, SOLUTION INTRAMUSCULAR; INTRAVENOUS; SUBCUTANEOUS at 07:02

## 2021-02-03 RX ADMIN — INSULIN ASPART 3 UNITS: 100 INJECTION, SOLUTION INTRAVENOUS; SUBCUTANEOUS at 12:02

## 2021-02-03 RX ADMIN — CEFTRIAXONE 2 G: 2 INJECTION, SOLUTION INTRAVENOUS at 12:02

## 2021-02-03 RX ADMIN — OXYCODONE HYDROCHLORIDE AND ACETAMINOPHEN 1 TABLET: 10; 325 TABLET ORAL at 09:02

## 2021-02-03 RX ADMIN — INSULIN ASPART 4 UNITS: 100 INJECTION, SOLUTION INTRAVENOUS; SUBCUTANEOUS at 06:02

## 2021-02-03 RX ADMIN — CITALOPRAM HYDROBROMIDE 20 MG: 20 TABLET ORAL at 09:02

## 2021-02-03 RX ADMIN — CARVEDILOL 6.25 MG: 6.25 TABLET, FILM COATED ORAL at 09:02

## 2021-02-03 RX ADMIN — APIXABAN 5 MG: 5 TABLET, FILM COATED ORAL at 09:02

## 2021-02-03 RX ADMIN — ATORVASTATIN CALCIUM 20 MG: 20 TABLET, FILM COATED ORAL at 09:02

## 2021-02-03 RX ADMIN — DOCUSATE SODIUM 100 MG: 100 CAPSULE, LIQUID FILLED ORAL at 09:02

## 2021-02-03 RX ADMIN — CLOPIDOGREL 75 MG: 75 TABLET, FILM COATED ORAL at 09:02

## 2021-02-03 RX ADMIN — PANTOPRAZOLE SODIUM 40 MG: 40 TABLET, DELAYED RELEASE ORAL at 09:02

## 2021-02-03 RX ADMIN — POTASSIUM CHLORIDE 40 MEQ: 1500 TABLET, EXTENDED RELEASE ORAL at 09:02

## 2021-02-04 ENCOUNTER — PATIENT OUTREACH (OUTPATIENT)
Dept: ADMINISTRATIVE | Facility: CLINIC | Age: 56
End: 2021-02-04

## 2021-02-04 ENCOUNTER — DOCUMENT SCAN (OUTPATIENT)
Dept: HOME HEALTH SERVICES | Facility: HOSPITAL | Age: 56
End: 2021-02-04
Payer: MEDICARE

## 2021-02-04 ENCOUNTER — DOCUMENT SCAN (OUTPATIENT)
Dept: HOME HEALTH SERVICES | Facility: HOSPITAL | Age: 56
End: 2021-02-04

## 2021-02-04 ENCOUNTER — TELEPHONE (OUTPATIENT)
Dept: FAMILY MEDICINE | Facility: CLINIC | Age: 56
End: 2021-02-04

## 2021-02-04 LAB — BACTERIA BLD CULT: NORMAL

## 2021-02-08 ENCOUNTER — DOCUMENT SCAN (OUTPATIENT)
Dept: HOME HEALTH SERVICES | Facility: HOSPITAL | Age: 56
End: 2021-02-08
Payer: MEDICARE

## 2021-02-08 ENCOUNTER — DOCUMENT SCAN (OUTPATIENT)
Dept: HOME HEALTH SERVICES | Facility: HOSPITAL | Age: 56
End: 2021-02-08

## 2021-02-09 ENCOUNTER — DOCUMENT SCAN (OUTPATIENT)
Dept: HOME HEALTH SERVICES | Facility: HOSPITAL | Age: 56
End: 2021-02-09
Payer: MEDICARE

## 2021-02-09 ENCOUNTER — TELEPHONE (OUTPATIENT)
Dept: ADMINISTRATIVE | Facility: HOSPITAL | Age: 56
End: 2021-02-09

## 2021-02-09 ENCOUNTER — PATIENT OUTREACH (OUTPATIENT)
Dept: ADMINISTRATIVE | Facility: HOSPITAL | Age: 56
End: 2021-02-09

## 2021-02-10 ENCOUNTER — DOCUMENT SCAN (OUTPATIENT)
Dept: HOME HEALTH SERVICES | Facility: HOSPITAL | Age: 56
End: 2021-02-10
Payer: MEDICARE

## 2021-02-11 ENCOUNTER — OFFICE VISIT (OUTPATIENT)
Dept: PODIATRY | Facility: CLINIC | Age: 56
End: 2021-02-11
Payer: MEDICARE

## 2021-02-11 ENCOUNTER — OFFICE VISIT (OUTPATIENT)
Dept: FAMILY MEDICINE | Facility: CLINIC | Age: 56
End: 2021-02-11
Payer: MEDICARE

## 2021-02-11 ENCOUNTER — TELEPHONE (OUTPATIENT)
Dept: FAMILY MEDICINE | Facility: CLINIC | Age: 56
End: 2021-02-11

## 2021-02-11 ENCOUNTER — DOCUMENT SCAN (OUTPATIENT)
Dept: HOME HEALTH SERVICES | Facility: HOSPITAL | Age: 56
End: 2021-02-11
Payer: MEDICARE

## 2021-02-11 VITALS
HEIGHT: 64 IN | BODY MASS INDEX: 39.13 KG/M2 | TEMPERATURE: 98 F | DIASTOLIC BLOOD PRESSURE: 70 MMHG | SYSTOLIC BLOOD PRESSURE: 130 MMHG | HEART RATE: 81 BPM | OXYGEN SATURATION: 98 % | RESPIRATION RATE: 18 BRPM

## 2021-02-11 DIAGNOSIS — I73.9 PVD (PERIPHERAL VASCULAR DISEASE): ICD-10-CM

## 2021-02-11 DIAGNOSIS — I83.009 VENOUS STASIS ULCER WITH EDEMA OF LOWER LEG: Primary | ICD-10-CM

## 2021-02-11 DIAGNOSIS — Z11.1 SCREENING-PULMONARY TB: Primary | ICD-10-CM

## 2021-02-11 DIAGNOSIS — L97.419 HEEL ULCERATION, RIGHT, WITH UNSPECIFIED SEVERITY: ICD-10-CM

## 2021-02-11 DIAGNOSIS — I83.899 VENOUS STASIS ULCER WITH EDEMA OF LOWER LEG: Primary | ICD-10-CM

## 2021-02-11 DIAGNOSIS — I70.229 CRITICAL LOWER LIMB ISCHEMIA: ICD-10-CM

## 2021-02-11 DIAGNOSIS — L97.909 VENOUS STASIS ULCER WITH EDEMA OF LOWER LEG: Primary | ICD-10-CM

## 2021-02-11 DIAGNOSIS — I73.9 PAD (PERIPHERAL ARTERY DISEASE): ICD-10-CM

## 2021-02-11 DIAGNOSIS — R60.9 VENOUS STASIS ULCER WITH EDEMA OF LOWER LEG: Primary | ICD-10-CM

## 2021-02-11 DIAGNOSIS — Z00.00 GENERAL MEDICAL EXAM: ICD-10-CM

## 2021-02-11 PROCEDURE — 99999 PR PBB SHADOW E&M-EST. PATIENT-LVL IV: CPT | Mod: PBBFAC,,, | Performed by: PODIATRIST

## 2021-02-11 PROCEDURE — 99214 OFFICE O/P EST MOD 30 MIN: CPT | Mod: PBBFAC,27,PN | Performed by: PODIATRIST

## 2021-02-11 PROCEDURE — 99999 PR PBB SHADOW E&M-EST. PATIENT-LVL IV: ICD-10-PCS | Mod: PBBFAC,,, | Performed by: PODIATRIST

## 2021-02-11 PROCEDURE — 99213 OFFICE O/P EST LOW 20 MIN: CPT | Mod: S$PBB,,, | Performed by: PODIATRIST

## 2021-02-11 PROCEDURE — 99999 PR PBB SHADOW E&M-EST. PATIENT-LVL III: CPT | Mod: PBBFAC,,, | Performed by: FAMILY MEDICINE

## 2021-02-11 PROCEDURE — 99999 PR PBB SHADOW E&M-EST. PATIENT-LVL III: ICD-10-PCS | Mod: PBBFAC,,, | Performed by: FAMILY MEDICINE

## 2021-02-11 PROCEDURE — 99214 PR OFFICE/OUTPT VISIT, EST, LEVL IV, 30-39 MIN: ICD-10-PCS | Mod: S$PBB,CS,, | Performed by: FAMILY MEDICINE

## 2021-02-11 PROCEDURE — 99213 OFFICE O/P EST LOW 20 MIN: CPT | Mod: PBBFAC,PN | Performed by: FAMILY MEDICINE

## 2021-02-11 PROCEDURE — 99214 OFFICE O/P EST MOD 30 MIN: CPT | Mod: S$PBB,CS,, | Performed by: FAMILY MEDICINE

## 2021-02-11 PROCEDURE — U0003 INFECTIOUS AGENT DETECTION BY NUCLEIC ACID (DNA OR RNA); SEVERE ACUTE RESPIRATORY SYNDROME CORONAVIRUS 2 (SARS-COV-2) (CORONAVIRUS DISEASE [COVID-19]), AMPLIFIED PROBE TECHNIQUE, MAKING USE OF HIGH THROUGHPUT TECHNOLOGIES AS DESCRIBED BY CMS-2020-01-R: HCPCS

## 2021-02-11 PROCEDURE — 99213 PR OFFICE/OUTPT VISIT, EST, LEVL III, 20-29 MIN: ICD-10-PCS | Mod: S$PBB,,, | Performed by: PODIATRIST

## 2021-02-11 PROCEDURE — U0005 INFEC AGEN DETEC AMPLI PROBE: HCPCS

## 2021-02-11 RX ORDER — TORSEMIDE 20 MG/1
TABLET ORAL
COMMUNITY
Start: 2021-02-05 | End: 2021-02-23

## 2021-02-11 RX ORDER — VANCOMYCIN HYDROCHLORIDE 5 G/1
INJECTION, POWDER, LYOPHILIZED, FOR SOLUTION INTRAVENOUS
COMMUNITY
Start: 2021-02-03 | End: 2021-02-23

## 2021-02-11 RX ORDER — TORSEMIDE 100 MG/1
TABLET ORAL
COMMUNITY
Start: 2021-02-10 | End: 2021-02-23

## 2021-02-11 RX ORDER — DULOXETIN HYDROCHLORIDE 30 MG/1
CAPSULE, DELAYED RELEASE ORAL
Status: ON HOLD | COMMUNITY
Start: 2021-02-05 | End: 2021-06-17 | Stop reason: HOSPADM

## 2021-02-12 LAB — SARS-COV-2 RNA RESP QL NAA+PROBE: NOT DETECTED

## 2021-02-15 ENCOUNTER — TELEPHONE (OUTPATIENT)
Dept: FAMILY MEDICINE | Facility: CLINIC | Age: 56
End: 2021-02-15

## 2021-02-18 ENCOUNTER — TELEPHONE (OUTPATIENT)
Dept: FAMILY MEDICINE | Facility: CLINIC | Age: 56
End: 2021-02-18

## 2021-02-22 ENCOUNTER — TELEPHONE (OUTPATIENT)
Dept: FAMILY MEDICINE | Facility: CLINIC | Age: 56
End: 2021-02-22

## 2021-02-22 ENCOUNTER — LAB VISIT (OUTPATIENT)
Dept: LAB | Facility: HOSPITAL | Age: 56
End: 2021-02-22
Attending: INTERNAL MEDICINE
Payer: MEDICARE

## 2021-02-22 ENCOUNTER — OFFICE VISIT (OUTPATIENT)
Dept: INFECTIOUS DISEASES | Facility: CLINIC | Age: 56
End: 2021-02-22
Payer: MEDICARE

## 2021-02-22 VITALS
HEART RATE: 86 BPM | SYSTOLIC BLOOD PRESSURE: 105 MMHG | BODY MASS INDEX: 29.55 KG/M2 | DIASTOLIC BLOOD PRESSURE: 70 MMHG | HEIGHT: 68 IN | WEIGHT: 195 LBS

## 2021-02-22 DIAGNOSIS — B95.61 STAPHYLOCOCCUS AUREUS BACTEREMIA: Primary | ICD-10-CM

## 2021-02-22 DIAGNOSIS — B95.61 STAPHYLOCOCCUS AUREUS BACTEREMIA: ICD-10-CM

## 2021-02-22 DIAGNOSIS — R78.81 STAPHYLOCOCCUS AUREUS BACTEREMIA: Primary | ICD-10-CM

## 2021-02-22 DIAGNOSIS — R78.81 STAPHYLOCOCCUS AUREUS BACTEREMIA: ICD-10-CM

## 2021-02-22 LAB
BASOPHILS # BLD AUTO: 0.07 K/UL (ref 0–0.2)
BASOPHILS NFR BLD: 0.8 % (ref 0–1.9)
DIFFERENTIAL METHOD: ABNORMAL
EOSINOPHIL # BLD AUTO: 0.2 K/UL (ref 0–0.5)
EOSINOPHIL NFR BLD: 2 % (ref 0–8)
ERYTHROCYTE [DISTWIDTH] IN BLOOD BY AUTOMATED COUNT: 17.5 % (ref 11.5–14.5)
HCT VFR BLD AUTO: 29.1 % (ref 40–54)
HGB BLD-MCNC: 9.2 G/DL (ref 14–18)
IMM GRANULOCYTES # BLD AUTO: 0.04 K/UL (ref 0–0.04)
IMM GRANULOCYTES NFR BLD AUTO: 0.5 % (ref 0–0.5)
LYMPHOCYTES # BLD AUTO: 1.3 K/UL (ref 1–4.8)
LYMPHOCYTES NFR BLD: 15.1 % (ref 18–48)
MCH RBC QN AUTO: 24.7 PG (ref 27–31)
MCHC RBC AUTO-ENTMCNC: 31.6 G/DL (ref 32–36)
MCV RBC AUTO: 78 FL (ref 82–98)
MONOCYTES # BLD AUTO: 0.6 K/UL (ref 0.3–1)
MONOCYTES NFR BLD: 6.7 % (ref 4–15)
NEUTROPHILS # BLD AUTO: 6.2 K/UL (ref 1.8–7.7)
NEUTROPHILS NFR BLD: 74.9 % (ref 38–73)
NRBC BLD-RTO: 0 /100 WBC
PLATELET # BLD AUTO: 300 K/UL (ref 150–350)
PMV BLD AUTO: 9.8 FL (ref 9.2–12.9)
RBC # BLD AUTO: 3.73 M/UL (ref 4.6–6.2)
WBC # BLD AUTO: 8.3 K/UL (ref 3.9–12.7)

## 2021-02-22 PROCEDURE — 99214 OFFICE O/P EST MOD 30 MIN: CPT | Mod: PBBFAC,PN | Performed by: INTERNAL MEDICINE

## 2021-02-22 PROCEDURE — 87040 BLOOD CULTURE FOR BACTERIA: CPT

## 2021-02-22 PROCEDURE — 85025 COMPLETE CBC W/AUTO DIFF WBC: CPT

## 2021-02-22 PROCEDURE — 99213 OFFICE O/P EST LOW 20 MIN: CPT | Mod: S$PBB,,, | Performed by: INTERNAL MEDICINE

## 2021-02-22 PROCEDURE — 99999 PR PBB SHADOW E&M-EST. PATIENT-LVL IV: ICD-10-PCS | Mod: PBBFAC,,, | Performed by: INTERNAL MEDICINE

## 2021-02-22 PROCEDURE — 36415 COLL VENOUS BLD VENIPUNCTURE: CPT

## 2021-02-22 PROCEDURE — 99999 PR PBB SHADOW E&M-EST. PATIENT-LVL IV: CPT | Mod: PBBFAC,,, | Performed by: INTERNAL MEDICINE

## 2021-02-22 PROCEDURE — 99213 PR OFFICE/OUTPT VISIT, EST, LEVL III, 20-29 MIN: ICD-10-PCS | Mod: S$PBB,,, | Performed by: INTERNAL MEDICINE

## 2021-02-23 ENCOUNTER — DOCUMENT SCAN (OUTPATIENT)
Dept: HOME HEALTH SERVICES | Facility: HOSPITAL | Age: 56
End: 2021-02-23
Payer: MEDICARE

## 2021-02-23 ENCOUNTER — OFFICE VISIT (OUTPATIENT)
Dept: FAMILY MEDICINE | Facility: CLINIC | Age: 56
End: 2021-02-23
Payer: MEDICARE

## 2021-02-23 ENCOUNTER — TELEPHONE (OUTPATIENT)
Dept: FAMILY MEDICINE | Facility: CLINIC | Age: 56
End: 2021-02-23

## 2021-02-23 VITALS
TEMPERATURE: 98 F | SYSTOLIC BLOOD PRESSURE: 110 MMHG | OXYGEN SATURATION: 97 % | HEART RATE: 96 BPM | DIASTOLIC BLOOD PRESSURE: 70 MMHG

## 2021-02-23 DIAGNOSIS — M62.838 MUSCLE SPASTICITY: ICD-10-CM

## 2021-02-23 DIAGNOSIS — N18.4 STAGE 4 CHRONIC KIDNEY DISEASE: ICD-10-CM

## 2021-02-23 DIAGNOSIS — I50.32 CHRONIC DIASTOLIC CHF (CONGESTIVE HEART FAILURE): ICD-10-CM

## 2021-02-23 DIAGNOSIS — E11.65 TYPE 2 DIABETES MELLITUS WITH HYPERGLYCEMIA, WITH LONG-TERM CURRENT USE OF INSULIN: ICD-10-CM

## 2021-02-23 DIAGNOSIS — I83.018 VENOUS STASIS ULCER OF OTHER PART OF RIGHT LOWER LEG, UNSPECIFIED ULCER STAGE, UNSPECIFIED WHETHER VARICOSE VEINS PRESENT: ICD-10-CM

## 2021-02-23 DIAGNOSIS — I87.2 CHRONIC VENOUS INSUFFICIENCY: ICD-10-CM

## 2021-02-23 DIAGNOSIS — Z79.4 TYPE 2 DIABETES MELLITUS WITH HYPERGLYCEMIA, WITH LONG-TERM CURRENT USE OF INSULIN: ICD-10-CM

## 2021-02-23 DIAGNOSIS — Z76.89 ENCOUNTER FOR ASSESSMENT OF ALCOHOL AND DRUG USE: ICD-10-CM

## 2021-02-23 DIAGNOSIS — N40.1 BENIGN PROSTATIC HYPERPLASIA WITH NOCTURIA: Primary | ICD-10-CM

## 2021-02-23 DIAGNOSIS — F33.0 MILD RECURRENT MAJOR DEPRESSION: ICD-10-CM

## 2021-02-23 DIAGNOSIS — R35.1 BENIGN PROSTATIC HYPERPLASIA WITH NOCTURIA: Primary | ICD-10-CM

## 2021-02-23 DIAGNOSIS — I10 ESSENTIAL HYPERTENSION: ICD-10-CM

## 2021-02-23 DIAGNOSIS — L97.819 VENOUS STASIS ULCER OF OTHER PART OF RIGHT LOWER LEG, UNSPECIFIED ULCER STAGE, UNSPECIFIED WHETHER VARICOSE VEINS PRESENT: ICD-10-CM

## 2021-02-23 DIAGNOSIS — I73.9 PAD (PERIPHERAL ARTERY DISEASE): ICD-10-CM

## 2021-02-23 DIAGNOSIS — I25.10 CORONARY ARTERY DISEASE INVOLVING NATIVE CORONARY ARTERY WITHOUT ANGINA PECTORIS, UNSPECIFIED WHETHER NATIVE OR TRANSPLANTED HEART: ICD-10-CM

## 2021-02-23 DIAGNOSIS — E78.2 MIXED HYPERLIPIDEMIA: ICD-10-CM

## 2021-02-23 PROCEDURE — 99999 PR PBB SHADOW E&M-EST. PATIENT-LVL III: ICD-10-PCS | Mod: PBBFAC,,, | Performed by: INTERNAL MEDICINE

## 2021-02-23 PROCEDURE — 99214 OFFICE O/P EST MOD 30 MIN: CPT | Mod: S$PBB,,, | Performed by: INTERNAL MEDICINE

## 2021-02-23 PROCEDURE — 99999 PR PBB SHADOW E&M-EST. PATIENT-LVL III: CPT | Mod: PBBFAC,,, | Performed by: INTERNAL MEDICINE

## 2021-02-23 PROCEDURE — 99214 PR OFFICE/OUTPT VISIT, EST, LEVL IV, 30-39 MIN: ICD-10-PCS | Mod: S$PBB,,, | Performed by: INTERNAL MEDICINE

## 2021-02-23 PROCEDURE — 99213 OFFICE O/P EST LOW 20 MIN: CPT | Mod: PBBFAC,PN | Performed by: INTERNAL MEDICINE

## 2021-02-23 RX ORDER — ATORVASTATIN CALCIUM 40 MG/1
40 TABLET, FILM COATED ORAL DAILY
Qty: 90 TABLET | Refills: 3 | Status: SHIPPED | OUTPATIENT
Start: 2021-02-23 | End: 2021-11-29 | Stop reason: SDUPTHER

## 2021-02-23 RX ORDER — TAMSULOSIN HYDROCHLORIDE 0.4 MG/1
0.4 CAPSULE ORAL NIGHTLY
COMMUNITY

## 2021-02-23 RX ORDER — FENOFIBRATE 160 MG/1
160 TABLET ORAL DAILY
Qty: 90 TABLET | Refills: 3 | Status: SHIPPED | OUTPATIENT
Start: 2021-02-23 | End: 2021-11-29 | Stop reason: SDUPTHER

## 2021-02-23 RX ORDER — TORSEMIDE 100 MG/1
TABLET ORAL
Status: ON HOLD
Start: 2021-02-23 | End: 2021-03-15 | Stop reason: SDUPTHER

## 2021-02-23 RX ORDER — METOLAZONE 5 MG/1
5 TABLET ORAL 2 TIMES DAILY
Qty: 180 TABLET | Refills: 3 | Status: ON HOLD
Start: 2021-02-23 | End: 2021-03-15 | Stop reason: HOSPADM

## 2021-02-23 RX ORDER — FINASTERIDE 5 MG/1
5 TABLET, FILM COATED ORAL DAILY
Qty: 90 TABLET | Refills: 3 | Status: SHIPPED | OUTPATIENT
Start: 2021-02-23 | End: 2021-05-31

## 2021-02-23 RX ORDER — OXYCODONE AND ACETAMINOPHEN 10; 325 MG/1; MG/1
1 TABLET ORAL EVERY 8 HOURS PRN
Qty: 30 TABLET | Refills: 0 | Status: ON HOLD | OUTPATIENT
Start: 2021-02-23 | End: 2021-03-15 | Stop reason: SDUPTHER

## 2021-02-25 ENCOUNTER — OFFICE VISIT (OUTPATIENT)
Dept: CARDIOLOGY | Facility: CLINIC | Age: 56
End: 2021-02-25
Payer: MEDICARE

## 2021-02-25 VITALS — SYSTOLIC BLOOD PRESSURE: 102 MMHG | DIASTOLIC BLOOD PRESSURE: 69 MMHG | OXYGEN SATURATION: 96 % | HEART RATE: 90 BPM

## 2021-02-25 DIAGNOSIS — R60.9 SWELLING: ICD-10-CM

## 2021-02-25 DIAGNOSIS — I50.32 CHRONIC HEART FAILURE WITH PRESERVED EJECTION FRACTION: ICD-10-CM

## 2021-02-25 DIAGNOSIS — I73.9 PAD (PERIPHERAL ARTERY DISEASE): ICD-10-CM

## 2021-02-25 DIAGNOSIS — Z95.1 S/P CABG (CORONARY ARTERY BYPASS GRAFT): ICD-10-CM

## 2021-02-25 DIAGNOSIS — I25.10 CORONARY ARTERY DISEASE INVOLVING NATIVE CORONARY ARTERY WITHOUT ANGINA PECTORIS, UNSPECIFIED WHETHER NATIVE OR TRANSPLANTED HEART: Primary | ICD-10-CM

## 2021-02-25 DIAGNOSIS — I70.0 AORTIC ATHEROSCLEROSIS: ICD-10-CM

## 2021-02-25 DIAGNOSIS — E11.59 HYPERTENSION ASSOCIATED WITH DIABETES: ICD-10-CM

## 2021-02-25 DIAGNOSIS — E11.65 TYPE 2 DIABETES MELLITUS WITH HYPERGLYCEMIA, WITH LONG-TERM CURRENT USE OF INSULIN: ICD-10-CM

## 2021-02-25 DIAGNOSIS — E11.69 HYPERLIPIDEMIA ASSOCIATED WITH TYPE 2 DIABETES MELLITUS: ICD-10-CM

## 2021-02-25 DIAGNOSIS — Z79.4 TYPE 2 DIABETES MELLITUS WITH HYPERGLYCEMIA, WITH LONG-TERM CURRENT USE OF INSULIN: ICD-10-CM

## 2021-02-25 DIAGNOSIS — E78.5 HYPERLIPIDEMIA ASSOCIATED WITH TYPE 2 DIABETES MELLITUS: ICD-10-CM

## 2021-02-25 DIAGNOSIS — I15.2 HYPERTENSION ASSOCIATED WITH DIABETES: ICD-10-CM

## 2021-02-25 PROCEDURE — 99215 OFFICE O/P EST HI 40 MIN: CPT | Mod: S$PBB,,, | Performed by: INTERNAL MEDICINE

## 2021-02-25 PROCEDURE — 99214 OFFICE O/P EST MOD 30 MIN: CPT | Mod: PBBFAC,PO | Performed by: INTERNAL MEDICINE

## 2021-02-25 PROCEDURE — 99999 PR PBB SHADOW E&M-EST. PATIENT-LVL IV: CPT | Mod: PBBFAC,,, | Performed by: INTERNAL MEDICINE

## 2021-02-25 PROCEDURE — 99999 PR PBB SHADOW E&M-EST. PATIENT-LVL IV: ICD-10-PCS | Mod: PBBFAC,,, | Performed by: INTERNAL MEDICINE

## 2021-02-25 PROCEDURE — 99215 PR OFFICE/OUTPT VISIT, EST, LEVL V, 40-54 MIN: ICD-10-PCS | Mod: S$PBB,,, | Performed by: INTERNAL MEDICINE

## 2021-02-27 LAB — BACTERIA BLD CULT: NORMAL

## 2021-03-02 ENCOUNTER — TELEPHONE (OUTPATIENT)
Dept: FAMILY MEDICINE | Facility: CLINIC | Age: 56
End: 2021-03-02

## 2021-03-05 PROCEDURE — G0179 PR HOME HEALTH MD RECERTIFICATION: ICD-10-PCS | Mod: ,,, | Performed by: FAMILY MEDICINE

## 2021-03-05 PROCEDURE — G0179 MD RECERTIFICATION HHA PT: HCPCS | Mod: ,,, | Performed by: FAMILY MEDICINE

## 2021-03-11 ENCOUNTER — OFFICE VISIT (OUTPATIENT)
Dept: FAMILY MEDICINE | Facility: CLINIC | Age: 56
End: 2021-03-11
Payer: MEDICARE

## 2021-03-11 VITALS
HEIGHT: 68 IN | RESPIRATION RATE: 18 BRPM | TEMPERATURE: 98 F | OXYGEN SATURATION: 99 % | HEART RATE: 88 BPM | BODY MASS INDEX: 29.65 KG/M2

## 2021-03-11 DIAGNOSIS — I87.312 IDIOPATHIC CHRONIC VENOUS HYPERTENSION OF LEFT LOWER EXTREMITY WITH ULCER: ICD-10-CM

## 2021-03-11 DIAGNOSIS — L97.929 IDIOPATHIC CHRONIC VENOUS HYPERTENSION OF LEFT LOWER EXTREMITY WITH ULCER: ICD-10-CM

## 2021-03-11 DIAGNOSIS — L03.115 CELLULITIS OF RIGHT LOWER EXTREMITY: Primary | ICD-10-CM

## 2021-03-11 PROBLEM — L03.119 CELLULITIS OF LOWER EXTREMITY: Status: ACTIVE | Noted: 2021-01-28

## 2021-03-11 PROBLEM — E87.6 HYPOKALEMIA: Status: ACTIVE | Noted: 2021-03-11

## 2021-03-11 PROBLEM — E87.1 HYPONATREMIA: Status: ACTIVE | Noted: 2021-03-11

## 2021-03-11 PROBLEM — N18.30 CHRONIC KIDNEY DISEASE, STAGE III (MODERATE): Status: ACTIVE | Noted: 2021-03-11

## 2021-03-11 PROCEDURE — 99999 PR PBB SHADOW E&M-EST. PATIENT-LVL V: CPT | Mod: PBBFAC,,, | Performed by: FAMILY MEDICINE

## 2021-03-11 PROCEDURE — 99215 OFFICE O/P EST HI 40 MIN: CPT | Mod: PBBFAC,PN | Performed by: FAMILY MEDICINE

## 2021-03-11 PROCEDURE — 99999 PR PBB SHADOW E&M-EST. PATIENT-LVL V: ICD-10-PCS | Mod: PBBFAC,,, | Performed by: FAMILY MEDICINE

## 2021-03-11 PROCEDURE — 99214 PR OFFICE/OUTPT VISIT, EST, LEVL IV, 30-39 MIN: ICD-10-PCS | Mod: S$PBB,,, | Performed by: FAMILY MEDICINE

## 2021-03-11 PROCEDURE — 99214 OFFICE O/P EST MOD 30 MIN: CPT | Mod: S$PBB,,, | Performed by: FAMILY MEDICINE

## 2021-03-13 PROBLEM — E87.1 HYPONATREMIA: Status: RESOLVED | Noted: 2021-03-11 | Resolved: 2021-03-13

## 2021-03-16 ENCOUNTER — PATIENT OUTREACH (OUTPATIENT)
Dept: ADMINISTRATIVE | Facility: CLINIC | Age: 56
End: 2021-03-16

## 2021-03-17 ENCOUNTER — PATIENT OUTREACH (OUTPATIENT)
Dept: HOME HEALTH SERVICES | Facility: HOSPITAL | Age: 56
End: 2021-03-17

## 2021-03-17 ENCOUNTER — OUTPATIENT CASE MANAGEMENT (OUTPATIENT)
Dept: ADMINISTRATIVE | Facility: OTHER | Age: 56
End: 2021-03-17

## 2021-03-19 ENCOUNTER — DOCUMENT SCAN (OUTPATIENT)
Dept: HOME HEALTH SERVICES | Facility: HOSPITAL | Age: 56
End: 2021-03-19
Payer: MEDICARE

## 2021-03-19 ENCOUNTER — EXTERNAL HOME HEALTH (OUTPATIENT)
Dept: HOME HEALTH SERVICES | Facility: HOSPITAL | Age: 56
End: 2021-03-19
Payer: MEDICARE

## 2021-03-22 ENCOUNTER — DOCUMENT SCAN (OUTPATIENT)
Dept: HOME HEALTH SERVICES | Facility: HOSPITAL | Age: 56
End: 2021-03-22
Payer: MEDICARE

## 2021-03-24 ENCOUNTER — OUTPATIENT CASE MANAGEMENT (OUTPATIENT)
Dept: ADMINISTRATIVE | Facility: OTHER | Age: 56
End: 2021-03-24

## 2021-03-29 ENCOUNTER — PATIENT MESSAGE (OUTPATIENT)
Dept: ENDOCRINOLOGY | Facility: CLINIC | Age: 56
End: 2021-03-29

## 2021-03-29 ENCOUNTER — PATIENT OUTREACH (OUTPATIENT)
Dept: ADMINISTRATIVE | Facility: OTHER | Age: 56
End: 2021-03-29

## 2021-03-30 ENCOUNTER — OFFICE VISIT (OUTPATIENT)
Dept: ENDOCRINOLOGY | Facility: CLINIC | Age: 56
End: 2021-03-30
Payer: MEDICARE

## 2021-03-30 VITALS — DIASTOLIC BLOOD PRESSURE: 80 MMHG | HEIGHT: 68 IN | BODY MASS INDEX: 27.83 KG/M2 | SYSTOLIC BLOOD PRESSURE: 138 MMHG

## 2021-03-30 DIAGNOSIS — I25.10 CORONARY ARTERY DISEASE INVOLVING NATIVE CORONARY ARTERY WITHOUT ANGINA PECTORIS, UNSPECIFIED WHETHER NATIVE OR TRANSPLANTED HEART: Chronic | ICD-10-CM

## 2021-03-30 DIAGNOSIS — Z79.4 TYPE 2 DIABETES MELLITUS WITH HYPERGLYCEMIA, WITH LONG-TERM CURRENT USE OF INSULIN: ICD-10-CM

## 2021-03-30 DIAGNOSIS — E11.65 TYPE 2 DIABETES MELLITUS WITH HYPERGLYCEMIA, WITH LONG-TERM CURRENT USE OF INSULIN: ICD-10-CM

## 2021-03-30 DIAGNOSIS — E11.9 TYPE 2 DIABETES MELLITUS WITHOUT COMPLICATION, WITHOUT LONG-TERM CURRENT USE OF INSULIN: Primary | ICD-10-CM

## 2021-03-30 DIAGNOSIS — I50.32 CHRONIC DIASTOLIC CHF (CONGESTIVE HEART FAILURE): ICD-10-CM

## 2021-03-30 DIAGNOSIS — I73.9 PAD (PERIPHERAL ARTERY DISEASE): ICD-10-CM

## 2021-03-30 PROCEDURE — 99999 PR PBB SHADOW E&M-EST. PATIENT-LVL IV: CPT | Mod: PBBFAC,,, | Performed by: INTERNAL MEDICINE

## 2021-03-30 PROCEDURE — 99214 PR OFFICE/OUTPT VISIT, EST, LEVL IV, 30-39 MIN: ICD-10-PCS | Mod: S$PBB,,, | Performed by: INTERNAL MEDICINE

## 2021-03-30 PROCEDURE — 99214 OFFICE O/P EST MOD 30 MIN: CPT | Mod: PBBFAC | Performed by: INTERNAL MEDICINE

## 2021-03-30 PROCEDURE — 99999 PR PBB SHADOW E&M-EST. PATIENT-LVL IV: ICD-10-PCS | Mod: PBBFAC,,, | Performed by: INTERNAL MEDICINE

## 2021-03-30 PROCEDURE — 99214 OFFICE O/P EST MOD 30 MIN: CPT | Mod: S$PBB,,, | Performed by: INTERNAL MEDICINE

## 2021-03-30 RX ORDER — INSULIN ASPART 100 [IU]/ML
20 INJECTION, SOLUTION INTRAVENOUS; SUBCUTANEOUS
Qty: 24 ML | Refills: 11
Start: 2021-03-30 | End: 2021-05-10 | Stop reason: SDUPTHER

## 2021-03-30 RX ORDER — INSULIN GLARGINE 100 [IU]/ML
28 INJECTION, SOLUTION SUBCUTANEOUS 2 TIMES DAILY
Qty: 21 ML | Refills: 11 | Status: SHIPPED | OUTPATIENT
Start: 2021-03-30 | End: 2021-04-27

## 2021-03-30 RX ORDER — PEN NEEDLE, DIABETIC 31 GX5/16"
1 NEEDLE, DISPOSABLE MISCELLANEOUS
Qty: 200 EACH | Refills: 11 | Status: SHIPPED | OUTPATIENT
Start: 2021-03-30

## 2021-03-31 ENCOUNTER — TELEPHONE (OUTPATIENT)
Dept: FAMILY MEDICINE | Facility: CLINIC | Age: 56
End: 2021-03-31

## 2021-03-31 ENCOUNTER — HOSPITAL ENCOUNTER (EMERGENCY)
Facility: HOSPITAL | Age: 56
Discharge: HOME | End: 2021-03-31
Attending: FAMILY MEDICINE
Payer: MEDICARE

## 2021-03-31 VITALS
OXYGEN SATURATION: 98 % | SYSTOLIC BLOOD PRESSURE: 140 MMHG | HEART RATE: 97 BPM | RESPIRATION RATE: 16 BRPM | DIASTOLIC BLOOD PRESSURE: 86 MMHG | TEMPERATURE: 100 F

## 2021-03-31 DIAGNOSIS — M79.604 CHRONIC PAIN OF BOTH LOWER EXTREMITIES: Primary | ICD-10-CM

## 2021-03-31 DIAGNOSIS — M79.605 CHRONIC PAIN OF BOTH LOWER EXTREMITIES: Primary | ICD-10-CM

## 2021-03-31 DIAGNOSIS — G89.29 CHRONIC PAIN OF BOTH LOWER EXTREMITIES: Primary | ICD-10-CM

## 2021-03-31 PROCEDURE — 96372 THER/PROPH/DIAG INJ SC/IM: CPT

## 2021-03-31 PROCEDURE — 99284 EMERGENCY DEPT VISIT MOD MDM: CPT | Mod: 25

## 2021-03-31 PROCEDURE — 63600175 PHARM REV CODE 636 W HCPCS: Performed by: FAMILY MEDICINE

## 2021-03-31 RX ORDER — LORAZEPAM 2 MG/ML
2 INJECTION INTRAMUSCULAR
Status: COMPLETED | OUTPATIENT
Start: 2021-03-31 | End: 2021-03-31

## 2021-03-31 RX ORDER — HALOPERIDOL 5 MG/ML
10 INJECTION INTRAMUSCULAR
Status: COMPLETED | OUTPATIENT
Start: 2021-03-31 | End: 2021-03-31

## 2021-03-31 RX ORDER — DIPHENHYDRAMINE HYDROCHLORIDE 50 MG/ML
50 INJECTION INTRAMUSCULAR; INTRAVENOUS
Status: COMPLETED | OUTPATIENT
Start: 2021-03-31 | End: 2021-03-31

## 2021-03-31 RX ADMIN — HALOPERIDOL LACTATE 5 MG: 5 INJECTION, SOLUTION INTRAMUSCULAR at 01:03

## 2021-03-31 RX ADMIN — LORAZEPAM 2 MG: 2 INJECTION INTRAMUSCULAR; INTRAVENOUS at 01:03

## 2021-03-31 RX ADMIN — DIPHENHYDRAMINE HYDROCHLORIDE 25 MG: 50 INJECTION INTRAMUSCULAR; INTRAVENOUS at 01:03

## 2021-04-02 ENCOUNTER — HOSPITAL ENCOUNTER (EMERGENCY)
Facility: HOSPITAL | Age: 56
Discharge: HOME | End: 2021-04-02
Attending: SURGERY
Payer: MEDICARE

## 2021-04-02 VITALS
HEART RATE: 113 BPM | BODY MASS INDEX: 27.37 KG/M2 | OXYGEN SATURATION: 98 % | SYSTOLIC BLOOD PRESSURE: 111 MMHG | TEMPERATURE: 97 F | RESPIRATION RATE: 20 BRPM | WEIGHT: 180 LBS | DIASTOLIC BLOOD PRESSURE: 79 MMHG

## 2021-04-02 DIAGNOSIS — F45.42 PAIN DISORDER ASSOCIATED WITH PSYCHOLOGICAL AND PHYSICAL FACTORS: Primary | ICD-10-CM

## 2021-04-02 PROCEDURE — 63600175 PHARM REV CODE 636 W HCPCS: Performed by: SURGERY

## 2021-04-02 PROCEDURE — 99284 EMERGENCY DEPT VISIT MOD MDM: CPT | Mod: 25

## 2021-04-02 PROCEDURE — 96372 THER/PROPH/DIAG INJ SC/IM: CPT

## 2021-04-02 RX ORDER — MORPHINE SULFATE 2 MG/ML
2 INJECTION, SOLUTION INTRAMUSCULAR; INTRAVENOUS
Status: COMPLETED | OUTPATIENT
Start: 2021-04-02 | End: 2021-04-02

## 2021-04-02 RX ORDER — ONDANSETRON 2 MG/ML
4 INJECTION INTRAMUSCULAR; INTRAVENOUS
Status: COMPLETED | OUTPATIENT
Start: 2021-04-02 | End: 2021-04-02

## 2021-04-02 RX ADMIN — ONDANSETRON 4 MG: 2 INJECTION INTRAMUSCULAR; INTRAVENOUS at 05:04

## 2021-04-02 RX ADMIN — MORPHINE SULFATE 2 MG: 2 INJECTION, SOLUTION INTRAMUSCULAR; INTRAVENOUS at 05:04

## 2021-04-04 ENCOUNTER — HOSPITAL ENCOUNTER (EMERGENCY)
Facility: HOSPITAL | Age: 56
Discharge: HOME | End: 2021-04-04
Attending: EMERGENCY MEDICINE
Payer: MEDICARE

## 2021-04-04 VITALS
DIASTOLIC BLOOD PRESSURE: 75 MMHG | HEART RATE: 91 BPM | OXYGEN SATURATION: 99 % | SYSTOLIC BLOOD PRESSURE: 139 MMHG | TEMPERATURE: 98 F | RESPIRATION RATE: 18 BRPM

## 2021-04-04 DIAGNOSIS — M79.604 CHRONIC PAIN OF RIGHT LOWER EXTREMITY: Primary | ICD-10-CM

## 2021-04-04 DIAGNOSIS — G89.29 CHRONIC PAIN OF RIGHT LOWER EXTREMITY: Primary | ICD-10-CM

## 2021-04-04 PROCEDURE — 96372 THER/PROPH/DIAG INJ SC/IM: CPT

## 2021-04-04 PROCEDURE — 99284 EMERGENCY DEPT VISIT MOD MDM: CPT | Mod: 25

## 2021-04-04 PROCEDURE — 63600175 PHARM REV CODE 636 W HCPCS: Performed by: EMERGENCY MEDICINE

## 2021-04-04 RX ORDER — HALOPERIDOL 5 MG/ML
5 INJECTION INTRAMUSCULAR
Status: COMPLETED | OUTPATIENT
Start: 2021-04-04 | End: 2021-04-04

## 2021-04-04 RX ORDER — DIPHENHYDRAMINE HYDROCHLORIDE 50 MG/ML
25 INJECTION INTRAMUSCULAR; INTRAVENOUS
Status: COMPLETED | OUTPATIENT
Start: 2021-04-04 | End: 2021-04-04

## 2021-04-04 RX ADMIN — DIPHENHYDRAMINE HYDROCHLORIDE 25 MG: 50 INJECTION INTRAMUSCULAR; INTRAVENOUS at 05:04

## 2021-04-04 RX ADMIN — HALOPERIDOL LACTATE 5 MG: 5 INJECTION, SOLUTION INTRAMUSCULAR at 05:04

## 2021-04-05 ENCOUNTER — TELEPHONE (OUTPATIENT)
Dept: ADMINISTRATIVE | Facility: OTHER | Age: 56
End: 2021-04-05

## 2021-04-05 ENCOUNTER — PATIENT MESSAGE (OUTPATIENT)
Dept: ADMINISTRATIVE | Facility: HOSPITAL | Age: 56
End: 2021-04-05

## 2021-04-07 ENCOUNTER — OUTPATIENT CASE MANAGEMENT (OUTPATIENT)
Dept: ADMINISTRATIVE | Facility: OTHER | Age: 56
End: 2021-04-07

## 2021-04-08 ENCOUNTER — DOCUMENT SCAN (OUTPATIENT)
Dept: HOME HEALTH SERVICES | Facility: HOSPITAL | Age: 56
End: 2021-04-08
Payer: MEDICARE

## 2021-04-13 ENCOUNTER — TELEPHONE (OUTPATIENT)
Dept: FAMILY MEDICINE | Facility: CLINIC | Age: 56
End: 2021-04-13

## 2021-04-14 PROBLEM — E87.6 HYPOKALEMIA: Status: RESOLVED | Noted: 2021-03-11 | Resolved: 2021-04-14

## 2021-04-18 PROBLEM — R73.9 HYPERGLYCEMIA: Status: ACTIVE | Noted: 2021-04-18

## 2021-04-18 PROBLEM — N17.9 AKI (ACUTE KIDNEY INJURY): Status: ACTIVE | Noted: 2021-04-18

## 2021-04-20 PROBLEM — N17.9 AKI (ACUTE KIDNEY INJURY): Status: RESOLVED | Noted: 2021-04-18 | Resolved: 2021-04-20

## 2021-04-21 ENCOUNTER — TELEPHONE (OUTPATIENT)
Dept: FAMILY MEDICINE | Facility: CLINIC | Age: 56
End: 2021-04-21

## 2021-04-21 ENCOUNTER — PATIENT OUTREACH (OUTPATIENT)
Dept: ADMINISTRATIVE | Facility: CLINIC | Age: 56
End: 2021-04-21

## 2021-04-21 ENCOUNTER — PATIENT MESSAGE (OUTPATIENT)
Dept: ADMINISTRATIVE | Facility: CLINIC | Age: 56
End: 2021-04-21

## 2021-04-22 ENCOUNTER — PATIENT OUTREACH (OUTPATIENT)
Dept: ADMINISTRATIVE | Facility: HOSPITAL | Age: 56
End: 2021-04-22

## 2021-04-22 DIAGNOSIS — Z12.5 SCREENING PSA (PROSTATE SPECIFIC ANTIGEN): Primary | ICD-10-CM

## 2021-04-26 ENCOUNTER — TELEPHONE (OUTPATIENT)
Dept: PODIATRY | Facility: CLINIC | Age: 56
End: 2021-04-26

## 2021-04-26 ENCOUNTER — OUTPATIENT CASE MANAGEMENT (OUTPATIENT)
Dept: ADMINISTRATIVE | Facility: OTHER | Age: 56
End: 2021-04-26

## 2021-04-26 ENCOUNTER — OFFICE VISIT (OUTPATIENT)
Dept: PODIATRY | Facility: CLINIC | Age: 56
DRG: 641 | End: 2021-04-26
Payer: MEDICARE

## 2021-04-26 VITALS — HEART RATE: 79 BPM | WEIGHT: 180 LBS | BODY MASS INDEX: 30.73 KG/M2 | HEIGHT: 64 IN

## 2021-04-26 DIAGNOSIS — I73.9 PAD (PERIPHERAL ARTERY DISEASE): ICD-10-CM

## 2021-04-26 DIAGNOSIS — B87.9 MAGGOT INFESTATION: ICD-10-CM

## 2021-04-26 DIAGNOSIS — I73.9 PVD (PERIPHERAL VASCULAR DISEASE): ICD-10-CM

## 2021-04-26 DIAGNOSIS — I83.009 VENOUS STASIS ULCER WITH EDEMA OF LOWER LEG: ICD-10-CM

## 2021-04-26 DIAGNOSIS — E11.51 TYPE II DIABETES MELLITUS WITH PERIPHERAL CIRCULATORY DISORDER: ICD-10-CM

## 2021-04-26 DIAGNOSIS — I83.899 VENOUS STASIS ULCER WITH EDEMA OF LOWER LEG: ICD-10-CM

## 2021-04-26 DIAGNOSIS — L81.9 DISCOLORATION OF SKIN OF TOE: Primary | ICD-10-CM

## 2021-04-26 DIAGNOSIS — L97.909 VENOUS STASIS ULCER WITH EDEMA OF LOWER LEG: ICD-10-CM

## 2021-04-26 DIAGNOSIS — R60.9 VENOUS STASIS ULCER WITH EDEMA OF LOWER LEG: ICD-10-CM

## 2021-04-26 DIAGNOSIS — E11.9 TYPE 2 DIABETES MELLITUS WITHOUT COMPLICATION, WITHOUT LONG-TERM CURRENT USE OF INSULIN: ICD-10-CM

## 2021-04-26 PROCEDURE — 99999 PR PBB SHADOW E&M-EST. PATIENT-LVL IV: CPT | Mod: PBBFAC,,, | Performed by: PODIATRIST

## 2021-04-26 PROCEDURE — 99999 PR PBB SHADOW E&M-EST. PATIENT-LVL IV: ICD-10-PCS | Mod: PBBFAC,,, | Performed by: PODIATRIST

## 2021-04-26 PROCEDURE — 99214 PR OFFICE/OUTPT VISIT, EST, LEVL IV, 30-39 MIN: ICD-10-PCS | Mod: S$PBB,,, | Performed by: PODIATRIST

## 2021-04-26 PROCEDURE — 99214 OFFICE O/P EST MOD 30 MIN: CPT | Mod: S$PBB,,, | Performed by: PODIATRIST

## 2021-04-26 PROCEDURE — 99214 OFFICE O/P EST MOD 30 MIN: CPT | Mod: PBBFAC,PN | Performed by: PODIATRIST

## 2021-04-27 ENCOUNTER — TELEPHONE (OUTPATIENT)
Dept: ORTHOPEDICS | Facility: CLINIC | Age: 56
End: 2021-04-27

## 2021-04-27 RX ORDER — INSULIN GLARGINE 100 [IU]/ML
32 INJECTION, SOLUTION SUBCUTANEOUS 2 TIMES DAILY
Qty: 21 ML | Refills: 11
Start: 2021-04-27 | End: 2021-05-10 | Stop reason: SDUPTHER

## 2021-04-28 ENCOUNTER — DOCUMENT SCAN (OUTPATIENT)
Dept: HOME HEALTH SERVICES | Facility: HOSPITAL | Age: 56
End: 2021-04-28
Payer: MEDICARE

## 2021-04-29 ENCOUNTER — HOSPITAL ENCOUNTER (INPATIENT)
Facility: HOSPITAL | Age: 56
LOS: 3 days | Discharge: HOME HEALTH | DRG: 641 | End: 2021-05-02
Attending: EMERGENCY MEDICINE | Admitting: EMERGENCY MEDICINE
Payer: MEDICARE

## 2021-04-29 ENCOUNTER — TELEPHONE (OUTPATIENT)
Dept: ENDOCRINOLOGY | Facility: CLINIC | Age: 56
End: 2021-04-29

## 2021-04-29 DIAGNOSIS — M79.673 FOOT PAIN: ICD-10-CM

## 2021-04-29 DIAGNOSIS — L08.9 FOOT INFECTION: ICD-10-CM

## 2021-04-29 DIAGNOSIS — I63.22 OCCLUSION AND STENOSIS OF BASILAR ARTERY WITH CEREBRAL INFARCTION: ICD-10-CM

## 2021-04-29 DIAGNOSIS — I73.9 PERIPHERAL ARTERIAL DISEASE: ICD-10-CM

## 2021-04-29 DIAGNOSIS — I87.2 PERIPHERAL VENOUS INSUFFICIENCY: ICD-10-CM

## 2021-04-29 DIAGNOSIS — R07.9 CHEST PAIN: ICD-10-CM

## 2021-04-29 DIAGNOSIS — E87.6 HYPOKALEMIA: Primary | ICD-10-CM

## 2021-04-29 DIAGNOSIS — N17.9 AKI (ACUTE KIDNEY INJURY): ICD-10-CM

## 2021-04-29 DIAGNOSIS — L97.412 CHRONIC ULCER OF RIGHT HEEL WITH FAT LAYER EXPOSED: ICD-10-CM

## 2021-04-29 DIAGNOSIS — M79.671 RIGHT FOOT PAIN: ICD-10-CM

## 2021-04-29 PROBLEM — N18.9 ACUTE KIDNEY INJURY SUPERIMPOSED ON CHRONIC KIDNEY DISEASE: Status: ACTIVE | Noted: 2021-04-29

## 2021-04-29 LAB
ALBUMIN SERPL BCP-MCNC: 3.4 G/DL (ref 3.5–5.2)
ALP SERPL-CCNC: 78 U/L (ref 55–135)
ALT SERPL W/O P-5'-P-CCNC: 13 U/L (ref 10–44)
ANION GAP SERPL CALC-SCNC: 14 MMOL/L (ref 8–16)
ANION GAP SERPL CALC-SCNC: 16 MMOL/L (ref 8–16)
AST SERPL-CCNC: 16 U/L (ref 10–40)
B-OH-BUTYR BLD STRIP-SCNC: 0.1 MMOL/L (ref 0–0.5)
BASOPHILS # BLD AUTO: 0.08 K/UL (ref 0–0.2)
BASOPHILS NFR BLD: 0.7 % (ref 0–1.9)
BILIRUB SERPL-MCNC: 0.5 MG/DL (ref 0.1–1)
BNP SERPL-MCNC: 30 PG/ML (ref 0–99)
BUN SERPL-MCNC: 61 MG/DL (ref 6–20)
BUN SERPL-MCNC: 68 MG/DL (ref 6–20)
CALCIUM SERPL-MCNC: 7.7 MG/DL (ref 8.7–10.5)
CALCIUM SERPL-MCNC: 9.3 MG/DL (ref 8.7–10.5)
CHLORIDE SERPL-SCNC: 82 MMOL/L (ref 95–110)
CHLORIDE SERPL-SCNC: 89 MMOL/L (ref 95–110)
CO2 SERPL-SCNC: 31 MMOL/L (ref 23–29)
CO2 SERPL-SCNC: 32 MMOL/L (ref 23–29)
CREAT SERPL-MCNC: 1.3 MG/DL (ref 0.5–1.4)
CREAT SERPL-MCNC: 1.5 MG/DL (ref 0.5–1.4)
CRP SERPL-MCNC: 26.1 MG/L (ref 0–8.2)
CTP QC/QA: YES
DIFFERENTIAL METHOD: ABNORMAL
EOSINOPHIL # BLD AUTO: 0.1 K/UL (ref 0–0.5)
EOSINOPHIL NFR BLD: 0.6 % (ref 0–8)
ERYTHROCYTE [DISTWIDTH] IN BLOOD BY AUTOMATED COUNT: 17.2 % (ref 11.5–14.5)
ERYTHROCYTE [SEDIMENTATION RATE] IN BLOOD BY WESTERGREN METHOD: >120 MM/HR (ref 0–23)
EST. GFR  (AFRICAN AMERICAN): 58.5 ML/MIN/1.73 M^2
EST. GFR  (AFRICAN AMERICAN): >60 ML/MIN/1.73 M^2
EST. GFR  (NON AFRICAN AMERICAN): 50.6 ML/MIN/1.73 M^2
EST. GFR  (NON AFRICAN AMERICAN): >60 ML/MIN/1.73 M^2
GLUCOSE SERPL-MCNC: 201 MG/DL (ref 70–110)
GLUCOSE SERPL-MCNC: 223 MG/DL (ref 70–110)
HCT VFR BLD AUTO: 30.2 % (ref 40–54)
HGB BLD-MCNC: 9.5 G/DL (ref 14–18)
IMM GRANULOCYTES # BLD AUTO: 0.04 K/UL (ref 0–0.04)
IMM GRANULOCYTES NFR BLD AUTO: 0.3 % (ref 0–0.5)
LACTATE SERPL-SCNC: 1.1 MMOL/L (ref 0.5–2.2)
LYMPHOCYTES # BLD AUTO: 1.1 K/UL (ref 1–4.8)
LYMPHOCYTES NFR BLD: 9.4 % (ref 18–48)
MAGNESIUM SERPL-MCNC: 1.9 MG/DL (ref 1.6–2.6)
MAGNESIUM SERPL-MCNC: 2 MG/DL (ref 1.6–2.6)
MAGNESIUM SERPL-MCNC: 2 MG/DL (ref 1.6–2.6)
MCH RBC QN AUTO: 22.3 PG (ref 27–31)
MCHC RBC AUTO-ENTMCNC: 31.5 G/DL (ref 32–36)
MCV RBC AUTO: 71 FL (ref 82–98)
MONOCYTES # BLD AUTO: 0.9 K/UL (ref 0.3–1)
MONOCYTES NFR BLD: 8 % (ref 4–15)
NEUTROPHILS # BLD AUTO: 9.5 K/UL (ref 1.8–7.7)
NEUTROPHILS NFR BLD: 81 % (ref 38–73)
NRBC BLD-RTO: 0 /100 WBC
PHOSPHATE SERPL-MCNC: 3.4 MG/DL (ref 2.7–4.5)
PLATELET # BLD AUTO: 423 K/UL (ref 150–450)
PMV BLD AUTO: 9.9 FL (ref 9.2–12.9)
POCT GLUCOSE: 243 MG/DL (ref 70–110)
POTASSIUM SERPL-SCNC: 2.2 MMOL/L (ref 3.5–5.1)
POTASSIUM SERPL-SCNC: <2 MMOL/L (ref 3.5–5.1)
PROT SERPL-MCNC: 8.9 G/DL (ref 6–8.4)
RBC # BLD AUTO: 4.26 M/UL (ref 4.6–6.2)
SARS-COV-2 RDRP RESP QL NAA+PROBE: NEGATIVE
SODIUM SERPL-SCNC: 130 MMOL/L (ref 136–145)
SODIUM SERPL-SCNC: 134 MMOL/L (ref 136–145)
WBC # BLD AUTO: 11.76 K/UL (ref 3.9–12.7)

## 2021-04-29 PROCEDURE — 96375 TX/PRO/DX INJ NEW DRUG ADDON: CPT

## 2021-04-29 PROCEDURE — 25000003 PHARM REV CODE 250: Performed by: STUDENT IN AN ORGANIZED HEALTH CARE EDUCATION/TRAINING PROGRAM

## 2021-04-29 PROCEDURE — 63600175 PHARM REV CODE 636 W HCPCS: Performed by: PHYSICIAN ASSISTANT

## 2021-04-29 PROCEDURE — 96365 THER/PROPH/DIAG IV INF INIT: CPT

## 2021-04-29 PROCEDURE — 86703 HIV-1/HIV-2 1 RESULT ANTBDY: CPT | Performed by: EMERGENCY MEDICINE

## 2021-04-29 PROCEDURE — 85025 COMPLETE CBC W/AUTO DIFF WBC: CPT | Performed by: PHYSICIAN ASSISTANT

## 2021-04-29 PROCEDURE — 99285 EMERGENCY DEPT VISIT HI MDM: CPT | Mod: ,,, | Performed by: EMERGENCY MEDICINE

## 2021-04-29 PROCEDURE — 99285 EMERGENCY DEPT VISIT HI MDM: CPT | Mod: 25

## 2021-04-29 PROCEDURE — 83735 ASSAY OF MAGNESIUM: CPT | Mod: 91 | Performed by: PHYSICIAN ASSISTANT

## 2021-04-29 PROCEDURE — 86140 C-REACTIVE PROTEIN: CPT | Performed by: PHYSICIAN ASSISTANT

## 2021-04-29 PROCEDURE — 63600175 PHARM REV CODE 636 W HCPCS: Performed by: STUDENT IN AN ORGANIZED HEALTH CARE EDUCATION/TRAINING PROGRAM

## 2021-04-29 PROCEDURE — 83735 ASSAY OF MAGNESIUM: CPT | Mod: 91 | Performed by: STUDENT IN AN ORGANIZED HEALTH CARE EDUCATION/TRAINING PROGRAM

## 2021-04-29 PROCEDURE — U0002 COVID-19 LAB TEST NON-CDC: HCPCS | Performed by: PHYSICIAN ASSISTANT

## 2021-04-29 PROCEDURE — 82010 KETONE BODYS QUAN: CPT | Performed by: PHYSICIAN ASSISTANT

## 2021-04-29 PROCEDURE — 11000001 HC ACUTE MED/SURG PRIVATE ROOM

## 2021-04-29 PROCEDURE — 86803 HEPATITIS C AB TEST: CPT | Performed by: EMERGENCY MEDICINE

## 2021-04-29 PROCEDURE — 83735 ASSAY OF MAGNESIUM: CPT | Performed by: PHYSICIAN ASSISTANT

## 2021-04-29 PROCEDURE — 84100 ASSAY OF PHOSPHORUS: CPT | Performed by: STUDENT IN AN ORGANIZED HEALTH CARE EDUCATION/TRAINING PROGRAM

## 2021-04-29 PROCEDURE — 25000003 PHARM REV CODE 250: Performed by: PHYSICIAN ASSISTANT

## 2021-04-29 PROCEDURE — 83605 ASSAY OF LACTIC ACID: CPT | Performed by: PHYSICIAN ASSISTANT

## 2021-04-29 PROCEDURE — 99900041 HC LEFT WITHOUT BEING SEEN- EMERGENCY

## 2021-04-29 PROCEDURE — 80053 COMPREHEN METABOLIC PANEL: CPT | Performed by: PHYSICIAN ASSISTANT

## 2021-04-29 PROCEDURE — 80048 BASIC METABOLIC PNL TOTAL CA: CPT | Performed by: STUDENT IN AN ORGANIZED HEALTH CARE EDUCATION/TRAINING PROGRAM

## 2021-04-29 PROCEDURE — 99285 PR EMERGENCY DEPT VISIT,LEVEL V: ICD-10-PCS | Mod: ,,, | Performed by: EMERGENCY MEDICINE

## 2021-04-29 PROCEDURE — 83880 ASSAY OF NATRIURETIC PEPTIDE: CPT | Performed by: PHYSICIAN ASSISTANT

## 2021-04-29 PROCEDURE — 85652 RBC SED RATE AUTOMATED: CPT | Performed by: PHYSICIAN ASSISTANT

## 2021-04-29 PROCEDURE — 82962 GLUCOSE BLOOD TEST: CPT

## 2021-04-29 RX ORDER — ONDANSETRON 8 MG/1
8 TABLET, ORALLY DISINTEGRATING ORAL EVERY 8 HOURS PRN
Status: DISCONTINUED | OUTPATIENT
Start: 2021-04-29 | End: 2021-04-29 | Stop reason: ALTCHOICE

## 2021-04-29 RX ORDER — IBUPROFEN 200 MG
16 TABLET ORAL
Status: DISCONTINUED | OUTPATIENT
Start: 2021-04-29 | End: 2021-05-01

## 2021-04-29 RX ORDER — SODIUM CHLORIDE 0.9 % (FLUSH) 0.9 %
10 SYRINGE (ML) INJECTION
Status: CANCELLED | OUTPATIENT
Start: 2021-04-29

## 2021-04-29 RX ORDER — IBUPROFEN 200 MG
24 TABLET ORAL
Status: DISCONTINUED | OUTPATIENT
Start: 2021-04-29 | End: 2021-05-01

## 2021-04-29 RX ORDER — ATORVASTATIN CALCIUM 20 MG/1
40 TABLET, FILM COATED ORAL DAILY
Status: DISCONTINUED | OUTPATIENT
Start: 2021-04-30 | End: 2021-05-02 | Stop reason: HOSPADM

## 2021-04-29 RX ORDER — MORPHINE SULFATE 2 MG/ML
6 INJECTION, SOLUTION INTRAMUSCULAR; INTRAVENOUS
Status: COMPLETED | OUTPATIENT
Start: 2021-04-29 | End: 2021-04-29

## 2021-04-29 RX ORDER — POLYETHYLENE GLYCOL 3350 17 G/17G
17 POWDER, FOR SOLUTION ORAL DAILY
Status: DISCONTINUED | OUTPATIENT
Start: 2021-04-30 | End: 2021-05-02 | Stop reason: HOSPADM

## 2021-04-29 RX ORDER — PROCHLORPERAZINE EDISYLATE 5 MG/ML
5 INJECTION INTRAMUSCULAR; INTRAVENOUS EVERY 6 HOURS PRN
Status: DISCONTINUED | OUTPATIENT
Start: 2021-04-29 | End: 2021-04-29 | Stop reason: ALTCHOICE

## 2021-04-29 RX ORDER — POTASSIUM CHLORIDE 7.45 MG/ML
10 INJECTION INTRAVENOUS
Status: DISPENSED | OUTPATIENT
Start: 2021-04-29 | End: 2021-04-30

## 2021-04-29 RX ORDER — POTASSIUM CHLORIDE 20 MEQ/1
40 TABLET, EXTENDED RELEASE ORAL ONCE
Status: COMPLETED | OUTPATIENT
Start: 2021-04-29 | End: 2021-04-29

## 2021-04-29 RX ORDER — ALPRAZOLAM 0.5 MG/1
0.5 TABLET ORAL 3 TIMES DAILY PRN
Status: DISCONTINUED | OUTPATIENT
Start: 2021-04-29 | End: 2021-05-02 | Stop reason: HOSPADM

## 2021-04-29 RX ORDER — OXYCODONE AND ACETAMINOPHEN 10; 325 MG/1; MG/1
1 TABLET ORAL EVERY 4 HOURS PRN
Status: DISCONTINUED | OUTPATIENT
Start: 2021-04-29 | End: 2021-04-30

## 2021-04-29 RX ORDER — POTASSIUM CHLORIDE 7.45 MG/ML
10 INJECTION INTRAVENOUS
Status: COMPLETED | OUTPATIENT
Start: 2021-04-29 | End: 2021-04-29

## 2021-04-29 RX ORDER — FENOFIBRATE 160 MG/1
160 TABLET ORAL DAILY
Status: DISCONTINUED | OUTPATIENT
Start: 2021-04-30 | End: 2021-05-02 | Stop reason: HOSPADM

## 2021-04-29 RX ORDER — FINASTERIDE 5 MG/1
5 TABLET, FILM COATED ORAL DAILY
Status: DISCONTINUED | OUTPATIENT
Start: 2021-04-30 | End: 2021-05-02 | Stop reason: HOSPADM

## 2021-04-29 RX ORDER — AMOXICILLIN 250 MG
1 CAPSULE ORAL 2 TIMES DAILY
Status: DISCONTINUED | OUTPATIENT
Start: 2021-04-29 | End: 2021-05-02 | Stop reason: HOSPADM

## 2021-04-29 RX ORDER — GLUCAGON 1 MG
1 KIT INJECTION
Status: DISCONTINUED | OUTPATIENT
Start: 2021-04-29 | End: 2021-05-01

## 2021-04-29 RX ORDER — TAMSULOSIN HYDROCHLORIDE 0.4 MG/1
0.4 CAPSULE ORAL DAILY
Status: DISCONTINUED | OUTPATIENT
Start: 2021-04-30 | End: 2021-05-02 | Stop reason: HOSPADM

## 2021-04-29 RX ORDER — PANTOPRAZOLE SODIUM 40 MG/1
40 TABLET, DELAYED RELEASE ORAL DAILY
Status: DISCONTINUED | OUTPATIENT
Start: 2021-04-30 | End: 2021-05-02 | Stop reason: HOSPADM

## 2021-04-29 RX ORDER — HYDROMORPHONE HYDROCHLORIDE 1 MG/ML
1 INJECTION, SOLUTION INTRAMUSCULAR; INTRAVENOUS; SUBCUTANEOUS EVERY 6 HOURS PRN
Status: DISCONTINUED | OUTPATIENT
Start: 2021-04-29 | End: 2021-04-30

## 2021-04-29 RX ORDER — CLOPIDOGREL BISULFATE 75 MG/1
75 TABLET ORAL DAILY
Status: DISCONTINUED | OUTPATIENT
Start: 2021-04-30 | End: 2021-05-02 | Stop reason: HOSPADM

## 2021-04-29 RX ORDER — ACETAMINOPHEN 500 MG
500 TABLET ORAL EVERY 8 HOURS PRN
Status: DISCONTINUED | OUTPATIENT
Start: 2021-04-29 | End: 2021-04-30

## 2021-04-29 RX ORDER — SODIUM CHLORIDE 0.9 % (FLUSH) 0.9 %
10 SYRINGE (ML) INJECTION
Status: DISCONTINUED | OUTPATIENT
Start: 2021-04-29 | End: 2021-05-02 | Stop reason: HOSPADM

## 2021-04-29 RX ADMIN — SODIUM CHLORIDE 1000 ML: 0.9 INJECTION, SOLUTION INTRAVENOUS at 05:04

## 2021-04-29 RX ADMIN — MORPHINE SULFATE 6 MG: 2 INJECTION, SOLUTION INTRAMUSCULAR; INTRAVENOUS at 07:04

## 2021-04-29 RX ADMIN — MORPHINE SULFATE 6 MG: 2 INJECTION, SOLUTION INTRAMUSCULAR; INTRAVENOUS at 05:04

## 2021-04-29 RX ADMIN — POTASSIUM CHLORIDE 10 MEQ: 7.46 INJECTION, SOLUTION INTRAVENOUS at 05:04

## 2021-04-29 RX ADMIN — POTASSIUM CHLORIDE 10 MEQ: 7.46 INJECTION, SOLUTION INTRAVENOUS at 07:04

## 2021-04-29 RX ADMIN — POTASSIUM CHLORIDE 40 MEQ: 1500 TABLET, EXTENDED RELEASE ORAL at 07:04

## 2021-04-29 RX ADMIN — POTASSIUM CHLORIDE 10 MEQ: 7.46 INJECTION, SOLUTION INTRAVENOUS at 11:04

## 2021-04-29 RX ADMIN — DOCUSATE SODIUM 50MG AND SENNOSIDES 8.6MG 1 TABLET: 8.6; 5 TABLET, FILM COATED ORAL at 11:04

## 2021-04-30 ENCOUNTER — ANESTHESIA EVENT (OUTPATIENT)
Dept: MEDSURG UNIT | Facility: HOSPITAL | Age: 56
DRG: 641 | End: 2021-04-30
Payer: MEDICARE

## 2021-04-30 ENCOUNTER — ANESTHESIA (OUTPATIENT)
Dept: MEDSURG UNIT | Facility: HOSPITAL | Age: 56
DRG: 641 | End: 2021-04-30
Payer: MEDICARE

## 2021-04-30 ENCOUNTER — DOCUMENT SCAN (OUTPATIENT)
Dept: HOME HEALTH SERVICES | Facility: HOSPITAL | Age: 56
End: 2021-04-30
Payer: MEDICARE

## 2021-04-30 PROBLEM — E87.1 HYPONATREMIA: Status: RESOLVED | Noted: 2021-03-11 | Resolved: 2021-04-30

## 2021-04-30 LAB
ALBUMIN SERPL BCP-MCNC: 3.3 G/DL (ref 3.5–5.2)
ALP SERPL-CCNC: 97 U/L (ref 55–135)
ALT SERPL W/O P-5'-P-CCNC: 20 U/L (ref 10–44)
ANION GAP SERPL CALC-SCNC: 10 MMOL/L (ref 8–16)
ANION GAP SERPL CALC-SCNC: 10 MMOL/L (ref 8–16)
ANION GAP SERPL CALC-SCNC: 11 MMOL/L (ref 8–16)
ANION GAP SERPL CALC-SCNC: 12 MMOL/L (ref 8–16)
ANION GAP SERPL CALC-SCNC: 13 MMOL/L (ref 8–16)
APTT BLDCRRT: 23.1 SEC (ref 21–32)
AST SERPL-CCNC: 20 U/L (ref 10–40)
BASOPHILS # BLD AUTO: 0.06 K/UL (ref 0–0.2)
BASOPHILS NFR BLD: 0.6 % (ref 0–1.9)
BILIRUB SERPL-MCNC: 0.5 MG/DL (ref 0.1–1)
BUN SERPL-MCNC: 45 MG/DL (ref 6–20)
BUN SERPL-MCNC: 46 MG/DL (ref 6–20)
BUN SERPL-MCNC: 46 MG/DL (ref 6–20)
BUN SERPL-MCNC: 56 MG/DL (ref 6–20)
BUN SERPL-MCNC: 57 MG/DL (ref 6–20)
BUN SERPL-MCNC: 59 MG/DL (ref 6–30)
CALCIUM SERPL-MCNC: 10 MG/DL (ref 8.7–10.5)
CALCIUM SERPL-MCNC: 8.9 MG/DL (ref 8.7–10.5)
CALCIUM SERPL-MCNC: 9.1 MG/DL (ref 8.7–10.5)
CALCIUM SERPL-MCNC: 9.7 MG/DL (ref 8.7–10.5)
CALCIUM SERPL-MCNC: 9.9 MG/DL (ref 8.7–10.5)
CHLORIDE SERPL-SCNC: 100 MMOL/L (ref 95–110)
CHLORIDE SERPL-SCNC: 84 MMOL/L (ref 95–110)
CHLORIDE SERPL-SCNC: 94 MMOL/L (ref 95–110)
CHLORIDE SERPL-SCNC: 94 MMOL/L (ref 95–110)
CHLORIDE SERPL-SCNC: 98 MMOL/L (ref 95–110)
CHLORIDE SERPL-SCNC: 98 MMOL/L (ref 95–110)
CO2 SERPL-SCNC: 29 MMOL/L (ref 23–29)
CO2 SERPL-SCNC: 30 MMOL/L (ref 23–29)
CO2 SERPL-SCNC: 31 MMOL/L (ref 23–29)
CO2 SERPL-SCNC: 32 MMOL/L (ref 23–29)
CO2 SERPL-SCNC: 33 MMOL/L (ref 23–29)
CREAT SERPL-MCNC: 1.1 MG/DL (ref 0.5–1.4)
CREAT SERPL-MCNC: 1.2 MG/DL (ref 0.5–1.4)
CREAT SERPL-MCNC: 1.3 MG/DL (ref 0.5–1.4)
DIFFERENTIAL METHOD: ABNORMAL
EOSINOPHIL # BLD AUTO: 0.1 K/UL (ref 0–0.5)
EOSINOPHIL NFR BLD: 1.3 % (ref 0–8)
ERYTHROCYTE [DISTWIDTH] IN BLOOD BY AUTOMATED COUNT: 17.3 % (ref 11.5–14.5)
EST. GFR  (AFRICAN AMERICAN): >60 ML/MIN/1.73 M^2
EST. GFR  (NON AFRICAN AMERICAN): >60 ML/MIN/1.73 M^2
GLUCOSE SERPL-MCNC: 178 MG/DL (ref 70–110)
GLUCOSE SERPL-MCNC: 209 MG/DL (ref 70–110)
GLUCOSE SERPL-MCNC: 211 MG/DL (ref 70–110)
GLUCOSE SERPL-MCNC: 215 MG/DL (ref 70–110)
GLUCOSE SERPL-MCNC: 235 MG/DL (ref 70–110)
GLUCOSE SERPL-MCNC: 238 MG/DL (ref 70–110)
HCT VFR BLD AUTO: 30.1 % (ref 40–54)
HCT VFR BLD CALC: 33 %PCV (ref 36–54)
HCV AB SERPL QL IA: NEGATIVE
HGB BLD-MCNC: 9 G/DL (ref 14–18)
HIV 1+2 AB+HIV1 P24 AG SERPL QL IA: NEGATIVE
IMM GRANULOCYTES # BLD AUTO: 0.04 K/UL (ref 0–0.04)
IMM GRANULOCYTES NFR BLD AUTO: 0.4 % (ref 0–0.5)
INR PPP: 1 (ref 0.8–1.2)
LYMPHOCYTES # BLD AUTO: 1 K/UL (ref 1–4.8)
LYMPHOCYTES NFR BLD: 10.7 % (ref 18–48)
MAGNESIUM SERPL-MCNC: 1.9 MG/DL (ref 1.6–2.6)
MCH RBC QN AUTO: 22.1 PG (ref 27–31)
MCHC RBC AUTO-ENTMCNC: 29.9 G/DL (ref 32–36)
MCV RBC AUTO: 74 FL (ref 82–98)
MONOCYTES # BLD AUTO: 0.7 K/UL (ref 0.3–1)
MONOCYTES NFR BLD: 7.5 % (ref 4–15)
NEUTROPHILS # BLD AUTO: 7.7 K/UL (ref 1.8–7.7)
NEUTROPHILS NFR BLD: 79.5 % (ref 38–73)
NRBC BLD-RTO: 0 /100 WBC
PHOSPHATE SERPL-MCNC: 2.3 MG/DL (ref 2.7–4.5)
PLATELET # BLD AUTO: 366 K/UL (ref 150–450)
PMV BLD AUTO: 9.8 FL (ref 9.2–12.9)
POC IONIZED CALCIUM: 1.05 MMOL/L (ref 1.06–1.42)
POC TCO2 (MEASURED): 34 MMOL/L (ref 23–29)
POCT GLUCOSE: 196 MG/DL (ref 70–110)
POCT GLUCOSE: 202 MG/DL (ref 70–110)
POCT GLUCOSE: 224 MG/DL (ref 70–110)
POCT GLUCOSE: 242 MG/DL (ref 70–110)
POTASSIUM BLD-SCNC: <2 MMOL/L (ref 3.5–5.1)
POTASSIUM SERPL-SCNC: 2.4 MMOL/L (ref 3.5–5.1)
POTASSIUM SERPL-SCNC: 2.4 MMOL/L (ref 3.5–5.1)
POTASSIUM SERPL-SCNC: 2.8 MMOL/L (ref 3.5–5.1)
POTASSIUM SERPL-SCNC: 2.8 MMOL/L (ref 3.5–5.1)
POTASSIUM SERPL-SCNC: 3.1 MMOL/L (ref 3.5–5.1)
PROT SERPL-MCNC: 8.4 G/DL (ref 6–8.4)
PROTHROMBIN TIME: 10.5 SEC (ref 9–12.5)
RBC # BLD AUTO: 4.08 M/UL (ref 4.6–6.2)
SAMPLE: ABNORMAL
SODIUM BLD-SCNC: 131 MMOL/L (ref 136–145)
SODIUM SERPL-SCNC: 135 MMOL/L (ref 136–145)
SODIUM SERPL-SCNC: 137 MMOL/L (ref 136–145)
SODIUM SERPL-SCNC: 141 MMOL/L (ref 136–145)
WBC # BLD AUTO: 9.67 K/UL (ref 3.9–12.7)

## 2021-04-30 PROCEDURE — 99231 PR SUBSEQUENT HOSPITAL CARE,LEVL I: ICD-10-PCS | Mod: GC,,, | Performed by: ANESTHESIOLOGY

## 2021-04-30 PROCEDURE — 84100 ASSAY OF PHOSPHORUS: CPT | Performed by: STUDENT IN AN ORGANIZED HEALTH CARE EDUCATION/TRAINING PROGRAM

## 2021-04-30 PROCEDURE — 80048 BASIC METABOLIC PNL TOTAL CA: CPT | Performed by: STUDENT IN AN ORGANIZED HEALTH CARE EDUCATION/TRAINING PROGRAM

## 2021-04-30 PROCEDURE — 25000242 PHARM REV CODE 250 ALT 637 W/ HCPCS: Performed by: STUDENT IN AN ORGANIZED HEALTH CARE EDUCATION/TRAINING PROGRAM

## 2021-04-30 PROCEDURE — 25000003 PHARM REV CODE 250: Performed by: STUDENT IN AN ORGANIZED HEALTH CARE EDUCATION/TRAINING PROGRAM

## 2021-04-30 PROCEDURE — 63600175 PHARM REV CODE 636 W HCPCS: Performed by: STUDENT IN AN ORGANIZED HEALTH CARE EDUCATION/TRAINING PROGRAM

## 2021-04-30 PROCEDURE — 99231 SBSQ HOSP IP/OBS SF/LOW 25: CPT | Mod: GC,,, | Performed by: ANESTHESIOLOGY

## 2021-04-30 PROCEDURE — 36415 COLL VENOUS BLD VENIPUNCTURE: CPT | Performed by: STUDENT IN AN ORGANIZED HEALTH CARE EDUCATION/TRAINING PROGRAM

## 2021-04-30 PROCEDURE — 85025 COMPLETE CBC W/AUTO DIFF WBC: CPT | Performed by: STUDENT IN AN ORGANIZED HEALTH CARE EDUCATION/TRAINING PROGRAM

## 2021-04-30 PROCEDURE — 25000003 PHARM REV CODE 250: Performed by: INTERNAL MEDICINE

## 2021-04-30 PROCEDURE — 36415 COLL VENOUS BLD VENIPUNCTURE: CPT | Performed by: INTERNAL MEDICINE

## 2021-04-30 PROCEDURE — 99223 PR INITIAL HOSPITAL CARE,LEVL III: ICD-10-PCS | Mod: ,,, | Performed by: PODIATRIST

## 2021-04-30 PROCEDURE — 11000001 HC ACUTE MED/SURG PRIVATE ROOM

## 2021-04-30 PROCEDURE — 80048 BASIC METABOLIC PNL TOTAL CA: CPT | Mod: 91 | Performed by: INTERNAL MEDICINE

## 2021-04-30 PROCEDURE — 80053 COMPREHEN METABOLIC PANEL: CPT | Performed by: STUDENT IN AN ORGANIZED HEALTH CARE EDUCATION/TRAINING PROGRAM

## 2021-04-30 PROCEDURE — 83735 ASSAY OF MAGNESIUM: CPT | Performed by: STUDENT IN AN ORGANIZED HEALTH CARE EDUCATION/TRAINING PROGRAM

## 2021-04-30 PROCEDURE — 99223 1ST HOSP IP/OBS HIGH 75: CPT | Mod: ,,, | Performed by: PODIATRIST

## 2021-04-30 PROCEDURE — 85610 PROTHROMBIN TIME: CPT | Performed by: STUDENT IN AN ORGANIZED HEALTH CARE EDUCATION/TRAINING PROGRAM

## 2021-04-30 PROCEDURE — 85730 THROMBOPLASTIN TIME PARTIAL: CPT | Performed by: STUDENT IN AN ORGANIZED HEALTH CARE EDUCATION/TRAINING PROGRAM

## 2021-04-30 PROCEDURE — 63600175 PHARM REV CODE 636 W HCPCS: Performed by: INTERNAL MEDICINE

## 2021-04-30 RX ORDER — TALC
6 POWDER (GRAM) TOPICAL NIGHTLY PRN
Status: DISCONTINUED | OUTPATIENT
Start: 2021-04-30 | End: 2021-05-02 | Stop reason: HOSPADM

## 2021-04-30 RX ORDER — TIZANIDINE 2 MG/1
2 TABLET ORAL EVERY 8 HOURS
Status: DISCONTINUED | OUTPATIENT
Start: 2021-04-30 | End: 2021-05-02 | Stop reason: HOSPADM

## 2021-04-30 RX ORDER — ACETAMINOPHEN 500 MG
1000 TABLET ORAL EVERY 6 HOURS
Status: DISPENSED | OUTPATIENT
Start: 2021-04-30 | End: 2021-05-02

## 2021-04-30 RX ORDER — POTASSIUM CHLORIDE 20 MEQ/1
40 TABLET, EXTENDED RELEASE ORAL
Status: COMPLETED | OUTPATIENT
Start: 2021-04-30 | End: 2021-05-01

## 2021-04-30 RX ORDER — SODIUM CHLORIDE 0.9 % (FLUSH) 0.9 %
3 SYRINGE (ML) INJECTION
Status: DISCONTINUED | OUTPATIENT
Start: 2021-04-30 | End: 2021-05-02 | Stop reason: HOSPADM

## 2021-04-30 RX ORDER — POTASSIUM CHLORIDE 7.45 MG/ML
10 INJECTION INTRAVENOUS
Status: COMPLETED | OUTPATIENT
Start: 2021-04-30 | End: 2021-04-30

## 2021-04-30 RX ORDER — GABAPENTIN 300 MG/1
300 CAPSULE ORAL 3 TIMES DAILY
Status: DISCONTINUED | OUTPATIENT
Start: 2021-04-30 | End: 2021-05-02 | Stop reason: HOSPADM

## 2021-04-30 RX ORDER — SODIUM CHLORIDE 9 MG/ML
INJECTION, SOLUTION INTRAVENOUS
Status: DISCONTINUED | OUTPATIENT
Start: 2021-04-30 | End: 2021-05-02 | Stop reason: HOSPADM

## 2021-04-30 RX ORDER — ACETAMINOPHEN 500 MG
1000 TABLET ORAL EVERY 8 HOURS
Status: DISCONTINUED | OUTPATIENT
Start: 2021-05-02 | End: 2021-05-02 | Stop reason: HOSPADM

## 2021-04-30 RX ORDER — OXYCODONE HYDROCHLORIDE 10 MG/1
10 TABLET ORAL EVERY 4 HOURS PRN
Status: DISCONTINUED | OUTPATIENT
Start: 2021-04-30 | End: 2021-05-02 | Stop reason: HOSPADM

## 2021-04-30 RX ORDER — HYDROMORPHONE HYDROCHLORIDE 1 MG/ML
0.5 INJECTION, SOLUTION INTRAMUSCULAR; INTRAVENOUS; SUBCUTANEOUS EVERY 4 HOURS PRN
Status: DISCONTINUED | OUTPATIENT
Start: 2021-04-30 | End: 2021-05-02 | Stop reason: HOSPADM

## 2021-04-30 RX ORDER — CELECOXIB 100 MG/1
200 CAPSULE ORAL DAILY
Status: DISCONTINUED | OUTPATIENT
Start: 2021-04-30 | End: 2021-05-02 | Stop reason: HOSPADM

## 2021-04-30 RX ORDER — SODIUM CHLORIDE 0.9 % (FLUSH) 0.9 %
.1-1 SYRINGE (ML) INJECTION
Status: DISCONTINUED | OUTPATIENT
Start: 2021-04-30 | End: 2021-05-02 | Stop reason: HOSPADM

## 2021-04-30 RX ORDER — POTASSIUM CHLORIDE 20 MEQ/1
40 TABLET, EXTENDED RELEASE ORAL ONCE
Status: COMPLETED | OUTPATIENT
Start: 2021-04-30 | End: 2021-04-30

## 2021-04-30 RX ORDER — OXYCODONE HYDROCHLORIDE 5 MG/1
5 TABLET ORAL EVERY 4 HOURS PRN
Status: DISCONTINUED | OUTPATIENT
Start: 2021-04-30 | End: 2021-05-02 | Stop reason: HOSPADM

## 2021-04-30 RX ORDER — INSULIN ASPART 100 [IU]/ML
0-5 INJECTION, SOLUTION INTRAVENOUS; SUBCUTANEOUS
Status: DISCONTINUED | OUTPATIENT
Start: 2021-04-30 | End: 2021-05-01

## 2021-04-30 RX ORDER — DRONABINOL 2.5 MG/1
5 CAPSULE ORAL 2 TIMES DAILY
Status: DISCONTINUED | OUTPATIENT
Start: 2021-04-30 | End: 2021-05-02 | Stop reason: HOSPADM

## 2021-04-30 RX ADMIN — POTASSIUM CHLORIDE 40 MEQ: 1500 TABLET, EXTENDED RELEASE ORAL at 11:04

## 2021-04-30 RX ADMIN — CELECOXIB 200 MG: 100 CAPSULE ORAL at 01:04

## 2021-04-30 RX ADMIN — PANTOPRAZOLE SODIUM 40 MG: 40 TABLET, DELAYED RELEASE ORAL at 08:04

## 2021-04-30 RX ADMIN — DOCUSATE SODIUM 50MG AND SENNOSIDES 8.6MG 1 TABLET: 8.6; 5 TABLET, FILM COATED ORAL at 08:04

## 2021-04-30 RX ADMIN — HYDROMORPHONE HYDROCHLORIDE 1 MG: 1 INJECTION, SOLUTION INTRAMUSCULAR; INTRAVENOUS; SUBCUTANEOUS at 08:04

## 2021-04-30 RX ADMIN — GABAPENTIN 300 MG: 300 CAPSULE ORAL at 03:04

## 2021-04-30 RX ADMIN — SODIUM CHLORIDE: 0.9 INJECTION, SOLUTION INTRAVENOUS at 03:04

## 2021-04-30 RX ADMIN — POTASSIUM CHLORIDE 10 MEQ: 7.46 INJECTION, SOLUTION INTRAVENOUS at 03:04

## 2021-04-30 RX ADMIN — POLYETHYLENE GLYCOL 3350 17 G: 17 POWDER, FOR SOLUTION ORAL at 08:04

## 2021-04-30 RX ADMIN — POTASSIUM CHLORIDE 10 MEQ: 7.46 INJECTION, SOLUTION INTRAVENOUS at 05:04

## 2021-04-30 RX ADMIN — INSULIN ASPART 2 UNITS: 100 INJECTION, SOLUTION INTRAVENOUS; SUBCUTANEOUS at 09:04

## 2021-04-30 RX ADMIN — ACETAMINOPHEN 1000 MG: 500 TABLET, FILM COATED ORAL at 11:04

## 2021-04-30 RX ADMIN — FENOFIBRATE 160 MG: 160 TABLET ORAL at 08:04

## 2021-04-30 RX ADMIN — GABAPENTIN 300 MG: 300 CAPSULE ORAL at 11:04

## 2021-04-30 RX ADMIN — ACETAMINOPHEN 1000 MG: 500 TABLET, FILM COATED ORAL at 06:04

## 2021-04-30 RX ADMIN — ALPRAZOLAM 0.5 MG: 0.5 TABLET ORAL at 01:04

## 2021-04-30 RX ADMIN — POTASSIUM CHLORIDE 10 MEQ: 7.46 INJECTION, SOLUTION INTRAVENOUS at 01:04

## 2021-04-30 RX ADMIN — ATORVASTATIN CALCIUM 40 MG: 20 TABLET, FILM COATED ORAL at 08:04

## 2021-04-30 RX ADMIN — OXYCODONE HYDROCHLORIDE 15 MG: 10 TABLET ORAL at 05:04

## 2021-04-30 RX ADMIN — TIZANIDINE 2 MG: 2 TABLET ORAL at 08:04

## 2021-04-30 RX ADMIN — SODIUM CHLORIDE: 0.9 INJECTION, SOLUTION INTRAVENOUS at 01:04

## 2021-04-30 RX ADMIN — HYDROMORPHONE HYDROCHLORIDE 1 MG: 1 INJECTION, SOLUTION INTRAMUSCULAR; INTRAVENOUS; SUBCUTANEOUS at 12:04

## 2021-04-30 RX ADMIN — TIZANIDINE 2 MG: 2 TABLET ORAL at 02:04

## 2021-04-30 RX ADMIN — ACETAMINOPHEN 1000 MG: 500 TABLET, FILM COATED ORAL at 01:04

## 2021-04-30 RX ADMIN — SODIUM CHLORIDE: 0.9 INJECTION, SOLUTION INTRAVENOUS at 06:04

## 2021-04-30 RX ADMIN — DRONABINOL 5 MG: 2.5 CAPSULE ORAL at 01:04

## 2021-04-30 RX ADMIN — GABAPENTIN 300 MG: 300 CAPSULE ORAL at 05:04

## 2021-04-30 RX ADMIN — FINASTERIDE 5 MG: 5 TABLET, FILM COATED ORAL at 08:04

## 2021-04-30 RX ADMIN — CLOPIDOGREL 75 MG: 75 TABLET, FILM COATED ORAL at 08:04

## 2021-04-30 RX ADMIN — POTASSIUM CHLORIDE 10 MEQ: 7.46 INJECTION, SOLUTION INTRAVENOUS at 04:04

## 2021-04-30 RX ADMIN — GABAPENTIN 300 MG: 300 CAPSULE ORAL at 01:04

## 2021-04-30 RX ADMIN — POTASSIUM CHLORIDE 40 MEQ: 1500 TABLET, EXTENDED RELEASE ORAL at 01:04

## 2021-04-30 RX ADMIN — DRONABINOL 5 MG: 2.5 CAPSULE ORAL at 08:04

## 2021-04-30 RX ADMIN — OXYCODONE HYDROCHLORIDE 10 MG: 10 TABLET ORAL at 08:04

## 2021-04-30 RX ADMIN — INSULIN ASPART 1 UNITS: 100 INJECTION, SOLUTION INTRAVENOUS; SUBCUTANEOUS at 09:04

## 2021-04-30 RX ADMIN — SODIUM CHLORIDE: 0.9 INJECTION, SOLUTION INTRAVENOUS at 05:04

## 2021-04-30 RX ADMIN — POTASSIUM CHLORIDE 10 MEQ: 7.46 INJECTION, SOLUTION INTRAVENOUS at 12:04

## 2021-04-30 RX ADMIN — TAMSULOSIN HYDROCHLORIDE 0.4 MG: 0.4 CAPSULE ORAL at 08:04

## 2021-04-30 RX ADMIN — POTASSIUM CHLORIDE 10 MEQ: 7.46 INJECTION, SOLUTION INTRAVENOUS at 02:04

## 2021-05-01 LAB
ALBUMIN SERPL BCP-MCNC: 3.1 G/DL (ref 3.5–5.2)
ALP SERPL-CCNC: 82 U/L (ref 55–135)
ALT SERPL W/O P-5'-P-CCNC: 13 U/L (ref 10–44)
ANION GAP SERPL CALC-SCNC: 10 MMOL/L (ref 8–16)
ANION GAP SERPL CALC-SCNC: 10 MMOL/L (ref 8–16)
ANION GAP SERPL CALC-SCNC: 11 MMOL/L (ref 8–16)
ANION GAP SERPL CALC-SCNC: 12 MMOL/L (ref 8–16)
ANION GAP SERPL CALC-SCNC: 8 MMOL/L (ref 8–16)
ANION GAP SERPL CALC-SCNC: 8 MMOL/L (ref 8–16)
ANION GAP SERPL CALC-SCNC: 9 MMOL/L (ref 8–16)
AST SERPL-CCNC: 19 U/L (ref 10–40)
BASOPHILS # BLD AUTO: 0.05 K/UL (ref 0–0.2)
BASOPHILS NFR BLD: 0.6 % (ref 0–1.9)
BILIRUB SERPL-MCNC: 0.4 MG/DL (ref 0.1–1)
BUN SERPL-MCNC: 35 MG/DL (ref 6–20)
BUN SERPL-MCNC: 37 MG/DL (ref 6–20)
BUN SERPL-MCNC: 38 MG/DL (ref 6–20)
BUN SERPL-MCNC: 38 MG/DL (ref 6–20)
BUN SERPL-MCNC: 39 MG/DL (ref 6–20)
BUN SERPL-MCNC: 39 MG/DL (ref 6–20)
BUN SERPL-MCNC: 46 MG/DL (ref 6–20)
CALCIUM SERPL-MCNC: 10 MG/DL (ref 8.7–10.5)
CALCIUM SERPL-MCNC: 10 MG/DL (ref 8.7–10.5)
CALCIUM SERPL-MCNC: 10.1 MG/DL (ref 8.7–10.5)
CALCIUM SERPL-MCNC: 8.9 MG/DL (ref 8.7–10.5)
CALCIUM SERPL-MCNC: 9.4 MG/DL (ref 8.7–10.5)
CALCIUM SERPL-MCNC: 9.9 MG/DL (ref 8.7–10.5)
CALCIUM SERPL-MCNC: 9.9 MG/DL (ref 8.7–10.5)
CHLORIDE SERPL-SCNC: 100 MMOL/L (ref 95–110)
CHLORIDE SERPL-SCNC: 98 MMOL/L (ref 95–110)
CHLORIDE SERPL-SCNC: 99 MMOL/L (ref 95–110)
CHLORIDE SERPL-SCNC: 99 MMOL/L (ref 95–110)
CO2 SERPL-SCNC: 28 MMOL/L (ref 23–29)
CO2 SERPL-SCNC: 29 MMOL/L (ref 23–29)
CO2 SERPL-SCNC: 29 MMOL/L (ref 23–29)
CO2 SERPL-SCNC: 30 MMOL/L (ref 23–29)
CO2 SERPL-SCNC: 32 MMOL/L (ref 23–29)
CREAT SERPL-MCNC: 1.1 MG/DL (ref 0.5–1.4)
CREAT SERPL-MCNC: 1.2 MG/DL (ref 0.5–1.4)
CREAT SERPL-MCNC: 1.3 MG/DL (ref 0.5–1.4)
CREAT SERPL-MCNC: 1.4 MG/DL (ref 0.5–1.4)
DIFFERENTIAL METHOD: ABNORMAL
EOSINOPHIL # BLD AUTO: 0.3 K/UL (ref 0–0.5)
EOSINOPHIL NFR BLD: 3.2 % (ref 0–8)
ERYTHROCYTE [DISTWIDTH] IN BLOOD BY AUTOMATED COUNT: 17.4 % (ref 11.5–14.5)
EST. GFR  (AFRICAN AMERICAN): >60 ML/MIN/1.73 M^2
EST. GFR  (NON AFRICAN AMERICAN): 55 ML/MIN/1.73 M^2
EST. GFR  (NON AFRICAN AMERICAN): >60 ML/MIN/1.73 M^2
GLUCOSE SERPL-MCNC: 212 MG/DL (ref 70–110)
GLUCOSE SERPL-MCNC: 212 MG/DL (ref 70–110)
GLUCOSE SERPL-MCNC: 216 MG/DL (ref 70–110)
GLUCOSE SERPL-MCNC: 216 MG/DL (ref 70–110)
GLUCOSE SERPL-MCNC: 245 MG/DL (ref 70–110)
GLUCOSE SERPL-MCNC: 303 MG/DL (ref 70–110)
GLUCOSE SERPL-MCNC: 324 MG/DL (ref 70–110)
HCT VFR BLD AUTO: 29 % (ref 40–54)
HGB BLD-MCNC: 8.4 G/DL (ref 14–18)
IMM GRANULOCYTES # BLD AUTO: 0.02 K/UL (ref 0–0.04)
IMM GRANULOCYTES NFR BLD AUTO: 0.3 % (ref 0–0.5)
LYMPHOCYTES # BLD AUTO: 1.1 K/UL (ref 1–4.8)
LYMPHOCYTES NFR BLD: 14.3 % (ref 18–48)
MAGNESIUM SERPL-MCNC: 2.1 MG/DL (ref 1.6–2.6)
MCH RBC QN AUTO: 22.5 PG (ref 27–31)
MCHC RBC AUTO-ENTMCNC: 29 G/DL (ref 32–36)
MCV RBC AUTO: 78 FL (ref 82–98)
MONOCYTES # BLD AUTO: 0.5 K/UL (ref 0.3–1)
MONOCYTES NFR BLD: 6.7 % (ref 4–15)
NEUTROPHILS # BLD AUTO: 5.9 K/UL (ref 1.8–7.7)
NEUTROPHILS NFR BLD: 74.9 % (ref 38–73)
NRBC BLD-RTO: 0 /100 WBC
PHOSPHATE SERPL-MCNC: 2.4 MG/DL (ref 2.7–4.5)
PLATELET # BLD AUTO: 322 K/UL (ref 150–450)
PMV BLD AUTO: 9.8 FL (ref 9.2–12.9)
POCT GLUCOSE: 248 MG/DL (ref 70–110)
POCT GLUCOSE: 293 MG/DL (ref 70–110)
POTASSIUM SERPL-SCNC: 2.9 MMOL/L (ref 3.5–5.1)
POTASSIUM SERPL-SCNC: 3.2 MMOL/L (ref 3.5–5.1)
POTASSIUM SERPL-SCNC: 3.3 MMOL/L (ref 3.5–5.1)
POTASSIUM SERPL-SCNC: 3.8 MMOL/L (ref 3.5–5.1)
POTASSIUM SERPL-SCNC: 4.2 MMOL/L (ref 3.5–5.1)
PROT SERPL-MCNC: 7.9 G/DL (ref 6–8.4)
RBC # BLD AUTO: 3.74 M/UL (ref 4.6–6.2)
SODIUM SERPL-SCNC: 136 MMOL/L (ref 136–145)
SODIUM SERPL-SCNC: 138 MMOL/L (ref 136–145)
SODIUM SERPL-SCNC: 139 MMOL/L (ref 136–145)
SODIUM SERPL-SCNC: 139 MMOL/L (ref 136–145)
WBC # BLD AUTO: 7.89 K/UL (ref 3.9–12.7)

## 2021-05-01 PROCEDURE — 25000003 PHARM REV CODE 250: Performed by: STUDENT IN AN ORGANIZED HEALTH CARE EDUCATION/TRAINING PROGRAM

## 2021-05-01 PROCEDURE — 99231 SBSQ HOSP IP/OBS SF/LOW 25: CPT | Mod: ,,, | Performed by: ANESTHESIOLOGY

## 2021-05-01 PROCEDURE — 11000001 HC ACUTE MED/SURG PRIVATE ROOM

## 2021-05-01 PROCEDURE — 63600175 PHARM REV CODE 636 W HCPCS: Performed by: STUDENT IN AN ORGANIZED HEALTH CARE EDUCATION/TRAINING PROGRAM

## 2021-05-01 PROCEDURE — 80053 COMPREHEN METABOLIC PANEL: CPT | Performed by: STUDENT IN AN ORGANIZED HEALTH CARE EDUCATION/TRAINING PROGRAM

## 2021-05-01 PROCEDURE — 99233 PR SUBSEQUENT HOSPITAL CARE,LEVL III: ICD-10-PCS | Mod: ,,, | Performed by: INTERNAL MEDICINE

## 2021-05-01 PROCEDURE — 84100 ASSAY OF PHOSPHORUS: CPT | Performed by: STUDENT IN AN ORGANIZED HEALTH CARE EDUCATION/TRAINING PROGRAM

## 2021-05-01 PROCEDURE — 80048 BASIC METABOLIC PNL TOTAL CA: CPT | Mod: 91 | Performed by: INTERNAL MEDICINE

## 2021-05-01 PROCEDURE — 36415 COLL VENOUS BLD VENIPUNCTURE: CPT | Performed by: INTERNAL MEDICINE

## 2021-05-01 PROCEDURE — 99233 SBSQ HOSP IP/OBS HIGH 50: CPT | Mod: ,,, | Performed by: INTERNAL MEDICINE

## 2021-05-01 PROCEDURE — 99231 PR SUBSEQUENT HOSPITAL CARE,LEVL I: ICD-10-PCS | Mod: ,,, | Performed by: ANESTHESIOLOGY

## 2021-05-01 PROCEDURE — 25000242 PHARM REV CODE 250 ALT 637 W/ HCPCS: Performed by: STUDENT IN AN ORGANIZED HEALTH CARE EDUCATION/TRAINING PROGRAM

## 2021-05-01 PROCEDURE — 99223 1ST HOSP IP/OBS HIGH 75: CPT | Mod: ,,, | Performed by: SURGERY

## 2021-05-01 PROCEDURE — 99223 PR INITIAL HOSPITAL CARE,LEVL III: ICD-10-PCS | Mod: ,,, | Performed by: SURGERY

## 2021-05-01 PROCEDURE — 83735 ASSAY OF MAGNESIUM: CPT | Performed by: STUDENT IN AN ORGANIZED HEALTH CARE EDUCATION/TRAINING PROGRAM

## 2021-05-01 PROCEDURE — 85025 COMPLETE CBC W/AUTO DIFF WBC: CPT | Performed by: STUDENT IN AN ORGANIZED HEALTH CARE EDUCATION/TRAINING PROGRAM

## 2021-05-01 PROCEDURE — 80048 BASIC METABOLIC PNL TOTAL CA: CPT | Performed by: INTERNAL MEDICINE

## 2021-05-01 RX ORDER — POTASSIUM CHLORIDE 7.45 MG/ML
10 INJECTION INTRAVENOUS
Status: DISCONTINUED | OUTPATIENT
Start: 2021-05-01 | End: 2021-05-01

## 2021-05-01 RX ORDER — GLUCAGON 1 MG
1 KIT INJECTION
Status: DISCONTINUED | OUTPATIENT
Start: 2021-05-01 | End: 2021-05-02 | Stop reason: HOSPADM

## 2021-05-01 RX ORDER — IBUPROFEN 200 MG
16 TABLET ORAL
Status: DISCONTINUED | OUTPATIENT
Start: 2021-05-01 | End: 2021-05-02 | Stop reason: HOSPADM

## 2021-05-01 RX ORDER — POTASSIUM CHLORIDE 20 MEQ/1
40 TABLET, EXTENDED RELEASE ORAL ONCE
Status: COMPLETED | OUTPATIENT
Start: 2021-05-01 | End: 2021-05-01

## 2021-05-01 RX ORDER — INSULIN ASPART 100 [IU]/ML
3 INJECTION, SOLUTION INTRAVENOUS; SUBCUTANEOUS
Status: DISCONTINUED | OUTPATIENT
Start: 2021-05-02 | End: 2021-05-02 | Stop reason: HOSPADM

## 2021-05-01 RX ORDER — ENOXAPARIN SODIUM 100 MG/ML
40 INJECTION SUBCUTANEOUS EVERY 24 HOURS
Status: DISCONTINUED | OUTPATIENT
Start: 2021-05-01 | End: 2021-05-01

## 2021-05-01 RX ORDER — INSULIN ASPART 100 [IU]/ML
0-5 INJECTION, SOLUTION INTRAVENOUS; SUBCUTANEOUS
Status: DISCONTINUED | OUTPATIENT
Start: 2021-05-01 | End: 2021-05-02 | Stop reason: HOSPADM

## 2021-05-01 RX ORDER — IBUPROFEN 200 MG
24 TABLET ORAL
Status: DISCONTINUED | OUTPATIENT
Start: 2021-05-01 | End: 2021-05-02 | Stop reason: HOSPADM

## 2021-05-01 RX ADMIN — OXYCODONE HYDROCHLORIDE 15 MG: 10 TABLET ORAL at 11:05

## 2021-05-01 RX ADMIN — CELECOXIB 200 MG: 100 CAPSULE ORAL at 10:05

## 2021-05-01 RX ADMIN — PANTOPRAZOLE SODIUM 40 MG: 40 TABLET, DELAYED RELEASE ORAL at 10:05

## 2021-05-01 RX ADMIN — OXYCODONE HYDROCHLORIDE 15 MG: 10 TABLET ORAL at 06:05

## 2021-05-01 RX ADMIN — TIZANIDINE 2 MG: 2 TABLET ORAL at 04:05

## 2021-05-01 RX ADMIN — DOCUSATE SODIUM 50MG AND SENNOSIDES 8.6MG 1 TABLET: 8.6; 5 TABLET, FILM COATED ORAL at 10:05

## 2021-05-01 RX ADMIN — GABAPENTIN 300 MG: 300 CAPSULE ORAL at 09:05

## 2021-05-01 RX ADMIN — POTASSIUM CHLORIDE 10 MEQ: 7.46 INJECTION, SOLUTION INTRAVENOUS at 04:05

## 2021-05-01 RX ADMIN — POLYETHYLENE GLYCOL 3350 17 G: 17 POWDER, FOR SOLUTION ORAL at 10:05

## 2021-05-01 RX ADMIN — INSULIN ASPART 3 UNITS: 100 INJECTION, SOLUTION INTRAVENOUS; SUBCUTANEOUS at 09:05

## 2021-05-01 RX ADMIN — FINASTERIDE 5 MG: 5 TABLET, FILM COATED ORAL at 10:05

## 2021-05-01 RX ADMIN — GABAPENTIN 300 MG: 300 CAPSULE ORAL at 04:05

## 2021-05-01 RX ADMIN — GABAPENTIN 300 MG: 300 CAPSULE ORAL at 10:05

## 2021-05-01 RX ADMIN — COLLAGENASE SANTYL: 250 OINTMENT TOPICAL at 10:05

## 2021-05-01 RX ADMIN — OXYCODONE HYDROCHLORIDE 10 MG: 10 TABLET ORAL at 02:05

## 2021-05-01 RX ADMIN — TIZANIDINE 2 MG: 2 TABLET ORAL at 01:05

## 2021-05-01 RX ADMIN — POTASSIUM CHLORIDE 40 MEQ: 1500 TABLET, EXTENDED RELEASE ORAL at 02:05

## 2021-05-01 RX ADMIN — DRONABINOL 5 MG: 2.5 CAPSULE ORAL at 09:05

## 2021-05-01 RX ADMIN — TIZANIDINE 2 MG: 2 TABLET ORAL at 09:05

## 2021-05-01 RX ADMIN — DOCUSATE SODIUM 50MG AND SENNOSIDES 8.6MG 1 TABLET: 8.6; 5 TABLET, FILM COATED ORAL at 09:05

## 2021-05-01 RX ADMIN — DRONABINOL 5 MG: 2.5 CAPSULE ORAL at 10:05

## 2021-05-01 RX ADMIN — POTASSIUM CHLORIDE 10 MEQ: 7.46 INJECTION, SOLUTION INTRAVENOUS at 01:05

## 2021-05-01 RX ADMIN — APIXABAN 5 MG: 5 TABLET, FILM COATED ORAL at 09:05

## 2021-05-01 RX ADMIN — ENOXAPARIN SODIUM 40 MG: 40 INJECTION SUBCUTANEOUS at 06:05

## 2021-05-01 RX ADMIN — POTASSIUM CHLORIDE 40 MEQ: 1500 TABLET, EXTENDED RELEASE ORAL at 01:05

## 2021-05-01 RX ADMIN — FENOFIBRATE 160 MG: 160 TABLET ORAL at 10:05

## 2021-05-01 RX ADMIN — ATORVASTATIN CALCIUM 40 MG: 20 TABLET, FILM COATED ORAL at 10:05

## 2021-05-01 RX ADMIN — ALPRAZOLAM 0.5 MG: 0.5 TABLET ORAL at 08:05

## 2021-05-01 RX ADMIN — ACETAMINOPHEN 1000 MG: 500 TABLET, FILM COATED ORAL at 12:05

## 2021-05-01 RX ADMIN — ACETAMINOPHEN 1000 MG: 500 TABLET, FILM COATED ORAL at 06:05

## 2021-05-01 RX ADMIN — CLOPIDOGREL 75 MG: 75 TABLET, FILM COATED ORAL at 10:05

## 2021-05-01 RX ADMIN — TAMSULOSIN HYDROCHLORIDE 0.4 MG: 0.4 CAPSULE ORAL at 10:05

## 2021-05-02 VITALS
OXYGEN SATURATION: 95 % | HEART RATE: 103 BPM | WEIGHT: 179 LBS | DIASTOLIC BLOOD PRESSURE: 63 MMHG | TEMPERATURE: 98 F | HEIGHT: 64 IN | SYSTOLIC BLOOD PRESSURE: 109 MMHG | RESPIRATION RATE: 18 BRPM | BODY MASS INDEX: 30.56 KG/M2

## 2021-05-02 LAB
ALBUMIN SERPL BCP-MCNC: 2.9 G/DL (ref 3.5–5.2)
ALP SERPL-CCNC: 73 U/L (ref 55–135)
ALT SERPL W/O P-5'-P-CCNC: 14 U/L (ref 10–44)
ANION GAP SERPL CALC-SCNC: 10 MMOL/L (ref 8–16)
ANION GAP SERPL CALC-SCNC: 10 MMOL/L (ref 8–16)
ANION GAP SERPL CALC-SCNC: 7 MMOL/L (ref 8–16)
ANION GAP SERPL CALC-SCNC: 9 MMOL/L (ref 8–16)
AST SERPL-CCNC: 17 U/L (ref 10–40)
BASOPHILS # BLD AUTO: 0.06 K/UL (ref 0–0.2)
BASOPHILS NFR BLD: 1 % (ref 0–1.9)
BILIRUB SERPL-MCNC: 0.3 MG/DL (ref 0.1–1)
BUN SERPL-MCNC: 23 MG/DL (ref 6–20)
BUN SERPL-MCNC: 27 MG/DL (ref 6–20)
BUN SERPL-MCNC: 27 MG/DL (ref 6–20)
BUN SERPL-MCNC: 34 MG/DL (ref 6–20)
CALCIUM SERPL-MCNC: 10 MG/DL (ref 8.7–10.5)
CALCIUM SERPL-MCNC: 9.7 MG/DL (ref 8.7–10.5)
CALCIUM SERPL-MCNC: 9.9 MG/DL (ref 8.7–10.5)
CALCIUM SERPL-MCNC: 9.9 MG/DL (ref 8.7–10.5)
CHLORIDE SERPL-SCNC: 100 MMOL/L (ref 95–110)
CHLORIDE SERPL-SCNC: 101 MMOL/L (ref 95–110)
CHLORIDE SERPL-SCNC: 102 MMOL/L (ref 95–110)
CHLORIDE SERPL-SCNC: 102 MMOL/L (ref 95–110)
CO2 SERPL-SCNC: 25 MMOL/L (ref 23–29)
CO2 SERPL-SCNC: 25 MMOL/L (ref 23–29)
CO2 SERPL-SCNC: 27 MMOL/L (ref 23–29)
CO2 SERPL-SCNC: 29 MMOL/L (ref 23–29)
CREAT SERPL-MCNC: 1.1 MG/DL (ref 0.5–1.4)
CREAT SERPL-MCNC: 1.4 MG/DL (ref 0.5–1.4)
DIFFERENTIAL METHOD: ABNORMAL
EOSINOPHIL # BLD AUTO: 0.3 K/UL (ref 0–0.5)
EOSINOPHIL NFR BLD: 4.2 % (ref 0–8)
ERYTHROCYTE [DISTWIDTH] IN BLOOD BY AUTOMATED COUNT: 17.1 % (ref 11.5–14.5)
EST. GFR  (AFRICAN AMERICAN): >60 ML/MIN/1.73 M^2
EST. GFR  (NON AFRICAN AMERICAN): 55 ML/MIN/1.73 M^2
EST. GFR  (NON AFRICAN AMERICAN): >60 ML/MIN/1.73 M^2
GLUCOSE SERPL-MCNC: 257 MG/DL (ref 70–110)
GLUCOSE SERPL-MCNC: 257 MG/DL (ref 70–110)
GLUCOSE SERPL-MCNC: 263 MG/DL (ref 70–110)
GLUCOSE SERPL-MCNC: 288 MG/DL (ref 70–110)
HCT VFR BLD AUTO: 27.5 % (ref 40–54)
HGB BLD-MCNC: 8.1 G/DL (ref 14–18)
IMM GRANULOCYTES # BLD AUTO: 0.02 K/UL (ref 0–0.04)
IMM GRANULOCYTES NFR BLD AUTO: 0.3 % (ref 0–0.5)
LYMPHOCYTES # BLD AUTO: 1.1 K/UL (ref 1–4.8)
LYMPHOCYTES NFR BLD: 17.1 % (ref 18–48)
MAGNESIUM SERPL-MCNC: 2.1 MG/DL (ref 1.6–2.6)
MCH RBC QN AUTO: 22.5 PG (ref 27–31)
MCHC RBC AUTO-ENTMCNC: 29.5 G/DL (ref 32–36)
MCV RBC AUTO: 76 FL (ref 82–98)
MONOCYTES # BLD AUTO: 0.4 K/UL (ref 0.3–1)
MONOCYTES NFR BLD: 6.8 % (ref 4–15)
NEUTROPHILS # BLD AUTO: 4.4 K/UL (ref 1.8–7.7)
NEUTROPHILS NFR BLD: 70.6 % (ref 38–73)
NRBC BLD-RTO: 0 /100 WBC
PHOSPHATE SERPL-MCNC: 3 MG/DL (ref 2.7–4.5)
PLATELET # BLD AUTO: 292 K/UL (ref 150–450)
PMV BLD AUTO: 9.5 FL (ref 9.2–12.9)
POCT GLUCOSE: 254 MG/DL (ref 70–110)
POCT GLUCOSE: 282 MG/DL (ref 70–110)
POCT GLUCOSE: 310 MG/DL (ref 70–110)
POCT GLUCOSE: 325 MG/DL (ref 70–110)
POCT GLUCOSE: 374 MG/DL (ref 70–110)
POTASSIUM SERPL-SCNC: 3.5 MMOL/L (ref 3.5–5.1)
POTASSIUM SERPL-SCNC: 3.5 MMOL/L (ref 3.5–5.1)
POTASSIUM SERPL-SCNC: 3.7 MMOL/L (ref 3.5–5.1)
POTASSIUM SERPL-SCNC: 3.9 MMOL/L (ref 3.5–5.1)
PROT SERPL-MCNC: 7.5 G/DL (ref 6–8.4)
RBC # BLD AUTO: 3.6 M/UL (ref 4.6–6.2)
SODIUM SERPL-SCNC: 136 MMOL/L (ref 136–145)
SODIUM SERPL-SCNC: 137 MMOL/L (ref 136–145)
WBC # BLD AUTO: 6.19 K/UL (ref 3.9–12.7)

## 2021-05-02 PROCEDURE — C9399 UNCLASSIFIED DRUGS OR BIOLOG: HCPCS | Performed by: STUDENT IN AN ORGANIZED HEALTH CARE EDUCATION/TRAINING PROGRAM

## 2021-05-02 PROCEDURE — 83735 ASSAY OF MAGNESIUM: CPT | Performed by: STUDENT IN AN ORGANIZED HEALTH CARE EDUCATION/TRAINING PROGRAM

## 2021-05-02 PROCEDURE — 25000242 PHARM REV CODE 250 ALT 637 W/ HCPCS: Performed by: STUDENT IN AN ORGANIZED HEALTH CARE EDUCATION/TRAINING PROGRAM

## 2021-05-02 PROCEDURE — 85025 COMPLETE CBC W/AUTO DIFF WBC: CPT | Performed by: STUDENT IN AN ORGANIZED HEALTH CARE EDUCATION/TRAINING PROGRAM

## 2021-05-02 PROCEDURE — 99239 HOSP IP/OBS DSCHRG MGMT >30: CPT | Mod: GC,,, | Performed by: INTERNAL MEDICINE

## 2021-05-02 PROCEDURE — 25000003 PHARM REV CODE 250: Performed by: STUDENT IN AN ORGANIZED HEALTH CARE EDUCATION/TRAINING PROGRAM

## 2021-05-02 PROCEDURE — 63600175 PHARM REV CODE 636 W HCPCS: Performed by: STUDENT IN AN ORGANIZED HEALTH CARE EDUCATION/TRAINING PROGRAM

## 2021-05-02 PROCEDURE — 99239 PR HOSPITAL DISCHARGE DAY,>30 MIN: ICD-10-PCS | Mod: GC,,, | Performed by: INTERNAL MEDICINE

## 2021-05-02 PROCEDURE — 84100 ASSAY OF PHOSPHORUS: CPT | Performed by: STUDENT IN AN ORGANIZED HEALTH CARE EDUCATION/TRAINING PROGRAM

## 2021-05-02 PROCEDURE — 80048 BASIC METABOLIC PNL TOTAL CA: CPT | Performed by: INTERNAL MEDICINE

## 2021-05-02 PROCEDURE — 36415 COLL VENOUS BLD VENIPUNCTURE: CPT | Performed by: INTERNAL MEDICINE

## 2021-05-02 PROCEDURE — 25000003 PHARM REV CODE 250: Performed by: INTERNAL MEDICINE

## 2021-05-02 PROCEDURE — 36415 COLL VENOUS BLD VENIPUNCTURE: CPT | Performed by: STUDENT IN AN ORGANIZED HEALTH CARE EDUCATION/TRAINING PROGRAM

## 2021-05-02 PROCEDURE — 80048 BASIC METABOLIC PNL TOTAL CA: CPT | Mod: 91 | Performed by: INTERNAL MEDICINE

## 2021-05-02 PROCEDURE — 80053 COMPREHEN METABOLIC PANEL: CPT | Performed by: STUDENT IN AN ORGANIZED HEALTH CARE EDUCATION/TRAINING PROGRAM

## 2021-05-02 RX ORDER — POTASSIUM CHLORIDE 20 MEQ/1
40 TABLET, EXTENDED RELEASE ORAL ONCE
Status: COMPLETED | OUTPATIENT
Start: 2021-05-02 | End: 2021-05-02

## 2021-05-02 RX ORDER — DRONABINOL 5 MG/1
5 CAPSULE ORAL 2 TIMES DAILY
Qty: 60 CAPSULE | Refills: 0 | Status: SHIPPED | OUTPATIENT
Start: 2021-05-02 | End: 2021-05-31

## 2021-05-02 RX ORDER — TIZANIDINE 2 MG/1
2 TABLET ORAL EVERY 8 HOURS
Qty: 30 TABLET | Refills: 0 | Status: SHIPPED | OUTPATIENT
Start: 2021-05-02 | End: 2021-05-12

## 2021-05-02 RX ORDER — OXYCODONE HYDROCHLORIDE 10 MG/1
10 TABLET ORAL EVERY 4 HOURS PRN
Refills: 0 | Status: CANCELLED | OUTPATIENT
Start: 2021-05-02

## 2021-05-02 RX ORDER — OXYCODONE HYDROCHLORIDE 15 MG/1
15 TABLET ORAL EVERY 6 HOURS PRN
Qty: 28 TABLET | Refills: 0 | Status: SHIPPED | OUTPATIENT
Start: 2021-05-02 | End: 2021-05-09

## 2021-05-02 RX ORDER — GABAPENTIN 300 MG/1
300 CAPSULE ORAL 3 TIMES DAILY
Qty: 90 CAPSULE | Refills: 11 | Status: ON HOLD | OUTPATIENT
Start: 2021-05-02 | End: 2021-06-17 | Stop reason: SDUPTHER

## 2021-05-02 RX ADMIN — FINASTERIDE 5 MG: 5 TABLET, FILM COATED ORAL at 09:05

## 2021-05-02 RX ADMIN — INSULIN ASPART 3 UNITS: 100 INJECTION, SOLUTION INTRAVENOUS; SUBCUTANEOUS at 09:05

## 2021-05-02 RX ADMIN — DOCUSATE SODIUM 50MG AND SENNOSIDES 8.6MG 1 TABLET: 8.6; 5 TABLET, FILM COATED ORAL at 09:05

## 2021-05-02 RX ADMIN — DRONABINOL 5 MG: 2.5 CAPSULE ORAL at 09:05

## 2021-05-02 RX ADMIN — TAMSULOSIN HYDROCHLORIDE 0.4 MG: 0.4 CAPSULE ORAL at 09:05

## 2021-05-02 RX ADMIN — FENOFIBRATE 160 MG: 160 TABLET ORAL at 09:05

## 2021-05-02 RX ADMIN — APIXABAN 5 MG: 5 TABLET, FILM COATED ORAL at 09:05

## 2021-05-02 RX ADMIN — PANTOPRAZOLE SODIUM 40 MG: 40 TABLET, DELAYED RELEASE ORAL at 09:05

## 2021-05-02 RX ADMIN — OXYCODONE HYDROCHLORIDE 10 MG: 10 TABLET ORAL at 06:05

## 2021-05-02 RX ADMIN — POLYETHYLENE GLYCOL 3350 17 G: 17 POWDER, FOR SOLUTION ORAL at 09:05

## 2021-05-02 RX ADMIN — INSULIN ASPART 1 UNITS: 100 INJECTION, SOLUTION INTRAVENOUS; SUBCUTANEOUS at 04:05

## 2021-05-02 RX ADMIN — OXYCODONE HYDROCHLORIDE 10 MG: 10 TABLET ORAL at 12:05

## 2021-05-02 RX ADMIN — INSULIN ASPART 4 UNITS: 100 INJECTION, SOLUTION INTRAVENOUS; SUBCUTANEOUS at 09:05

## 2021-05-02 RX ADMIN — CLOPIDOGREL 75 MG: 75 TABLET, FILM COATED ORAL at 09:05

## 2021-05-02 RX ADMIN — INSULIN ASPART 2 UNITS: 100 INJECTION, SOLUTION INTRAVENOUS; SUBCUTANEOUS at 12:05

## 2021-05-02 RX ADMIN — ACETAMINOPHEN 1000 MG: 500 TABLET, FILM COATED ORAL at 12:05

## 2021-05-02 RX ADMIN — GABAPENTIN 300 MG: 300 CAPSULE ORAL at 09:05

## 2021-05-02 RX ADMIN — TIZANIDINE 2 MG: 2 TABLET ORAL at 06:05

## 2021-05-02 RX ADMIN — OXYCODONE HYDROCHLORIDE 15 MG: 10 TABLET ORAL at 09:05

## 2021-05-02 RX ADMIN — POTASSIUM CHLORIDE 40 MEQ: 1500 TABLET, EXTENDED RELEASE ORAL at 09:05

## 2021-05-02 RX ADMIN — ATORVASTATIN CALCIUM 40 MG: 20 TABLET, FILM COATED ORAL at 09:05

## 2021-05-02 RX ADMIN — CELECOXIB 200 MG: 100 CAPSULE ORAL at 09:05

## 2021-05-02 RX ADMIN — INSULIN DETEMIR 16 UNITS: 100 INJECTION, SOLUTION SUBCUTANEOUS at 09:05

## 2021-05-02 RX ADMIN — COLLAGENASE SANTYL: 250 OINTMENT TOPICAL at 09:05

## 2021-05-02 RX ADMIN — OXYCODONE HYDROCHLORIDE 15 MG: 10 TABLET ORAL at 04:05

## 2021-05-03 ENCOUNTER — TELEPHONE (OUTPATIENT)
Dept: PHARMACY | Facility: CLINIC | Age: 56
End: 2021-05-03

## 2021-05-03 ENCOUNTER — DOCUMENT SCAN (OUTPATIENT)
Dept: HOME HEALTH SERVICES | Facility: HOSPITAL | Age: 56
End: 2021-05-03
Payer: MEDICARE

## 2021-05-04 ENCOUNTER — PATIENT OUTREACH (OUTPATIENT)
Dept: ADMINISTRATIVE | Facility: CLINIC | Age: 56
End: 2021-05-04

## 2021-05-04 ENCOUNTER — NURSE TRIAGE (OUTPATIENT)
Dept: ADMINISTRATIVE | Facility: CLINIC | Age: 56
End: 2021-05-04

## 2021-05-04 ENCOUNTER — TELEPHONE (OUTPATIENT)
Dept: FAMILY MEDICINE | Facility: CLINIC | Age: 56
End: 2021-05-04

## 2021-05-04 ENCOUNTER — PATIENT OUTREACH (OUTPATIENT)
Dept: ADMINISTRATIVE | Facility: OTHER | Age: 56
End: 2021-05-04

## 2021-05-04 DIAGNOSIS — E11.65 TYPE 2 DIABETES MELLITUS WITH HYPERGLYCEMIA, WITH LONG-TERM CURRENT USE OF INSULIN: Primary | ICD-10-CM

## 2021-05-04 DIAGNOSIS — Z79.4 TYPE 2 DIABETES MELLITUS WITH HYPERGLYCEMIA, WITH LONG-TERM CURRENT USE OF INSULIN: Primary | ICD-10-CM

## 2021-05-04 PROCEDURE — G0179 PR HOME HEALTH MD RECERTIFICATION: ICD-10-PCS | Mod: ,,, | Performed by: FAMILY MEDICINE

## 2021-05-04 PROCEDURE — G0179 MD RECERTIFICATION HHA PT: HCPCS | Mod: ,,, | Performed by: FAMILY MEDICINE

## 2021-05-06 ENCOUNTER — OFFICE VISIT (OUTPATIENT)
Dept: FAMILY MEDICINE | Facility: CLINIC | Age: 56
End: 2021-05-06
Payer: MEDICARE

## 2021-05-06 VITALS
TEMPERATURE: 98 F | HEART RATE: 85 BPM | DIASTOLIC BLOOD PRESSURE: 56 MMHG | SYSTOLIC BLOOD PRESSURE: 120 MMHG | OXYGEN SATURATION: 97 %

## 2021-05-06 DIAGNOSIS — Z79.4 TYPE 2 DIABETES MELLITUS WITH HYPERGLYCEMIA, WITH LONG-TERM CURRENT USE OF INSULIN: ICD-10-CM

## 2021-05-06 DIAGNOSIS — I73.9 PERIPHERAL ARTERIAL DISEASE: ICD-10-CM

## 2021-05-06 DIAGNOSIS — M79.604 CHRONIC PAIN OF RIGHT LOWER EXTREMITY: Primary | ICD-10-CM

## 2021-05-06 DIAGNOSIS — G89.29 CHRONIC PAIN OF RIGHT LOWER EXTREMITY: Primary | ICD-10-CM

## 2021-05-06 DIAGNOSIS — E11.65 TYPE 2 DIABETES MELLITUS WITH HYPERGLYCEMIA, WITH LONG-TERM CURRENT USE OF INSULIN: ICD-10-CM

## 2021-05-06 PROCEDURE — 99215 OFFICE O/P EST HI 40 MIN: CPT | Mod: PBBFAC,PN | Performed by: INTERNAL MEDICINE

## 2021-05-06 PROCEDURE — 99214 OFFICE O/P EST MOD 30 MIN: CPT | Mod: S$PBB,,, | Performed by: INTERNAL MEDICINE

## 2021-05-06 PROCEDURE — 99999 PR PBB SHADOW E&M-EST. PATIENT-LVL V: CPT | Mod: PBBFAC,,, | Performed by: INTERNAL MEDICINE

## 2021-05-06 PROCEDURE — 99214 PR OFFICE/OUTPT VISIT, EST, LEVL IV, 30-39 MIN: ICD-10-PCS | Mod: S$PBB,,, | Performed by: INTERNAL MEDICINE

## 2021-05-06 PROCEDURE — 99999 PR PBB SHADOW E&M-EST. PATIENT-LVL V: ICD-10-PCS | Mod: PBBFAC,,, | Performed by: INTERNAL MEDICINE

## 2021-05-07 ENCOUNTER — DOCUMENT SCAN (OUTPATIENT)
Dept: HOME HEALTH SERVICES | Facility: HOSPITAL | Age: 56
End: 2021-05-07
Payer: MEDICARE

## 2021-05-07 ENCOUNTER — TELEPHONE (OUTPATIENT)
Dept: DIABETES | Facility: CLINIC | Age: 56
End: 2021-05-07

## 2021-05-07 ENCOUNTER — TELEPHONE (OUTPATIENT)
Dept: ENDOCRINOLOGY | Facility: CLINIC | Age: 56
End: 2021-05-07

## 2021-05-07 ENCOUNTER — PATIENT OUTREACH (OUTPATIENT)
Dept: HOME HEALTH SERVICES | Facility: HOSPITAL | Age: 56
End: 2021-05-07

## 2021-05-07 DIAGNOSIS — E11.65 TYPE 2 DIABETES MELLITUS WITH HYPERGLYCEMIA, WITH LONG-TERM CURRENT USE OF INSULIN: Primary | ICD-10-CM

## 2021-05-07 DIAGNOSIS — Z79.4 TYPE 2 DIABETES MELLITUS WITH HYPERGLYCEMIA, WITH LONG-TERM CURRENT USE OF INSULIN: Primary | ICD-10-CM

## 2021-05-07 DIAGNOSIS — E11.9 TYPE 2 DIABETES MELLITUS WITHOUT COMPLICATION, WITHOUT LONG-TERM CURRENT USE OF INSULIN: ICD-10-CM

## 2021-05-10 ENCOUNTER — DOCUMENT SCAN (OUTPATIENT)
Dept: HOME HEALTH SERVICES | Facility: HOSPITAL | Age: 56
End: 2021-05-10
Payer: MEDICARE

## 2021-05-10 ENCOUNTER — TELEPHONE (OUTPATIENT)
Dept: FAMILY MEDICINE | Facility: CLINIC | Age: 56
End: 2021-05-10

## 2021-05-10 ENCOUNTER — OFFICE VISIT (OUTPATIENT)
Dept: PODIATRY | Facility: CLINIC | Age: 56
End: 2021-05-10
Payer: MEDICARE

## 2021-05-10 DIAGNOSIS — I83.009 VENOUS STASIS ULCER WITH EDEMA OF LOWER LEG: Primary | ICD-10-CM

## 2021-05-10 DIAGNOSIS — R60.9 VENOUS STASIS ULCER WITH EDEMA OF LOWER LEG: Primary | ICD-10-CM

## 2021-05-10 DIAGNOSIS — I83.899 VENOUS STASIS ULCER WITH EDEMA OF LOWER LEG: Primary | ICD-10-CM

## 2021-05-10 DIAGNOSIS — L97.909 VENOUS STASIS ULCER WITH EDEMA OF LOWER LEG: Primary | ICD-10-CM

## 2021-05-10 DIAGNOSIS — L81.9 DISCOLORATION OF SKIN OF TOE: ICD-10-CM

## 2021-05-10 DIAGNOSIS — B87.9 MAGGOT INFESTATION: ICD-10-CM

## 2021-05-10 DIAGNOSIS — I73.9 PVD (PERIPHERAL VASCULAR DISEASE): ICD-10-CM

## 2021-05-10 DIAGNOSIS — I73.9 PAD (PERIPHERAL ARTERY DISEASE): ICD-10-CM

## 2021-05-10 PROCEDURE — 99214 PR OFFICE/OUTPT VISIT, EST, LEVL IV, 30-39 MIN: ICD-10-PCS | Mod: S$PBB,,, | Performed by: PODIATRIST

## 2021-05-10 PROCEDURE — 99214 OFFICE O/P EST MOD 30 MIN: CPT | Mod: S$PBB,,, | Performed by: PODIATRIST

## 2021-05-10 PROCEDURE — 99999 PR PBB SHADOW E&M-EST. PATIENT-LVL II: CPT | Mod: PBBFAC,,, | Performed by: PODIATRIST

## 2021-05-10 PROCEDURE — 99212 OFFICE O/P EST SF 10 MIN: CPT | Mod: PBBFAC,PN | Performed by: PODIATRIST

## 2021-05-10 PROCEDURE — 99999 PR PBB SHADOW E&M-EST. PATIENT-LVL II: ICD-10-PCS | Mod: PBBFAC,,, | Performed by: PODIATRIST

## 2021-05-10 RX ORDER — INSULIN ASPART 100 [IU]/ML
32 INJECTION, SOLUTION INTRAVENOUS; SUBCUTANEOUS
Qty: 30 ML | Refills: 11 | Status: SHIPPED | OUTPATIENT
Start: 2021-05-10 | End: 2021-06-11 | Stop reason: SDUPTHER

## 2021-05-10 RX ORDER — INSULIN GLARGINE 100 [IU]/ML
36 INJECTION, SOLUTION SUBCUTANEOUS 2 TIMES DAILY
Qty: 21 ML | Refills: 11 | Status: SHIPPED | OUTPATIENT
Start: 2021-05-10 | End: 2021-06-11

## 2021-05-12 ENCOUNTER — DOCUMENT SCAN (OUTPATIENT)
Dept: HOME HEALTH SERVICES | Facility: HOSPITAL | Age: 56
End: 2021-05-12
Payer: MEDICARE

## 2021-05-13 ENCOUNTER — OFFICE VISIT (OUTPATIENT)
Dept: UROLOGY | Facility: CLINIC | Age: 56
End: 2021-05-13
Payer: MEDICARE

## 2021-05-13 VITALS
TEMPERATURE: 98 F | DIASTOLIC BLOOD PRESSURE: 63 MMHG | WEIGHT: 179 LBS | HEART RATE: 70 BPM | BODY MASS INDEX: 30.56 KG/M2 | SYSTOLIC BLOOD PRESSURE: 127 MMHG | HEIGHT: 64 IN | OXYGEN SATURATION: 95 %

## 2021-05-13 DIAGNOSIS — R35.1 BENIGN PROSTATIC HYPERPLASIA WITH NOCTURIA: Primary | ICD-10-CM

## 2021-05-13 DIAGNOSIS — R30.0 DYSURIA: ICD-10-CM

## 2021-05-13 DIAGNOSIS — R35.0 URINARY FREQUENCY: ICD-10-CM

## 2021-05-13 DIAGNOSIS — R32 ENURESIS: ICD-10-CM

## 2021-05-13 DIAGNOSIS — R39.15 URINARY URGENCY: ICD-10-CM

## 2021-05-13 DIAGNOSIS — N40.1 BENIGN PROSTATIC HYPERPLASIA WITH NOCTURIA: Primary | ICD-10-CM

## 2021-05-13 DIAGNOSIS — N39.41 URGE URINARY INCONTINENCE: ICD-10-CM

## 2021-05-13 LAB
BILIRUB SERPL-MCNC: NORMAL MG/DL
BLOOD URINE, POC: NORMAL
CLARITY, POC UA: CLEAR
COLOR, POC UA: YELLOW
GLUCOSE UR QL STRIP: 250
KETONES UR QL STRIP: NORMAL
LEUKOCYTE ESTERASE URINE, POC: NORMAL
NITRITE, POC UA: NORMAL
PH, POC UA: 7
PROTEIN, POC: NORMAL
SPECIFIC GRAVITY, POC UA: 1.01
UROBILINOGEN, POC UA: 0.2

## 2021-05-13 PROCEDURE — 99204 PR OFFICE/OUTPT VISIT, NEW, LEVL IV, 45-59 MIN: ICD-10-PCS | Mod: S$PBB,,, | Performed by: NURSE PRACTITIONER

## 2021-05-13 PROCEDURE — 87077 CULTURE AEROBIC IDENTIFY: CPT | Mod: 59 | Performed by: NURSE PRACTITIONER

## 2021-05-13 PROCEDURE — 99999 PR PBB SHADOW E&M-EST. PATIENT-LVL V: CPT | Mod: PBBFAC,,, | Performed by: NURSE PRACTITIONER

## 2021-05-13 PROCEDURE — 87086 URINE CULTURE/COLONY COUNT: CPT | Performed by: NURSE PRACTITIONER

## 2021-05-13 PROCEDURE — 87186 SC STD MICRODIL/AGAR DIL: CPT | Mod: 59 | Performed by: NURSE PRACTITIONER

## 2021-05-13 PROCEDURE — 99999 PR PBB SHADOW E&M-EST. PATIENT-LVL V: ICD-10-PCS | Mod: PBBFAC,,, | Performed by: NURSE PRACTITIONER

## 2021-05-13 PROCEDURE — 99204 OFFICE O/P NEW MOD 45 MIN: CPT | Mod: S$PBB,,, | Performed by: NURSE PRACTITIONER

## 2021-05-13 PROCEDURE — 87088 URINE BACTERIA CULTURE: CPT | Performed by: NURSE PRACTITIONER

## 2021-05-13 PROCEDURE — 81002 URINALYSIS NONAUTO W/O SCOPE: CPT | Mod: PBBFAC,PO | Performed by: NURSE PRACTITIONER

## 2021-05-13 PROCEDURE — 99215 OFFICE O/P EST HI 40 MIN: CPT | Mod: PBBFAC,PO | Performed by: NURSE PRACTITIONER

## 2021-05-13 RX ORDER — CARVEDILOL 12.5 MG/1
12.5 TABLET ORAL 2 TIMES DAILY
Status: ON HOLD | COMMUNITY
Start: 2021-02-09 | End: 2021-06-17 | Stop reason: SDUPTHER

## 2021-05-13 RX ORDER — CITALOPRAM 20 MG/1
20 TABLET, FILM COATED ORAL DAILY
COMMUNITY
Start: 2021-04-19 | End: 2022-02-15

## 2021-05-13 RX ORDER — METFORMIN HYDROCHLORIDE 850 MG/1
850 TABLET ORAL 2 TIMES DAILY
Status: ON HOLD | COMMUNITY
Start: 2021-03-20 | End: 2021-06-17 | Stop reason: HOSPADM

## 2021-05-13 RX ORDER — SPIRONOLACTONE 25 MG/1
25 TABLET ORAL DAILY
COMMUNITY
Start: 2021-02-24 | End: 2021-11-29 | Stop reason: SDUPTHER

## 2021-05-13 RX ORDER — METOLAZONE 5 MG/1
5 TABLET ORAL 2 TIMES DAILY
Status: ON HOLD | COMMUNITY
Start: 2021-04-13 | End: 2021-06-17 | Stop reason: HOSPADM

## 2021-05-13 RX ORDER — HYDROMORPHONE HYDROCHLORIDE 2 MG/1
2 TABLET ORAL 3 TIMES DAILY PRN
Status: ON HOLD | COMMUNITY
Start: 2021-03-18 | End: 2021-06-03 | Stop reason: SDUPTHER

## 2021-05-13 RX ORDER — POTASSIUM CHLORIDE 1500 MG/1
20 TABLET, EXTENDED RELEASE ORAL DAILY
Status: ON HOLD | COMMUNITY
Start: 2021-04-15 | End: 2021-06-03 | Stop reason: HOSPADM

## 2021-05-13 RX ORDER — SOLIFENACIN SUCCINATE 5 MG/1
5 TABLET, FILM COATED ORAL DAILY
Qty: 30 TABLET | Refills: 11 | Status: SHIPPED | OUTPATIENT
Start: 2021-05-13 | End: 2021-05-13

## 2021-05-14 ENCOUNTER — TELEPHONE (OUTPATIENT)
Dept: FAMILY MEDICINE | Facility: CLINIC | Age: 56
End: 2021-05-14

## 2021-05-14 ENCOUNTER — DOCUMENT SCAN (OUTPATIENT)
Dept: HOME HEALTH SERVICES | Facility: HOSPITAL | Age: 56
End: 2021-05-14
Payer: MEDICARE

## 2021-05-14 ENCOUNTER — TELEPHONE (OUTPATIENT)
Dept: UROLOGY | Facility: CLINIC | Age: 56
End: 2021-05-14

## 2021-05-14 ENCOUNTER — PATIENT OUTREACH (OUTPATIENT)
Dept: HOME HEALTH SERVICES | Facility: HOSPITAL | Age: 56
End: 2021-05-14

## 2021-05-15 LAB
BACTERIA UR CULT: ABNORMAL
BACTERIA UR CULT: ABNORMAL

## 2021-05-17 ENCOUNTER — DOCUMENT SCAN (OUTPATIENT)
Dept: HOME HEALTH SERVICES | Facility: HOSPITAL | Age: 56
End: 2021-05-17
Payer: MEDICARE

## 2021-05-17 PROBLEM — Z00.00 GENERAL MEDICAL EXAM: Status: RESOLVED | Noted: 2021-02-11 | Resolved: 2021-05-17

## 2021-05-18 ENCOUNTER — EXTERNAL HOME HEALTH (OUTPATIENT)
Dept: HOME HEALTH SERVICES | Facility: HOSPITAL | Age: 56
End: 2021-05-18
Payer: MEDICARE

## 2021-05-18 ENCOUNTER — TELEPHONE (OUTPATIENT)
Dept: FAMILY MEDICINE | Facility: CLINIC | Age: 56
End: 2021-05-18

## 2021-05-18 ENCOUNTER — TELEPHONE (OUTPATIENT)
Dept: UROLOGY | Facility: CLINIC | Age: 56
End: 2021-05-18

## 2021-05-18 DIAGNOSIS — N30.00 ACUTE CYSTITIS WITHOUT HEMATURIA: Primary | ICD-10-CM

## 2021-05-18 RX ORDER — CIPROFLOXACIN 500 MG/1
500 TABLET ORAL EVERY 12 HOURS
Qty: 28 TABLET | Refills: 0 | Status: ON HOLD | OUTPATIENT
Start: 2021-05-18 | End: 2021-06-03 | Stop reason: HOSPADM

## 2021-05-19 ENCOUNTER — TELEPHONE (OUTPATIENT)
Dept: UROLOGY | Facility: CLINIC | Age: 56
End: 2021-05-19

## 2021-05-25 ENCOUNTER — CLINICAL SUPPORT (OUTPATIENT)
Dept: ENDOCRINOLOGY | Facility: CLINIC | Age: 56
End: 2021-05-25
Payer: MEDICARE

## 2021-05-25 ENCOUNTER — TELEPHONE (OUTPATIENT)
Dept: UROLOGY | Facility: CLINIC | Age: 56
End: 2021-05-25

## 2021-05-25 DIAGNOSIS — E11.9 TYPE 2 DIABETES MELLITUS WITHOUT COMPLICATION, WITHOUT LONG-TERM CURRENT USE OF INSULIN: ICD-10-CM

## 2021-05-27 ENCOUNTER — OFFICE VISIT (OUTPATIENT)
Dept: WOUND CARE | Facility: HOSPITAL | Age: 56
End: 2021-05-27
Attending: SURGERY
Payer: MEDICARE

## 2021-05-27 VITALS
TEMPERATURE: 98 F | RESPIRATION RATE: 20 BRPM | SYSTOLIC BLOOD PRESSURE: 154 MMHG | DIASTOLIC BLOOD PRESSURE: 92 MMHG | HEART RATE: 88 BPM

## 2021-05-27 DIAGNOSIS — L89.313 PRESSURE ULCER OF RIGHT BUTTOCK, STAGE 3: ICD-10-CM

## 2021-05-27 DIAGNOSIS — I83.899 VENOUS STASIS ULCER WITH EDEMA OF LOWER LEG: ICD-10-CM

## 2021-05-27 DIAGNOSIS — L97.919 CHRONIC VENOUS HYPERTENSION W ULCER OF R LOW EXTREM: ICD-10-CM

## 2021-05-27 DIAGNOSIS — I83.009 VENOUS STASIS ULCER WITH EDEMA OF LOWER LEG: ICD-10-CM

## 2021-05-27 DIAGNOSIS — L97.519 ULCER OF GREAT TOE, RIGHT, WITH UNSPECIFIED SEVERITY: ICD-10-CM

## 2021-05-27 DIAGNOSIS — I73.9 PAD (PERIPHERAL ARTERY DISEASE): ICD-10-CM

## 2021-05-27 DIAGNOSIS — R60.9 VENOUS STASIS ULCER WITH EDEMA OF LOWER LEG: ICD-10-CM

## 2021-05-27 DIAGNOSIS — L89.323 PRESSURE ULCER OF LEFT BUTTOCK, STAGE 3: ICD-10-CM

## 2021-05-27 DIAGNOSIS — R60.0 EDEMA OF RIGHT LOWER EXTREMITY: ICD-10-CM

## 2021-05-27 DIAGNOSIS — I87.311 CHRONIC VENOUS HYPERTENSION W ULCER OF R LOW EXTREM: ICD-10-CM

## 2021-05-27 DIAGNOSIS — L97.909 VENOUS STASIS ULCER WITH EDEMA OF LOWER LEG: ICD-10-CM

## 2021-05-27 DIAGNOSIS — L97.812 NON-PRESSURE CHRONIC ULCER OF OTHER PART OF RIGHT LOWER LEG WITH FAT LAYER EXPOSED: Primary | ICD-10-CM

## 2021-05-27 DIAGNOSIS — L89.610 DECUBITUS ULCER OF HEEL, RIGHT, UNSTAGEABLE: ICD-10-CM

## 2021-05-27 PROBLEM — L89.312 PRESSURE ULCER OF RIGHT BUTTOCK, STAGE 2: Status: ACTIVE | Noted: 2021-05-27

## 2021-05-27 PROBLEM — L89.322 PRESSURE ULCER OF LEFT BUTTOCK, STAGE 2: Status: ACTIVE | Noted: 2021-05-27

## 2021-05-27 PROCEDURE — 99214 OFFICE O/P EST MOD 30 MIN: CPT | Mod: 25

## 2021-05-27 PROCEDURE — 99499 NO LOS: ICD-10-PCS | Mod: ,,, | Performed by: SURGERY

## 2021-05-27 PROCEDURE — 27201912 HC WOUND CARE DEBRIDEMENT SUPPLIES

## 2021-05-27 PROCEDURE — 99499 UNLISTED E&M SERVICE: CPT | Mod: ,,, | Performed by: SURGERY

## 2021-05-27 PROCEDURE — 11042 DBRDMT SUBQ TIS 1ST 20SQCM/<: CPT

## 2021-05-31 ENCOUNTER — CLINICAL SUPPORT (OUTPATIENT)
Dept: ENDOCRINOLOGY | Facility: CLINIC | Age: 56
End: 2021-05-31
Payer: MEDICARE

## 2021-05-31 DIAGNOSIS — Z79.4 TYPE 2 DIABETES MELLITUS WITH HYPERGLYCEMIA, WITH LONG-TERM CURRENT USE OF INSULIN: ICD-10-CM

## 2021-05-31 DIAGNOSIS — E11.65 TYPE 2 DIABETES MELLITUS WITH HYPERGLYCEMIA, WITH LONG-TERM CURRENT USE OF INSULIN: ICD-10-CM

## 2021-05-31 PROBLEM — Z65.9 SOCIAL PROBLEM: Status: ACTIVE | Noted: 2021-05-31

## 2021-05-31 PROBLEM — L03.115 CELLULITIS OF RIGHT LOWER EXTREMITY: Status: RESOLVED | Noted: 2020-07-22 | Resolved: 2021-05-31

## 2021-05-31 PROBLEM — Z60.9 SOCIAL PROBLEM: Status: ACTIVE | Noted: 2021-05-31

## 2021-05-31 PROCEDURE — 95251 PR GLUCOSE MONITOR, 72 HOUR, PHYS INTERP: ICD-10-PCS | Mod: ,,, | Performed by: INTERNAL MEDICINE

## 2021-05-31 PROCEDURE — 95250 CONT GLUC MNTR PHYS/QHP EQP: CPT | Mod: PBBFAC | Performed by: INTERNAL MEDICINE

## 2021-05-31 PROCEDURE — 95251 CONT GLUC MNTR ANALYSIS I&R: CPT | Mod: ,,, | Performed by: INTERNAL MEDICINE

## 2021-06-03 ENCOUNTER — DOCUMENT SCAN (OUTPATIENT)
Dept: HOME HEALTH SERVICES | Facility: HOSPITAL | Age: 56
End: 2021-06-03
Payer: MEDICARE

## 2021-06-03 PROBLEM — E87.6 HYPOKALEMIA: Status: RESOLVED | Noted: 2021-03-11 | Resolved: 2021-06-03

## 2021-06-11 ENCOUNTER — TELEPHONE (OUTPATIENT)
Dept: ENDOCRINOLOGY | Facility: CLINIC | Age: 56
End: 2021-06-11

## 2021-06-11 DIAGNOSIS — E11.9 TYPE 2 DIABETES MELLITUS WITHOUT COMPLICATION, WITHOUT LONG-TERM CURRENT USE OF INSULIN: ICD-10-CM

## 2021-06-11 RX ORDER — INSULIN GLARGINE 100 [IU]/ML
32 INJECTION, SOLUTION SUBCUTANEOUS 2 TIMES DAILY
Qty: 21 ML | Refills: 11 | Status: ON HOLD | OUTPATIENT
Start: 2021-06-11 | End: 2021-09-10 | Stop reason: SDUPTHER

## 2021-06-11 RX ORDER — INSULIN ASPART 100 [IU]/ML
INJECTION, SOLUTION INTRAVENOUS; SUBCUTANEOUS
Qty: 30 ML | Refills: 11 | Status: ON HOLD | OUTPATIENT
Start: 2021-06-11 | End: 2021-06-17 | Stop reason: SDUPTHER

## 2021-06-14 PROBLEM — I95.9 HYPOTENSION: Status: ACTIVE | Noted: 2021-06-14

## 2021-06-15 ENCOUNTER — TELEPHONE (OUTPATIENT)
Dept: ENDOCRINOLOGY | Facility: CLINIC | Age: 56
End: 2021-06-15

## 2021-06-15 PROBLEM — R79.89 ELEVATED TROPONIN: Status: ACTIVE | Noted: 2021-06-15

## 2021-06-15 PROBLEM — E83.42 HYPOMAGNESEMIA: Status: ACTIVE | Noted: 2021-06-15

## 2021-06-15 PROBLEM — L97.909 CHRONIC SKIN ULCER OF LOWER LEG: Status: ACTIVE | Noted: 2017-12-14

## 2021-06-15 PROBLEM — A41.9 SEPSIS: Status: ACTIVE | Noted: 2021-06-15

## 2021-06-22 PROBLEM — D72.829 LEUKOCYTOSIS: Status: RESOLVED | Noted: 2019-10-04 | Resolved: 2021-06-22

## 2021-06-22 PROBLEM — R79.89 ELEVATED TROPONIN: Status: RESOLVED | Noted: 2021-06-15 | Resolved: 2021-06-22

## 2021-06-22 PROBLEM — E83.42 HYPOMAGNESEMIA: Status: RESOLVED | Noted: 2021-06-15 | Resolved: 2021-06-22

## 2021-06-22 PROBLEM — I95.9 HYPOTENSION: Status: RESOLVED | Noted: 2021-06-14 | Resolved: 2021-06-22

## 2021-06-22 PROBLEM — N18.9 ACUTE KIDNEY INJURY SUPERIMPOSED ON CHRONIC KIDNEY DISEASE: Status: RESOLVED | Noted: 2021-04-29 | Resolved: 2021-06-22

## 2021-06-22 PROBLEM — N17.9 ACUTE KIDNEY INJURY SUPERIMPOSED ON CHRONIC KIDNEY DISEASE: Status: RESOLVED | Noted: 2021-04-29 | Resolved: 2021-06-22

## 2021-06-29 ENCOUNTER — TELEPHONE (OUTPATIENT)
Dept: PODIATRY | Facility: CLINIC | Age: 56
End: 2021-06-29

## 2021-07-07 ENCOUNTER — PATIENT MESSAGE (OUTPATIENT)
Dept: ADMINISTRATIVE | Facility: HOSPITAL | Age: 56
End: 2021-07-07

## 2021-07-13 ENCOUNTER — PATIENT MESSAGE (OUTPATIENT)
Dept: ENDOCRINOLOGY | Facility: CLINIC | Age: 56
End: 2021-07-13

## 2021-07-15 ENCOUNTER — PATIENT OUTREACH (OUTPATIENT)
Dept: ADMINISTRATIVE | Facility: OTHER | Age: 56
End: 2021-07-15

## 2021-07-21 ENCOUNTER — PATIENT OUTREACH (OUTPATIENT)
Dept: ADMINISTRATIVE | Facility: HOSPITAL | Age: 56
End: 2021-07-21

## 2021-07-21 ENCOUNTER — TELEPHONE (OUTPATIENT)
Dept: ADMINISTRATIVE | Facility: HOSPITAL | Age: 56
End: 2021-07-21

## 2021-08-02 ENCOUNTER — TELEPHONE (OUTPATIENT)
Dept: FAMILY MEDICINE | Facility: CLINIC | Age: 56
End: 2021-08-02

## 2021-08-02 DIAGNOSIS — I69.959 HEMIPLEGIA OF NONDOMINANT SIDE, LATE EFFECT OF CEREBROVASCULAR DISEASE: ICD-10-CM

## 2021-08-02 DIAGNOSIS — I25.10 CORONARY ARTERY DISEASE INVOLVING NATIVE CORONARY ARTERY WITHOUT ANGINA PECTORIS, UNSPECIFIED WHETHER NATIVE OR TRANSPLANTED HEART: Primary | ICD-10-CM

## 2021-08-02 DIAGNOSIS — I73.9 PVD (PERIPHERAL VASCULAR DISEASE): ICD-10-CM

## 2021-08-02 DIAGNOSIS — L97.909 CHRONIC SKIN ULCER OF LOWER LEG: ICD-10-CM

## 2021-08-02 DIAGNOSIS — I87.2 PERIPHERAL VENOUS INSUFFICIENCY: ICD-10-CM

## 2021-08-04 ENCOUNTER — OFFICE VISIT (OUTPATIENT)
Dept: FAMILY MEDICINE | Facility: CLINIC | Age: 56
End: 2021-08-04
Payer: MEDICARE

## 2021-08-04 VITALS
WEIGHT: 171.38 LBS | TEMPERATURE: 98 F | DIASTOLIC BLOOD PRESSURE: 70 MMHG | BODY MASS INDEX: 29.42 KG/M2 | SYSTOLIC BLOOD PRESSURE: 120 MMHG | OXYGEN SATURATION: 97 % | HEART RATE: 97 BPM

## 2021-08-04 DIAGNOSIS — E11.65 TYPE 2 DIABETES MELLITUS WITH HYPERGLYCEMIA, WITH LONG-TERM CURRENT USE OF INSULIN: ICD-10-CM

## 2021-08-04 DIAGNOSIS — Z79.4 TYPE 2 DIABETES MELLITUS WITH HYPERGLYCEMIA, WITH LONG-TERM CURRENT USE OF INSULIN: ICD-10-CM

## 2021-08-04 DIAGNOSIS — F33.0 MILD RECURRENT MAJOR DEPRESSION: ICD-10-CM

## 2021-08-04 DIAGNOSIS — I50.32 CHRONIC DIASTOLIC CHF (CONGESTIVE HEART FAILURE): ICD-10-CM

## 2021-08-04 DIAGNOSIS — E78.2 MIXED HYPERLIPIDEMIA: ICD-10-CM

## 2021-08-04 DIAGNOSIS — I10 ESSENTIAL HYPERTENSION: ICD-10-CM

## 2021-08-04 DIAGNOSIS — I73.9 PVD (PERIPHERAL VASCULAR DISEASE): ICD-10-CM

## 2021-08-04 DIAGNOSIS — I25.10 CORONARY ARTERY DISEASE INVOLVING NATIVE CORONARY ARTERY WITHOUT ANGINA PECTORIS, UNSPECIFIED WHETHER NATIVE OR TRANSPLANTED HEART: ICD-10-CM

## 2021-08-04 DIAGNOSIS — Z86.73 HISTORY OF STROKE: ICD-10-CM

## 2021-08-04 DIAGNOSIS — L97.909 CHRONIC SKIN ULCER OF LOWER LEG: Primary | ICD-10-CM

## 2021-08-04 PROCEDURE — 99214 PR OFFICE/OUTPT VISIT, EST, LEVL IV, 30-39 MIN: ICD-10-PCS | Mod: S$PBB,,, | Performed by: INTERNAL MEDICINE

## 2021-08-04 PROCEDURE — 99214 OFFICE O/P EST MOD 30 MIN: CPT | Mod: S$PBB,,, | Performed by: INTERNAL MEDICINE

## 2021-08-04 PROCEDURE — 99215 OFFICE O/P EST HI 40 MIN: CPT | Mod: PBBFAC,PN | Performed by: INTERNAL MEDICINE

## 2021-08-04 PROCEDURE — 99999 PR PBB SHADOW E&M-EST. PATIENT-LVL V: CPT | Mod: PBBFAC,,, | Performed by: INTERNAL MEDICINE

## 2021-08-04 PROCEDURE — 99999 PR PBB SHADOW E&M-EST. PATIENT-LVL V: ICD-10-PCS | Mod: PBBFAC,,, | Performed by: INTERNAL MEDICINE

## 2021-08-04 RX ORDER — CYCLOBENZAPRINE HCL 5 MG
5 TABLET ORAL 2 TIMES DAILY PRN
Status: ON HOLD | COMMUNITY
Start: 2021-07-13 | End: 2021-09-10 | Stop reason: HOSPADM

## 2021-08-05 ENCOUNTER — TELEPHONE (OUTPATIENT)
Dept: FAMILY MEDICINE | Facility: CLINIC | Age: 56
End: 2021-08-05

## 2021-08-18 ENCOUNTER — TELEPHONE (OUTPATIENT)
Dept: ENDOCRINOLOGY | Facility: CLINIC | Age: 56
End: 2021-08-18

## 2021-08-24 ENCOUNTER — TELEPHONE (OUTPATIENT)
Dept: ENDOCRINOLOGY | Facility: CLINIC | Age: 56
End: 2021-08-24

## 2021-08-25 PROBLEM — A41.9 SEPSIS: Status: RESOLVED | Noted: 2021-06-15 | Resolved: 2021-08-25

## 2021-08-25 PROBLEM — E86.0 DEHYDRATION: Status: RESOLVED | Noted: 2019-10-05 | Resolved: 2021-08-25

## 2021-08-25 PROBLEM — G89.29 CHRONIC PAIN: Status: ACTIVE | Noted: 2021-08-25

## 2021-08-25 PROBLEM — I70.263 ATHEROSCLEROSIS OF NATIVE ARTERY OF BOTH LOWER EXTREMITIES WITH GANGRENE: Status: ACTIVE | Noted: 2018-01-04

## 2021-08-29 ENCOUNTER — HOSPITAL ENCOUNTER (INPATIENT)
Facility: HOSPITAL | Age: 56
LOS: 12 days | Discharge: NURSING, REHAB OR LONG-TERM CARE | DRG: 240 | End: 2021-09-10
Attending: EMERGENCY MEDICINE | Admitting: INTERNAL MEDICINE
Payer: MEDICARE

## 2021-08-29 DIAGNOSIS — N18.30 STAGE 3 CHRONIC KIDNEY DISEASE, UNSPECIFIED WHETHER STAGE 3A OR 3B CKD: ICD-10-CM

## 2021-08-29 DIAGNOSIS — D64.9 ANEMIA, UNSPECIFIED TYPE: ICD-10-CM

## 2021-08-29 DIAGNOSIS — I96 GANGRENE: Primary | ICD-10-CM

## 2021-08-29 DIAGNOSIS — I96 DRY GANGRENE: ICD-10-CM

## 2021-08-29 DIAGNOSIS — I10 ESSENTIAL HYPERTENSION: Chronic | ICD-10-CM

## 2021-08-29 DIAGNOSIS — E78.2 MIXED HYPERLIPIDEMIA: ICD-10-CM

## 2021-08-29 DIAGNOSIS — F33.0 MILD RECURRENT MAJOR DEPRESSION: ICD-10-CM

## 2021-08-29 DIAGNOSIS — Z79.4 TYPE 2 DIABETES MELLITUS WITH HYPERGLYCEMIA, WITH LONG-TERM CURRENT USE OF INSULIN: ICD-10-CM

## 2021-08-29 DIAGNOSIS — Z89.611 S/P AKA (ABOVE KNEE AMPUTATION), RIGHT: ICD-10-CM

## 2021-08-29 DIAGNOSIS — I69.959 HEMIPLEGIA OF NONDOMINANT SIDE, LATE EFFECT OF CEREBROVASCULAR DISEASE: ICD-10-CM

## 2021-08-29 DIAGNOSIS — E87.6 HYPOKALEMIA: ICD-10-CM

## 2021-08-29 DIAGNOSIS — E11.65 TYPE 2 DIABETES MELLITUS WITH HYPERGLYCEMIA, WITH LONG-TERM CURRENT USE OF INSULIN: ICD-10-CM

## 2021-08-29 DIAGNOSIS — I70.229 CRITICAL LOWER LIMB ISCHEMIA: ICD-10-CM

## 2021-08-29 DIAGNOSIS — I50.32 CHRONIC DIASTOLIC CHF (CONGESTIVE HEART FAILURE): ICD-10-CM

## 2021-08-29 DIAGNOSIS — R07.9 CHEST PAIN: ICD-10-CM

## 2021-08-29 DIAGNOSIS — I25.10 CORONARY ARTERY DISEASE INVOLVING NATIVE CORONARY ARTERY OF NATIVE HEART WITHOUT ANGINA PECTORIS: ICD-10-CM

## 2021-08-29 DIAGNOSIS — Z86.718 HISTORY OF DVT OF LOWER EXTREMITY: ICD-10-CM

## 2021-08-29 DIAGNOSIS — G57.93 NEUROPATHIC PAIN OF BOTH LEGS: ICD-10-CM

## 2021-08-29 DIAGNOSIS — E11.9 TYPE 2 DIABETES MELLITUS WITHOUT COMPLICATION, WITHOUT LONG-TERM CURRENT USE OF INSULIN: ICD-10-CM

## 2021-08-29 PROBLEM — R41.0 DELIRIUM: Status: ACTIVE | Noted: 2021-08-29

## 2021-08-29 LAB
BILIRUB UR QL STRIP: NEGATIVE
CLARITY UR REFRACT.AUTO: CLEAR
COLOR UR AUTO: YELLOW
GLUCOSE SERPL-MCNC: 137 MG/DL (ref 70–110)
GLUCOSE UR QL STRIP: NEGATIVE
HGB UR QL STRIP: NEGATIVE
KETONES UR QL STRIP: NEGATIVE
LEUKOCYTE ESTERASE UR QL STRIP: NEGATIVE
NITRITE UR QL STRIP: NEGATIVE
PH UR STRIP: 5 [PH] (ref 5–8)
POCT GLUCOSE: 137 MG/DL (ref 70–110)
POCT GLUCOSE: 144 MG/DL (ref 70–110)
POCT GLUCOSE: 176 MG/DL (ref 70–110)
PROT UR QL STRIP: NEGATIVE
SP GR UR STRIP: 1.01 (ref 1–1.03)
URN SPEC COLLECT METH UR: NORMAL

## 2021-08-29 PROCEDURE — 25000242 PHARM REV CODE 250 ALT 637 W/ HCPCS: Performed by: INTERNAL MEDICINE

## 2021-08-29 PROCEDURE — C9399 UNCLASSIFIED DRUGS OR BIOLOG: HCPCS | Performed by: INTERNAL MEDICINE

## 2021-08-29 PROCEDURE — 94640 AIRWAY INHALATION TREATMENT: CPT

## 2021-08-29 PROCEDURE — 99285 EMERGENCY DEPT VISIT HI MDM: CPT | Mod: ,,, | Performed by: EMERGENCY MEDICINE

## 2021-08-29 PROCEDURE — 63600175 PHARM REV CODE 636 W HCPCS: Performed by: INTERNAL MEDICINE

## 2021-08-29 PROCEDURE — 82962 GLUCOSE BLOOD TEST: CPT

## 2021-08-29 PROCEDURE — 99900035 HC TECH TIME PER 15 MIN (STAT)

## 2021-08-29 PROCEDURE — 99291 CRITICAL CARE FIRST HOUR: CPT | Mod: 25

## 2021-08-29 PROCEDURE — 81003 URINALYSIS AUTO W/O SCOPE: CPT | Performed by: INTERNAL MEDICINE

## 2021-08-29 PROCEDURE — 20600001 HC STEP DOWN PRIVATE ROOM

## 2021-08-29 PROCEDURE — 94761 N-INVAS EAR/PLS OXIMETRY MLT: CPT

## 2021-08-29 PROCEDURE — 99285 PR EMERGENCY DEPT VISIT,LEVEL V: ICD-10-PCS | Mod: ,,, | Performed by: EMERGENCY MEDICINE

## 2021-08-29 PROCEDURE — 25000003 PHARM REV CODE 250: Performed by: INTERNAL MEDICINE

## 2021-08-29 RX ORDER — SODIUM,POTASSIUM PHOSPHATES 280-250MG
2 POWDER IN PACKET (EA) ORAL
Status: DISCONTINUED | OUTPATIENT
Start: 2021-08-29 | End: 2021-08-29

## 2021-08-29 RX ORDER — LANOLIN ALCOHOL/MO/W.PET/CERES
800 CREAM (GRAM) TOPICAL
Status: DISCONTINUED | OUTPATIENT
Start: 2021-08-29 | End: 2021-08-29

## 2021-08-29 RX ORDER — DOXYCYCLINE HYCLATE 100 MG
100 TABLET ORAL EVERY 12 HOURS
Status: DISCONTINUED | OUTPATIENT
Start: 2021-08-29 | End: 2021-08-30

## 2021-08-29 RX ORDER — SPIRONOLACTONE 25 MG/1
25 TABLET ORAL DAILY
Status: DISCONTINUED | OUTPATIENT
Start: 2021-08-30 | End: 2021-09-10 | Stop reason: HOSPADM

## 2021-08-29 RX ORDER — TORSEMIDE 20 MG/1
20 TABLET ORAL DAILY
Status: DISCONTINUED | OUTPATIENT
Start: 2021-08-30 | End: 2021-08-29

## 2021-08-29 RX ORDER — CIPROFLOXACIN 500 MG/1
500 TABLET ORAL EVERY 12 HOURS
Status: DISCONTINUED | OUTPATIENT
Start: 2021-08-29 | End: 2021-08-30

## 2021-08-29 RX ORDER — IBUPROFEN 200 MG
16 TABLET ORAL
Status: DISCONTINUED | OUTPATIENT
Start: 2021-08-29 | End: 2021-09-10 | Stop reason: HOSPADM

## 2021-08-29 RX ORDER — ENOXAPARIN SODIUM 100 MG/ML
40 INJECTION SUBCUTANEOUS EVERY 24 HOURS
Status: DISCONTINUED | OUTPATIENT
Start: 2021-08-29 | End: 2021-08-29

## 2021-08-29 RX ORDER — MORPHINE SULFATE 2 MG/ML
3 INJECTION, SOLUTION INTRAMUSCULAR; INTRAVENOUS EVERY 6 HOURS PRN
Status: DISCONTINUED | OUTPATIENT
Start: 2021-08-29 | End: 2021-08-30

## 2021-08-29 RX ORDER — GABAPENTIN 300 MG/1
600 CAPSULE ORAL 3 TIMES DAILY
Status: DISCONTINUED | OUTPATIENT
Start: 2021-08-29 | End: 2021-09-05

## 2021-08-29 RX ORDER — ASCORBIC ACID 500 MG
500 TABLET ORAL 2 TIMES DAILY
Status: DISCONTINUED | OUTPATIENT
Start: 2021-08-29 | End: 2021-09-10 | Stop reason: HOSPADM

## 2021-08-29 RX ORDER — TORSEMIDE 20 MG/1
20 TABLET ORAL DAILY
Status: DISCONTINUED | OUTPATIENT
Start: 2021-08-29 | End: 2021-09-10 | Stop reason: HOSPADM

## 2021-08-29 RX ORDER — AMOXICILLIN 250 MG
1 CAPSULE ORAL 2 TIMES DAILY
Status: DISCONTINUED | OUTPATIENT
Start: 2021-08-29 | End: 2021-09-10

## 2021-08-29 RX ORDER — IBUPROFEN 200 MG
24 TABLET ORAL
Status: DISCONTINUED | OUTPATIENT
Start: 2021-08-29 | End: 2021-09-10 | Stop reason: HOSPADM

## 2021-08-29 RX ORDER — CARVEDILOL 3.12 MG/1
3.12 TABLET ORAL 2 TIMES DAILY
Status: DISCONTINUED | OUTPATIENT
Start: 2021-08-29 | End: 2021-09-10 | Stop reason: HOSPADM

## 2021-08-29 RX ORDER — CIPROFLOXACIN 250 MG/1
250 TABLET, FILM COATED ORAL EVERY 12 HOURS
Status: DISCONTINUED | OUTPATIENT
Start: 2021-08-29 | End: 2021-08-29

## 2021-08-29 RX ORDER — LANOLIN ALCOHOL/MO/W.PET/CERES
400 CREAM (GRAM) TOPICAL 2 TIMES DAILY
Status: DISCONTINUED | OUTPATIENT
Start: 2021-08-29 | End: 2021-09-10 | Stop reason: HOSPADM

## 2021-08-29 RX ORDER — TAMSULOSIN HYDROCHLORIDE 0.4 MG/1
0.4 CAPSULE ORAL DAILY
Status: DISCONTINUED | OUTPATIENT
Start: 2021-08-30 | End: 2021-09-10 | Stop reason: HOSPADM

## 2021-08-29 RX ORDER — ALPRAZOLAM 0.25 MG/1
0.5 TABLET ORAL 3 TIMES DAILY PRN
Status: DISCONTINUED | OUTPATIENT
Start: 2021-08-29 | End: 2021-09-03

## 2021-08-29 RX ORDER — OXYCODONE AND ACETAMINOPHEN 10; 325 MG/1; MG/1
1 TABLET ORAL EVERY 4 HOURS PRN
Status: DISCONTINUED | OUTPATIENT
Start: 2021-08-29 | End: 2021-08-30

## 2021-08-29 RX ORDER — INSULIN ASPART 100 [IU]/ML
1-10 INJECTION, SOLUTION INTRAVENOUS; SUBCUTANEOUS
Status: DISCONTINUED | OUTPATIENT
Start: 2021-08-29 | End: 2021-09-10 | Stop reason: HOSPADM

## 2021-08-29 RX ORDER — FERROUS GLUCONATE 324(37.5)
324 TABLET ORAL 2 TIMES DAILY WITH MEALS
Status: DISCONTINUED | OUTPATIENT
Start: 2021-08-29 | End: 2021-09-10 | Stop reason: HOSPADM

## 2021-08-29 RX ORDER — ENOXAPARIN SODIUM 100 MG/ML
1 INJECTION SUBCUTANEOUS
Status: DISCONTINUED | OUTPATIENT
Start: 2021-08-29 | End: 2021-09-02

## 2021-08-29 RX ORDER — SODIUM CHLORIDE 0.9 % (FLUSH) 0.9 %
10 SYRINGE (ML) INJECTION
Status: DISCONTINUED | OUTPATIENT
Start: 2021-08-29 | End: 2021-08-30

## 2021-08-29 RX ORDER — ATORVASTATIN CALCIUM 20 MG/1
40 TABLET, FILM COATED ORAL DAILY
Status: DISCONTINUED | OUTPATIENT
Start: 2021-08-30 | End: 2021-09-10 | Stop reason: HOSPADM

## 2021-08-29 RX ORDER — POTASSIUM CHLORIDE 20 MEQ/1
20 TABLET, EXTENDED RELEASE ORAL 2 TIMES DAILY
Status: DISCONTINUED | OUTPATIENT
Start: 2021-08-29 | End: 2021-09-10 | Stop reason: HOSPADM

## 2021-08-29 RX ORDER — MUPIROCIN 20 MG/G
OINTMENT TOPICAL 2 TIMES DAILY
Status: COMPLETED | OUTPATIENT
Start: 2021-08-29 | End: 2021-09-03

## 2021-08-29 RX ORDER — ONDANSETRON 2 MG/ML
4 INJECTION INTRAMUSCULAR; INTRAVENOUS EVERY 8 HOURS PRN
Status: DISCONTINUED | OUTPATIENT
Start: 2021-08-29 | End: 2021-09-10 | Stop reason: HOSPADM

## 2021-08-29 RX ORDER — SODIUM CHLORIDE 0.9 % (FLUSH) 0.9 %
10 SYRINGE (ML) INJECTION EVERY 8 HOURS PRN
Status: DISCONTINUED | OUTPATIENT
Start: 2021-08-29 | End: 2021-09-10 | Stop reason: HOSPADM

## 2021-08-29 RX ORDER — IPRATROPIUM BROMIDE AND ALBUTEROL SULFATE 2.5; .5 MG/3ML; MG/3ML
3 SOLUTION RESPIRATORY (INHALATION) EVERY 4 HOURS PRN
Status: DISCONTINUED | OUTPATIENT
Start: 2021-08-29 | End: 2021-09-08

## 2021-08-29 RX ORDER — GLUCAGON 1 MG
1 KIT INJECTION
Status: DISCONTINUED | OUTPATIENT
Start: 2021-08-29 | End: 2021-09-10 | Stop reason: HOSPADM

## 2021-08-29 RX ORDER — TALC
6 POWDER (GRAM) TOPICAL NIGHTLY PRN
Status: DISCONTINUED | OUTPATIENT
Start: 2021-08-29 | End: 2021-09-10 | Stop reason: HOSPADM

## 2021-08-29 RX ORDER — HALOPERIDOL 5 MG/ML
5 INJECTION INTRAMUSCULAR EVERY 6 HOURS PRN
Status: DISCONTINUED | OUTPATIENT
Start: 2021-08-29 | End: 2021-09-10 | Stop reason: HOSPADM

## 2021-08-29 RX ORDER — ACETAMINOPHEN 325 MG/1
650 TABLET ORAL EVERY 4 HOURS PRN
Status: DISCONTINUED | OUTPATIENT
Start: 2021-08-29 | End: 2021-08-30

## 2021-08-29 RX ADMIN — CARVEDILOL 3.12 MG: 3.12 TABLET, FILM COATED ORAL at 08:08

## 2021-08-29 RX ADMIN — HALOPERIDOL LACTATE 5 MG: 5 INJECTION, SOLUTION INTRAMUSCULAR at 02:08

## 2021-08-29 RX ADMIN — CIPROFLOXACIN 500 MG: 250 TABLET ORAL at 08:08

## 2021-08-29 RX ADMIN — GABAPENTIN 600 MG: 300 CAPSULE ORAL at 02:08

## 2021-08-29 RX ADMIN — Medication 324 MG: at 05:08

## 2021-08-29 RX ADMIN — DOXYCYCLINE HYCLATE 100 MG: 100 TABLET, COATED ORAL at 08:08

## 2021-08-29 RX ADMIN — OXYCODONE HYDROCHLORIDE AND ACETAMINOPHEN 500 MG: 500 TABLET ORAL at 08:08

## 2021-08-29 RX ADMIN — GABAPENTIN 600 MG: 300 CAPSULE ORAL at 08:08

## 2021-08-29 RX ADMIN — TORSEMIDE 20 MG: 20 TABLET ORAL at 02:08

## 2021-08-29 RX ADMIN — ALPRAZOLAM 0.5 MG: 0.25 TABLET ORAL at 02:08

## 2021-08-29 RX ADMIN — IPRATROPIUM BROMIDE AND ALBUTEROL SULFATE 3 ML: .5; 2.5 SOLUTION RESPIRATORY (INHALATION) at 01:08

## 2021-08-29 RX ADMIN — MELATONIN TAB 3 MG 6 MG: 3 TAB at 08:08

## 2021-08-29 RX ADMIN — INSULIN DETEMIR 20 UNITS: 100 INJECTION, SOLUTION SUBCUTANEOUS at 08:08

## 2021-08-29 RX ADMIN — SENNOSIDES AND DOCUSATE SODIUM 1 TABLET: 8.6; 5 TABLET ORAL at 08:08

## 2021-08-29 RX ADMIN — MAGNESIUM OXIDE TAB 400 MG (241.3 MG ELEMENTAL MG) 400 MG: 400 (241.3 MG) TAB at 08:08

## 2021-08-29 RX ADMIN — POTASSIUM CHLORIDE 20 MEQ: 1500 TABLET, EXTENDED RELEASE ORAL at 08:08

## 2021-08-29 RX ADMIN — HALOPERIDOL LACTATE 5 MG: 5 INJECTION, SOLUTION INTRAMUSCULAR at 08:08

## 2021-08-29 RX ADMIN — INSULIN ASPART 1 UNITS: 100 INJECTION, SOLUTION INTRAVENOUS; SUBCUTANEOUS at 08:08

## 2021-08-29 RX ADMIN — MUPIROCIN: 20 OINTMENT TOPICAL at 08:08

## 2021-08-30 ENCOUNTER — ANESTHESIA (OUTPATIENT)
Dept: SURGERY | Facility: HOSPITAL | Age: 56
DRG: 240 | End: 2021-08-30
Payer: MEDICARE

## 2021-08-30 ENCOUNTER — ANESTHESIA EVENT (OUTPATIENT)
Dept: SURGERY | Facility: HOSPITAL | Age: 56
DRG: 240 | End: 2021-08-30
Payer: MEDICARE

## 2021-08-30 PROBLEM — I96 GANGRENE: Status: ACTIVE | Noted: 2021-08-30

## 2021-08-30 LAB
ALBUMIN SERPL BCP-MCNC: 2.3 G/DL (ref 3.5–5.2)
ALP SERPL-CCNC: 100 U/L (ref 55–135)
ALT SERPL W/O P-5'-P-CCNC: 32 U/L (ref 10–44)
ANION GAP SERPL CALC-SCNC: 15 MMOL/L (ref 8–16)
AST SERPL-CCNC: 102 U/L (ref 10–40)
BASOPHILS # BLD AUTO: 0.05 K/UL (ref 0–0.2)
BASOPHILS NFR BLD: 0.4 % (ref 0–1.9)
BILIRUB SERPL-MCNC: 0.4 MG/DL (ref 0.1–1)
BUN SERPL-MCNC: 42 MG/DL (ref 6–20)
CALCIUM SERPL-MCNC: 10 MG/DL (ref 8.7–10.5)
CHLORIDE SERPL-SCNC: 102 MMOL/L (ref 95–110)
CO2 SERPL-SCNC: 23 MMOL/L (ref 23–29)
CREAT SERPL-MCNC: 1 MG/DL (ref 0.5–1.4)
DIFFERENTIAL METHOD: ABNORMAL
EOSINOPHIL # BLD AUTO: 0 K/UL (ref 0–0.5)
EOSINOPHIL NFR BLD: 0.2 % (ref 0–8)
ERYTHROCYTE [DISTWIDTH] IN BLOOD BY AUTOMATED COUNT: 21 % (ref 11.5–14.5)
EST. GFR  (AFRICAN AMERICAN): >60 ML/MIN/1.73 M^2
EST. GFR  (NON AFRICAN AMERICAN): >60 ML/MIN/1.73 M^2
GLUCOSE SERPL-MCNC: 102 MG/DL (ref 70–110)
HCT VFR BLD AUTO: 27.3 % (ref 40–54)
HGB BLD-MCNC: 7.9 G/DL (ref 14–18)
IMM GRANULOCYTES # BLD AUTO: 0.06 K/UL (ref 0–0.04)
IMM GRANULOCYTES NFR BLD AUTO: 0.4 % (ref 0–0.5)
LYMPHOCYTES # BLD AUTO: 0.9 K/UL (ref 1–4.8)
LYMPHOCYTES NFR BLD: 6.2 % (ref 18–48)
MCH RBC QN AUTO: 19.6 PG (ref 27–31)
MCHC RBC AUTO-ENTMCNC: 28.9 G/DL (ref 32–36)
MCV RBC AUTO: 68 FL (ref 82–98)
MONOCYTES # BLD AUTO: 0.8 K/UL (ref 0.3–1)
MONOCYTES NFR BLD: 5.8 % (ref 4–15)
NEUTROPHILS # BLD AUTO: 12.3 K/UL (ref 1.8–7.7)
NEUTROPHILS NFR BLD: 87 % (ref 38–73)
NRBC BLD-RTO: 0 /100 WBC
PLATELET # BLD AUTO: 714 K/UL (ref 150–450)
PMV BLD AUTO: 9.8 FL (ref 9.2–12.9)
POCT GLUCOSE: 103 MG/DL (ref 70–110)
POCT GLUCOSE: 117 MG/DL (ref 70–110)
POCT GLUCOSE: 124 MG/DL (ref 70–110)
POCT GLUCOSE: 178 MG/DL (ref 70–110)
POCT GLUCOSE: 95 MG/DL (ref 70–110)
POTASSIUM SERPL-SCNC: 3.6 MMOL/L (ref 3.5–5.1)
PROT SERPL-MCNC: 9 G/DL (ref 6–8.4)
RBC # BLD AUTO: 4.03 M/UL (ref 4.6–6.2)
SARS-COV-2 RDRP RESP QL NAA+PROBE: NEGATIVE
SODIUM SERPL-SCNC: 140 MMOL/L (ref 136–145)
WBC # BLD AUTO: 14.17 K/UL (ref 3.9–12.7)

## 2021-08-30 PROCEDURE — 63600175 PHARM REV CODE 636 W HCPCS: Performed by: INTERNAL MEDICINE

## 2021-08-30 PROCEDURE — 88307 TISSUE EXAM BY PATHOLOGIST: CPT | Performed by: STUDENT IN AN ORGANIZED HEALTH CARE EDUCATION/TRAINING PROGRAM

## 2021-08-30 PROCEDURE — 25000003 PHARM REV CODE 250: Performed by: NURSE ANESTHETIST, CERTIFIED REGISTERED

## 2021-08-30 PROCEDURE — 63600175 PHARM REV CODE 636 W HCPCS: Performed by: NURSE ANESTHETIST, CERTIFIED REGISTERED

## 2021-08-30 PROCEDURE — 36415 COLL VENOUS BLD VENIPUNCTURE: CPT | Performed by: INTERNAL MEDICINE

## 2021-08-30 PROCEDURE — 63600175 PHARM REV CODE 636 W HCPCS: Performed by: STUDENT IN AN ORGANIZED HEALTH CARE EDUCATION/TRAINING PROGRAM

## 2021-08-30 PROCEDURE — 85025 COMPLETE CBC W/AUTO DIFF WBC: CPT | Performed by: INTERNAL MEDICINE

## 2021-08-30 PROCEDURE — 88307 PR  SURG PATH,LEVEL V: ICD-10-PCS | Mod: 26,,, | Performed by: STUDENT IN AN ORGANIZED HEALTH CARE EDUCATION/TRAINING PROGRAM

## 2021-08-30 PROCEDURE — 71000039 HC RECOVERY, EACH ADD'L HOUR: Performed by: SURGERY

## 2021-08-30 PROCEDURE — 20600001 HC STEP DOWN PRIVATE ROOM

## 2021-08-30 PROCEDURE — D9220A PRA ANESTHESIA: Mod: CRNA,,, | Performed by: NURSE ANESTHETIST, CERTIFIED REGISTERED

## 2021-08-30 PROCEDURE — 37000009 HC ANESTHESIA EA ADD 15 MINS: Performed by: SURGERY

## 2021-08-30 PROCEDURE — D9220A PRA ANESTHESIA: ICD-10-PCS | Mod: ANES,,, | Performed by: ANESTHESIOLOGY

## 2021-08-30 PROCEDURE — 37000008 HC ANESTHESIA 1ST 15 MINUTES: Performed by: SURGERY

## 2021-08-30 PROCEDURE — 94761 N-INVAS EAR/PLS OXIMETRY MLT: CPT

## 2021-08-30 PROCEDURE — 36000710: Performed by: SURGERY

## 2021-08-30 PROCEDURE — 94799 UNLISTED PULMONARY SVC/PX: CPT

## 2021-08-30 PROCEDURE — 27590 AMPUTATE LEG AT THIGH: CPT | Mod: RT,GC,, | Performed by: SURGERY

## 2021-08-30 PROCEDURE — D9220A PRA ANESTHESIA: Mod: ANES,,, | Performed by: ANESTHESIOLOGY

## 2021-08-30 PROCEDURE — 80053 COMPREHEN METABOLIC PANEL: CPT | Performed by: INTERNAL MEDICINE

## 2021-08-30 PROCEDURE — 71000015 HC POSTOP RECOV 1ST HR: Performed by: SURGERY

## 2021-08-30 PROCEDURE — 25000003 PHARM REV CODE 250: Performed by: STUDENT IN AN ORGANIZED HEALTH CARE EDUCATION/TRAINING PROGRAM

## 2021-08-30 PROCEDURE — 25000003 PHARM REV CODE 250: Performed by: INTERNAL MEDICINE

## 2021-08-30 PROCEDURE — 71000033 HC RECOVERY, INTIAL HOUR: Performed by: SURGERY

## 2021-08-30 PROCEDURE — C1894 INTRO/SHEATH, NON-LASER: HCPCS | Performed by: SURGERY

## 2021-08-30 PROCEDURE — 82962 GLUCOSE BLOOD TEST: CPT | Performed by: SURGERY

## 2021-08-30 PROCEDURE — 88307 TISSUE EXAM BY PATHOLOGIST: CPT | Mod: 26,,, | Performed by: STUDENT IN AN ORGANIZED HEALTH CARE EDUCATION/TRAINING PROGRAM

## 2021-08-30 PROCEDURE — U0002 COVID-19 LAB TEST NON-CDC: HCPCS | Performed by: INTERNAL MEDICINE

## 2021-08-30 PROCEDURE — 27590 PR AMPUTATE THIGH,THRU FEMUR: ICD-10-PCS | Mod: RT,GC,, | Performed by: SURGERY

## 2021-08-30 PROCEDURE — 36000711: Performed by: SURGERY

## 2021-08-30 PROCEDURE — D9220A PRA ANESTHESIA: ICD-10-PCS | Mod: CRNA,,, | Performed by: NURSE ANESTHETIST, CERTIFIED REGISTERED

## 2021-08-30 RX ORDER — PROPOFOL 10 MG/ML
VIAL (ML) INTRAVENOUS
Status: DISCONTINUED | OUTPATIENT
Start: 2021-08-30 | End: 2021-08-30

## 2021-08-30 RX ORDER — HYDROMORPHONE HYDROCHLORIDE 1 MG/ML
0.2 INJECTION, SOLUTION INTRAMUSCULAR; INTRAVENOUS; SUBCUTANEOUS EVERY 5 MIN PRN
Status: DISCONTINUED | OUTPATIENT
Start: 2021-08-30 | End: 2021-08-30

## 2021-08-30 RX ORDER — FENTANYL CITRATE 50 UG/ML
INJECTION, SOLUTION INTRAMUSCULAR; INTRAVENOUS
Status: DISCONTINUED | OUTPATIENT
Start: 2021-08-30 | End: 2021-08-30

## 2021-08-30 RX ORDER — MEPERIDINE HYDROCHLORIDE 50 MG/ML
12.5 INJECTION INTRAMUSCULAR; INTRAVENOUS; SUBCUTANEOUS ONCE AS NEEDED
Status: DISCONTINUED | OUTPATIENT
Start: 2021-08-30 | End: 2021-08-30 | Stop reason: HOSPADM

## 2021-08-30 RX ORDER — CIPROFLOXACIN 2 MG/ML
400 INJECTION, SOLUTION INTRAVENOUS
Status: DISCONTINUED | OUTPATIENT
Start: 2021-08-30 | End: 2021-09-03

## 2021-08-30 RX ORDER — LORAZEPAM 2 MG/ML
0.25 INJECTION INTRAMUSCULAR ONCE AS NEEDED
Status: DISCONTINUED | OUTPATIENT
Start: 2021-08-30 | End: 2021-08-30 | Stop reason: HOSPADM

## 2021-08-30 RX ORDER — ROCURONIUM BROMIDE 10 MG/ML
INJECTION, SOLUTION INTRAVENOUS
Status: DISCONTINUED | OUTPATIENT
Start: 2021-08-30 | End: 2021-08-30

## 2021-08-30 RX ORDER — CEFAZOLIN SODIUM 1 G/3ML
INJECTION, POWDER, FOR SOLUTION INTRAMUSCULAR; INTRAVENOUS
Status: DISCONTINUED | OUTPATIENT
Start: 2021-08-30 | End: 2021-08-30

## 2021-08-30 RX ORDER — MIDAZOLAM HYDROCHLORIDE 1 MG/ML
INJECTION, SOLUTION INTRAMUSCULAR; INTRAVENOUS
Status: DISCONTINUED | OUTPATIENT
Start: 2021-08-30 | End: 2021-08-30

## 2021-08-30 RX ORDER — SODIUM CHLORIDE, SODIUM LACTATE, POTASSIUM CHLORIDE, CALCIUM CHLORIDE 600; 310; 30; 20 MG/100ML; MG/100ML; MG/100ML; MG/100ML
INJECTION, SOLUTION INTRAVENOUS CONTINUOUS
Status: DISCONTINUED | OUTPATIENT
Start: 2021-08-30 | End: 2021-08-30

## 2021-08-30 RX ORDER — ACETAMINOPHEN 325 MG/1
650 TABLET ORAL EVERY 4 HOURS
Status: DISCONTINUED | OUTPATIENT
Start: 2021-08-30 | End: 2021-09-06

## 2021-08-30 RX ORDER — SODIUM CHLORIDE 9 MG/ML
INJECTION, SOLUTION INTRAVENOUS CONTINUOUS
Status: DISCONTINUED | OUTPATIENT
Start: 2021-08-30 | End: 2021-09-01

## 2021-08-30 RX ORDER — ONDANSETRON 2 MG/ML
4 INJECTION INTRAMUSCULAR; INTRAVENOUS EVERY 12 HOURS PRN
Status: DISCONTINUED | OUTPATIENT
Start: 2021-08-30 | End: 2021-08-30

## 2021-08-30 RX ORDER — PHENYLEPHRINE HCL IN 0.9% NACL 1 MG/10 ML
SYRINGE (ML) INTRAVENOUS
Status: DISCONTINUED | OUTPATIENT
Start: 2021-08-30 | End: 2021-08-30

## 2021-08-30 RX ORDER — LIDOCAINE HYDROCHLORIDE 20 MG/ML
INJECTION, SOLUTION EPIDURAL; INFILTRATION; INTRACAUDAL; PERINEURAL
Status: DISCONTINUED | OUTPATIENT
Start: 2021-08-30 | End: 2021-08-30

## 2021-08-30 RX ORDER — OXYCODONE HYDROCHLORIDE 10 MG/1
10 TABLET ORAL EVERY 4 HOURS PRN
Status: DISCONTINUED | OUTPATIENT
Start: 2021-08-30 | End: 2021-09-06

## 2021-08-30 RX ORDER — HYDROMORPHONE HYDROCHLORIDE 1 MG/ML
1 INJECTION, SOLUTION INTRAMUSCULAR; INTRAVENOUS; SUBCUTANEOUS EVERY 4 HOURS PRN
Status: DISCONTINUED | OUTPATIENT
Start: 2021-08-30 | End: 2021-09-06

## 2021-08-30 RX ORDER — NEOSTIGMINE METHYLSULFATE 0.5 MG/ML
INJECTION, SOLUTION INTRAVENOUS
Status: DISCONTINUED | OUTPATIENT
Start: 2021-08-30 | End: 2021-08-30

## 2021-08-30 RX ORDER — SODIUM CHLORIDE 9 MG/ML
INJECTION, SOLUTION INTRAVENOUS CONTINUOUS
Status: DISCONTINUED | OUTPATIENT
Start: 2021-08-30 | End: 2021-08-30

## 2021-08-30 RX ADMIN — TORSEMIDE 20 MG: 20 TABLET ORAL at 08:08

## 2021-08-30 RX ADMIN — SODIUM CHLORIDE, SODIUM LACTATE, POTASSIUM CHLORIDE, AND CALCIUM CHLORIDE: .6; .31; .03; .02 INJECTION, SOLUTION INTRAVENOUS at 11:08

## 2021-08-30 RX ADMIN — DOXYCYCLINE HYCLATE 100 MG: 100 TABLET, COATED ORAL at 08:08

## 2021-08-30 RX ADMIN — Medication 200 MCG: at 12:08

## 2021-08-30 RX ADMIN — CARVEDILOL 3.12 MG: 3.12 TABLET, FILM COATED ORAL at 08:08

## 2021-08-30 RX ADMIN — PROPOFOL 140 MG: 10 INJECTION, EMULSION INTRAVENOUS at 11:08

## 2021-08-30 RX ADMIN — ENOXAPARIN SODIUM 70 MG: 80 INJECTION SUBCUTANEOUS at 02:08

## 2021-08-30 RX ADMIN — SENNOSIDES AND DOCUSATE SODIUM 1 TABLET: 8.6; 5 TABLET ORAL at 08:08

## 2021-08-30 RX ADMIN — MAGNESIUM OXIDE TAB 400 MG (241.3 MG ELEMENTAL MG) 400 MG: 400 (241.3 MG) TAB at 08:08

## 2021-08-30 RX ADMIN — TAMSULOSIN HYDROCHLORIDE 0.4 MG: 0.4 CAPSULE ORAL at 08:08

## 2021-08-30 RX ADMIN — SODIUM CHLORIDE, SODIUM LACTATE, POTASSIUM CHLORIDE, AND CALCIUM CHLORIDE: .6; .31; .03; .02 INJECTION, SOLUTION INTRAVENOUS at 10:08

## 2021-08-30 RX ADMIN — FENTANYL CITRATE 50 MCG: 50 INJECTION INTRAMUSCULAR; INTRAVENOUS at 11:08

## 2021-08-30 RX ADMIN — MUPIROCIN: 20 OINTMENT TOPICAL at 08:08

## 2021-08-30 RX ADMIN — ROCURONIUM BROMIDE 50 MG: 10 INJECTION INTRAVENOUS at 11:08

## 2021-08-30 RX ADMIN — ALPRAZOLAM 0.5 MG: 0.25 TABLET ORAL at 01:08

## 2021-08-30 RX ADMIN — GLYCOPYRROLATE 0.4 MG: 0.2 INJECTION, SOLUTION INTRAMUSCULAR; INTRAVITREAL at 01:08

## 2021-08-30 RX ADMIN — HYDROMORPHONE HYDROCHLORIDE 1 MG: 1 INJECTION, SOLUTION INTRAMUSCULAR; INTRAVENOUS; SUBCUTANEOUS at 04:08

## 2021-08-30 RX ADMIN — MIDAZOLAM 1 MG: 1 INJECTION INTRAMUSCULAR; INTRAVENOUS at 11:08

## 2021-08-30 RX ADMIN — CEFAZOLIN 2 G: 330 INJECTION, POWDER, FOR SOLUTION INTRAMUSCULAR; INTRAVENOUS at 12:08

## 2021-08-30 RX ADMIN — LIDOCAINE HYDROCHLORIDE 75 MG: 20 INJECTION, SOLUTION EPIDURAL; INFILTRATION; INTRACAUDAL at 11:08

## 2021-08-30 RX ADMIN — Medication 100 MCG: at 12:08

## 2021-08-30 RX ADMIN — POTASSIUM CHLORIDE 20 MEQ: 1500 TABLET, EXTENDED RELEASE ORAL at 08:08

## 2021-08-30 RX ADMIN — ATORVASTATIN CALCIUM 40 MG: 20 TABLET, FILM COATED ORAL at 08:08

## 2021-08-30 RX ADMIN — GABAPENTIN 600 MG: 300 CAPSULE ORAL at 08:08

## 2021-08-30 RX ADMIN — CIPROFLOXACIN 400 MG: 2 INJECTION, SOLUTION INTRAVENOUS at 08:08

## 2021-08-30 RX ADMIN — CIPROFLOXACIN 500 MG: 250 TABLET ORAL at 08:08

## 2021-08-30 RX ADMIN — DOXYCYCLINE 100 MG: 100 INJECTION, POWDER, LYOPHILIZED, FOR SOLUTION INTRAVENOUS at 05:08

## 2021-08-30 RX ADMIN — FENTANYL CITRATE 50 MCG: 50 INJECTION INTRAMUSCULAR; INTRAVENOUS at 01:08

## 2021-08-30 RX ADMIN — SPIRONOLACTONE 25 MG: 25 TABLET, FILM COATED ORAL at 08:08

## 2021-08-30 RX ADMIN — OXYCODONE HYDROCHLORIDE AND ACETAMINOPHEN 500 MG: 500 TABLET ORAL at 08:08

## 2021-08-30 RX ADMIN — NEOSTIGMINE METHYLSULFATE 5 MG: 0.5 INJECTION INTRAVENOUS at 01:08

## 2021-08-30 RX ADMIN — SODIUM CHLORIDE 75 ML/HR: 0.9 INJECTION, SOLUTION INTRAVENOUS at 01:08

## 2021-08-31 LAB
ALBUMIN SERPL BCP-MCNC: 2 G/DL (ref 3.5–5.2)
ALP SERPL-CCNC: 88 U/L (ref 55–135)
ALT SERPL W/O P-5'-P-CCNC: 23 U/L (ref 10–44)
ANION GAP SERPL CALC-SCNC: 13 MMOL/L (ref 8–16)
AST SERPL-CCNC: 73 U/L (ref 10–40)
BASOPHILS # BLD AUTO: 0.04 K/UL (ref 0–0.2)
BASOPHILS NFR BLD: 0.3 % (ref 0–1.9)
BILIRUB SERPL-MCNC: 0.3 MG/DL (ref 0.1–1)
BUN SERPL-MCNC: 27 MG/DL (ref 6–20)
CALCIUM SERPL-MCNC: 9 MG/DL (ref 8.7–10.5)
CHLORIDE SERPL-SCNC: 111 MMOL/L (ref 95–110)
CO2 SERPL-SCNC: 21 MMOL/L (ref 23–29)
CREAT SERPL-MCNC: 0.8 MG/DL (ref 0.5–1.4)
DIFFERENTIAL METHOD: ABNORMAL
EOSINOPHIL # BLD AUTO: 0 K/UL (ref 0–0.5)
EOSINOPHIL NFR BLD: 0.1 % (ref 0–8)
ERYTHROCYTE [DISTWIDTH] IN BLOOD BY AUTOMATED COUNT: 21 % (ref 11.5–14.5)
EST. GFR  (AFRICAN AMERICAN): >60 ML/MIN/1.73 M^2
EST. GFR  (NON AFRICAN AMERICAN): >60 ML/MIN/1.73 M^2
GLUCOSE SERPL-MCNC: 163 MG/DL (ref 70–110)
HCT VFR BLD AUTO: 25.5 % (ref 40–54)
HGB BLD-MCNC: 7.4 G/DL (ref 14–18)
IMM GRANULOCYTES # BLD AUTO: 0.05 K/UL (ref 0–0.04)
IMM GRANULOCYTES NFR BLD AUTO: 0.4 % (ref 0–0.5)
LYMPHOCYTES # BLD AUTO: 0.9 K/UL (ref 1–4.8)
LYMPHOCYTES NFR BLD: 7 % (ref 18–48)
MCH RBC QN AUTO: 20 PG (ref 27–31)
MCHC RBC AUTO-ENTMCNC: 29 G/DL (ref 32–36)
MCV RBC AUTO: 69 FL (ref 82–98)
MONOCYTES # BLD AUTO: 0.8 K/UL (ref 0.3–1)
MONOCYTES NFR BLD: 6.4 % (ref 4–15)
NEUTROPHILS # BLD AUTO: 10.8 K/UL (ref 1.8–7.7)
NEUTROPHILS NFR BLD: 85.8 % (ref 38–73)
NRBC BLD-RTO: 0 /100 WBC
PLATELET # BLD AUTO: 608 K/UL (ref 150–450)
PMV BLD AUTO: 9.6 FL (ref 9.2–12.9)
POCT GLUCOSE: 143 MG/DL (ref 70–110)
POCT GLUCOSE: 158 MG/DL (ref 70–110)
POCT GLUCOSE: 229 MG/DL (ref 70–110)
POTASSIUM SERPL-SCNC: 3.6 MMOL/L (ref 3.5–5.1)
PROT SERPL-MCNC: 8.1 G/DL (ref 6–8.4)
RBC # BLD AUTO: 3.7 M/UL (ref 4.6–6.2)
SODIUM SERPL-SCNC: 145 MMOL/L (ref 136–145)
WBC # BLD AUTO: 12.63 K/UL (ref 3.9–12.7)

## 2021-08-31 PROCEDURE — 85025 COMPLETE CBC W/AUTO DIFF WBC: CPT | Performed by: INTERNAL MEDICINE

## 2021-08-31 PROCEDURE — 20600001 HC STEP DOWN PRIVATE ROOM

## 2021-08-31 PROCEDURE — 92610 EVALUATE SWALLOWING FUNCTION: CPT

## 2021-08-31 PROCEDURE — 97162 PT EVAL MOD COMPLEX 30 MIN: CPT

## 2021-08-31 PROCEDURE — 25000003 PHARM REV CODE 250: Performed by: INTERNAL MEDICINE

## 2021-08-31 PROCEDURE — 63600175 PHARM REV CODE 636 W HCPCS: Performed by: INTERNAL MEDICINE

## 2021-08-31 PROCEDURE — 63600175 PHARM REV CODE 636 W HCPCS: Performed by: STUDENT IN AN ORGANIZED HEALTH CARE EDUCATION/TRAINING PROGRAM

## 2021-08-31 PROCEDURE — 99233 PR SUBSEQUENT HOSPITAL CARE,LEVL III: ICD-10-PCS | Mod: ,,, | Performed by: INTERNAL MEDICINE

## 2021-08-31 PROCEDURE — 25000003 PHARM REV CODE 250: Performed by: STUDENT IN AN ORGANIZED HEALTH CARE EDUCATION/TRAINING PROGRAM

## 2021-08-31 PROCEDURE — 97167 OT EVAL HIGH COMPLEX 60 MIN: CPT

## 2021-08-31 PROCEDURE — 36415 COLL VENOUS BLD VENIPUNCTURE: CPT | Performed by: INTERNAL MEDICINE

## 2021-08-31 PROCEDURE — 99233 SBSQ HOSP IP/OBS HIGH 50: CPT | Mod: ,,, | Performed by: INTERNAL MEDICINE

## 2021-08-31 PROCEDURE — 80053 COMPREHEN METABOLIC PANEL: CPT | Performed by: INTERNAL MEDICINE

## 2021-08-31 RX ADMIN — ALPRAZOLAM 0.5 MG: 0.25 TABLET ORAL at 09:08

## 2021-08-31 RX ADMIN — TORSEMIDE 20 MG: 20 TABLET ORAL at 09:08

## 2021-08-31 RX ADMIN — SENNOSIDES AND DOCUSATE SODIUM 1 TABLET: 8.6; 5 TABLET ORAL at 09:08

## 2021-08-31 RX ADMIN — ACETAMINOPHEN 650 MG: 325 TABLET ORAL at 09:08

## 2021-08-31 RX ADMIN — ALPRAZOLAM 0.5 MG: 0.25 TABLET ORAL at 06:08

## 2021-08-31 RX ADMIN — INSULIN ASPART 1 UNITS: 100 INJECTION, SOLUTION INTRAVENOUS; SUBCUTANEOUS at 09:08

## 2021-08-31 RX ADMIN — DOXYCYCLINE 100 MG: 100 INJECTION, POWDER, LYOPHILIZED, FOR SOLUTION INTRAVENOUS at 05:08

## 2021-08-31 RX ADMIN — MUPIROCIN: 20 OINTMENT TOPICAL at 09:08

## 2021-08-31 RX ADMIN — SPIRONOLACTONE 25 MG: 25 TABLET, FILM COATED ORAL at 09:08

## 2021-08-31 RX ADMIN — OXYCODONE HYDROCHLORIDE AND ACETAMINOPHEN 500 MG: 500 TABLET ORAL at 09:08

## 2021-08-31 RX ADMIN — DOXYCYCLINE 100 MG: 100 INJECTION, POWDER, LYOPHILIZED, FOR SOLUTION INTRAVENOUS at 04:08

## 2021-08-31 RX ADMIN — POTASSIUM CHLORIDE 20 MEQ: 1500 TABLET, EXTENDED RELEASE ORAL at 09:08

## 2021-08-31 RX ADMIN — HYDROMORPHONE HYDROCHLORIDE 1 MG: 1 INJECTION, SOLUTION INTRAMUSCULAR; INTRAVENOUS; SUBCUTANEOUS at 01:08

## 2021-08-31 RX ADMIN — CIPROFLOXACIN 400 MG: 2 INJECTION, SOLUTION INTRAVENOUS at 08:08

## 2021-08-31 RX ADMIN — ACETAMINOPHEN 650 MG: 325 TABLET ORAL at 02:08

## 2021-08-31 RX ADMIN — ATORVASTATIN CALCIUM 40 MG: 20 TABLET, FILM COATED ORAL at 09:08

## 2021-08-31 RX ADMIN — ENOXAPARIN SODIUM 70 MG: 80 INJECTION SUBCUTANEOUS at 01:08

## 2021-08-31 RX ADMIN — OXYCODONE HYDROCHLORIDE 10 MG: 10 TABLET ORAL at 09:08

## 2021-08-31 RX ADMIN — TAMSULOSIN HYDROCHLORIDE 0.4 MG: 0.4 CAPSULE ORAL at 09:08

## 2021-08-31 RX ADMIN — Medication 324 MG: at 02:08

## 2021-08-31 RX ADMIN — MAGNESIUM OXIDE TAB 400 MG (241.3 MG ELEMENTAL MG) 400 MG: 400 (241.3 MG) TAB at 09:08

## 2021-08-31 RX ADMIN — CARVEDILOL 3.12 MG: 3.12 TABLET, FILM COATED ORAL at 09:08

## 2021-08-31 RX ADMIN — CIPROFLOXACIN 400 MG: 2 INJECTION, SOLUTION INTRAVENOUS at 09:08

## 2021-08-31 RX ADMIN — GABAPENTIN 600 MG: 300 CAPSULE ORAL at 09:08

## 2021-08-31 RX ADMIN — ACETAMINOPHEN 650 MG: 325 TABLET ORAL at 05:08

## 2021-08-31 RX ADMIN — Medication 324 MG: at 04:08

## 2021-08-31 RX ADMIN — SODIUM CHLORIDE 75 ML/HR: 0.9 INJECTION, SOLUTION INTRAVENOUS at 03:08

## 2021-08-31 RX ADMIN — OXYCODONE HYDROCHLORIDE 10 MG: 10 TABLET ORAL at 06:08

## 2021-08-31 RX ADMIN — SODIUM CHLORIDE 75 ML/HR: 0.9 INJECTION, SOLUTION INTRAVENOUS at 09:08

## 2021-08-31 RX ADMIN — GABAPENTIN 600 MG: 300 CAPSULE ORAL at 02:08

## 2021-08-31 RX ADMIN — INSULIN ASPART 4 UNITS: 100 INJECTION, SOLUTION INTRAVENOUS; SUBCUTANEOUS at 05:08

## 2021-08-31 RX ADMIN — ENOXAPARIN SODIUM 70 MG: 80 INJECTION SUBCUTANEOUS at 02:08

## 2021-08-31 RX ADMIN — MELATONIN TAB 3 MG 6 MG: 3 TAB at 09:08

## 2021-09-01 LAB
ABO + RH BLD: NORMAL
ALBUMIN SERPL BCP-MCNC: 1.8 G/DL (ref 3.5–5.2)
ALP SERPL-CCNC: 73 U/L (ref 55–135)
ALT SERPL W/O P-5'-P-CCNC: 13 U/L (ref 10–44)
ANION GAP SERPL CALC-SCNC: 7 MMOL/L (ref 8–16)
AST SERPL-CCNC: 40 U/L (ref 10–40)
BASOPHILS # BLD AUTO: 0.03 K/UL (ref 0–0.2)
BASOPHILS NFR BLD: 0.3 % (ref 0–1.9)
BILIRUB SERPL-MCNC: 0.3 MG/DL (ref 0.1–1)
BLD GP AB SCN CELLS X3 SERPL QL: NORMAL
BLD PROD TYP BPU: NORMAL
BLOOD UNIT EXPIRATION DATE: NORMAL
BLOOD UNIT TYPE CODE: 6200
BLOOD UNIT TYPE: NORMAL
BUN SERPL-MCNC: 15 MG/DL (ref 6–20)
CALCIUM SERPL-MCNC: 8.6 MG/DL (ref 8.7–10.5)
CHLORIDE SERPL-SCNC: 107 MMOL/L (ref 95–110)
CO2 SERPL-SCNC: 23 MMOL/L (ref 23–29)
CODING SYSTEM: NORMAL
CREAT SERPL-MCNC: 0.7 MG/DL (ref 0.5–1.4)
DIFFERENTIAL METHOD: ABNORMAL
DISPENSE STATUS: NORMAL
EOSINOPHIL # BLD AUTO: 0.1 K/UL (ref 0–0.5)
EOSINOPHIL NFR BLD: 0.6 % (ref 0–8)
ERYTHROCYTE [DISTWIDTH] IN BLOOD BY AUTOMATED COUNT: 20.4 % (ref 11.5–14.5)
EST. GFR  (AFRICAN AMERICAN): >60 ML/MIN/1.73 M^2
EST. GFR  (NON AFRICAN AMERICAN): >60 ML/MIN/1.73 M^2
GLUCOSE SERPL-MCNC: 170 MG/DL (ref 70–110)
GLUCOSE SERPL-MCNC: 246 MG/DL (ref 70–110)
HCT VFR BLD AUTO: 23.7 % (ref 40–54)
HGB BLD-MCNC: 6.6 G/DL (ref 14–18)
IMM GRANULOCYTES # BLD AUTO: 0.05 K/UL (ref 0–0.04)
IMM GRANULOCYTES NFR BLD AUTO: 0.5 % (ref 0–0.5)
LYMPHOCYTES # BLD AUTO: 1.2 K/UL (ref 1–4.8)
LYMPHOCYTES NFR BLD: 10.6 % (ref 18–48)
MCH RBC QN AUTO: 19.5 PG (ref 27–31)
MCHC RBC AUTO-ENTMCNC: 27.8 G/DL (ref 32–36)
MCV RBC AUTO: 70 FL (ref 82–98)
MONOCYTES # BLD AUTO: 0.7 K/UL (ref 0.3–1)
MONOCYTES NFR BLD: 6.2 % (ref 4–15)
NEUTROPHILS # BLD AUTO: 8.9 K/UL (ref 1.8–7.7)
NEUTROPHILS NFR BLD: 81.8 % (ref 38–73)
NRBC BLD-RTO: 0 /100 WBC
PLATELET # BLD AUTO: 518 K/UL (ref 150–450)
PMV BLD AUTO: 9.7 FL (ref 9.2–12.9)
POCT GLUCOSE: 173 MG/DL (ref 70–110)
POCT GLUCOSE: 181 MG/DL (ref 70–110)
POCT GLUCOSE: 196 MG/DL (ref 70–110)
POCT GLUCOSE: 210 MG/DL (ref 70–110)
POCT GLUCOSE: 246 MG/DL (ref 70–110)
POTASSIUM SERPL-SCNC: 3.9 MMOL/L (ref 3.5–5.1)
PROT SERPL-MCNC: 7 G/DL (ref 6–8.4)
RBC # BLD AUTO: 3.39 M/UL (ref 4.6–6.2)
SODIUM SERPL-SCNC: 137 MMOL/L (ref 136–145)
TRANS ERYTHROCYTES VOL PATIENT: NORMAL ML
WBC # BLD AUTO: 10.81 K/UL (ref 3.9–12.7)

## 2021-09-01 PROCEDURE — 36415 COLL VENOUS BLD VENIPUNCTURE: CPT | Performed by: INTERNAL MEDICINE

## 2021-09-01 PROCEDURE — 63600175 PHARM REV CODE 636 W HCPCS: Performed by: INTERNAL MEDICINE

## 2021-09-01 PROCEDURE — 85025 COMPLETE CBC W/AUTO DIFF WBC: CPT | Performed by: INTERNAL MEDICINE

## 2021-09-01 PROCEDURE — 99233 PR SUBSEQUENT HOSPITAL CARE,LEVL III: ICD-10-PCS | Mod: ,,, | Performed by: INTERNAL MEDICINE

## 2021-09-01 PROCEDURE — 25000003 PHARM REV CODE 250: Performed by: STUDENT IN AN ORGANIZED HEALTH CARE EDUCATION/TRAINING PROGRAM

## 2021-09-01 PROCEDURE — P9021 RED BLOOD CELLS UNIT: HCPCS | Performed by: INTERNAL MEDICINE

## 2021-09-01 PROCEDURE — 80053 COMPREHEN METABOLIC PANEL: CPT | Performed by: INTERNAL MEDICINE

## 2021-09-01 PROCEDURE — 20600001 HC STEP DOWN PRIVATE ROOM

## 2021-09-01 PROCEDURE — 86900 BLOOD TYPING SEROLOGIC ABO: CPT | Performed by: INTERNAL MEDICINE

## 2021-09-01 PROCEDURE — 25000003 PHARM REV CODE 250: Performed by: INTERNAL MEDICINE

## 2021-09-01 PROCEDURE — 86920 COMPATIBILITY TEST SPIN: CPT | Performed by: INTERNAL MEDICINE

## 2021-09-01 PROCEDURE — 99233 SBSQ HOSP IP/OBS HIGH 50: CPT | Mod: ,,, | Performed by: INTERNAL MEDICINE

## 2021-09-01 RX ORDER — HYDROCODONE BITARTRATE AND ACETAMINOPHEN 500; 5 MG/1; MG/1
TABLET ORAL
Status: DISCONTINUED | OUTPATIENT
Start: 2021-09-01 | End: 2021-09-10 | Stop reason: HOSPADM

## 2021-09-01 RX ADMIN — GABAPENTIN 600 MG: 300 CAPSULE ORAL at 08:09

## 2021-09-01 RX ADMIN — ACETAMINOPHEN 650 MG: 325 TABLET ORAL at 02:09

## 2021-09-01 RX ADMIN — ACETAMINOPHEN 650 MG: 325 TABLET ORAL at 05:09

## 2021-09-01 RX ADMIN — ALPRAZOLAM 0.5 MG: 0.25 TABLET ORAL at 08:09

## 2021-09-01 RX ADMIN — TAMSULOSIN HYDROCHLORIDE 0.4 MG: 0.4 CAPSULE ORAL at 08:09

## 2021-09-01 RX ADMIN — CARVEDILOL 3.12 MG: 3.12 TABLET, FILM COATED ORAL at 08:09

## 2021-09-01 RX ADMIN — CIPROFLOXACIN 400 MG: 2 INJECTION, SOLUTION INTRAVENOUS at 09:09

## 2021-09-01 RX ADMIN — TORSEMIDE 20 MG: 20 TABLET ORAL at 08:09

## 2021-09-01 RX ADMIN — ACETAMINOPHEN 650 MG: 325 TABLET ORAL at 09:09

## 2021-09-01 RX ADMIN — OXYCODONE HYDROCHLORIDE AND ACETAMINOPHEN 500 MG: 500 TABLET ORAL at 08:09

## 2021-09-01 RX ADMIN — Medication 324 MG: at 05:09

## 2021-09-01 RX ADMIN — MAGNESIUM OXIDE TAB 400 MG (241.3 MG ELEMENTAL MG) 400 MG: 400 (241.3 MG) TAB at 08:09

## 2021-09-01 RX ADMIN — INSULIN ASPART 2 UNITS: 100 INJECTION, SOLUTION INTRAVENOUS; SUBCUTANEOUS at 08:09

## 2021-09-01 RX ADMIN — INSULIN ASPART 2 UNITS: 100 INJECTION, SOLUTION INTRAVENOUS; SUBCUTANEOUS at 09:09

## 2021-09-01 RX ADMIN — DOXYCYCLINE 100 MG: 100 INJECTION, POWDER, LYOPHILIZED, FOR SOLUTION INTRAVENOUS at 04:09

## 2021-09-01 RX ADMIN — INSULIN ASPART 2 UNITS: 100 INJECTION, SOLUTION INTRAVENOUS; SUBCUTANEOUS at 05:09

## 2021-09-01 RX ADMIN — SPIRONOLACTONE 25 MG: 25 TABLET, FILM COATED ORAL at 08:09

## 2021-09-01 RX ADMIN — POTASSIUM CHLORIDE 20 MEQ: 1500 TABLET, EXTENDED RELEASE ORAL at 09:09

## 2021-09-01 RX ADMIN — SENNOSIDES AND DOCUSATE SODIUM 1 TABLET: 8.6; 5 TABLET ORAL at 08:09

## 2021-09-01 RX ADMIN — CIPROFLOXACIN 400 MG: 2 INJECTION, SOLUTION INTRAVENOUS at 08:09

## 2021-09-01 RX ADMIN — GABAPENTIN 600 MG: 300 CAPSULE ORAL at 02:09

## 2021-09-01 RX ADMIN — INSULIN ASPART 4 UNITS: 100 INJECTION, SOLUTION INTRAVENOUS; SUBCUTANEOUS at 12:09

## 2021-09-01 RX ADMIN — OXYCODONE HYDROCHLORIDE 10 MG: 10 TABLET ORAL at 08:09

## 2021-09-01 RX ADMIN — MELATONIN TAB 3 MG 6 MG: 3 TAB at 04:09

## 2021-09-01 RX ADMIN — ATORVASTATIN CALCIUM 40 MG: 20 TABLET, FILM COATED ORAL at 08:09

## 2021-09-01 RX ADMIN — POTASSIUM CHLORIDE 20 MEQ: 1500 TABLET, EXTENDED RELEASE ORAL at 08:09

## 2021-09-01 RX ADMIN — MUPIROCIN: 20 OINTMENT TOPICAL at 09:09

## 2021-09-01 RX ADMIN — MUPIROCIN: 20 OINTMENT TOPICAL at 08:09

## 2021-09-01 RX ADMIN — ACETAMINOPHEN 650 MG: 325 TABLET ORAL at 01:09

## 2021-09-01 RX ADMIN — ENOXAPARIN SODIUM 70 MG: 80 INJECTION SUBCUTANEOUS at 02:09

## 2021-09-01 RX ADMIN — ENOXAPARIN SODIUM 70 MG: 80 INJECTION SUBCUTANEOUS at 01:09

## 2021-09-01 RX ADMIN — Medication 324 MG: at 12:09

## 2021-09-01 RX ADMIN — MELATONIN TAB 3 MG 6 MG: 3 TAB at 08:09

## 2021-09-02 LAB
ALBUMIN SERPL BCP-MCNC: 1.7 G/DL (ref 3.5–5.2)
ALP SERPL-CCNC: 76 U/L (ref 55–135)
ALT SERPL W/O P-5'-P-CCNC: 14 U/L (ref 10–44)
ANION GAP SERPL CALC-SCNC: 9 MMOL/L (ref 8–16)
AST SERPL-CCNC: 35 U/L (ref 10–40)
BASOPHILS # BLD AUTO: 0.04 K/UL (ref 0–0.2)
BASOPHILS NFR BLD: 0.4 % (ref 0–1.9)
BILIRUB SERPL-MCNC: 1.8 MG/DL (ref 0.1–1)
BUN SERPL-MCNC: 14 MG/DL (ref 6–20)
CALCIUM SERPL-MCNC: 8.5 MG/DL (ref 8.7–10.5)
CHLORIDE SERPL-SCNC: 103 MMOL/L (ref 95–110)
CO2 SERPL-SCNC: 24 MMOL/L (ref 23–29)
CREAT SERPL-MCNC: 0.6 MG/DL (ref 0.5–1.4)
DIFFERENTIAL METHOD: ABNORMAL
EOSINOPHIL # BLD AUTO: 0.1 K/UL (ref 0–0.5)
EOSINOPHIL NFR BLD: 1.1 % (ref 0–8)
ERYTHROCYTE [DISTWIDTH] IN BLOOD BY AUTOMATED COUNT: 21 % (ref 11.5–14.5)
EST. GFR  (AFRICAN AMERICAN): >60 ML/MIN/1.73 M^2
EST. GFR  (NON AFRICAN AMERICAN): >60 ML/MIN/1.73 M^2
GLUCOSE SERPL-MCNC: 173 MG/DL (ref 70–110)
HCT VFR BLD AUTO: 26.4 % (ref 40–54)
HGB BLD-MCNC: 7.7 G/DL (ref 14–18)
IMM GRANULOCYTES # BLD AUTO: 0.06 K/UL (ref 0–0.04)
IMM GRANULOCYTES NFR BLD AUTO: 0.6 % (ref 0–0.5)
LYMPHOCYTES # BLD AUTO: 1.5 K/UL (ref 1–4.8)
LYMPHOCYTES NFR BLD: 16.2 % (ref 18–48)
MCH RBC QN AUTO: 20.7 PG (ref 27–31)
MCHC RBC AUTO-ENTMCNC: 29.2 G/DL (ref 32–36)
MCV RBC AUTO: 71 FL (ref 82–98)
MONOCYTES # BLD AUTO: 0.6 K/UL (ref 0.3–1)
MONOCYTES NFR BLD: 6.4 % (ref 4–15)
NEUTROPHILS # BLD AUTO: 7.1 K/UL (ref 1.8–7.7)
NEUTROPHILS NFR BLD: 75.3 % (ref 38–73)
NRBC BLD-RTO: 0 /100 WBC
PLATELET # BLD AUTO: 500 K/UL (ref 150–450)
PMV BLD AUTO: 10.2 FL (ref 9.2–12.9)
POCT GLUCOSE: 169 MG/DL (ref 70–110)
POCT GLUCOSE: 208 MG/DL (ref 70–110)
POCT GLUCOSE: 249 MG/DL (ref 70–110)
POCT GLUCOSE: 267 MG/DL (ref 70–110)
POTASSIUM SERPL-SCNC: 3.4 MMOL/L (ref 3.5–5.1)
PROT SERPL-MCNC: 6.6 G/DL (ref 6–8.4)
RBC # BLD AUTO: 3.72 M/UL (ref 4.6–6.2)
SODIUM SERPL-SCNC: 136 MMOL/L (ref 136–145)
WBC # BLD AUTO: 9.38 K/UL (ref 3.9–12.7)

## 2021-09-02 PROCEDURE — 36415 COLL VENOUS BLD VENIPUNCTURE: CPT | Performed by: INTERNAL MEDICINE

## 2021-09-02 PROCEDURE — 63600175 PHARM REV CODE 636 W HCPCS: Performed by: INTERNAL MEDICINE

## 2021-09-02 PROCEDURE — 25000003 PHARM REV CODE 250: Performed by: STUDENT IN AN ORGANIZED HEALTH CARE EDUCATION/TRAINING PROGRAM

## 2021-09-02 PROCEDURE — 63600175 PHARM REV CODE 636 W HCPCS: Performed by: STUDENT IN AN ORGANIZED HEALTH CARE EDUCATION/TRAINING PROGRAM

## 2021-09-02 PROCEDURE — 94799 UNLISTED PULMONARY SVC/PX: CPT

## 2021-09-02 PROCEDURE — 20600001 HC STEP DOWN PRIVATE ROOM

## 2021-09-02 PROCEDURE — 25000003 PHARM REV CODE 250: Performed by: INTERNAL MEDICINE

## 2021-09-02 PROCEDURE — 36430 TRANSFUSION BLD/BLD COMPNT: CPT

## 2021-09-02 PROCEDURE — 80053 COMPREHEN METABOLIC PANEL: CPT | Performed by: INTERNAL MEDICINE

## 2021-09-02 PROCEDURE — 99232 PR SUBSEQUENT HOSPITAL CARE,LEVL II: ICD-10-PCS | Mod: ,,, | Performed by: INTERNAL MEDICINE

## 2021-09-02 PROCEDURE — 92526 ORAL FUNCTION THERAPY: CPT

## 2021-09-02 PROCEDURE — 99232 SBSQ HOSP IP/OBS MODERATE 35: CPT | Mod: ,,, | Performed by: INTERNAL MEDICINE

## 2021-09-02 PROCEDURE — 97535 SELF CARE MNGMENT TRAINING: CPT

## 2021-09-02 PROCEDURE — 85025 COMPLETE CBC W/AUTO DIFF WBC: CPT | Performed by: INTERNAL MEDICINE

## 2021-09-02 RX ORDER — CLOPIDOGREL BISULFATE 75 MG/1
75 TABLET ORAL DAILY
Status: DISCONTINUED | OUTPATIENT
Start: 2021-09-03 | End: 2021-09-10 | Stop reason: HOSPADM

## 2021-09-02 RX ADMIN — DOXYCYCLINE 100 MG: 100 INJECTION, POWDER, LYOPHILIZED, FOR SOLUTION INTRAVENOUS at 05:09

## 2021-09-02 RX ADMIN — HYDROMORPHONE HYDROCHLORIDE 1 MG: 1 INJECTION, SOLUTION INTRAMUSCULAR; INTRAVENOUS; SUBCUTANEOUS at 02:09

## 2021-09-02 RX ADMIN — ALPRAZOLAM 0.5 MG: 0.25 TABLET ORAL at 02:09

## 2021-09-02 RX ADMIN — OXYCODONE HYDROCHLORIDE 10 MG: 10 TABLET ORAL at 12:09

## 2021-09-02 RX ADMIN — SENNOSIDES AND DOCUSATE SODIUM 1 TABLET: 8.6; 5 TABLET ORAL at 09:09

## 2021-09-02 RX ADMIN — Medication 324 MG: at 12:09

## 2021-09-02 RX ADMIN — INSULIN ASPART 4 UNITS: 100 INJECTION, SOLUTION INTRAVENOUS; SUBCUTANEOUS at 05:09

## 2021-09-02 RX ADMIN — ATORVASTATIN CALCIUM 40 MG: 20 TABLET, FILM COATED ORAL at 09:09

## 2021-09-02 RX ADMIN — INSULIN ASPART 2 UNITS: 100 INJECTION, SOLUTION INTRAVENOUS; SUBCUTANEOUS at 08:09

## 2021-09-02 RX ADMIN — CIPROFLOXACIN 400 MG: 2 INJECTION, SOLUTION INTRAVENOUS at 09:09

## 2021-09-02 RX ADMIN — MUPIROCIN: 20 OINTMENT TOPICAL at 09:09

## 2021-09-02 RX ADMIN — INSULIN ASPART 3 UNITS: 100 INJECTION, SOLUTION INTRAVENOUS; SUBCUTANEOUS at 09:09

## 2021-09-02 RX ADMIN — APIXABAN 5 MG: 5 TABLET, FILM COATED ORAL at 09:09

## 2021-09-02 RX ADMIN — TORSEMIDE 20 MG: 20 TABLET ORAL at 09:09

## 2021-09-02 RX ADMIN — OXYCODONE HYDROCHLORIDE AND ACETAMINOPHEN 500 MG: 500 TABLET ORAL at 09:09

## 2021-09-02 RX ADMIN — ENOXAPARIN SODIUM 70 MG: 80 INJECTION SUBCUTANEOUS at 01:09

## 2021-09-02 RX ADMIN — GABAPENTIN 600 MG: 300 CAPSULE ORAL at 09:09

## 2021-09-02 RX ADMIN — ACETAMINOPHEN 650 MG: 325 TABLET ORAL at 05:09

## 2021-09-02 RX ADMIN — HYDROMORPHONE HYDROCHLORIDE 1 MG: 1 INJECTION, SOLUTION INTRAMUSCULAR; INTRAVENOUS; SUBCUTANEOUS at 09:09

## 2021-09-02 RX ADMIN — OXYCODONE HYDROCHLORIDE 10 MG: 10 TABLET ORAL at 11:09

## 2021-09-02 RX ADMIN — SPIRONOLACTONE 25 MG: 25 TABLET, FILM COATED ORAL at 09:09

## 2021-09-02 RX ADMIN — INSULIN ASPART 4 UNITS: 100 INJECTION, SOLUTION INTRAVENOUS; SUBCUTANEOUS at 12:09

## 2021-09-02 RX ADMIN — Medication 324 MG: at 05:09

## 2021-09-02 RX ADMIN — MAGNESIUM OXIDE TAB 400 MG (241.3 MG ELEMENTAL MG) 400 MG: 400 (241.3 MG) TAB at 09:09

## 2021-09-02 RX ADMIN — CARVEDILOL 3.12 MG: 3.12 TABLET, FILM COATED ORAL at 09:09

## 2021-09-02 RX ADMIN — TAMSULOSIN HYDROCHLORIDE 0.4 MG: 0.4 CAPSULE ORAL at 09:09

## 2021-09-02 RX ADMIN — OXYCODONE HYDROCHLORIDE 10 MG: 10 TABLET ORAL at 05:09

## 2021-09-02 RX ADMIN — POTASSIUM CHLORIDE 20 MEQ: 1500 TABLET, EXTENDED RELEASE ORAL at 09:09

## 2021-09-02 RX ADMIN — MELATONIN TAB 3 MG 6 MG: 3 TAB at 11:09

## 2021-09-02 RX ADMIN — ENOXAPARIN SODIUM 70 MG: 80 INJECTION SUBCUTANEOUS at 02:09

## 2021-09-02 RX ADMIN — ACETAMINOPHEN 650 MG: 325 TABLET ORAL at 09:09

## 2021-09-02 RX ADMIN — GABAPENTIN 600 MG: 300 CAPSULE ORAL at 02:09

## 2021-09-03 PROBLEM — R41.0 DELIRIUM: Status: RESOLVED | Noted: 2021-08-29 | Resolved: 2021-09-03

## 2021-09-03 LAB
ALBUMIN SERPL BCP-MCNC: 1.8 G/DL (ref 3.5–5.2)
ALP SERPL-CCNC: 136 U/L (ref 55–135)
ALT SERPL W/O P-5'-P-CCNC: 20 U/L (ref 10–44)
ANION GAP SERPL CALC-SCNC: 9 MMOL/L (ref 8–16)
ANISOCYTOSIS BLD QL SMEAR: SLIGHT
AST SERPL-CCNC: 42 U/L (ref 10–40)
BASOPHILS # BLD AUTO: 0.07 K/UL (ref 0–0.2)
BASOPHILS NFR BLD: 0.8 % (ref 0–1.9)
BILIRUB SERPL-MCNC: 0.4 MG/DL (ref 0.1–1)
BUN SERPL-MCNC: 14 MG/DL (ref 6–20)
CALCIUM SERPL-MCNC: 8.9 MG/DL (ref 8.7–10.5)
CHLORIDE SERPL-SCNC: 102 MMOL/L (ref 95–110)
CO2 SERPL-SCNC: 24 MMOL/L (ref 23–29)
CREAT SERPL-MCNC: 0.6 MG/DL (ref 0.5–1.4)
DACRYOCYTES BLD QL SMEAR: ABNORMAL
DIFFERENTIAL METHOD: ABNORMAL
EOSINOPHIL # BLD AUTO: 0.1 K/UL (ref 0–0.5)
EOSINOPHIL NFR BLD: 1.7 % (ref 0–8)
ERYTHROCYTE [DISTWIDTH] IN BLOOD BY AUTOMATED COUNT: 21 % (ref 11.5–14.5)
EST. GFR  (AFRICAN AMERICAN): >60 ML/MIN/1.73 M^2
EST. GFR  (NON AFRICAN AMERICAN): >60 ML/MIN/1.73 M^2
GLUCOSE SERPL-MCNC: 110 MG/DL (ref 70–110)
HCT VFR BLD AUTO: 26.4 % (ref 40–54)
HGB BLD-MCNC: 7.7 G/DL (ref 14–18)
HYPOCHROMIA BLD QL SMEAR: ABNORMAL
IMM GRANULOCYTES # BLD AUTO: 0.04 K/UL (ref 0–0.04)
IMM GRANULOCYTES NFR BLD AUTO: 0.5 % (ref 0–0.5)
LYMPHOCYTES # BLD AUTO: 1.5 K/UL (ref 1–4.8)
LYMPHOCYTES NFR BLD: 18.5 % (ref 18–48)
MCH RBC QN AUTO: 20.5 PG (ref 27–31)
MCHC RBC AUTO-ENTMCNC: 29.2 G/DL (ref 32–36)
MCV RBC AUTO: 70 FL (ref 82–98)
MONOCYTES # BLD AUTO: 0.5 K/UL (ref 0.3–1)
MONOCYTES NFR BLD: 6.5 % (ref 4–15)
NEUTROPHILS # BLD AUTO: 6 K/UL (ref 1.8–7.7)
NEUTROPHILS NFR BLD: 72 % (ref 38–73)
NRBC BLD-RTO: 0 /100 WBC
OVALOCYTES BLD QL SMEAR: ABNORMAL
PLATELET # BLD AUTO: 479 K/UL (ref 150–450)
PLATELET BLD QL SMEAR: ABNORMAL
PMV BLD AUTO: 9.3 FL (ref 9.2–12.9)
POCT GLUCOSE: 163 MG/DL (ref 70–110)
POCT GLUCOSE: 181 MG/DL (ref 70–110)
POCT GLUCOSE: 197 MG/DL (ref 70–110)
POCT GLUCOSE: 205 MG/DL (ref 70–110)
POIKILOCYTOSIS BLD QL SMEAR: SLIGHT
POLYCHROMASIA BLD QL SMEAR: ABNORMAL
POTASSIUM SERPL-SCNC: 4.2 MMOL/L (ref 3.5–5.1)
PROT SERPL-MCNC: 6.9 G/DL (ref 6–8.4)
RBC # BLD AUTO: 3.76 M/UL (ref 4.6–6.2)
SODIUM SERPL-SCNC: 135 MMOL/L (ref 136–145)
WBC # BLD AUTO: 8.29 K/UL (ref 3.9–12.7)

## 2021-09-03 PROCEDURE — 25000003 PHARM REV CODE 250: Performed by: INTERNAL MEDICINE

## 2021-09-03 PROCEDURE — 97535 SELF CARE MNGMENT TRAINING: CPT

## 2021-09-03 PROCEDURE — 99233 SBSQ HOSP IP/OBS HIGH 50: CPT | Mod: ,,, | Performed by: INTERNAL MEDICINE

## 2021-09-03 PROCEDURE — 99233 PR SUBSEQUENT HOSPITAL CARE,LEVL III: ICD-10-PCS | Mod: ,,, | Performed by: INTERNAL MEDICINE

## 2021-09-03 PROCEDURE — 97168 OT RE-EVAL EST PLAN CARE: CPT

## 2021-09-03 PROCEDURE — 25000003 PHARM REV CODE 250: Performed by: STUDENT IN AN ORGANIZED HEALTH CARE EDUCATION/TRAINING PROGRAM

## 2021-09-03 PROCEDURE — 36415 COLL VENOUS BLD VENIPUNCTURE: CPT | Performed by: INTERNAL MEDICINE

## 2021-09-03 PROCEDURE — 63600175 PHARM REV CODE 636 W HCPCS: Performed by: STUDENT IN AN ORGANIZED HEALTH CARE EDUCATION/TRAINING PROGRAM

## 2021-09-03 PROCEDURE — 97530 THERAPEUTIC ACTIVITIES: CPT

## 2021-09-03 PROCEDURE — 80053 COMPREHEN METABOLIC PANEL: CPT | Performed by: INTERNAL MEDICINE

## 2021-09-03 PROCEDURE — 85025 COMPLETE CBC W/AUTO DIFF WBC: CPT | Performed by: INTERNAL MEDICINE

## 2021-09-03 PROCEDURE — 92526 ORAL FUNCTION THERAPY: CPT

## 2021-09-03 PROCEDURE — 20600001 HC STEP DOWN PRIVATE ROOM

## 2021-09-03 RX ORDER — DOXYCYCLINE HYCLATE 100 MG
100 TABLET ORAL EVERY 12 HOURS
Status: DISCONTINUED | OUTPATIENT
Start: 2021-09-03 | End: 2021-09-07

## 2021-09-03 RX ORDER — CIPROFLOXACIN 500 MG/1
500 TABLET ORAL EVERY 12 HOURS
Status: DISCONTINUED | OUTPATIENT
Start: 2021-09-03 | End: 2021-09-07

## 2021-09-03 RX ADMIN — OXYCODONE HYDROCHLORIDE AND ACETAMINOPHEN 500 MG: 500 TABLET ORAL at 09:09

## 2021-09-03 RX ADMIN — HYDROMORPHONE HYDROCHLORIDE 1 MG: 1 INJECTION, SOLUTION INTRAMUSCULAR; INTRAVENOUS; SUBCUTANEOUS at 12:09

## 2021-09-03 RX ADMIN — INSULIN ASPART 2 UNITS: 100 INJECTION, SOLUTION INTRAVENOUS; SUBCUTANEOUS at 05:09

## 2021-09-03 RX ADMIN — HYDROMORPHONE HYDROCHLORIDE 1 MG: 1 INJECTION, SOLUTION INTRAMUSCULAR; INTRAVENOUS; SUBCUTANEOUS at 06:09

## 2021-09-03 RX ADMIN — SENNOSIDES AND DOCUSATE SODIUM 1 TABLET: 8.6; 5 TABLET ORAL at 09:09

## 2021-09-03 RX ADMIN — OXYCODONE HYDROCHLORIDE 10 MG: 10 TABLET ORAL at 09:09

## 2021-09-03 RX ADMIN — HYDROMORPHONE HYDROCHLORIDE 1 MG: 1 INJECTION, SOLUTION INTRAMUSCULAR; INTRAVENOUS; SUBCUTANEOUS at 10:09

## 2021-09-03 RX ADMIN — APIXABAN 5 MG: 5 TABLET, FILM COATED ORAL at 09:09

## 2021-09-03 RX ADMIN — ACETAMINOPHEN 650 MG: 325 TABLET ORAL at 06:09

## 2021-09-03 RX ADMIN — GABAPENTIN 600 MG: 300 CAPSULE ORAL at 09:09

## 2021-09-03 RX ADMIN — CARVEDILOL 3.12 MG: 3.12 TABLET, FILM COATED ORAL at 09:09

## 2021-09-03 RX ADMIN — CIPROFLOXACIN 500 MG: 500 TABLET, FILM COATED ORAL at 09:09

## 2021-09-03 RX ADMIN — MELATONIN TAB 3 MG 6 MG: 3 TAB at 10:09

## 2021-09-03 RX ADMIN — TAMSULOSIN HYDROCHLORIDE 0.4 MG: 0.4 CAPSULE ORAL at 09:09

## 2021-09-03 RX ADMIN — CLOPIDOGREL 75 MG: 75 TABLET, FILM COATED ORAL at 09:09

## 2021-09-03 RX ADMIN — MAGNESIUM OXIDE TAB 400 MG (241.3 MG ELEMENTAL MG) 400 MG: 400 (241.3 MG) TAB at 09:09

## 2021-09-03 RX ADMIN — TORSEMIDE 20 MG: 20 TABLET ORAL at 09:09

## 2021-09-03 RX ADMIN — INSULIN ASPART 2 UNITS: 100 INJECTION, SOLUTION INTRAVENOUS; SUBCUTANEOUS at 09:09

## 2021-09-03 RX ADMIN — OXYCODONE HYDROCHLORIDE 10 MG: 10 TABLET ORAL at 04:09

## 2021-09-03 RX ADMIN — GABAPENTIN 600 MG: 300 CAPSULE ORAL at 02:09

## 2021-09-03 RX ADMIN — INSULIN ASPART 2 UNITS: 100 INJECTION, SOLUTION INTRAVENOUS; SUBCUTANEOUS at 11:09

## 2021-09-03 RX ADMIN — ATORVASTATIN CALCIUM 40 MG: 20 TABLET, FILM COATED ORAL at 09:09

## 2021-09-03 RX ADMIN — ACETAMINOPHEN 650 MG: 325 TABLET ORAL at 09:09

## 2021-09-03 RX ADMIN — POTASSIUM CHLORIDE 20 MEQ: 1500 TABLET, EXTENDED RELEASE ORAL at 09:09

## 2021-09-03 RX ADMIN — CIPROFLOXACIN 500 MG: 500 TABLET, FILM COATED ORAL at 10:09

## 2021-09-03 RX ADMIN — ACETAMINOPHEN 650 MG: 325 TABLET ORAL at 05:09

## 2021-09-03 RX ADMIN — INSULIN ASPART 2 UNITS: 100 INJECTION, SOLUTION INTRAVENOUS; SUBCUTANEOUS at 08:09

## 2021-09-03 RX ADMIN — SPIRONOLACTONE 25 MG: 25 TABLET, FILM COATED ORAL at 09:09

## 2021-09-03 RX ADMIN — DOXYCYCLINE 100 MG: 100 INJECTION, POWDER, LYOPHILIZED, FOR SOLUTION INTRAVENOUS at 06:09

## 2021-09-03 RX ADMIN — MUPIROCIN: 20 OINTMENT TOPICAL at 09:09

## 2021-09-03 RX ADMIN — DOXYCYCLINE HYCLATE 100 MG: 100 TABLET, COATED ORAL at 05:09

## 2021-09-03 RX ADMIN — Medication 324 MG: at 05:09

## 2021-09-03 RX ADMIN — Medication 324 MG: at 11:09

## 2021-09-03 RX ADMIN — HYDROMORPHONE HYDROCHLORIDE 1 MG: 1 INJECTION, SOLUTION INTRAMUSCULAR; INTRAVENOUS; SUBCUTANEOUS at 02:09

## 2021-09-04 LAB
ALBUMIN SERPL BCP-MCNC: 2 G/DL (ref 3.5–5.2)
ALP SERPL-CCNC: 168 U/L (ref 55–135)
ALT SERPL W/O P-5'-P-CCNC: 25 U/L (ref 10–44)
ANION GAP SERPL CALC-SCNC: 8 MMOL/L (ref 8–16)
AST SERPL-CCNC: 41 U/L (ref 10–40)
BASOPHILS # BLD AUTO: 0.06 K/UL (ref 0–0.2)
BASOPHILS NFR BLD: 0.7 % (ref 0–1.9)
BILIRUB SERPL-MCNC: 0.3 MG/DL (ref 0.1–1)
BUN SERPL-MCNC: 13 MG/DL (ref 6–20)
CALCIUM SERPL-MCNC: 9.2 MG/DL (ref 8.7–10.5)
CHLORIDE SERPL-SCNC: 101 MMOL/L (ref 95–110)
CO2 SERPL-SCNC: 25 MMOL/L (ref 23–29)
CREAT SERPL-MCNC: 0.6 MG/DL (ref 0.5–1.4)
DIFFERENTIAL METHOD: ABNORMAL
EOSINOPHIL # BLD AUTO: 0.1 K/UL (ref 0–0.5)
EOSINOPHIL NFR BLD: 1.5 % (ref 0–8)
ERYTHROCYTE [DISTWIDTH] IN BLOOD BY AUTOMATED COUNT: 21.8 % (ref 11.5–14.5)
EST. GFR  (AFRICAN AMERICAN): >60 ML/MIN/1.73 M^2
EST. GFR  (NON AFRICAN AMERICAN): >60 ML/MIN/1.73 M^2
GLUCOSE SERPL-MCNC: 141 MG/DL (ref 70–110)
HCT VFR BLD AUTO: 27.9 % (ref 40–54)
HGB BLD-MCNC: 8.3 G/DL (ref 14–18)
IMM GRANULOCYTES # BLD AUTO: 0.04 K/UL (ref 0–0.04)
IMM GRANULOCYTES NFR BLD AUTO: 0.4 % (ref 0–0.5)
LYMPHOCYTES # BLD AUTO: 1.6 K/UL (ref 1–4.8)
LYMPHOCYTES NFR BLD: 17.6 % (ref 18–48)
MCH RBC QN AUTO: 20.8 PG (ref 27–31)
MCHC RBC AUTO-ENTMCNC: 29.7 G/DL (ref 32–36)
MCV RBC AUTO: 70 FL (ref 82–98)
MONOCYTES # BLD AUTO: 0.6 K/UL (ref 0.3–1)
MONOCYTES NFR BLD: 7 % (ref 4–15)
NEUTROPHILS # BLD AUTO: 6.5 K/UL (ref 1.8–7.7)
NEUTROPHILS NFR BLD: 72.8 % (ref 38–73)
NRBC BLD-RTO: 0 /100 WBC
PLATELET # BLD AUTO: 525 K/UL (ref 150–450)
PMV BLD AUTO: 9.4 FL (ref 9.2–12.9)
POCT GLUCOSE: 146 MG/DL (ref 70–110)
POCT GLUCOSE: 248 MG/DL (ref 70–110)
POCT GLUCOSE: 275 MG/DL (ref 70–110)
POTASSIUM SERPL-SCNC: 4.5 MMOL/L (ref 3.5–5.1)
PROT SERPL-MCNC: 7.2 G/DL (ref 6–8.4)
RBC # BLD AUTO: 4 M/UL (ref 4.6–6.2)
SODIUM SERPL-SCNC: 134 MMOL/L (ref 136–145)
WBC # BLD AUTO: 8.96 K/UL (ref 3.9–12.7)

## 2021-09-04 PROCEDURE — 99232 SBSQ HOSP IP/OBS MODERATE 35: CPT | Mod: ,,, | Performed by: INTERNAL MEDICINE

## 2021-09-04 PROCEDURE — 63600175 PHARM REV CODE 636 W HCPCS: Performed by: STUDENT IN AN ORGANIZED HEALTH CARE EDUCATION/TRAINING PROGRAM

## 2021-09-04 PROCEDURE — 25000003 PHARM REV CODE 250: Performed by: INTERNAL MEDICINE

## 2021-09-04 PROCEDURE — 25000003 PHARM REV CODE 250: Performed by: STUDENT IN AN ORGANIZED HEALTH CARE EDUCATION/TRAINING PROGRAM

## 2021-09-04 PROCEDURE — 99232 PR SUBSEQUENT HOSPITAL CARE,LEVL II: ICD-10-PCS | Mod: ,,, | Performed by: INTERNAL MEDICINE

## 2021-09-04 PROCEDURE — 36415 COLL VENOUS BLD VENIPUNCTURE: CPT | Performed by: INTERNAL MEDICINE

## 2021-09-04 PROCEDURE — 80053 COMPREHEN METABOLIC PANEL: CPT | Performed by: INTERNAL MEDICINE

## 2021-09-04 PROCEDURE — 20600001 HC STEP DOWN PRIVATE ROOM

## 2021-09-04 PROCEDURE — 85025 COMPLETE CBC W/AUTO DIFF WBC: CPT | Performed by: INTERNAL MEDICINE

## 2021-09-04 RX ORDER — LACTULOSE 10 G/15ML
20 SOLUTION ORAL 2 TIMES DAILY
Status: DISCONTINUED | OUTPATIENT
Start: 2021-09-04 | End: 2021-09-10 | Stop reason: HOSPADM

## 2021-09-04 RX ADMIN — DOXYCYCLINE HYCLATE 100 MG: 100 TABLET, COATED ORAL at 09:09

## 2021-09-04 RX ADMIN — OXYCODONE HYDROCHLORIDE AND ACETAMINOPHEN 500 MG: 500 TABLET ORAL at 08:09

## 2021-09-04 RX ADMIN — OXYCODONE HYDROCHLORIDE 10 MG: 10 TABLET ORAL at 05:09

## 2021-09-04 RX ADMIN — CLOPIDOGREL 75 MG: 75 TABLET, FILM COATED ORAL at 09:09

## 2021-09-04 RX ADMIN — Medication 324 MG: at 05:09

## 2021-09-04 RX ADMIN — GABAPENTIN 600 MG: 300 CAPSULE ORAL at 09:09

## 2021-09-04 RX ADMIN — ACETAMINOPHEN 650 MG: 325 TABLET ORAL at 02:09

## 2021-09-04 RX ADMIN — CARVEDILOL 3.12 MG: 3.12 TABLET, FILM COATED ORAL at 09:09

## 2021-09-04 RX ADMIN — OXYCODONE HYDROCHLORIDE 10 MG: 10 TABLET ORAL at 09:09

## 2021-09-04 RX ADMIN — ACETAMINOPHEN 650 MG: 325 TABLET ORAL at 05:09

## 2021-09-04 RX ADMIN — GABAPENTIN 600 MG: 300 CAPSULE ORAL at 08:09

## 2021-09-04 RX ADMIN — INSULIN ASPART 4 UNITS: 100 INJECTION, SOLUTION INTRAVENOUS; SUBCUTANEOUS at 05:09

## 2021-09-04 RX ADMIN — HYDROMORPHONE HYDROCHLORIDE 1 MG: 1 INJECTION, SOLUTION INTRAMUSCULAR; INTRAVENOUS; SUBCUTANEOUS at 11:09

## 2021-09-04 RX ADMIN — Medication 324 MG: at 11:09

## 2021-09-04 RX ADMIN — SENNOSIDES AND DOCUSATE SODIUM 1 TABLET: 8.6; 5 TABLET ORAL at 08:09

## 2021-09-04 RX ADMIN — MAGNESIUM OXIDE TAB 400 MG (241.3 MG ELEMENTAL MG) 400 MG: 400 (241.3 MG) TAB at 08:09

## 2021-09-04 RX ADMIN — ATORVASTATIN CALCIUM 40 MG: 20 TABLET, FILM COATED ORAL at 09:09

## 2021-09-04 RX ADMIN — POTASSIUM CHLORIDE 20 MEQ: 1500 TABLET, EXTENDED RELEASE ORAL at 09:09

## 2021-09-04 RX ADMIN — SENNOSIDES AND DOCUSATE SODIUM 1 TABLET: 8.6; 5 TABLET ORAL at 09:09

## 2021-09-04 RX ADMIN — CIPROFLOXACIN 500 MG: 500 TABLET, FILM COATED ORAL at 09:09

## 2021-09-04 RX ADMIN — APIXABAN 5 MG: 5 TABLET, FILM COATED ORAL at 09:09

## 2021-09-04 RX ADMIN — HYDROMORPHONE HYDROCHLORIDE 1 MG: 1 INJECTION, SOLUTION INTRAMUSCULAR; INTRAVENOUS; SUBCUTANEOUS at 07:09

## 2021-09-04 RX ADMIN — SPIRONOLACTONE 25 MG: 25 TABLET, FILM COATED ORAL at 09:09

## 2021-09-04 RX ADMIN — INSULIN ASPART 3 UNITS: 100 INJECTION, SOLUTION INTRAVENOUS; SUBCUTANEOUS at 08:09

## 2021-09-04 RX ADMIN — INSULIN ASPART 6 UNITS: 100 INJECTION, SOLUTION INTRAVENOUS; SUBCUTANEOUS at 11:09

## 2021-09-04 RX ADMIN — ACETAMINOPHEN 650 MG: 325 TABLET ORAL at 09:09

## 2021-09-04 RX ADMIN — OXYCODONE HYDROCHLORIDE 10 MG: 10 TABLET ORAL at 06:09

## 2021-09-04 RX ADMIN — TAMSULOSIN HYDROCHLORIDE 0.4 MG: 0.4 CAPSULE ORAL at 09:09

## 2021-09-04 RX ADMIN — HYDROMORPHONE HYDROCHLORIDE 1 MG: 1 INJECTION, SOLUTION INTRAMUSCULAR; INTRAVENOUS; SUBCUTANEOUS at 08:09

## 2021-09-04 RX ADMIN — OXYCODONE HYDROCHLORIDE 10 MG: 10 TABLET ORAL at 10:09

## 2021-09-04 RX ADMIN — GABAPENTIN 600 MG: 300 CAPSULE ORAL at 02:09

## 2021-09-04 RX ADMIN — OXYCODONE HYDROCHLORIDE 10 MG: 10 TABLET ORAL at 02:09

## 2021-09-04 RX ADMIN — ACETAMINOPHEN 650 MG: 325 TABLET ORAL at 10:09

## 2021-09-04 RX ADMIN — POTASSIUM CHLORIDE 20 MEQ: 1500 TABLET, EXTENDED RELEASE ORAL at 08:09

## 2021-09-04 RX ADMIN — HYDROMORPHONE HYDROCHLORIDE 1 MG: 1 INJECTION, SOLUTION INTRAMUSCULAR; INTRAVENOUS; SUBCUTANEOUS at 03:09

## 2021-09-04 RX ADMIN — CIPROFLOXACIN 500 MG: 500 TABLET, FILM COATED ORAL at 08:09

## 2021-09-04 RX ADMIN — OXYCODONE HYDROCHLORIDE AND ACETAMINOPHEN 500 MG: 500 TABLET ORAL at 09:09

## 2021-09-04 RX ADMIN — HYDROMORPHONE HYDROCHLORIDE 1 MG: 1 INJECTION, SOLUTION INTRAMUSCULAR; INTRAVENOUS; SUBCUTANEOUS at 04:09

## 2021-09-04 RX ADMIN — TORSEMIDE 20 MG: 20 TABLET ORAL at 09:09

## 2021-09-04 RX ADMIN — LACTULOSE 20 G: 10 SOLUTION ORAL at 08:09

## 2021-09-04 RX ADMIN — MELATONIN TAB 3 MG 6 MG: 3 TAB at 08:09

## 2021-09-04 RX ADMIN — DOXYCYCLINE HYCLATE 100 MG: 100 TABLET, COATED ORAL at 08:09

## 2021-09-04 RX ADMIN — MAGNESIUM OXIDE TAB 400 MG (241.3 MG ELEMENTAL MG) 400 MG: 400 (241.3 MG) TAB at 09:09

## 2021-09-04 RX ADMIN — APIXABAN 5 MG: 5 TABLET, FILM COATED ORAL at 08:09

## 2021-09-05 LAB
ALBUMIN SERPL BCP-MCNC: 2.1 G/DL (ref 3.5–5.2)
ALP SERPL-CCNC: 145 U/L (ref 55–135)
ALT SERPL W/O P-5'-P-CCNC: 20 U/L (ref 10–44)
ANION GAP SERPL CALC-SCNC: 8 MMOL/L (ref 8–16)
AST SERPL-CCNC: 25 U/L (ref 10–40)
BASOPHILS # BLD AUTO: 0.08 K/UL (ref 0–0.2)
BASOPHILS NFR BLD: 0.8 % (ref 0–1.9)
BILIRUB SERPL-MCNC: 0.3 MG/DL (ref 0.1–1)
BUN SERPL-MCNC: 12 MG/DL (ref 6–20)
CALCIUM SERPL-MCNC: 9.1 MG/DL (ref 8.7–10.5)
CHLORIDE SERPL-SCNC: 102 MMOL/L (ref 95–110)
CO2 SERPL-SCNC: 25 MMOL/L (ref 23–29)
CREAT SERPL-MCNC: 0.7 MG/DL (ref 0.5–1.4)
DIFFERENTIAL METHOD: ABNORMAL
EOSINOPHIL # BLD AUTO: 0.1 K/UL (ref 0–0.5)
EOSINOPHIL NFR BLD: 1.3 % (ref 0–8)
ERYTHROCYTE [DISTWIDTH] IN BLOOD BY AUTOMATED COUNT: 21.8 % (ref 11.5–14.5)
EST. GFR  (AFRICAN AMERICAN): >60 ML/MIN/1.73 M^2
EST. GFR  (NON AFRICAN AMERICAN): >60 ML/MIN/1.73 M^2
GLUCOSE SERPL-MCNC: 171 MG/DL (ref 70–110)
HCT VFR BLD AUTO: 28.3 % (ref 40–54)
HGB BLD-MCNC: 8 G/DL (ref 14–18)
IMM GRANULOCYTES # BLD AUTO: 0.06 K/UL (ref 0–0.04)
IMM GRANULOCYTES NFR BLD AUTO: 0.6 % (ref 0–0.5)
LYMPHOCYTES # BLD AUTO: 1.5 K/UL (ref 1–4.8)
LYMPHOCYTES NFR BLD: 16.1 % (ref 18–48)
MCH RBC QN AUTO: 20.1 PG (ref 27–31)
MCHC RBC AUTO-ENTMCNC: 28.3 G/DL (ref 32–36)
MCV RBC AUTO: 71 FL (ref 82–98)
MONOCYTES # BLD AUTO: 0.6 K/UL (ref 0.3–1)
MONOCYTES NFR BLD: 6.3 % (ref 4–15)
NEUTROPHILS # BLD AUTO: 7.1 K/UL (ref 1.8–7.7)
NEUTROPHILS NFR BLD: 74.9 % (ref 38–73)
NRBC BLD-RTO: 0 /100 WBC
PLATELET # BLD AUTO: 501 K/UL (ref 150–450)
PMV BLD AUTO: 9.7 FL (ref 9.2–12.9)
POCT GLUCOSE: 153 MG/DL (ref 70–110)
POCT GLUCOSE: 214 MG/DL (ref 70–110)
POCT GLUCOSE: 222 MG/DL (ref 70–110)
POCT GLUCOSE: 234 MG/DL (ref 70–110)
POCT GLUCOSE: 293 MG/DL (ref 70–110)
POTASSIUM SERPL-SCNC: 4.5 MMOL/L (ref 3.5–5.1)
PROT SERPL-MCNC: 7.4 G/DL (ref 6–8.4)
RBC # BLD AUTO: 3.98 M/UL (ref 4.6–6.2)
SODIUM SERPL-SCNC: 135 MMOL/L (ref 136–145)
WBC # BLD AUTO: 9.47 K/UL (ref 3.9–12.7)

## 2021-09-05 PROCEDURE — 80053 COMPREHEN METABOLIC PANEL: CPT | Performed by: INTERNAL MEDICINE

## 2021-09-05 PROCEDURE — 99233 PR SUBSEQUENT HOSPITAL CARE,LEVL III: ICD-10-PCS | Mod: 95,,, | Performed by: INTERNAL MEDICINE

## 2021-09-05 PROCEDURE — 85025 COMPLETE CBC W/AUTO DIFF WBC: CPT | Performed by: INTERNAL MEDICINE

## 2021-09-05 PROCEDURE — 25000003 PHARM REV CODE 250: Performed by: INTERNAL MEDICINE

## 2021-09-05 PROCEDURE — 99233 SBSQ HOSP IP/OBS HIGH 50: CPT | Mod: 95,,, | Performed by: INTERNAL MEDICINE

## 2021-09-05 PROCEDURE — 20600001 HC STEP DOWN PRIVATE ROOM

## 2021-09-05 PROCEDURE — 94640 AIRWAY INHALATION TREATMENT: CPT

## 2021-09-05 PROCEDURE — 63600175 PHARM REV CODE 636 W HCPCS: Performed by: STUDENT IN AN ORGANIZED HEALTH CARE EDUCATION/TRAINING PROGRAM

## 2021-09-05 PROCEDURE — 36415 COLL VENOUS BLD VENIPUNCTURE: CPT | Performed by: INTERNAL MEDICINE

## 2021-09-05 PROCEDURE — 25000003 PHARM REV CODE 250: Performed by: STUDENT IN AN ORGANIZED HEALTH CARE EDUCATION/TRAINING PROGRAM

## 2021-09-05 PROCEDURE — 25000242 PHARM REV CODE 250 ALT 637 W/ HCPCS: Performed by: INTERNAL MEDICINE

## 2021-09-05 RX ORDER — DOCUSATE SODIUM 283 MG/5ML
1 LIQUID RECTAL DAILY PRN
Status: DISCONTINUED | OUTPATIENT
Start: 2021-09-05 | End: 2021-09-06

## 2021-09-05 RX ORDER — BISACODYL 10 MG
10 SUPPOSITORY, RECTAL RECTAL DAILY PRN
Status: DISCONTINUED | OUTPATIENT
Start: 2021-09-05 | End: 2021-09-06

## 2021-09-05 RX ORDER — TORSEMIDE 100 MG/1
50 TABLET ORAL 2 TIMES DAILY
Status: ON HOLD | COMMUNITY
Start: 2021-05-16 | End: 2021-09-10 | Stop reason: HOSPADM

## 2021-09-05 RX ORDER — GABAPENTIN 400 MG/1
800 CAPSULE ORAL 3 TIMES DAILY
Status: DISCONTINUED | OUTPATIENT
Start: 2021-09-05 | End: 2021-09-08

## 2021-09-05 RX ORDER — POLYETHYLENE GLYCOL 3350 17 G/17G
17 POWDER, FOR SOLUTION ORAL 2 TIMES DAILY
Status: DISCONTINUED | OUTPATIENT
Start: 2021-09-05 | End: 2021-09-10

## 2021-09-05 RX ORDER — PREGABALIN 50 MG/1
CAPSULE ORAL
Status: ON HOLD | COMMUNITY
Start: 2021-06-25 | End: 2021-09-10 | Stop reason: HOSPADM

## 2021-09-05 RX ORDER — METOLAZONE 5 MG/1
5 TABLET ORAL DAILY
Status: ON HOLD | COMMUNITY
Start: 2021-08-05 | End: 2021-09-10 | Stop reason: HOSPADM

## 2021-09-05 RX ORDER — BISACODYL 10 MG
10 SUPPOSITORY, RECTAL RECTAL DAILY PRN
COMMUNITY
Start: 2021-08-05 | End: 2022-02-07

## 2021-09-05 RX ORDER — BISACODYL 5 MG
10 TABLET, DELAYED RELEASE (ENTERIC COATED) ORAL ONCE
Status: COMPLETED | OUTPATIENT
Start: 2021-09-05 | End: 2021-09-05

## 2021-09-05 RX ORDER — PREGABALIN 50 MG/1
50 CAPSULE ORAL 2 TIMES DAILY
Status: DISCONTINUED | OUTPATIENT
Start: 2021-09-05 | End: 2021-09-10 | Stop reason: HOSPADM

## 2021-09-05 RX ADMIN — ATORVASTATIN CALCIUM 40 MG: 20 TABLET, FILM COATED ORAL at 08:09

## 2021-09-05 RX ADMIN — POTASSIUM CHLORIDE 20 MEQ: 1500 TABLET, EXTENDED RELEASE ORAL at 09:09

## 2021-09-05 RX ADMIN — SENNOSIDES AND DOCUSATE SODIUM 1 TABLET: 8.6; 5 TABLET ORAL at 09:09

## 2021-09-05 RX ADMIN — DOXYCYCLINE HYCLATE 100 MG: 100 TABLET, COATED ORAL at 09:09

## 2021-09-05 RX ADMIN — HYDROMORPHONE HYDROCHLORIDE 1 MG: 1 INJECTION, SOLUTION INTRAMUSCULAR; INTRAVENOUS; SUBCUTANEOUS at 08:09

## 2021-09-05 RX ADMIN — TORSEMIDE 20 MG: 20 TABLET ORAL at 08:09

## 2021-09-05 RX ADMIN — POTASSIUM CHLORIDE 20 MEQ: 1500 TABLET, EXTENDED RELEASE ORAL at 08:09

## 2021-09-05 RX ADMIN — SENNOSIDES AND DOCUSATE SODIUM 1 TABLET: 8.6; 5 TABLET ORAL at 08:09

## 2021-09-05 RX ADMIN — HYDROMORPHONE HYDROCHLORIDE 1 MG: 1 INJECTION, SOLUTION INTRAMUSCULAR; INTRAVENOUS; SUBCUTANEOUS at 10:09

## 2021-09-05 RX ADMIN — LACTULOSE 20 G: 10 SOLUTION ORAL at 09:09

## 2021-09-05 RX ADMIN — CLOPIDOGREL 75 MG: 75 TABLET, FILM COATED ORAL at 08:09

## 2021-09-05 RX ADMIN — LACTULOSE 20 G: 10 SOLUTION ORAL at 08:09

## 2021-09-05 RX ADMIN — PREGABALIN 50 MG: 50 CAPSULE ORAL at 09:09

## 2021-09-05 RX ADMIN — BISACODYL 10 MG: 5 TABLET, COATED ORAL at 10:09

## 2021-09-05 RX ADMIN — ACETAMINOPHEN 650 MG: 325 TABLET ORAL at 05:09

## 2021-09-05 RX ADMIN — MAGNESIUM OXIDE TAB 400 MG (241.3 MG ELEMENTAL MG) 400 MG: 400 (241.3 MG) TAB at 08:09

## 2021-09-05 RX ADMIN — SPIRONOLACTONE 25 MG: 25 TABLET, FILM COATED ORAL at 08:09

## 2021-09-05 RX ADMIN — ACETAMINOPHEN 650 MG: 325 TABLET ORAL at 02:09

## 2021-09-05 RX ADMIN — HYDROMORPHONE HYDROCHLORIDE 1 MG: 1 INJECTION, SOLUTION INTRAMUSCULAR; INTRAVENOUS; SUBCUTANEOUS at 05:09

## 2021-09-05 RX ADMIN — OXYCODONE HYDROCHLORIDE 10 MG: 10 TABLET ORAL at 07:09

## 2021-09-05 RX ADMIN — ACETAMINOPHEN 650 MG: 325 TABLET ORAL at 10:09

## 2021-09-05 RX ADMIN — APIXABAN 5 MG: 5 TABLET, FILM COATED ORAL at 08:09

## 2021-09-05 RX ADMIN — OXYCODONE HYDROCHLORIDE AND ACETAMINOPHEN 500 MG: 500 TABLET ORAL at 08:09

## 2021-09-05 RX ADMIN — GABAPENTIN 800 MG: 400 CAPSULE ORAL at 09:09

## 2021-09-05 RX ADMIN — IPRATROPIUM BROMIDE AND ALBUTEROL SULFATE 3 ML: .5; 2.5 SOLUTION RESPIRATORY (INHALATION) at 01:09

## 2021-09-05 RX ADMIN — GABAPENTIN 600 MG: 300 CAPSULE ORAL at 08:09

## 2021-09-05 RX ADMIN — TAMSULOSIN HYDROCHLORIDE 0.4 MG: 0.4 CAPSULE ORAL at 08:09

## 2021-09-05 RX ADMIN — INSULIN ASPART 4 UNITS: 100 INJECTION, SOLUTION INTRAVENOUS; SUBCUTANEOUS at 12:09

## 2021-09-05 RX ADMIN — CIPROFLOXACIN 500 MG: 500 TABLET, FILM COATED ORAL at 08:09

## 2021-09-05 RX ADMIN — APIXABAN 5 MG: 5 TABLET, FILM COATED ORAL at 09:09

## 2021-09-05 RX ADMIN — HYDROMORPHONE HYDROCHLORIDE 1 MG: 1 INJECTION, SOLUTION INTRAMUSCULAR; INTRAVENOUS; SUBCUTANEOUS at 04:09

## 2021-09-05 RX ADMIN — MAGNESIUM OXIDE TAB 400 MG (241.3 MG ELEMENTAL MG) 400 MG: 400 (241.3 MG) TAB at 09:09

## 2021-09-05 RX ADMIN — Medication 324 MG: at 10:09

## 2021-09-05 RX ADMIN — POLYETHYLENE GLYCOL 3350 17 G: 17 POWDER, FOR SOLUTION ORAL at 09:09

## 2021-09-05 RX ADMIN — HYDROMORPHONE HYDROCHLORIDE 1 MG: 1 INJECTION, SOLUTION INTRAMUSCULAR; INTRAVENOUS; SUBCUTANEOUS at 12:09

## 2021-09-05 RX ADMIN — INSULIN ASPART 2 UNITS: 100 INJECTION, SOLUTION INTRAVENOUS; SUBCUTANEOUS at 09:09

## 2021-09-05 RX ADMIN — POLYETHYLENE GLYCOL 3350 17 G: 17 POWDER, FOR SOLUTION ORAL at 10:09

## 2021-09-05 RX ADMIN — HYDROMORPHONE HYDROCHLORIDE 1 MG: 1 INJECTION, SOLUTION INTRAMUSCULAR; INTRAVENOUS; SUBCUTANEOUS at 01:09

## 2021-09-05 RX ADMIN — CIPROFLOXACIN 500 MG: 500 TABLET, FILM COATED ORAL at 09:09

## 2021-09-05 RX ADMIN — GABAPENTIN 800 MG: 400 CAPSULE ORAL at 02:09

## 2021-09-05 RX ADMIN — OXYCODONE HYDROCHLORIDE AND ACETAMINOPHEN 500 MG: 500 TABLET ORAL at 09:09

## 2021-09-05 RX ADMIN — DOXYCYCLINE HYCLATE 100 MG: 100 TABLET, COATED ORAL at 08:09

## 2021-09-05 RX ADMIN — Medication 324 MG: at 05:09

## 2021-09-05 RX ADMIN — ACETAMINOPHEN 650 MG: 325 TABLET ORAL at 09:09

## 2021-09-05 RX ADMIN — INSULIN ASPART 4 UNITS: 100 INJECTION, SOLUTION INTRAVENOUS; SUBCUTANEOUS at 05:09

## 2021-09-06 LAB
ALBUMIN SERPL BCP-MCNC: 2.2 G/DL (ref 3.5–5.2)
ALP SERPL-CCNC: 128 U/L (ref 55–135)
ALT SERPL W/O P-5'-P-CCNC: 17 U/L (ref 10–44)
ANION GAP SERPL CALC-SCNC: 8 MMOL/L (ref 8–16)
AST SERPL-CCNC: 19 U/L (ref 10–40)
BASOPHILS # BLD AUTO: 0.06 K/UL (ref 0–0.2)
BASOPHILS NFR BLD: 0.6 % (ref 0–1.9)
BILIRUB SERPL-MCNC: 0.3 MG/DL (ref 0.1–1)
BNP SERPL-MCNC: 57 PG/ML (ref 0–99)
BUN SERPL-MCNC: 11 MG/DL (ref 6–20)
CALCIUM SERPL-MCNC: 9.6 MG/DL (ref 8.7–10.5)
CHLORIDE SERPL-SCNC: 102 MMOL/L (ref 95–110)
CO2 SERPL-SCNC: 24 MMOL/L (ref 23–29)
CREAT SERPL-MCNC: 0.7 MG/DL (ref 0.5–1.4)
DIFFERENTIAL METHOD: ABNORMAL
EOSINOPHIL # BLD AUTO: 0.2 K/UL (ref 0–0.5)
EOSINOPHIL NFR BLD: 1.6 % (ref 0–8)
ERYTHROCYTE [DISTWIDTH] IN BLOOD BY AUTOMATED COUNT: 22.5 % (ref 11.5–14.5)
EST. GFR  (AFRICAN AMERICAN): >60 ML/MIN/1.73 M^2
EST. GFR  (NON AFRICAN AMERICAN): >60 ML/MIN/1.73 M^2
GLUCOSE SERPL-MCNC: 167 MG/DL (ref 70–110)
HCT VFR BLD AUTO: 28.8 % (ref 40–54)
HGB BLD-MCNC: 8.4 G/DL (ref 14–18)
IMM GRANULOCYTES # BLD AUTO: 0.06 K/UL (ref 0–0.04)
IMM GRANULOCYTES NFR BLD AUTO: 0.6 % (ref 0–0.5)
LYMPHOCYTES # BLD AUTO: 1.4 K/UL (ref 1–4.8)
LYMPHOCYTES NFR BLD: 14.7 % (ref 18–48)
MCH RBC QN AUTO: 20.7 PG (ref 27–31)
MCHC RBC AUTO-ENTMCNC: 29.2 G/DL (ref 32–36)
MCV RBC AUTO: 71 FL (ref 82–98)
MONOCYTES # BLD AUTO: 0.7 K/UL (ref 0.3–1)
MONOCYTES NFR BLD: 6.8 % (ref 4–15)
NEUTROPHILS # BLD AUTO: 7.2 K/UL (ref 1.8–7.7)
NEUTROPHILS NFR BLD: 75.7 % (ref 38–73)
NRBC BLD-RTO: 0 /100 WBC
PLATELET # BLD AUTO: 499 K/UL (ref 150–450)
PMV BLD AUTO: 10 FL (ref 9.2–12.9)
POCT GLUCOSE: 171 MG/DL (ref 70–110)
POCT GLUCOSE: 215 MG/DL (ref 70–110)
POCT GLUCOSE: 244 MG/DL (ref 70–110)
POCT GLUCOSE: 267 MG/DL (ref 70–110)
POTASSIUM SERPL-SCNC: 4.4 MMOL/L (ref 3.5–5.1)
PROT SERPL-MCNC: 7.7 G/DL (ref 6–8.4)
RBC # BLD AUTO: 4.06 M/UL (ref 4.6–6.2)
SODIUM SERPL-SCNC: 134 MMOL/L (ref 136–145)
WBC # BLD AUTO: 9.54 K/UL (ref 3.9–12.7)

## 2021-09-06 PROCEDURE — 63600175 PHARM REV CODE 636 W HCPCS: Performed by: INTERNAL MEDICINE

## 2021-09-06 PROCEDURE — 25000003 PHARM REV CODE 250: Performed by: INTERNAL MEDICINE

## 2021-09-06 PROCEDURE — 85025 COMPLETE CBC W/AUTO DIFF WBC: CPT | Performed by: INTERNAL MEDICINE

## 2021-09-06 PROCEDURE — 99233 SBSQ HOSP IP/OBS HIGH 50: CPT | Mod: 95,,, | Performed by: INTERNAL MEDICINE

## 2021-09-06 PROCEDURE — 63600175 PHARM REV CODE 636 W HCPCS: Performed by: STUDENT IN AN ORGANIZED HEALTH CARE EDUCATION/TRAINING PROGRAM

## 2021-09-06 PROCEDURE — 80053 COMPREHEN METABOLIC PANEL: CPT | Performed by: INTERNAL MEDICINE

## 2021-09-06 PROCEDURE — 36415 COLL VENOUS BLD VENIPUNCTURE: CPT | Performed by: INTERNAL MEDICINE

## 2021-09-06 PROCEDURE — 25000003 PHARM REV CODE 250: Performed by: HOSPITALIST

## 2021-09-06 PROCEDURE — 20600001 HC STEP DOWN PRIVATE ROOM

## 2021-09-06 PROCEDURE — 99233 PR SUBSEQUENT HOSPITAL CARE,LEVL III: ICD-10-PCS | Mod: 95,,, | Performed by: INTERNAL MEDICINE

## 2021-09-06 PROCEDURE — 83880 ASSAY OF NATRIURETIC PEPTIDE: CPT | Performed by: INTERNAL MEDICINE

## 2021-09-06 RX ORDER — HYDROMORPHONE HYDROCHLORIDE 1 MG/ML
1 INJECTION, SOLUTION INTRAMUSCULAR; INTRAVENOUS; SUBCUTANEOUS EVERY 4 HOURS PRN
Status: DISCONTINUED | OUTPATIENT
Start: 2021-09-06 | End: 2021-09-08

## 2021-09-06 RX ORDER — DOCUSATE SODIUM 283 MG/5ML
1 LIQUID RECTAL DAILY PRN
Status: DISCONTINUED | OUTPATIENT
Start: 2021-09-06 | End: 2021-09-10 | Stop reason: HOSPADM

## 2021-09-06 RX ORDER — ACETAMINOPHEN 325 MG/1
650 TABLET ORAL 4 TIMES DAILY
Status: DISCONTINUED | OUTPATIENT
Start: 2021-09-06 | End: 2021-09-10 | Stop reason: HOSPADM

## 2021-09-06 RX ORDER — OXYCODONE HYDROCHLORIDE 10 MG/1
10 TABLET ORAL EVERY 4 HOURS PRN
Status: DISCONTINUED | OUTPATIENT
Start: 2021-09-06 | End: 2021-09-10 | Stop reason: HOSPADM

## 2021-09-06 RX ORDER — BACLOFEN 10 MG/1
10 TABLET ORAL 3 TIMES DAILY
Status: DISCONTINUED | OUTPATIENT
Start: 2021-09-06 | End: 2021-09-10 | Stop reason: HOSPADM

## 2021-09-06 RX ORDER — BACLOFEN 10 MG/1
10 TABLET ORAL EVERY 8 HOURS PRN
Status: DISCONTINUED | OUTPATIENT
Start: 2021-09-06 | End: 2021-09-06

## 2021-09-06 RX ORDER — BISACODYL 5 MG
10 TABLET, DELAYED RELEASE (ENTERIC COATED) ORAL ONCE
Status: COMPLETED | OUTPATIENT
Start: 2021-09-06 | End: 2021-09-06

## 2021-09-06 RX ADMIN — DOXYCYCLINE HYCLATE 100 MG: 100 TABLET, COATED ORAL at 08:09

## 2021-09-06 RX ADMIN — CIPROFLOXACIN 500 MG: 500 TABLET, FILM COATED ORAL at 09:09

## 2021-09-06 RX ADMIN — BACLOFEN 10 MG: 10 TABLET ORAL at 03:09

## 2021-09-06 RX ADMIN — PREGABALIN 50 MG: 50 CAPSULE ORAL at 09:09

## 2021-09-06 RX ADMIN — GABAPENTIN 800 MG: 400 CAPSULE ORAL at 03:09

## 2021-09-06 RX ADMIN — LACTULOSE 20 G: 10 SOLUTION ORAL at 09:09

## 2021-09-06 RX ADMIN — GABAPENTIN 800 MG: 400 CAPSULE ORAL at 08:09

## 2021-09-06 RX ADMIN — OXYCODONE HYDROCHLORIDE 10 MG: 10 TABLET ORAL at 11:09

## 2021-09-06 RX ADMIN — CARVEDILOL 3.12 MG: 3.12 TABLET, FILM COATED ORAL at 09:09

## 2021-09-06 RX ADMIN — HYDROMORPHONE HYDROCHLORIDE 1 MG: 1 INJECTION, SOLUTION INTRAMUSCULAR; INTRAVENOUS; SUBCUTANEOUS at 02:09

## 2021-09-06 RX ADMIN — OXYCODONE HYDROCHLORIDE 10 MG: 10 TABLET ORAL at 05:09

## 2021-09-06 RX ADMIN — INSULIN ASPART 4 UNITS: 100 INJECTION, SOLUTION INTRAVENOUS; SUBCUTANEOUS at 11:09

## 2021-09-06 RX ADMIN — TAMSULOSIN HYDROCHLORIDE 0.4 MG: 0.4 CAPSULE ORAL at 09:09

## 2021-09-06 RX ADMIN — HYDROMORPHONE HYDROCHLORIDE 1 MG: 1 INJECTION, SOLUTION INTRAMUSCULAR; INTRAVENOUS; SUBCUTANEOUS at 08:09

## 2021-09-06 RX ADMIN — HYDROMORPHONE HYDROCHLORIDE 1 MG: 1 INJECTION, SOLUTION INTRAMUSCULAR; INTRAVENOUS; SUBCUTANEOUS at 06:09

## 2021-09-06 RX ADMIN — OXYCODONE HYDROCHLORIDE AND ACETAMINOPHEN 500 MG: 500 TABLET ORAL at 09:09

## 2021-09-06 RX ADMIN — POTASSIUM CHLORIDE 20 MEQ: 1500 TABLET, EXTENDED RELEASE ORAL at 08:09

## 2021-09-06 RX ADMIN — APIXABAN 5 MG: 5 TABLET, FILM COATED ORAL at 08:09

## 2021-09-06 RX ADMIN — HYDROMORPHONE HYDROCHLORIDE 1 MG: 1 INJECTION, SOLUTION INTRAMUSCULAR; INTRAVENOUS; SUBCUTANEOUS at 01:09

## 2021-09-06 RX ADMIN — MELATONIN TAB 3 MG 6 MG: 3 TAB at 08:09

## 2021-09-06 RX ADMIN — INSULIN ASPART 2 UNITS: 100 INJECTION, SOLUTION INTRAVENOUS; SUBCUTANEOUS at 08:09

## 2021-09-06 RX ADMIN — MAGNESIUM OXIDE TAB 400 MG (241.3 MG ELEMENTAL MG) 400 MG: 400 (241.3 MG) TAB at 09:09

## 2021-09-06 RX ADMIN — LACTULOSE 20 G: 10 SOLUTION ORAL at 08:09

## 2021-09-06 RX ADMIN — APIXABAN 5 MG: 5 TABLET, FILM COATED ORAL at 09:09

## 2021-09-06 RX ADMIN — POTASSIUM CHLORIDE 20 MEQ: 1500 TABLET, EXTENDED RELEASE ORAL at 09:09

## 2021-09-06 RX ADMIN — TORSEMIDE 20 MG: 20 TABLET ORAL at 09:09

## 2021-09-06 RX ADMIN — ACETAMINOPHEN 650 MG: 325 TABLET ORAL at 05:09

## 2021-09-06 RX ADMIN — SPIRONOLACTONE 25 MG: 25 TABLET, FILM COATED ORAL at 09:09

## 2021-09-06 RX ADMIN — CLOPIDOGREL 75 MG: 75 TABLET, FILM COATED ORAL at 09:09

## 2021-09-06 RX ADMIN — ACETAMINOPHEN 650 MG: 325 TABLET ORAL at 12:09

## 2021-09-06 RX ADMIN — OXYCODONE HYDROCHLORIDE AND ACETAMINOPHEN 500 MG: 500 TABLET ORAL at 08:09

## 2021-09-06 RX ADMIN — GABAPENTIN 800 MG: 400 CAPSULE ORAL at 09:09

## 2021-09-06 RX ADMIN — BISACODYL 10 MG: 5 TABLET, COATED ORAL at 11:09

## 2021-09-06 RX ADMIN — DOXYCYCLINE HYCLATE 100 MG: 100 TABLET, COATED ORAL at 09:09

## 2021-09-06 RX ADMIN — INSULIN ASPART 6 UNITS: 100 INJECTION, SOLUTION INTRAVENOUS; SUBCUTANEOUS at 05:09

## 2021-09-06 RX ADMIN — MAGNESIUM OXIDE TAB 400 MG (241.3 MG ELEMENTAL MG) 400 MG: 400 (241.3 MG) TAB at 08:09

## 2021-09-06 RX ADMIN — ACETAMINOPHEN 650 MG: 325 TABLET ORAL at 08:09

## 2021-09-06 RX ADMIN — Medication 324 MG: at 05:09

## 2021-09-06 RX ADMIN — POLYETHYLENE GLYCOL 3350 17 G: 17 POWDER, FOR SOLUTION ORAL at 09:09

## 2021-09-06 RX ADMIN — POLYETHYLENE GLYCOL 3350 17 G: 17 POWDER, FOR SOLUTION ORAL at 08:09

## 2021-09-06 RX ADMIN — Medication 324 MG: at 11:09

## 2021-09-06 RX ADMIN — ATORVASTATIN CALCIUM 40 MG: 20 TABLET, FILM COATED ORAL at 09:09

## 2021-09-06 RX ADMIN — SENNOSIDES AND DOCUSATE SODIUM 1 TABLET: 8.6; 5 TABLET ORAL at 08:09

## 2021-09-06 RX ADMIN — CIPROFLOXACIN 500 MG: 500 TABLET, FILM COATED ORAL at 08:09

## 2021-09-06 RX ADMIN — PREGABALIN 50 MG: 50 CAPSULE ORAL at 08:09

## 2021-09-06 RX ADMIN — SENNOSIDES AND DOCUSATE SODIUM 1 TABLET: 8.6; 5 TABLET ORAL at 09:09

## 2021-09-06 RX ADMIN — BACLOFEN 10 MG: 10 TABLET ORAL at 08:09

## 2021-09-07 LAB
ALBUMIN SERPL BCP-MCNC: 2.3 G/DL (ref 3.5–5.2)
ALP SERPL-CCNC: 111 U/L (ref 55–135)
ALT SERPL W/O P-5'-P-CCNC: 17 U/L (ref 10–44)
ANION GAP SERPL CALC-SCNC: 8 MMOL/L (ref 8–16)
AST SERPL-CCNC: 17 U/L (ref 10–40)
BACTERIA #/AREA URNS AUTO: NORMAL /HPF
BASOPHILS # BLD AUTO: 0.07 K/UL (ref 0–0.2)
BASOPHILS NFR BLD: 0.6 % (ref 0–1.9)
BILIRUB SERPL-MCNC: 0.3 MG/DL (ref 0.1–1)
BILIRUB UR QL STRIP: NEGATIVE
BUN SERPL-MCNC: 13 MG/DL (ref 6–20)
CALCIUM SERPL-MCNC: 9.6 MG/DL (ref 8.7–10.5)
CHLORIDE SERPL-SCNC: 102 MMOL/L (ref 95–110)
CLARITY UR REFRACT.AUTO: CLEAR
CO2 SERPL-SCNC: 23 MMOL/L (ref 23–29)
COLOR UR AUTO: NORMAL
CREAT SERPL-MCNC: 0.7 MG/DL (ref 0.5–1.4)
DIFFERENTIAL METHOD: ABNORMAL
EOSINOPHIL # BLD AUTO: 0.1 K/UL (ref 0–0.5)
EOSINOPHIL NFR BLD: 0.8 % (ref 0–8)
ERYTHROCYTE [DISTWIDTH] IN BLOOD BY AUTOMATED COUNT: 22.9 % (ref 11.5–14.5)
EST. GFR  (AFRICAN AMERICAN): >60 ML/MIN/1.73 M^2
EST. GFR  (NON AFRICAN AMERICAN): >60 ML/MIN/1.73 M^2
GLUCOSE SERPL-MCNC: 188 MG/DL (ref 70–110)
GLUCOSE UR QL STRIP: NEGATIVE
HCT VFR BLD AUTO: 29.2 % (ref 40–54)
HGB BLD-MCNC: 8.5 G/DL (ref 14–18)
HGB UR QL STRIP: NEGATIVE
IMM GRANULOCYTES # BLD AUTO: 0.05 K/UL (ref 0–0.04)
IMM GRANULOCYTES NFR BLD AUTO: 0.4 % (ref 0–0.5)
KETONES UR QL STRIP: NEGATIVE
LEUKOCYTE ESTERASE UR QL STRIP: NEGATIVE
LYMPHOCYTES # BLD AUTO: 1.6 K/UL (ref 1–4.8)
LYMPHOCYTES NFR BLD: 12.9 % (ref 18–48)
MCH RBC QN AUTO: 20.6 PG (ref 27–31)
MCHC RBC AUTO-ENTMCNC: 29.1 G/DL (ref 32–36)
MCV RBC AUTO: 71 FL (ref 82–98)
MICROSCOPIC COMMENT: NORMAL
MONOCYTES # BLD AUTO: 0.7 K/UL (ref 0.3–1)
MONOCYTES NFR BLD: 5.9 % (ref 4–15)
NEUTROPHILS # BLD AUTO: 9.9 K/UL (ref 1.8–7.7)
NEUTROPHILS NFR BLD: 79.4 % (ref 38–73)
NITRITE UR QL STRIP: NEGATIVE
NRBC BLD-RTO: 0 /100 WBC
PH UR STRIP: 6 [PH] (ref 5–8)
PLATELET # BLD AUTO: 450 K/UL (ref 150–450)
PMV BLD AUTO: 9.7 FL (ref 9.2–12.9)
POCT GLUCOSE: 168 MG/DL (ref 70–110)
POCT GLUCOSE: 192 MG/DL (ref 70–110)
POCT GLUCOSE: 202 MG/DL (ref 70–110)
POCT GLUCOSE: 301 MG/DL (ref 70–110)
POTASSIUM SERPL-SCNC: 4.3 MMOL/L (ref 3.5–5.1)
PROT SERPL-MCNC: 7.8 G/DL (ref 6–8.4)
PROT UR QL STRIP: NEGATIVE
RBC # BLD AUTO: 4.12 M/UL (ref 4.6–6.2)
RBC #/AREA URNS AUTO: 0 /HPF (ref 0–4)
SODIUM SERPL-SCNC: 133 MMOL/L (ref 136–145)
SP GR UR STRIP: 1.01 (ref 1–1.03)
URN SPEC COLLECT METH UR: NORMAL
WBC # BLD AUTO: 12.43 K/UL (ref 3.9–12.7)
WBC #/AREA URNS AUTO: 0 /HPF (ref 0–5)

## 2021-09-07 PROCEDURE — 97530 THERAPEUTIC ACTIVITIES: CPT

## 2021-09-07 PROCEDURE — 36415 COLL VENOUS BLD VENIPUNCTURE: CPT | Performed by: INTERNAL MEDICINE

## 2021-09-07 PROCEDURE — 80053 COMPREHEN METABOLIC PANEL: CPT | Performed by: INTERNAL MEDICINE

## 2021-09-07 PROCEDURE — 99233 SBSQ HOSP IP/OBS HIGH 50: CPT | Mod: 95,,, | Performed by: INTERNAL MEDICINE

## 2021-09-07 PROCEDURE — 99223 PR INITIAL HOSPITAL CARE,LEVL III: ICD-10-PCS | Mod: ,,, | Performed by: PHYSICIAN ASSISTANT

## 2021-09-07 PROCEDURE — 63600175 PHARM REV CODE 636 W HCPCS: Performed by: INTERNAL MEDICINE

## 2021-09-07 PROCEDURE — 81001 URINALYSIS AUTO W/SCOPE: CPT | Performed by: INTERNAL MEDICINE

## 2021-09-07 PROCEDURE — 97164 PT RE-EVAL EST PLAN CARE: CPT

## 2021-09-07 PROCEDURE — 20600001 HC STEP DOWN PRIVATE ROOM

## 2021-09-07 PROCEDURE — 85025 COMPLETE CBC W/AUTO DIFF WBC: CPT | Performed by: INTERNAL MEDICINE

## 2021-09-07 PROCEDURE — 25000003 PHARM REV CODE 250: Performed by: INTERNAL MEDICINE

## 2021-09-07 PROCEDURE — 99223 1ST HOSP IP/OBS HIGH 75: CPT | Mod: ,,, | Performed by: PHYSICIAN ASSISTANT

## 2021-09-07 PROCEDURE — 99233 PR SUBSEQUENT HOSPITAL CARE,LEVL III: ICD-10-PCS | Mod: 95,,, | Performed by: INTERNAL MEDICINE

## 2021-09-07 PROCEDURE — 63600175 PHARM REV CODE 636 W HCPCS: Performed by: PHYSICIAN ASSISTANT

## 2021-09-07 PROCEDURE — 97162 PT EVAL MOD COMPLEX 30 MIN: CPT

## 2021-09-07 RX ORDER — MICONAZOLE NITRATE 2 %
POWDER (GRAM) TOPICAL 2 TIMES DAILY
Status: DISCONTINUED | OUTPATIENT
Start: 2021-09-07 | End: 2021-09-10 | Stop reason: HOSPADM

## 2021-09-07 RX ORDER — MORPHINE SULFATE 15 MG/1
15 TABLET, FILM COATED, EXTENDED RELEASE ORAL EVERY 12 HOURS
Status: DISCONTINUED | OUTPATIENT
Start: 2021-09-07 | End: 2021-09-08

## 2021-09-07 RX ORDER — CEFEPIME HYDROCHLORIDE 2 G/1
2 INJECTION, POWDER, FOR SOLUTION INTRAVENOUS
Status: DISCONTINUED | OUTPATIENT
Start: 2021-09-07 | End: 2021-09-10 | Stop reason: HOSPADM

## 2021-09-07 RX ORDER — VANCOMYCIN HCL IN 5 % DEXTROSE 1G/250ML
15 PLASTIC BAG, INJECTION (ML) INTRAVENOUS
Status: DISCONTINUED | OUTPATIENT
Start: 2021-09-07 | End: 2021-09-10 | Stop reason: HOSPADM

## 2021-09-07 RX ORDER — BISACODYL 5 MG
10 TABLET, DELAYED RELEASE (ENTERIC COATED) ORAL ONCE
Status: COMPLETED | OUTPATIENT
Start: 2021-09-07 | End: 2021-09-07

## 2021-09-07 RX ORDER — MICONAZOLE NITRATE 2 %
POWDER (GRAM) TOPICAL 2 TIMES DAILY
Status: DISCONTINUED | OUTPATIENT
Start: 2021-09-07 | End: 2021-09-07

## 2021-09-07 RX ADMIN — OXYCODONE HYDROCHLORIDE 10 MG: 10 TABLET ORAL at 06:09

## 2021-09-07 RX ADMIN — ACETAMINOPHEN 650 MG: 325 TABLET ORAL at 08:09

## 2021-09-07 RX ADMIN — DOXYCYCLINE HYCLATE 100 MG: 100 TABLET, COATED ORAL at 08:09

## 2021-09-07 RX ADMIN — MORPHINE SULFATE 15 MG: 15 TABLET, FILM COATED, EXTENDED RELEASE ORAL at 08:09

## 2021-09-07 RX ADMIN — POLYETHYLENE GLYCOL 3350 17 G: 17 POWDER, FOR SOLUTION ORAL at 08:09

## 2021-09-07 RX ADMIN — ACETAMINOPHEN 650 MG: 325 TABLET ORAL at 05:09

## 2021-09-07 RX ADMIN — POTASSIUM CHLORIDE 20 MEQ: 1500 TABLET, EXTENDED RELEASE ORAL at 08:09

## 2021-09-07 RX ADMIN — Medication 324 MG: at 12:09

## 2021-09-07 RX ADMIN — BACLOFEN 10 MG: 10 TABLET ORAL at 08:09

## 2021-09-07 RX ADMIN — OXYCODONE HYDROCHLORIDE 10 MG: 10 TABLET ORAL at 02:09

## 2021-09-07 RX ADMIN — ATORVASTATIN CALCIUM 40 MG: 20 TABLET, FILM COATED ORAL at 08:09

## 2021-09-07 RX ADMIN — CIPROFLOXACIN 500 MG: 500 TABLET, FILM COATED ORAL at 08:09

## 2021-09-07 RX ADMIN — OXYCODONE HYDROCHLORIDE AND ACETAMINOPHEN 500 MG: 500 TABLET ORAL at 08:09

## 2021-09-07 RX ADMIN — CARVEDILOL 3.12 MG: 3.12 TABLET, FILM COATED ORAL at 08:09

## 2021-09-07 RX ADMIN — PREGABALIN 50 MG: 50 CAPSULE ORAL at 08:09

## 2021-09-07 RX ADMIN — OXYCODONE HYDROCHLORIDE 10 MG: 10 TABLET ORAL at 11:09

## 2021-09-07 RX ADMIN — HYDROMORPHONE HYDROCHLORIDE 1 MG: 1 INJECTION, SOLUTION INTRAMUSCULAR; INTRAVENOUS; SUBCUTANEOUS at 11:09

## 2021-09-07 RX ADMIN — INSULIN ASPART 1 UNITS: 100 INJECTION, SOLUTION INTRAVENOUS; SUBCUTANEOUS at 08:09

## 2021-09-07 RX ADMIN — INSULIN ASPART 2 UNITS: 100 INJECTION, SOLUTION INTRAVENOUS; SUBCUTANEOUS at 07:09

## 2021-09-07 RX ADMIN — GABAPENTIN 800 MG: 400 CAPSULE ORAL at 08:09

## 2021-09-07 RX ADMIN — SENNOSIDES AND DOCUSATE SODIUM 1 TABLET: 8.6; 5 TABLET ORAL at 08:09

## 2021-09-07 RX ADMIN — MAGNESIUM OXIDE TAB 400 MG (241.3 MG ELEMENTAL MG) 400 MG: 400 (241.3 MG) TAB at 08:09

## 2021-09-07 RX ADMIN — MICONAZOLE NITRATE: 20 POWDER TOPICAL at 08:09

## 2021-09-07 RX ADMIN — TORSEMIDE 20 MG: 20 TABLET ORAL at 08:09

## 2021-09-07 RX ADMIN — GABAPENTIN 800 MG: 400 CAPSULE ORAL at 03:09

## 2021-09-07 RX ADMIN — LACTULOSE 20 G: 10 SOLUTION ORAL at 08:09

## 2021-09-07 RX ADMIN — BACLOFEN 10 MG: 10 TABLET ORAL at 03:09

## 2021-09-07 RX ADMIN — MORPHINE SULFATE 15 MG: 15 TABLET, FILM COATED, EXTENDED RELEASE ORAL at 12:09

## 2021-09-07 RX ADMIN — APIXABAN 5 MG: 5 TABLET, FILM COATED ORAL at 08:09

## 2021-09-07 RX ADMIN — MICONAZOLE NITRATE: 20 POWDER TOPICAL at 01:09

## 2021-09-07 RX ADMIN — VANCOMYCIN HYDROCHLORIDE 1500 MG: 1.5 INJECTION, POWDER, LYOPHILIZED, FOR SOLUTION INTRAVENOUS at 11:09

## 2021-09-07 RX ADMIN — SPIRONOLACTONE 25 MG: 25 TABLET, FILM COATED ORAL at 08:09

## 2021-09-07 RX ADMIN — INSULIN ASPART 4 UNITS: 100 INJECTION, SOLUTION INTRAVENOUS; SUBCUTANEOUS at 05:09

## 2021-09-07 RX ADMIN — INSULIN ASPART 8 UNITS: 100 INJECTION, SOLUTION INTRAVENOUS; SUBCUTANEOUS at 12:09

## 2021-09-07 RX ADMIN — HYDROMORPHONE HYDROCHLORIDE 1 MG: 1 INJECTION, SOLUTION INTRAMUSCULAR; INTRAVENOUS; SUBCUTANEOUS at 07:09

## 2021-09-07 RX ADMIN — MELATONIN TAB 3 MG 6 MG: 3 TAB at 08:09

## 2021-09-07 RX ADMIN — BISACODYL 10 MG: 5 TABLET, COATED ORAL at 08:09

## 2021-09-07 RX ADMIN — OXYCODONE HYDROCHLORIDE 10 MG: 10 TABLET ORAL at 01:09

## 2021-09-07 RX ADMIN — CLOPIDOGREL 75 MG: 75 TABLET, FILM COATED ORAL at 08:09

## 2021-09-07 RX ADMIN — TAMSULOSIN HYDROCHLORIDE 0.4 MG: 0.4 CAPSULE ORAL at 08:09

## 2021-09-07 RX ADMIN — HYDROMORPHONE HYDROCHLORIDE 1 MG: 1 INJECTION, SOLUTION INTRAMUSCULAR; INTRAVENOUS; SUBCUTANEOUS at 03:09

## 2021-09-07 RX ADMIN — HYDROMORPHONE HYDROCHLORIDE 1 MG: 1 INJECTION, SOLUTION INTRAMUSCULAR; INTRAVENOUS; SUBCUTANEOUS at 08:09

## 2021-09-07 RX ADMIN — Medication 324 MG: at 05:09

## 2021-09-07 RX ADMIN — ACETAMINOPHEN 650 MG: 325 TABLET ORAL at 01:09

## 2021-09-07 RX ADMIN — CEFEPIME 2 G: 2 INJECTION, POWDER, FOR SOLUTION INTRAVENOUS at 03:09

## 2021-09-08 ENCOUNTER — TELEPHONE (OUTPATIENT)
Dept: WOUND CARE | Facility: CLINIC | Age: 56
End: 2021-09-08

## 2021-09-08 LAB
ALBUMIN SERPL BCP-MCNC: 2.3 G/DL (ref 3.5–5.2)
ALP SERPL-CCNC: 96 U/L (ref 55–135)
ALT SERPL W/O P-5'-P-CCNC: 15 U/L (ref 10–44)
ANION GAP SERPL CALC-SCNC: 10 MMOL/L (ref 8–16)
AST SERPL-CCNC: 17 U/L (ref 10–40)
BASOPHILS # BLD AUTO: 0.06 K/UL (ref 0–0.2)
BASOPHILS NFR BLD: 0.6 % (ref 0–1.9)
BILIRUB SERPL-MCNC: 0.2 MG/DL (ref 0.1–1)
BUN SERPL-MCNC: 20 MG/DL (ref 6–20)
CALCIUM SERPL-MCNC: 9.4 MG/DL (ref 8.7–10.5)
CHLORIDE SERPL-SCNC: 102 MMOL/L (ref 95–110)
CO2 SERPL-SCNC: 24 MMOL/L (ref 23–29)
CREAT SERPL-MCNC: 0.7 MG/DL (ref 0.5–1.4)
DIFFERENTIAL METHOD: ABNORMAL
EOSINOPHIL # BLD AUTO: 0.1 K/UL (ref 0–0.5)
EOSINOPHIL NFR BLD: 1.2 % (ref 0–8)
ERYTHROCYTE [DISTWIDTH] IN BLOOD BY AUTOMATED COUNT: 22.9 % (ref 11.5–14.5)
EST. GFR  (AFRICAN AMERICAN): >60 ML/MIN/1.73 M^2
EST. GFR  (NON AFRICAN AMERICAN): >60 ML/MIN/1.73 M^2
GLUCOSE SERPL-MCNC: 159 MG/DL (ref 70–110)
HCT VFR BLD AUTO: 28.7 % (ref 40–54)
HGB BLD-MCNC: 8.2 G/DL (ref 14–18)
IMM GRANULOCYTES # BLD AUTO: 0.04 K/UL (ref 0–0.04)
IMM GRANULOCYTES NFR BLD AUTO: 0.4 % (ref 0–0.5)
LYMPHOCYTES # BLD AUTO: 1.2 K/UL (ref 1–4.8)
LYMPHOCYTES NFR BLD: 12.5 % (ref 18–48)
MCH RBC QN AUTO: 20.6 PG (ref 27–31)
MCHC RBC AUTO-ENTMCNC: 28.6 G/DL (ref 32–36)
MCV RBC AUTO: 72 FL (ref 82–98)
MONOCYTES # BLD AUTO: 0.5 K/UL (ref 0.3–1)
MONOCYTES NFR BLD: 5.6 % (ref 4–15)
NEUTROPHILS # BLD AUTO: 7.7 K/UL (ref 1.8–7.7)
NEUTROPHILS NFR BLD: 79.7 % (ref 38–73)
NRBC BLD-RTO: 0 /100 WBC
PLATELET # BLD AUTO: 389 K/UL (ref 150–450)
PMV BLD AUTO: 9.4 FL (ref 9.2–12.9)
POCT GLUCOSE: 164 MG/DL (ref 70–110)
POCT GLUCOSE: 260 MG/DL (ref 70–110)
POCT GLUCOSE: 284 MG/DL (ref 70–110)
POTASSIUM SERPL-SCNC: 4.4 MMOL/L (ref 3.5–5.1)
PROT SERPL-MCNC: 7.5 G/DL (ref 6–8.4)
RBC # BLD AUTO: 3.98 M/UL (ref 4.6–6.2)
SODIUM SERPL-SCNC: 136 MMOL/L (ref 136–145)
WBC # BLD AUTO: 9.71 K/UL (ref 3.9–12.7)

## 2021-09-08 PROCEDURE — 99233 PR SUBSEQUENT HOSPITAL CARE,LEVL III: ICD-10-PCS | Mod: 95,,, | Performed by: INTERNAL MEDICINE

## 2021-09-08 PROCEDURE — 97530 THERAPEUTIC ACTIVITIES: CPT

## 2021-09-08 PROCEDURE — 36415 COLL VENOUS BLD VENIPUNCTURE: CPT | Performed by: INTERNAL MEDICINE

## 2021-09-08 PROCEDURE — 97535 SELF CARE MNGMENT TRAINING: CPT

## 2021-09-08 PROCEDURE — 99233 SBSQ HOSP IP/OBS HIGH 50: CPT | Mod: 95,,, | Performed by: INTERNAL MEDICINE

## 2021-09-08 PROCEDURE — 80202 ASSAY OF VANCOMYCIN: CPT | Performed by: INTERNAL MEDICINE

## 2021-09-08 PROCEDURE — 63600175 PHARM REV CODE 636 W HCPCS: Performed by: INTERNAL MEDICINE

## 2021-09-08 PROCEDURE — 92526 ORAL FUNCTION THERAPY: CPT

## 2021-09-08 PROCEDURE — 25000003 PHARM REV CODE 250: Performed by: INTERNAL MEDICINE

## 2021-09-08 PROCEDURE — 99233 SBSQ HOSP IP/OBS HIGH 50: CPT | Mod: ,,, | Performed by: PHYSICIAN ASSISTANT

## 2021-09-08 PROCEDURE — 63600175 PHARM REV CODE 636 W HCPCS: Performed by: PHYSICIAN ASSISTANT

## 2021-09-08 PROCEDURE — 80053 COMPREHEN METABOLIC PANEL: CPT | Performed by: INTERNAL MEDICINE

## 2021-09-08 PROCEDURE — 85025 COMPLETE CBC W/AUTO DIFF WBC: CPT | Performed by: INTERNAL MEDICINE

## 2021-09-08 PROCEDURE — 20600001 HC STEP DOWN PRIVATE ROOM

## 2021-09-08 PROCEDURE — 99233 PR SUBSEQUENT HOSPITAL CARE,LEVL III: ICD-10-PCS | Mod: ,,, | Performed by: PHYSICIAN ASSISTANT

## 2021-09-08 RX ORDER — HYDROMORPHONE HYDROCHLORIDE 1 MG/ML
1 INJECTION, SOLUTION INTRAMUSCULAR; INTRAVENOUS; SUBCUTANEOUS EVERY 4 HOURS PRN
Status: DISCONTINUED | OUTPATIENT
Start: 2021-09-08 | End: 2021-09-09

## 2021-09-08 RX ORDER — GABAPENTIN 400 MG/1
1200 CAPSULE ORAL 3 TIMES DAILY
Status: DISCONTINUED | OUTPATIENT
Start: 2021-09-08 | End: 2021-09-10 | Stop reason: HOSPADM

## 2021-09-08 RX ORDER — MORPHINE SULFATE 30 MG/1
30 TABLET, FILM COATED, EXTENDED RELEASE ORAL EVERY 12 HOURS
Status: DISCONTINUED | OUTPATIENT
Start: 2021-09-08 | End: 2021-09-10 | Stop reason: HOSPADM

## 2021-09-08 RX ORDER — SYRING-NEEDL,DISP,INSUL,0.3 ML 29 G X1/2"
296 SYRINGE, EMPTY DISPOSABLE MISCELLANEOUS ONCE
Status: COMPLETED | OUTPATIENT
Start: 2021-09-08 | End: 2021-09-08

## 2021-09-08 RX ADMIN — POLYETHYLENE GLYCOL 3350 17 G: 17 POWDER, FOR SOLUTION ORAL at 09:09

## 2021-09-08 RX ADMIN — SPIRONOLACTONE 25 MG: 25 TABLET, FILM COATED ORAL at 07:09

## 2021-09-08 RX ADMIN — CEFEPIME 2 G: 2 INJECTION, POWDER, FOR SOLUTION INTRAVENOUS at 06:09

## 2021-09-08 RX ADMIN — MORPHINE SULFATE 30 MG: 30 TABLET, FILM COATED, EXTENDED RELEASE ORAL at 09:09

## 2021-09-08 RX ADMIN — INSULIN ASPART 6 UNITS: 100 INJECTION, SOLUTION INTRAVENOUS; SUBCUTANEOUS at 11:09

## 2021-09-08 RX ADMIN — APIXABAN 5 MG: 5 TABLET, FILM COATED ORAL at 09:09

## 2021-09-08 RX ADMIN — APIXABAN 5 MG: 5 TABLET, FILM COATED ORAL at 07:09

## 2021-09-08 RX ADMIN — VANCOMYCIN HYDROCHLORIDE 1000 MG: 1 INJECTION, POWDER, LYOPHILIZED, FOR SOLUTION INTRAVENOUS at 10:09

## 2021-09-08 RX ADMIN — MELATONIN TAB 3 MG 6 MG: 3 TAB at 09:09

## 2021-09-08 RX ADMIN — Medication 324 MG: at 11:09

## 2021-09-08 RX ADMIN — CLOPIDOGREL 75 MG: 75 TABLET, FILM COATED ORAL at 07:09

## 2021-09-08 RX ADMIN — HYDROMORPHONE HYDROCHLORIDE 1 MG: 1 INJECTION, SOLUTION INTRAMUSCULAR; INTRAVENOUS; SUBCUTANEOUS at 06:09

## 2021-09-08 RX ADMIN — CARVEDILOL 3.12 MG: 3.12 TABLET, FILM COATED ORAL at 09:09

## 2021-09-08 RX ADMIN — Medication 324 MG: at 04:09

## 2021-09-08 RX ADMIN — POTASSIUM CHLORIDE 20 MEQ: 1500 TABLET, EXTENDED RELEASE ORAL at 09:09

## 2021-09-08 RX ADMIN — OXYCODONE HYDROCHLORIDE 10 MG: 10 TABLET ORAL at 03:09

## 2021-09-08 RX ADMIN — CEFEPIME 2 G: 2 INJECTION, POWDER, FOR SOLUTION INTRAVENOUS at 12:09

## 2021-09-08 RX ADMIN — TAMSULOSIN HYDROCHLORIDE 0.4 MG: 0.4 CAPSULE ORAL at 07:09

## 2021-09-08 RX ADMIN — ACETAMINOPHEN 650 MG: 325 TABLET ORAL at 09:09

## 2021-09-08 RX ADMIN — GABAPENTIN 1200 MG: 400 CAPSULE ORAL at 09:09

## 2021-09-08 RX ADMIN — OXYCODONE HYDROCHLORIDE 10 MG: 10 TABLET ORAL at 12:09

## 2021-09-08 RX ADMIN — POLYETHYLENE GLYCOL 3350 17 G: 17 POWDER, FOR SOLUTION ORAL at 07:09

## 2021-09-08 RX ADMIN — HYDROMORPHONE HYDROCHLORIDE 1 MG: 1 INJECTION, SOLUTION INTRAMUSCULAR; INTRAVENOUS; SUBCUTANEOUS at 02:09

## 2021-09-08 RX ADMIN — GABAPENTIN 1200 MG: 400 CAPSULE ORAL at 01:09

## 2021-09-08 RX ADMIN — ACETAMINOPHEN 650 MG: 325 TABLET ORAL at 07:09

## 2021-09-08 RX ADMIN — OXYCODONE HYDROCHLORIDE AND ACETAMINOPHEN 500 MG: 500 TABLET ORAL at 07:09

## 2021-09-08 RX ADMIN — BACLOFEN 10 MG: 10 TABLET ORAL at 01:09

## 2021-09-08 RX ADMIN — MAGNESIUM OXIDE TAB 400 MG (241.3 MG ELEMENTAL MG) 400 MG: 400 (241.3 MG) TAB at 09:09

## 2021-09-08 RX ADMIN — Medication 296 ML: at 12:09

## 2021-09-08 RX ADMIN — OXYCODONE HYDROCHLORIDE 10 MG: 10 TABLET ORAL at 07:09

## 2021-09-08 RX ADMIN — HYDROMORPHONE HYDROCHLORIDE 1 MG: 1 INJECTION, SOLUTION INTRAMUSCULAR; INTRAVENOUS; SUBCUTANEOUS at 04:09

## 2021-09-08 RX ADMIN — CARVEDILOL 3.12 MG: 3.12 TABLET, FILM COATED ORAL at 07:09

## 2021-09-08 RX ADMIN — PREGABALIN 50 MG: 50 CAPSULE ORAL at 07:09

## 2021-09-08 RX ADMIN — BACLOFEN 10 MG: 10 TABLET ORAL at 09:09

## 2021-09-08 RX ADMIN — MAGNESIUM OXIDE TAB 400 MG (241.3 MG ELEMENTAL MG) 400 MG: 400 (241.3 MG) TAB at 07:09

## 2021-09-08 RX ADMIN — INSULIN ASPART 2 UNITS: 100 INJECTION, SOLUTION INTRAVENOUS; SUBCUTANEOUS at 04:09

## 2021-09-08 RX ADMIN — PREGABALIN 50 MG: 50 CAPSULE ORAL at 09:09

## 2021-09-08 RX ADMIN — SENNOSIDES AND DOCUSATE SODIUM 1 TABLET: 8.6; 5 TABLET ORAL at 07:09

## 2021-09-08 RX ADMIN — HYDROMORPHONE HYDROCHLORIDE 1 MG: 1 INJECTION, SOLUTION INTRAMUSCULAR; INTRAVENOUS; SUBCUTANEOUS at 12:09

## 2021-09-08 RX ADMIN — LACTULOSE 20 G: 10 SOLUTION ORAL at 07:09

## 2021-09-08 RX ADMIN — ACETAMINOPHEN 650 MG: 325 TABLET ORAL at 04:09

## 2021-09-08 RX ADMIN — ATORVASTATIN CALCIUM 40 MG: 20 TABLET, FILM COATED ORAL at 07:09

## 2021-09-08 RX ADMIN — CEFEPIME 2 G: 2 INJECTION, POWDER, FOR SOLUTION INTRAVENOUS at 01:09

## 2021-09-08 RX ADMIN — VANCOMYCIN HYDROCHLORIDE 1000 MG: 1 INJECTION, POWDER, LYOPHILIZED, FOR SOLUTION INTRAVENOUS at 11:09

## 2021-09-08 RX ADMIN — VANCOMYCIN HYDROCHLORIDE 1000 MG: 1 INJECTION, POWDER, LYOPHILIZED, FOR SOLUTION INTRAVENOUS at 12:09

## 2021-09-08 RX ADMIN — MICONAZOLE NITRATE: 20 POWDER TOPICAL at 09:09

## 2021-09-08 RX ADMIN — OXYCODONE HYDROCHLORIDE AND ACETAMINOPHEN 500 MG: 500 TABLET ORAL at 09:09

## 2021-09-08 RX ADMIN — ACETAMINOPHEN 650 MG: 325 TABLET ORAL at 12:09

## 2021-09-08 RX ADMIN — LACTULOSE 20 G: 10 SOLUTION ORAL at 09:09

## 2021-09-08 RX ADMIN — HYDROMORPHONE HYDROCHLORIDE 1 MG: 1 INJECTION, SOLUTION INTRAMUSCULAR; INTRAVENOUS; SUBCUTANEOUS at 10:09

## 2021-09-08 RX ADMIN — CEFEPIME 2 G: 2 INJECTION, POWDER, FOR SOLUTION INTRAVENOUS at 11:09

## 2021-09-08 RX ADMIN — SENNOSIDES AND DOCUSATE SODIUM 1 TABLET: 8.6; 5 TABLET ORAL at 09:09

## 2021-09-08 RX ADMIN — TORSEMIDE 20 MG: 20 TABLET ORAL at 07:09

## 2021-09-08 RX ADMIN — HYDROMORPHONE HYDROCHLORIDE 1 MG: 1 INJECTION, SOLUTION INTRAMUSCULAR; INTRAVENOUS; SUBCUTANEOUS at 11:09

## 2021-09-08 RX ADMIN — OXYCODONE HYDROCHLORIDE 10 MG: 10 TABLET ORAL at 05:09

## 2021-09-08 RX ADMIN — POTASSIUM CHLORIDE 20 MEQ: 1500 TABLET, EXTENDED RELEASE ORAL at 07:09

## 2021-09-08 RX ADMIN — MORPHINE SULFATE 15 MG: 15 TABLET, FILM COATED, EXTENDED RELEASE ORAL at 07:09

## 2021-09-08 RX ADMIN — GABAPENTIN 800 MG: 400 CAPSULE ORAL at 07:09

## 2021-09-08 RX ADMIN — INSULIN ASPART 3 UNITS: 100 INJECTION, SOLUTION INTRAVENOUS; SUBCUTANEOUS at 09:09

## 2021-09-09 LAB
ALBUMIN SERPL BCP-MCNC: 2.4 G/DL (ref 3.5–5.2)
ALP SERPL-CCNC: 96 U/L (ref 55–135)
ALT SERPL W/O P-5'-P-CCNC: 15 U/L (ref 10–44)
ANION GAP SERPL CALC-SCNC: 9 MMOL/L (ref 8–16)
AST SERPL-CCNC: 16 U/L (ref 10–40)
BASOPHILS # BLD AUTO: 0.05 K/UL (ref 0–0.2)
BASOPHILS NFR BLD: 0.6 % (ref 0–1.9)
BILIRUB SERPL-MCNC: 0.3 MG/DL (ref 0.1–1)
BUN SERPL-MCNC: 18 MG/DL (ref 6–20)
CALCIUM SERPL-MCNC: 9.4 MG/DL (ref 8.7–10.5)
CHLORIDE SERPL-SCNC: 98 MMOL/L (ref 95–110)
CO2 SERPL-SCNC: 29 MMOL/L (ref 23–29)
CREAT SERPL-MCNC: 0.7 MG/DL (ref 0.5–1.4)
DIFFERENTIAL METHOD: ABNORMAL
EOSINOPHIL # BLD AUTO: 0.2 K/UL (ref 0–0.5)
EOSINOPHIL NFR BLD: 1.8 % (ref 0–8)
ERYTHROCYTE [DISTWIDTH] IN BLOOD BY AUTOMATED COUNT: 23.1 % (ref 11.5–14.5)
EST. GFR  (AFRICAN AMERICAN): >60 ML/MIN/1.73 M^2
EST. GFR  (NON AFRICAN AMERICAN): >60 ML/MIN/1.73 M^2
FINAL PATHOLOGIC DIAGNOSIS: NORMAL
GLUCOSE SERPL-MCNC: 156 MG/DL (ref 70–110)
GROSS: NORMAL
HCT VFR BLD AUTO: 28.6 % (ref 40–54)
HGB BLD-MCNC: 8.3 G/DL (ref 14–18)
IMM GRANULOCYTES # BLD AUTO: 0.03 K/UL (ref 0–0.04)
IMM GRANULOCYTES NFR BLD AUTO: 0.3 % (ref 0–0.5)
LYMPHOCYTES # BLD AUTO: 1.3 K/UL (ref 1–4.8)
LYMPHOCYTES NFR BLD: 14.9 % (ref 18–48)
Lab: NORMAL
MCH RBC QN AUTO: 21 PG (ref 27–31)
MCHC RBC AUTO-ENTMCNC: 29 G/DL (ref 32–36)
MCV RBC AUTO: 72 FL (ref 82–98)
MONOCYTES # BLD AUTO: 0.5 K/UL (ref 0.3–1)
MONOCYTES NFR BLD: 5.9 % (ref 4–15)
NEUTROPHILS # BLD AUTO: 6.9 K/UL (ref 1.8–7.7)
NEUTROPHILS NFR BLD: 76.5 % (ref 38–73)
NRBC BLD-RTO: 0 /100 WBC
PLATELET # BLD AUTO: 372 K/UL (ref 150–450)
PMV BLD AUTO: 10.1 FL (ref 9.2–12.9)
POCT GLUCOSE: 175 MG/DL (ref 70–110)
POCT GLUCOSE: 259 MG/DL (ref 70–110)
POTASSIUM SERPL-SCNC: 4.1 MMOL/L (ref 3.5–5.1)
PROT SERPL-MCNC: 7.8 G/DL (ref 6–8.4)
RBC # BLD AUTO: 3.95 M/UL (ref 4.6–6.2)
SODIUM SERPL-SCNC: 136 MMOL/L (ref 136–145)
VANCOMYCIN TROUGH SERPL-MCNC: 19.2 UG/ML (ref 10–22)
WBC # BLD AUTO: 8.98 K/UL (ref 3.9–12.7)

## 2021-09-09 PROCEDURE — 36415 COLL VENOUS BLD VENIPUNCTURE: CPT | Performed by: INTERNAL MEDICINE

## 2021-09-09 PROCEDURE — 80202 ASSAY OF VANCOMYCIN: CPT | Performed by: INTERNAL MEDICINE

## 2021-09-09 PROCEDURE — 80053 COMPREHEN METABOLIC PANEL: CPT | Performed by: INTERNAL MEDICINE

## 2021-09-09 PROCEDURE — 25000003 PHARM REV CODE 250: Performed by: INTERNAL MEDICINE

## 2021-09-09 PROCEDURE — 99233 PR SUBSEQUENT HOSPITAL CARE,LEVL III: ICD-10-PCS | Mod: 95,,, | Performed by: INTERNAL MEDICINE

## 2021-09-09 PROCEDURE — 63600175 PHARM REV CODE 636 W HCPCS: Performed by: INTERNAL MEDICINE

## 2021-09-09 PROCEDURE — 92526 ORAL FUNCTION THERAPY: CPT

## 2021-09-09 PROCEDURE — 99233 SBSQ HOSP IP/OBS HIGH 50: CPT | Mod: 95,,, | Performed by: INTERNAL MEDICINE

## 2021-09-09 PROCEDURE — 97535 SELF CARE MNGMENT TRAINING: CPT

## 2021-09-09 PROCEDURE — 85025 COMPLETE CBC W/AUTO DIFF WBC: CPT | Performed by: INTERNAL MEDICINE

## 2021-09-09 PROCEDURE — 63600175 PHARM REV CODE 636 W HCPCS: Performed by: PHYSICIAN ASSISTANT

## 2021-09-09 PROCEDURE — 20600001 HC STEP DOWN PRIVATE ROOM

## 2021-09-09 RX ORDER — HYDROMORPHONE HYDROCHLORIDE 1 MG/ML
0.5 INJECTION, SOLUTION INTRAMUSCULAR; INTRAVENOUS; SUBCUTANEOUS EVERY 4 HOURS PRN
Status: DISCONTINUED | OUTPATIENT
Start: 2021-09-09 | End: 2021-09-10 | Stop reason: HOSPADM

## 2021-09-09 RX ADMIN — HYDROMORPHONE HYDROCHLORIDE 0.5 MG: 1 INJECTION, SOLUTION INTRAMUSCULAR; INTRAVENOUS; SUBCUTANEOUS at 02:09

## 2021-09-09 RX ADMIN — Medication 324 MG: at 04:09

## 2021-09-09 RX ADMIN — INSULIN ASPART 2 UNITS: 100 INJECTION, SOLUTION INTRAVENOUS; SUBCUTANEOUS at 08:09

## 2021-09-09 RX ADMIN — OXYCODONE HYDROCHLORIDE 10 MG: 10 TABLET ORAL at 12:09

## 2021-09-09 RX ADMIN — ACETAMINOPHEN 650 MG: 325 TABLET ORAL at 08:09

## 2021-09-09 RX ADMIN — HYDROMORPHONE HYDROCHLORIDE 1 MG: 1 INJECTION, SOLUTION INTRAMUSCULAR; INTRAVENOUS; SUBCUTANEOUS at 05:09

## 2021-09-09 RX ADMIN — OXYCODONE HYDROCHLORIDE 10 MG: 10 TABLET ORAL at 04:09

## 2021-09-09 RX ADMIN — CEFEPIME 2 G: 2 INJECTION, POWDER, FOR SOLUTION INTRAVENOUS at 08:09

## 2021-09-09 RX ADMIN — MAGNESIUM OXIDE TAB 400 MG (241.3 MG ELEMENTAL MG) 400 MG: 400 (241.3 MG) TAB at 08:09

## 2021-09-09 RX ADMIN — BACLOFEN 10 MG: 10 TABLET ORAL at 09:09

## 2021-09-09 RX ADMIN — CLOPIDOGREL 75 MG: 75 TABLET, FILM COATED ORAL at 08:09

## 2021-09-09 RX ADMIN — POTASSIUM CHLORIDE 20 MEQ: 1500 TABLET, EXTENDED RELEASE ORAL at 09:09

## 2021-09-09 RX ADMIN — INSULIN ASPART 4 UNITS: 100 INJECTION, SOLUTION INTRAVENOUS; SUBCUTANEOUS at 04:09

## 2021-09-09 RX ADMIN — ACETAMINOPHEN 650 MG: 325 TABLET ORAL at 09:09

## 2021-09-09 RX ADMIN — GABAPENTIN 1200 MG: 400 CAPSULE ORAL at 08:09

## 2021-09-09 RX ADMIN — GABAPENTIN 1200 MG: 400 CAPSULE ORAL at 04:09

## 2021-09-09 RX ADMIN — HYDROMORPHONE HYDROCHLORIDE 0.5 MG: 1 INJECTION, SOLUTION INTRAMUSCULAR; INTRAVENOUS; SUBCUTANEOUS at 06:09

## 2021-09-09 RX ADMIN — PREGABALIN 50 MG: 50 CAPSULE ORAL at 08:09

## 2021-09-09 RX ADMIN — LACTULOSE 20 G: 10 SOLUTION ORAL at 09:09

## 2021-09-09 RX ADMIN — CEFEPIME 2 G: 2 INJECTION, POWDER, FOR SOLUTION INTRAVENOUS at 03:09

## 2021-09-09 RX ADMIN — INSULIN ASPART 2 UNITS: 100 INJECTION, SOLUTION INTRAVENOUS; SUBCUTANEOUS at 10:09

## 2021-09-09 RX ADMIN — PREGABALIN 50 MG: 50 CAPSULE ORAL at 09:09

## 2021-09-09 RX ADMIN — MORPHINE SULFATE 30 MG: 30 TABLET, FILM COATED, EXTENDED RELEASE ORAL at 09:09

## 2021-09-09 RX ADMIN — OXYCODONE HYDROCHLORIDE 10 MG: 10 TABLET ORAL at 08:09

## 2021-09-09 RX ADMIN — OXYCODONE HYDROCHLORIDE 10 MG: 10 TABLET ORAL at 10:09

## 2021-09-09 RX ADMIN — BACLOFEN 10 MG: 10 TABLET ORAL at 08:09

## 2021-09-09 RX ADMIN — TORSEMIDE 20 MG: 20 TABLET ORAL at 08:09

## 2021-09-09 RX ADMIN — SPIRONOLACTONE 25 MG: 25 TABLET, FILM COATED ORAL at 08:09

## 2021-09-09 RX ADMIN — HYDROMORPHONE HYDROCHLORIDE 0.5 MG: 1 INJECTION, SOLUTION INTRAMUSCULAR; INTRAVENOUS; SUBCUTANEOUS at 11:09

## 2021-09-09 RX ADMIN — CEFEPIME 2 G: 2 INJECTION, POWDER, FOR SOLUTION INTRAVENOUS at 11:09

## 2021-09-09 RX ADMIN — VANCOMYCIN HYDROCHLORIDE 1000 MG: 1 INJECTION, POWDER, LYOPHILIZED, FOR SOLUTION INTRAVENOUS at 11:09

## 2021-09-09 RX ADMIN — BACLOFEN 10 MG: 10 TABLET ORAL at 03:09

## 2021-09-09 RX ADMIN — ATORVASTATIN CALCIUM 40 MG: 20 TABLET, FILM COATED ORAL at 08:09

## 2021-09-09 RX ADMIN — TAMSULOSIN HYDROCHLORIDE 0.4 MG: 0.4 CAPSULE ORAL at 08:09

## 2021-09-09 RX ADMIN — GABAPENTIN 1200 MG: 400 CAPSULE ORAL at 09:09

## 2021-09-09 RX ADMIN — ACETAMINOPHEN 650 MG: 325 TABLET ORAL at 01:09

## 2021-09-09 RX ADMIN — ACETAMINOPHEN 650 MG: 325 TABLET ORAL at 04:09

## 2021-09-09 RX ADMIN — OXYCODONE HYDROCHLORIDE 10 MG: 10 TABLET ORAL at 03:09

## 2021-09-09 RX ADMIN — APIXABAN 5 MG: 5 TABLET, FILM COATED ORAL at 10:09

## 2021-09-09 RX ADMIN — CARVEDILOL 3.12 MG: 3.12 TABLET, FILM COATED ORAL at 08:09

## 2021-09-09 RX ADMIN — MORPHINE SULFATE 30 MG: 30 TABLET, FILM COATED, EXTENDED RELEASE ORAL at 08:09

## 2021-09-09 RX ADMIN — OXYCODONE HYDROCHLORIDE AND ACETAMINOPHEN 500 MG: 500 TABLET ORAL at 08:09

## 2021-09-09 RX ADMIN — OXYCODONE HYDROCHLORIDE AND ACETAMINOPHEN 500 MG: 500 TABLET ORAL at 09:09

## 2021-09-09 RX ADMIN — POTASSIUM CHLORIDE 20 MEQ: 1500 TABLET, EXTENDED RELEASE ORAL at 08:09

## 2021-09-09 RX ADMIN — CARVEDILOL 3.12 MG: 3.12 TABLET, FILM COATED ORAL at 09:09

## 2021-09-09 RX ADMIN — APIXABAN 5 MG: 5 TABLET, FILM COATED ORAL at 08:09

## 2021-09-09 RX ADMIN — HYDROMORPHONE HYDROCHLORIDE 1 MG: 1 INJECTION, SOLUTION INTRAMUSCULAR; INTRAVENOUS; SUBCUTANEOUS at 09:09

## 2021-09-09 RX ADMIN — MAGNESIUM OXIDE TAB 400 MG (241.3 MG ELEMENTAL MG) 400 MG: 400 (241.3 MG) TAB at 09:09

## 2021-09-10 VITALS
DIASTOLIC BLOOD PRESSURE: 67 MMHG | BODY MASS INDEX: 25.82 KG/M2 | TEMPERATURE: 98 F | WEIGHT: 151.25 LBS | RESPIRATION RATE: 16 BRPM | HEIGHT: 64 IN | HEART RATE: 89 BPM | OXYGEN SATURATION: 96 % | SYSTOLIC BLOOD PRESSURE: 131 MMHG

## 2021-09-10 PROBLEM — D72.829 LEUCOCYTOSIS: Status: RESOLVED | Noted: 2019-10-04 | Resolved: 2021-09-10

## 2021-09-10 LAB
ALBUMIN SERPL BCP-MCNC: 2.3 G/DL (ref 3.5–5.2)
ALP SERPL-CCNC: 82 U/L (ref 55–135)
ALT SERPL W/O P-5'-P-CCNC: 15 U/L (ref 10–44)
ANION GAP SERPL CALC-SCNC: 8 MMOL/L (ref 8–16)
ANISOCYTOSIS BLD QL SMEAR: SLIGHT
AST SERPL-CCNC: 17 U/L (ref 10–40)
BASOPHILS # BLD AUTO: 0.05 K/UL (ref 0–0.2)
BASOPHILS NFR BLD: 0.8 % (ref 0–1.9)
BILIRUB SERPL-MCNC: 0.2 MG/DL (ref 0.1–1)
BUN SERPL-MCNC: 15 MG/DL (ref 6–20)
CALCIUM SERPL-MCNC: 9.4 MG/DL (ref 8.7–10.5)
CHLORIDE SERPL-SCNC: 102 MMOL/L (ref 95–110)
CO2 SERPL-SCNC: 26 MMOL/L (ref 23–29)
CREAT SERPL-MCNC: 0.6 MG/DL (ref 0.5–1.4)
DIFFERENTIAL METHOD: ABNORMAL
EOSINOPHIL # BLD AUTO: 0.1 K/UL (ref 0–0.5)
EOSINOPHIL NFR BLD: 2.2 % (ref 0–8)
ERYTHROCYTE [DISTWIDTH] IN BLOOD BY AUTOMATED COUNT: 23 % (ref 11.5–14.5)
EST. GFR  (AFRICAN AMERICAN): >60 ML/MIN/1.73 M^2
EST. GFR  (NON AFRICAN AMERICAN): >60 ML/MIN/1.73 M^2
GLUCOSE SERPL-MCNC: 186 MG/DL (ref 70–110)
HCT VFR BLD AUTO: 27.4 % (ref 40–54)
HGB BLD-MCNC: 8 G/DL (ref 14–18)
HYPOCHROMIA BLD QL SMEAR: ABNORMAL
IMM GRANULOCYTES # BLD AUTO: 0.03 K/UL (ref 0–0.04)
IMM GRANULOCYTES NFR BLD AUTO: 0.5 % (ref 0–0.5)
LYMPHOCYTES # BLD AUTO: 1.3 K/UL (ref 1–4.8)
LYMPHOCYTES NFR BLD: 21 % (ref 18–48)
MCH RBC QN AUTO: 21.1 PG (ref 27–31)
MCHC RBC AUTO-ENTMCNC: 29.2 G/DL (ref 32–36)
MCV RBC AUTO: 72 FL (ref 82–98)
MONOCYTES # BLD AUTO: 0.4 K/UL (ref 0.3–1)
MONOCYTES NFR BLD: 6.7 % (ref 4–15)
NEUTROPHILS # BLD AUTO: 4.3 K/UL (ref 1.8–7.7)
NEUTROPHILS NFR BLD: 68.8 % (ref 38–73)
NRBC BLD-RTO: 0 /100 WBC
OVALOCYTES BLD QL SMEAR: ABNORMAL
PLATELET # BLD AUTO: 348 K/UL (ref 150–450)
PMV BLD AUTO: 9.9 FL (ref 9.2–12.9)
POCT GLUCOSE: 181 MG/DL (ref 70–110)
POCT GLUCOSE: 203 MG/DL (ref 70–110)
POCT GLUCOSE: 219 MG/DL (ref 70–110)
POCT GLUCOSE: 234 MG/DL (ref 70–110)
POIKILOCYTOSIS BLD QL SMEAR: SLIGHT
POLYCHROMASIA BLD QL SMEAR: ABNORMAL
POTASSIUM SERPL-SCNC: 4.3 MMOL/L (ref 3.5–5.1)
PROT SERPL-MCNC: 7.5 G/DL (ref 6–8.4)
RBC # BLD AUTO: 3.79 M/UL (ref 4.6–6.2)
SARS-COV-2 RDRP RESP QL NAA+PROBE: NEGATIVE
SODIUM SERPL-SCNC: 136 MMOL/L (ref 136–145)
VANCOMYCIN TROUGH SERPL-MCNC: 13.8 UG/ML (ref 10–22)
VANCOMYCIN TROUGH SERPL-MCNC: 17 UG/ML (ref 10–22)
WBC # BLD AUTO: 6.28 K/UL (ref 3.9–12.7)

## 2021-09-10 PROCEDURE — 97535 SELF CARE MNGMENT TRAINING: CPT

## 2021-09-10 PROCEDURE — 63600175 PHARM REV CODE 636 W HCPCS: Performed by: INTERNAL MEDICINE

## 2021-09-10 PROCEDURE — U0002 COVID-19 LAB TEST NON-CDC: HCPCS | Performed by: INTERNAL MEDICINE

## 2021-09-10 PROCEDURE — 99239 HOSP IP/OBS DSCHRG MGMT >30: CPT | Mod: 95,,, | Performed by: INTERNAL MEDICINE

## 2021-09-10 PROCEDURE — 25000003 PHARM REV CODE 250: Performed by: INTERNAL MEDICINE

## 2021-09-10 PROCEDURE — 36415 COLL VENOUS BLD VENIPUNCTURE: CPT | Performed by: INTERNAL MEDICINE

## 2021-09-10 PROCEDURE — 63600175 PHARM REV CODE 636 W HCPCS: Performed by: PHYSICIAN ASSISTANT

## 2021-09-10 PROCEDURE — C1751 CATH, INF, PER/CENT/MIDLINE: HCPCS

## 2021-09-10 PROCEDURE — 97530 THERAPEUTIC ACTIVITIES: CPT | Mod: CQ

## 2021-09-10 PROCEDURE — 80053 COMPREHEN METABOLIC PANEL: CPT | Performed by: INTERNAL MEDICINE

## 2021-09-10 PROCEDURE — 99239 PR HOSPITAL DISCHARGE DAY,>30 MIN: ICD-10-PCS | Mod: 95,,, | Performed by: INTERNAL MEDICINE

## 2021-09-10 PROCEDURE — 97110 THERAPEUTIC EXERCISES: CPT | Mod: CQ

## 2021-09-10 PROCEDURE — 85025 COMPLETE CBC W/AUTO DIFF WBC: CPT | Performed by: INTERNAL MEDICINE

## 2021-09-10 PROCEDURE — 80202 ASSAY OF VANCOMYCIN: CPT | Performed by: INTERNAL MEDICINE

## 2021-09-10 PROCEDURE — 36410 VNPNXR 3YR/> PHY/QHP DX/THER: CPT

## 2021-09-10 PROCEDURE — 76937 US GUIDE VASCULAR ACCESS: CPT

## 2021-09-10 RX ORDER — POLYETHYLENE GLYCOL 3350 17 G/17G
17 POWDER, FOR SOLUTION ORAL DAILY
Status: DISCONTINUED | OUTPATIENT
Start: 2021-09-11 | End: 2021-09-10 | Stop reason: HOSPADM

## 2021-09-10 RX ORDER — ASCORBIC ACID 500 MG
500 TABLET ORAL 2 TIMES DAILY WITH MEALS
Start: 2021-09-10

## 2021-09-10 RX ORDER — OXYCODONE HYDROCHLORIDE 10 MG/1
10 TABLET ORAL EVERY 4 HOURS PRN
Qty: 60 TABLET | Refills: 0 | Status: ON HOLD | OUTPATIENT
Start: 2021-09-10 | End: 2021-09-29 | Stop reason: SDUPTHER

## 2021-09-10 RX ORDER — MICONAZOLE NITRATE 2 %
POWDER (GRAM) TOPICAL 2 TIMES DAILY
Refills: 0
Start: 2021-09-10 | End: 2022-03-09

## 2021-09-10 RX ORDER — POTASSIUM CHLORIDE 20 MEQ/1
20 TABLET, EXTENDED RELEASE ORAL 2 TIMES DAILY
Start: 2021-09-10 | End: 2022-07-18

## 2021-09-10 RX ORDER — TORSEMIDE 20 MG/1
20 TABLET ORAL DAILY
Qty: 30 TABLET | Refills: 11
Start: 2021-09-10 | End: 2023-01-25

## 2021-09-10 RX ORDER — INSULIN GLARGINE 100 [IU]/ML
5 INJECTION, SOLUTION SUBCUTANEOUS 2 TIMES DAILY
Qty: 21 ML | Refills: 11
Start: 2021-09-10 | End: 2021-11-18 | Stop reason: SDUPTHER

## 2021-09-10 RX ORDER — FERROUS GLUCONATE 324(37.5)
324 TABLET ORAL 2 TIMES DAILY WITH MEALS
Qty: 60 TABLET | Refills: 11
Start: 2021-09-10 | End: 2022-02-15

## 2021-09-10 RX ORDER — AMOXICILLIN 250 MG
2 CAPSULE ORAL 2 TIMES DAILY PRN
Status: DISCONTINUED | OUTPATIENT
Start: 2021-09-10 | End: 2021-09-10 | Stop reason: HOSPADM

## 2021-09-10 RX ORDER — MORPHINE SULFATE 30 MG/1
30 TABLET, FILM COATED, EXTENDED RELEASE ORAL 2 TIMES DAILY
Qty: 60 TABLET | Refills: 0 | Status: ON HOLD | OUTPATIENT
Start: 2021-09-10 | End: 2021-09-29 | Stop reason: SDUPTHER

## 2021-09-10 RX ORDER — MORPHINE SULFATE 30 MG/1
30 TABLET, FILM COATED, EXTENDED RELEASE ORAL 2 TIMES DAILY
Qty: 20 TABLET | Refills: 0 | Status: SHIPPED | OUTPATIENT
Start: 2021-09-10 | End: 2021-09-10

## 2021-09-10 RX ORDER — OXYCODONE HYDROCHLORIDE 10 MG/1
10 TABLET ORAL EVERY 4 HOURS PRN
Qty: 27 TABLET | Refills: 0 | Status: SHIPPED | OUTPATIENT
Start: 2021-09-10 | End: 2021-09-10

## 2021-09-10 RX ORDER — CEFEPIME HYDROCHLORIDE 2 G/1
2 INJECTION, POWDER, FOR SOLUTION INTRAVENOUS EVERY 8 HOURS
Qty: 72 G | Refills: 0 | Status: ON HOLD
Start: 2021-09-10 | End: 2021-09-29 | Stop reason: HOSPADM

## 2021-09-10 RX ORDER — GABAPENTIN 400 MG/1
1200 CAPSULE ORAL 3 TIMES DAILY
Qty: 270 CAPSULE | Refills: 11
Start: 2021-09-10 | End: 2022-07-18

## 2021-09-10 RX ORDER — DOCUSATE SODIUM 283 MG/5ML
1 LIQUID RECTAL DAILY PRN
Start: 2021-09-10 | End: 2022-02-07

## 2021-09-10 RX ORDER — TORSEMIDE 20 MG/1
20 TABLET ORAL DAILY PRN
Qty: 30 TABLET | Refills: 11 | Status: ON HOLD | OUTPATIENT
Start: 2021-09-10 | End: 2021-09-29 | Stop reason: HOSPADM

## 2021-09-10 RX ORDER — VANCOMYCIN HCL IN 5 % DEXTROSE 1G/250ML
1000 PLASTIC BAG, INJECTION (ML) INTRAVENOUS EVERY 12 HOURS
Status: ON HOLD
Start: 2021-09-10 | End: 2021-09-29 | Stop reason: HOSPADM

## 2021-09-10 RX ORDER — ACETAMINOPHEN 325 MG/1
650 TABLET ORAL 3 TIMES DAILY
Refills: 0
Start: 2021-09-10 | End: 2022-02-07

## 2021-09-10 RX ORDER — BACLOFEN 20 MG/1
10 TABLET ORAL 3 TIMES DAILY
Status: ON HOLD
Start: 2021-09-10 | End: 2021-09-29 | Stop reason: HOSPADM

## 2021-09-10 RX ORDER — LACTULOSE 10 G/15ML
20 SOLUTION ORAL 2 TIMES DAILY
Start: 2021-09-11 | End: 2021-11-18

## 2021-09-10 RX ADMIN — GABAPENTIN 1200 MG: 400 CAPSULE ORAL at 02:09

## 2021-09-10 RX ADMIN — MAGNESIUM OXIDE TAB 400 MG (241.3 MG ELEMENTAL MG) 400 MG: 400 (241.3 MG) TAB at 08:09

## 2021-09-10 RX ADMIN — GABAPENTIN 1200 MG: 400 CAPSULE ORAL at 08:09

## 2021-09-10 RX ADMIN — OXYCODONE HYDROCHLORIDE AND ACETAMINOPHEN 500 MG: 500 TABLET ORAL at 08:09

## 2021-09-10 RX ADMIN — HYDROMORPHONE HYDROCHLORIDE 0.5 MG: 1 INJECTION, SOLUTION INTRAMUSCULAR; INTRAVENOUS; SUBCUTANEOUS at 11:09

## 2021-09-10 RX ADMIN — CARVEDILOL 3.12 MG: 3.12 TABLET, FILM COATED ORAL at 08:09

## 2021-09-10 RX ADMIN — INSULIN ASPART 2 UNITS: 100 INJECTION, SOLUTION INTRAVENOUS; SUBCUTANEOUS at 08:09

## 2021-09-10 RX ADMIN — INSULIN ASPART 4 UNITS: 100 INJECTION, SOLUTION INTRAVENOUS; SUBCUTANEOUS at 12:09

## 2021-09-10 RX ADMIN — VANCOMYCIN HYDROCHLORIDE 1000 MG: 1 INJECTION, POWDER, LYOPHILIZED, FOR SOLUTION INTRAVENOUS at 12:09

## 2021-09-10 RX ADMIN — ATORVASTATIN CALCIUM 40 MG: 20 TABLET, FILM COATED ORAL at 08:09

## 2021-09-10 RX ADMIN — INSULIN ASPART 4 UNITS: 100 INJECTION, SOLUTION INTRAVENOUS; SUBCUTANEOUS at 04:09

## 2021-09-10 RX ADMIN — BACLOFEN 10 MG: 10 TABLET ORAL at 08:09

## 2021-09-10 RX ADMIN — POLYETHYLENE GLYCOL 3350 17 G: 17 POWDER, FOR SOLUTION ORAL at 08:09

## 2021-09-10 RX ADMIN — MORPHINE SULFATE 30 MG: 30 TABLET, FILM COATED, EXTENDED RELEASE ORAL at 08:09

## 2021-09-10 RX ADMIN — PREGABALIN 50 MG: 50 CAPSULE ORAL at 08:09

## 2021-09-10 RX ADMIN — BACLOFEN 10 MG: 10 TABLET ORAL at 02:09

## 2021-09-10 RX ADMIN — APIXABAN 5 MG: 5 TABLET, FILM COATED ORAL at 08:09

## 2021-09-10 RX ADMIN — CEFEPIME 2 G: 2 INJECTION, POWDER, FOR SOLUTION INTRAVENOUS at 05:09

## 2021-09-10 RX ADMIN — TORSEMIDE 20 MG: 20 TABLET ORAL at 08:09

## 2021-09-10 RX ADMIN — CLOPIDOGREL 75 MG: 75 TABLET, FILM COATED ORAL at 08:09

## 2021-09-10 RX ADMIN — ACETAMINOPHEN 650 MG: 325 TABLET ORAL at 08:09

## 2021-09-10 RX ADMIN — SPIRONOLACTONE 25 MG: 25 TABLET, FILM COATED ORAL at 08:09

## 2021-09-10 RX ADMIN — HYDROMORPHONE HYDROCHLORIDE 0.5 MG: 1 INJECTION, SOLUTION INTRAMUSCULAR; INTRAVENOUS; SUBCUTANEOUS at 07:09

## 2021-09-10 RX ADMIN — TAMSULOSIN HYDROCHLORIDE 0.4 MG: 0.4 CAPSULE ORAL at 08:09

## 2021-09-10 RX ADMIN — HYDROMORPHONE HYDROCHLORIDE 0.5 MG: 1 INJECTION, SOLUTION INTRAMUSCULAR; INTRAVENOUS; SUBCUTANEOUS at 04:09

## 2021-09-10 RX ADMIN — CEFEPIME 2 G: 2 INJECTION, POWDER, FOR SOLUTION INTRAVENOUS at 02:09

## 2021-09-10 RX ADMIN — OXYCODONE HYDROCHLORIDE 10 MG: 10 TABLET ORAL at 05:09

## 2021-09-10 RX ADMIN — Medication 324 MG: at 11:09

## 2021-09-10 RX ADMIN — ACETAMINOPHEN 650 MG: 325 TABLET ORAL at 02:09

## 2021-09-10 RX ADMIN — OXYCODONE HYDROCHLORIDE 10 MG: 10 TABLET ORAL at 10:09

## 2021-09-10 RX ADMIN — POTASSIUM CHLORIDE 20 MEQ: 1500 TABLET, EXTENDED RELEASE ORAL at 08:09

## 2021-09-10 RX ADMIN — OXYCODONE HYDROCHLORIDE 10 MG: 10 TABLET ORAL at 02:09

## 2021-09-13 ENCOUNTER — HOSPITAL ENCOUNTER (EMERGENCY)
Facility: HOSPITAL | Age: 56
Discharge: HOME | End: 2021-09-13
Attending: EMERGENCY MEDICINE
Payer: MEDICARE

## 2021-09-13 VITALS
HEART RATE: 79 BPM | DIASTOLIC BLOOD PRESSURE: 68 MMHG | RESPIRATION RATE: 16 BRPM | TEMPERATURE: 99 F | OXYGEN SATURATION: 98 % | SYSTOLIC BLOOD PRESSURE: 110 MMHG | WEIGHT: 151.31 LBS | BODY MASS INDEX: 25.97 KG/M2

## 2021-09-13 DIAGNOSIS — Z89.511 HISTORY OF AMPUTATION OF RIGHT LEG THROUGH TIBIA AND FIBULA: ICD-10-CM

## 2021-09-13 DIAGNOSIS — Z01.89 ENCOUNTER FOR LABORATORY TEST: Primary | ICD-10-CM

## 2021-09-13 LAB
ANION GAP SERPL CALC-SCNC: 11 MMOL/L (ref 8–16)
BASOPHILS # BLD AUTO: 0.08 K/UL (ref 0–0.2)
BASOPHILS NFR BLD: 1.1 % (ref 0–1.9)
BUN SERPL-MCNC: 20 MG/DL (ref 6–20)
CALCIUM SERPL-MCNC: 10 MG/DL (ref 8.7–10.5)
CHLORIDE SERPL-SCNC: 100 MMOL/L (ref 95–110)
CO2 SERPL-SCNC: 26 MMOL/L (ref 23–29)
CREAT SERPL-MCNC: 0.8 MG/DL (ref 0.5–1.4)
DIFFERENTIAL METHOD: ABNORMAL
EOSINOPHIL # BLD AUTO: 0.3 K/UL (ref 0–0.5)
EOSINOPHIL NFR BLD: 4.2 % (ref 0–8)
ERYTHROCYTE [DISTWIDTH] IN BLOOD BY AUTOMATED COUNT: 24.3 % (ref 11.5–14.5)
EST. GFR  (AFRICAN AMERICAN): >60 ML/MIN/1.73 M^2
EST. GFR  (NON AFRICAN AMERICAN): >60 ML/MIN/1.73 M^2
GLUCOSE SERPL-MCNC: 247 MG/DL (ref 70–110)
HCT VFR BLD AUTO: 30.7 % (ref 40–54)
HGB BLD-MCNC: 9.5 G/DL (ref 14–18)
IMM GRANULOCYTES # BLD AUTO: 0.02 K/UL (ref 0–0.04)
IMM GRANULOCYTES NFR BLD AUTO: 0.3 % (ref 0–0.5)
LYMPHOCYTES # BLD AUTO: 1.8 K/UL (ref 1–4.8)
LYMPHOCYTES NFR BLD: 23.7 % (ref 18–48)
MCH RBC QN AUTO: 21.8 PG (ref 27–31)
MCHC RBC AUTO-ENTMCNC: 30.9 G/DL (ref 32–36)
MCV RBC AUTO: 71 FL (ref 82–98)
MONOCYTES # BLD AUTO: 0.6 K/UL (ref 0.3–1)
MONOCYTES NFR BLD: 7.9 % (ref 4–15)
NEUTROPHILS # BLD AUTO: 4.6 K/UL (ref 1.8–7.7)
NEUTROPHILS NFR BLD: 62.8 % (ref 38–73)
NRBC BLD-RTO: 0 /100 WBC
PLATELET # BLD AUTO: 344 K/UL (ref 150–450)
PMV BLD AUTO: 9.7 FL (ref 9.2–12.9)
POTASSIUM SERPL-SCNC: 4.2 MMOL/L (ref 3.5–5.1)
RBC # BLD AUTO: 4.35 M/UL (ref 4.6–6.2)
SODIUM SERPL-SCNC: 137 MMOL/L (ref 136–145)
VANCOMYCIN SERPL-MCNC: 6.6 UG/ML
WBC # BLD AUTO: 7.37 K/UL (ref 3.9–12.7)

## 2021-09-13 PROCEDURE — 99282 EMERGENCY DEPT VISIT SF MDM: CPT | Mod: ,,, | Performed by: EMERGENCY MEDICINE

## 2021-09-13 PROCEDURE — 86803 HEPATITIS C AB TEST: CPT | Performed by: EMERGENCY MEDICINE

## 2021-09-13 PROCEDURE — 80048 BASIC METABOLIC PNL TOTAL CA: CPT | Performed by: EMERGENCY MEDICINE

## 2021-09-13 PROCEDURE — 85025 COMPLETE CBC W/AUTO DIFF WBC: CPT | Performed by: EMERGENCY MEDICINE

## 2021-09-13 PROCEDURE — 99282 PR EMERGENCY DEPT VISIT,LEVEL II: ICD-10-PCS | Mod: ,,, | Performed by: EMERGENCY MEDICINE

## 2021-09-13 PROCEDURE — 99283 EMERGENCY DEPT VISIT LOW MDM: CPT

## 2021-09-13 PROCEDURE — 80202 ASSAY OF VANCOMYCIN: CPT | Performed by: EMERGENCY MEDICINE

## 2021-09-13 PROCEDURE — 87389 HIV-1 AG W/HIV-1&-2 AB AG IA: CPT | Performed by: EMERGENCY MEDICINE

## 2021-09-14 LAB
HCV AB SERPL QL IA: NEGATIVE
HIV 1+2 AB+HIV1 P24 AG SERPL QL IA: NEGATIVE

## 2021-09-16 ENCOUNTER — LAB VISIT (OUTPATIENT)
Dept: LAB | Facility: OTHER | Age: 56
End: 2021-09-16
Payer: MEDICARE

## 2021-09-16 DIAGNOSIS — Z20.822 ENCOUNTER FOR LABORATORY TESTING FOR COVID-19 VIRUS: ICD-10-CM

## 2021-09-16 PROCEDURE — U0003 INFECTIOUS AGENT DETECTION BY NUCLEIC ACID (DNA OR RNA); SEVERE ACUTE RESPIRATORY SYNDROME CORONAVIRUS 2 (SARS-COV-2) (CORONAVIRUS DISEASE [COVID-19]), AMPLIFIED PROBE TECHNIQUE, MAKING USE OF HIGH THROUGHPUT TECHNOLOGIES AS DESCRIBED BY CMS-2020-01-R: HCPCS | Performed by: NURSE PRACTITIONER

## 2021-09-17 LAB
SARS-COV-2 RNA RESP QL NAA+PROBE: NOT DETECTED
SARS-COV-2- CYCLE NUMBER: NORMAL

## 2021-09-20 ENCOUNTER — HOSPITAL ENCOUNTER (INPATIENT)
Facility: HOSPITAL | Age: 56
LOS: 8 days | Discharge: NURSING, REHAB OR LONG-TERM CARE | DRG: 240 | End: 2021-09-29
Attending: EMERGENCY MEDICINE | Admitting: INTERNAL MEDICINE
Payer: MEDICARE

## 2021-09-20 DIAGNOSIS — Z45.2 PICC (PERIPHERALLY INSERTED CENTRAL CATHETER) IN PLACE: ICD-10-CM

## 2021-09-20 DIAGNOSIS — E11.9 TYPE 2 DIABETES MELLITUS WITHOUT COMPLICATION, UNSPECIFIED WHETHER LONG TERM INSULIN USE: ICD-10-CM

## 2021-09-20 DIAGNOSIS — I73.9 PAD (PERIPHERAL ARTERY DISEASE): ICD-10-CM

## 2021-09-20 DIAGNOSIS — M86.272 SUBACUTE OSTEOMYELITIS OF LEFT FOOT: ICD-10-CM

## 2021-09-20 DIAGNOSIS — S91.302A NON HEALING LEFT HEEL WOUND: Primary | ICD-10-CM

## 2021-09-20 DIAGNOSIS — R07.9 CHEST PAIN: ICD-10-CM

## 2021-09-20 DIAGNOSIS — I50.9 CONGESTIVE HEART FAILURE, UNSPECIFIED HF CHRONICITY, UNSPECIFIED HEART FAILURE TYPE: ICD-10-CM

## 2021-09-20 LAB
BASOPHILS # BLD AUTO: 0.07 K/UL (ref 0–0.2)
BASOPHILS NFR BLD: 1 % (ref 0–1.9)
BUN SERPL-MCNC: 13 MG/DL (ref 6–30)
CHLORIDE SERPL-SCNC: 104 MMOL/L (ref 95–110)
CREAT SERPL-MCNC: 0.6 MG/DL (ref 0.5–1.4)
CRP SERPL-MCNC: 35.2 MG/L (ref 0–8.2)
CTP QC/QA: YES
DIFFERENTIAL METHOD: ABNORMAL
EOSINOPHIL # BLD AUTO: 0.3 K/UL (ref 0–0.5)
EOSINOPHIL NFR BLD: 3.7 % (ref 0–8)
ERYTHROCYTE [DISTWIDTH] IN BLOOD BY AUTOMATED COUNT: 25.2 % (ref 11.5–14.5)
ERYTHROCYTE [SEDIMENTATION RATE] IN BLOOD BY WESTERGREN METHOD: >120 MM/HR (ref 0–23)
GLUCOSE SERPL-MCNC: 256 MG/DL (ref 70–110)
HCT VFR BLD AUTO: 30.6 % (ref 40–54)
HCT VFR BLD CALC: 30 %PCV (ref 36–54)
HGB BLD-MCNC: 9.1 G/DL (ref 14–18)
IMM GRANULOCYTES # BLD AUTO: 0.02 K/UL (ref 0–0.04)
IMM GRANULOCYTES NFR BLD AUTO: 0.3 % (ref 0–0.5)
LYMPHOCYTES # BLD AUTO: 1.3 K/UL (ref 1–4.8)
LYMPHOCYTES NFR BLD: 18 % (ref 18–48)
MCH RBC QN AUTO: 21.8 PG (ref 27–31)
MCHC RBC AUTO-ENTMCNC: 29.7 G/DL (ref 32–36)
MCV RBC AUTO: 73 FL (ref 82–98)
MONOCYTES # BLD AUTO: 0.5 K/UL (ref 0.3–1)
MONOCYTES NFR BLD: 7.4 % (ref 4–15)
NEUTROPHILS # BLD AUTO: 5.1 K/UL (ref 1.8–7.7)
NEUTROPHILS NFR BLD: 69.6 % (ref 38–73)
NRBC BLD-RTO: 0 /100 WBC
PLATELET # BLD AUTO: 309 K/UL (ref 150–450)
PMV BLD AUTO: 10.6 FL (ref 9.2–12.9)
POC IONIZED CALCIUM: 1.19 MMOL/L (ref 1.06–1.42)
POC TCO2 (MEASURED): 25 MMOL/L (ref 23–29)
POCT GLUCOSE: 150 MG/DL (ref 70–110)
POTASSIUM BLD-SCNC: 4.3 MMOL/L (ref 3.5–5.1)
RBC # BLD AUTO: 4.17 M/UL (ref 4.6–6.2)
SAMPLE: ABNORMAL
SARS-COV-2 RDRP RESP QL NAA+PROBE: NEGATIVE
SODIUM BLD-SCNC: 139 MMOL/L (ref 136–145)
WBC # BLD AUTO: 7.28 K/UL (ref 3.9–12.7)

## 2021-09-20 PROCEDURE — 99285 EMERGENCY DEPT VISIT HI MDM: CPT | Mod: 25

## 2021-09-20 PROCEDURE — G0378 HOSPITAL OBSERVATION PER HR: HCPCS

## 2021-09-20 PROCEDURE — 63600175 PHARM REV CODE 636 W HCPCS: Performed by: STUDENT IN AN ORGANIZED HEALTH CARE EDUCATION/TRAINING PROGRAM

## 2021-09-20 PROCEDURE — 85652 RBC SED RATE AUTOMATED: CPT | Performed by: PHYSICIAN ASSISTANT

## 2021-09-20 PROCEDURE — 86140 C-REACTIVE PROTEIN: CPT | Performed by: PHYSICIAN ASSISTANT

## 2021-09-20 PROCEDURE — 96374 THER/PROPH/DIAG INJ IV PUSH: CPT

## 2021-09-20 PROCEDURE — 25000003 PHARM REV CODE 250: Performed by: FAMILY MEDICINE

## 2021-09-20 PROCEDURE — U0002 COVID-19 LAB TEST NON-CDC: HCPCS | Performed by: PHYSICIAN ASSISTANT

## 2021-09-20 PROCEDURE — 96375 TX/PRO/DX INJ NEW DRUG ADDON: CPT

## 2021-09-20 PROCEDURE — 99285 EMERGENCY DEPT VISIT HI MDM: CPT | Mod: CS,,, | Performed by: PHYSICIAN ASSISTANT

## 2021-09-20 PROCEDURE — 99285 PR EMERGENCY DEPT VISIT,LEVEL V: ICD-10-PCS | Mod: CS,,, | Performed by: PHYSICIAN ASSISTANT

## 2021-09-20 PROCEDURE — 25500020 PHARM REV CODE 255: Performed by: STUDENT IN AN ORGANIZED HEALTH CARE EDUCATION/TRAINING PROGRAM

## 2021-09-20 PROCEDURE — 80047 BASIC METABLC PNL IONIZED CA: CPT

## 2021-09-20 PROCEDURE — A9585 GADOBUTROL INJECTION: HCPCS | Performed by: STUDENT IN AN ORGANIZED HEALTH CARE EDUCATION/TRAINING PROGRAM

## 2021-09-20 PROCEDURE — 99223 PR INITIAL HOSPITAL CARE,LEVL III: ICD-10-PCS | Mod: ,,, | Performed by: PODIATRIST

## 2021-09-20 PROCEDURE — 85025 COMPLETE CBC W/AUTO DIFF WBC: CPT | Performed by: PHYSICIAN ASSISTANT

## 2021-09-20 PROCEDURE — 99223 1ST HOSP IP/OBS HIGH 75: CPT | Mod: ,,, | Performed by: PODIATRIST

## 2021-09-20 PROCEDURE — 25000003 PHARM REV CODE 250: Performed by: PHYSICIAN ASSISTANT

## 2021-09-20 PROCEDURE — 63600175 PHARM REV CODE 636 W HCPCS: Performed by: FAMILY MEDICINE

## 2021-09-20 RX ORDER — IPRATROPIUM BROMIDE AND ALBUTEROL SULFATE 2.5; .5 MG/3ML; MG/3ML
3 SOLUTION RESPIRATORY (INHALATION) EVERY 4 HOURS PRN
Status: DISCONTINUED | OUTPATIENT
Start: 2021-09-20 | End: 2021-09-29 | Stop reason: HOSPADM

## 2021-09-20 RX ORDER — TAMSULOSIN HYDROCHLORIDE 0.4 MG/1
0.4 CAPSULE ORAL DAILY
Status: DISCONTINUED | OUTPATIENT
Start: 2021-09-21 | End: 2021-09-29 | Stop reason: HOSPADM

## 2021-09-20 RX ORDER — GLUCAGON 1 MG
1 KIT INJECTION
Status: DISCONTINUED | OUTPATIENT
Start: 2021-09-20 | End: 2021-09-29 | Stop reason: HOSPADM

## 2021-09-20 RX ORDER — INSULIN ASPART 100 [IU]/ML
1-10 INJECTION, SOLUTION INTRAVENOUS; SUBCUTANEOUS
Status: DISCONTINUED | OUTPATIENT
Start: 2021-09-20 | End: 2021-09-29 | Stop reason: HOSPADM

## 2021-09-20 RX ORDER — GADOBUTROL 604.72 MG/ML
7 INJECTION INTRAVENOUS
Status: COMPLETED | OUTPATIENT
Start: 2021-09-20 | End: 2021-09-20

## 2021-09-20 RX ORDER — OXYCODONE HYDROCHLORIDE 10 MG/1
10 TABLET ORAL EVERY 4 HOURS PRN
Status: DISCONTINUED | OUTPATIENT
Start: 2021-09-20 | End: 2021-09-23

## 2021-09-20 RX ORDER — NITROGLYCERIN 0.4 MG/1
0.4 TABLET SUBLINGUAL EVERY 5 MIN PRN
Status: DISCONTINUED | OUTPATIENT
Start: 2021-09-20 | End: 2021-09-29 | Stop reason: HOSPADM

## 2021-09-20 RX ORDER — ACETAMINOPHEN 325 MG/1
650 TABLET ORAL EVERY 4 HOURS PRN
Status: DISCONTINUED | OUTPATIENT
Start: 2021-09-20 | End: 2021-09-21

## 2021-09-20 RX ORDER — TORSEMIDE 20 MG/1
20 TABLET ORAL DAILY
Status: DISCONTINUED | OUTPATIENT
Start: 2021-09-21 | End: 2021-09-29 | Stop reason: HOSPADM

## 2021-09-20 RX ORDER — IBUPROFEN 200 MG
16 TABLET ORAL
Status: DISCONTINUED | OUTPATIENT
Start: 2021-09-20 | End: 2021-09-29 | Stop reason: HOSPADM

## 2021-09-20 RX ORDER — ATORVASTATIN CALCIUM 40 MG/1
40 TABLET, FILM COATED ORAL DAILY
Status: DISCONTINUED | OUTPATIENT
Start: 2021-09-21 | End: 2021-09-29 | Stop reason: HOSPADM

## 2021-09-20 RX ORDER — LORAZEPAM 2 MG/ML
1 INJECTION INTRAMUSCULAR ONCE
Status: COMPLETED | OUTPATIENT
Start: 2021-09-20 | End: 2021-09-20

## 2021-09-20 RX ORDER — SODIUM CHLORIDE 0.9 % (FLUSH) 0.9 %
10 SYRINGE (ML) INJECTION
Status: DISCONTINUED | OUTPATIENT
Start: 2021-09-20 | End: 2021-09-29 | Stop reason: HOSPADM

## 2021-09-20 RX ORDER — BISACODYL 10 MG
10 SUPPOSITORY, RECTAL RECTAL DAILY PRN
Status: DISCONTINUED | OUTPATIENT
Start: 2021-09-20 | End: 2021-09-29 | Stop reason: HOSPADM

## 2021-09-20 RX ORDER — LACTULOSE 10 G/15ML
20 SOLUTION ORAL 2 TIMES DAILY
Status: DISCONTINUED | OUTPATIENT
Start: 2021-09-20 | End: 2021-09-29 | Stop reason: HOSPADM

## 2021-09-20 RX ORDER — POTASSIUM CHLORIDE 20 MEQ/1
20 TABLET, EXTENDED RELEASE ORAL 2 TIMES DAILY
Status: DISCONTINUED | OUTPATIENT
Start: 2021-09-20 | End: 2021-09-29 | Stop reason: HOSPADM

## 2021-09-20 RX ORDER — IBUPROFEN 200 MG
24 TABLET ORAL
Status: DISCONTINUED | OUTPATIENT
Start: 2021-09-20 | End: 2021-09-29 | Stop reason: HOSPADM

## 2021-09-20 RX ORDER — CEFEPIME HYDROCHLORIDE 2 G/1
2 INJECTION, POWDER, FOR SOLUTION INTRAVENOUS EVERY 8 HOURS
Status: DISCONTINUED | OUTPATIENT
Start: 2021-09-20 | End: 2021-09-22

## 2021-09-20 RX ORDER — TALC
6 POWDER (GRAM) TOPICAL NIGHTLY PRN
Status: DISCONTINUED | OUTPATIENT
Start: 2021-09-20 | End: 2021-09-29 | Stop reason: HOSPADM

## 2021-09-20 RX ORDER — BACLOFEN 10 MG/1
10 TABLET ORAL 3 TIMES DAILY
Status: DISCONTINUED | OUTPATIENT
Start: 2021-09-20 | End: 2021-09-29 | Stop reason: HOSPADM

## 2021-09-20 RX ORDER — MORPHINE SULFATE 15 MG/1
30 TABLET, FILM COATED, EXTENDED RELEASE ORAL 2 TIMES DAILY
Status: DISCONTINUED | OUTPATIENT
Start: 2021-09-20 | End: 2021-09-29 | Stop reason: HOSPADM

## 2021-09-20 RX ORDER — ASCORBIC ACID 500 MG
500 TABLET ORAL 2 TIMES DAILY WITH MEALS
Status: DISCONTINUED | OUTPATIENT
Start: 2021-09-20 | End: 2021-09-29 | Stop reason: HOSPADM

## 2021-09-20 RX ORDER — HYDROCODONE BITARTRATE AND ACETAMINOPHEN 5; 325 MG/1; MG/1
1 TABLET ORAL
Status: COMPLETED | OUTPATIENT
Start: 2021-09-20 | End: 2021-09-20

## 2021-09-20 RX ORDER — ONDANSETRON 2 MG/ML
4 INJECTION INTRAMUSCULAR; INTRAVENOUS EVERY 8 HOURS PRN
Status: DISCONTINUED | OUTPATIENT
Start: 2021-09-20 | End: 2021-09-22

## 2021-09-20 RX ORDER — SODIUM CHLORIDE 0.9 % (FLUSH) 0.9 %
10 SYRINGE (ML) INJECTION EVERY 12 HOURS PRN
Status: DISCONTINUED | OUTPATIENT
Start: 2021-09-20 | End: 2021-09-29 | Stop reason: HOSPADM

## 2021-09-20 RX ORDER — CITALOPRAM 20 MG/1
20 TABLET, FILM COATED ORAL DAILY
Status: DISCONTINUED | OUTPATIENT
Start: 2021-09-21 | End: 2021-09-29 | Stop reason: HOSPADM

## 2021-09-20 RX ORDER — CARVEDILOL 3.12 MG/1
3.12 TABLET ORAL 2 TIMES DAILY
Status: DISCONTINUED | OUTPATIENT
Start: 2021-09-20 | End: 2021-09-29 | Stop reason: HOSPADM

## 2021-09-20 RX ORDER — NALOXONE HCL 0.4 MG/ML
0.02 VIAL (ML) INJECTION
Status: DISCONTINUED | OUTPATIENT
Start: 2021-09-20 | End: 2021-09-29 | Stop reason: HOSPADM

## 2021-09-20 RX ORDER — GABAPENTIN 300 MG/1
1200 CAPSULE ORAL 3 TIMES DAILY
Status: DISCONTINUED | OUTPATIENT
Start: 2021-09-20 | End: 2021-09-29 | Stop reason: HOSPADM

## 2021-09-20 RX ORDER — PANTOPRAZOLE SODIUM 40 MG/1
40 TABLET, DELAYED RELEASE ORAL DAILY
Status: DISCONTINUED | OUTPATIENT
Start: 2021-09-21 | End: 2021-09-29 | Stop reason: HOSPADM

## 2021-09-20 RX ORDER — CLOPIDOGREL BISULFATE 75 MG/1
75 TABLET ORAL DAILY
Status: DISCONTINUED | OUTPATIENT
Start: 2021-09-21 | End: 2021-09-29 | Stop reason: HOSPADM

## 2021-09-20 RX ORDER — POLYETHYLENE GLYCOL 3350 17 G/17G
17 POWDER, FOR SOLUTION ORAL DAILY PRN
Status: DISCONTINUED | OUTPATIENT
Start: 2021-09-20 | End: 2021-09-29 | Stop reason: HOSPADM

## 2021-09-20 RX ORDER — SPIRONOLACTONE 25 MG/1
25 TABLET ORAL DAILY
Status: DISCONTINUED | OUTPATIENT
Start: 2021-09-21 | End: 2021-09-29 | Stop reason: HOSPADM

## 2021-09-20 RX ORDER — TALC
6 POWDER (GRAM) TOPICAL NIGHTLY PRN
Status: DISCONTINUED | OUTPATIENT
Start: 2021-09-20 | End: 2021-09-20 | Stop reason: SDUPTHER

## 2021-09-20 RX ADMIN — BACLOFEN 10 MG: 10 TABLET ORAL at 08:09

## 2021-09-20 RX ADMIN — LORAZEPAM 1 MG: 2 INJECTION INTRAMUSCULAR; INTRAVENOUS at 10:09

## 2021-09-20 RX ADMIN — Medication 500 MG: at 08:09

## 2021-09-20 RX ADMIN — GADOBUTROL 7 ML: 604.72 INJECTION INTRAVENOUS at 10:09

## 2021-09-20 RX ADMIN — CEFEPIME 2 G: 2 INJECTION, POWDER, FOR SOLUTION INTRAVENOUS at 10:09

## 2021-09-20 RX ADMIN — GABAPENTIN 1200 MG: 400 CAPSULE ORAL at 08:09

## 2021-09-20 RX ADMIN — MORPHINE SULFATE 30 MG: 15 TABLET, FILM COATED, EXTENDED RELEASE ORAL at 08:09

## 2021-09-20 RX ADMIN — POTASSIUM CHLORIDE 20 MEQ: 1500 TABLET, EXTENDED RELEASE ORAL at 08:09

## 2021-09-20 RX ADMIN — VANCOMYCIN HYDROCHLORIDE 1750 MG: 750 INJECTION, POWDER, LYOPHILIZED, FOR SOLUTION INTRAVENOUS at 11:09

## 2021-09-20 RX ADMIN — APIXABAN 5 MG: 5 TABLET, FILM COATED ORAL at 08:09

## 2021-09-20 RX ADMIN — CARVEDILOL 3.12 MG: 3.12 TABLET, FILM COATED ORAL at 08:09

## 2021-09-20 RX ADMIN — HYDROCODONE BITARTRATE AND ACETAMINOPHEN 1 TABLET: 5; 325 TABLET ORAL at 04:09

## 2021-09-21 ENCOUNTER — ANESTHESIA EVENT (OUTPATIENT)
Dept: SURGERY | Facility: HOSPITAL | Age: 56
DRG: 240 | End: 2021-09-21
Payer: MEDICARE

## 2021-09-21 LAB
ALBUMIN SERPL BCP-MCNC: 2.8 G/DL (ref 3.5–5.2)
ALP SERPL-CCNC: 74 U/L (ref 55–135)
ALT SERPL W/O P-5'-P-CCNC: 15 U/L (ref 10–44)
ANION GAP SERPL CALC-SCNC: 17 MMOL/L (ref 8–16)
AST SERPL-CCNC: 20 U/L (ref 10–40)
BASOPHILS # BLD AUTO: 0.08 K/UL (ref 0–0.2)
BASOPHILS NFR BLD: 1.1 % (ref 0–1.9)
BILIRUB SERPL-MCNC: 0.4 MG/DL (ref 0.1–1)
BUN SERPL-MCNC: 14 MG/DL (ref 6–20)
CALCIUM SERPL-MCNC: 10.2 MG/DL (ref 8.7–10.5)
CHLORIDE SERPL-SCNC: 102 MMOL/L (ref 95–110)
CO2 SERPL-SCNC: 21 MMOL/L (ref 23–29)
CREAT SERPL-MCNC: 0.7 MG/DL (ref 0.5–1.4)
DIFFERENTIAL METHOD: ABNORMAL
EOSINOPHIL # BLD AUTO: 0.3 K/UL (ref 0–0.5)
EOSINOPHIL NFR BLD: 4.6 % (ref 0–8)
ERYTHROCYTE [DISTWIDTH] IN BLOOD BY AUTOMATED COUNT: 25.2 % (ref 11.5–14.5)
EST. GFR  (AFRICAN AMERICAN): >60 ML/MIN/1.73 M^2
EST. GFR  (NON AFRICAN AMERICAN): >60 ML/MIN/1.73 M^2
GLUCOSE SERPL-MCNC: 114 MG/DL (ref 70–110)
HCT VFR BLD AUTO: 30.2 % (ref 40–54)
HGB BLD-MCNC: 8.8 G/DL (ref 14–18)
IMM GRANULOCYTES # BLD AUTO: 0.02 K/UL (ref 0–0.04)
IMM GRANULOCYTES NFR BLD AUTO: 0.3 % (ref 0–0.5)
LYMPHOCYTES # BLD AUTO: 1.5 K/UL (ref 1–4.8)
LYMPHOCYTES NFR BLD: 19.7 % (ref 18–48)
MAGNESIUM SERPL-MCNC: 1.8 MG/DL (ref 1.6–2.6)
MCH RBC QN AUTO: 21.3 PG (ref 27–31)
MCHC RBC AUTO-ENTMCNC: 29.1 G/DL (ref 32–36)
MCV RBC AUTO: 73 FL (ref 82–98)
MONOCYTES # BLD AUTO: 0.6 K/UL (ref 0.3–1)
MONOCYTES NFR BLD: 8 % (ref 4–15)
NEUTROPHILS # BLD AUTO: 5 K/UL (ref 1.8–7.7)
NEUTROPHILS NFR BLD: 66.3 % (ref 38–73)
NRBC BLD-RTO: 0 /100 WBC
PHOSPHATE SERPL-MCNC: 4.5 MG/DL (ref 2.7–4.5)
PLATELET # BLD AUTO: 309 K/UL (ref 150–450)
PMV BLD AUTO: 10.4 FL (ref 9.2–12.9)
POCT GLUCOSE: 120 MG/DL (ref 70–110)
POCT GLUCOSE: 129 MG/DL (ref 70–110)
POCT GLUCOSE: 186 MG/DL (ref 70–110)
POCT GLUCOSE: 200 MG/DL (ref 70–110)
POTASSIUM SERPL-SCNC: 4.1 MMOL/L (ref 3.5–5.1)
PROT SERPL-MCNC: 7.6 G/DL (ref 6–8.4)
RBC # BLD AUTO: 4.14 M/UL (ref 4.6–6.2)
SARS-COV-2 RDRP RESP QL NAA+PROBE: NEGATIVE
SODIUM SERPL-SCNC: 140 MMOL/L (ref 136–145)
VANCOMYCIN SERPL-MCNC: 32.8 UG/ML
WBC # BLD AUTO: 7.47 K/UL (ref 3.9–12.7)

## 2021-09-21 PROCEDURE — 25000003 PHARM REV CODE 250: Performed by: FAMILY MEDICINE

## 2021-09-21 PROCEDURE — 99223 PR INITIAL HOSPITAL CARE,LEVL III: ICD-10-PCS | Mod: ,,, | Performed by: PHYSICIAN ASSISTANT

## 2021-09-21 PROCEDURE — 83735 ASSAY OF MAGNESIUM: CPT | Performed by: FAMILY MEDICINE

## 2021-09-21 PROCEDURE — 97162 PT EVAL MOD COMPLEX 30 MIN: CPT

## 2021-09-21 PROCEDURE — U0002 COVID-19 LAB TEST NON-CDC: HCPCS

## 2021-09-21 PROCEDURE — 36415 COLL VENOUS BLD VENIPUNCTURE: CPT | Performed by: FAMILY MEDICINE

## 2021-09-21 PROCEDURE — 97530 THERAPEUTIC ACTIVITIES: CPT

## 2021-09-21 PROCEDURE — 99220 PR INITIAL OBSERVATION CARE,LEVL III: CPT | Mod: ,,, | Performed by: FAMILY MEDICINE

## 2021-09-21 PROCEDURE — 20600001 HC STEP DOWN PRIVATE ROOM

## 2021-09-21 PROCEDURE — 25000003 PHARM REV CODE 250: Performed by: STUDENT IN AN ORGANIZED HEALTH CARE EDUCATION/TRAINING PROGRAM

## 2021-09-21 PROCEDURE — 99223 1ST HOSP IP/OBS HIGH 75: CPT | Mod: ,,, | Performed by: PHYSICIAN ASSISTANT

## 2021-09-21 PROCEDURE — 80202 ASSAY OF VANCOMYCIN: CPT | Performed by: FAMILY MEDICINE

## 2021-09-21 PROCEDURE — 84100 ASSAY OF PHOSPHORUS: CPT | Performed by: FAMILY MEDICINE

## 2021-09-21 PROCEDURE — 63600175 PHARM REV CODE 636 W HCPCS: Performed by: FAMILY MEDICINE

## 2021-09-21 PROCEDURE — 63600175 PHARM REV CODE 636 W HCPCS: Performed by: STUDENT IN AN ORGANIZED HEALTH CARE EDUCATION/TRAINING PROGRAM

## 2021-09-21 PROCEDURE — 80053 COMPREHEN METABOLIC PANEL: CPT | Performed by: FAMILY MEDICINE

## 2021-09-21 PROCEDURE — 99220 PR INITIAL OBSERVATION CARE,LEVL III: ICD-10-PCS | Mod: ,,, | Performed by: FAMILY MEDICINE

## 2021-09-21 PROCEDURE — 85025 COMPLETE CBC W/AUTO DIFF WBC: CPT | Performed by: FAMILY MEDICINE

## 2021-09-21 RX ORDER — ACETAMINOPHEN 500 MG
1000 TABLET ORAL EVERY 6 HOURS
Status: DISCONTINUED | OUTPATIENT
Start: 2021-09-21 | End: 2021-09-29 | Stop reason: HOSPADM

## 2021-09-21 RX ORDER — NAPROXEN SODIUM 220 MG/1
81 TABLET, FILM COATED ORAL DAILY
Status: DISCONTINUED | OUTPATIENT
Start: 2021-09-21 | End: 2021-09-29 | Stop reason: HOSPADM

## 2021-09-21 RX ORDER — HYDROMORPHONE HYDROCHLORIDE 1 MG/ML
0.2 INJECTION, SOLUTION INTRAMUSCULAR; INTRAVENOUS; SUBCUTANEOUS EVERY 6 HOURS PRN
Status: DISCONTINUED | OUTPATIENT
Start: 2021-09-21 | End: 2021-09-23

## 2021-09-21 RX ADMIN — APIXABAN 5 MG: 5 TABLET, FILM COATED ORAL at 09:09

## 2021-09-21 RX ADMIN — APIXABAN 5 MG: 5 TABLET, FILM COATED ORAL at 08:09

## 2021-09-21 RX ADMIN — GABAPENTIN 1200 MG: 400 CAPSULE ORAL at 09:09

## 2021-09-21 RX ADMIN — MELATONIN TAB 3 MG 6 MG: 3 TAB at 08:09

## 2021-09-21 RX ADMIN — ACETAMINOPHEN 1000 MG: 500 TABLET ORAL at 11:09

## 2021-09-21 RX ADMIN — CEFEPIME 2 G: 2 INJECTION, POWDER, FOR SOLUTION INTRAVENOUS at 01:09

## 2021-09-21 RX ADMIN — INSULIN ASPART 2 UNITS: 100 INJECTION, SOLUTION INTRAVENOUS; SUBCUTANEOUS at 05:09

## 2021-09-21 RX ADMIN — TORSEMIDE 20 MG: 20 TABLET ORAL at 09:09

## 2021-09-21 RX ADMIN — HYDROMORPHONE HYDROCHLORIDE 0.2 MG: 1 INJECTION, SOLUTION INTRAMUSCULAR; INTRAVENOUS; SUBCUTANEOUS at 01:09

## 2021-09-21 RX ADMIN — POTASSIUM CHLORIDE 20 MEQ: 1500 TABLET, EXTENDED RELEASE ORAL at 09:09

## 2021-09-21 RX ADMIN — SPIRONOLACTONE 25 MG: 25 TABLET, FILM COATED ORAL at 09:09

## 2021-09-21 RX ADMIN — LACTULOSE 20 G: 10 SOLUTION ORAL at 08:09

## 2021-09-21 RX ADMIN — GABAPENTIN 1200 MG: 400 CAPSULE ORAL at 02:09

## 2021-09-21 RX ADMIN — PANTOPRAZOLE SODIUM 40 MG: 40 TABLET, DELAYED RELEASE ORAL at 09:09

## 2021-09-21 RX ADMIN — CARVEDILOL 3.12 MG: 3.12 TABLET, FILM COATED ORAL at 08:09

## 2021-09-21 RX ADMIN — BACLOFEN 10 MG: 10 TABLET ORAL at 09:09

## 2021-09-21 RX ADMIN — VANCOMYCIN HYDROCHLORIDE 1250 MG: 1.25 INJECTION, POWDER, LYOPHILIZED, FOR SOLUTION INTRAVENOUS at 01:09

## 2021-09-21 RX ADMIN — Medication 500 MG: at 05:09

## 2021-09-21 RX ADMIN — TAMSULOSIN HYDROCHLORIDE 0.4 MG: 0.4 CAPSULE ORAL at 09:09

## 2021-09-21 RX ADMIN — GABAPENTIN 1200 MG: 400 CAPSULE ORAL at 08:09

## 2021-09-21 RX ADMIN — CITALOPRAM HYDROBROMIDE 20 MG: 20 TABLET ORAL at 09:09

## 2021-09-21 RX ADMIN — MORPHINE SULFATE 30 MG: 15 TABLET, FILM COATED, EXTENDED RELEASE ORAL at 08:09

## 2021-09-21 RX ADMIN — CLOPIDOGREL 75 MG: 75 TABLET, FILM COATED ORAL at 09:09

## 2021-09-21 RX ADMIN — LACTULOSE 20 G: 10 SOLUTION ORAL at 09:09

## 2021-09-21 RX ADMIN — CEFEPIME 2 G: 2 INJECTION, POWDER, FOR SOLUTION INTRAVENOUS at 05:09

## 2021-09-21 RX ADMIN — BACLOFEN 10 MG: 10 TABLET ORAL at 08:09

## 2021-09-21 RX ADMIN — MORPHINE SULFATE 30 MG: 15 TABLET, FILM COATED, EXTENDED RELEASE ORAL at 09:09

## 2021-09-21 RX ADMIN — CEFEPIME 2 G: 2 INJECTION, POWDER, FOR SOLUTION INTRAVENOUS at 10:09

## 2021-09-21 RX ADMIN — BACLOFEN 10 MG: 10 TABLET ORAL at 02:09

## 2021-09-21 RX ADMIN — Medication 500 MG: at 09:09

## 2021-09-21 RX ADMIN — ATORVASTATIN CALCIUM 40 MG: 40 TABLET, FILM COATED ORAL at 09:09

## 2021-09-21 RX ADMIN — CARVEDILOL 3.12 MG: 3.12 TABLET, FILM COATED ORAL at 09:09

## 2021-09-21 RX ADMIN — ASPIRIN 81 MG CHEWABLE TABLET 81 MG: 81 TABLET CHEWABLE at 09:09

## 2021-09-21 RX ADMIN — ACETAMINOPHEN 1000 MG: 500 TABLET ORAL at 05:09

## 2021-09-21 RX ADMIN — INSULIN ASPART 2 UNITS: 100 INJECTION, SOLUTION INTRAVENOUS; SUBCUTANEOUS at 08:09

## 2021-09-21 RX ADMIN — HYDROMORPHONE HYDROCHLORIDE 0.2 MG: 1 INJECTION, SOLUTION INTRAMUSCULAR; INTRAVENOUS; SUBCUTANEOUS at 08:09

## 2021-09-21 RX ADMIN — POTASSIUM CHLORIDE 20 MEQ: 1500 TABLET, EXTENDED RELEASE ORAL at 08:09

## 2021-09-22 ENCOUNTER — ANESTHESIA (OUTPATIENT)
Dept: SURGERY | Facility: HOSPITAL | Age: 56
DRG: 240 | End: 2021-09-22
Payer: MEDICARE

## 2021-09-22 LAB
ALBUMIN SERPL BCP-MCNC: 2.9 G/DL (ref 3.5–5.2)
ALP SERPL-CCNC: 90 U/L (ref 55–135)
ALT SERPL W/O P-5'-P-CCNC: 19 U/L (ref 10–44)
ANION GAP SERPL CALC-SCNC: 14 MMOL/L (ref 8–16)
AST SERPL-CCNC: 20 U/L (ref 10–40)
BASOPHILS # BLD AUTO: 0.09 K/UL (ref 0–0.2)
BASOPHILS NFR BLD: 1.1 % (ref 0–1.9)
BILIRUB SERPL-MCNC: 0.4 MG/DL (ref 0.1–1)
BUN SERPL-MCNC: 19 MG/DL (ref 6–20)
CALCIUM SERPL-MCNC: 10 MG/DL (ref 8.7–10.5)
CHLORIDE SERPL-SCNC: 100 MMOL/L (ref 95–110)
CO2 SERPL-SCNC: 21 MMOL/L (ref 23–29)
CREAT SERPL-MCNC: 0.8 MG/DL (ref 0.5–1.4)
DIFFERENTIAL METHOD: ABNORMAL
EOSINOPHIL # BLD AUTO: 0.5 K/UL (ref 0–0.5)
EOSINOPHIL NFR BLD: 5.9 % (ref 0–8)
ERYTHROCYTE [DISTWIDTH] IN BLOOD BY AUTOMATED COUNT: 24.9 % (ref 11.5–14.5)
EST. GFR  (AFRICAN AMERICAN): >60 ML/MIN/1.73 M^2
EST. GFR  (NON AFRICAN AMERICAN): >60 ML/MIN/1.73 M^2
GLUCOSE SERPL-MCNC: 133 MG/DL (ref 70–110)
HCT VFR BLD AUTO: 30.2 % (ref 40–54)
HGB BLD-MCNC: 8.8 G/DL (ref 14–18)
IMM GRANULOCYTES # BLD AUTO: 0.03 K/UL (ref 0–0.04)
IMM GRANULOCYTES NFR BLD AUTO: 0.4 % (ref 0–0.5)
LYMPHOCYTES # BLD AUTO: 1.5 K/UL (ref 1–4.8)
LYMPHOCYTES NFR BLD: 18.8 % (ref 18–48)
MAGNESIUM SERPL-MCNC: 1.9 MG/DL (ref 1.6–2.6)
MCH RBC QN AUTO: 21.8 PG (ref 27–31)
MCHC RBC AUTO-ENTMCNC: 29.1 G/DL (ref 32–36)
MCV RBC AUTO: 75 FL (ref 82–98)
MONOCYTES # BLD AUTO: 0.7 K/UL (ref 0.3–1)
MONOCYTES NFR BLD: 8.2 % (ref 4–15)
NEUTROPHILS # BLD AUTO: 5.4 K/UL (ref 1.8–7.7)
NEUTROPHILS NFR BLD: 65.6 % (ref 38–73)
NRBC BLD-RTO: 0 /100 WBC
PHOSPHATE SERPL-MCNC: 4.4 MG/DL (ref 2.7–4.5)
PLATELET # BLD AUTO: 316 K/UL (ref 150–450)
PMV BLD AUTO: 10.2 FL (ref 9.2–12.9)
POCT GLUCOSE: 125 MG/DL (ref 70–110)
POCT GLUCOSE: 126 MG/DL (ref 70–110)
POCT GLUCOSE: 160 MG/DL (ref 70–110)
POCT GLUCOSE: 174 MG/DL (ref 70–110)
POCT GLUCOSE: 229 MG/DL (ref 70–110)
POTASSIUM SERPL-SCNC: 4.1 MMOL/L (ref 3.5–5.1)
PROT SERPL-MCNC: 7.6 G/DL (ref 6–8.4)
RBC # BLD AUTO: 4.03 M/UL (ref 4.6–6.2)
SODIUM SERPL-SCNC: 135 MMOL/L (ref 136–145)
VANCOMYCIN TROUGH SERPL-MCNC: 35.1 UG/ML (ref 10–22)
WBC # BLD AUTO: 8.19 K/UL (ref 3.9–12.7)

## 2021-09-22 PROCEDURE — 63600175 PHARM REV CODE 636 W HCPCS: Performed by: STUDENT IN AN ORGANIZED HEALTH CARE EDUCATION/TRAINING PROGRAM

## 2021-09-22 PROCEDURE — 71000015 HC POSTOP RECOV 1ST HR: Performed by: SURGERY

## 2021-09-22 PROCEDURE — D9220A PRA ANESTHESIA: ICD-10-PCS | Mod: ANES,,, | Performed by: ANESTHESIOLOGY

## 2021-09-22 PROCEDURE — 63600175 PHARM REV CODE 636 W HCPCS: Performed by: NURSE ANESTHETIST, CERTIFIED REGISTERED

## 2021-09-22 PROCEDURE — 64445 NJX AA&/STRD SCIATIC NRV IMG: CPT | Mod: 59,LT,GC, | Performed by: STUDENT IN AN ORGANIZED HEALTH CARE EDUCATION/TRAINING PROGRAM

## 2021-09-22 PROCEDURE — 64448 NJX AA&/STRD FEM NRV NFS IMG: CPT | Performed by: STUDENT IN AN ORGANIZED HEALTH CARE EDUCATION/TRAINING PROGRAM

## 2021-09-22 PROCEDURE — 94761 N-INVAS EAR/PLS OXIMETRY MLT: CPT

## 2021-09-22 PROCEDURE — 63600175 PHARM REV CODE 636 W HCPCS: Performed by: FAMILY MEDICINE

## 2021-09-22 PROCEDURE — 27590 PR AMPUTATE THIGH,THRU FEMUR: ICD-10-PCS | Mod: 79,LT,GC, | Performed by: SURGERY

## 2021-09-22 PROCEDURE — 64448: ICD-10-PCS | Mod: 59,LT,GC, | Performed by: STUDENT IN AN ORGANIZED HEALTH CARE EDUCATION/TRAINING PROGRAM

## 2021-09-22 PROCEDURE — 82962 GLUCOSE BLOOD TEST: CPT | Performed by: SURGERY

## 2021-09-22 PROCEDURE — 25000003 PHARM REV CODE 250: Performed by: NURSE ANESTHETIST, CERTIFIED REGISTERED

## 2021-09-22 PROCEDURE — 36000710: Performed by: SURGERY

## 2021-09-22 PROCEDURE — D9220A PRA ANESTHESIA: Mod: CRNA,,, | Performed by: NURSE ANESTHETIST, CERTIFIED REGISTERED

## 2021-09-22 PROCEDURE — 37000008 HC ANESTHESIA 1ST 15 MINUTES: Performed by: SURGERY

## 2021-09-22 PROCEDURE — 25000003 PHARM REV CODE 250: Performed by: STUDENT IN AN ORGANIZED HEALTH CARE EDUCATION/TRAINING PROGRAM

## 2021-09-22 PROCEDURE — 99232 PR SUBSEQUENT HOSPITAL CARE,LEVL II: ICD-10-PCS | Mod: ,,, | Performed by: STUDENT IN AN ORGANIZED HEALTH CARE EDUCATION/TRAINING PROGRAM

## 2021-09-22 PROCEDURE — 36415 COLL VENOUS BLD VENIPUNCTURE: CPT | Performed by: STUDENT IN AN ORGANIZED HEALTH CARE EDUCATION/TRAINING PROGRAM

## 2021-09-22 PROCEDURE — 76942: ICD-10-PCS | Mod: 26,GC,, | Performed by: STUDENT IN AN ORGANIZED HEALTH CARE EDUCATION/TRAINING PROGRAM

## 2021-09-22 PROCEDURE — 76942 ECHO GUIDE FOR BIOPSY: CPT | Mod: 26,GC,, | Performed by: STUDENT IN AN ORGANIZED HEALTH CARE EDUCATION/TRAINING PROGRAM

## 2021-09-22 PROCEDURE — 37000009 HC ANESTHESIA EA ADD 15 MINS: Performed by: SURGERY

## 2021-09-22 PROCEDURE — 25000003 PHARM REV CODE 250: Performed by: FAMILY MEDICINE

## 2021-09-22 PROCEDURE — 64448 NJX AA&/STRD FEM NRV NFS IMG: CPT | Mod: 59,LT,GC, | Performed by: STUDENT IN AN ORGANIZED HEALTH CARE EDUCATION/TRAINING PROGRAM

## 2021-09-22 PROCEDURE — 64445 NJX AA&/STRD SCIATIC NRV IMG: CPT | Performed by: STUDENT IN AN ORGANIZED HEALTH CARE EDUCATION/TRAINING PROGRAM

## 2021-09-22 PROCEDURE — 88307 TISSUE EXAM BY PATHOLOGIST: CPT | Performed by: PATHOLOGY

## 2021-09-22 PROCEDURE — 80053 COMPREHEN METABOLIC PANEL: CPT | Performed by: FAMILY MEDICINE

## 2021-09-22 PROCEDURE — D9220A PRA ANESTHESIA: Mod: ANES,,, | Performed by: ANESTHESIOLOGY

## 2021-09-22 PROCEDURE — 20600001 HC STEP DOWN PRIVATE ROOM

## 2021-09-22 PROCEDURE — 80202 ASSAY OF VANCOMYCIN: CPT | Performed by: STUDENT IN AN ORGANIZED HEALTH CARE EDUCATION/TRAINING PROGRAM

## 2021-09-22 PROCEDURE — 88307 PR  SURG PATH,LEVEL V: ICD-10-PCS | Mod: 26,,, | Performed by: PATHOLOGY

## 2021-09-22 PROCEDURE — 64445: ICD-10-PCS | Mod: 59,LT,GC, | Performed by: STUDENT IN AN ORGANIZED HEALTH CARE EDUCATION/TRAINING PROGRAM

## 2021-09-22 PROCEDURE — 83735 ASSAY OF MAGNESIUM: CPT | Performed by: FAMILY MEDICINE

## 2021-09-22 PROCEDURE — 84100 ASSAY OF PHOSPHORUS: CPT | Performed by: FAMILY MEDICINE

## 2021-09-22 PROCEDURE — 63600175 PHARM REV CODE 636 W HCPCS: Performed by: SURGERY

## 2021-09-22 PROCEDURE — 36000711: Performed by: SURGERY

## 2021-09-22 PROCEDURE — 88307 TISSUE EXAM BY PATHOLOGIST: CPT | Mod: 26,,, | Performed by: PATHOLOGY

## 2021-09-22 PROCEDURE — 99232 SBSQ HOSP IP/OBS MODERATE 35: CPT | Mod: ,,, | Performed by: STUDENT IN AN ORGANIZED HEALTH CARE EDUCATION/TRAINING PROGRAM

## 2021-09-22 PROCEDURE — 36415 COLL VENOUS BLD VENIPUNCTURE: CPT | Performed by: FAMILY MEDICINE

## 2021-09-22 PROCEDURE — 85025 COMPLETE CBC W/AUTO DIFF WBC: CPT | Performed by: FAMILY MEDICINE

## 2021-09-22 PROCEDURE — 27590 AMPUTATE LEG AT THIGH: CPT | Mod: 79,LT,GC, | Performed by: SURGERY

## 2021-09-22 PROCEDURE — 71000033 HC RECOVERY, INTIAL HOUR: Performed by: SURGERY

## 2021-09-22 PROCEDURE — 27201423 OPTIME MED/SURG SUP & DEVICES STERILE SUPPLY: Performed by: SURGERY

## 2021-09-22 PROCEDURE — 25000003 PHARM REV CODE 250: Performed by: SURGERY

## 2021-09-22 PROCEDURE — D9220A PRA ANESTHESIA: ICD-10-PCS | Mod: CRNA,,, | Performed by: NURSE ANESTHETIST, CERTIFIED REGISTERED

## 2021-09-22 RX ORDER — ROPIVACAINE HYDROCHLORIDE 2 MG/ML
2 INJECTION, SOLUTION EPIDURAL; INFILTRATION; PERINEURAL CONTINUOUS
Status: DISCONTINUED | OUTPATIENT
Start: 2021-09-22 | End: 2021-09-26

## 2021-09-22 RX ORDER — MIDAZOLAM HYDROCHLORIDE 1 MG/ML
2 INJECTION INTRAMUSCULAR; INTRAVENOUS
Status: DISCONTINUED | OUTPATIENT
Start: 2021-09-22 | End: 2021-09-22 | Stop reason: HOSPADM

## 2021-09-22 RX ORDER — MUPIROCIN 20 MG/G
OINTMENT TOPICAL 2 TIMES DAILY
Status: COMPLETED | OUTPATIENT
Start: 2021-09-22 | End: 2021-09-27

## 2021-09-22 RX ORDER — EPHEDRINE SULFATE 50 MG/ML
INJECTION, SOLUTION INTRAVENOUS
Status: DISCONTINUED | OUTPATIENT
Start: 2021-09-22 | End: 2021-09-22

## 2021-09-22 RX ORDER — ONDANSETRON 2 MG/ML
4 INJECTION INTRAMUSCULAR; INTRAVENOUS EVERY 12 HOURS PRN
Status: DISCONTINUED | OUTPATIENT
Start: 2021-09-22 | End: 2021-09-29 | Stop reason: HOSPADM

## 2021-09-22 RX ORDER — ENOXAPARIN SODIUM 100 MG/ML
40 INJECTION SUBCUTANEOUS EVERY 24 HOURS
Status: DISCONTINUED | OUTPATIENT
Start: 2021-09-22 | End: 2021-09-25

## 2021-09-22 RX ORDER — BUPIVACAINE HYDROCHLORIDE 5 MG/ML
INJECTION, SOLUTION EPIDURAL; INTRACAUDAL
Status: COMPLETED | OUTPATIENT
Start: 2021-09-22 | End: 2021-09-22

## 2021-09-22 RX ORDER — FENTANYL CITRATE 50 UG/ML
25 INJECTION, SOLUTION INTRAMUSCULAR; INTRAVENOUS EVERY 5 MIN PRN
Status: DISCONTINUED | OUTPATIENT
Start: 2021-09-22 | End: 2021-09-22 | Stop reason: HOSPADM

## 2021-09-22 RX ORDER — SODIUM CHLORIDE 0.9 % (FLUSH) 0.9 %
10 SYRINGE (ML) INJECTION
Status: DISCONTINUED | OUTPATIENT
Start: 2021-09-22 | End: 2021-09-22 | Stop reason: HOSPADM

## 2021-09-22 RX ORDER — PROPOFOL 10 MG/ML
VIAL (ML) INTRAVENOUS CONTINUOUS PRN
Status: DISCONTINUED | OUTPATIENT
Start: 2021-09-22 | End: 2021-09-22

## 2021-09-22 RX ORDER — LIDOCAINE HYDROCHLORIDE 20 MG/ML
INJECTION INTRAVENOUS
Status: DISCONTINUED | OUTPATIENT
Start: 2021-09-22 | End: 2021-09-22

## 2021-09-22 RX ORDER — BACITRACIN ZINC 500 [USP'U]/G
OINTMENT TOPICAL
Status: DISCONTINUED | OUTPATIENT
Start: 2021-09-22 | End: 2021-09-22 | Stop reason: HOSPADM

## 2021-09-22 RX ORDER — PHENYLEPHRINE HCL IN 0.9% NACL 1 MG/10 ML
SYRINGE (ML) INTRAVENOUS
Status: DISCONTINUED | OUTPATIENT
Start: 2021-09-22 | End: 2021-09-22

## 2021-09-22 RX ORDER — MIDAZOLAM HYDROCHLORIDE 1 MG/ML
INJECTION, SOLUTION INTRAMUSCULAR; INTRAVENOUS
Status: DISCONTINUED | OUTPATIENT
Start: 2021-09-22 | End: 2021-09-22

## 2021-09-22 RX ORDER — ONDANSETRON 2 MG/ML
4 INJECTION INTRAMUSCULAR; INTRAVENOUS DAILY PRN
Status: DISCONTINUED | OUTPATIENT
Start: 2021-09-22 | End: 2021-09-22 | Stop reason: HOSPADM

## 2021-09-22 RX ORDER — ONDANSETRON 2 MG/ML
INJECTION INTRAMUSCULAR; INTRAVENOUS
Status: DISCONTINUED | OUTPATIENT
Start: 2021-09-22 | End: 2021-09-22

## 2021-09-22 RX ORDER — FENTANYL CITRATE 50 UG/ML
100 INJECTION, SOLUTION INTRAMUSCULAR; INTRAVENOUS EVERY 5 MIN PRN
Status: DISCONTINUED | OUTPATIENT
Start: 2021-09-22 | End: 2021-09-22 | Stop reason: HOSPADM

## 2021-09-22 RX ORDER — HYDROMORPHONE HYDROCHLORIDE 1 MG/ML
0.2 INJECTION, SOLUTION INTRAMUSCULAR; INTRAVENOUS; SUBCUTANEOUS EVERY 5 MIN PRN
Status: DISCONTINUED | OUTPATIENT
Start: 2021-09-22 | End: 2021-09-22 | Stop reason: HOSPADM

## 2021-09-22 RX ADMIN — CLOPIDOGREL 75 MG: 75 TABLET, FILM COATED ORAL at 08:09

## 2021-09-22 RX ADMIN — MUPIROCIN: 20 OINTMENT TOPICAL at 09:09

## 2021-09-22 RX ADMIN — LACTULOSE 20 G: 10 SOLUTION ORAL at 09:09

## 2021-09-22 RX ADMIN — BUPIVACAINE HYDROCHLORIDE 25 ML: 5 INJECTION, SOLUTION EPIDURAL; INTRACAUDAL at 03:09

## 2021-09-22 RX ADMIN — ACETAMINOPHEN 1000 MG: 500 TABLET ORAL at 12:09

## 2021-09-22 RX ADMIN — MIDAZOLAM HYDROCHLORIDE 1 MG: 1 INJECTION, SOLUTION INTRAMUSCULAR; INTRAVENOUS at 02:09

## 2021-09-22 RX ADMIN — EPHEDRINE SULFATE 15 MG: 50 INJECTION INTRAVENOUS at 04:09

## 2021-09-22 RX ADMIN — CARVEDILOL 3.12 MG: 3.12 TABLET, FILM COATED ORAL at 09:09

## 2021-09-22 RX ADMIN — EPHEDRINE SULFATE 10 MG: 50 INJECTION INTRAVENOUS at 04:09

## 2021-09-22 RX ADMIN — SODIUM CHLORIDE: 0.9 INJECTION, SOLUTION INTRAVENOUS at 03:09

## 2021-09-22 RX ADMIN — CEFEPIME 2 G: 2 INJECTION, POWDER, FOR SOLUTION INTRAVENOUS at 04:09

## 2021-09-22 RX ADMIN — BUPIVACAINE HYDROCHLORIDE 20 ML: 5 INJECTION, SOLUTION EPIDURAL; INTRACAUDAL; PERINEURAL at 03:09

## 2021-09-22 RX ADMIN — FENTANYL CITRATE 50 MCG: 50 INJECTION INTRAMUSCULAR; INTRAVENOUS at 02:09

## 2021-09-22 RX ADMIN — Medication 200 MCG: at 05:09

## 2021-09-22 RX ADMIN — LIDOCAINE HYDROCHLORIDE 60 MG: 20 INJECTION, SOLUTION INTRAVENOUS at 03:09

## 2021-09-22 RX ADMIN — HYDROMORPHONE HYDROCHLORIDE 0.2 MG: 1 INJECTION, SOLUTION INTRAMUSCULAR; INTRAVENOUS; SUBCUTANEOUS at 05:09

## 2021-09-22 RX ADMIN — ATORVASTATIN CALCIUM 40 MG: 40 TABLET, FILM COATED ORAL at 08:09

## 2021-09-22 RX ADMIN — ROPIVACAINE HYDROCHLORIDE 2 ML/HR: 2 INJECTION, SOLUTION EPIDURAL; INFILTRATION at 06:09

## 2021-09-22 RX ADMIN — CEFEPIME 2 G: 2 INJECTION, POWDER, FOR SOLUTION INTRAVENOUS at 05:09

## 2021-09-22 RX ADMIN — MIDAZOLAM 1 MG: 1 INJECTION INTRAMUSCULAR; INTRAVENOUS at 03:09

## 2021-09-22 RX ADMIN — ACETAMINOPHEN 1000 MG: 500 TABLET ORAL at 07:09

## 2021-09-22 RX ADMIN — Medication 500 MG: at 07:09

## 2021-09-22 RX ADMIN — GABAPENTIN 1200 MG: 400 CAPSULE ORAL at 09:09

## 2021-09-22 RX ADMIN — SPIRONOLACTONE 25 MG: 25 TABLET, FILM COATED ORAL at 08:09

## 2021-09-22 RX ADMIN — POTASSIUM CHLORIDE 20 MEQ: 1500 TABLET, EXTENDED RELEASE ORAL at 09:09

## 2021-09-22 RX ADMIN — MORPHINE SULFATE 30 MG: 15 TABLET, FILM COATED, EXTENDED RELEASE ORAL at 09:09

## 2021-09-22 RX ADMIN — INSULIN ASPART 4 UNITS: 100 INJECTION, SOLUTION INTRAVENOUS; SUBCUTANEOUS at 09:09

## 2021-09-22 RX ADMIN — TORSEMIDE 20 MG: 20 TABLET ORAL at 08:09

## 2021-09-22 RX ADMIN — PANTOPRAZOLE SODIUM 40 MG: 40 TABLET, DELAYED RELEASE ORAL at 08:09

## 2021-09-22 RX ADMIN — OXYCODONE HYDROCHLORIDE 10 MG: 10 TABLET ORAL at 08:09

## 2021-09-22 RX ADMIN — Medication 500 MG: at 08:09

## 2021-09-22 RX ADMIN — ASPIRIN 81 MG CHEWABLE TABLET 81 MG: 81 TABLET CHEWABLE at 08:09

## 2021-09-22 RX ADMIN — CARVEDILOL 3.12 MG: 3.12 TABLET, FILM COATED ORAL at 08:09

## 2021-09-22 RX ADMIN — OXYCODONE HYDROCHLORIDE 10 MG: 10 TABLET ORAL at 01:09

## 2021-09-22 RX ADMIN — ENOXAPARIN SODIUM 40 MG: 40 INJECTION SUBCUTANEOUS at 09:09

## 2021-09-22 RX ADMIN — OXYCODONE HYDROCHLORIDE 10 MG: 10 TABLET ORAL at 12:09

## 2021-09-22 RX ADMIN — GABAPENTIN 1200 MG: 400 CAPSULE ORAL at 08:09

## 2021-09-22 RX ADMIN — POTASSIUM CHLORIDE 20 MEQ: 1500 TABLET, EXTENDED RELEASE ORAL at 08:09

## 2021-09-22 RX ADMIN — CITALOPRAM HYDROBROMIDE 20 MG: 20 TABLET ORAL at 08:09

## 2021-09-22 RX ADMIN — PROPOFOL 50 MCG/KG/MIN: 10 INJECTION, EMULSION INTRAVENOUS at 03:09

## 2021-09-22 RX ADMIN — BACLOFEN 10 MG: 10 TABLET ORAL at 08:09

## 2021-09-22 RX ADMIN — BACLOFEN 10 MG: 10 TABLET ORAL at 09:09

## 2021-09-22 RX ADMIN — OXYCODONE HYDROCHLORIDE 10 MG: 10 TABLET ORAL at 07:09

## 2021-09-22 RX ADMIN — ONDANSETRON 4 MG: 2 INJECTION INTRAMUSCULAR; INTRAVENOUS at 04:09

## 2021-09-22 RX ADMIN — VANCOMYCIN HYDROCHLORIDE 1250 MG: 1.25 INJECTION, POWDER, LYOPHILIZED, FOR SOLUTION INTRAVENOUS at 12:09

## 2021-09-22 RX ADMIN — TAMSULOSIN HYDROCHLORIDE 0.4 MG: 0.4 CAPSULE ORAL at 08:09

## 2021-09-23 LAB
ALBUMIN SERPL BCP-MCNC: 2.6 G/DL (ref 3.5–5.2)
ALP SERPL-CCNC: 84 U/L (ref 55–135)
ALT SERPL W/O P-5'-P-CCNC: 18 U/L (ref 10–44)
ANION GAP SERPL CALC-SCNC: 10 MMOL/L (ref 8–16)
AST SERPL-CCNC: 22 U/L (ref 10–40)
BASOPHILS # BLD AUTO: 0.05 K/UL (ref 0–0.2)
BASOPHILS NFR BLD: 0.4 % (ref 0–1.9)
BILIRUB SERPL-MCNC: 0.3 MG/DL (ref 0.1–1)
BUN SERPL-MCNC: 31 MG/DL (ref 6–20)
CALCIUM SERPL-MCNC: 9.2 MG/DL (ref 8.7–10.5)
CHLORIDE SERPL-SCNC: 101 MMOL/L (ref 95–110)
CO2 SERPL-SCNC: 25 MMOL/L (ref 23–29)
CREAT SERPL-MCNC: 1.1 MG/DL (ref 0.5–1.4)
DIFFERENTIAL METHOD: ABNORMAL
EOSINOPHIL # BLD AUTO: 0 K/UL (ref 0–0.5)
EOSINOPHIL NFR BLD: 0.3 % (ref 0–8)
ERYTHROCYTE [DISTWIDTH] IN BLOOD BY AUTOMATED COUNT: 24.7 % (ref 11.5–14.5)
EST. GFR  (AFRICAN AMERICAN): >60 ML/MIN/1.73 M^2
EST. GFR  (NON AFRICAN AMERICAN): >60 ML/MIN/1.73 M^2
GLUCOSE SERPL-MCNC: 228 MG/DL (ref 70–110)
HCT VFR BLD AUTO: 28 % (ref 40–54)
HGB BLD-MCNC: 8 G/DL (ref 14–18)
IMM GRANULOCYTES # BLD AUTO: 0.05 K/UL (ref 0–0.04)
IMM GRANULOCYTES NFR BLD AUTO: 0.4 % (ref 0–0.5)
LYMPHOCYTES # BLD AUTO: 0.8 K/UL (ref 1–4.8)
LYMPHOCYTES NFR BLD: 6.1 % (ref 18–48)
MAGNESIUM SERPL-MCNC: 1.9 MG/DL (ref 1.6–2.6)
MCH RBC QN AUTO: 21.9 PG (ref 27–31)
MCHC RBC AUTO-ENTMCNC: 28.6 G/DL (ref 32–36)
MCV RBC AUTO: 77 FL (ref 82–98)
MONOCYTES # BLD AUTO: 0.8 K/UL (ref 0.3–1)
MONOCYTES NFR BLD: 6.3 % (ref 4–15)
NEUTROPHILS # BLD AUTO: 10.7 K/UL (ref 1.8–7.7)
NEUTROPHILS NFR BLD: 86.5 % (ref 38–73)
NRBC BLD-RTO: 0 /100 WBC
PHOSPHATE SERPL-MCNC: 5.5 MG/DL (ref 2.7–4.5)
PLATELET # BLD AUTO: 260 K/UL (ref 150–450)
PMV BLD AUTO: 10.6 FL (ref 9.2–12.9)
POCT GLUCOSE: 219 MG/DL (ref 70–110)
POCT GLUCOSE: 219 MG/DL (ref 70–110)
POCT GLUCOSE: 232 MG/DL (ref 70–110)
POCT GLUCOSE: 325 MG/DL (ref 70–110)
POTASSIUM SERPL-SCNC: 4.9 MMOL/L (ref 3.5–5.1)
PROT SERPL-MCNC: 7 G/DL (ref 6–8.4)
RBC # BLD AUTO: 3.66 M/UL (ref 4.6–6.2)
SODIUM SERPL-SCNC: 136 MMOL/L (ref 136–145)
WBC # BLD AUTO: 12.32 K/UL (ref 3.9–12.7)

## 2021-09-23 PROCEDURE — 94761 N-INVAS EAR/PLS OXIMETRY MLT: CPT

## 2021-09-23 PROCEDURE — 25000003 PHARM REV CODE 250: Performed by: STUDENT IN AN ORGANIZED HEALTH CARE EDUCATION/TRAINING PROGRAM

## 2021-09-23 PROCEDURE — 80053 COMPREHEN METABOLIC PANEL: CPT | Performed by: FAMILY MEDICINE

## 2021-09-23 PROCEDURE — 99233 PR SUBSEQUENT HOSPITAL CARE,LEVL III: ICD-10-PCS | Mod: ,,, | Performed by: STUDENT IN AN ORGANIZED HEALTH CARE EDUCATION/TRAINING PROGRAM

## 2021-09-23 PROCEDURE — 99233 SBSQ HOSP IP/OBS HIGH 50: CPT | Mod: ,,, | Performed by: STUDENT IN AN ORGANIZED HEALTH CARE EDUCATION/TRAINING PROGRAM

## 2021-09-23 PROCEDURE — 99900035 HC TECH TIME PER 15 MIN (STAT)

## 2021-09-23 PROCEDURE — 85025 COMPLETE CBC W/AUTO DIFF WBC: CPT | Performed by: FAMILY MEDICINE

## 2021-09-23 PROCEDURE — 25000003 PHARM REV CODE 250: Performed by: SURGERY

## 2021-09-23 PROCEDURE — 99231 SBSQ HOSP IP/OBS SF/LOW 25: CPT | Mod: GC,,, | Performed by: ANESTHESIOLOGY

## 2021-09-23 PROCEDURE — 25000003 PHARM REV CODE 250: Performed by: FAMILY MEDICINE

## 2021-09-23 PROCEDURE — 63600175 PHARM REV CODE 636 W HCPCS: Performed by: STUDENT IN AN ORGANIZED HEALTH CARE EDUCATION/TRAINING PROGRAM

## 2021-09-23 PROCEDURE — 36415 COLL VENOUS BLD VENIPUNCTURE: CPT | Performed by: FAMILY MEDICINE

## 2021-09-23 PROCEDURE — 84100 ASSAY OF PHOSPHORUS: CPT | Performed by: FAMILY MEDICINE

## 2021-09-23 PROCEDURE — 99231 PR SUBSEQUENT HOSPITAL CARE,LEVL I: ICD-10-PCS | Mod: GC,,, | Performed by: ANESTHESIOLOGY

## 2021-09-23 PROCEDURE — 83735 ASSAY OF MAGNESIUM: CPT | Performed by: FAMILY MEDICINE

## 2021-09-23 PROCEDURE — 20600001 HC STEP DOWN PRIVATE ROOM

## 2021-09-23 RX ORDER — OXYCODONE HYDROCHLORIDE 5 MG/1
5 TABLET ORAL
Status: DISCONTINUED | OUTPATIENT
Start: 2021-09-23 | End: 2021-09-29 | Stop reason: HOSPADM

## 2021-09-23 RX ORDER — OXYCODONE HYDROCHLORIDE 10 MG/1
10 TABLET ORAL
Status: DISCONTINUED | OUTPATIENT
Start: 2021-09-23 | End: 2021-09-29 | Stop reason: HOSPADM

## 2021-09-23 RX ORDER — HYDROMORPHONE HYDROCHLORIDE 1 MG/ML
0.5 INJECTION, SOLUTION INTRAMUSCULAR; INTRAVENOUS; SUBCUTANEOUS
Status: DISCONTINUED | OUTPATIENT
Start: 2021-09-23 | End: 2021-09-24

## 2021-09-23 RX ADMIN — OXYCODONE 5 MG: 5 TABLET ORAL at 06:09

## 2021-09-23 RX ADMIN — ACETAMINOPHEN 1000 MG: 500 TABLET ORAL at 11:09

## 2021-09-23 RX ADMIN — GABAPENTIN 1200 MG: 400 CAPSULE ORAL at 08:09

## 2021-09-23 RX ADMIN — ACETAMINOPHEN 1000 MG: 500 TABLET ORAL at 05:09

## 2021-09-23 RX ADMIN — LACTULOSE 20 G: 10 SOLUTION ORAL at 08:09

## 2021-09-23 RX ADMIN — Medication 500 MG: at 08:09

## 2021-09-23 RX ADMIN — CARVEDILOL 3.12 MG: 3.12 TABLET, FILM COATED ORAL at 09:09

## 2021-09-23 RX ADMIN — BACLOFEN 10 MG: 10 TABLET ORAL at 08:09

## 2021-09-23 RX ADMIN — ATORVASTATIN CALCIUM 40 MG: 40 TABLET, FILM COATED ORAL at 08:09

## 2021-09-23 RX ADMIN — OXYCODONE HYDROCHLORIDE 10 MG: 10 TABLET ORAL at 03:09

## 2021-09-23 RX ADMIN — INSULIN ASPART 4 UNITS: 100 INJECTION, SOLUTION INTRAVENOUS; SUBCUTANEOUS at 09:09

## 2021-09-23 RX ADMIN — CLOPIDOGREL 75 MG: 75 TABLET, FILM COATED ORAL at 08:09

## 2021-09-23 RX ADMIN — INSULIN ASPART 8 UNITS: 100 INJECTION, SOLUTION INTRAVENOUS; SUBCUTANEOUS at 12:09

## 2021-09-23 RX ADMIN — PANTOPRAZOLE SODIUM 40 MG: 40 TABLET, DELAYED RELEASE ORAL at 08:09

## 2021-09-23 RX ADMIN — BACLOFEN 10 MG: 10 TABLET ORAL at 03:09

## 2021-09-23 RX ADMIN — SPIRONOLACTONE 25 MG: 25 TABLET, FILM COATED ORAL at 08:09

## 2021-09-23 RX ADMIN — MUPIROCIN: 20 OINTMENT TOPICAL at 09:09

## 2021-09-23 RX ADMIN — POTASSIUM CHLORIDE 20 MEQ: 1500 TABLET, EXTENDED RELEASE ORAL at 09:09

## 2021-09-23 RX ADMIN — GABAPENTIN 1200 MG: 400 CAPSULE ORAL at 09:09

## 2021-09-23 RX ADMIN — CITALOPRAM HYDROBROMIDE 20 MG: 20 TABLET ORAL at 08:09

## 2021-09-23 RX ADMIN — Medication 500 MG: at 05:09

## 2021-09-23 RX ADMIN — MELATONIN TAB 3 MG 6 MG: 3 TAB at 09:09

## 2021-09-23 RX ADMIN — OXYCODONE HYDROCHLORIDE 10 MG: 10 TABLET ORAL at 11:09

## 2021-09-23 RX ADMIN — GABAPENTIN 1200 MG: 400 CAPSULE ORAL at 03:09

## 2021-09-23 RX ADMIN — BACLOFEN 10 MG: 10 TABLET ORAL at 09:09

## 2021-09-23 RX ADMIN — MUPIROCIN: 20 OINTMENT TOPICAL at 11:09

## 2021-09-23 RX ADMIN — POTASSIUM CHLORIDE 20 MEQ: 1500 TABLET, EXTENDED RELEASE ORAL at 08:09

## 2021-09-23 RX ADMIN — INSULIN ASPART 4 UNITS: 100 INJECTION, SOLUTION INTRAVENOUS; SUBCUTANEOUS at 05:09

## 2021-09-23 RX ADMIN — MORPHINE SULFATE 30 MG: 15 TABLET, FILM COATED, EXTENDED RELEASE ORAL at 09:09

## 2021-09-23 RX ADMIN — ENOXAPARIN SODIUM 40 MG: 40 INJECTION SUBCUTANEOUS at 05:09

## 2021-09-23 RX ADMIN — TAMSULOSIN HYDROCHLORIDE 0.4 MG: 0.4 CAPSULE ORAL at 08:09

## 2021-09-23 RX ADMIN — ASPIRIN 81 MG CHEWABLE TABLET 81 MG: 81 TABLET CHEWABLE at 08:09

## 2021-09-23 RX ADMIN — INSULIN ASPART 4 UNITS: 100 INJECTION, SOLUTION INTRAVENOUS; SUBCUTANEOUS at 10:09

## 2021-09-23 RX ADMIN — OXYCODONE HYDROCHLORIDE 10 MG: 10 TABLET ORAL at 07:09

## 2021-09-23 RX ADMIN — LACTULOSE 20 G: 10 SOLUTION ORAL at 09:09

## 2021-09-24 LAB
ABO + RH BLD: NORMAL
ALBUMIN SERPL BCP-MCNC: 2.4 G/DL (ref 3.5–5.2)
ALP SERPL-CCNC: 74 U/L (ref 55–135)
ALT SERPL W/O P-5'-P-CCNC: 13 U/L (ref 10–44)
ANION GAP SERPL CALC-SCNC: 8 MMOL/L (ref 8–16)
AST SERPL-CCNC: 16 U/L (ref 10–40)
BASOPHILS # BLD AUTO: 0.04 K/UL (ref 0–0.2)
BASOPHILS NFR BLD: 0.5 % (ref 0–1.9)
BILIRUB SERPL-MCNC: 0.2 MG/DL (ref 0.1–1)
BLD GP AB SCN CELLS X3 SERPL QL: NORMAL
BLD PROD TYP BPU: NORMAL
BLOOD UNIT EXPIRATION DATE: NORMAL
BLOOD UNIT TYPE CODE: 6200
BLOOD UNIT TYPE: NORMAL
BUN SERPL-MCNC: 26 MG/DL (ref 6–20)
CALCIUM SERPL-MCNC: 9 MG/DL (ref 8.7–10.5)
CHLORIDE SERPL-SCNC: 106 MMOL/L (ref 95–110)
CO2 SERPL-SCNC: 23 MMOL/L (ref 23–29)
CODING SYSTEM: NORMAL
CREAT SERPL-MCNC: 0.9 MG/DL (ref 0.5–1.4)
DIFFERENTIAL METHOD: ABNORMAL
DISPENSE STATUS: NORMAL
EOSINOPHIL # BLD AUTO: 0.1 K/UL (ref 0–0.5)
EOSINOPHIL NFR BLD: 1.1 % (ref 0–8)
ERYTHROCYTE [DISTWIDTH] IN BLOOD BY AUTOMATED COUNT: 24.3 % (ref 11.5–14.5)
EST. GFR  (AFRICAN AMERICAN): >60 ML/MIN/1.73 M^2
EST. GFR  (NON AFRICAN AMERICAN): >60 ML/MIN/1.73 M^2
GLUCOSE SERPL-MCNC: 205 MG/DL (ref 70–110)
HCT VFR BLD AUTO: 22.4 % (ref 40–54)
HGB BLD-MCNC: 6.6 G/DL (ref 14–18)
IMM GRANULOCYTES # BLD AUTO: 0.03 K/UL (ref 0–0.04)
IMM GRANULOCYTES NFR BLD AUTO: 0.4 % (ref 0–0.5)
LYMPHOCYTES # BLD AUTO: 0.8 K/UL (ref 1–4.8)
LYMPHOCYTES NFR BLD: 9.3 % (ref 18–48)
MAGNESIUM SERPL-MCNC: 1.9 MG/DL (ref 1.6–2.6)
MCH RBC QN AUTO: 21.7 PG (ref 27–31)
MCHC RBC AUTO-ENTMCNC: 29.5 G/DL (ref 32–36)
MCV RBC AUTO: 74 FL (ref 82–98)
MONOCYTES # BLD AUTO: 0.8 K/UL (ref 0.3–1)
MONOCYTES NFR BLD: 9.1 % (ref 4–15)
NEUTROPHILS # BLD AUTO: 6.7 K/UL (ref 1.8–7.7)
NEUTROPHILS NFR BLD: 79.6 % (ref 38–73)
NRBC BLD-RTO: 0 /100 WBC
NUM UNITS TRANS PACKED RBC: NORMAL
PHOSPHATE SERPL-MCNC: 2.6 MG/DL (ref 2.7–4.5)
PLATELET # BLD AUTO: 223 K/UL (ref 150–450)
PMV BLD AUTO: 9.9 FL (ref 9.2–12.9)
POCT GLUCOSE: 177 MG/DL (ref 70–110)
POCT GLUCOSE: 197 MG/DL (ref 70–110)
POCT GLUCOSE: 211 MG/DL (ref 70–110)
POCT GLUCOSE: 288 MG/DL (ref 70–110)
POTASSIUM SERPL-SCNC: 4.9 MMOL/L (ref 3.5–5.1)
PROT SERPL-MCNC: 6.5 G/DL (ref 6–8.4)
RBC # BLD AUTO: 3.04 M/UL (ref 4.6–6.2)
SODIUM SERPL-SCNC: 137 MMOL/L (ref 136–145)
WBC # BLD AUTO: 8.35 K/UL (ref 3.9–12.7)

## 2021-09-24 PROCEDURE — 97530 THERAPEUTIC ACTIVITIES: CPT

## 2021-09-24 PROCEDURE — 86900 BLOOD TYPING SEROLOGIC ABO: CPT | Performed by: STUDENT IN AN ORGANIZED HEALTH CARE EDUCATION/TRAINING PROGRAM

## 2021-09-24 PROCEDURE — 99231 SBSQ HOSP IP/OBS SF/LOW 25: CPT | Mod: GC,,, | Performed by: ANESTHESIOLOGY

## 2021-09-24 PROCEDURE — P9016 RBC LEUKOCYTES REDUCED: HCPCS | Performed by: STUDENT IN AN ORGANIZED HEALTH CARE EDUCATION/TRAINING PROGRAM

## 2021-09-24 PROCEDURE — 97164 PT RE-EVAL EST PLAN CARE: CPT

## 2021-09-24 PROCEDURE — 84100 ASSAY OF PHOSPHORUS: CPT | Performed by: FAMILY MEDICINE

## 2021-09-24 PROCEDURE — 36415 COLL VENOUS BLD VENIPUNCTURE: CPT | Performed by: FAMILY MEDICINE

## 2021-09-24 PROCEDURE — 83735 ASSAY OF MAGNESIUM: CPT | Performed by: FAMILY MEDICINE

## 2021-09-24 PROCEDURE — 20600001 HC STEP DOWN PRIVATE ROOM

## 2021-09-24 PROCEDURE — 25000003 PHARM REV CODE 250: Performed by: FAMILY MEDICINE

## 2021-09-24 PROCEDURE — 36430 TRANSFUSION BLD/BLD COMPNT: CPT

## 2021-09-24 PROCEDURE — 85025 COMPLETE CBC W/AUTO DIFF WBC: CPT | Performed by: FAMILY MEDICINE

## 2021-09-24 PROCEDURE — 99233 PR SUBSEQUENT HOSPITAL CARE,LEVL III: ICD-10-PCS | Mod: ,,, | Performed by: STUDENT IN AN ORGANIZED HEALTH CARE EDUCATION/TRAINING PROGRAM

## 2021-09-24 PROCEDURE — 80053 COMPREHEN METABOLIC PANEL: CPT | Performed by: FAMILY MEDICINE

## 2021-09-24 PROCEDURE — 25000003 PHARM REV CODE 250: Performed by: SURGERY

## 2021-09-24 PROCEDURE — 99233 SBSQ HOSP IP/OBS HIGH 50: CPT | Mod: ,,, | Performed by: STUDENT IN AN ORGANIZED HEALTH CARE EDUCATION/TRAINING PROGRAM

## 2021-09-24 PROCEDURE — 25000003 PHARM REV CODE 250: Performed by: STUDENT IN AN ORGANIZED HEALTH CARE EDUCATION/TRAINING PROGRAM

## 2021-09-24 PROCEDURE — 86920 COMPATIBILITY TEST SPIN: CPT | Performed by: STUDENT IN AN ORGANIZED HEALTH CARE EDUCATION/TRAINING PROGRAM

## 2021-09-24 PROCEDURE — 36415 COLL VENOUS BLD VENIPUNCTURE: CPT | Performed by: STUDENT IN AN ORGANIZED HEALTH CARE EDUCATION/TRAINING PROGRAM

## 2021-09-24 PROCEDURE — 63600175 PHARM REV CODE 636 W HCPCS: Performed by: STUDENT IN AN ORGANIZED HEALTH CARE EDUCATION/TRAINING PROGRAM

## 2021-09-24 PROCEDURE — 99231 PR SUBSEQUENT HOSPITAL CARE,LEVL I: ICD-10-PCS | Mod: GC,,, | Performed by: ANESTHESIOLOGY

## 2021-09-24 RX ORDER — HYDROCODONE BITARTRATE AND ACETAMINOPHEN 500; 5 MG/1; MG/1
TABLET ORAL
Status: DISCONTINUED | OUTPATIENT
Start: 2021-09-24 | End: 2021-09-29 | Stop reason: HOSPADM

## 2021-09-24 RX ORDER — HYDROCODONE BITARTRATE AND ACETAMINOPHEN 500; 5 MG/1; MG/1
TABLET ORAL
Status: DISCONTINUED | OUTPATIENT
Start: 2021-09-24 | End: 2021-09-24

## 2021-09-24 RX ADMIN — PANTOPRAZOLE SODIUM 40 MG: 40 TABLET, DELAYED RELEASE ORAL at 08:09

## 2021-09-24 RX ADMIN — CARVEDILOL 3.12 MG: 3.12 TABLET, FILM COATED ORAL at 08:09

## 2021-09-24 RX ADMIN — OXYCODONE HYDROCHLORIDE 10 MG: 10 TABLET ORAL at 01:09

## 2021-09-24 RX ADMIN — ACETAMINOPHEN 1000 MG: 500 TABLET ORAL at 12:09

## 2021-09-24 RX ADMIN — ASPIRIN 81 MG CHEWABLE TABLET 81 MG: 81 TABLET CHEWABLE at 08:09

## 2021-09-24 RX ADMIN — OXYCODONE HYDROCHLORIDE 10 MG: 10 TABLET ORAL at 04:09

## 2021-09-24 RX ADMIN — GABAPENTIN 1200 MG: 400 CAPSULE ORAL at 03:09

## 2021-09-24 RX ADMIN — BACLOFEN 10 MG: 10 TABLET ORAL at 08:09

## 2021-09-24 RX ADMIN — Medication 500 MG: at 05:09

## 2021-09-24 RX ADMIN — INSULIN ASPART 6 UNITS: 100 INJECTION, SOLUTION INTRAVENOUS; SUBCUTANEOUS at 12:09

## 2021-09-24 RX ADMIN — OXYCODONE HYDROCHLORIDE 10 MG: 10 TABLET ORAL at 06:09

## 2021-09-24 RX ADMIN — MORPHINE SULFATE 30 MG: 15 TABLET, FILM COATED, EXTENDED RELEASE ORAL at 08:09

## 2021-09-24 RX ADMIN — OXYCODONE 5 MG: 5 TABLET ORAL at 03:09

## 2021-09-24 RX ADMIN — ACETAMINOPHEN 1000 MG: 500 TABLET ORAL at 08:09

## 2021-09-24 RX ADMIN — ACETAMINOPHEN 1000 MG: 500 TABLET ORAL at 05:09

## 2021-09-24 RX ADMIN — GABAPENTIN 1200 MG: 400 CAPSULE ORAL at 08:09

## 2021-09-24 RX ADMIN — SPIRONOLACTONE 25 MG: 25 TABLET, FILM COATED ORAL at 08:09

## 2021-09-24 RX ADMIN — ENOXAPARIN SODIUM 40 MG: 40 INJECTION SUBCUTANEOUS at 05:09

## 2021-09-24 RX ADMIN — CITALOPRAM HYDROBROMIDE 20 MG: 20 TABLET ORAL at 08:09

## 2021-09-24 RX ADMIN — OXYCODONE HYDROCHLORIDE 10 MG: 10 TABLET ORAL at 08:09

## 2021-09-24 RX ADMIN — CLOPIDOGREL 75 MG: 75 TABLET, FILM COATED ORAL at 08:09

## 2021-09-24 RX ADMIN — ACETAMINOPHEN 1000 MG: 500 TABLET ORAL at 01:09

## 2021-09-24 RX ADMIN — MUPIROCIN: 20 OINTMENT TOPICAL at 08:09

## 2021-09-24 RX ADMIN — TORSEMIDE 20 MG: 20 TABLET ORAL at 08:09

## 2021-09-24 RX ADMIN — Medication 500 MG: at 08:09

## 2021-09-24 RX ADMIN — POTASSIUM CHLORIDE 20 MEQ: 1500 TABLET, EXTENDED RELEASE ORAL at 08:09

## 2021-09-24 RX ADMIN — OXYCODONE HYDROCHLORIDE 10 MG: 10 TABLET ORAL at 12:09

## 2021-09-24 RX ADMIN — LACTULOSE 20 G: 10 SOLUTION ORAL at 08:09

## 2021-09-24 RX ADMIN — TAMSULOSIN HYDROCHLORIDE 0.4 MG: 0.4 CAPSULE ORAL at 08:09

## 2021-09-24 RX ADMIN — BACLOFEN 10 MG: 10 TABLET ORAL at 03:09

## 2021-09-24 RX ADMIN — ATORVASTATIN CALCIUM 40 MG: 40 TABLET, FILM COATED ORAL at 08:09

## 2021-09-25 LAB
ALBUMIN SERPL BCP-MCNC: 2.5 G/DL (ref 3.5–5.2)
ALP SERPL-CCNC: 72 U/L (ref 55–135)
ALT SERPL W/O P-5'-P-CCNC: 12 U/L (ref 10–44)
ANION GAP SERPL CALC-SCNC: 12 MMOL/L (ref 8–16)
AST SERPL-CCNC: 16 U/L (ref 10–40)
BASOPHILS # BLD AUTO: 0.06 K/UL (ref 0–0.2)
BASOPHILS NFR BLD: 0.7 % (ref 0–1.9)
BILIRUB SERPL-MCNC: 0.3 MG/DL (ref 0.1–1)
BUN SERPL-MCNC: 21 MG/DL (ref 6–20)
CALCIUM SERPL-MCNC: 9.4 MG/DL (ref 8.7–10.5)
CHLORIDE SERPL-SCNC: 103 MMOL/L (ref 95–110)
CO2 SERPL-SCNC: 22 MMOL/L (ref 23–29)
CREAT SERPL-MCNC: 0.7 MG/DL (ref 0.5–1.4)
DIFFERENTIAL METHOD: ABNORMAL
EOSINOPHIL # BLD AUTO: 0.2 K/UL (ref 0–0.5)
EOSINOPHIL NFR BLD: 1.8 % (ref 0–8)
ERYTHROCYTE [DISTWIDTH] IN BLOOD BY AUTOMATED COUNT: 23.8 % (ref 11.5–14.5)
EST. GFR  (AFRICAN AMERICAN): >60 ML/MIN/1.73 M^2
EST. GFR  (NON AFRICAN AMERICAN): >60 ML/MIN/1.73 M^2
GLUCOSE SERPL-MCNC: 143 MG/DL (ref 70–110)
HCT VFR BLD AUTO: 25.1 % (ref 40–54)
HGB BLD-MCNC: 7.7 G/DL (ref 14–18)
IMM GRANULOCYTES # BLD AUTO: 0.03 K/UL (ref 0–0.04)
IMM GRANULOCYTES NFR BLD AUTO: 0.4 % (ref 0–0.5)
LYMPHOCYTES # BLD AUTO: 1.4 K/UL (ref 1–4.8)
LYMPHOCYTES NFR BLD: 17.6 % (ref 18–48)
MAGNESIUM SERPL-MCNC: 1.5 MG/DL (ref 1.6–2.6)
MCH RBC QN AUTO: 23.2 PG (ref 27–31)
MCHC RBC AUTO-ENTMCNC: 30.7 G/DL (ref 32–36)
MCV RBC AUTO: 76 FL (ref 82–98)
MONOCYTES # BLD AUTO: 0.9 K/UL (ref 0.3–1)
MONOCYTES NFR BLD: 10.6 % (ref 4–15)
NEUTROPHILS # BLD AUTO: 5.6 K/UL (ref 1.8–7.7)
NEUTROPHILS NFR BLD: 68.9 % (ref 38–73)
NRBC BLD-RTO: 0 /100 WBC
PHOSPHATE SERPL-MCNC: 2.9 MG/DL (ref 2.7–4.5)
PLATELET # BLD AUTO: 237 K/UL (ref 150–450)
PMV BLD AUTO: 11.2 FL (ref 9.2–12.9)
POCT GLUCOSE: 102 MG/DL (ref 70–110)
POCT GLUCOSE: 190 MG/DL (ref 70–110)
POCT GLUCOSE: 198 MG/DL (ref 70–110)
POTASSIUM SERPL-SCNC: 4.2 MMOL/L (ref 3.5–5.1)
PROT SERPL-MCNC: 7 G/DL (ref 6–8.4)
RBC # BLD AUTO: 3.32 M/UL (ref 4.6–6.2)
SODIUM SERPL-SCNC: 137 MMOL/L (ref 136–145)
WBC # BLD AUTO: 8.14 K/UL (ref 3.9–12.7)

## 2021-09-25 PROCEDURE — 20600001 HC STEP DOWN PRIVATE ROOM

## 2021-09-25 PROCEDURE — 80053 COMPREHEN METABOLIC PANEL: CPT | Performed by: FAMILY MEDICINE

## 2021-09-25 PROCEDURE — 84100 ASSAY OF PHOSPHORUS: CPT | Performed by: FAMILY MEDICINE

## 2021-09-25 PROCEDURE — 25000003 PHARM REV CODE 250: Performed by: STUDENT IN AN ORGANIZED HEALTH CARE EDUCATION/TRAINING PROGRAM

## 2021-09-25 PROCEDURE — 36415 COLL VENOUS BLD VENIPUNCTURE: CPT | Performed by: FAMILY MEDICINE

## 2021-09-25 PROCEDURE — 85025 COMPLETE CBC W/AUTO DIFF WBC: CPT | Performed by: FAMILY MEDICINE

## 2021-09-25 PROCEDURE — 63600175 PHARM REV CODE 636 W HCPCS: Performed by: SURGERY

## 2021-09-25 PROCEDURE — 25000003 PHARM REV CODE 250: Performed by: SURGERY

## 2021-09-25 PROCEDURE — 99233 PR SUBSEQUENT HOSPITAL CARE,LEVL III: ICD-10-PCS | Mod: ,,, | Performed by: STUDENT IN AN ORGANIZED HEALTH CARE EDUCATION/TRAINING PROGRAM

## 2021-09-25 PROCEDURE — 99233 SBSQ HOSP IP/OBS HIGH 50: CPT | Mod: ,,, | Performed by: STUDENT IN AN ORGANIZED HEALTH CARE EDUCATION/TRAINING PROGRAM

## 2021-09-25 PROCEDURE — 83735 ASSAY OF MAGNESIUM: CPT | Performed by: FAMILY MEDICINE

## 2021-09-25 PROCEDURE — 25000003 PHARM REV CODE 250: Performed by: FAMILY MEDICINE

## 2021-09-25 RX ORDER — LANOLIN ALCOHOL/MO/W.PET/CERES
1 CREAM (GRAM) TOPICAL DAILY
Status: DISCONTINUED | OUTPATIENT
Start: 2021-09-25 | End: 2021-09-29 | Stop reason: HOSPADM

## 2021-09-25 RX ADMIN — MELATONIN TAB 3 MG 6 MG: 3 TAB at 08:09

## 2021-09-25 RX ADMIN — OXYCODONE HYDROCHLORIDE 10 MG: 10 TABLET ORAL at 07:09

## 2021-09-25 RX ADMIN — OXYCODONE HYDROCHLORIDE 10 MG: 10 TABLET ORAL at 04:09

## 2021-09-25 RX ADMIN — TORSEMIDE 20 MG: 20 TABLET ORAL at 08:09

## 2021-09-25 RX ADMIN — SPIRONOLACTONE 25 MG: 25 TABLET, FILM COATED ORAL at 08:09

## 2021-09-25 RX ADMIN — POTASSIUM CHLORIDE 20 MEQ: 1500 TABLET, EXTENDED RELEASE ORAL at 08:09

## 2021-09-25 RX ADMIN — MORPHINE SULFATE 30 MG: 15 TABLET, FILM COATED, EXTENDED RELEASE ORAL at 08:09

## 2021-09-25 RX ADMIN — Medication 500 MG: at 04:09

## 2021-09-25 RX ADMIN — ACETAMINOPHEN 1000 MG: 500 TABLET ORAL at 12:09

## 2021-09-25 RX ADMIN — CARVEDILOL 3.12 MG: 3.12 TABLET, FILM COATED ORAL at 08:09

## 2021-09-25 RX ADMIN — OXYCODONE HYDROCHLORIDE 10 MG: 10 TABLET ORAL at 12:09

## 2021-09-25 RX ADMIN — BACLOFEN 10 MG: 10 TABLET ORAL at 08:09

## 2021-09-25 RX ADMIN — APIXABAN 5 MG: 5 TABLET, FILM COATED ORAL at 08:09

## 2021-09-25 RX ADMIN — Medication 500 MG: at 07:09

## 2021-09-25 RX ADMIN — BACLOFEN 10 MG: 10 TABLET ORAL at 03:09

## 2021-09-25 RX ADMIN — GABAPENTIN 1200 MG: 400 CAPSULE ORAL at 08:09

## 2021-09-25 RX ADMIN — OXYCODONE HYDROCHLORIDE 10 MG: 10 TABLET ORAL at 08:09

## 2021-09-25 RX ADMIN — FERROUS SULFATE TAB EC 325 MG (65 MG FE EQUIVALENT) 1 EACH: 325 (65 FE) TABLET DELAYED RESPONSE at 08:09

## 2021-09-25 RX ADMIN — LACTULOSE 20 G: 10 SOLUTION ORAL at 08:09

## 2021-09-25 RX ADMIN — CLOPIDOGREL 75 MG: 75 TABLET, FILM COATED ORAL at 08:09

## 2021-09-25 RX ADMIN — MUPIROCIN: 20 OINTMENT TOPICAL at 08:09

## 2021-09-25 RX ADMIN — ACETAMINOPHEN 1000 MG: 500 TABLET ORAL at 05:09

## 2021-09-25 RX ADMIN — GABAPENTIN 1200 MG: 400 CAPSULE ORAL at 03:09

## 2021-09-25 RX ADMIN — ASPIRIN 81 MG CHEWABLE TABLET 81 MG: 81 TABLET CHEWABLE at 08:09

## 2021-09-25 RX ADMIN — CITALOPRAM HYDROBROMIDE 20 MG: 20 TABLET ORAL at 08:09

## 2021-09-25 RX ADMIN — ATORVASTATIN CALCIUM 40 MG: 40 TABLET, FILM COATED ORAL at 08:09

## 2021-09-25 RX ADMIN — TAMSULOSIN HYDROCHLORIDE 0.4 MG: 0.4 CAPSULE ORAL at 08:09

## 2021-09-25 RX ADMIN — PANTOPRAZOLE SODIUM 40 MG: 40 TABLET, DELAYED RELEASE ORAL at 08:09

## 2021-09-25 RX ADMIN — ROPIVACAINE HYDROCHLORIDE 2 ML/HR: 2 INJECTION, SOLUTION EPIDURAL; INFILTRATION at 01:09

## 2021-09-25 RX ADMIN — OXYCODONE HYDROCHLORIDE 10 MG: 10 TABLET ORAL at 01:09

## 2021-09-26 LAB
ALBUMIN SERPL BCP-MCNC: 2.4 G/DL (ref 3.5–5.2)
ALP SERPL-CCNC: 76 U/L (ref 55–135)
ALT SERPL W/O P-5'-P-CCNC: 13 U/L (ref 10–44)
ANION GAP SERPL CALC-SCNC: 10 MMOL/L (ref 8–16)
AST SERPL-CCNC: 14 U/L (ref 10–40)
BASOPHILS # BLD AUTO: 0.06 K/UL (ref 0–0.2)
BASOPHILS NFR BLD: 0.9 % (ref 0–1.9)
BILIRUB SERPL-MCNC: 0.2 MG/DL (ref 0.1–1)
BUN SERPL-MCNC: 15 MG/DL (ref 6–20)
CALCIUM SERPL-MCNC: 9.4 MG/DL (ref 8.7–10.5)
CHLORIDE SERPL-SCNC: 105 MMOL/L (ref 95–110)
CO2 SERPL-SCNC: 23 MMOL/L (ref 23–29)
CREAT SERPL-MCNC: 0.6 MG/DL (ref 0.5–1.4)
DIFFERENTIAL METHOD: ABNORMAL
EOSINOPHIL # BLD AUTO: 0.2 K/UL (ref 0–0.5)
EOSINOPHIL NFR BLD: 2.9 % (ref 0–8)
ERYTHROCYTE [DISTWIDTH] IN BLOOD BY AUTOMATED COUNT: 24.2 % (ref 11.5–14.5)
EST. GFR  (AFRICAN AMERICAN): >60 ML/MIN/1.73 M^2
EST. GFR  (NON AFRICAN AMERICAN): >60 ML/MIN/1.73 M^2
GLUCOSE SERPL-MCNC: 151 MG/DL (ref 70–110)
HCT VFR BLD AUTO: 24.6 % (ref 40–54)
HGB BLD-MCNC: 7.4 G/DL (ref 14–18)
IMM GRANULOCYTES # BLD AUTO: 0.03 K/UL (ref 0–0.04)
IMM GRANULOCYTES NFR BLD AUTO: 0.4 % (ref 0–0.5)
LYMPHOCYTES # BLD AUTO: 1.6 K/UL (ref 1–4.8)
LYMPHOCYTES NFR BLD: 23.3 % (ref 18–48)
MAGNESIUM SERPL-MCNC: 1.9 MG/DL (ref 1.6–2.6)
MCH RBC QN AUTO: 22.8 PG (ref 27–31)
MCHC RBC AUTO-ENTMCNC: 30.1 G/DL (ref 32–36)
MCV RBC AUTO: 76 FL (ref 82–98)
MONOCYTES # BLD AUTO: 0.5 K/UL (ref 0.3–1)
MONOCYTES NFR BLD: 8 % (ref 4–15)
NEUTROPHILS # BLD AUTO: 4.4 K/UL (ref 1.8–7.7)
NEUTROPHILS NFR BLD: 64.5 % (ref 38–73)
NRBC BLD-RTO: 0 /100 WBC
PHOSPHATE SERPL-MCNC: 3.6 MG/DL (ref 2.7–4.5)
PLATELET # BLD AUTO: 253 K/UL (ref 150–450)
PLATELET BLD QL SMEAR: ABNORMAL
PMV BLD AUTO: 10.4 FL (ref 9.2–12.9)
POCT GLUCOSE: 142 MG/DL (ref 70–110)
POCT GLUCOSE: 147 MG/DL (ref 70–110)
POCT GLUCOSE: 185 MG/DL (ref 70–110)
POCT GLUCOSE: 256 MG/DL (ref 70–110)
POCT GLUCOSE: 260 MG/DL (ref 70–110)
POTASSIUM SERPL-SCNC: 4.5 MMOL/L (ref 3.5–5.1)
PROT SERPL-MCNC: 6.9 G/DL (ref 6–8.4)
RBC # BLD AUTO: 3.24 M/UL (ref 4.6–6.2)
SODIUM SERPL-SCNC: 138 MMOL/L (ref 136–145)
WBC # BLD AUTO: 6.78 K/UL (ref 3.9–12.7)

## 2021-09-26 PROCEDURE — 20600001 HC STEP DOWN PRIVATE ROOM

## 2021-09-26 PROCEDURE — 80053 COMPREHEN METABOLIC PANEL: CPT | Performed by: FAMILY MEDICINE

## 2021-09-26 PROCEDURE — 85025 COMPLETE CBC W/AUTO DIFF WBC: CPT | Performed by: FAMILY MEDICINE

## 2021-09-26 PROCEDURE — 84100 ASSAY OF PHOSPHORUS: CPT | Performed by: FAMILY MEDICINE

## 2021-09-26 PROCEDURE — 99233 SBSQ HOSP IP/OBS HIGH 50: CPT | Mod: ,,, | Performed by: STUDENT IN AN ORGANIZED HEALTH CARE EDUCATION/TRAINING PROGRAM

## 2021-09-26 PROCEDURE — 99233 PR SUBSEQUENT HOSPITAL CARE,LEVL III: ICD-10-PCS | Mod: ,,, | Performed by: STUDENT IN AN ORGANIZED HEALTH CARE EDUCATION/TRAINING PROGRAM

## 2021-09-26 PROCEDURE — 83735 ASSAY OF MAGNESIUM: CPT | Performed by: FAMILY MEDICINE

## 2021-09-26 PROCEDURE — 25000003 PHARM REV CODE 250: Performed by: STUDENT IN AN ORGANIZED HEALTH CARE EDUCATION/TRAINING PROGRAM

## 2021-09-26 PROCEDURE — 36415 COLL VENOUS BLD VENIPUNCTURE: CPT | Performed by: FAMILY MEDICINE

## 2021-09-26 PROCEDURE — 25000003 PHARM REV CODE 250: Performed by: SURGERY

## 2021-09-26 PROCEDURE — 25000003 PHARM REV CODE 250: Performed by: FAMILY MEDICINE

## 2021-09-26 RX ADMIN — BACLOFEN 10 MG: 10 TABLET ORAL at 03:09

## 2021-09-26 RX ADMIN — TAMSULOSIN HYDROCHLORIDE 0.4 MG: 0.4 CAPSULE ORAL at 09:09

## 2021-09-26 RX ADMIN — MORPHINE SULFATE 30 MG: 15 TABLET, FILM COATED, EXTENDED RELEASE ORAL at 09:09

## 2021-09-26 RX ADMIN — Medication 500 MG: at 06:09

## 2021-09-26 RX ADMIN — MUPIROCIN: 20 OINTMENT TOPICAL at 09:09

## 2021-09-26 RX ADMIN — GABAPENTIN 1200 MG: 400 CAPSULE ORAL at 03:09

## 2021-09-26 RX ADMIN — TORSEMIDE 20 MG: 20 TABLET ORAL at 09:09

## 2021-09-26 RX ADMIN — LACTULOSE 20 G: 10 SOLUTION ORAL at 09:09

## 2021-09-26 RX ADMIN — BACLOFEN 10 MG: 10 TABLET ORAL at 09:09

## 2021-09-26 RX ADMIN — Medication 500 MG: at 09:09

## 2021-09-26 RX ADMIN — POTASSIUM CHLORIDE 20 MEQ: 1500 TABLET, EXTENDED RELEASE ORAL at 09:09

## 2021-09-26 RX ADMIN — APIXABAN 5 MG: 5 TABLET, FILM COATED ORAL at 09:09

## 2021-09-26 RX ADMIN — ACETAMINOPHEN 1000 MG: 500 TABLET ORAL at 12:09

## 2021-09-26 RX ADMIN — BACLOFEN 10 MG: 10 TABLET ORAL at 08:09

## 2021-09-26 RX ADMIN — APIXABAN 5 MG: 5 TABLET, FILM COATED ORAL at 08:09

## 2021-09-26 RX ADMIN — ACETAMINOPHEN 1000 MG: 500 TABLET ORAL at 06:09

## 2021-09-26 RX ADMIN — CITALOPRAM HYDROBROMIDE 20 MG: 20 TABLET ORAL at 09:09

## 2021-09-26 RX ADMIN — GABAPENTIN 1200 MG: 400 CAPSULE ORAL at 09:09

## 2021-09-26 RX ADMIN — MORPHINE SULFATE 30 MG: 15 TABLET, FILM COATED, EXTENDED RELEASE ORAL at 08:09

## 2021-09-26 RX ADMIN — CLOPIDOGREL 75 MG: 75 TABLET, FILM COATED ORAL at 09:09

## 2021-09-26 RX ADMIN — FERROUS SULFATE TAB EC 325 MG (65 MG FE EQUIVALENT) 1 EACH: 325 (65 FE) TABLET DELAYED RESPONSE at 09:09

## 2021-09-26 RX ADMIN — CARVEDILOL 3.12 MG: 3.12 TABLET, FILM COATED ORAL at 09:09

## 2021-09-26 RX ADMIN — ASPIRIN 81 MG CHEWABLE TABLET 81 MG: 81 TABLET CHEWABLE at 09:09

## 2021-09-26 RX ADMIN — LACTULOSE 20 G: 10 SOLUTION ORAL at 08:09

## 2021-09-26 RX ADMIN — OXYCODONE HYDROCHLORIDE 10 MG: 10 TABLET ORAL at 04:09

## 2021-09-26 RX ADMIN — PANTOPRAZOLE SODIUM 40 MG: 40 TABLET, DELAYED RELEASE ORAL at 09:09

## 2021-09-26 RX ADMIN — INSULIN ASPART 1 UNITS: 100 INJECTION, SOLUTION INTRAVENOUS; SUBCUTANEOUS at 08:09

## 2021-09-26 RX ADMIN — MELATONIN TAB 3 MG 6 MG: 3 TAB at 08:09

## 2021-09-26 RX ADMIN — MUPIROCIN: 20 OINTMENT TOPICAL at 08:09

## 2021-09-26 RX ADMIN — POTASSIUM CHLORIDE 20 MEQ: 1500 TABLET, EXTENDED RELEASE ORAL at 08:09

## 2021-09-26 RX ADMIN — OXYCODONE HYDROCHLORIDE 10 MG: 10 TABLET ORAL at 06:09

## 2021-09-26 RX ADMIN — OXYCODONE HYDROCHLORIDE 10 MG: 10 TABLET ORAL at 09:09

## 2021-09-26 RX ADMIN — SPIRONOLACTONE 25 MG: 25 TABLET, FILM COATED ORAL at 09:09

## 2021-09-26 RX ADMIN — ATORVASTATIN CALCIUM 40 MG: 40 TABLET, FILM COATED ORAL at 09:09

## 2021-09-26 RX ADMIN — CARVEDILOL 3.12 MG: 3.12 TABLET, FILM COATED ORAL at 08:09

## 2021-09-26 RX ADMIN — GABAPENTIN 1200 MG: 400 CAPSULE ORAL at 08:09

## 2021-09-26 RX ADMIN — INSULIN ASPART 6 UNITS: 100 INJECTION, SOLUTION INTRAVENOUS; SUBCUTANEOUS at 01:09

## 2021-09-27 LAB
ALBUMIN SERPL BCP-MCNC: 2.5 G/DL (ref 3.5–5.2)
ALP SERPL-CCNC: 85 U/L (ref 55–135)
ALT SERPL W/O P-5'-P-CCNC: 16 U/L (ref 10–44)
ANION GAP SERPL CALC-SCNC: 9 MMOL/L (ref 8–16)
AST SERPL-CCNC: 16 U/L (ref 10–40)
BASOPHILS # BLD AUTO: 0.07 K/UL (ref 0–0.2)
BASOPHILS NFR BLD: 1.1 % (ref 0–1.9)
BILIRUB SERPL-MCNC: 0.3 MG/DL (ref 0.1–1)
BUN SERPL-MCNC: 16 MG/DL (ref 6–20)
CALCIUM SERPL-MCNC: 9.9 MG/DL (ref 8.7–10.5)
CHLORIDE SERPL-SCNC: 102 MMOL/L (ref 95–110)
CO2 SERPL-SCNC: 28 MMOL/L (ref 23–29)
CREAT SERPL-MCNC: 0.7 MG/DL (ref 0.5–1.4)
DIFFERENTIAL METHOD: ABNORMAL
EOSINOPHIL # BLD AUTO: 0.3 K/UL (ref 0–0.5)
EOSINOPHIL NFR BLD: 4.6 % (ref 0–8)
ERYTHROCYTE [DISTWIDTH] IN BLOOD BY AUTOMATED COUNT: 24.8 % (ref 11.5–14.5)
EST. GFR  (AFRICAN AMERICAN): >60 ML/MIN/1.73 M^2
EST. GFR  (NON AFRICAN AMERICAN): >60 ML/MIN/1.73 M^2
GLUCOSE SERPL-MCNC: 139 MG/DL (ref 70–110)
HCT VFR BLD AUTO: 26.9 % (ref 40–54)
HGB BLD-MCNC: 8.2 G/DL (ref 14–18)
IMM GRANULOCYTES # BLD AUTO: 0.02 K/UL (ref 0–0.04)
IMM GRANULOCYTES NFR BLD AUTO: 0.3 % (ref 0–0.5)
LYMPHOCYTES # BLD AUTO: 1.4 K/UL (ref 1–4.8)
LYMPHOCYTES NFR BLD: 22.2 % (ref 18–48)
MAGNESIUM SERPL-MCNC: 1.5 MG/DL (ref 1.6–2.6)
MCH RBC QN AUTO: 22.9 PG (ref 27–31)
MCHC RBC AUTO-ENTMCNC: 30.5 G/DL (ref 32–36)
MCV RBC AUTO: 75 FL (ref 82–98)
MONOCYTES # BLD AUTO: 0.5 K/UL (ref 0.3–1)
MONOCYTES NFR BLD: 7.8 % (ref 4–15)
NEUTROPHILS # BLD AUTO: 4 K/UL (ref 1.8–7.7)
NEUTROPHILS NFR BLD: 64 % (ref 38–73)
NRBC BLD-RTO: 0 /100 WBC
PHOSPHATE SERPL-MCNC: 4.5 MG/DL (ref 2.7–4.5)
PLATELET # BLD AUTO: 296 K/UL (ref 150–450)
PMV BLD AUTO: 10.4 FL (ref 9.2–12.9)
POCT GLUCOSE: 161 MG/DL (ref 70–110)
POCT GLUCOSE: 214 MG/DL (ref 70–110)
POCT GLUCOSE: 250 MG/DL (ref 70–110)
POCT GLUCOSE: 300 MG/DL (ref 70–110)
POTASSIUM SERPL-SCNC: 4.2 MMOL/L (ref 3.5–5.1)
PROT SERPL-MCNC: 7.2 G/DL (ref 6–8.4)
RBC # BLD AUTO: 3.58 M/UL (ref 4.6–6.2)
SODIUM SERPL-SCNC: 139 MMOL/L (ref 136–145)
WBC # BLD AUTO: 6.31 K/UL (ref 3.9–12.7)

## 2021-09-27 PROCEDURE — 80053 COMPREHEN METABOLIC PANEL: CPT | Performed by: FAMILY MEDICINE

## 2021-09-27 PROCEDURE — 85025 COMPLETE CBC W/AUTO DIFF WBC: CPT | Performed by: FAMILY MEDICINE

## 2021-09-27 PROCEDURE — 25000003 PHARM REV CODE 250: Performed by: FAMILY MEDICINE

## 2021-09-27 PROCEDURE — 25000003 PHARM REV CODE 250: Performed by: STUDENT IN AN ORGANIZED HEALTH CARE EDUCATION/TRAINING PROGRAM

## 2021-09-27 PROCEDURE — 25000003 PHARM REV CODE 250: Performed by: SURGERY

## 2021-09-27 PROCEDURE — 20600001 HC STEP DOWN PRIVATE ROOM

## 2021-09-27 PROCEDURE — 84100 ASSAY OF PHOSPHORUS: CPT | Performed by: FAMILY MEDICINE

## 2021-09-27 PROCEDURE — 36415 COLL VENOUS BLD VENIPUNCTURE: CPT | Performed by: FAMILY MEDICINE

## 2021-09-27 PROCEDURE — 99233 PR SUBSEQUENT HOSPITAL CARE,LEVL III: ICD-10-PCS | Mod: ,,, | Performed by: STUDENT IN AN ORGANIZED HEALTH CARE EDUCATION/TRAINING PROGRAM

## 2021-09-27 PROCEDURE — 99233 SBSQ HOSP IP/OBS HIGH 50: CPT | Mod: ,,, | Performed by: STUDENT IN AN ORGANIZED HEALTH CARE EDUCATION/TRAINING PROGRAM

## 2021-09-27 PROCEDURE — 83735 ASSAY OF MAGNESIUM: CPT | Performed by: FAMILY MEDICINE

## 2021-09-27 RX ADMIN — POTASSIUM CHLORIDE 20 MEQ: 1500 TABLET, EXTENDED RELEASE ORAL at 09:09

## 2021-09-27 RX ADMIN — GABAPENTIN 1200 MG: 400 CAPSULE ORAL at 09:09

## 2021-09-27 RX ADMIN — GABAPENTIN 1200 MG: 400 CAPSULE ORAL at 08:09

## 2021-09-27 RX ADMIN — SPIRONOLACTONE 25 MG: 25 TABLET, FILM COATED ORAL at 09:09

## 2021-09-27 RX ADMIN — OXYCODONE HYDROCHLORIDE 10 MG: 10 TABLET ORAL at 06:09

## 2021-09-27 RX ADMIN — ASPIRIN 81 MG CHEWABLE TABLET 81 MG: 81 TABLET CHEWABLE at 09:09

## 2021-09-27 RX ADMIN — ACETAMINOPHEN 1000 MG: 500 TABLET ORAL at 05:09

## 2021-09-27 RX ADMIN — ACETAMINOPHEN 1000 MG: 500 TABLET ORAL at 06:09

## 2021-09-27 RX ADMIN — POTASSIUM CHLORIDE 20 MEQ: 1500 TABLET, EXTENDED RELEASE ORAL at 08:09

## 2021-09-27 RX ADMIN — INSULIN ASPART 3 UNITS: 100 INJECTION, SOLUTION INTRAVENOUS; SUBCUTANEOUS at 08:09

## 2021-09-27 RX ADMIN — CITALOPRAM HYDROBROMIDE 20 MG: 20 TABLET ORAL at 09:09

## 2021-09-27 RX ADMIN — LACTULOSE 20 G: 10 SOLUTION ORAL at 09:09

## 2021-09-27 RX ADMIN — GABAPENTIN 1200 MG: 400 CAPSULE ORAL at 03:09

## 2021-09-27 RX ADMIN — MUPIROCIN: 20 OINTMENT TOPICAL at 10:09

## 2021-09-27 RX ADMIN — FERROUS SULFATE TAB EC 325 MG (65 MG FE EQUIVALENT) 1 EACH: 325 (65 FE) TABLET DELAYED RESPONSE at 09:09

## 2021-09-27 RX ADMIN — INSULIN ASPART 2 UNITS: 100 INJECTION, SOLUTION INTRAVENOUS; SUBCUTANEOUS at 09:09

## 2021-09-27 RX ADMIN — APIXABAN 5 MG: 5 TABLET, FILM COATED ORAL at 08:09

## 2021-09-27 RX ADMIN — ATORVASTATIN CALCIUM 40 MG: 40 TABLET, FILM COATED ORAL at 09:09

## 2021-09-27 RX ADMIN — OXYCODONE 5 MG: 5 TABLET ORAL at 03:09

## 2021-09-27 RX ADMIN — BACLOFEN 10 MG: 10 TABLET ORAL at 09:09

## 2021-09-27 RX ADMIN — CARVEDILOL 3.12 MG: 3.12 TABLET, FILM COATED ORAL at 08:09

## 2021-09-27 RX ADMIN — CLOPIDOGREL 75 MG: 75 TABLET, FILM COATED ORAL at 09:09

## 2021-09-27 RX ADMIN — INSULIN ASPART 6 UNITS: 100 INJECTION, SOLUTION INTRAVENOUS; SUBCUTANEOUS at 01:09

## 2021-09-27 RX ADMIN — INSULIN ASPART 4 UNITS: 100 INJECTION, SOLUTION INTRAVENOUS; SUBCUTANEOUS at 05:09

## 2021-09-27 RX ADMIN — CARVEDILOL 3.12 MG: 3.12 TABLET, FILM COATED ORAL at 09:09

## 2021-09-27 RX ADMIN — MORPHINE SULFATE 30 MG: 15 TABLET, FILM COATED, EXTENDED RELEASE ORAL at 08:09

## 2021-09-27 RX ADMIN — ACETAMINOPHEN 1000 MG: 500 TABLET ORAL at 12:09

## 2021-09-27 RX ADMIN — BACLOFEN 10 MG: 10 TABLET ORAL at 08:09

## 2021-09-27 RX ADMIN — TORSEMIDE 20 MG: 20 TABLET ORAL at 09:09

## 2021-09-27 RX ADMIN — MORPHINE SULFATE 30 MG: 15 TABLET, FILM COATED, EXTENDED RELEASE ORAL at 09:09

## 2021-09-27 RX ADMIN — Medication 500 MG: at 09:09

## 2021-09-27 RX ADMIN — BACLOFEN 10 MG: 10 TABLET ORAL at 03:09

## 2021-09-27 RX ADMIN — TAMSULOSIN HYDROCHLORIDE 0.4 MG: 0.4 CAPSULE ORAL at 09:09

## 2021-09-27 RX ADMIN — APIXABAN 5 MG: 5 TABLET, FILM COATED ORAL at 09:09

## 2021-09-27 RX ADMIN — PANTOPRAZOLE SODIUM 40 MG: 40 TABLET, DELAYED RELEASE ORAL at 09:09

## 2021-09-27 RX ADMIN — LACTULOSE 20 G: 10 SOLUTION ORAL at 08:09

## 2021-09-27 RX ADMIN — Medication 500 MG: at 05:09

## 2021-09-28 ENCOUNTER — TELEPHONE (OUTPATIENT)
Dept: FAMILY MEDICINE | Facility: CLINIC | Age: 56
End: 2021-09-28

## 2021-09-28 LAB
ALBUMIN SERPL BCP-MCNC: 2.6 G/DL (ref 3.5–5.2)
ALP SERPL-CCNC: 81 U/L (ref 55–135)
ALT SERPL W/O P-5'-P-CCNC: 18 U/L (ref 10–44)
ANION GAP SERPL CALC-SCNC: 13 MMOL/L (ref 8–16)
AST SERPL-CCNC: 15 U/L (ref 10–40)
BASOPHILS # BLD AUTO: 0.07 K/UL (ref 0–0.2)
BASOPHILS NFR BLD: 1 % (ref 0–1.9)
BILIRUB SERPL-MCNC: 0.3 MG/DL (ref 0.1–1)
BUN SERPL-MCNC: 18 MG/DL (ref 6–20)
CALCIUM SERPL-MCNC: 9.5 MG/DL (ref 8.7–10.5)
CHLORIDE SERPL-SCNC: 100 MMOL/L (ref 95–110)
CO2 SERPL-SCNC: 25 MMOL/L (ref 23–29)
CREAT SERPL-MCNC: 0.7 MG/DL (ref 0.5–1.4)
DIFFERENTIAL METHOD: ABNORMAL
EOSINOPHIL # BLD AUTO: 0.3 K/UL (ref 0–0.5)
EOSINOPHIL NFR BLD: 4 % (ref 0–8)
ERYTHROCYTE [DISTWIDTH] IN BLOOD BY AUTOMATED COUNT: 24.5 % (ref 11.5–14.5)
EST. GFR  (AFRICAN AMERICAN): >60 ML/MIN/1.73 M^2
EST. GFR  (NON AFRICAN AMERICAN): >60 ML/MIN/1.73 M^2
GLUCOSE SERPL-MCNC: 154 MG/DL (ref 70–110)
HCT VFR BLD AUTO: 27.6 % (ref 40–54)
HGB BLD-MCNC: 8.3 G/DL (ref 14–18)
IMM GRANULOCYTES # BLD AUTO: 0.04 K/UL (ref 0–0.04)
IMM GRANULOCYTES NFR BLD AUTO: 0.6 % (ref 0–0.5)
LYMPHOCYTES # BLD AUTO: 1.6 K/UL (ref 1–4.8)
LYMPHOCYTES NFR BLD: 22.5 % (ref 18–48)
MAGNESIUM SERPL-MCNC: 1.5 MG/DL (ref 1.6–2.6)
MCH RBC QN AUTO: 23.1 PG (ref 27–31)
MCHC RBC AUTO-ENTMCNC: 30.1 G/DL (ref 32–36)
MCV RBC AUTO: 77 FL (ref 82–98)
MONOCYTES # BLD AUTO: 0.5 K/UL (ref 0.3–1)
MONOCYTES NFR BLD: 7.6 % (ref 4–15)
NEUTROPHILS # BLD AUTO: 4.5 K/UL (ref 1.8–7.7)
NEUTROPHILS NFR BLD: 64.3 % (ref 38–73)
NRBC BLD-RTO: 0 /100 WBC
PHOSPHATE SERPL-MCNC: 4.3 MG/DL (ref 2.7–4.5)
PLATELET # BLD AUTO: 325 K/UL (ref 150–450)
PMV BLD AUTO: 10.3 FL (ref 9.2–12.9)
POCT GLUCOSE: 155 MG/DL (ref 70–110)
POCT GLUCOSE: 156 MG/DL (ref 70–110)
POCT GLUCOSE: 168 MG/DL (ref 70–110)
POCT GLUCOSE: 264 MG/DL (ref 70–110)
POTASSIUM SERPL-SCNC: 4.4 MMOL/L (ref 3.5–5.1)
PROT SERPL-MCNC: 7.2 G/DL (ref 6–8.4)
RBC # BLD AUTO: 3.6 M/UL (ref 4.6–6.2)
SARS-COV-2 RNA RESP QL NAA+PROBE: NOT DETECTED
SODIUM SERPL-SCNC: 138 MMOL/L (ref 136–145)
WBC # BLD AUTO: 6.98 K/UL (ref 3.9–12.7)

## 2021-09-28 PROCEDURE — 97110 THERAPEUTIC EXERCISES: CPT

## 2021-09-28 PROCEDURE — 25000003 PHARM REV CODE 250: Performed by: SURGERY

## 2021-09-28 PROCEDURE — 25000003 PHARM REV CODE 250: Performed by: STUDENT IN AN ORGANIZED HEALTH CARE EDUCATION/TRAINING PROGRAM

## 2021-09-28 PROCEDURE — 85025 COMPLETE CBC W/AUTO DIFF WBC: CPT | Performed by: FAMILY MEDICINE

## 2021-09-28 PROCEDURE — 99233 PR SUBSEQUENT HOSPITAL CARE,LEVL III: ICD-10-PCS | Mod: ,,, | Performed by: STUDENT IN AN ORGANIZED HEALTH CARE EDUCATION/TRAINING PROGRAM

## 2021-09-28 PROCEDURE — 84100 ASSAY OF PHOSPHORUS: CPT | Performed by: FAMILY MEDICINE

## 2021-09-28 PROCEDURE — U0003 INFECTIOUS AGENT DETECTION BY NUCLEIC ACID (DNA OR RNA); SEVERE ACUTE RESPIRATORY SYNDROME CORONAVIRUS 2 (SARS-COV-2) (CORONAVIRUS DISEASE [COVID-19]), AMPLIFIED PROBE TECHNIQUE, MAKING USE OF HIGH THROUGHPUT TECHNOLOGIES AS DESCRIBED BY CMS-2020-01-R: HCPCS | Performed by: STUDENT IN AN ORGANIZED HEALTH CARE EDUCATION/TRAINING PROGRAM

## 2021-09-28 PROCEDURE — 36415 COLL VENOUS BLD VENIPUNCTURE: CPT | Performed by: FAMILY MEDICINE

## 2021-09-28 PROCEDURE — 83735 ASSAY OF MAGNESIUM: CPT | Performed by: FAMILY MEDICINE

## 2021-09-28 PROCEDURE — 99233 SBSQ HOSP IP/OBS HIGH 50: CPT | Mod: ,,, | Performed by: STUDENT IN AN ORGANIZED HEALTH CARE EDUCATION/TRAINING PROGRAM

## 2021-09-28 PROCEDURE — U0005 INFEC AGEN DETEC AMPLI PROBE: HCPCS | Performed by: STUDENT IN AN ORGANIZED HEALTH CARE EDUCATION/TRAINING PROGRAM

## 2021-09-28 PROCEDURE — 25000003 PHARM REV CODE 250: Performed by: FAMILY MEDICINE

## 2021-09-28 PROCEDURE — 97530 THERAPEUTIC ACTIVITIES: CPT

## 2021-09-28 PROCEDURE — 80053 COMPREHEN METABOLIC PANEL: CPT | Performed by: FAMILY MEDICINE

## 2021-09-28 PROCEDURE — 20600001 HC STEP DOWN PRIVATE ROOM

## 2021-09-28 RX ADMIN — INSULIN ASPART 6 UNITS: 100 INJECTION, SOLUTION INTRAVENOUS; SUBCUTANEOUS at 01:09

## 2021-09-28 RX ADMIN — OXYCODONE 5 MG: 5 TABLET ORAL at 04:09

## 2021-09-28 RX ADMIN — BACLOFEN 10 MG: 10 TABLET ORAL at 08:09

## 2021-09-28 RX ADMIN — CLOPIDOGREL 75 MG: 75 TABLET, FILM COATED ORAL at 09:09

## 2021-09-28 RX ADMIN — MORPHINE SULFATE 30 MG: 15 TABLET, FILM COATED, EXTENDED RELEASE ORAL at 08:09

## 2021-09-28 RX ADMIN — SPIRONOLACTONE 25 MG: 25 TABLET, FILM COATED ORAL at 09:09

## 2021-09-28 RX ADMIN — FERROUS SULFATE TAB EC 325 MG (65 MG FE EQUIVALENT) 1 EACH: 325 (65 FE) TABLET DELAYED RESPONSE at 09:09

## 2021-09-28 RX ADMIN — Medication 500 MG: at 09:09

## 2021-09-28 RX ADMIN — ACETAMINOPHEN 1000 MG: 500 TABLET ORAL at 01:09

## 2021-09-28 RX ADMIN — CARVEDILOL 3.12 MG: 3.12 TABLET, FILM COATED ORAL at 09:09

## 2021-09-28 RX ADMIN — INSULIN ASPART 1 UNITS: 100 INJECTION, SOLUTION INTRAVENOUS; SUBCUTANEOUS at 10:09

## 2021-09-28 RX ADMIN — ACETAMINOPHEN 1000 MG: 500 TABLET ORAL at 05:09

## 2021-09-28 RX ADMIN — ATORVASTATIN CALCIUM 40 MG: 40 TABLET, FILM COATED ORAL at 09:09

## 2021-09-28 RX ADMIN — APIXABAN 5 MG: 5 TABLET, FILM COATED ORAL at 09:09

## 2021-09-28 RX ADMIN — TORSEMIDE 20 MG: 20 TABLET ORAL at 09:09

## 2021-09-28 RX ADMIN — GABAPENTIN 1200 MG: 400 CAPSULE ORAL at 09:09

## 2021-09-28 RX ADMIN — TAMSULOSIN HYDROCHLORIDE 0.4 MG: 0.4 CAPSULE ORAL at 09:09

## 2021-09-28 RX ADMIN — ACETAMINOPHEN 1000 MG: 500 TABLET ORAL at 12:09

## 2021-09-28 RX ADMIN — GABAPENTIN 1200 MG: 400 CAPSULE ORAL at 08:09

## 2021-09-28 RX ADMIN — BACLOFEN 10 MG: 10 TABLET ORAL at 09:09

## 2021-09-28 RX ADMIN — LACTULOSE 20 G: 10 SOLUTION ORAL at 08:09

## 2021-09-28 RX ADMIN — INSULIN ASPART 2 UNITS: 100 INJECTION, SOLUTION INTRAVENOUS; SUBCUTANEOUS at 05:09

## 2021-09-28 RX ADMIN — GABAPENTIN 1200 MG: 400 CAPSULE ORAL at 04:09

## 2021-09-28 RX ADMIN — CARVEDILOL 3.12 MG: 3.12 TABLET, FILM COATED ORAL at 08:09

## 2021-09-28 RX ADMIN — Medication 500 MG: at 05:09

## 2021-09-28 RX ADMIN — LACTULOSE 20 G: 10 SOLUTION ORAL at 09:09

## 2021-09-28 RX ADMIN — INSULIN ASPART 2 UNITS: 100 INJECTION, SOLUTION INTRAVENOUS; SUBCUTANEOUS at 08:09

## 2021-09-28 RX ADMIN — POTASSIUM CHLORIDE 20 MEQ: 1500 TABLET, EXTENDED RELEASE ORAL at 08:09

## 2021-09-28 RX ADMIN — PANTOPRAZOLE SODIUM 40 MG: 40 TABLET, DELAYED RELEASE ORAL at 09:09

## 2021-09-28 RX ADMIN — CITALOPRAM HYDROBROMIDE 20 MG: 20 TABLET ORAL at 09:09

## 2021-09-28 RX ADMIN — APIXABAN 5 MG: 5 TABLET, FILM COATED ORAL at 08:09

## 2021-09-28 RX ADMIN — ASPIRIN 81 MG CHEWABLE TABLET 81 MG: 81 TABLET CHEWABLE at 09:09

## 2021-09-28 RX ADMIN — MORPHINE SULFATE 30 MG: 15 TABLET, FILM COATED, EXTENDED RELEASE ORAL at 09:09

## 2021-09-28 RX ADMIN — BACLOFEN 10 MG: 10 TABLET ORAL at 04:09

## 2021-09-28 RX ADMIN — POTASSIUM CHLORIDE 20 MEQ: 1500 TABLET, EXTENDED RELEASE ORAL at 09:09

## 2021-09-29 VITALS
BODY MASS INDEX: 25.96 KG/M2 | OXYGEN SATURATION: 97 % | WEIGHT: 151.25 LBS | SYSTOLIC BLOOD PRESSURE: 107 MMHG | DIASTOLIC BLOOD PRESSURE: 58 MMHG | RESPIRATION RATE: 16 BRPM | TEMPERATURE: 99 F | HEART RATE: 71 BPM

## 2021-09-29 LAB
ALBUMIN SERPL BCP-MCNC: 2.8 G/DL (ref 3.5–5.2)
ALP SERPL-CCNC: 87 U/L (ref 55–135)
ALT SERPL W/O P-5'-P-CCNC: 17 U/L (ref 10–44)
ANION GAP SERPL CALC-SCNC: 13 MMOL/L (ref 8–16)
AST SERPL-CCNC: 16 U/L (ref 10–40)
BASOPHILS # BLD AUTO: 0.08 K/UL (ref 0–0.2)
BASOPHILS NFR BLD: 1.1 % (ref 0–1.9)
BILIRUB SERPL-MCNC: 0.4 MG/DL (ref 0.1–1)
BUN SERPL-MCNC: 20 MG/DL (ref 6–20)
CALCIUM SERPL-MCNC: 9.9 MG/DL (ref 8.7–10.5)
CHLORIDE SERPL-SCNC: 99 MMOL/L (ref 95–110)
CO2 SERPL-SCNC: 25 MMOL/L (ref 23–29)
CREAT SERPL-MCNC: 0.7 MG/DL (ref 0.5–1.4)
DIFFERENTIAL METHOD: ABNORMAL
EOSINOPHIL # BLD AUTO: 0.3 K/UL (ref 0–0.5)
EOSINOPHIL NFR BLD: 3.7 % (ref 0–8)
ERYTHROCYTE [DISTWIDTH] IN BLOOD BY AUTOMATED COUNT: 24.7 % (ref 11.5–14.5)
EST. GFR  (AFRICAN AMERICAN): >60 ML/MIN/1.73 M^2
EST. GFR  (NON AFRICAN AMERICAN): >60 ML/MIN/1.73 M^2
GLUCOSE SERPL-MCNC: 135 MG/DL (ref 70–110)
HCT VFR BLD AUTO: 27.6 % (ref 40–54)
HGB BLD-MCNC: 8.6 G/DL (ref 14–18)
IMM GRANULOCYTES # BLD AUTO: 0.03 K/UL (ref 0–0.04)
IMM GRANULOCYTES NFR BLD AUTO: 0.4 % (ref 0–0.5)
LYMPHOCYTES # BLD AUTO: 1.4 K/UL (ref 1–4.8)
LYMPHOCYTES NFR BLD: 19.3 % (ref 18–48)
MAGNESIUM SERPL-MCNC: 1.6 MG/DL (ref 1.6–2.6)
MCH RBC QN AUTO: 23.4 PG (ref 27–31)
MCHC RBC AUTO-ENTMCNC: 31.2 G/DL (ref 32–36)
MCV RBC AUTO: 75 FL (ref 82–98)
MONOCYTES # BLD AUTO: 0.6 K/UL (ref 0.3–1)
MONOCYTES NFR BLD: 7.8 % (ref 4–15)
NEUTROPHILS # BLD AUTO: 4.8 K/UL (ref 1.8–7.7)
NEUTROPHILS NFR BLD: 67.7 % (ref 38–73)
NRBC BLD-RTO: 0 /100 WBC
PHOSPHATE SERPL-MCNC: 4.6 MG/DL (ref 2.7–4.5)
PLATELET # BLD AUTO: 298 K/UL (ref 150–450)
PMV BLD AUTO: 9.9 FL (ref 9.2–12.9)
POCT GLUCOSE: 163 MG/DL (ref 70–110)
POCT GLUCOSE: 178 MG/DL (ref 70–110)
POTASSIUM SERPL-SCNC: 4.1 MMOL/L (ref 3.5–5.1)
PROT SERPL-MCNC: 7.4 G/DL (ref 6–8.4)
RBC # BLD AUTO: 3.67 M/UL (ref 4.6–6.2)
SODIUM SERPL-SCNC: 137 MMOL/L (ref 136–145)
WBC # BLD AUTO: 7.09 K/UL (ref 3.9–12.7)

## 2021-09-29 PROCEDURE — 84100 ASSAY OF PHOSPHORUS: CPT | Performed by: FAMILY MEDICINE

## 2021-09-29 PROCEDURE — 85025 COMPLETE CBC W/AUTO DIFF WBC: CPT | Performed by: FAMILY MEDICINE

## 2021-09-29 PROCEDURE — 80053 COMPREHEN METABOLIC PANEL: CPT | Performed by: FAMILY MEDICINE

## 2021-09-29 PROCEDURE — 99239 HOSP IP/OBS DSCHRG MGMT >30: CPT | Mod: ,,, | Performed by: STUDENT IN AN ORGANIZED HEALTH CARE EDUCATION/TRAINING PROGRAM

## 2021-09-29 PROCEDURE — 99239 PR HOSPITAL DISCHARGE DAY,>30 MIN: ICD-10-PCS | Mod: ,,, | Performed by: STUDENT IN AN ORGANIZED HEALTH CARE EDUCATION/TRAINING PROGRAM

## 2021-09-29 PROCEDURE — 83735 ASSAY OF MAGNESIUM: CPT | Performed by: FAMILY MEDICINE

## 2021-09-29 PROCEDURE — 25000003 PHARM REV CODE 250: Performed by: STUDENT IN AN ORGANIZED HEALTH CARE EDUCATION/TRAINING PROGRAM

## 2021-09-29 PROCEDURE — 25000003 PHARM REV CODE 250: Performed by: SURGERY

## 2021-09-29 PROCEDURE — 25000003 PHARM REV CODE 250: Performed by: FAMILY MEDICINE

## 2021-09-29 PROCEDURE — 36415 COLL VENOUS BLD VENIPUNCTURE: CPT | Performed by: FAMILY MEDICINE

## 2021-09-29 RX ORDER — OXYCODONE HYDROCHLORIDE 10 MG/1
10 TABLET ORAL EVERY 4 HOURS PRN
Qty: 60 TABLET | Refills: 0 | Status: SHIPPED | OUTPATIENT
Start: 2021-09-29 | End: 2021-09-29 | Stop reason: SDUPTHER

## 2021-09-29 RX ORDER — MORPHINE SULFATE 30 MG/1
30 TABLET, FILM COATED, EXTENDED RELEASE ORAL 2 TIMES DAILY
Qty: 60 TABLET | Refills: 0 | Status: SHIPPED | OUTPATIENT
Start: 2021-09-29 | End: 2021-10-19 | Stop reason: SDUPTHER

## 2021-09-29 RX ORDER — OXYCODONE HYDROCHLORIDE 10 MG/1
10 TABLET ORAL EVERY 4 HOURS PRN
Qty: 60 TABLET | Refills: 0 | Status: SHIPPED | OUTPATIENT
Start: 2021-09-29 | End: 2021-10-13

## 2021-09-29 RX ORDER — NAPROXEN SODIUM 220 MG/1
81 TABLET, FILM COATED ORAL DAILY
Qty: 360 TABLET | Refills: 0 | Status: SHIPPED | OUTPATIENT
Start: 2021-09-29 | End: 2022-02-07

## 2021-09-29 RX ORDER — MORPHINE SULFATE 30 MG/1
30 TABLET, FILM COATED, EXTENDED RELEASE ORAL 2 TIMES DAILY
Qty: 60 TABLET | Refills: 0 | Status: SHIPPED | OUTPATIENT
Start: 2021-09-29 | End: 2021-09-29 | Stop reason: SDUPTHER

## 2021-09-29 RX ADMIN — LACTULOSE 20 G: 10 SOLUTION ORAL at 08:09

## 2021-09-29 RX ADMIN — CARVEDILOL 3.12 MG: 3.12 TABLET, FILM COATED ORAL at 08:09

## 2021-09-29 RX ADMIN — POTASSIUM CHLORIDE 20 MEQ: 1500 TABLET, EXTENDED RELEASE ORAL at 08:09

## 2021-09-29 RX ADMIN — CLOPIDOGREL 75 MG: 75 TABLET, FILM COATED ORAL at 08:09

## 2021-09-29 RX ADMIN — ACETAMINOPHEN 1000 MG: 500 TABLET ORAL at 06:09

## 2021-09-29 RX ADMIN — SPIRONOLACTONE 25 MG: 25 TABLET, FILM COATED ORAL at 08:09

## 2021-09-29 RX ADMIN — CITALOPRAM HYDROBROMIDE 20 MG: 20 TABLET ORAL at 08:09

## 2021-09-29 RX ADMIN — OXYCODONE HYDROCHLORIDE 10 MG: 10 TABLET ORAL at 08:09

## 2021-09-29 RX ADMIN — ACETAMINOPHEN 1000 MG: 500 TABLET ORAL at 01:09

## 2021-09-29 RX ADMIN — TAMSULOSIN HYDROCHLORIDE 0.4 MG: 0.4 CAPSULE ORAL at 08:09

## 2021-09-29 RX ADMIN — OXYCODONE HYDROCHLORIDE 10 MG: 10 TABLET ORAL at 12:09

## 2021-09-29 RX ADMIN — FERROUS SULFATE TAB EC 325 MG (65 MG FE EQUIVALENT) 1 EACH: 325 (65 FE) TABLET DELAYED RESPONSE at 08:09

## 2021-09-29 RX ADMIN — BACLOFEN 10 MG: 10 TABLET ORAL at 08:09

## 2021-09-29 RX ADMIN — MORPHINE SULFATE 30 MG: 15 TABLET, FILM COATED, EXTENDED RELEASE ORAL at 09:09

## 2021-09-29 RX ADMIN — GABAPENTIN 1200 MG: 400 CAPSULE ORAL at 08:09

## 2021-09-29 RX ADMIN — Medication 500 MG: at 08:09

## 2021-09-29 RX ADMIN — ATORVASTATIN CALCIUM 40 MG: 40 TABLET, FILM COATED ORAL at 08:09

## 2021-09-29 RX ADMIN — TORSEMIDE 20 MG: 20 TABLET ORAL at 08:09

## 2021-09-29 RX ADMIN — PANTOPRAZOLE SODIUM 40 MG: 40 TABLET, DELAYED RELEASE ORAL at 08:09

## 2021-09-29 RX ADMIN — APIXABAN 5 MG: 5 TABLET, FILM COATED ORAL at 08:09

## 2021-09-29 RX ADMIN — ACETAMINOPHEN 1000 MG: 500 TABLET ORAL at 12:09

## 2021-09-29 RX ADMIN — ASPIRIN 81 MG CHEWABLE TABLET 81 MG: 81 TABLET CHEWABLE at 08:09

## 2021-09-30 ENCOUNTER — LAB VISIT (OUTPATIENT)
Dept: LAB | Facility: OTHER | Age: 56
End: 2021-09-30
Payer: MEDICARE

## 2021-09-30 DIAGNOSIS — Z20.822 ENCOUNTER FOR LABORATORY TESTING FOR COVID-19 VIRUS: ICD-10-CM

## 2021-09-30 PROCEDURE — U0003 INFECTIOUS AGENT DETECTION BY NUCLEIC ACID (DNA OR RNA); SEVERE ACUTE RESPIRATORY SYNDROME CORONAVIRUS 2 (SARS-COV-2) (CORONAVIRUS DISEASE [COVID-19]), AMPLIFIED PROBE TECHNIQUE, MAKING USE OF HIGH THROUGHPUT TECHNOLOGIES AS DESCRIBED BY CMS-2020-01-R: HCPCS | Performed by: NURSE PRACTITIONER

## 2021-10-01 LAB
FINAL PATHOLOGIC DIAGNOSIS: NORMAL
GROSS: NORMAL
Lab: NORMAL
SARS-COV-2 RNA RESP QL NAA+PROBE: NOT DETECTED
SARS-COV-2- CYCLE NUMBER: NORMAL

## 2021-10-05 ENCOUNTER — EXTERNAL HOSPITAL ADMISSION (OUTPATIENT)
Dept: SKILLED NURSING FACILITY | Facility: HOSPITAL | Age: 56
End: 2021-10-05
Payer: MEDICARE

## 2021-10-05 ENCOUNTER — PATIENT MESSAGE (OUTPATIENT)
Dept: ADMINISTRATIVE | Facility: HOSPITAL | Age: 56
End: 2021-10-05

## 2021-10-05 DIAGNOSIS — Z89.611 S/P AKA (ABOVE KNEE AMPUTATION), RIGHT: Primary | ICD-10-CM

## 2021-10-05 PROCEDURE — 99307 PR NURSING FAC CARE, SUBSEQ, IMPROVING: ICD-10-PCS | Mod: ,,, | Performed by: INTERNAL MEDICINE

## 2021-10-05 PROCEDURE — 99307 SBSQ NF CARE SF MDM 10: CPT | Mod: ,,, | Performed by: INTERNAL MEDICINE

## 2021-10-07 DIAGNOSIS — Z20.822 ENCOUNTER FOR LABORATORY TESTING FOR COVID-19 VIRUS: ICD-10-CM

## 2021-10-14 ENCOUNTER — LAB VISIT (OUTPATIENT)
Dept: LAB | Facility: OTHER | Age: 56
End: 2021-10-14
Payer: MEDICARE

## 2021-10-14 DIAGNOSIS — Z20.822 ENCOUNTER FOR LABORATORY TESTING FOR COVID-19 VIRUS: ICD-10-CM

## 2021-10-14 PROCEDURE — U0003 INFECTIOUS AGENT DETECTION BY NUCLEIC ACID (DNA OR RNA); SEVERE ACUTE RESPIRATORY SYNDROME CORONAVIRUS 2 (SARS-COV-2) (CORONAVIRUS DISEASE [COVID-19]), AMPLIFIED PROBE TECHNIQUE, MAKING USE OF HIGH THROUGHPUT TECHNOLOGIES AS DESCRIBED BY CMS-2020-01-R: HCPCS | Performed by: NURSE PRACTITIONER

## 2021-10-15 LAB
SARS-COV-2 RNA RESP QL NAA+PROBE: NOT DETECTED
SARS-COV-2- CYCLE NUMBER: NORMAL

## 2021-10-17 ENCOUNTER — EXTERNAL HOSPITAL ADMISSION (OUTPATIENT)
Dept: SKILLED NURSING FACILITY | Facility: HOSPITAL | Age: 56
End: 2021-10-17
Payer: MEDICARE

## 2021-10-17 DIAGNOSIS — Z74.09 MOBILITY IMPAIRED: Primary | ICD-10-CM

## 2021-10-17 PROCEDURE — 99307 PR NURSING FAC CARE, SUBSEQ, IMPROVING: ICD-10-PCS | Mod: ,,, | Performed by: INTERNAL MEDICINE

## 2021-10-17 PROCEDURE — 99307 SBSQ NF CARE SF MDM 10: CPT | Mod: ,,, | Performed by: INTERNAL MEDICINE

## 2021-10-19 ENCOUNTER — EXTERNAL HOSPITAL ADMISSION (OUTPATIENT)
Dept: SKILLED NURSING FACILITY | Facility: HOSPITAL | Age: 56
End: 2021-10-19
Payer: MEDICARE

## 2021-10-19 DIAGNOSIS — Z89.611 S/P AKA (ABOVE KNEE AMPUTATION), RIGHT: Primary | ICD-10-CM

## 2021-10-19 PROCEDURE — 99307 PR NURSING FAC CARE, SUBSEQ, IMPROVING: ICD-10-PCS | Mod: ,,, | Performed by: INTERNAL MEDICINE

## 2021-10-19 PROCEDURE — 99307 SBSQ NF CARE SF MDM 10: CPT | Mod: ,,, | Performed by: INTERNAL MEDICINE

## 2021-10-19 RX ORDER — MORPHINE SULFATE 30 MG/1
30 TABLET, FILM COATED, EXTENDED RELEASE ORAL 2 TIMES DAILY
Qty: 60 TABLET | Refills: 0 | Status: SHIPPED | OUTPATIENT
Start: 2021-10-19 | End: 2021-10-19

## 2021-10-19 RX ORDER — MORPHINE SULFATE 30 MG/1
30 TABLET, FILM COATED, EXTENDED RELEASE ORAL 2 TIMES DAILY
Qty: 60 TABLET | Refills: 0 | Status: SHIPPED | OUTPATIENT
Start: 2021-10-19 | End: 2021-10-28

## 2021-10-21 ENCOUNTER — EXTERNAL HOSPITAL ADMISSION (OUTPATIENT)
Dept: SKILLED NURSING FACILITY | Facility: HOSPITAL | Age: 56
End: 2021-10-21
Payer: MEDICARE

## 2021-10-21 ENCOUNTER — LAB VISIT (OUTPATIENT)
Dept: LAB | Facility: OTHER | Age: 56
End: 2021-10-21
Payer: MEDICARE

## 2021-10-21 DIAGNOSIS — Z20.822 ENCOUNTER FOR LABORATORY TESTING FOR COVID-19 VIRUS: ICD-10-CM

## 2021-10-21 DIAGNOSIS — Z89.611 S/P AKA (ABOVE KNEE AMPUTATION), RIGHT: Primary | ICD-10-CM

## 2021-10-21 PROCEDURE — 99307 SBSQ NF CARE SF MDM 10: CPT | Mod: ,,, | Performed by: INTERNAL MEDICINE

## 2021-10-21 PROCEDURE — 99307 PR NURSING FAC CARE, SUBSEQ, IMPROVING: ICD-10-PCS | Mod: ,,, | Performed by: INTERNAL MEDICINE

## 2021-10-21 PROCEDURE — U0003 INFECTIOUS AGENT DETECTION BY NUCLEIC ACID (DNA OR RNA); SEVERE ACUTE RESPIRATORY SYNDROME CORONAVIRUS 2 (SARS-COV-2) (CORONAVIRUS DISEASE [COVID-19]), AMPLIFIED PROBE TECHNIQUE, MAKING USE OF HIGH THROUGHPUT TECHNOLOGIES AS DESCRIBED BY CMS-2020-01-R: HCPCS | Performed by: NURSE PRACTITIONER

## 2021-10-22 LAB
SARS-COV-2 RNA RESP QL NAA+PROBE: NOT DETECTED
SARS-COV-2- CYCLE NUMBER: NORMAL

## 2021-10-24 ENCOUNTER — EXTERNAL HOSPITAL ADMISSION (OUTPATIENT)
Dept: SKILLED NURSING FACILITY | Facility: HOSPITAL | Age: 56
End: 2021-10-24

## 2021-10-24 DIAGNOSIS — L97.909 CHRONIC SKIN ULCER OF LOWER LEG: Primary | ICD-10-CM

## 2021-10-24 PROCEDURE — 99307 SBSQ NF CARE SF MDM 10: CPT | Mod: ,,, | Performed by: INTERNAL MEDICINE

## 2021-10-24 PROCEDURE — 99307 PR NURSING FAC CARE, SUBSEQ, IMPROVING: ICD-10-PCS | Mod: ,,, | Performed by: INTERNAL MEDICINE

## 2021-10-28 ENCOUNTER — EXTERNAL HOSPITAL ADMISSION (OUTPATIENT)
Dept: SKILLED NURSING FACILITY | Facility: HOSPITAL | Age: 56
End: 2021-10-28

## 2021-10-28 ENCOUNTER — LAB VISIT (OUTPATIENT)
Dept: LAB | Facility: OTHER | Age: 56
End: 2021-10-28
Payer: MEDICARE

## 2021-10-28 DIAGNOSIS — Z20.822 ENCOUNTER FOR LABORATORY TESTING FOR COVID-19 VIRUS: ICD-10-CM

## 2021-10-28 DIAGNOSIS — E11.65 TYPE 2 DIABETES MELLITUS WITH HYPERGLYCEMIA, WITH LONG-TERM CURRENT USE OF INSULIN: Primary | ICD-10-CM

## 2021-10-28 DIAGNOSIS — Z79.4 TYPE 2 DIABETES MELLITUS WITH HYPERGLYCEMIA, WITH LONG-TERM CURRENT USE OF INSULIN: Primary | ICD-10-CM

## 2021-10-28 PROCEDURE — 99307 PR NURSING FAC CARE, SUBSEQ, IMPROVING: ICD-10-PCS | Mod: ,,, | Performed by: INTERNAL MEDICINE

## 2021-10-28 PROCEDURE — 99307 SBSQ NF CARE SF MDM 10: CPT | Mod: ,,, | Performed by: INTERNAL MEDICINE

## 2021-10-28 PROCEDURE — U0003 INFECTIOUS AGENT DETECTION BY NUCLEIC ACID (DNA OR RNA); SEVERE ACUTE RESPIRATORY SYNDROME CORONAVIRUS 2 (SARS-COV-2) (CORONAVIRUS DISEASE [COVID-19]), AMPLIFIED PROBE TECHNIQUE, MAKING USE OF HIGH THROUGHPUT TECHNOLOGIES AS DESCRIBED BY CMS-2020-01-R: HCPCS | Performed by: NURSE PRACTITIONER

## 2021-10-28 RX ORDER — MORPHINE SULFATE 30 MG/1
60 TABLET, FILM COATED, EXTENDED RELEASE ORAL 2 TIMES DAILY
Qty: 60 TABLET | Refills: 0 | Status: SHIPPED | OUTPATIENT
Start: 2021-10-28 | End: 2021-11-01

## 2021-10-29 LAB
SARS-COV-2 RNA RESP QL NAA+PROBE: NOT DETECTED
SARS-COV-2- CYCLE NUMBER: NORMAL

## 2021-11-01 RX ORDER — MORPHINE SULFATE 30 MG/1
30 TABLET, FILM COATED, EXTENDED RELEASE ORAL 2 TIMES DAILY
Qty: 60 TABLET | Refills: 0 | Status: SHIPPED | OUTPATIENT
Start: 2021-11-01 | End: 2022-02-07

## 2021-11-02 ENCOUNTER — EXTERNAL HOSPITAL ADMISSION (OUTPATIENT)
Dept: SKILLED NURSING FACILITY | Facility: HOSPITAL | Age: 56
End: 2021-11-02

## 2021-11-02 DIAGNOSIS — E11.65 TYPE 2 DIABETES MELLITUS WITH HYPERGLYCEMIA, WITH LONG-TERM CURRENT USE OF INSULIN: Primary | ICD-10-CM

## 2021-11-02 DIAGNOSIS — Z79.4 TYPE 2 DIABETES MELLITUS WITH HYPERGLYCEMIA, WITH LONG-TERM CURRENT USE OF INSULIN: Primary | ICD-10-CM

## 2021-11-02 PROCEDURE — 99315 NF DSCHRG MGMT 30 MIN/LESS: CPT | Mod: ,,, | Performed by: INTERNAL MEDICINE

## 2021-11-02 PROCEDURE — 99315 PR NURSING FAC DISCHRGE DAY,1-30 MIN: ICD-10-PCS | Mod: ,,, | Performed by: INTERNAL MEDICINE

## 2021-11-03 ENCOUNTER — PATIENT OUTREACH (OUTPATIENT)
Dept: ADMINISTRATIVE | Facility: CLINIC | Age: 56
End: 2021-11-03
Payer: MEDICARE

## 2021-11-03 DIAGNOSIS — M86.9 OSTEOMYELITIS OF LEFT FOOT, UNSPECIFIED TYPE: Primary | ICD-10-CM

## 2021-11-04 ENCOUNTER — TELEPHONE (OUTPATIENT)
Dept: UROLOGY | Facility: CLINIC | Age: 56
End: 2021-11-04
Payer: MEDICARE

## 2021-11-05 ENCOUNTER — TELEPHONE (OUTPATIENT)
Dept: PRIMARY CARE CLINIC | Facility: CLINIC | Age: 56
End: 2021-11-05
Payer: MEDICARE

## 2021-11-09 ENCOUNTER — PES CALL (OUTPATIENT)
Dept: HOME HEALTH SERVICES | Facility: CLINIC | Age: 56
End: 2021-11-09
Payer: MEDICARE

## 2021-11-10 ENCOUNTER — PES CALL (OUTPATIENT)
Dept: HOME HEALTH SERVICES | Facility: CLINIC | Age: 56
End: 2021-11-10
Payer: MEDICARE

## 2021-11-15 ENCOUNTER — DOCUMENT SCAN (OUTPATIENT)
Dept: HOME HEALTH SERVICES | Facility: HOSPITAL | Age: 56
End: 2021-11-15
Payer: MEDICARE

## 2021-11-15 ENCOUNTER — OFFICE VISIT (OUTPATIENT)
Dept: FAMILY MEDICINE | Facility: CLINIC | Age: 56
End: 2021-11-15
Payer: MEDICARE

## 2021-11-15 ENCOUNTER — TELEPHONE (OUTPATIENT)
Dept: PRIMARY CARE CLINIC | Facility: CLINIC | Age: 56
End: 2021-11-15
Payer: MEDICARE

## 2021-11-15 ENCOUNTER — OFFICE VISIT (OUTPATIENT)
Dept: VASCULAR SURGERY | Facility: CLINIC | Age: 56
End: 2021-11-15
Payer: MEDICARE

## 2021-11-15 ENCOUNTER — TELEPHONE (OUTPATIENT)
Dept: VASCULAR SURGERY | Facility: CLINIC | Age: 56
End: 2021-11-15
Payer: MEDICARE

## 2021-11-15 VITALS — HEART RATE: 79 BPM | SYSTOLIC BLOOD PRESSURE: 143 MMHG | DIASTOLIC BLOOD PRESSURE: 65 MMHG | TEMPERATURE: 99 F

## 2021-11-15 VITALS
HEART RATE: 87 BPM | SYSTOLIC BLOOD PRESSURE: 128 MMHG | TEMPERATURE: 98 F | OXYGEN SATURATION: 96 % | BODY MASS INDEX: 23.52 KG/M2 | DIASTOLIC BLOOD PRESSURE: 70 MMHG | WEIGHT: 137 LBS

## 2021-11-15 DIAGNOSIS — Z53.20 PROCEDURE NOT CARRIED OUT BECAUSE OF PATIENT'S DECISION: Primary | ICD-10-CM

## 2021-11-15 DIAGNOSIS — Z89.611 S/P AKA (ABOVE KNEE AMPUTATION), RIGHT: Primary | ICD-10-CM

## 2021-11-15 PROCEDURE — 99024 PR POST-OP FOLLOW-UP VISIT: ICD-10-PCS | Mod: POP,,, | Performed by: SURGERY

## 2021-11-15 PROCEDURE — 99499 UNLISTED E&M SERVICE: CPT | Mod: S$PBB,,, | Performed by: INTERNAL MEDICINE

## 2021-11-15 PROCEDURE — 99999 PR PBB SHADOW E&M-EST. PATIENT-LVL IV: CPT | Mod: PBBFAC,,, | Performed by: INTERNAL MEDICINE

## 2021-11-15 PROCEDURE — 99999 PR PBB SHADOW E&M-EST. PATIENT-LVL IV: ICD-10-PCS | Mod: PBBFAC,,, | Performed by: INTERNAL MEDICINE

## 2021-11-15 PROCEDURE — 99024 POSTOP FOLLOW-UP VISIT: CPT | Mod: POP,,, | Performed by: SURGERY

## 2021-11-15 PROCEDURE — 99214 OFFICE O/P EST MOD 30 MIN: CPT | Mod: PBBFAC,PN,25 | Performed by: INTERNAL MEDICINE

## 2021-11-15 PROCEDURE — 99214 OFFICE O/P EST MOD 30 MIN: CPT | Mod: PBBFAC,27 | Performed by: SURGERY

## 2021-11-15 PROCEDURE — 99999 PR PBB SHADOW E&M-EST. PATIENT-LVL IV: CPT | Mod: PBBFAC,,, | Performed by: SURGERY

## 2021-11-15 PROCEDURE — 99499 NO LOS: ICD-10-PCS | Mod: S$PBB,,, | Performed by: INTERNAL MEDICINE

## 2021-11-15 PROCEDURE — 99999 PR PBB SHADOW E&M-EST. PATIENT-LVL IV: ICD-10-PCS | Mod: PBBFAC,,, | Performed by: SURGERY

## 2021-11-15 RX ORDER — ALPRAZOLAM 0.5 MG/1
0.5 TABLET ORAL 3 TIMES DAILY
COMMUNITY
Start: 2021-11-02 | End: 2022-02-15

## 2021-11-17 ENCOUNTER — PATIENT MESSAGE (OUTPATIENT)
Dept: ENDOCRINOLOGY | Facility: CLINIC | Age: 56
End: 2021-11-17
Payer: MEDICARE

## 2021-11-17 ENCOUNTER — TELEPHONE (OUTPATIENT)
Dept: ENDOCRINOLOGY | Facility: CLINIC | Age: 56
End: 2021-11-17
Payer: MEDICARE

## 2021-11-17 DIAGNOSIS — Z79.4 TYPE 2 DIABETES MELLITUS WITH HYPERGLYCEMIA, WITH LONG-TERM CURRENT USE OF INSULIN: Primary | ICD-10-CM

## 2021-11-17 DIAGNOSIS — E11.65 TYPE 2 DIABETES MELLITUS WITH HYPERGLYCEMIA, WITH LONG-TERM CURRENT USE OF INSULIN: Primary | ICD-10-CM

## 2021-11-17 RX ORDER — INSULIN ASPART 100 [IU]/ML
3 INJECTION, SOLUTION INTRAVENOUS; SUBCUTANEOUS
Qty: 6 ML | Refills: 11 | Status: SHIPPED | OUTPATIENT
Start: 2021-11-17 | End: 2021-11-18 | Stop reason: SDUPTHER

## 2021-11-18 ENCOUNTER — OFFICE VISIT (OUTPATIENT)
Dept: HOME HEALTH SERVICES | Facility: CLINIC | Age: 56
End: 2021-11-18
Payer: MEDICARE

## 2021-11-18 VITALS
BODY MASS INDEX: 23.52 KG/M2 | WEIGHT: 137 LBS | HEART RATE: 77 BPM | RESPIRATION RATE: 18 BRPM | DIASTOLIC BLOOD PRESSURE: 68 MMHG | SYSTOLIC BLOOD PRESSURE: 118 MMHG | OXYGEN SATURATION: 97 %

## 2021-11-18 DIAGNOSIS — M86.9 OSTEOMYELITIS OF LEFT FOOT, UNSPECIFIED TYPE: ICD-10-CM

## 2021-11-18 DIAGNOSIS — Z79.4 TYPE 2 DIABETES MELLITUS WITH HYPERGLYCEMIA, WITH LONG-TERM CURRENT USE OF INSULIN: ICD-10-CM

## 2021-11-18 DIAGNOSIS — E11.65 TYPE 2 DIABETES MELLITUS WITH HYPERGLYCEMIA, WITH LONG-TERM CURRENT USE OF INSULIN: ICD-10-CM

## 2021-11-18 DIAGNOSIS — Z89.612 S/P AKA (ABOVE KNEE AMPUTATION) BILATERAL: Primary | ICD-10-CM

## 2021-11-18 DIAGNOSIS — I50.32 CHRONIC DIASTOLIC CHF (CONGESTIVE HEART FAILURE): ICD-10-CM

## 2021-11-18 DIAGNOSIS — E11.59 TYPE 2 DIABETES MELLITUS WITH OTHER CIRCULATORY COMPLICATION, WITH LONG-TERM CURRENT USE OF INSULIN: ICD-10-CM

## 2021-11-18 DIAGNOSIS — Z79.4 TYPE 2 DIABETES MELLITUS WITH OTHER CIRCULATORY COMPLICATION, WITH LONG-TERM CURRENT USE OF INSULIN: ICD-10-CM

## 2021-11-18 DIAGNOSIS — Z89.611 S/P AKA (ABOVE KNEE AMPUTATION) BILATERAL: Primary | ICD-10-CM

## 2021-11-18 PROCEDURE — 99350 PR HOME VISIT,ESTAB PATIENT,LEVEL IV: ICD-10-PCS | Mod: S$GLB,,, | Performed by: NURSE PRACTITIONER

## 2021-11-18 PROCEDURE — 99350 HOME/RES VST EST HIGH MDM 60: CPT | Mod: S$GLB,,, | Performed by: NURSE PRACTITIONER

## 2021-11-18 RX ORDER — INSULIN GLARGINE 100 [IU]/ML
10 INJECTION, SOLUTION SUBCUTANEOUS 2 TIMES DAILY
Qty: 6 ML | Refills: 2 | Status: SHIPPED | OUTPATIENT
Start: 2021-11-18 | End: 2022-10-11 | Stop reason: SDUPTHER

## 2021-11-18 RX ORDER — BACLOFEN 10 MG/1
20 TABLET ORAL 4 TIMES DAILY PRN
COMMUNITY
Start: 2021-02-17 | End: 2023-01-25

## 2021-11-18 RX ORDER — DULOXETIN HYDROCHLORIDE 30 MG/1
60 CAPSULE, DELAYED RELEASE ORAL DAILY
COMMUNITY
Start: 2021-02-05 | End: 2022-07-18

## 2021-11-18 RX ORDER — INSULIN ASPART 100 [IU]/ML
3 INJECTION, SOLUTION INTRAVENOUS; SUBCUTANEOUS
Qty: 2.7 ML | Refills: 11 | Status: SHIPPED | OUTPATIENT
Start: 2021-11-18 | End: 2022-08-03 | Stop reason: SDUPTHER

## 2021-11-22 ENCOUNTER — TELEPHONE (OUTPATIENT)
Dept: ENDOCRINOLOGY | Facility: CLINIC | Age: 56
End: 2021-11-22
Payer: MEDICARE

## 2021-11-24 ENCOUNTER — DOCUMENT SCAN (OUTPATIENT)
Dept: HOME HEALTH SERVICES | Facility: HOSPITAL | Age: 56
End: 2021-11-24
Payer: MEDICARE

## 2021-11-24 ENCOUNTER — DOCUMENT SCAN (OUTPATIENT)
Dept: HOME HEALTH SERVICES | Facility: HOSPITAL | Age: 56
End: 2021-11-24

## 2021-11-29 DIAGNOSIS — E78.2 MIXED HYPERLIPIDEMIA: ICD-10-CM

## 2021-11-29 RX ORDER — SPIRONOLACTONE 25 MG/1
25 TABLET ORAL DAILY
Qty: 90 TABLET | Refills: 3 | Status: SHIPPED | OUTPATIENT
Start: 2021-11-29 | End: 2021-12-02 | Stop reason: SDUPTHER

## 2021-11-29 RX ORDER — ATORVASTATIN CALCIUM 40 MG/1
40 TABLET, FILM COATED ORAL DAILY
Qty: 90 TABLET | Refills: 0 | Status: SHIPPED | OUTPATIENT
Start: 2021-11-29 | End: 2021-12-02 | Stop reason: SDUPTHER

## 2021-11-29 RX ORDER — FENOFIBRATE 160 MG/1
160 TABLET ORAL DAILY
Qty: 90 TABLET | Refills: 0 | Status: SHIPPED | OUTPATIENT
Start: 2021-11-29 | End: 2021-12-02 | Stop reason: SDUPTHER

## 2021-11-29 RX ORDER — CARVEDILOL 3.12 MG/1
3.12 TABLET ORAL 2 TIMES DAILY
Qty: 180 TABLET | Refills: 0 | Status: SHIPPED | OUTPATIENT
Start: 2021-11-29 | End: 2021-12-02 | Stop reason: SDUPTHER

## 2021-12-01 DIAGNOSIS — E78.2 MIXED HYPERLIPIDEMIA: ICD-10-CM

## 2021-12-01 RX ORDER — CARVEDILOL 3.12 MG/1
TABLET ORAL
Qty: 180 TABLET | Refills: 0 | OUTPATIENT
Start: 2021-12-01

## 2021-12-01 RX ORDER — FENOFIBRATE 160 MG/1
TABLET ORAL
Qty: 90 TABLET | Refills: 0 | OUTPATIENT
Start: 2021-12-01

## 2021-12-01 RX ORDER — ATORVASTATIN CALCIUM 40 MG/1
TABLET, FILM COATED ORAL
Qty: 90 TABLET | Refills: 0 | OUTPATIENT
Start: 2021-12-01

## 2021-12-02 ENCOUNTER — OFFICE VISIT (OUTPATIENT)
Dept: FAMILY MEDICINE | Facility: CLINIC | Age: 56
End: 2021-12-02
Payer: MEDICARE

## 2021-12-02 ENCOUNTER — TELEPHONE (OUTPATIENT)
Dept: OPTOMETRY | Facility: CLINIC | Age: 56
End: 2021-12-02
Payer: MEDICARE

## 2021-12-02 VITALS
HEART RATE: 72 BPM | OXYGEN SATURATION: 98 % | SYSTOLIC BLOOD PRESSURE: 130 MMHG | TEMPERATURE: 98 F | DIASTOLIC BLOOD PRESSURE: 72 MMHG

## 2021-12-02 DIAGNOSIS — Z89.611 S/P AKA (ABOVE KNEE AMPUTATION) BILATERAL: ICD-10-CM

## 2021-12-02 DIAGNOSIS — I10 ESSENTIAL HYPERTENSION: ICD-10-CM

## 2021-12-02 DIAGNOSIS — I25.10 CORONARY ARTERY DISEASE INVOLVING NATIVE CORONARY ARTERY WITHOUT ANGINA PECTORIS, UNSPECIFIED WHETHER NATIVE OR TRANSPLANTED HEART: ICD-10-CM

## 2021-12-02 DIAGNOSIS — E11.65 TYPE 2 DIABETES MELLITUS WITH HYPERGLYCEMIA, WITH LONG-TERM CURRENT USE OF INSULIN: Primary | ICD-10-CM

## 2021-12-02 DIAGNOSIS — Z86.73 HISTORY OF STROKE: ICD-10-CM

## 2021-12-02 DIAGNOSIS — Z89.612 S/P AKA (ABOVE KNEE AMPUTATION) BILATERAL: ICD-10-CM

## 2021-12-02 DIAGNOSIS — F33.0 MILD RECURRENT MAJOR DEPRESSION: ICD-10-CM

## 2021-12-02 DIAGNOSIS — Z79.4 TYPE 2 DIABETES MELLITUS WITH HYPERGLYCEMIA, WITH LONG-TERM CURRENT USE OF INSULIN: Primary | ICD-10-CM

## 2021-12-02 DIAGNOSIS — I50.32 CHRONIC DIASTOLIC CHF (CONGESTIVE HEART FAILURE): ICD-10-CM

## 2021-12-02 DIAGNOSIS — I73.9 PVD (PERIPHERAL VASCULAR DISEASE): ICD-10-CM

## 2021-12-02 DIAGNOSIS — E78.2 MIXED HYPERLIPIDEMIA: ICD-10-CM

## 2021-12-02 PROCEDURE — 99214 OFFICE O/P EST MOD 30 MIN: CPT | Mod: S$PBB,,, | Performed by: INTERNAL MEDICINE

## 2021-12-02 PROCEDURE — 99213 OFFICE O/P EST LOW 20 MIN: CPT | Mod: PBBFAC,PN | Performed by: INTERNAL MEDICINE

## 2021-12-02 PROCEDURE — 99999 PR PBB SHADOW E&M-EST. PATIENT-LVL III: ICD-10-PCS | Mod: PBBFAC,,, | Performed by: INTERNAL MEDICINE

## 2021-12-02 PROCEDURE — 99999 PR PBB SHADOW E&M-EST. PATIENT-LVL III: CPT | Mod: PBBFAC,,, | Performed by: INTERNAL MEDICINE

## 2021-12-02 PROCEDURE — 99214 PR OFFICE/OUTPT VISIT, EST, LEVL IV, 30-39 MIN: ICD-10-PCS | Mod: S$PBB,,, | Performed by: INTERNAL MEDICINE

## 2021-12-02 RX ORDER — CARVEDILOL 3.12 MG/1
3.12 TABLET ORAL 2 TIMES DAILY
Qty: 180 TABLET | Refills: 0 | Status: SHIPPED | OUTPATIENT
Start: 2021-12-02 | End: 2023-01-25

## 2021-12-02 RX ORDER — FENOFIBRATE 160 MG/1
160 TABLET ORAL DAILY
Qty: 90 TABLET | Refills: 0 | Status: SHIPPED | OUTPATIENT
Start: 2021-12-02 | End: 2022-02-15

## 2021-12-02 RX ORDER — CLOPIDOGREL BISULFATE 75 MG/1
75 TABLET ORAL DAILY
Qty: 90 TABLET | Refills: 0 | Status: ON HOLD | OUTPATIENT
Start: 2021-12-02 | End: 2022-02-14 | Stop reason: HOSPADM

## 2021-12-02 RX ORDER — ATORVASTATIN CALCIUM 40 MG/1
40 TABLET, FILM COATED ORAL DAILY
Qty: 90 TABLET | Refills: 0 | Status: SHIPPED | OUTPATIENT
Start: 2021-12-02 | End: 2022-03-09 | Stop reason: ALTCHOICE

## 2021-12-02 RX ORDER — SPIRONOLACTONE 25 MG/1
25 TABLET ORAL DAILY
Qty: 90 TABLET | Refills: 0 | Status: SHIPPED | OUTPATIENT
Start: 2021-12-02 | End: 2023-01-25

## 2021-12-10 ENCOUNTER — TELEPHONE (OUTPATIENT)
Dept: FAMILY MEDICINE | Facility: CLINIC | Age: 56
End: 2021-12-10
Payer: MEDICARE

## 2021-12-13 ENCOUNTER — TELEPHONE (OUTPATIENT)
Dept: INTERNAL MEDICINE | Facility: CLINIC | Age: 56
End: 2021-12-13
Payer: MEDICARE

## 2021-12-22 ENCOUNTER — TELEPHONE (OUTPATIENT)
Dept: OPTOMETRY | Facility: CLINIC | Age: 56
End: 2021-12-22

## 2021-12-22 ENCOUNTER — OFFICE VISIT (OUTPATIENT)
Dept: OPTOMETRY | Facility: CLINIC | Age: 56
End: 2021-12-22
Payer: MEDICARE

## 2021-12-22 DIAGNOSIS — H52.13 MYOPIA WITH ASTIGMATISM AND PRESBYOPIA, BILATERAL: ICD-10-CM

## 2021-12-22 DIAGNOSIS — E11.9 TYPE 2 DIABETES MELLITUS WITHOUT RETINOPATHY: Primary | ICD-10-CM

## 2021-12-22 DIAGNOSIS — E11.65 TYPE 2 DIABETES MELLITUS WITH HYPERGLYCEMIA, WITH LONG-TERM CURRENT USE OF INSULIN: ICD-10-CM

## 2021-12-22 DIAGNOSIS — H53.15 VISUAL DISTORTIONS OF SHAPE AND SIZE: ICD-10-CM

## 2021-12-22 DIAGNOSIS — H52.203 MYOPIA WITH ASTIGMATISM AND PRESBYOPIA, BILATERAL: ICD-10-CM

## 2021-12-22 DIAGNOSIS — H40.013 OAG (OPEN ANGLE GLAUCOMA) SUSPECT, LOW RISK, BILATERAL: ICD-10-CM

## 2021-12-22 DIAGNOSIS — Z79.4 TYPE 2 DIABETES MELLITUS WITH HYPERGLYCEMIA, WITH LONG-TERM CURRENT USE OF INSULIN: ICD-10-CM

## 2021-12-22 DIAGNOSIS — H04.123 DRY EYE SYNDROME OF BOTH EYES: ICD-10-CM

## 2021-12-22 DIAGNOSIS — H52.4 MYOPIA WITH ASTIGMATISM AND PRESBYOPIA, BILATERAL: ICD-10-CM

## 2021-12-22 DIAGNOSIS — H25.813 COMBINED FORM OF AGE-RELATED CATARACT, BOTH EYES: ICD-10-CM

## 2021-12-22 PROCEDURE — 92015 PR REFRACTION: ICD-10-PCS | Mod: ,,, | Performed by: OPTOMETRIST

## 2021-12-22 PROCEDURE — 92004 COMPRE OPH EXAM NEW PT 1/>: CPT | Mod: S$PBB,,, | Performed by: OPTOMETRIST

## 2021-12-22 PROCEDURE — 99212 OFFICE O/P EST SF 10 MIN: CPT | Mod: PBBFAC,PN | Performed by: OPTOMETRIST

## 2021-12-22 PROCEDURE — 99999 PR PBB SHADOW E&M-EST. PATIENT-LVL II: CPT | Mod: PBBFAC,,, | Performed by: OPTOMETRIST

## 2021-12-22 PROCEDURE — 92004 PR EYE EXAM, NEW PATIENT,COMPREHESV: ICD-10-PCS | Mod: S$PBB,,, | Performed by: OPTOMETRIST

## 2021-12-22 PROCEDURE — 99999 PR PBB SHADOW E&M-EST. PATIENT-LVL II: ICD-10-PCS | Mod: PBBFAC,,, | Performed by: OPTOMETRIST

## 2021-12-22 PROCEDURE — 92015 DETERMINE REFRACTIVE STATE: CPT | Mod: ,,, | Performed by: OPTOMETRIST

## 2022-01-02 PROCEDURE — G0179 MD RECERTIFICATION HHA PT: HCPCS | Mod: ,,, | Performed by: INTERNAL MEDICINE

## 2022-01-02 PROCEDURE — G0179 PR HOME HEALTH MD RECERTIFICATION: ICD-10-PCS | Mod: ,,, | Performed by: INTERNAL MEDICINE

## 2022-01-10 ENCOUNTER — PATIENT MESSAGE (OUTPATIENT)
Dept: ADMINISTRATIVE | Facility: HOSPITAL | Age: 57
End: 2022-01-10
Payer: MEDICARE

## 2022-01-31 ENCOUNTER — PATIENT OUTREACH (OUTPATIENT)
Dept: ADMINISTRATIVE | Facility: OTHER | Age: 57
End: 2022-01-31
Payer: MEDICARE

## 2022-01-31 NOTE — PROGRESS NOTES
Health Maintenance Due   Topic Date Due    COVID-19 Vaccine (1) Never done    Shingles Vaccine (1 of 2) Never done    Influenza Vaccine (1) 09/01/2021    Hemoglobin A1c  10/09/2021    Lipid Panel  10/12/2021     Updates were requested from care everywhere.  Chart was reviewed for overdue Proactive [facility] ([facility]UAN [facility]) topics (CRS, Breast Cancer Screening, Eye exam)  Health Maintenance has been updated.  [place] immunization registry triggered.  Immunizations were reconciled.

## 2022-02-01 ENCOUNTER — OFFICE VISIT (OUTPATIENT)
Dept: UROLOGY | Facility: CLINIC | Age: 57
End: 2022-02-01
Payer: MEDICARE

## 2022-02-01 VITALS
HEART RATE: 81 BPM | OXYGEN SATURATION: 95 % | DIASTOLIC BLOOD PRESSURE: 80 MMHG | SYSTOLIC BLOOD PRESSURE: 154 MMHG | TEMPERATURE: 98 F | HEIGHT: 64 IN | BODY MASS INDEX: 23.52 KG/M2

## 2022-02-01 DIAGNOSIS — N40.1 BENIGN PROSTATIC HYPERPLASIA WITH NOCTURIA: ICD-10-CM

## 2022-02-01 DIAGNOSIS — Z01.818 PRE-OP TESTING: ICD-10-CM

## 2022-02-01 DIAGNOSIS — R39.81 URINARY INCONTINENCE DUE TO SEVERE PHYSICAL DISABILITY: Primary | ICD-10-CM

## 2022-02-01 DIAGNOSIS — R33.9 URINARY RETENTION: ICD-10-CM

## 2022-02-01 DIAGNOSIS — R35.1 NOCTURIA: ICD-10-CM

## 2022-02-01 DIAGNOSIS — R35.0 URINARY FREQUENCY: ICD-10-CM

## 2022-02-01 DIAGNOSIS — R35.1 BENIGN PROSTATIC HYPERPLASIA WITH NOCTURIA: ICD-10-CM

## 2022-02-01 PROCEDURE — 99999 PR PBB SHADOW E&M-EST. PATIENT-LVL V: CPT | Mod: PBBFAC,,, | Performed by: UROLOGY

## 2022-02-01 PROCEDURE — 99215 OFFICE O/P EST HI 40 MIN: CPT | Mod: PBBFAC,PO | Performed by: UROLOGY

## 2022-02-01 PROCEDURE — 99215 OFFICE O/P EST HI 40 MIN: CPT | Mod: S$PBB,CS,, | Performed by: UROLOGY

## 2022-02-01 PROCEDURE — 99215 PR OFFICE/OUTPT VISIT, EST, LEVL V, 40-54 MIN: ICD-10-PCS | Mod: S$PBB,CS,, | Performed by: UROLOGY

## 2022-02-01 PROCEDURE — 99999 PR PBB SHADOW E&M-EST. PATIENT-LVL V: ICD-10-PCS | Mod: PBBFAC,,, | Performed by: UROLOGY

## 2022-02-01 RX ORDER — GABAPENTIN 300 MG/1
CAPSULE ORAL
COMMUNITY
Start: 2021-05-04 | End: 2022-02-15

## 2022-02-01 RX ORDER — COLLAGENASE SANTYL 250 [ARB'U]/G
OINTMENT TOPICAL
COMMUNITY
Start: 2021-05-04 | End: 2023-01-25

## 2022-02-01 RX ORDER — OXYCODONE HYDROCHLORIDE 15 MG/1
10 TABLET ORAL DAILY
COMMUNITY
Start: 2021-05-04 | End: 2023-01-25

## 2022-02-01 RX ORDER — ALBUTEROL SULFATE 0.83 MG/ML
SOLUTION RESPIRATORY (INHALATION)
COMMUNITY
Start: 2021-03-05 | End: 2023-01-25

## 2022-02-01 RX ORDER — INSULIN ASPART 100 [IU]/ML
INJECTION, SOLUTION INTRAVENOUS; SUBCUTANEOUS
COMMUNITY
Start: 2021-03-30 | End: 2022-08-03

## 2022-02-01 RX ORDER — INSULIN GLARGINE 100 [IU]/ML
INJECTION, SOLUTION SUBCUTANEOUS
Status: ON HOLD | COMMUNITY
Start: 2021-05-04 | End: 2022-02-14 | Stop reason: CLARIF

## 2022-02-01 RX ORDER — TORSEMIDE 20 MG/1
TABLET ORAL
Status: ON HOLD | COMMUNITY
Start: 2021-03-15 | End: 2022-02-14 | Stop reason: CLARIF

## 2022-02-01 RX ORDER — LIDOCAINE HYDROCHLORIDE 20 MG/ML
JELLY TOPICAL ONCE
Status: CANCELLED | OUTPATIENT
Start: 2022-02-01 | End: 2022-02-01

## 2022-02-01 RX ORDER — CIPROFLOXACIN 2 MG/ML
400 INJECTION, SOLUTION INTRAVENOUS
Status: CANCELLED | OUTPATIENT
Start: 2022-02-01

## 2022-02-01 RX ORDER — TIZANIDINE HYDROCHLORIDE 2 MG/1
CAPSULE, GELATIN COATED ORAL
COMMUNITY
Start: 2021-05-04 | End: 2022-02-15

## 2022-02-01 RX ORDER — SODIUM CHLORIDE 9 MG/ML
INJECTION, SOLUTION INTRAVENOUS CONTINUOUS
Status: CANCELLED | OUTPATIENT
Start: 2022-02-01

## 2022-02-01 NOTE — PROGRESS NOTES
Subjective:       Patient ID: Kuldeep Alamo is a 56 y.o. male.    Chief Complaint: Urinary Frequency (Condom cath )    55 yo WM with a history of difficult to control diabetes and poor circulation.  The patient has bilateral above knee amputations.  He has poor mobility with urinary frequency and urgency.  At nighttime he is unable to get to the urinal or position in order to empty his bladder in ends up urinating on himself.  He had been trying to use condom cath for low appeared to time but is been too tight and irritating the penis and falling off.  Patient requests treatment.  It was suggested is that of an indwelling penile catheter for the patient to use a suprapubic catheter which could be changed every 6 weeks and which he could use a plug during the day and a drainage bag at night.  The patient seems amenable to this and has no lower abdominal scars.    Urinary Frequency   This is a chronic problem. The current episode started more than 1 year ago. The problem occurs every urination. The problem has been gradually worsening. The pain is at a severity of 0/10. The patient is experiencing no pain. There has been no fever. He is not sexually active. There is no history of pyelonephritis. Associated symptoms include frequency and urgency. Pertinent negatives include no behavior changes, chills, discharge, flank pain, hematuria, hesitancy, nausea, possible pregnancy, sweats, vomiting, weight loss, bubble bath use, constipation, rash or withholding. He has tried nothing for the symptoms. The treatment provided significant relief. His past medical history is significant for catheterization, diabetes insipidus and hypertension. There is no history of diabetes mellitus, genitourinary reflux, kidney stones, recurrent UTIs, a single kidney, STD, urinary stasis or a urological procedure.     Review of Systems   Constitutional: Negative for chills and weight loss.   Gastrointestinal: Negative for constipation, nausea  and vomiting.   Genitourinary: Positive for frequency and urgency. Negative for flank pain, hematuria and hesitancy.   Skin: Negative for rash.       Objective:      Physical Exam  Vitals and nursing note reviewed.   Constitutional:       General: He is not in acute distress.     Appearance: Normal appearance. He is well-developed and well-nourished. He is not ill-appearing, toxic-appearing or diaphoretic.   HENT:      Head: Normocephalic.      Right Ear: External ear normal.      Left Ear: External ear normal.      Nose: Nose normal.      Mouth/Throat:      Mouth: Oropharynx is clear and moist. Mucous membranes are moist.      Pharynx: Oropharynx is clear. No oropharyngeal exudate or posterior oropharyngeal erythema.   Eyes:      Extraocular Movements: Extraocular movements intact and EOM normal.      Conjunctiva/sclera: Conjunctivae normal.      Pupils: Pupils are equal, round, and reactive to light.   Cardiovascular:      Rate and Rhythm: Normal rate and regular rhythm.      Pulses: Intact distal pulses.      Heart sounds: Normal heart sounds.   Pulmonary:      Effort: Pulmonary effort is normal.      Breath sounds: Normal breath sounds.   Abdominal:      General: Bowel sounds are normal.      Palpations: Abdomen is soft.      Tenderness: There is right CVA tenderness.   Genitourinary:     Penis: Normal.       Prostate: Normal.   Musculoskeletal:         General: Normal range of motion.      Cervical back: Normal range of motion and neck supple.   Skin:     General: Skin is warm and dry.      Capillary Refill: Capillary refill takes less than 2 seconds.   Neurological:      Mental Status: He is alert and oriented to person, place, and time.      Deep Tendon Reflexes: Reflexes are normal and symmetric.   Psychiatric:         Mood and Affect: Mood and affect and mood normal.         Behavior: Behavior normal.         Thought Content: Thought content normal.         Judgment: Judgment normal.         Assessment:        1. Urinary incontinence due to severe physical disability    2. Urinary retention    3. Urinary frequency    4. Nocturia    5. Benign prostatic hyperplasia with nocturia        Plan:       Patient Instructions   Plan cystourethroscopy with suprapubic tube placement via open cystostomy tube on 02/14/2022 at [facility]

## 2022-02-01 NOTE — LETTER
February 1, 2022        Gerald Bolden MD  [id number] [place] [address]  [place] [id number]             [facility]  [address] [place] [address]  [place] [address]  Phone: [phone]  Fax: [phone]   Patient: Kuldeep Alamo   MR Number: [id number]   YOB: 1965   Date of Visit: 2/1/2022       Dear Dr. Bolden:    Thank you for referring Kuldeep Alamo to me for evaluation. Below are the relevant portions of my assessment and plan of care.    1. Urinary incontinence due to severe physical disability    2. Urinary retention    3. Urinary frequency    4. Nocturia    5. Benign prostatic hyperplasia with nocturia      Patient Instructions   Plan cystourethroscopy with suprapubic tube placement via open cystostomy tube on 02/14/2022 at [facility]           If you have questions, please do not hesitate to call me. I look forward to following Kuldeep along with you.    Sincerely,      Thompson Silva MD           CC  No Recipients

## 2022-02-01 NOTE — PATIENT INSTRUCTIONS
Plan cystourethroscopy with suprapubic tube placement via open cystostomy tube on 02/14/2022 at [facility]   97.8

## 2022-02-04 DIAGNOSIS — R35.1 BENIGN PROSTATIC HYPERPLASIA WITH NOCTURIA: ICD-10-CM

## 2022-02-04 DIAGNOSIS — R39.81 URINARY INCONTINENCE DUE TO SEVERE PHYSICAL DISABILITY: Primary | ICD-10-CM

## 2022-02-04 DIAGNOSIS — Z01.818 PREOP TESTING: ICD-10-CM

## 2022-02-04 DIAGNOSIS — N40.1 BENIGN PROSTATIC HYPERPLASIA WITH NOCTURIA: ICD-10-CM

## 2022-02-07 ENCOUNTER — PATIENT OUTREACH (OUTPATIENT)
Dept: HOME HEALTH SERVICES | Facility: HOSPITAL | Age: 57
End: 2022-02-07
Payer: MEDICARE

## 2022-02-07 ENCOUNTER — EXTERNAL HOME HEALTH (OUTPATIENT)
Dept: HOME HEALTH SERVICES | Facility: HOSPITAL | Age: 57
End: 2022-02-07
Payer: MEDICARE

## 2022-02-07 ENCOUNTER — TELEPHONE (OUTPATIENT)
Dept: UROLOGY | Facility: CLINIC | Age: 57
End: 2022-02-07
Payer: MEDICARE

## 2022-02-07 NOTE — TELEPHONE ENCOUNTER
Informed pt of provider plan. Informed pt I would first consult prescribing providers to make sure this is okay and then I will contact him to confirm.

## 2022-02-07 NOTE — TELEPHONE ENCOUNTER
----- Message from Thompson Silva MD sent at 2/4/2022  9:20 PM CST -----  Regarding: RE: pre op question  Yes,  STOP ASA and PLAVIX 5 DAYS PRIOR AND ELIQUIS 3 DAYS PRIOR  ----- Message -----  From: Nicole Purdy LPN  Sent: 2/4/2022   9:15 AM CST  To: Thompson Silva MD  Subject: pre op question                                  Medication list includes Eliquis, Plavix, and ASA. Will you need these medications stopped?

## 2022-02-08 ENCOUNTER — OFFICE VISIT (OUTPATIENT)
Dept: FAMILY MEDICINE | Facility: CLINIC | Age: 57
End: 2022-02-08
Payer: MEDICARE

## 2022-02-08 VITALS
DIASTOLIC BLOOD PRESSURE: 70 MMHG | HEART RATE: 71 BPM | TEMPERATURE: 98 F | OXYGEN SATURATION: 98 % | WEIGHT: 149.94 LBS | HEIGHT: 60 IN | SYSTOLIC BLOOD PRESSURE: 132 MMHG | BODY MASS INDEX: 29.44 KG/M2

## 2022-02-08 DIAGNOSIS — Z79.4 TYPE 2 DIABETES MELLITUS WITH HYPERGLYCEMIA, WITH LONG-TERM CURRENT USE OF INSULIN: ICD-10-CM

## 2022-02-08 DIAGNOSIS — E11.65 TYPE 2 DIABETES MELLITUS WITH HYPERGLYCEMIA, WITH LONG-TERM CURRENT USE OF INSULIN: ICD-10-CM

## 2022-02-08 DIAGNOSIS — I10 ESSENTIAL HYPERTENSION: Chronic | ICD-10-CM

## 2022-02-08 DIAGNOSIS — Z12.11 SCREENING FOR MALIGNANT NEOPLASM OF COLON: ICD-10-CM

## 2022-02-08 DIAGNOSIS — I25.10 CORONARY ARTERY DISEASE INVOLVING NATIVE CORONARY ARTERY OF NATIVE HEART WITHOUT ANGINA PECTORIS: ICD-10-CM

## 2022-02-08 DIAGNOSIS — F33.0 MILD RECURRENT MAJOR DEPRESSION: ICD-10-CM

## 2022-02-08 DIAGNOSIS — Z23 NEED FOR VACCINATION: ICD-10-CM

## 2022-02-08 DIAGNOSIS — G45.0 VERTEBROBASILAR OCCLUSIVE DISEASE: ICD-10-CM

## 2022-02-08 DIAGNOSIS — Z89.612 S/P AKA (ABOVE KNEE AMPUTATION) BILATERAL: ICD-10-CM

## 2022-02-08 DIAGNOSIS — Z89.611 S/P AKA (ABOVE KNEE AMPUTATION) BILATERAL: ICD-10-CM

## 2022-02-08 DIAGNOSIS — I63.00 CEREBRAL INFARCTION DUE TO THROMBOSIS OF PRECEREBRAL ARTERY: ICD-10-CM

## 2022-02-08 DIAGNOSIS — G89.29 OTHER CHRONIC PAIN: ICD-10-CM

## 2022-02-08 DIAGNOSIS — N40.1 BENIGN PROSTATIC HYPERPLASIA WITH NOCTURIA: ICD-10-CM

## 2022-02-08 DIAGNOSIS — R39.81 URINARY INCONTINENCE DUE TO SEVERE PHYSICAL DISABILITY: ICD-10-CM

## 2022-02-08 DIAGNOSIS — Z76.89 ENCOUNTER TO ESTABLISH CARE WITH NEW DOCTOR: Primary | ICD-10-CM

## 2022-02-08 DIAGNOSIS — E78.2 MIXED HYPERLIPIDEMIA: ICD-10-CM

## 2022-02-08 DIAGNOSIS — R35.1 BENIGN PROSTATIC HYPERPLASIA WITH NOCTURIA: ICD-10-CM

## 2022-02-08 PROBLEM — M86.272 SUBACUTE OSTEOMYELITIS OF LEFT FOOT: Status: RESOLVED | Noted: 2021-09-20 | Resolved: 2022-02-08

## 2022-02-08 PROBLEM — R60.0 EDEMA OF RIGHT LOWER EXTREMITY: Status: RESOLVED | Noted: 2020-02-14 | Resolved: 2022-02-08

## 2022-02-08 PROBLEM — I87.312 IDIOPATHIC CHRONIC VENOUS HYPERTENSION OF LEFT LOWER EXTREMITY WITH ULCER: Status: RESOLVED | Noted: 2020-06-29 | Resolved: 2022-02-08

## 2022-02-08 PROBLEM — I77.0 A-V FISTULA: Status: RESOLVED | Noted: 2018-01-12 | Resolved: 2022-02-08

## 2022-02-08 PROBLEM — L97.929 IDIOPATHIC CHRONIC VENOUS HYPERTENSION OF LEFT LOWER EXTREMITY WITH ULCER: Status: RESOLVED | Noted: 2020-06-29 | Resolved: 2022-02-08

## 2022-02-08 PROBLEM — I82.411 ACUTE DEEP VEIN THROMBOSIS (DVT) OF FEMORAL VEIN OF RIGHT LOWER EXTREMITY: Status: RESOLVED | Noted: 2019-10-04 | Resolved: 2022-02-08

## 2022-02-08 PROBLEM — L89.629 PRESSURE INJURY OF SKIN OF LEFT HEEL: Status: RESOLVED | Noted: 2020-09-16 | Resolved: 2022-02-08

## 2022-02-08 PROBLEM — L03.119 CELLULITIS OF LOWER EXTREMITY: Status: RESOLVED | Noted: 2021-01-28 | Resolved: 2022-02-08

## 2022-02-08 PROBLEM — I87.2 PERIPHERAL VENOUS INSUFFICIENCY: Status: RESOLVED | Noted: 2017-12-07 | Resolved: 2022-02-08

## 2022-02-08 PROBLEM — Z86.718 HISTORY OF DVT OF LOWER EXTREMITY: Status: RESOLVED | Noted: 2020-07-22 | Resolved: 2022-02-08

## 2022-02-08 PROBLEM — I70.263 ATHEROSCLEROSIS OF NATIVE ARTERY OF BOTH LOWER EXTREMITIES WITH GANGRENE: Status: RESOLVED | Noted: 2018-01-04 | Resolved: 2022-02-08

## 2022-02-08 PROBLEM — I99.8 LOWER LIMB ISCHEMIA: Status: RESOLVED | Noted: 2019-07-05 | Resolved: 2022-02-08

## 2022-02-08 PROBLEM — I70.229 CRITICAL LOWER LIMB ISCHEMIA: Status: RESOLVED | Noted: 2017-12-29 | Resolved: 2022-02-08

## 2022-02-08 PROBLEM — S81.801A LEG WOUND, RIGHT, INITIAL ENCOUNTER: Status: RESOLVED | Noted: 2020-07-24 | Resolved: 2022-02-08

## 2022-02-08 PROCEDURE — 99214 PR OFFICE/OUTPT VISIT, EST, LEVL IV, 30-39 MIN: ICD-10-PCS | Mod: S$PBB,,, | Performed by: STUDENT IN AN ORGANIZED HEALTH CARE EDUCATION/TRAINING PROGRAM

## 2022-02-08 PROCEDURE — 99215 OFFICE O/P EST HI 40 MIN: CPT | Mod: PBBFAC,PN,25 | Performed by: STUDENT IN AN ORGANIZED HEALTH CARE EDUCATION/TRAINING PROGRAM

## 2022-02-08 PROCEDURE — 99999 PR PBB SHADOW E&M-EST. PATIENT-LVL V: ICD-10-PCS | Mod: PBBFAC,,, | Performed by: STUDENT IN AN ORGANIZED HEALTH CARE EDUCATION/TRAINING PROGRAM

## 2022-02-08 PROCEDURE — 99999 PR PBB SHADOW E&M-EST. PATIENT-LVL V: CPT | Mod: PBBFAC,,, | Performed by: STUDENT IN AN ORGANIZED HEALTH CARE EDUCATION/TRAINING PROGRAM

## 2022-02-08 PROCEDURE — G0008 ADMIN INFLUENZA VIRUS VAC: HCPCS | Mod: PBBFAC,PN

## 2022-02-08 PROCEDURE — 99214 OFFICE O/P EST MOD 30 MIN: CPT | Mod: S$PBB,,, | Performed by: STUDENT IN AN ORGANIZED HEALTH CARE EDUCATION/TRAINING PROGRAM

## 2022-02-08 NOTE — LETTER
AUTHORIZATION FOR RELEASE OF   CONFIDENTIAL INFORMATION    Dear Dr. Galeano,    We are seeing Kuldeep Alamo, date of birth 1965, in the clinic at [facility]. Jalyn Rock DO is the patient's PCP. Kuldeep Alamo has an outstanding lab/procedure at the time we reviewed his chart. In order to help keep his health information updated, he has authorized us to request the following medical record(s):        (  )  MAMMOGRAM                                      (  )  COLONOSCOPY      (  )  PAP SMEAR                                          (x  )  OUTSIDE LAB RESULTS     (  )  DEXA SCAN                                          (  )  EYE EXAM            (  )  FOOT EXAM                                          ( x )  ENTIRE RECORD     (  )  OUTSIDE IMMUNIZATIONS                 (  )  _______________         Please fax records to [facility], Sarah A. Champagne, DO, [phone]     If you have any questions, please contact Sh[place]ana at [phone]          Patient Name: Kuldeep Alamo  : 1965  Patient Phone #: [phone]

## 2022-02-08 NOTE — PROGRESS NOTES
Subjective:       Patient ID: Kuldeep Alamo is a 56 y.o. male.    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  Follows with Dimitrios  Labs done 2 weeks ago  Stable  On statin, plavix, asa, eliquis      Chronic pain  Follows with pain management     Vertebrobasilar occlusive disease  Follows with dimitrios    Mild recurrent major depression  Sees psych   Celexa, cymbalta, xanax    Coronary artery disease involving native coronary artery of native heart without angina pectoris  Sees dimitrios regularly   On statin, asa, eliquis    Essential hypertension  Stable  Taking meds as prescribed    Mixed hyperlipidemia  Stable taking meds as prescribed    Benign prostatic hyperplasia with nocturia  Sees urology     Urinary incontinence due to severe physical disability  Scheduled for suprapubic cath with Dr Silva      Type 2 diabetes mellitus with hyperglycemia, with long-term current use of insulin  Stable  Takes meds and uses insulin as prescribed      Needs colon screen   Requests flu shot  Labs done about 2 weeks ago with Dimitrios     Review of Systems   Constitutional: Negative for fever.   HENT: Negative.    Respiratory: Negative for cough, shortness of breath and wheezing.    Cardiovascular: Negative for chest pain and leg swelling.   Gastrointestinal: Negative for abdominal pain, diarrhea, nausea and vomiting.   Genitourinary: Negative.  Negative for difficulty urinating, dysuria and frequency.   Musculoskeletal: Negative.    Neurological: Negative.  Negative for dizziness, numbness and headaches.   Psychiatric/Behavioral: Negative for dysphoric mood. The patient is not nervous/anxious.       Objective:      Vitals:    02/08/22 1450   BP: 132/70   Pulse: 71   Temp: 97.7 °F (36.5 °C)      Physical Exam  Constitutional:       Appearance: Normal appearance. He is normal weight.   HENT:      Head: Normocephalic and atraumatic.   Eyes:       Conjunctiva/sclera: Conjunctivae normal.   Cardiovascular:      Rate and Rhythm: Normal rate and regular rhythm.      Heart sounds: Normal heart sounds.   Pulmonary:      Effort: Pulmonary effort is normal.      Breath sounds: Normal breath sounds.   Abdominal:      Palpations: Abdomen is soft.      Tenderness: There is no abdominal tenderness.   Musculoskeletal:         General: Deformity present. Normal range of motion.      Cervical back: Normal range of motion.      Comments: Wheelchair bound      Right Lower Extremity: Right leg is amputated above knee.      Left Lower Extremity: Left leg is amputated above knee.   Neurological:      General: No focal deficit present.      Mental Status: He is alert and oriented to person, place, and time.   Psychiatric:         Mood and Affect: Mood normal.         Behavior: Behavior normal.          Assessment:       1. Encounter to establish care with new doctor    2. 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 Other chronic pain    4. Vertebrobasilar occlusive disease    5. Mild recurrent major depression    6. Coronary artery disease involving native coronary artery of native heart without angina pectoris    7. Essential hypertension    8. Mixed hyperlipidemia    9. S/P AKA (above knee amputation) bilateral    10. Benign prostatic hyperplasia with nocturia    11. Urinary incontinence due to severe physical disability    12. Type 2 diabetes mellitus with hyperglycemia, with long-term current use of insulin    13. Screening for malignant neoplasm of colon    14. Need for vaccination        Plan:   1. Encounter to establish care with new doctor    2. 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 Other chronic pain    4. Vertebrobasilar occlusive disease    5. Mild recurrent major depression    6. Coronary artery disease involving native coronary artery of native heart without angina pectoris    7. Essential hypertension    8. Mixed  hyperlipidemia    9. S/P AKA (above knee amputation) bilateral    10. Benign prostatic hyperplasia with nocturia    11. Urinary incontinence due to severe physical disability    12. Type 2 diabetes mellitus with hyperglycemia, with long-term current use of insulin    13. Screening for malignant neoplasm of colon  - Cologuard Screening (Multitarget Stool DNA); Future  - Cologuard Screening (Multitarget Stool DNA)    14. Need for vaccination     Establish care  Will request recent labs from [facility] ordered  Flu shot today  Continue current medications as prescribed   Keep routine specialist f/u   RTC in 6 months and/or prn       Jalyn Rock   [facility]   2/8/22

## 2022-02-09 PROBLEM — S32.010A CLOSED COMPRESSION FRACTURE OF FIRST LUMBAR VERTEBRA: Status: RESOLVED | Noted: 2017-02-01 | Resolved: 2022-02-09

## 2022-02-09 PROBLEM — M79.604 BILATERAL LEG PAIN: Status: RESOLVED | Noted: 2021-01-27 | Resolved: 2022-02-09

## 2022-02-09 PROBLEM — I87.311 CHRONIC VENOUS HYPERTENSION W ULCER OF R LOW EXTREM: Status: RESOLVED | Noted: 2020-05-11 | Resolved: 2022-02-09

## 2022-02-09 PROBLEM — I96 DRY GANGRENE: Status: RESOLVED | Noted: 2020-07-24 | Resolved: 2022-02-09

## 2022-02-09 PROBLEM — L97.822: Status: RESOLVED | Noted: 2020-06-29 | Resolved: 2022-02-09

## 2022-02-09 PROBLEM — L89.610 DECUBITUS ULCER OF HEEL, RIGHT, UNSTAGEABLE: Status: RESOLVED | Noted: 2020-07-20 | Resolved: 2022-02-09

## 2022-02-09 PROBLEM — R35.0 URINARY FREQUENCY: Status: RESOLVED | Noted: 2018-02-28 | Resolved: 2022-02-09

## 2022-02-09 PROBLEM — I73.9 PERIPHERAL ARTERIAL DISEASE: Status: RESOLVED | Noted: 2021-04-29 | Resolved: 2022-02-09

## 2022-02-09 PROBLEM — R53.1 WEAKNESS GENERALIZED: Status: RESOLVED | Noted: 2019-10-04 | Resolved: 2022-02-09

## 2022-02-09 PROBLEM — R23.9 ALTERATION IN SKIN INTEGRITY: Status: RESOLVED | Noted: 2021-01-28 | Resolved: 2022-02-09

## 2022-02-09 PROBLEM — L89.322 PRESSURE ULCER OF LEFT BUTTOCK, STAGE 2: Status: RESOLVED | Noted: 2021-05-27 | Resolved: 2022-02-09

## 2022-02-09 PROBLEM — N17.9 AKI (ACUTE KIDNEY INJURY): Status: RESOLVED | Noted: 2021-04-18 | Resolved: 2022-02-09

## 2022-02-09 PROBLEM — L97.919 CHRONIC VENOUS HYPERTENSION W ULCER OF R LOW EXTREM: Status: RESOLVED | Noted: 2020-05-11 | Resolved: 2022-02-09

## 2022-02-09 PROBLEM — M79.605 BILATERAL LEG PAIN: Status: RESOLVED | Noted: 2021-01-27 | Resolved: 2022-02-09

## 2022-02-09 PROBLEM — G57.93 NEUROPATHIC PAIN OF BOTH LEGS: Status: RESOLVED | Noted: 2020-11-09 | Resolved: 2022-02-09

## 2022-02-09 PROBLEM — Z86.73 HISTORY OF STROKE: Status: RESOLVED | Noted: 2020-12-23 | Resolved: 2022-02-09

## 2022-02-09 PROBLEM — S91.302A NON HEALING LEFT HEEL WOUND: Status: RESOLVED | Noted: 2021-09-20 | Resolved: 2022-02-09

## 2022-02-09 PROBLEM — I87.1 MAY-THURNER SYNDROME: Status: RESOLVED | Noted: 2020-02-17 | Resolved: 2022-02-09

## 2022-02-09 PROBLEM — L97.909 CHRONIC SKIN ULCER OF LOWER LEG: Status: RESOLVED | Noted: 2017-12-14 | Resolved: 2022-02-09

## 2022-02-09 PROBLEM — E87.6 HYPOKALEMIA: Status: RESOLVED | Noted: 2021-03-11 | Resolved: 2022-02-09

## 2022-02-09 PROBLEM — N18.9 ACUTE KIDNEY INJURY SUPERIMPOSED ON CHRONIC KIDNEY DISEASE: Status: RESOLVED | Noted: 2021-04-29 | Resolved: 2022-02-09

## 2022-02-09 PROBLEM — I96 GANGRENE: Status: RESOLVED | Noted: 2021-08-30 | Resolved: 2022-02-09

## 2022-02-09 PROBLEM — R35.1 NOCTURIA: Status: RESOLVED | Noted: 2018-02-28 | Resolved: 2022-02-09

## 2022-02-09 PROBLEM — L97.412 CHRONIC ULCER OF RIGHT HEEL WITH FAT LAYER EXPOSED: Status: RESOLVED | Noted: 2020-09-16 | Resolved: 2022-02-09

## 2022-02-09 PROBLEM — N17.9 ACUTE KIDNEY INJURY SUPERIMPOSED ON CHRONIC KIDNEY DISEASE: Status: RESOLVED | Noted: 2021-04-29 | Resolved: 2022-02-09

## 2022-02-09 PROBLEM — L89.312 PRESSURE ULCER OF RIGHT BUTTOCK, STAGE 2: Status: RESOLVED | Noted: 2021-05-27 | Resolved: 2022-02-09

## 2022-02-09 PROBLEM — L97.519: Status: RESOLVED | Noted: 2021-05-27 | Resolved: 2022-02-09

## 2022-02-10 ENCOUNTER — TELEPHONE (OUTPATIENT)
Dept: UROLOGY | Facility: CLINIC | Age: 57
End: 2022-02-10
Payer: MEDICARE

## 2022-02-11 ENCOUNTER — LAB VISIT (OUTPATIENT)
Dept: FAMILY MEDICINE | Facility: CLINIC | Age: 57
End: 2022-02-11
Payer: MEDICARE

## 2022-02-11 DIAGNOSIS — Z01.818 PRE-OP TESTING: ICD-10-CM

## 2022-02-11 LAB
SARS-COV-2 RNA RESP QL NAA+PROBE: NOT DETECTED
SARS-COV-2- CYCLE NUMBER: NORMAL

## 2022-02-11 PROCEDURE — U0005 INFEC AGEN DETEC AMPLI PROBE: HCPCS | Performed by: UROLOGY

## 2022-02-11 PROCEDURE — U0003 INFECTIOUS AGENT DETECTION BY NUCLEIC ACID (DNA OR RNA); SEVERE ACUTE RESPIRATORY SYNDROME CORONAVIRUS 2 (SARS-COV-2) (CORONAVIRUS DISEASE [COVID-19]), AMPLIFIED PROBE TECHNIQUE, MAKING USE OF HIGH THROUGHPUT TECHNOLOGIES AS DESCRIBED BY CMS-2020-01-R: HCPCS | Performed by: UROLOGY

## 2022-02-15 ENCOUNTER — PATIENT MESSAGE (OUTPATIENT)
Dept: UROLOGY | Facility: CLINIC | Age: 57
End: 2022-02-15
Payer: MEDICARE

## 2022-02-15 ENCOUNTER — TELEPHONE (OUTPATIENT)
Dept: UROLOGY | Facility: CLINIC | Age: 57
End: 2022-02-15
Payer: MEDICARE

## 2022-02-15 ENCOUNTER — NURSE TRIAGE (OUTPATIENT)
Dept: ADMINISTRATIVE | Facility: CLINIC | Age: 57
End: 2022-02-15
Payer: MEDICARE

## 2022-02-15 DIAGNOSIS — E55.9 VITAMIN D DEFICIENCY: ICD-10-CM

## 2022-02-15 NOTE — TELEPHONE ENCOUNTER
Patient reporting blood in his catheter mixed with his urine.  He has blood around the catheter at the insertion.  Patient had a pubic catheter put in on yesterday.  Patient denies having fever or pain.  Current temp 98.2.  Per protocol advised patient to be seen in clinic today.  Patient connected with David in urology for appointment.    Reason for Disposition   Bleeding around catheter (e.g., from penis or female urethra)    Additional Information   Negative: Shock suspected (e.g., cold/pale/clammy skin, too weak to stand, low BP, rapid pulse)   Negative: Sounds like a life-threatening emergency to the triager   Negative: Catheter was accidentally pulled-out and bright red continuous bleeding   Negative: Catheter was accidentally pulled-out   Negative: SEVERE abdominal pain   Negative: Fever > 100.4 F (38.0 C)   Negative: Drinking very little and dehydration suspected (e.g., no urine > 12 hours, very dry mouth, very lightheaded)   Negative: Patient sounds very sick or weak to the triager    Protocols used: URINARY CATHETER SYMPTOMS AND [id number]

## 2022-02-15 NOTE — TELEPHONE ENCOUNTER
Spoke with pt he says he has a small amount of bleeding around his catheter. Instructed  Pt to put a small pressure dressing on it per doctor Brojono.small amounts of bleeding may occur on and off for the next couple of weeks. Informed pt if bleeding increases to notify the office and report to the ER.

## 2022-02-22 ENCOUNTER — PATIENT OUTREACH (OUTPATIENT)
Dept: ADMINISTRATIVE | Facility: HOSPITAL | Age: 57
End: 2022-02-22
Payer: MEDICARE

## 2022-02-22 DIAGNOSIS — Z79.4 TYPE 2 DIABETES MELLITUS WITH HYPERGLYCEMIA, WITH LONG-TERM CURRENT USE OF INSULIN: Primary | ICD-10-CM

## 2022-02-22 DIAGNOSIS — E11.65 TYPE 2 DIABETES MELLITUS WITH HYPERGLYCEMIA, WITH LONG-TERM CURRENT USE OF INSULIN: Primary | ICD-10-CM

## 2022-02-23 DIAGNOSIS — E11.65 TYPE 2 DIABETES MELLITUS WITH HYPERGLYCEMIA, WITH LONG-TERM CURRENT USE OF INSULIN: ICD-10-CM

## 2022-02-23 DIAGNOSIS — Z79.4 TYPE 2 DIABETES MELLITUS WITH HYPERGLYCEMIA, WITH LONG-TERM CURRENT USE OF INSULIN: ICD-10-CM

## 2022-02-23 LAB — NONINV COLON CA DNA+OCC BLD SCRN STL QL: NORMAL

## 2022-02-23 NOTE — TELEPHONE ENCOUNTER
----- Message from Elizabeth Woo sent at 2/23/2022  2:21 PM CST -----  Regarding: refill  Contact: [phone]/wife Almaz  Type:  RX Refill Request    Who Called:  wife  Refill or New Rx: refill  RX Name and Strength: blood sugar diagnostic Strp  How is the patient currently taking it? (ex. 1XDay): Test sugar 4 times daily (meals and nightly  Is this a 30 day or 90 day RX: 300 strips/11 refill  Preferred Pharmacy with phone number: [id number] - [place], [place] - [address] [facility]  Local or Mail Order: local  Ordering Provider: Dr. Bolden  Would the patient rather a call back or a response via [facility]?  call  Best Call Back Number: [phone]  Additional Information:

## 2022-02-28 DIAGNOSIS — Z79.4 TYPE 2 DIABETES MELLITUS WITH HYPERGLYCEMIA, WITH LONG-TERM CURRENT USE OF INSULIN: ICD-10-CM

## 2022-02-28 DIAGNOSIS — E11.9 TYPE 2 DIABETES MELLITUS WITHOUT COMPLICATION, WITHOUT LONG-TERM CURRENT USE OF INSULIN: ICD-10-CM

## 2022-02-28 DIAGNOSIS — E11.65 TYPE 2 DIABETES MELLITUS WITH HYPERGLYCEMIA, WITH LONG-TERM CURRENT USE OF INSULIN: ICD-10-CM

## 2022-02-28 RX ORDER — LANCETS
1 EACH MISCELLANEOUS
Qty: 300 EACH | Refills: 5 | Status: SHIPPED | OUTPATIENT
Start: 2022-02-28

## 2022-02-28 NOTE — TELEPHONE ENCOUNTER
Called [facility] in [place] and spoke with Kaelyn in regards pf pt's testing strips. Kaelyn informed me that pt's strips are not being covered due to the 4 day testing, she informed me that pt should test three times a day. So a new script is need for strips and lancets. Please advise message.

## 2022-02-28 NOTE — TELEPHONE ENCOUNTER
----- Message from Kylah Feliciano sent at 2/28/2022  3:28 PM CST -----  Type:  Needs Medical Advice    Who Called:  pt angelita Muse  Symptoms (please be specific):  would like to get a call back regarding blood sugar diagnostic Strp   Pharmacy said they never received script     Pharmacy name and phone #:  [id number] - [place] - [address] [facility]   Phone: [phone]  Fax:  [phone]        Would the patient rather a call back or a response via [facility]?  Please Call once script sent to pharmacy   Best Call Back Number:  [phone]  Additional Information:  pt is almost out of strips

## 2022-03-03 PROCEDURE — G0179 PR HOME HEALTH MD RECERTIFICATION: ICD-10-PCS | Mod: ,,, | Performed by: INTERNAL MEDICINE

## 2022-03-03 PROCEDURE — G0179 MD RECERTIFICATION HHA PT: HCPCS | Mod: ,,, | Performed by: INTERNAL MEDICINE

## 2022-03-04 ENCOUNTER — EXTERNAL HOME HEALTH (OUTPATIENT)
Dept: HOME HEALTH SERVICES | Facility: HOSPITAL | Age: 57
End: 2022-03-04
Payer: MEDICARE

## 2022-03-04 ENCOUNTER — TELEPHONE (OUTPATIENT)
Dept: UROLOGY | Facility: CLINIC | Age: 57
End: 2022-03-04
Payer: MEDICARE

## 2022-03-04 ENCOUNTER — PATIENT OUTREACH (OUTPATIENT)
Dept: HOME HEALTH SERVICES | Facility: HOSPITAL | Age: 57
End: 2022-03-04
Payer: MEDICARE

## 2022-03-04 NOTE — TELEPHONE ENCOUNTER
Confirmed that pt has a 22fr cath that needs to be changed q 6 weeks with the provider for at least the first few changes until the provider says home health can take over.

## 2022-03-04 NOTE — TELEPHONE ENCOUNTER
----- Message from Stephanie Quintero sent at 3/4/2022  9:56 AM CST -----  Type:  Needs Medical Advice    Who Called: [facility]  Symptoms (please be specific): calling to get info on pt catheter,size,change. And how often it needs changing       Would the patient rather a call back or a response via [facility]? call  Best Call Back Number: [phone]  Additional Information:

## 2022-03-07 ENCOUNTER — PATIENT OUTREACH (OUTPATIENT)
Dept: ADMINISTRATIVE | Facility: OTHER | Age: 57
End: 2022-03-07
Payer: MEDICARE

## 2022-03-07 NOTE — PROGRESS NOTES
Health Maintenance Due   Topic Date Due    COVID-19 Vaccine (1) Never done    Shingles Vaccine (1 of 2) Never done    Hemoglobin A1c  10/09/2021     Updates were requested from care everywhere.  Chart was reviewed for overdue Proactive [facility]coun[facility] (JUAN PABLO) topics (CRS, Breast Cancer Screening, Eye exam)  Health Maintenance has been updated.  LINKS immunization registry triggered.  Immunizations were reconciled.

## 2022-03-09 ENCOUNTER — OFFICE VISIT (OUTPATIENT)
Dept: UROLOGY | Facility: CLINIC | Age: 57
End: 2022-03-09
Payer: MEDICARE

## 2022-03-09 VITALS
HEART RATE: 69 BPM | DIASTOLIC BLOOD PRESSURE: 70 MMHG | RESPIRATION RATE: 20 BRPM | OXYGEN SATURATION: 96 % | SYSTOLIC BLOOD PRESSURE: 132 MMHG | TEMPERATURE: 99 F

## 2022-03-09 DIAGNOSIS — Z43.5 ATTENTION TO CYSTOSTOMY: ICD-10-CM

## 2022-03-09 DIAGNOSIS — Z98.890 POST-OPERATIVE STATE: ICD-10-CM

## 2022-03-09 DIAGNOSIS — R35.1 BENIGN PROSTATIC HYPERPLASIA WITH NOCTURIA: Primary | ICD-10-CM

## 2022-03-09 DIAGNOSIS — N40.1 BENIGN PROSTATIC HYPERPLASIA WITH NOCTURIA: Primary | ICD-10-CM

## 2022-03-09 PROCEDURE — 99213 PR OFFICE/OUTPT VISIT, EST, LEVL III, 20-29 MIN: ICD-10-PCS | Mod: 57,S$PBB,, | Performed by: UROLOGY

## 2022-03-09 PROCEDURE — 99999 PR PBB SHADOW E&M-EST. PATIENT-LVL III: CPT | Mod: PBBFAC,,, | Performed by: UROLOGY

## 2022-03-09 PROCEDURE — 99999 PR PBB SHADOW E&M-EST. PATIENT-LVL III: ICD-10-PCS | Mod: PBBFAC,,, | Performed by: UROLOGY

## 2022-03-09 PROCEDURE — 99213 OFFICE O/P EST LOW 20 MIN: CPT | Mod: 57,S$PBB,, | Performed by: UROLOGY

## 2022-03-09 PROCEDURE — 51702 BLADDER CATHETERIZATION: ICD-10-PCS | Mod: S$PBB,,, | Performed by: UROLOGY

## 2022-03-09 PROCEDURE — 99213 OFFICE O/P EST LOW 20 MIN: CPT | Mod: PBBFAC,PO | Performed by: UROLOGY

## 2022-03-09 PROCEDURE — 51702 INSERT TEMP BLADDER CATH: CPT | Mod: PBBFAC,PO | Performed by: UROLOGY

## 2022-03-09 RX ORDER — ALPRAZOLAM 0.5 MG/1
0.5 TABLET ORAL 3 TIMES DAILY
Status: ON HOLD | COMMUNITY
Start: 2022-02-20 | End: 2023-02-13 | Stop reason: SDUPTHER

## 2022-03-09 NOTE — PROCEDURES
Bladder Catheterization    Date/Time: 3/9/2022 3:00 PM  Location procedure was performed: [facility]  Performed by: Thompson Silva MD  Authorized by: Thompson Silva MD   Assisting provider: Thmopson Silva MD  Pre-operative diagnosis: Status post cystostomy tube placement  Post-operative diagnosis: Same  Consent Done: Not Needed  Indications: catheter change, neurogenic bladder and urinary retention  Local anesthesia used: no    Anesthesia:  Local anesthesia used: no    Patient sedated: no  Preparation: Patient was prepped and draped in the usual sterile fashion.  Description of findings: SP tube site cleaning with no evidence of infection, nylon suture ripped through skin causing some bleeding.   Catheter insertion: indwelling  Catheter type: Isbell  Catheter size: 22 Fr  Complicated insertion: no  Altered anatomy: no  Bladder irrigation: no  Number of attempts: 1  Urine volume: 10 ml  Urine characteristics: blood-tinged  Technical procedures used: none  Significant surgical tasks conducted by the assistant(s): none  Complications: No  Estimated blood loss (mL): 0  Specimens: No  Implants: No  Patient tolerance: Patient tolerated the procedure well with no immediate complications

## 2022-03-09 NOTE — PROGRESS NOTES
Kuldeep Alamo is a 57 y.o. male patient.   1. Benign prostatic hyperplasia with nocturia    2. Post-operative state    3. Attention to cystostomy      Past Medical History:   Diagnosis Date    Acute on chronic diastolic CHF (congestive heart failure), NYHA class 3 12/30/2017    CAD (coronary artery disease) 1/7/2013    Cerebral vascular accident     COPD (chronic obstructive pulmonary disease)     Cough syncope 6/15/2017    Diabetes mellitus     Disorder of kidney and ureter     Hyperlipidemia     Hypertension     Laceration of right hand without foreign body 12/23/2019    Patient sustained injury to dorsum of right hand due to ronda. No significant pain. No significant drainage. No fever or chills    Localized edema of right lower leg 12/7/2017    S/P CABG (coronary artery bypass graft) 1/7/2013    Ulcer of right pretibial region, limited to breakdown of skin 7/2/2018    Wound identified 6/18/2018 that is most likely related to tight dressing.    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     Past Surgical History Pertinent Negatives:   Procedure Date Noted    ABDOMINAL AORTIC ANEURYSM REPAIR 05/19/2014    ANGIOPLASTY 05/19/2014    ASD REPAIR 05/19/2014    CARDIAC VALVE SURGERY 05/19/2014    CAROTID ENDARTERECTOMY 05/19/2014    CORONARY ANGIOPLASTY 05/19/2014    CYSTOSCOPY 04/18/2017    INSERT / REPLACE / REMOVE PACEMAKER 05/19/2014    LITHOTRIPSY 05/13/2021    NEPHRECTOMY 04/18/2017    ORCHIECTOMY 04/18/2017    PROSTATE SURGERY 02/28/2018    RADIOFREQUENCY ABLATION 05/19/2014    VASCULAR SURGERY 05/19/2014    VASECTOMY 04/18/2017    VSD REPAIR 05/19/2014     Scheduled Meds:  Continuous Infusions:  PRN Meds:    Review of patient's allergies indicates:   Allergen Reactions    Sulfa (sulfonamide antibiotics) Nausea And Vomiting     There are no hospital problems to display for this patient.    Blood pressure 132/70, pulse 69, temperature 98.6 °F (37 °C),  resp. rate 20, SpO2 96 %.    Subjective:   Diet: Adequate intake.  Patient reports no nausea or vomiting.    Activity level: Normal.    Pain control: Well controlled.      Objective:  Vital signs (most recent): Blood pressure 132/70, pulse 69, temperature 98.6 °F (37 °C), resp. rate 20, SpO2 96 %.  General appearance: Comfortable, well-appearing, in no acute distress and not in pain.    Lungs:  Normal respiratory rate and normal effort.  Breath sounds normal.    Heart: Normal rate.  S1 normal.    Chest: Symmetric chest wall expansion.    Abdomen: Abdomen is soft.    Bowel sounds:  Bowel sounds are normal.    Tenderness: There is no abdominal tenderness tenderness.    Wound:  Clean.  There is no drainage.    Extremities: There is decreased range of motion.    Neurological: The patient is alert and oriented to person, place and time.  Normal strength.  Pupils are equal, round, and reactive to light.       Assessment:   Post-op: 21 days.    Condition: In stable condition.     Plan:  Drains Plan: Change SPT now.  Restricted diet.           Thompson Silva MD  3/9/2022

## 2022-03-09 NOTE — PATIENT INSTRUCTIONS
Change suprapubic tube every 6 weeks.  Can have home health do this.  If home health is unable to do this follow-up with me in 6 weeks for SP tube change.  Will need yearly PSA test.

## 2022-03-16 PROBLEM — E87.8 ELECTROLYTE DISTURBANCE: Status: ACTIVE | Noted: 2022-03-16

## 2022-03-16 PROBLEM — R19.7 DIARRHEA: Status: ACTIVE | Noted: 2022-03-16

## 2022-03-18 PROBLEM — E87.8 ELECTROLYTE DISTURBANCE: Status: RESOLVED | Noted: 2022-03-16 | Resolved: 2022-03-18

## 2022-03-18 PROBLEM — R19.7 DIARRHEA: Status: RESOLVED | Noted: 2022-03-16 | Resolved: 2022-03-18

## 2022-03-25 ENCOUNTER — OFFICE VISIT (OUTPATIENT)
Dept: FAMILY MEDICINE | Facility: CLINIC | Age: 57
End: 2022-03-25
Payer: MEDICARE

## 2022-03-25 VITALS
HEIGHT: 60 IN | OXYGEN SATURATION: 97 % | WEIGHT: 147.5 LBS | DIASTOLIC BLOOD PRESSURE: 74 MMHG | SYSTOLIC BLOOD PRESSURE: 118 MMHG | BODY MASS INDEX: 28.96 KG/M2 | TEMPERATURE: 98 F | HEART RATE: 69 BPM

## 2022-03-25 DIAGNOSIS — Z09 HOSPITAL DISCHARGE FOLLOW-UP: Primary | ICD-10-CM

## 2022-03-25 DIAGNOSIS — Z79.4 TYPE 2 DIABETES MELLITUS WITH HYPERGLYCEMIA, WITH LONG-TERM CURRENT USE OF INSULIN: ICD-10-CM

## 2022-03-25 DIAGNOSIS — R29.898 RIGHT ARM WEAKNESS: ICD-10-CM

## 2022-03-25 DIAGNOSIS — I63.00 CEREBRAL INFARCTION DUE TO THROMBOSIS OF PRECEREBRAL ARTERY: ICD-10-CM

## 2022-03-25 DIAGNOSIS — E86.0 DEHYDRATION: ICD-10-CM

## 2022-03-25 DIAGNOSIS — E11.65 TYPE 2 DIABETES MELLITUS WITH HYPERGLYCEMIA, WITH LONG-TERM CURRENT USE OF INSULIN: ICD-10-CM

## 2022-03-25 PROCEDURE — 99214 OFFICE O/P EST MOD 30 MIN: CPT | Mod: S$PBB,,, | Performed by: STUDENT IN AN ORGANIZED HEALTH CARE EDUCATION/TRAINING PROGRAM

## 2022-03-25 PROCEDURE — 99999 PR PBB SHADOW E&M-EST. PATIENT-LVL V: CPT | Mod: PBBFAC,,, | Performed by: STUDENT IN AN ORGANIZED HEALTH CARE EDUCATION/TRAINING PROGRAM

## 2022-03-25 PROCEDURE — 99215 OFFICE O/P EST HI 40 MIN: CPT | Mod: PBBFAC,PN | Performed by: STUDENT IN AN ORGANIZED HEALTH CARE EDUCATION/TRAINING PROGRAM

## 2022-03-25 PROCEDURE — 99214 PR OFFICE/OUTPT VISIT, EST, LEVL IV, 30-39 MIN: ICD-10-PCS | Mod: S$PBB,,, | Performed by: STUDENT IN AN ORGANIZED HEALTH CARE EDUCATION/TRAINING PROGRAM

## 2022-03-25 PROCEDURE — 99999 PR PBB SHADOW E&M-EST. PATIENT-LVL V: ICD-10-PCS | Mod: PBBFAC,,, | Performed by: STUDENT IN AN ORGANIZED HEALTH CARE EDUCATION/TRAINING PROGRAM

## 2022-03-25 RX ORDER — BLOOD-GLUCOSE,RECEIVER,CONT
EACH MISCELLANEOUS
Status: CANCELLED | OUTPATIENT
Start: 2022-03-25 | End: 2023-03-25

## 2022-03-25 RX ORDER — FLASH GLUCOSE SENSOR
1 KIT MISCELLANEOUS
Qty: 1 KIT | Refills: 1 | Status: SHIPPED | OUTPATIENT
Start: 2022-03-25 | End: 2022-03-25

## 2022-03-25 RX ORDER — FLASH GLUCOSE SENSOR
1 KIT MISCELLANEOUS
Qty: 1 KIT | Refills: 1 | Status: SHIPPED | OUTPATIENT
Start: 2022-03-25 | End: 2022-12-09

## 2022-03-25 NOTE — PROGRESS NOTES
Transitional Care Note  Subjective:       Patient ID: Kuldeep Alamo is a 57 y.o. male.  Chief Complaint: Hospital Follow Up (Pt here for a hospital follow up )    Family and/or Caretaker present at visit?  Yes.  Diagnostic tests reviewed/disposition: No diagnosic tests pending after this hospitalization.  Disease/illness education: dehydration; advised on insulin use when not eating  Home health/community services discussion/referrals: Patient does not have home health established from hospital visit.  They do not need home health.  If needed, we will set up home health for the patient.   Establishment or re-establishment of referral orders for community resources: No other necessary community resources.   Discussion with other health care providers: No discussion with other health care providers necessary.   Hospital f/u for dehydration and hypoglycemia 2/2 gastroenteritis  Now feeling much better  Sugars are starting to increase overall  Sees Dr Galeano for cards who also handles many of his chronic diseases and will get blood work for him in about 2 weeks   Does see endo and follows up with them in May next  Interested in continuous glucose monitor as has noted sugars increasing a lot now but while in hospital were very low  Also needs some refills on things  notes also new complaint of weakness in right hand and arm which has caused him to drop more thing recently though this is a long term problem and he has had neck pain for many years        Review of Systems   Constitutional: Positive for fatigue. Negative for fever.   HENT: Negative.    Respiratory: Negative for cough, shortness of breath and wheezing.    Cardiovascular: Negative for chest pain and leg swelling.   Gastrointestinal: Negative for abdominal pain, diarrhea, nausea and vomiting.   Genitourinary: Negative.  Negative for difficulty urinating, dysuria and frequency.   Musculoskeletal: Positive for arthralgias, back pain, myalgias, neck pain and neck  stiffness.   Neurological: Positive for dizziness, weakness and light-headedness. Negative for numbness and headaches.   Psychiatric/Behavioral: Negative for dysphoric mood. The patient is not nervous/anxious.        Objective:      Physical Exam  Vitals reviewed.   Constitutional:       Appearance: Normal appearance.   HENT:      Head: Atraumatic.   Eyes:      Conjunctiva/sclera: Conjunctivae normal.   Cardiovascular:      Rate and Rhythm: Normal rate and regular rhythm.   Pulmonary:      Effort: Pulmonary effort is normal.   Abdominal:      General: There is no distension.      Tenderness: There is no abdominal tenderness.   Musculoskeletal:      Right Lower Extremity: Right leg is amputated above knee.      Left Lower Extremity: Left leg is amputated above knee.   Neurological:      General: No focal deficit present.      Mental Status: He is alert and oriented to person, place, and time.   Psychiatric:         Mood and Affect: Mood normal.         Behavior: Behavior normal.         Assessment:       1. Hospital discharge follow-up    2. Dehydration    3. Type 2 diabetes mellitus with hyperglycemia, with long-term current use of insulin    4. 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 Right arm weakness        Plan:       1. Hospital discharge follow-up  - Comprehensive Metabolic Panel; Future    2. Dehydration  - Comprehensive Metabolic Panel; Future  - flash glucose sensor (FREESTYLE YEN 2 SENSOR) Kit; 1 kit by Misc.(Non-Drug; Combo Route) route every 14 (fourteen) days.  Dispense: 1 kit; Refill: 1    3. Type 2 diabetes mellitus with hyperglycemia, with long-term current use of insulin  - blood sugar diagnostic Strp; Test sugar 4 times daily (meals and nightly).  Onetouch Ultra or any preferred brand.  Dx Codes E11.65, Z79.4.  Dispense: 300 strip; Refill: 11  - flash glucose sensor (FREESTYLE YEN 2 SENSOR) Kit; 1 kit by Misc.(Non-Drug; Combo Route) route every 14 (fourteen) days.   Dispense: 1 kit; Refill: 1    4. 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 Right arm weakness  - Ambulatory referral/consult to Neurology; Future    Hospital f/u   Overall improved since discharge  Check CMP to make sure electrolytes have normalized and do not require further replenishment  Will attempt continuous glucose monitor for liable sugars; may need to contact endo for further management if continues to be elevated >200  Keep routine f/u with specialists   Refer to neurology for weakness in right arm worsening every since stroke  RTC as scheduled and/or PRN        DO Ad[facility]  3/25/22

## 2022-03-31 ENCOUNTER — OUTPATIENT CASE MANAGEMENT (OUTPATIENT)
Dept: ADMINISTRATIVE | Facility: OTHER | Age: 57
End: 2022-03-31
Payer: MEDICARE

## 2022-03-31 NOTE — LETTER
April 1, 2022             Dear Mr. Alamo,    Welcome to [facility].  It was a pleasure talking with you today.  My name is Lyn Crespo RN and I look forward to being your Care Manager.  My goal is to help you function at the healthiest and highest level possible.  You can contact me directly at [phone].    As an [facility] patient, some of the services we may be able to provide include:      Development of an individualized care plan with a Registered Nurse    Connection with a    Connection with available resources and services     Coordinate communication among your care team members    Provide coaching and education    Help you understand your doctors treatment plan   Help you obtain information about your insurance coverage.     All services provided by [facility] Man[facility] and other care team members are coordinated with and communicated to your primary care team.      As part of your enrollment, you will be receiving education materials and more information about these services in your [facility] account, by phone or through the mail.  If you do not wish to participate or receive information, please contact our office at [phone].      Sincerely,        Lyn Crespo RN  [facility]   Out-patient RN Complex Care Manager

## 2022-03-31 NOTE — LETTER
April 1, 2022           Dear Kuldeep,     Welcome to [facility].  It was a pleasure talking with you today.  My name is Lyn Crespo, and I look forward to being your Care Manager.  My goal is to help you function at the healthiest and highest level possible.  You can contact me directly at ***.    As an [facility] patient with [employer], some of the services we may be able to provide include:      Development of an individualized care plan with a Registered Nurse    Connection with a    Connection with available resources and services     Coordinate communication among your care team members    Provide coaching and education    Help you understand your doctors treatment plan   Help you obtain information about your insurance coverage.     All services provided by [facility] Man[facility] and other care team members are coordinated with and communicated to your primary care team.    As part of your enrollment, you will be receiving education materials and more information about these services in your [facility] account, by phone or through the mail.  If you do not wish to participate or receive information, please contact our office at [phone].      Sincerely,        Lyn Crespo RN  [facility]   Out-patient RN Complex Care Manager

## 2022-03-31 NOTE — PROGRESS NOTES
Outpatient Care Management  Initial Patient Assessment    Patient: Kuldeep Alamo  MRN: [id number]  Date of Service: 04/01/2022  Completed by: Lyn Crespo RN  Referral Date: 03/18/2022  Program: Case Management (High Risk)    Reason for Visit   Patient presents with    OPCM Chart Review     3/31/22    OPCM RN First Assessment Attempt     3/31/22    OPCM Enrollment Call    Initial Assessment    PHQ-9    Plan Of Care       Brief Summary:  Kuldeep Alamo was referred by Dr. 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 Patient qualifies for program based on risk score 96.7%.   Active problem list, medical, surgical and social history reviewed. Active comorbidities include CAD, DM II, HTN, hx of stroke. Areas of need identified by patient include DM management, food resources.   Complex care plan created with patient/caregiver input. By next encounter, patient agrees to F/U 4/8/22.     Assessment Documentation     OPCM Initial Assessment    Involvement of Care  Do I have permission to speak with other family members about your care?: Yes (Comment: Almaz his niece)  Assessment completed by: Patient  Identified Areas of Need  Advanced Care Planning: Yes  Housing: no  Medication Adherence: No  *Active medication list was reviewed and reconciled with patient and/or caregiver:   Nutrition: no  Lab Adherence: no  Depression: No  Cognitive/Behavioral Health: no  Communication: no  Health Literacy: no  Fall risk?: Yes  Equipment/Supplies/Services: no          Problem List and History     Problems Addressed This Visit    Depression: Not identified by patient as current problem  Heart Failure: Not identified by patient as current problem  Hypertension: Not identified by patient as current problem  Diabetes: Identified as current problem  Stroke: Not identified by patient as  current problem  Anemia: Not identified by patient as current problem  Chronic Kidney Disease: Not identified by patient as current problem  Hyperlipidemia: Not identified by patient as current problem  Glaucoma: Not identified by patient as current problem  Incontinence: Not identified by patient as current problem  Heart Disease: Not identified by patient as current problem         Reviewed medical and social history with patient and/or caregiver. A complex care plan was discussed and completed today, with input from patient and/or caregiver.    Patient Instructions     Instructions were provided via the [employer] patient resources and are available for the patient to view on the patient portal, if active.    Next steps:  F/U concerning diabetic management     Follow up in about 8 days (around 4/8/2022).    Todays OPCM Self-Management Care Plan was developed with the patients/caregivers input and was based on identified barriers from todays assessment.  Goals were written today with the patient/caregiver and the patient has agreed to work towards these goals to improve his/her overall well-being. Patient verbalized understanding of the care plan, goals, and all of today's instructions. Encouraged patient/caregiver to communicate with his/her physician and health care team about health conditions and the treatment plan.  Provided my contact information today and encouraged patient/caregiver to call me with any questions as needed.

## 2022-04-01 ENCOUNTER — TELEPHONE (OUTPATIENT)
Dept: OTOLARYNGOLOGY | Facility: CLINIC | Age: 57
End: 2022-04-01
Payer: MEDICARE

## 2022-04-01 ENCOUNTER — PATIENT MESSAGE (OUTPATIENT)
Dept: ADMINISTRATIVE | Facility: OTHER | Age: 57
End: 2022-04-01
Payer: MEDICARE

## 2022-04-01 NOTE — TELEPHONE ENCOUNTER
Returned call to patient and left message  ----- Message from Kylah Feliciano sent at 4/1/2022  9:10 AM CDT -----  Type:  Needs Medical Advice    Who Called:  pt niece  Symptoms (please be specific):   would like to get a call back to schedule pt appt for Ringing in ear and slight hearing loss     Would the patient rather a call back or a response via [facility]?  Call   Best Call Back Number:  [phone]  Additional Information:

## 2022-04-04 ENCOUNTER — OUTPATIENT CASE MANAGEMENT (OUTPATIENT)
Dept: ADMINISTRATIVE | Facility: OTHER | Age: 57
End: 2022-04-04
Payer: MEDICARE

## 2022-04-05 ENCOUNTER — OUTPATIENT CASE MANAGEMENT (OUTPATIENT)
Dept: ADMINISTRATIVE | Facility: OTHER | Age: 57
End: 2022-04-05
Payer: MEDICARE

## 2022-04-05 NOTE — PROGRESS NOTES
Outpatient Care Management   - High Risk Patient Assessment    Patient: Kuldeep Alamo  MRN:  [id number]  Date of Service:  4/5/2022  Completed by:  Sunitha Key LCSW  Referral Date: 03/18/2022  Program: Case Management (High Risk)    Reason for Visit   Patient presents with    Social Work Assessment - High Risk    Plan Of Care    [facility] Chart Review     04/05/2022    SW telephoned pt and completed SW assessment. Pt reported that he currently receives food assistance from [email or web address]. The SW encouraged the pt to reach out to SW if he has any questions or concerns. SW will follow up with pt in one week.     Brief Summary:  received a referral from OPC RN Lyn Crespo for the following patient identified psycho-social needs ACP and food insecurity. Care plan was created in collaboration with patient input. By next encounter, patient agrees to review ACP documents. Next steps will be for the SW to complete community search for resources that assist the pt.    Patient Summary     [facility] Social Work Assessment (High Risk)    Involvement of Care  Do I have permission to speak with other family members about your care?: Yes (Comment: Almaz Corbett- Niece and Grisel Chavez- Sister)  Assessment completed by: Patient  Cognitive  Which of the following can you state?: Name, Date of birth  Cognitive barriers?: No  General  Marital status: Single  Current employment status: Disabled  Support  Level of Caregiver support: Caregiver currently provides assistance  Support system: Family, Other (Use comment) (Comment:  Nurse)  Is the caregiver reporting burnout?: No  Support Barriers?: No  Advanced Care Planning  Do you have any of the following?: None  If yes, do we have a copy?: No  If no, would you like Advance Directive resources?: Yes  Advance Care Planning resources provided?: Yes (Comment: SW sending ACP resources to pt.)  Is Advance Care Planning an area of need?: Yes  Financial  Current  medical coverage: Medicaid, Medicare  Have you applied for government assistance programs?: Yes (Comment: SNAP Benefits- $16.00)  Are you unable to pay any of the following?: Food  Gross monthly income: 1100  Other assets: Owns Home  Financial Support Barriers?: No  Safety  Safety barriers?: No   History  Do you or your spouse currently or formerly serve in the ?: No  Wartime service?: No  Current use of VA services?: No  Disaster Plan  Established evacuation plan?: No (Comment: Family will evacuate pt.)  [place] residence: [facility]  Evacuation location: N/A  Registered for evacuation?: No  Ability to evacuate: N/A  Mental Health Status  Emotional status: Telephonic/Unable to assess (Comment: Pt was engaged and lucid during assessment)  Have you recenetly lost a loved one?: No  Psychiatric diagnosis: N/A  Current mental health treatment: No  Would you like mental health resources?: No  Current symptoms: None  Mental/Behavioral/Environmental risk: None (Comment: No history of SI/HI)  Mental Health Barriers?: No  Addictive Behaviors  Current alcohol consumption?: No  Current substance abuse?: No  Gambling habits?: No  Was the PHQ depression screening completed?: No  Was the NEY-7 completed?: No         Complex Care Plan     Care plan was discussed and completed today with input from patient and/or caregiver.    Patient Instructions     Follow up in about 1 week (around 4/12/2022).    Todays OPCM Self-Management Care Plan was developed with the patients/caregivers input and was based on identified barriers from todays assessment.  Goals were written today with the patient/caregiver and the patient has agreed to work towards these goals to improve his/her overall well-being. Patient verbalized understanding of the care plan, goals, and all of today's instructions. Encouraged patient/caregiver to communicate with his/her physician and health care team about health conditions and the treatment plan.   Provided my contact information today and encouraged patient/caregiver to call me with any questions as needed.

## 2022-04-06 NOTE — TELEPHONE ENCOUNTER
Called [facility] in [place] in regards of message on pt. Spoke with Myra in regards of patient's strips, she informed me that I can't do a verbal and that script needs to include the pt's ICD-10 codes and to also include it on the free text box as well. Please advise pt message.     Please include ICD-10 codes for script as well.

## 2022-04-06 NOTE — TELEPHONE ENCOUNTER
----- Message from Pillo Helton sent at 4/6/2022  3:06 PM CDT -----  Type:  Needs Medical Advice    Who Called: angelita  Reason:blood sugar diagnostic Strp needs a diagnosis code in order to fill his script for his test strips  Would the patient rather a call back or a response via [facility]? call  Best Call Back Number: [phone]  Additional Information: none

## 2022-04-06 NOTE — TELEPHONE ENCOUNTER
Called and spoke with pt's niece in regards of message on patient. She informed me that pt still has not gotten his test strips, due to ICD-10 codes missing on script. Informed pt's niece that I will call pharmacy in regards of this matter.

## 2022-04-07 DIAGNOSIS — M25.512 LEFT SHOULDER PAIN: Primary | ICD-10-CM

## 2022-04-14 ENCOUNTER — OUTPATIENT CASE MANAGEMENT (OUTPATIENT)
Dept: ADMINISTRATIVE | Facility: OTHER | Age: 57
End: 2022-04-14
Payer: MEDICARE

## 2022-04-14 NOTE — PROGRESS NOTES
4/14/2022     Attempt to complete Social Work Follow-Up for Outpatient Care Management; left message requesting a return call.  LCSW will reattempt at a later date.

## 2022-04-20 ENCOUNTER — TELEPHONE (OUTPATIENT)
Dept: UROLOGY | Facility: CLINIC | Age: 57
End: 2022-04-20
Payer: MEDICARE

## 2022-04-20 NOTE — TELEPHONE ENCOUNTER
----- Message from Shweta Jimenez sent at 4/20/2022  9:42 AM CDT -----  Type:  Patient Returning Call    Who Called: Pt   Who Left Message for Patient: Nicole   Does the patient know what this is regarding? Yes   Would the patient rather a call back or a response via [facility]? Call back   Best Call Back Number: [phone]  Additional Information:  pt says he can not make appt today will keep his appt for Friday

## 2022-04-22 ENCOUNTER — PROCEDURE VISIT (OUTPATIENT)
Dept: UROLOGY | Facility: CLINIC | Age: 57
End: 2022-04-22
Payer: MEDICARE

## 2022-04-22 VITALS — BODY MASS INDEX: 80.01 KG/M2 | HEIGHT: 60 IN

## 2022-04-22 DIAGNOSIS — N31.9 NEUROGENIC BLADDER: Primary | ICD-10-CM

## 2022-04-22 DIAGNOSIS — R33.9 URINARY RETENTION: ICD-10-CM

## 2022-04-22 PROCEDURE — 51700 BLADDER CATHETERIZATION: ICD-10-PCS | Mod: S$PBB,,, | Performed by: UROLOGY

## 2022-04-22 PROCEDURE — 51700 IRRIGATION OF BLADDER: CPT | Mod: PBBFAC,PO | Performed by: UROLOGY

## 2022-04-22 RX ORDER — CLOPIDOGREL BISULFATE 75 MG/1
75 TABLET ORAL DAILY
COMMUNITY
Start: 2022-04-06 | End: 2023-01-25

## 2022-04-22 RX ORDER — CIPROFLOXACIN 500 MG/1
500 TABLET ORAL 2 TIMES DAILY
Qty: 10 TABLET | Refills: 0 | Status: SHIPPED | OUTPATIENT
Start: 2022-04-22 | End: 2022-04-27

## 2022-04-22 NOTE — PROCEDURES
"Bladder Catheterization    Date/Time: 2022 11:00 AM  Location procedure was performed: [facility]  Performed by: Thompson Silva MD  Authorized by: Thompson Silva MD   Assisting provider: Thompson Silva MD  Pre-operative diagnosis: NGB  Post-operative diagnosis: NGB  Consent Done: Not Needed  Consent: Verbal consent obtained.  Consent given by: patient  Patient understanding: patient states understanding of the procedure being performed  Patient identity confirmed: name, , MRN and verbally with patient  Time out: Immediately prior to procedure a "time out" was called to verify the correct patient, procedure, equipment, support staff and site/side marked as required.  Indications: neurogenic bladder  Local anesthesia used: no    Anesthesia:  Local anesthesia used: no    Patient sedated: no  Preparation: Patient was prepped and draped in the usual sterile fashion.  Description of findings: spt site   Catheter insertion: indwelling  Catheter type: Isbell  Catheter size: 22 Fr  Complicated insertion: no  Altered anatomy: no  Bladder irrigation: yes  Number of attempts: 1  Urine volume: 10 ml  Urine characteristics: yellow and clear  Technical procedures used: none  Significant surgical tasks conducted by the assistant(s): none  Complications: No  Estimated blood loss (mL): 0  Specimens: No  Implants: No  Patient tolerance: Patient tolerated the procedure well with no immediate complications        "

## 2022-04-29 ENCOUNTER — OUTPATIENT CASE MANAGEMENT (OUTPATIENT)
Dept: ADMINISTRATIVE | Facility: OTHER | Age: 57
End: 2022-04-29
Payer: MEDICARE

## 2022-04-29 ENCOUNTER — DOCUMENT SCAN (OUTPATIENT)
Dept: HOME HEALTH SERVICES | Facility: HOSPITAL | Age: 57
End: 2022-04-29
Payer: MEDICARE

## 2022-04-29 NOTE — PROGRESS NOTES
Attempt to complete Social Work Follow-up for Outpatient Care Management; left message requesting a return call.  LCSW will reattempt at a later date.

## 2022-05-02 PROCEDURE — G0179 PR HOME HEALTH MD RECERTIFICATION: ICD-10-PCS | Mod: ,,, | Performed by: INTERNAL MEDICINE

## 2022-05-02 PROCEDURE — G0179 MD RECERTIFICATION HHA PT: HCPCS | Mod: ,,, | Performed by: INTERNAL MEDICINE

## 2022-05-03 ENCOUNTER — PATIENT OUTREACH (OUTPATIENT)
Dept: HOME HEALTH SERVICES | Facility: HOSPITAL | Age: 57
End: 2022-05-03
Payer: MEDICARE

## 2022-05-04 ENCOUNTER — EXTERNAL HOME HEALTH (OUTPATIENT)
Dept: HOME HEALTH SERVICES | Facility: HOSPITAL | Age: 57
End: 2022-05-04
Payer: MEDICARE

## 2022-05-13 DIAGNOSIS — Z79.4 TYPE 2 DIABETES MELLITUS WITH HYPERGLYCEMIA, WITH LONG-TERM CURRENT USE OF INSULIN: Primary | ICD-10-CM

## 2022-05-13 DIAGNOSIS — E11.65 TYPE 2 DIABETES MELLITUS WITH HYPERGLYCEMIA, WITH LONG-TERM CURRENT USE OF INSULIN: Primary | ICD-10-CM

## 2022-05-13 RX ORDER — BLOOD SUGAR DIAGNOSTIC
STRIP MISCELLANEOUS
Qty: 300 STRIP | Refills: 3 | Status: SHIPPED | OUTPATIENT
Start: 2022-05-13 | End: 2022-08-03

## 2022-05-16 ENCOUNTER — DOCUMENT SCAN (OUTPATIENT)
Dept: HOME HEALTH SERVICES | Facility: HOSPITAL | Age: 57
End: 2022-05-16
Payer: MEDICARE

## 2022-05-17 ENCOUNTER — OUTPATIENT CASE MANAGEMENT (OUTPATIENT)
Dept: ADMINISTRATIVE | Facility: OTHER | Age: 57
End: 2022-05-17
Payer: MEDICARE

## 2022-05-17 ENCOUNTER — TELEPHONE (OUTPATIENT)
Dept: FAMILY MEDICINE | Facility: CLINIC | Age: 57
End: 2022-05-17
Payer: MEDICARE

## 2022-05-17 NOTE — TELEPHONE ENCOUNTER
----- Message from Elizabeth Woo sent at 5/17/2022  9:52 AM CDT -----  Regarding: refill issue/diagnosis code  Contact: [phone]/Jerzy Muse  Patient's nicris Muse is requesting a call back regarding the pharmacy needs a diagnosis code for the  ONETOUCH ULTRA TEST Strps   Would the patient rather a call back or a response via [facility]?  call  Best Call Back Number:  [phone]/Jerzy Muse  Additional Information:  [id number] - [place], [place] - [address]

## 2022-05-20 ENCOUNTER — TELEPHONE (OUTPATIENT)
Dept: FAMILY MEDICINE | Facility: CLINIC | Age: 57
End: 2022-05-20
Payer: MEDICARE

## 2022-05-20 ENCOUNTER — OUTPATIENT CASE MANAGEMENT (OUTPATIENT)
Dept: ADMINISTRATIVE | Facility: OTHER | Age: 57
End: 2022-05-20
Payer: MEDICARE

## 2022-05-20 NOTE — TELEPHONE ENCOUNTER
----- Message from Lyn Crespo RN sent at 5/20/2022  2:15 PM CDT -----  Regarding: CGM  Contact: Lyn Aopnte    This is Lyn with Outpatient Case Management. Pt stated that he previously spoke with you concerning him receiving a CGM, but he has not heard any updates concerning this? Please Advise. Thank You      MARLENY Vicente, RN  [facility]  [email or web address]  TEL:  [phone]

## 2022-05-20 NOTE — PROGRESS NOTES
Outpatient Care Management  Plan of Care Follow Up Visit    Patient: Kuldeep Alamo  MRN: [id number]  Date of Service: 05/20/2022  Completed by: Lyn Crespo RN  Referral Date: 03/18/2022  Program: Case Management (High Risk)    Reason for Visit   Patient presents with    OPCM Chart Review     5/20/22    Update Plan Of Care     5/20/22       Brief Summary: 5/20/22 - Pt reports that he has not been having any complications with his DM. BS readings have been 150-200s checking BS TID. Discussed healthier snack options with pt besides salted peanuts and popcorn he is currently eating. Pt agreed to try carrot/celery with lite ranch snack pack when sister does grocery shopping. Reached out to PCP concerning pt receiving CGM.     Patient Summary     Involvement of Care:  Do I have permission to speak with other family members about your care?  Yes    Patient Reported Labs & Vitals:  1.  Any Patient Reported Labs & Vitals?  Yes  2.  Patient Reported Blood Pressure:     3.  Patient Reported Pulse:     4.  Patient Reported Weight (Kg):     5.  Patient Reported Blood Glucose (mg/dl):  150-200    Medical and social history was reviewed with patient and/or caregiver.     Clinical Assessment     Reviewed and provided basic information on available community resources for mental health, transportation, wellness resources, and palliative care programs with patient and/or caregiver.     Complex Care Plan     Care plan was discussed and completed today with input from patient and/or caregiver.    Patient Instructions     Instructions were provided via the [employer] patient resources and are available for the patient to view on the patient portal.    Next Steps: F/U concerning snack options   F/U concerning CGM    Follow up in about 1 week (around 5/27/2022).    Todays OPCM Self-Management Care Plan was developed with the patients/caregivers input and was based on identified barriers from todays assessment.  Goals were written today  with the patient/caregiver and the patient has agreed to work towards these goals to improve his/her overall well-being. Patient verbalized understanding of the care plan, goals, and all of today's instructions. Encouraged patient/caregiver to communicate with his/her physician and health care team about health conditions and the treatment plan.  Provided my contact information today and encouraged patient/caregiver to call me with any questions as needed.

## 2022-05-21 NOTE — PROGRESS NOTES
5/20/2022    Attempt to complete Social Work Follow-up for Outpatient Care Management; left message requesting a return call.  LCSW will reattempt at a later date.

## 2022-05-24 ENCOUNTER — OUTPATIENT CASE MANAGEMENT (OUTPATIENT)
Dept: ADMINISTRATIVE | Facility: OTHER | Age: 57
End: 2022-05-24
Payer: MEDICARE

## 2022-05-24 NOTE — PROGRESS NOTES
Outpatient Care Management   - Care Plan Follow Up    Patient: Kuldeep Alamo  MRN:  [id number]  Date of Service:  5/24/2022  Completed by:  Sunitha Key LCSW  Referral Date: 03/18/2022  Program: Case Management (High Risk)    Reason for Visit   Patient presents with    OPCM Chart Review    Case Closure    OPCM SW FOLLOW-UP       5/24/2022    SW telephoned pt and discussed case closure. Pt was in agreement with case closure.     Complex Care Plan     Care plan was discussed and completed today with input from patient and/or caregiver.    Patient Instructions         No follow-ups on file.    Todays OPCM Self-Management Care Plan was developed with the patients/caregivers input and was based on identified barriers from todays assessment.  Goals were written today with the patient/caregiver and the patient has agreed to work towards these goals to improve his/her overall well-being. Patient verbalized understanding of the care plan, goals, and all of today's instructions. Encouraged patient/caregiver to communicate with his/her physician and health care team about health conditions and the treatment plan.  Provided my contact information today and encouraged patient/caregiver to call me with any questions as needed.

## 2022-05-27 ENCOUNTER — OUTPATIENT CASE MANAGEMENT (OUTPATIENT)
Dept: ADMINISTRATIVE | Facility: OTHER | Age: 57
End: 2022-05-27
Payer: MEDICARE

## 2022-05-30 ENCOUNTER — PATIENT OUTREACH (OUTPATIENT)
Dept: EMERGENCY MEDICINE | Facility: HOSPITAL | Age: 57
End: 2022-05-30
Payer: MEDICARE

## 2022-06-02 ENCOUNTER — TELEPHONE (OUTPATIENT)
Dept: UROLOGY | Facility: CLINIC | Age: 57
End: 2022-06-02
Payer: MEDICARE

## 2022-06-02 NOTE — TELEPHONE ENCOUNTER
----- Message from Thompson Silva MD sent at 5/30/2022  9:05 AM CDT -----  Patient needs to come in this week for suprapubic tube change.  Can schedule at same time as another patient.  ----- Message -----  From: Nena Gilman MD  Sent: 5/28/2022  10:33 PM CDT  To: Thompson Silva MD    B- Mr. Jeong came into the ER on Saturday with malfunction of his suprapubic catheter.  We were able to place a  regular Isbell catheter.  He was sent home on Cipro.  The suprapubic catheter still in place.  He has a normal white blood cell count, is afebrile and has normal kidney function. Thanks, Nena

## 2022-06-03 ENCOUNTER — PROCEDURE VISIT (OUTPATIENT)
Dept: UROLOGY | Facility: CLINIC | Age: 57
End: 2022-06-03
Payer: MEDICARE

## 2022-06-03 VITALS
OXYGEN SATURATION: 97 % | WEIGHT: 160 LBS | HEART RATE: 74 BPM | TEMPERATURE: 98 F | DIASTOLIC BLOOD PRESSURE: 68 MMHG | SYSTOLIC BLOOD PRESSURE: 128 MMHG | RESPIRATION RATE: 16 BRPM | BODY MASS INDEX: 86.8 KG/M2

## 2022-06-03 DIAGNOSIS — Z43.5 ATTENTION TO CYSTOSTOMY: Primary | ICD-10-CM

## 2022-06-03 DIAGNOSIS — N40.1 BENIGN PROSTATIC HYPERPLASIA WITH NOCTURIA: ICD-10-CM

## 2022-06-03 DIAGNOSIS — R33.9 URINARY RETENTION: ICD-10-CM

## 2022-06-03 DIAGNOSIS — N99.512: ICD-10-CM

## 2022-06-03 DIAGNOSIS — R35.1 BENIGN PROSTATIC HYPERPLASIA WITH NOCTURIA: ICD-10-CM

## 2022-06-03 PROCEDURE — 51700 IRRIGATION OF BLADDER: CPT | Mod: PBBFAC,PO | Performed by: UROLOGY

## 2022-06-03 PROCEDURE — 51700 BLADDER CATHETERIZATION: ICD-10-PCS | Mod: S$PBB,,, | Performed by: UROLOGY

## 2022-06-03 RX ORDER — CIPROFLOXACIN 250 MG/1
250 TABLET, FILM COATED ORAL EVERY 12 HOURS
Qty: 10 TABLET | Refills: 0 | Status: SHIPPED | OUTPATIENT
Start: 2022-06-03 | End: 2022-06-08

## 2022-06-03 NOTE — PROCEDURES
Bladder Catheterization    Date/Time: 6/3/2022 2:00 PM  Location procedure was performed: [facility]  Performed by: Thompson Silva MD  Authorized by: Thompson Silva MD   Assisting provider: Thompson Silva MD  Pre-operative diagnosis: urinary retention, malfunctioning SPT  Post-operative diagnosis: same  Consent Done: Not Needed  Indications: catheter change and urinary retention  Local anesthesia used: no    Anesthesia:  Local anesthesia used: no    Patient sedated: no  Preparation: Patient was prepped and draped in the usual sterile fashion.  Description of findings: Sixteen Turkmen Isbell per urethra, 22 Turkmen Isbell per suprapubic tube site.  Suprapubic tube not draining, Isbell tube draining.   Catheter insertion: indwelling  Catheter type: Isbell  Catheter size: 22 Fr  Complicated insertion: no  Altered anatomy: no  Bladder irrigation: yes  Number of attempts: 1  Urine volume: 40 ml  Urine characteristics: blood-tinged  Technical procedures used: None  Significant surgical tasks conducted by the assistant(s): Isbell catheter removed  Complications: No  Estimated blood loss (mL): 0  Specimens: No  Implants: No  Patient tolerance: Patient tolerated the procedure well with no immediate complications  Comments: Patient seen emergency room for obstructed suprapubic tube.  Irrigation did not work and Isbell catheter placed per urethra because patient was leaking urine through the urethra.  Patient was referred to the office for change of the suprapubic tube and removal of Isbell catheter.

## 2022-06-15 ENCOUNTER — TELEPHONE (OUTPATIENT)
Dept: FAMILY MEDICINE | Facility: CLINIC | Age: 57
End: 2022-06-15
Payer: MEDICARE

## 2022-06-15 NOTE — PROGRESS NOTES
Outpatient Care Management  Plan of Care Follow Up Visit    Patient: Kuldeep Alamo  MRN: [id number]  Date of Service: 06/15/2022  Completed by: Lyn Crespo RN  Referral Date: 03/18/2022  Program: Case Management (High Risk)    Reason for Visit   Patient presents with    OPCM Chart Review     5/27/22    OPCM RN First Follow-Up Attempt     5/27/22    Update Plan Of Care     6/15/22       Brief Summary: Pt reports no complications with BS at this time. Pt reports BS stays in 150's -160's.  Pt reports he tried the veggie with fruit and cheese snack packs but states he is not a fan of it. Pt states he keeps sugar free snacks on hand. Pt reports consuming 4-5 bottled carrizales daily. Pt states he tried the no salt nuts and popcorn but states he is also not a fan of this either.   Patient Summary     Involvement of Care:  Do I have permission to speak with other family members about your care?  Yes    Patient Reported Labs & Vitals:  1.  Any Patient Reported Labs & Vitals?  Yes  2.  Patient Reported Blood Pressure:  1502-160s  3.  Patient Reported Pulse:     4.  Patient Reported Weight (Kg):     5.  Patient Reported Blood Glucose (mg/dl):  120s-130s/70s-60s    Medical and social history was reviewed with patient and/or caregiver.     Clinical Assessment     Reviewed and provided basic information on available community resources for mental health, transportation, wellness resources, and palliative care programs with patient and/or caregiver.     Complex Care Plan     Care plan was discussed and completed today with input from patient and/or caregiver.    Patient Instructions     Instructions were provided via the [employer] patient resources and are available for the patient to view on the patient portal.    Next Steps:F/U concerning management of BS and pt obtaining CGM    Follow up in about 1 month (around 6/29/2022).    Todays OPCM Self-Management Care Plan was developed with the patients/caregivers input and was based  on identified barriers from todays assessment.  Goals were written today with the patient/caregiver and the patient has agreed to work towards these goals to improve his/her overall well-being. Patient verbalized understanding of the care plan, goals, and all of today's instructions. Encouraged patient/caregiver to communicate with his/her physician and health care team about health conditions and the treatment plan.  Provided my contact information today and encouraged patient/caregiver to call me with any questions as needed.

## 2022-06-15 NOTE — TELEPHONE ENCOUNTER
----- Message from Lyn Crespo RN sent at 6/15/2022  4:42 PM CDT -----  Regarding: CGM  Contact: Lyn Anna      This is Lyn with Outpatient Case Management. I saw in the notes that you reached out to me concerning the CGM for this pt but unfortunately my number was the wrong number. I'm so sorry about the confusion. My new number is [phone]. Call me whenever you have a chance. Thank You      MARLENY Vicente, RN  [facility]  [email or web address]  TEL:  [phone]

## 2022-06-16 ENCOUNTER — TELEPHONE (OUTPATIENT)
Dept: FAMILY MEDICINE | Facility: CLINIC | Age: 57
End: 2022-06-16
Payer: MEDICARE

## 2022-06-16 NOTE — TELEPHONE ENCOUNTER
I have tried to get in past but insurance denied; pt should try to call insurance and find out what they would be able to cover due to his limited ability to check sugars appropriately on being on insulin he needs to check sugars frequently

## 2022-06-16 NOTE — TELEPHONE ENCOUNTER
Called Lyn who is the patient's Outpatient Case Management at the moment. She informed me that pt is trying to get a continues glucose meter , so that he can check his sugar levels on that go. Patient wants to know can he get the Dexcom device to manage his levels. Please advise patient message.

## 2022-06-17 NOTE — TELEPHONE ENCOUNTER
Sylvia Nicholson who is the patient's Outpatient Case Management in regards of provider message.  Informed her of Dr. Rock's message and she verbalized understanding of message.

## 2022-06-22 ENCOUNTER — NURSE TRIAGE (OUTPATIENT)
Dept: ADMINISTRATIVE | Facility: CLINIC | Age: 57
End: 2022-06-22
Payer: MEDICARE

## 2022-06-22 NOTE — TELEPHONE ENCOUNTER
Spoke with Almaz Corbett (niece) and the patient:    Patient has a supra pubic catheter in place. He reports hematuria with scant blood. Niece also notes cloudy/tea colored urine. Patient reports adequate fluid intake but reports decreased UOP. Patient has only emptied his bag once today. Denies fever, abdominal pain, or belly distention. Advised per protocol to be seen within the next 24 hours. Patient would like to see urology tomorrow if possible. Will route a message to Dr. Silva's staff to f/u with the patient. Discussed symptoms to monitor. Advised the patient to call back with any further questions or if symptoms worsen. Patient VU.     Reason for Disposition   [1] Tea-colored or slightly red urine lasts > 24 hours AND [2] not cleared by increasing fluid intake    AND [3] no recent prostate or bladder surgery    Additional Information   Negative: Shock suspected (e.g., cold/pale/clammy skin, too weak to stand, low BP, rapid pulse)   Negative: Sounds like a life-threatening emergency to the triager   Negative: [1] Catheter was accidentally pulled-out AND [2] bright red continuous bleeding   Negative: SEVERE abdominal pain   Negative: Fever > 100.4 F (38.0 C)   Negative: [1] Drinking very little AND [2] dehydration suspected (e.g., no urine > 12 hours, very dry mouth, very lightheaded)   Negative: Patient sounds very sick or weak to the triager   Negative: Catheter was accidentally pulled-out   Negative: [1] Catheter is broken AND [2] is not usable   Negative: Bleeding around catheter (e.g., from penis or female urethra)   Negative: Lower abdominal pain or distention   Negative: [1] No urine in bag > 4 hours AND [2] catheter is not kinked    Protocols used: URINARY CATHETER SYMPTOMS AND [id number]

## 2022-06-23 ENCOUNTER — TELEPHONE (OUTPATIENT)
Dept: UROLOGY | Facility: CLINIC | Age: 57
End: 2022-06-23
Payer: MEDICARE

## 2022-06-23 NOTE — TELEPHONE ENCOUNTER
I called to inform pt of the below message, we have scheduled him with Dr Silva on 6/24/22 at 9:30 a.m. Pt advised to call us back if he can not make this appointment. Also, kervin had to leave urology to fill in for the hunter nurse today.     No doctor nurse practitioner in office today.  May have nurse visit as described in my last note for urine collection for culture and irrigation of catheter to ensure that it is in appropriate place and draining well.  Patient should be encouraged to intake more fluids.  Thanks

## 2022-06-28 ENCOUNTER — PROCEDURE VISIT (OUTPATIENT)
Dept: UROLOGY | Facility: CLINIC | Age: 57
End: 2022-06-28
Payer: MEDICARE

## 2022-06-28 VITALS
RESPIRATION RATE: 16 BRPM | TEMPERATURE: 98 F | DIASTOLIC BLOOD PRESSURE: 78 MMHG | HEART RATE: 72 BPM | OXYGEN SATURATION: 96 % | SYSTOLIC BLOOD PRESSURE: 128 MMHG

## 2022-06-28 DIAGNOSIS — R31.0 GROSS HEMATURIA: ICD-10-CM

## 2022-06-28 DIAGNOSIS — Z43.5 ATTENTION TO CYSTOSTOMY: Primary | ICD-10-CM

## 2022-06-28 PROCEDURE — 99213 PR OFFICE/OUTPT VISIT, EST, LEVL III, 20-29 MIN: ICD-10-PCS | Mod: S$PBB,,, | Performed by: UROLOGY

## 2022-06-28 PROCEDURE — 99213 OFFICE O/P EST LOW 20 MIN: CPT | Mod: S$PBB,,, | Performed by: UROLOGY

## 2022-06-28 NOTE — PROGRESS NOTES
Subjective:       Patient ID: Kuldeep Alamo is a 57 y.o. male.    Chief Complaint: No chief complaint on file.    Mr. Alamo is a 58 yo WM with bilateral AKA and an SPT. Here for f/u secondary to bleeding in urine bag last week. Resolved over night. Here for f/u.    Hematuria  This is a new (Due to SPT rubbing in bladder.) problem. The current episode started more than 1 year ago. The problem has been resolved since onset. He describes the hematuria as gross hematuria. He is experiencing no pain. He describes his urine color as tea colored. Irritative symptoms do not include frequency or urgency. Pertinent negatives include no abdominal pain, chills, dysuria, facial swelling, fever, flank pain, genital pain, hesitancy, inability to urinate, nausea, vomiting or sore throat. He is not sexually active. His past medical history is significant for hypertension and tobacco use.     Review of Systems   Constitutional: Negative for activity change, appetite change, chills, diaphoresis, fatigue, fever and unexpected weight change.   HENT: Negative for congestion, facial swelling, hearing loss, sinus pressure, sore throat and trouble swallowing.    Eyes: Negative for photophobia, pain, discharge and visual disturbance.   Respiratory: Negative for apnea, cough and shortness of breath.    Cardiovascular: Negative for chest pain, palpitations and leg swelling.   Gastrointestinal: Negative for abdominal distention, abdominal pain, anal bleeding, blood in stool, constipation, diarrhea, nausea, rectal pain and vomiting.   Endocrine: Negative for cold intolerance, heat intolerance, polydipsia, polyphagia and polyuria.   Genitourinary: Positive for hematuria. Negative for decreased urine volume, difficulty urinating, dysuria, enuresis, flank pain, frequency, genital sores, hesitancy, penile discharge, penile pain, penile swelling, scrotal swelling, testicular pain and urgency.   Musculoskeletal: Negative for arthralgias, back pain  and myalgias.   Skin: Negative for color change, pallor, rash and wound.   Allergic/Immunologic: Negative for environmental allergies, food allergies and immunocompromised state.   Neurological: Negative for dizziness, seizures, weakness and headaches.   Hematological: Negative for adenopathy. Does not bruise/bleed easily.   Psychiatric/Behavioral: Negative.        Objective:      Physical Exam  Vitals and nursing note reviewed.   Constitutional:       Appearance: He is well-developed.   HENT:      Head: Normocephalic.      Right Ear: External ear normal.      Left Ear: External ear normal.      Nose: Nose normal.   Eyes:      Conjunctiva/sclera: Conjunctivae normal.      Pupils: Pupils are equal, round, and reactive to light.   Cardiovascular:      Rate and Rhythm: Normal rate and regular rhythm.      Heart sounds: Normal heart sounds.   Pulmonary:      Effort: Pulmonary effort is normal.      Breath sounds: Normal breath sounds.   Abdominal:      General: Bowel sounds are normal.      Palpations: Abdomen is soft.      Tenderness: There is no right CVA tenderness or left CVA tenderness.   Genitourinary:     Penis: Normal.       Comments: SPT in place, urine clear  Musculoskeletal:         General: Normal range of motion.      Cervical back: Normal range of motion and neck supple.      Comments: Bilateral AKA   Skin:     General: Skin is warm and dry.      Capillary Refill: Capillary refill takes 2 to 3 seconds.      Findings: No lesion or rash.   Neurological:      Mental Status: He is alert and oriented to person, place, and time.      Deep Tendon Reflexes: Reflexes are normal and symmetric.   Psychiatric:         Behavior: Behavior normal.         Thought Content: Thought content normal.         Judgment: Judgment normal.         Assessment:       1. Attention to cystostomy    2. Gross hematuria        Plan:       Patient Instructions   Keep f/u for SPT change

## 2022-06-29 ENCOUNTER — OUTPATIENT CASE MANAGEMENT (OUTPATIENT)
Dept: ADMINISTRATIVE | Facility: OTHER | Age: 57
End: 2022-06-29
Payer: MEDICARE

## 2022-06-29 NOTE — LETTER
July 13, 2022    Kuldeep Alamo  [address] [place] [address]             [facility] [address] [place] [address] Dear Mr. Alamo,    This is JOVANI Nicholson with [facility] Outpatient Case Management Department. I have been unsuccessful at reaching you to follow-up to see how you have been doing. Please call me back at your earliest convenience to discuss your health care needs.      I can be reached at [phone] from 8:00AM to 4:30 PM on Monday thru Friday. [facility] also has a program where a nurse is available 24/7 to answer questions or provide medical advice, their number is [phone].    Thanks,      Lyn Crespo RN  Outpatient Case Management/Disease Management  [phone]

## 2022-07-01 PROCEDURE — G0179 MD RECERTIFICATION HHA PT: HCPCS | Mod: ,,, | Performed by: INTERNAL MEDICINE

## 2022-07-01 PROCEDURE — G0179 PR HOME HEALTH MD RECERTIFICATION: ICD-10-PCS | Mod: ,,, | Performed by: INTERNAL MEDICINE

## 2022-07-07 ENCOUNTER — PATIENT OUTREACH (OUTPATIENT)
Dept: HOME HEALTH SERVICES | Facility: HOSPITAL | Age: 57
End: 2022-07-07
Payer: MEDICARE

## 2022-07-07 NOTE — ASSESSMENT & PLAN NOTE
Patient has ongoing evidence arterial ischemia.  Right foot with dependent rubor and pallor when elevated.  Dr. Galeano is scheduled to perform an intervention.  
Wound is only slightly larger.  Continue debridement to maintain active phase wound healing.  Await intervention by Dr. Galeano regarding arterial ischemia.  
negative

## 2022-07-08 ENCOUNTER — EXTERNAL HOME HEALTH (OUTPATIENT)
Dept: HOME HEALTH SERVICES | Facility: HOSPITAL | Age: 57
End: 2022-07-08
Payer: MEDICARE

## 2022-07-13 NOTE — PROGRESS NOTES
Outpatient Care Management  Plan of Care Follow Up Visit    Patient: Kuldeep Alamo  MRN: [id number]  Date of Service: 07/28/2022  Completed by: Lyn Crespo RN  Referral Date: 03/18/2022  Program: Case Management (High Risk)    Reason for Visit   Patient presents with    OPCM Chart Review     6/29/22    OPCM RN First Follow-Up Attempt     6/29/22    OPCM RN Second Follow-Up Attempt     7/13/22    Update Plan Of Care     7/28/22       Brief Summary: Pt reports that his BS has been in the 180s and the lowest reading he has had in the last 2-3 weeks is 119. Pt reports he has been eating lots of green beans with his meals such as green beans and ham or fish. Educated pt that the ham is not recommended because of the salt content, and that turkey would be better; pt verbalized understanding. Pt reports that he has been eating cucumber slices as a snack. Pt reports he has been drinking up to 4 16.9oz bottles of water daily. Pt reports that he needs appt with endocrine because his niece was supposed to make the appt but she gave birth on yesterday. Pt requested OPCM RN assist with makig appt. Called endocrine scheduling to make appt but was told scheduling was not able to make appt and that a message will be sent to Dr. Casillas's office for appt request. Appt Made for 9/23/22 with endocrine.     Patient Summary     Involvement of Care:  Do I have permission to speak with other family members about your care?  Yes    Patient Reported Labs & Vitals:  1.  Any Patient Reported Labs & Vitals?  Yes  2.  Patient Reported Blood Pressure:     3.  Patient Reported Pulse:     4.  Patient Reported Weight (Kg):     5.  Patient Reported Blood Glucose (mg/dl):  180s, 119    Medical and social history was reviewed with patient and/or caregiver.     Clinical Assessment     Reviewed and provided basic information on available community resources for mental health, transportation, wellness resources, and palliative care programs with  patient and/or caregiver.     Complex Care Plan     Care plan was discussed and completed today with input from patient and/or caregiver.    Patient Instructions     Instructions were provided via the [facility] patient resources and are available for the patient to view on the patient portal.    Next Steps: Case Closed     No follow-ups on file.    Todays OPCM Self-Management Care Plan was developed with the patients/caregivers input and was based on identified barriers from todays assessment.  Goals were written today with the patient/caregiver and the patient has agreed to work towards these goals to improve his/her overall well-being. Patient verbalized understanding of the care plan, goals, and all of today's instructions. Encouraged patient/caregiver to communicate with his/her physician and health care team about health conditions and the treatment plan.  Provided my contact information today and encouraged patient/caregiver to call me with any questions as needed.

## 2022-07-18 ENCOUNTER — PROCEDURE VISIT (OUTPATIENT)
Dept: UROLOGY | Facility: CLINIC | Age: 57
End: 2022-07-18
Payer: MEDICARE

## 2022-07-18 VITALS — HEART RATE: 79 BPM | OXYGEN SATURATION: 94 % | SYSTOLIC BLOOD PRESSURE: 136 MMHG | DIASTOLIC BLOOD PRESSURE: 87 MMHG

## 2022-07-18 DIAGNOSIS — N18.30 STAGE 3 CHRONIC KIDNEY DISEASE, UNSPECIFIED WHETHER STAGE 3A OR 3B CKD: ICD-10-CM

## 2022-07-18 DIAGNOSIS — Z43.5 ATTENTION TO CYSTOSTOMY: Primary | ICD-10-CM

## 2022-07-18 DIAGNOSIS — R33.9 URINARY RETENTION: ICD-10-CM

## 2022-07-18 PROCEDURE — 51700 IRRIGATION OF BLADDER: CPT | Mod: PBBFAC,PO | Performed by: UROLOGY

## 2022-07-18 PROCEDURE — 51700 BLADDER CATHETERIZATION: ICD-10-PCS | Mod: S$PBB,,, | Performed by: UROLOGY

## 2022-07-18 RX ORDER — POTASSIUM CHLORIDE 1500 MG/1
20 TABLET, EXTENDED RELEASE ORAL 2 TIMES DAILY
COMMUNITY
Start: 2022-06-06 | End: 2023-01-25

## 2022-07-18 RX ORDER — CIPROFLOXACIN 500 MG/1
500 TABLET ORAL 2 TIMES DAILY
Qty: 10 TABLET | Refills: 0 | Status: SHIPPED | OUTPATIENT
Start: 2022-07-18 | End: 2022-07-23

## 2022-07-18 RX ORDER — GABAPENTIN 100 MG/1
CAPSULE ORAL
COMMUNITY
Start: 2022-06-03 | End: 2023-01-25

## 2022-07-18 RX ORDER — BACLOFEN 20 MG/1
20 TABLET ORAL 4 TIMES DAILY
Status: ON HOLD | COMMUNITY
Start: 2022-05-29 | End: 2023-02-13 | Stop reason: SDUPTHER

## 2022-07-18 RX ORDER — DULOXETIN HYDROCHLORIDE 60 MG/1
60 CAPSULE, DELAYED RELEASE ORAL DAILY
COMMUNITY
Start: 2022-07-03

## 2022-07-18 RX ORDER — OXYCODONE HYDROCHLORIDE 10 MG/1
10 TABLET ORAL 3 TIMES DAILY PRN
COMMUNITY
Start: 2022-07-13

## 2022-07-18 NOTE — PROCEDURES
Bladder Catheterization    Date/Time: 7/18/2022 10:30 AM  Location procedure was performed: [facility]  Performed by: Thompson Silva MD  Authorized by: Thompson Silva MD   Assisting provider: Thompson Silva MD  Pre-operative diagnosis: urinary retention  Post-operative diagnosis: same  Consent Done: Not Needed  Indications: catheter change, inability to ambulate, neurogenic bladder and urinary retention  Local anesthesia used: no    Anesthesia:  Local anesthesia used: no    Patient sedated: no  Preparation: Patient was prepped and draped in the usual sterile fashion.  Description of findings: normal cystostomy   Catheter insertion: indwelling  Catheter type: Isbell  Catheter size: 20 Fr  Complicated insertion: no  Altered anatomy: no  Bladder irrigation: yes  Number of attempts: 1  Urine volume: 10 ml  Urine characteristics: clear and yellow  Technical procedures used: none  Significant surgical tasks conducted by the assistant(s): none  Complications: No  Estimated blood loss (mL): 0  Specimens: No  Implants: No  Patient tolerance: Patient tolerated the procedure well with no immediate complications

## 2022-07-29 ENCOUNTER — PATIENT MESSAGE (OUTPATIENT)
Dept: ENDOCRINOLOGY | Facility: CLINIC | Age: 57
End: 2022-07-29
Payer: MEDICARE

## 2022-08-03 RX ORDER — FENOFIBRATE 160 MG/1
160 TABLET ORAL DAILY
Qty: 90 TABLET | Refills: 1 | Status: SHIPPED | OUTPATIENT
Start: 2022-08-03 | End: 2023-01-25

## 2022-08-03 RX ORDER — INSULIN ASPART 100 [IU]/ML
3 INJECTION, SOLUTION INTRAVENOUS; SUBCUTANEOUS
Qty: 2.7 ML | Refills: 11 | Status: SHIPPED | OUTPATIENT
Start: 2022-08-03 | End: 2022-10-12 | Stop reason: SDUPTHER

## 2022-08-03 NOTE — TELEPHONE ENCOUNTER
----- Message from Antwon Kowalski sent at 8/3/2022 12:22 PM CDT -----  Contact: pt angelita    Type:  RX Refill Request    Who Called: pt niece  Refill or New Rx:refill  RX Name and Strength:insulin aspart U-100 (NOVOLOG) 100 unit/mL (3 mL) InPn pen   How is the patient currently taking it? (ex. 1XDay):Si unit 3 TIMES DAILY (route: subcutaneous)  Is this a 30 day or 90 day RX:n/a    RX Name and Strength:blood sugar diagnostic Strp  How is the patient currently taking it? (ex. 1XDay): Inject 1 strip into the skin 3 (three) times daily. Test sugar 3 times daily.  Is this a 30 day or 90 day RX: 300 strips      Preferred Pharmacy with phone number:[id number] - [place] - [address] [facility]   Phone: [phone]  Fax:  [phone]      Local or Mail Order: local  Ordering Provider:  Would the patient rather a call back or a response via [facility]? Call  Best Call Back Number:  Additional Information:

## 2022-08-03 NOTE — TELEPHONE ENCOUNTER
Called and spoke with pt in regards of his message. apat is calling to get med refills on his medication. Please advise message.

## 2022-08-31 ENCOUNTER — TELEPHONE (OUTPATIENT)
Dept: FAMILY MEDICINE | Facility: CLINIC | Age: 57
End: 2022-08-31
Payer: MEDICARE

## 2022-08-31 NOTE — TELEPHONE ENCOUNTER
----- Message from Laury Joseph, Patient Care Assistant sent at 8/31/2022  1:25 PM CDT -----  Type:  Needs Medical Advice    Who Called:  pt  Symptoms (please be specific):  Patient would like a call back regarding some concern about his appt    Would the patient rather a call back or a response via [facility]?  Please call  Best Call Back Number:  [phone]  Additional Information:

## 2022-09-01 ENCOUNTER — TELEPHONE (OUTPATIENT)
Dept: UROLOGY | Facility: CLINIC | Age: 57
End: 2022-09-01
Payer: MEDICARE

## 2022-09-01 NOTE — TELEPHONE ENCOUNTER
I attempted to return pt call, no answer. Pt appt move to mid sept.    ----- Message from Antwon Kowalski sent at 9/1/2022 12:31 PM CDT -----  Contact: Lyn  Type: Requesting to speak with nurse        Who Called: Lyn   Regarding:would like a call back to reschedule missed appointment   Would the patient rather a call back or a response via [facility]? Call back  Best Call Back Number: [phone]  Additional Information:

## 2022-09-13 ENCOUNTER — PROCEDURE VISIT (OUTPATIENT)
Dept: UROLOGY | Facility: CLINIC | Age: 57
End: 2022-09-13
Payer: MEDICARE

## 2022-09-13 DIAGNOSIS — Z43.5 ATTENTION TO CYSTOSTOMY: Primary | ICD-10-CM

## 2022-09-13 DIAGNOSIS — R39.81 URINARY INCONTINENCE DUE TO SEVERE PHYSICAL DISABILITY: ICD-10-CM

## 2022-09-13 DIAGNOSIS — N40.1 BENIGN PROSTATIC HYPERPLASIA WITH NOCTURIA: ICD-10-CM

## 2022-09-13 DIAGNOSIS — R35.1 BENIGN PROSTATIC HYPERPLASIA WITH NOCTURIA: ICD-10-CM

## 2022-09-13 DIAGNOSIS — R33.9 URINARY RETENTION: ICD-10-CM

## 2022-09-13 PROCEDURE — 51700 IRRIGATION OF BLADDER: CPT | Mod: PBBFAC,PO | Performed by: UROLOGY

## 2022-09-13 PROCEDURE — 51700 BLADDER CATHETERIZATION: ICD-10-PCS | Mod: S$PBB,,, | Performed by: UROLOGY

## 2022-09-13 RX ORDER — CIPROFLOXACIN 500 MG/1
500 TABLET ORAL 2 TIMES DAILY
Qty: 10 TABLET | Refills: 0 | Status: SHIPPED | OUTPATIENT
Start: 2022-09-13 | End: 2022-09-18

## 2022-09-13 NOTE — PROCEDURES
Bladder Catheterization    Date/Time: 9/13/2022 10:30 AM  Location procedure was performed: [facility]  Performed by: Thompson Silva MD  Authorized by: Thompson Silva MD   Assisting provider: Thompson Silva MD  Pre-operative diagnosis: Urinary retention  Post-operative diagnosis: Urinary retention  Consent Done: Not Needed  Indications: bladder incontinence, catheter change, neurogenic bladder and urinary retention  Local anesthesia used: no    Anesthesia:  Local anesthesia used: no    Patient sedated: no  Preparation: Patient was prepped and draped in the usual sterile fashion.  Description of findings: Normal cystostomy site   Catheter insertion: indwelling  Catheter size: 22 Fr  Complicated insertion: no  Altered anatomy: no  Bladder irrigation: yes  Number of attempts: 1  Urine volume: 10 ml  Urine characteristics: yellow  Technical procedures used: none  Significant surgical tasks conducted by the assistant(s): none  Complications: No  Estimated blood loss (mL): 0  Specimens: No  Implants: No  Patient tolerance: Patient tolerated the procedure well with no immediate complications

## 2022-10-11 ENCOUNTER — TELEPHONE (OUTPATIENT)
Dept: FAMILY MEDICINE | Facility: CLINIC | Age: 57
End: 2022-10-11
Payer: MEDICARE

## 2022-10-11 RX ORDER — INSULIN ASPART 100 [IU]/ML
3 INJECTION, SOLUTION INTRAVENOUS; SUBCUTANEOUS
Qty: 2.7 ML | Refills: 11 | Status: CANCELLED | OUTPATIENT
Start: 2022-10-11 | End: 2023-10-11

## 2022-10-11 NOTE — TELEPHONE ENCOUNTER
Care Due:                  Date            Visit Type   Department     Provider  --------------------------------------------------------------------------------                                HOSPITAL     [facility]  Last Visit: 03-      FOLLOW UP    [facility]  Jalyn AFSANEH Rock                               -                              PRIMARY      [facility]  Next Visit: 10-      CARE ([facility])   [facility] AFSANE[facility] Shweta                                                            Last  Test          Frequency    Reason                     Performed    Due Date  --------------------------------------------------------------------------------    HBA1C.......  6 months...  insulin..................  03- 09-    Lipid Panel.  12 months..  fenofibrate..............  10-   10-    [facility] Embedded Care Gaps. Reference number: [id number]. 10/11/2022   4:43:18 PM CDT

## 2022-10-11 NOTE — TELEPHONE ENCOUNTER
Called and spoke with pt in regards of his message. Pt called to get an appt to be seen in the clinic. Pt made his appt for 10/17/2022 for 3:00 pm.

## 2022-10-17 ENCOUNTER — OFFICE VISIT (OUTPATIENT)
Dept: FAMILY MEDICINE | Facility: CLINIC | Age: 57
End: 2022-10-17
Payer: MEDICARE

## 2022-10-17 VITALS
BODY MASS INDEX: 32.76 KG/M2 | DIASTOLIC BLOOD PRESSURE: 76 MMHG | OXYGEN SATURATION: 96 % | HEART RATE: 74 BPM | TEMPERATURE: 98 F | SYSTOLIC BLOOD PRESSURE: 122 MMHG | HEIGHT: 60 IN | WEIGHT: 166.88 LBS

## 2022-10-17 DIAGNOSIS — F33.0 MILD RECURRENT MAJOR DEPRESSION: ICD-10-CM

## 2022-10-17 DIAGNOSIS — H91.93 BILATERAL HEARING LOSS, UNSPECIFIED HEARING LOSS TYPE: ICD-10-CM

## 2022-10-17 DIAGNOSIS — I10 ESSENTIAL HYPERTENSION: Chronic | ICD-10-CM

## 2022-10-17 DIAGNOSIS — Z89.611 S/P AKA (ABOVE KNEE AMPUTATION) BILATERAL: ICD-10-CM

## 2022-10-17 DIAGNOSIS — G45.0 VERTEBROBASILAR OCCLUSIVE DISEASE: ICD-10-CM

## 2022-10-17 DIAGNOSIS — E78.2 MIXED HYPERLIPIDEMIA: ICD-10-CM

## 2022-10-17 DIAGNOSIS — Z79.4 TYPE 2 DIABETES MELLITUS WITH HYPERGLYCEMIA, WITH LONG-TERM CURRENT USE OF INSULIN: Primary | ICD-10-CM

## 2022-10-17 DIAGNOSIS — E11.65 TYPE 2 DIABETES MELLITUS WITH HYPERGLYCEMIA, WITH LONG-TERM CURRENT USE OF INSULIN: Primary | ICD-10-CM

## 2022-10-17 DIAGNOSIS — Z89.612 S/P AKA (ABOVE KNEE AMPUTATION) BILATERAL: ICD-10-CM

## 2022-10-17 PROCEDURE — 99214 PR OFFICE/OUTPT VISIT, EST, LEVL IV, 30-39 MIN: ICD-10-PCS | Mod: S$PBB,,, | Performed by: STUDENT IN AN ORGANIZED HEALTH CARE EDUCATION/TRAINING PROGRAM

## 2022-10-17 PROCEDURE — 99214 OFFICE O/P EST MOD 30 MIN: CPT | Mod: S$PBB,,, | Performed by: STUDENT IN AN ORGANIZED HEALTH CARE EDUCATION/TRAINING PROGRAM

## 2022-10-17 PROCEDURE — 99999 PR PBB SHADOW E&M-EST. PATIENT-LVL V: ICD-10-PCS | Mod: PBBFAC,,, | Performed by: STUDENT IN AN ORGANIZED HEALTH CARE EDUCATION/TRAINING PROGRAM

## 2022-10-17 PROCEDURE — 99999 PR PBB SHADOW E&M-EST. PATIENT-LVL V: CPT | Mod: PBBFAC,,, | Performed by: STUDENT IN AN ORGANIZED HEALTH CARE EDUCATION/TRAINING PROGRAM

## 2022-10-17 PROCEDURE — 99499 UNLISTED E&M SERVICE: CPT | Mod: S$PBB,,, | Performed by: STUDENT IN AN ORGANIZED HEALTH CARE EDUCATION/TRAINING PROGRAM

## 2022-10-17 PROCEDURE — 99215 OFFICE O/P EST HI 40 MIN: CPT | Mod: PBBFAC,PN | Performed by: STUDENT IN AN ORGANIZED HEALTH CARE EDUCATION/TRAINING PROGRAM

## 2022-10-17 PROCEDURE — 99499 RISK ADDL DX/OHS AUDIT: ICD-10-PCS | Mod: S$PBB,,, | Performed by: STUDENT IN AN ORGANIZED HEALTH CARE EDUCATION/TRAINING PROGRAM

## 2022-10-18 PROBLEM — R33.9 URINARY RETENTION: Status: RESOLVED | Noted: 2018-03-27 | Resolved: 2022-10-18

## 2022-10-18 PROBLEM — R35.1 BENIGN PROSTATIC HYPERPLASIA WITH NOCTURIA: Status: RESOLVED | Noted: 2018-02-28 | Resolved: 2022-10-18

## 2022-10-18 PROBLEM — Z65.9 SOCIAL PROBLEM: Status: RESOLVED | Noted: 2021-05-31 | Resolved: 2022-10-18

## 2022-10-18 PROBLEM — Z43.5 ATTENTION TO CYSTOSTOMY: Status: RESOLVED | Noted: 2022-03-09 | Resolved: 2022-10-18

## 2022-10-18 PROBLEM — R39.81 URINARY INCONTINENCE DUE TO SEVERE PHYSICAL DISABILITY: Status: RESOLVED | Noted: 2022-02-01 | Resolved: 2022-10-18

## 2022-10-18 PROBLEM — Z60.9 SOCIAL PROBLEM: Status: RESOLVED | Noted: 2021-05-31 | Resolved: 2022-10-18

## 2022-10-18 PROBLEM — N40.1 BENIGN PROSTATIC HYPERPLASIA WITH NOCTURIA: Status: RESOLVED | Noted: 2018-02-28 | Resolved: 2022-10-18

## 2022-10-18 PROBLEM — Z98.890 POST-OPERATIVE STATE: Status: RESOLVED | Noted: 2022-03-09 | Resolved: 2022-10-18

## 2022-10-18 RX ORDER — PROPRANOLOL HYDROCHLORIDE 80 MG/1
80 TABLET ORAL 2 TIMES DAILY
Status: ON HOLD | COMMUNITY
Start: 2022-10-14 | End: 2023-07-30 | Stop reason: SDUPTHER

## 2022-10-18 RX ORDER — ATORVASTATIN CALCIUM 40 MG/1
40 TABLET, FILM COATED ORAL NIGHTLY
COMMUNITY
Start: 2022-08-06 | End: 2022-12-07

## 2022-10-18 RX ORDER — PREGABALIN 150 MG/1
150 CAPSULE ORAL 3 TIMES DAILY
Status: ON HOLD | COMMUNITY
Start: 2022-10-03 | End: 2023-02-13 | Stop reason: SDUPTHER

## 2022-10-18 NOTE — ASSESSMENT & PLAN NOTE
Stable  Takes meds and uses insulin as prescribed   basaglar 10 units qd; novolog 3 TID  Wants to try to get CGM as would be much easier than trying to stick self 4x/day with his physical limitations

## 2022-10-18 NOTE — PROGRESS NOTES
Subjective:       Patient ID: Kuldeep Alamo is a 57 y.o. male.    Chief Complaint: Follow-up (Pt here for follow up and med refill )    Vertebrobasilar occlusive disease  Follows with dimitrios    Mild recurrent major depression  Sees psych   Celexa, cymbalta, xanax    Essential hypertension  Stable  Taking meds as prescribed    Mixed hyperlipidemia  Stable taking meds as prescribed    Type 2 diabetes mellitus with hyperglycemia, with long-term current use of insulin  Stable  Takes meds and uses insulin as prescribed   basaglar 10 units qd; novolog 3 TID  Wants to try to get CGM as would be much easier than trying to stick self 4x/day with his physical limitations      Review of Systems   Constitutional:  Negative for fever.   HENT:  Positive for hearing loss. Ear pain: wants referral to ENT.   Respiratory:  Negative for cough, shortness of breath and wheezing.    Cardiovascular:  Negative for chest pain and leg swelling.   Gastrointestinal:  Negative for abdominal pain, diarrhea, nausea and vomiting.   Genitourinary: Negative.  Negative for difficulty urinating, dysuria and frequency.   Musculoskeletal:  Positive for arthralgias.   Skin:  Positive for rash.   Neurological:  Positive for tremors. Negative for dizziness, numbness and headaches.   Psychiatric/Behavioral:  Negative for dysphoric mood. The patient is nervous/anxious.       A1C:  Recent Labs   Lab 04/18/21  2332 07/09/21  0920 03/16/22  2354   Hemoglobin A1C 10.1 H 8.6 H 7.6 H     CBC:  Recent Labs   Lab 03/16/22  1601 03/17/22  0519 05/28/22  1913   WBC 7.53 5.57 5.31   RBC 4.39 L 4.06 L 3.95 L   Hemoglobin 12.5 L 11.4 L 11.5 L   Hematocrit 36.2 L 33.5 L 34.7 L   Platelets 275 260 199   MCV 83 83 88   MCH 28.5 28.1 29.1   MCHC 34.5 34.0 33.1     CMP:  Recent Labs   Lab 03/17/22  0519 03/17/22  1904 03/25/22  1123 05/28/22  1913   Glucose 86   < > 251 H 165 H   Calcium 7.6 L   < > 9.0 9.2   Albumin 4.1  --  4.3 4.3   Total Protein 7.9  --  8.4 8.0   Sodium  143   < > 141 139   Potassium 2.5 LL   < > 3.9 4.2   CO2 19 L   < > 29 29   Chloride 109   < > 101 105   BUN 35 H   < > 27 H 22 H   Creatinine 1.03   < > 0.97 0.87   Alkaline Phosphatase 72  --  105 115   ALT 47 H  --  45 H 59 H   AST 46  --  34 39   Total Bilirubin 0.3  --  0.3 0.5    < > = values in this interval not displayed.     LIPIDS:  Recent Labs   Lab 05/15/20  0000 10/12/20  0000 11/07/20  1647 01/27/21  2133   TSH  --   --    < > 0.589   HDL 46 38  --   --    Cholesterol 175 173  --   --    Triglycerides 279  --   --   --    LDL Cholesterol 95 98  --   --    HDL/Cholesterol Ratio 3.8  --   --   --     < > = values in this interval not displayed.     TSH:  Recent Labs   Lab 11/07/20 1647 01/27/21  2133   TSH 1.070 0.589        Objective:      Vitals:    10/17/22 1510   BP: 122/76   Pulse: 74   Temp: 98.2 °F (36.8 °C)      Physical Exam  Vitals reviewed.   Constitutional:       Appearance: Normal appearance. He is normal weight.   HENT:      Head: Normocephalic and atraumatic.   Eyes:      Conjunctiva/sclera: Conjunctivae normal.   Cardiovascular:      Rate and Rhythm: Normal rate and regular rhythm.      Heart sounds: Normal heart sounds.   Pulmonary:      Effort: Pulmonary effort is normal.      Breath sounds: Normal breath sounds.   Abdominal:      Palpations: Abdomen is soft.      Tenderness: There is no abdominal tenderness.   Musculoskeletal:         General: Deformity present.      Cervical back: Normal range of motion.      Right Lower Extremity: Right leg is amputated above knee.      Left Lower Extremity: Left leg is amputated above knee.   Skin:     Findings: Rash present. Rash is crusting and scaling.          Neurological:      General: No focal deficit present.      Mental Status: He is alert and oriented to person, place, and time.   Psychiatric:         Mood and Affect: Mood normal.         Behavior: Behavior normal.        Assessment:       1. Type 2 diabetes mellitus with hyperglycemia,  with long-term current use of insulin    2. Vertebrobasilar occlusive disease    3. Mild recurrent major depression    4. Essential hypertension    5. Mixed hyperlipidemia    6. Bilateral hearing loss, unspecified hearing loss type    7. S/P AKA (above knee amputation) bilateral          Plan:   1. Type 2 diabetes mellitus with hyperglycemia, with long-term current use of insulin  - Continuous Glucose Monitoring for Home Use (HME vendor/non-pharmacy)    2. Vertebrobasilar occlusive disease    3. Mild recurrent major depression    4. Essential hypertension    5. Mixed hyperlipidemia    6. Bilateral hearing loss, unspecified hearing loss type  - Ambulatory referral/consult to ENT; Future  - Ambulatory referral/consult to Audiology; Future    7. S/P AKA (above knee amputation) bilateral  - Ambulatory referral/consult to Physical/Occupational Therapy; Future       Labs will review from [place]  Continue healthy lifestyle efforts  Will try to get CGM approved through DME   Continue current meds as prescribed  PT needs motorized wheelchair due to bilateral AKA; referral to PT for mobility eval placed  Keep routine specialist f/u   RTC in 6 months and/or PRN          [facility]   10/17/22

## 2022-10-20 NOTE — PROGRESS NOTES
Subjective:      Patient ID: Kuldeep Alamo is a 52 y.o. male.    Chief Complaint: Diabetic Foot Exam (pcp Dr Frederick last seen 2/5/2018)    Kuldeep is a 52 y.o. male who presents to the clinic upon referral from Dr. Frederick  for evaluation and treatment of diabetic feet. 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 Patient relates no major problem with feet. Only complaints today consist of thick, fungal toenails. He denies any history of lower extremity wounds, infections and/or injury.      PCP: Owen Frederick MD    Date Last Seen by PCP: 10/23/17     Current shoe gear: Casual shoes    Hemoglobin A1C   Date Value Ref Range Status   12/30/2017 5.7 (H) 4.0 - 5.6 % Final     Comment:     According to ADA guidelines, hemoglobin A1c <7.0% represents  optimal control in non-pregnant diabetic patients. Different  metrics may apply to specific patient populations.   Standards of Medical Care in Diabetes-2016.  For the purpose of screening for the presence of diabetes:  <5.7%     Consistent with the absence of diabetes  5.7-6.4%  Consistent with increasing risk for diabetes   (prediabetes)  >or=6.5%  Consistent with diabetes  Currently, no consensus exists for use of hemoglobin A1c  for diagnosis of diabetes for children.  This Hemoglobin A1c assay has significant interference with fetal   hemoglobin   (HbF). The results are invalid for patients with abnormal amounts of   HbF,   including those with known Hereditary Persistence   of Fetal Hemoglobin. Heterozygous hemoglobin variants (HbAS, HbAC,   HbAD, HbAE, HbA2) do not  significantly interfere with this assay;   however, presence of multiple variants in a sample may impact the %   interference.     07/13/2017 6.2 (H) 4.0 - 5.6 % Final     Comment:     According to ADA guidelines, hemoglobin A1c <7.0% represents  optimal control in non-pregnant diabetic patients. Different  metrics may apply to specific patient populations.   Standards of Medical Care in Diabetes-2016.  For the purpose of screening for the presence of diabetes:  <5.7%     Consistent with the absence of diabetes  5.7-6.4%  Consistent with increasing risk for diabetes   (prediabetes)  >or=6.5%  Consistent with diabetes  Currently, no consensus exists for use of hemoglobin A1c  for diagnosis of diabetes for children.  This Hemoglobin A1c assay has significant interference with fetal   hemoglobin   (HbF). The results are invalid for patients with abnormal amounts of   HbF,   including those with known Hereditary Persistence   of Fetal Hemoglobin. Heterozygous hemoglobin variants (HbAS, HbAC,   HbAD, HbAE, HbA2) do not significantly interfere with this assay;   however, presence of multiple variants in a sample may impact the %   interference.     12/06/2016 5.7 4.5 - 6.2 % Final     Comment:     According to ADA guidelines, hemoglobin A1C <7.0% represents  optimal control in non-pregnant diabetic patients.  Different  metrics may apply to specific populations.   Standards of Medical Care in Diabetes - 2016.  For the purpose of screening for the presence of diabetes:  <5.7%     Consistent with the absence of diabetes  5.7-6.4%  Consistent with increasing risk for diabetes   (prediabetes)  >or=6.5%  Consistent with diabetes  Currently no consensus exists for use of hemoglobin A1C  for diagnosis of diabetes for children.             Review of Systems   Constitution: Negative for chills, fever and malaise/fatigue.   Cardiovascular: Negative for chest pain, leg swelling, orthopnea and palpitations.   Respiratory: Negative  [No Acute Distress] : no acute distress for cough, shortness of breath and wheezing.    Skin: Positive for color change, dry skin and nail changes. Negative for itching, poor wound healing and rash.   Musculoskeletal: Negative for arthritis, gout, joint pain, joint swelling, muscle weakness and myalgias.   Neurological: Positive for numbness, paresthesias and sensory change. Negative for disturbances in coordination, dizziness, focal weakness and tremors.           Objective:        Physical Exam   Cardiovascular:   Pulses:       Dorsalis pedis pulses are 1+ on the right side, and 1+ on the left side.        Posterior tibial pulses are 1+ on the right side, and 1+ on the left side.   Dorsalis pedis and posterior tibial pulses are diminished Bilaterally. Toes are cool to touch. Feet are warm proximally.There is decreased digital hair. Skin is atrophic, slightly hyperpigmented, and mildly edematous       Musculoskeletal:   Muscle strength is 5/5 in all groups bilaterally.  Metatarsophalangeal and subtalar range of motion are within normal limits without crepitus bilaterally. There is limitation of ankle dorsiflexion with knees extended and flexed Bilaterally.       Neurological:   [place] 5.07 monofilamant testing is diminished both feet. Sharp/dull sensation diminished Bilaterally.   Skin:   Toenails 1-5 bilaterally are elongated by 2-3 mm, thickened by 2-3 mm, discolored/yellowed, dystrophic, brittle with subungual debris. No incurvation. Mild xerosis noted, bilat. No open wounds.                 Assessment:       Encounter Diagnoses   Name Primary?    Type II diabetes mellitus with neurological manifestations Yes    Onychomycosis due to dermatophyte     Hammer toes of both feet     Corn or callus     PAD (peripheral artery disease)     Mobility impaired     Hemiplegia of nondominant side, late effect of cerebrovascular disease     Comprehensive diabetic foot examination, type 2 DM, encounter for     Obesity, unspecified classification,  "unspecified obesity type, unspecified whether serious comorbidity present          Plan:       Kuldeep was seen today for diabetic foot exam.    Diagnoses and all orders for this visit:    Type II diabetes mellitus with neurological manifestations  -     Foot Care  -     DIABETIC SHOES FOR HOME USE    Onychomycosis due to dermatophyte    Hammer toes of both feet  -     DIABETIC SHOES FOR HOME USE    Corn or callus  -     DIABETIC SHOES FOR HOME USE    PAD (peripheral artery disease)    Mobility impaired    Hemiplegia of nondominant side, late effect of cerebrovascular disease    Comprehensive diabetic foot examination, type 2 DM, encounter for    Obesity, unspecified classification, unspecified obesity type, unspecified whether serious comorbidity present    Routine Foot Care  Date/Time: 2/9/2018 1:26 PM  Performed by: WILLIAMS GASTELUM JR.  Authorized by: WILLIAMS GASTELUM JR.     Time out: Immediately prior to procedure a "time out" was called to verify the correct patient, procedure, equipment, support staff and site/side marked as required.    Consent Done?:  Yes (Verbal)  Hyperkeratotic Skin Lesions?: No      Nail Care Type:  Debride  Location(s): All  (Left 1st Toe, Left 3rd Toe, Left 2nd Toe, Left 4th Toe, Left 5th Toe, Right 1st Toe, Right 2nd Toe, Right 3rd Toe, Right 4th Toe and Right 5th Toe)  Patient tolerance:  Patient tolerated the procedure well with no immediate complications      I counseled the patient on his conditions, their implications and medical management.        - Shoe inspection. Diabetic Foot Education. Patient reminded of the importance of good nutrition and blood sugar control to help prevent podiatric complications of diabetes. Patient instructed on proper foot hygeine. We discussed wearing proper shoe gear, daily foot inspections, never walking without protective shoe gear.      - Discussed all treatment options such as topical, oral, laser treatments vs surgical removal of nail " [No Resp Distress] : no resp distress [Clear to Auscultation Bilaterally] : clear to auscultation bilaterally permanent vs non-permanent in detail pertaining to nail fungus    He will use OTC topical treatments.

## 2022-10-25 ENCOUNTER — PROCEDURE VISIT (OUTPATIENT)
Dept: UROLOGY | Facility: CLINIC | Age: 57
End: 2022-10-25
Payer: MEDICARE

## 2022-10-25 VITALS — SYSTOLIC BLOOD PRESSURE: 136 MMHG | DIASTOLIC BLOOD PRESSURE: 70 MMHG | HEART RATE: 55 BPM

## 2022-10-25 DIAGNOSIS — N39.492 POSTURAL URINARY INCONTINENCE: ICD-10-CM

## 2022-10-25 DIAGNOSIS — Z43.5 ENCOUNTER FOR ATTENTION TO CYSTOSTOMY: ICD-10-CM

## 2022-10-25 DIAGNOSIS — L89.322 PRESSURE INJURY OF LEFT BUTTOCK, STAGE 2: Primary | ICD-10-CM

## 2022-10-25 PROBLEM — L89.302 PRESSURE INJURY OF BUTTOCK, STAGE 2: Status: ACTIVE | Noted: 2021-05-27

## 2022-10-25 PROCEDURE — 51700 BLADDER CATHETERIZATION: ICD-10-PCS | Mod: S$PBB,,, | Performed by: UROLOGY

## 2022-10-25 PROCEDURE — 51700 IRRIGATION OF BLADDER: CPT | Mod: PBBFAC,PO | Performed by: UROLOGY

## 2022-10-25 NOTE — PROCEDURES
Bladder Catheterization    Date/Time: 10/25/2022 9:00 AM  Location procedure was performed: [facility]  Performed by: Thompson Silva MD  Authorized by: Thompson Silva MD   Assisting provider: Thompson Silva MD  Pre-operative diagnosis: Cystostomy tube due to urinary incontinence in wheelchair-bound patient  Post-operative diagnosis: Same  Consent Done: Not Needed  Indications: catheter change and inability to ambulate  Local anesthesia used: no    Anesthesia:  Local anesthesia used: no    Patient sedated: no  Preparation: Patient was prepped and draped in the usual sterile fashion.  Description of findings: Normal cystostomy site with no evidence of infection   Catheter insertion: indwelling  Catheter type: Isbell  Catheter size: 22 Fr  Complicated insertion: no  Altered anatomy: no  Bladder irrigation: yes  Number of attempts: 1  Urine volume: 20 ml  Urine characteristics: blood-tinged  Technical procedures used: None  Significant surgical tasks conducted by the assistant(s): None  Complications: No  Estimated blood loss (mL): 0  Specimens: No  Implants: No  Patient tolerance: Patient tolerated the procedure well with no immediate complications

## 2022-11-09 ENCOUNTER — TELEPHONE (OUTPATIENT)
Dept: FAMILY MEDICINE | Facility: CLINIC | Age: 57
End: 2022-11-09
Payer: MEDICARE

## 2022-11-09 DIAGNOSIS — Z89.611 S/P AKA (ABOVE KNEE AMPUTATION) BILATERAL: Primary | ICD-10-CM

## 2022-11-09 DIAGNOSIS — Z89.612 S/P AKA (ABOVE KNEE AMPUTATION) BILATERAL: Primary | ICD-10-CM

## 2022-11-09 NOTE — TELEPHONE ENCOUNTER
----- Message from Sally Orona sent at 11/9/2022  3:14 PM CST -----  Type:  Needs Medical Advice    Who Called:  Pt sister Zhou  Symptoms (please be specific):  needs to speak with nurse in office in regards his power chair   Would the patient rather a call back or a response via [facility]? call  Best Call Back Number:  [phone]  Additional Information: sister states that he falls out all the time sister has already spoke with medical supply and states the need a strip in order  to get a new chair     [facility]  [phone]   Fax [phone]
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
## 2022-11-10 ENCOUNTER — TELEPHONE (OUTPATIENT)
Dept: FAMILY MEDICINE | Facility: CLINIC | Age: 57
End: 2022-11-10
Payer: MEDICARE

## 2022-11-16 ENCOUNTER — PATIENT MESSAGE (OUTPATIENT)
Dept: ADMINISTRATIVE | Facility: OTHER | Age: 57
End: 2022-11-16
Payer: MEDICARE

## 2022-11-17 ENCOUNTER — TELEPHONE (OUTPATIENT)
Dept: FAMILY MEDICINE | Facility: CLINIC | Age: 57
End: 2022-11-17
Payer: MEDICARE

## 2022-11-17 DIAGNOSIS — R53.83 FATIGUE: ICD-10-CM

## 2022-11-17 DIAGNOSIS — R53.1 ACUTE WEAKNESS: Primary | ICD-10-CM

## 2022-11-17 NOTE — TELEPHONE ENCOUNTER
Called pt's sister and lvm and informed her that I will share message with Dr. Rock in regards of pt needing a motorized wheelchair instead of a scooter.     Can you please place new order for pt for a motorized wheelchair instead on scooter. Please advise message.

## 2022-11-17 NOTE — TELEPHONE ENCOUNTER
I called and spoke to Caren at [employer]. She is faxing a DME order form. She stated we will need PT Mobility Evaluation if not done. We will also need last clinic note with documentation.     Rosalba Greenfield, MSN, APRN, FNP-C  Family Medicine  Office [phone]

## 2022-11-17 NOTE — TELEPHONE ENCOUNTER
----- Message from Kylah Feliciano sent at 11/17/2022 10:44 AM CST -----  Type:  Needs Medical Advice    Who Called: Shirlene ( Sister)  Symptoms (please be specific):   How long has patient had these symptoms:    Pharmacy name and phone #:    Would the patient rather a call back or a response via [facility]? call  Best Call Back Number: [phone]  Additional Information: Calling to get an updated order sent to [facility] for a motorized wheelchair/ order that was sent was for a scooter.  Please call Caren at [facility] #[phone] for additional information.  Caren needs most recent clinical notes.

## 2022-11-18 ENCOUNTER — TELEPHONE (OUTPATIENT)
Dept: FAMILY MEDICINE | Facility: CLINIC | Age: 57
End: 2022-11-18
Payer: MEDICARE

## 2022-11-18 NOTE — TELEPHONE ENCOUNTER
[facility] medical equipment order sheet completed for power wheelchair. Form, PT mobility eval order that was placed, and last clinic note faxed to Caren/[employer]. Form will be scanned in pt chart.

## 2022-11-21 ENCOUNTER — PATIENT MESSAGE (OUTPATIENT)
Dept: FAMILY MEDICINE | Facility: CLINIC | Age: 57
End: 2022-11-21
Payer: MEDICARE

## 2022-11-22 NOTE — TELEPHONE ENCOUNTER
Called [facility] and spoke with Caren in regards of pt forms to make sure that she received faxed. Caren informed me that she got faxed and all forms that were sent to her.

## 2022-11-28 PROBLEM — Z74.09 IMPAIRED TRANSFERS: Status: ACTIVE | Noted: 2022-11-28

## 2022-12-07 ENCOUNTER — PROCEDURE VISIT (OUTPATIENT)
Dept: UROLOGY | Facility: CLINIC | Age: 57
End: 2022-12-07
Payer: MEDICARE

## 2022-12-07 VITALS — HEART RATE: 55 BPM | OXYGEN SATURATION: 95 %

## 2022-12-07 DIAGNOSIS — N39.492 POSTURAL URINARY INCONTINENCE: Primary | ICD-10-CM

## 2022-12-07 DIAGNOSIS — Z43.5 ENCOUNTER FOR ATTENTION TO CYSTOSTOMY: ICD-10-CM

## 2022-12-07 PROCEDURE — 51702 INSERT TEMP BLADDER CATH: CPT | Mod: PBBFAC,PO | Performed by: UROLOGY

## 2022-12-07 PROCEDURE — 51702 BLADDER CATHETERIZATION: ICD-10-PCS | Mod: S$PBB,,, | Performed by: UROLOGY

## 2022-12-07 RX ORDER — CYCLOBENZAPRINE HCL 5 MG
5 TABLET ORAL
Status: ON HOLD | COMMUNITY
Start: 2022-11-07 | End: 2023-02-13 | Stop reason: HOSPADM

## 2022-12-07 RX ORDER — ROSUVASTATIN CALCIUM 40 MG/1
40 TABLET, COATED ORAL
Status: ON HOLD | COMMUNITY
Start: 2022-11-05 | End: 2023-03-16 | Stop reason: SDUPTHER

## 2022-12-07 NOTE — PROCEDURES
Bladder Catheterization    Date/Time: 12/7/2022 3:30 PM  Location procedure was performed: [facility]  Performed by: Thompson Silva MD  Authorized by: Thompson Silva MD   Assisting provider: Thompson Silva MD  Pre-operative diagnosis: Suprapubic cystostomy for urinary diversion  Post-operative diagnosis: Same  Consent Done: Not Needed  Indications: bladder incontinence, catheter change and inability to ambulate  Local anesthesia used: no    Anesthesia:  Local anesthesia used: no    Patient sedated: no  Preparation: Patient was prepped and draped in the usual sterile fashion.  Description of findings: Normal cystostomy site   Catheter insertion: indwelling  Catheter type: Isbell  Catheter size: 22 Fr  Complicated insertion: no  Altered anatomy: no  Bladder irrigation: no  Number of attempts: 1  Urine volume: 1 ml  Urine characteristics: yellow  Technical procedures used: none  Significant surgical tasks conducted by the assistant(s): none  Complications: No  Estimated blood loss (mL): 0  Specimens: No  Implants: No  Patient tolerance: Patient tolerated the procedure well with no immediate complications

## 2022-12-09 ENCOUNTER — OFFICE VISIT (OUTPATIENT)
Dept: ENDOCRINOLOGY | Facility: CLINIC | Age: 57
End: 2022-12-09
Payer: MEDICARE

## 2022-12-09 ENCOUNTER — PATIENT MESSAGE (OUTPATIENT)
Dept: ENDOCRINOLOGY | Facility: CLINIC | Age: 57
End: 2022-12-09

## 2022-12-09 DIAGNOSIS — E11.65 TYPE 2 DIABETES MELLITUS WITH HYPERGLYCEMIA, WITH LONG-TERM CURRENT USE OF INSULIN: Primary | ICD-10-CM

## 2022-12-09 DIAGNOSIS — I25.10 CORONARY ARTERY DISEASE INVOLVING NATIVE CORONARY ARTERY OF NATIVE HEART WITHOUT ANGINA PECTORIS: ICD-10-CM

## 2022-12-09 DIAGNOSIS — Z79.4 TYPE 2 DIABETES MELLITUS WITH HYPERGLYCEMIA, WITH LONG-TERM CURRENT USE OF INSULIN: Primary | ICD-10-CM

## 2022-12-09 DIAGNOSIS — E66.01 MORBID OBESITY: ICD-10-CM

## 2022-12-09 DIAGNOSIS — I73.9 PAD (PERIPHERAL ARTERY DISEASE): ICD-10-CM

## 2022-12-09 PROCEDURE — 99214 OFFICE O/P EST MOD 30 MIN: CPT | Mod: 95,,, | Performed by: INTERNAL MEDICINE

## 2022-12-09 PROCEDURE — 99214 PR OFFICE/OUTPT VISIT, EST, LEVL IV, 30-39 MIN: ICD-10-PCS | Mod: 95,,, | Performed by: INTERNAL MEDICINE

## 2022-12-09 NOTE — PROGRESS NOTES
Subjective:      Patient ID: Kuldeep Alamo is a 57 y.o. male.    Chief Complaint:  Diabetes    The patient location is: home in [place]  The chief complaint leading to consultation is:   Chief Complaint   Patient presents with    Diabetes       Visit type: audiovisual but changed to audio due to tech issues partway through    Face to Face time with patient: 15 minutes  25 minutes of total time spent on the encounter, which includes face to face time and non-face to face time preparing to see the patient (eg, review of tests), Obtaining and/or reviewing separately obtained history, Documenting clinical information in the electronic or other health record, Independently interpreting results (not separately reported) and communicating results to the patient/family/caregiver, or Care coordination (not separately reported).    Each patient to whom he or she provides medical services by telemedicine is:  (1) informed of the relationship between the physician and patient and the respective role of any other health care provider with respect to management of the patient; and (2) notified that he or she may decline to receive medical services by telemedicine and may withdraw from such care at any time.    History of Present Illness  Mr. Alamo presents for follow up of type 2 diabetes.  Previously seen by Dr. Casillas, new to me. Last visit in 3/2021       Has active history of DM2, CAD, CHFpEF, COPD, HTN, HLD, hx of stroke, BPH, depression, and GERD.     Type 2 diabetes first diagnosed several years ago.     He has been managing DM on his own but now has caretaker to help. Unclear the doses he has been giving but in general he reports:     Current Diabetes Regimen:  Basaglar 10 units BID  Novolog 5-10 but will go as high as 20 if >300 units AC    Reports A1c 7.2% checked by PCP recently    Reported sugars mostly in upper 100's with rare 200    Working to get set-up with [facility]       Denies any hypoglycemia or hypoglycemic  s/s.  Usually 3 meals daily     Hasn't seen diabetes educator recently.      No recent steroid injections.        Diabetes Management Status    Statin: Taking  ACE/ARB: Not taking    Screening or Prevention Patient's value Goal Complete/Controlled?   HgA1C Testing and Control   Lab Results   Component Value Date    HGBA1C 7.6 (H) 03/16/2022      Annually/Less than 8% Yes     Lipid profile : 07/28/2022 Annually No     LDL control Lab Results   Component Value Date    LDLCALC 98 10/12/2020    Annually/Less than 100 mg/dl  No     Nephropathy screening Lab Results   Component Value Date    LABMICR <2.5 07/12/2021     Lab Results   Component Value Date    PROTEINUA 1+ (A) 05/28/2022     No results found for: UTPCR   Annually Yes     Blood pressure BP Readings from Last 1 Encounters:   12/07/22 (P) 138/80    Less than 140/90 Yes     Dilated retinal exam : 12/22/2021 Annually Yes     Foot exam   : 10/17/2022 Annually Yes           Objective:     BP Readings from Last 3 Encounters:   12/07/22 (P) 138/80   10/25/22 136/70   10/17/22 122/76     Wt Readings from Last 1 Encounters:   10/17/22 1510 75.7 kg (166 lb 14.2 oz)       There is no height or weight on file to calculate BMI.    Lab Review:   Lab Results   Component Value Date    HGBA1C 7.6 (H) 03/16/2022     Lab Results   Component Value Date    CHOL 173 10/12/2020    HDL 38 10/12/2020    LDLCALC 98 10/12/2020    TRIG 279 05/15/2020    CHOLHDL 3.8 05/15/2020     Lab Results   Component Value Date     05/28/2022    K 4.2 05/28/2022     05/28/2022    CO2 29 05/28/2022     (H) 05/28/2022    BUN 22 (H) 05/28/2022    CREATININE 0.87 05/28/2022    CALCIUM 9.2 05/28/2022    PROT 8.0 05/28/2022    ALBUMIN 4.3 05/28/2022    BILITOT 0.5 05/28/2022    ALKPHOS 115 05/28/2022    AST 39 05/28/2022    ALT 59 (H) 05/28/2022    ANIONGAP 5 (L) 05/28/2022    ESTGFRAFRICA >60.0 05/28/2022    EGFRNONAA >60.0 05/28/2022    TSH 0.589 01/27/2021         Assessment and Plan      Type 2 diabetes mellitus with hyperglycemia, with long-term current use of insulin  Limited data to review today although sounds like sugars largely at goal but he is varying insulin doses   Will simplify regimen to the following:  Patient Instructions   Basaglar 15 units once daily  Novolog 5 units with meals plus sliding scale. Skip dose if not eating  Sliding Scale  You can also take extra novolog insulin if your blood sugar is higher than 150.  If your blood sugar is:  150-199             give extra 1 units of insulin to yourself.  200-249                             2 units  250-299         3 units  300-349                             4 units  350+                                  5 units    This is a link to a glucose log that you can print and fill out and then send pack to me either through the portal as an attachment or via fax [phone]  [email or web address]    Send picture of log 1 week and we can adjust doses then    Consider GLP1 in future. May also consider SGLT2 but would have to have discuss given history amputation although this risk is felt to be very low overall    PAD (peripheral artery disease)  Glycemic control as above    Morbid obesity  Consider glp1 next visit    Coronary artery disease involving native coronary artery of native heart without angina pectoris  As above      Gloria Abrams MD    RTC 4 months with labs

## 2022-12-09 NOTE — ASSESSMENT & PLAN NOTE
Limited data to review today although sounds like sugars largely at goal but he is varying insulin doses   Will simplify regimen to the following:  Patient Instructions   Basaglar 15 units once daily  Novolog 5 units with meals plus sliding scale. Skip dose if not eating  Sliding Scale  You can also take extra novolog insulin if your blood sugar is higher than 150.  If your blood sugar is:  150-199             give extra 1 units of insulin to yourself.  200-249                             2 units  250-299         3 units  300-349                             4 units  350+                                  5 units    This is a link to a glucose log that you can print and fill out and then send pack to me either through the portal as an attachment or via fax [phone]  [email or web address]    Send picture of log 1 week and we can adjust doses then    Consider GLP1 in future. May also consider SGLT2 but would have to have discuss given history amputation although this risk is felt to be very low overall

## 2022-12-09 NOTE — PATIENT INSTRUCTIONS
Basaglar 15 units once daily  Novolog 5 units with meals plus sliding scale. Skip dose if not eating  Sliding Scale  You can also take extra novolog insulin if your blood sugar is higher than 150.  If your blood sugar is:  150-199             give extra 1 units of insulin to yourself.  200-249                             2 units  250-299         3 units  300-349                             4 units  350+                                  5 units    This is a link to a glucose log that you can print and fill out and then send pack to me either through the portal as an attachment or via fax [phone]  [email or web address]    Send picture of log 1 week and we can adjust doses then

## 2022-12-14 ENCOUNTER — PATIENT MESSAGE (OUTPATIENT)
Dept: FAMILY MEDICINE | Facility: CLINIC | Age: 57
End: 2022-12-14
Payer: MEDICARE

## 2022-12-20 ENCOUNTER — TELEPHONE (OUTPATIENT)
Dept: FAMILY MEDICINE | Facility: CLINIC | Age: 57
End: 2022-12-20
Payer: MEDICARE

## 2022-12-28 ENCOUNTER — TELEPHONE (OUTPATIENT)
Dept: FAMILY MEDICINE | Facility: CLINIC | Age: 57
End: 2022-12-28
Payer: MEDICARE

## 2022-12-28 DIAGNOSIS — S31.829A OPEN WOUND OF LEFT BUTTOCK WITH COMPLICATION, INITIAL ENCOUNTER: Primary | ICD-10-CM

## 2022-12-28 NOTE — TELEPHONE ENCOUNTER
Called and spoke with pt's sister Aydee in regards of message on patient. Pt's sister informed me that patient has an open wound on his left butt cheek and it is very small, and it has some depth to it as well pt's sister states. Patient's sister states that pt is having pain from wound and she informed him to stay off that area until he is bale to get some help. Pt's sister wants to know if patient can get some assistance from Home Health, so that they can help treat patient's wound. Please advise message.

## 2022-12-28 NOTE — TELEPHONE ENCOUNTER
----- Message from Shweta Zambrano sent at 12/28/2022 11:35 AM CST -----  .Type:  Needs Medical Advice    Who Called: pt's Sister Aydee  Symptoms (please be specific): pain   How long has patient had these symptoms:  noticed about 4 days ago  Would the patient rather a call back or a response via [facility]? call  Best Call Back Number:   Additional Information:     Pt has another bed sore and she would like to speak to someone about receiving assistance from home health.

## 2022-12-29 NOTE — TELEPHONE ENCOUNTER
Spoke with pt's sister in regards of Home Health Orders being placed for pt, and that I will fax to correct HH agency. Pt's sister verbalized understanding of message.

## 2022-12-30 ENCOUNTER — TELEPHONE (OUTPATIENT)
Dept: FAMILY MEDICINE | Facility: CLINIC | Age: 57
End: 2022-12-30
Payer: MEDICARE

## 2022-12-30 DIAGNOSIS — S31.829A OPEN WOUND OF LEFT BUTTOCK WITH COMPLICATION, INITIAL ENCOUNTER: Primary | ICD-10-CM

## 2022-12-30 PROCEDURE — G0180 MD CERTIFICATION HHA PATIENT: HCPCS | Mod: ,,, | Performed by: PEDIATRICS

## 2022-12-30 PROCEDURE — G0180 PR HOME HEALTH MD CERTIFICATION: ICD-10-PCS | Mod: ,,, | Performed by: PEDIATRICS

## 2022-12-30 NOTE — TELEPHONE ENCOUNTER
Called and spoke with Gerda with [facility]  in regards of message on patient. Gerda informed me that she went and elevated patient for the wound on his left buttocks. She informed me that the wound is 1x1x1 with no tunneling at all.  Gerda was calling to get a verbal order to start promogram to the wound bed with mepilex on top twice a week if that is ok. Spoke with IZZY Broussard is regards of Gerda's plan of action and IZZY Broussard approved and I gave a verbal order to start patient's treatment for his wound. Gerda verbalized understanding of message.    Gerda wanted me to share that she feels that patient would benefit a lot from [facility]. She informed me that they have a PA that travels to patient's home to help treat their wound. She feels that Kuldeep would benefit since he is having transportation issues. Just sharing if this is something Dr. Rock would liek patient o do.

## 2022-12-30 NOTE — TELEPHONE ENCOUNTER
----- Message from Luna Chavira sent at 12/30/2022 11:10 AM CST -----  ..........Type:  Needs Medical Advice    Who Called: Gerda /home health nurse [facility]   Symptoms (please be specific): soars on left buttocks / promogram to the wound bed with mepilex on top twice a week if your ok with that  How long has patient had these symptoms: some 1cm by 1cm by 1cm in depth   Pharmacy name and phone #:    Would the patient rather a call back or a response via [facility]? Yes   Best Call Back Number: [phone]  Additional Information: Gerda needs a verbal if you would like her to give the pt

## 2023-01-03 ENCOUNTER — TELEPHONE (OUTPATIENT)
Dept: UROLOGY | Facility: CLINIC | Age: 58
End: 2023-01-03
Payer: MEDICARE

## 2023-01-03 NOTE — TELEPHONE ENCOUNTER
Called and spoke with Gerda with [facility] in regards of who can place orders for [facility]. Gerda informed me that the order needs to come from Dr. Rock . I will reach out to them in regards of this matter to see what it is they need from us.

## 2023-01-03 NOTE — TELEPHONE ENCOUNTER
Home health order routed to Jaimie:    Home health  Received: Today  Ada [facility] Staff  Pt was admitted to [facility] on12/30/22. Requesting orders to change SP catheter. Please sign if the following is ok.   SKILLED NURSE FOR INSERTION OF 22FR 10CC BALLOON SUPRAPUBIC CATHETER USING ASEPTIC TECHNIQUE; INSTILL 10CC OF STERILE WATER INTO BALLOON. CHANGE EVERY 4 WEEKS AND PRN DISLODGEMENT OR BLOCKAGE.  OBTAIN URINE SPECIMEN FOR SIGNS/SYMPTOMS OF INFECTION.   SKILLED NURSE MAY PERFORM UP TO   2  PRN VISITS DURING THIS EPISODE FOR OCCLUSION, BURNING SENSATION, LOWER ABDOMINAL PAIN, DISLODGEMENT OF CATHETER, FOUL ODOR TO URINE, INCREASED BLOOD IN URINE, OR TEMPERATURE GREATER THAN 101

## 2023-01-04 ENCOUNTER — TELEPHONE (OUTPATIENT)
Dept: FAMILY MEDICINE | Facility: CLINIC | Age: 58
End: 2023-01-04
Payer: MEDICARE

## 2023-01-04 NOTE — TELEPHONE ENCOUNTER
Called and spoke with Gerda with [facility]  in regards of pt's wife wanting patient to continue with PT , that he was getting before he was admitted to . Gerda took a verbal order to continue those services through [facility] for pt to continue those services with [facility].

## 2023-01-06 ENCOUNTER — TELEPHONE (OUTPATIENT)
Dept: FAMILY MEDICINE | Facility: CLINIC | Age: 58
End: 2023-01-06
Payer: MEDICARE

## 2023-01-06 NOTE — TELEPHONE ENCOUNTER
Called [facility]  and spoke with Gladis Newton in regards of them needing an order for PT evl and OT elv for patient. I was able to give an verbal order to Gladis in regards of this matter, and she took my verbal order.

## 2023-01-10 ENCOUNTER — TELEPHONE (OUTPATIENT)
Dept: UROLOGY | Facility: CLINIC | Age: 58
End: 2023-01-10
Payer: MEDICARE

## 2023-01-10 NOTE — TELEPHONE ENCOUNTER
Addended by: HARRIETT PENDLETON on: 1/3/2019 05:15 PM     Modules accepted: Fawad Stevenson     Spoke to Evangelina and notified her that the orders have been signed and will fax them to them. She stated that he possible has a uti and is requesting orders put in for urine tests to determine if this is true.

## 2023-01-10 NOTE — TELEPHONE ENCOUNTER
----- Message from Kylah Feliciano sent at 1/10/2023  9:16 AM CST -----  Contact: Evangelina  Type:  Needs Medical Advice    Who Called:  Home Health Agency  Symptoms (please be specific):  calling to get orders for Super Pubic Isbell/Cath change and also needing order for UA and Culture for possible infection pt has cloudy urine      Would the patient rather a call back or a response via [facility]?  Call   Best Call Back Number:  [phone]  Additional Information:  fax [phone]

## 2023-01-12 DIAGNOSIS — N30.00 ACUTE CYSTITIS WITHOUT HEMATURIA: ICD-10-CM

## 2023-01-12 DIAGNOSIS — N31.9 NEUROGENIC BLADDER: ICD-10-CM

## 2023-01-12 DIAGNOSIS — R33.9 URINARY RETENTION: Primary | ICD-10-CM

## 2023-01-23 ENCOUNTER — TELEPHONE (OUTPATIENT)
Dept: FAMILY MEDICINE | Facility: CLINIC | Age: 58
End: 2023-01-23
Payer: MEDICARE

## 2023-01-23 NOTE — TELEPHONE ENCOUNTER
Called and spoke with pt's niece in regards of message on patient. Pt's niece states that HH is requesting that patient has an virtual visit appt with Dr. Rock for another wound on patient's bottom. Patient currently has no working transportation at this time his van is broke. I see that you have sometime on Wednesday. Please let me know what you think about that date. Please advise message.

## 2023-01-23 NOTE — TELEPHONE ENCOUNTER
----- Message from Antwon Kowalski sent at 1/23/2023  4:03 PM CST -----  Contact: pt  Type: Requesting to speak with nurse        Who Called: PT  Regarding: requesting a virtual visit by Friday   Would the patient rather a call back or a response via [facility]? Call back  Best Call Back Number: [phone]   Additional Information:

## 2023-01-24 ENCOUNTER — TELEPHONE (OUTPATIENT)
Dept: FAMILY MEDICINE | Facility: CLINIC | Age: 58
End: 2023-01-24
Payer: MEDICARE

## 2023-01-24 NOTE — TELEPHONE ENCOUNTER
Called and spoke with pt's angelita Muse in regards of Dr. Rock agreeing to patient doing a virtual visit for the new wound on hit bottom. Informed pt's nasima Muse that pt's virtual visit is for 1/25/2023 for 11:20 am tomorrow with Dr. Rock. Pt's nice verbalized understanding of message.

## 2023-01-25 ENCOUNTER — OFFICE VISIT (OUTPATIENT)
Dept: FAMILY MEDICINE | Facility: CLINIC | Age: 58
End: 2023-01-25
Payer: COMMERCIAL

## 2023-01-25 DIAGNOSIS — S31.829D OPEN WOUND OF LEFT BUTTOCK WITH COMPLICATION, SUBSEQUENT ENCOUNTER: Primary | ICD-10-CM

## 2023-01-25 DIAGNOSIS — K59.04 CHRONIC IDIOPATHIC CONSTIPATION: ICD-10-CM

## 2023-01-25 PROCEDURE — 1160F RVW MEDS BY RX/DR IN RCRD: CPT | Mod: CPTII,95,, | Performed by: STUDENT IN AN ORGANIZED HEALTH CARE EDUCATION/TRAINING PROGRAM

## 2023-01-25 PROCEDURE — 99214 OFFICE O/P EST MOD 30 MIN: CPT | Mod: 95,,, | Performed by: STUDENT IN AN ORGANIZED HEALTH CARE EDUCATION/TRAINING PROGRAM

## 2023-01-25 PROCEDURE — 1159F MED LIST DOCD IN RCRD: CPT | Mod: CPTII,95,, | Performed by: STUDENT IN AN ORGANIZED HEALTH CARE EDUCATION/TRAINING PROGRAM

## 2023-01-25 PROCEDURE — 1160F PR REVIEW ALL MEDS BY PRESCRIBER/CLIN PHARMACIST DOCUMENTED: ICD-10-PCS | Mod: CPTII,95,, | Performed by: STUDENT IN AN ORGANIZED HEALTH CARE EDUCATION/TRAINING PROGRAM

## 2023-01-25 PROCEDURE — 1159F PR MEDICATION LIST DOCUMENTED IN MEDICAL RECORD: ICD-10-PCS | Mod: CPTII,95,, | Performed by: STUDENT IN AN ORGANIZED HEALTH CARE EDUCATION/TRAINING PROGRAM

## 2023-01-25 PROCEDURE — 99214 PR OFFICE/OUTPT VISIT, EST, LEVL IV, 30-39 MIN: ICD-10-PCS | Mod: 95,,, | Performed by: STUDENT IN AN ORGANIZED HEALTH CARE EDUCATION/TRAINING PROGRAM

## 2023-01-25 RX ORDER — LACTULOSE 10 G/15ML
10 SOLUTION ORAL 3 TIMES DAILY
Qty: 1000 ML | Refills: 6 | Status: SHIPPED | OUTPATIENT
Start: 2023-01-25 | End: 2023-03-14 | Stop reason: DRUGHIGH

## 2023-01-25 NOTE — PROGRESS NOTES
Subjective:      Patient ID: Kuldeep Alamo is a 57 y.o. male.    Chief Complaint: Wound Care (Pt is being seen today for wound on his bottom .)    Pt needs face to face to continue home health and wound care  Wound is healing but needs aw little longer to make sure fully heals   His sugars have not been good; 200s; not seeing endo until April due to transportation issues and having to reschedule a lot   He also needs order for lactulose as this helped with his constipation in the [facility]     Of note pt has left hemiparesis, a wound on his left bottom, and him not able to functionally reposition himself in his wheelchair as such he requires power chair replacement due to limited mobility and poor condition of current power chair.     Review of Systems   Constitutional:  Negative for activity change and unexpected weight change.   HENT:  Negative for hearing loss, rhinorrhea and trouble swallowing.    Eyes:  Negative for discharge and visual disturbance.   Respiratory:  Negative for chest tightness and wheezing.    Cardiovascular:  Negative for chest pain and palpitations.   Gastrointestinal:  Negative for blood in stool, constipation, diarrhea and vomiting.   Endocrine: Negative for polydipsia and polyuria.   Genitourinary:  Negative for difficulty urinating, hematuria and urgency.   Musculoskeletal:  Negative for arthralgias, joint swelling and neck pain.   Skin:  Positive for wound.   Neurological:  Negative for weakness and headaches.   Psychiatric/Behavioral:  Negative for confusion and dysphoric mood.       Objective:     There were no vitals filed for this visit.   Physical Exam  Constitutional:       Appearance: Normal appearance.   HENT:      Head: Atraumatic.   Eyes:      Conjunctiva/sclera: Conjunctivae normal.   Pulmonary:      Effort: Pulmonary effort is normal.   Neurological:      General: No focal deficit present.      Mental Status: He is alert and oriented to person, place, and time.   Psychiatric:          Mood and Affect: Mood normal.         Behavior: Behavior normal.      Assessment:         1. Open wound of left buttock with complication, subsequent encounter    2. Chronic idiopathic constipation            Plan:   1. Open wound of left buttock with complication, subsequent encounter  - SUBSEQUENT HOME HEALTH ORDERS    2. Chronic idiopathic constipation  - lactulose (CHRONULAC) 20 gram/30 mL Soln; Take 15 mLs (10 g total) by mouth 3 (three) times daily.  Dispense: 1000 mL; Refill: 6       HH to continue wound care until resolution of wound  Lactulose for constipation likely related to chronic opioid use  Continue current meds as prescribed; consider increasing insulin and/or adding GLP1  Keep routine specialist f/u   RTC as scheduled            [facility]   [facility]     The patient location is: [place]  The chief complaint leading to consultation is: wound    Visit type: audiovisual    Face to Face time with patient: 12  30 minutes of total time spent on the encounter, which includes face to face time and non-face to face time preparing to see the patient (eg, review of tests), Obtaining and/or reviewing separately obtained history, Documenting clinical information in the electronic or other health record, Independently interpreting results (not separately reported) and communicating results to the patient/family/caregiver, or Care coordination (not separately reported).

## 2023-01-26 ENCOUNTER — DOCUMENT SCAN (OUTPATIENT)
Dept: HOME HEALTH SERVICES | Facility: HOSPITAL | Age: 58
End: 2023-01-26
Payer: MEDICARE

## 2023-01-30 ENCOUNTER — DOCUMENT SCAN (OUTPATIENT)
Dept: HOME HEALTH SERVICES | Facility: HOSPITAL | Age: 58
End: 2023-01-30
Payer: MEDICARE

## 2023-02-01 DIAGNOSIS — I69.959 HEMIPLEGIA OF NONDOMINANT SIDE, LATE EFFECT OF CEREBROVASCULAR DISEASE: Primary | ICD-10-CM

## 2023-02-01 DIAGNOSIS — E11.65 TYPE 2 DIABETES MELLITUS WITH HYPERGLYCEMIA, WITH LONG-TERM CURRENT USE OF INSULIN: ICD-10-CM

## 2023-02-01 DIAGNOSIS — N18.30 STAGE 3 CHRONIC KIDNEY DISEASE, UNSPECIFIED WHETHER STAGE 3A OR 3B CKD: ICD-10-CM

## 2023-02-01 DIAGNOSIS — Z79.4 TYPE 2 DIABETES MELLITUS WITH HYPERGLYCEMIA, WITH LONG-TERM CURRENT USE OF INSULIN: ICD-10-CM

## 2023-02-03 ENCOUNTER — TELEPHONE (OUTPATIENT)
Dept: UROLOGY | Facility: CLINIC | Age: 58
End: 2023-02-03
Payer: MEDICARE

## 2023-02-03 ENCOUNTER — TELEPHONE (OUTPATIENT)
Dept: FAMILY MEDICINE | Facility: CLINIC | Age: 58
End: 2023-02-03
Payer: MEDICARE

## 2023-02-03 PROBLEM — N39.0 URINARY TRACT INFECTION ASSOCIATED WITH CATHETERIZATION OF URINARY TRACT: Status: ACTIVE | Noted: 2023-02-03

## 2023-02-03 PROBLEM — T83.511A URINARY TRACT INFECTION ASSOCIATED WITH CATHETERIZATION OF URINARY TRACT: Status: ACTIVE | Noted: 2023-02-03

## 2023-02-03 NOTE — TELEPHONE ENCOUNTER
----- Message from Mateo Callahan sent at 2/3/2023  7:52 AM CST -----  Contact: pt  .Type:  Needs Medical Advice    Who Called: ptJuan Gupta  Symptoms (please be specific):  low energy, low blood pressure and he keeps falling   Would the patient rather a call back or a response via [facility]?  Call back  Best Call Back Number: [phone]/ [phone]  Additional Information:  PtJuan Bertrand is calling to see if the home health nurse can check the pt. Potassium levels because his energy level is low, he is falling  and  his blood pressure is low 165.

## 2023-02-03 NOTE — TELEPHONE ENCOUNTER
Called and spoke with pt's angelita Muse in regards of Dr. Rock's message. Pt's angelita verbalized understanding of message , and informed me that they will take pt to the ER to seek medical treatment.

## 2023-02-03 NOTE — TELEPHONE ENCOUNTER
I attempted to return pt call on number listed below, no answer. Unable to leave voice mail. Pt has a Home health order for sp tube mgmt scanned into chart from jan 2023.      ----- Message from Noa Anderson sent at 2/3/2023  2:53 PM CST -----  Regarding: call back  Contact: [phone]  Type:  Same Day Appointment Request    Caller is requesting a same day appointment.  Caller declined first available appointment listed below.    Name of Caller: PT   When is the first available appointment? May   Symptoms: needs catheter changed urine is not going into the bag patient is wetting himself  Best Call Back Number: [phone]  Additional Information:

## 2023-02-03 NOTE — TELEPHONE ENCOUNTER
----- Message from Kylah Feliciano sent at 2/3/2023 12:51 PM CST -----  Type:  Patient Returning Call    Who Called:pt niece  Who Left Message for Patient:office  Does the patient know what this is regarding?:following up on lab orders  Would the patient rather a call back or a response via [facility]? call  Best Call Back Number:[phone]

## 2023-02-03 NOTE — TELEPHONE ENCOUNTER
Called and spoke with pt's sister Grisel in regards of message on patient. Grisel informed me that pt has been very weak and has been falling as well. She informed me that since pt's scooter is broken he has been in a wheelchair and using all his energy to move around as well. She also informed me that when patient is like this his potassium levels are usually low as well. Pt's sister states that  Dr. Galeano has took pt off of his potassium med as well.  Please advise message.       Is there anyway HH can check pt's potassium level  and his BP has been low 165.

## 2023-02-04 PROBLEM — R53.1 GENERALIZED WEAKNESS: Status: ACTIVE | Noted: 2023-02-04

## 2023-02-06 PROBLEM — R29.898 LUE WEAKNESS: Status: ACTIVE | Noted: 2023-02-06

## 2023-02-06 PROBLEM — R65.20 SEPSIS WITH ENCEPHALOPATHY: Status: ACTIVE | Noted: 2021-06-15

## 2023-02-06 PROBLEM — G93.41 ACUTE METABOLIC ENCEPHALOPATHY: Status: ACTIVE | Noted: 2023-02-06

## 2023-02-06 PROBLEM — G93.41 SEPSIS WITH ENCEPHALOPATHY: Status: ACTIVE | Noted: 2021-06-15

## 2023-02-07 ENCOUNTER — DOCUMENT SCAN (OUTPATIENT)
Dept: HOME HEALTH SERVICES | Facility: HOSPITAL | Age: 58
End: 2023-02-07
Payer: MEDICARE

## 2023-02-07 ENCOUNTER — TELEPHONE (OUTPATIENT)
Dept: UROLOGY | Facility: CLINIC | Age: 58
End: 2023-02-07
Payer: MEDICARE

## 2023-02-07 NOTE — TELEPHONE ENCOUNTER
Staff message sent to Dr Silva:    Casimiro Silva, pt jeradcris landry says Mr Light was admitted to the hospital today for UTI and fever and that he won't be released for a few days. She asked if you could go see him at the hospital but I told her that was highly unlikely and you will see his ED note /  have him to follow up in clinic after he is discharged from the hospital. Is there anything else you'd like me to advise them?      ----- Message from Stephanie Quintero sent at 2/7/2023  1:25 PM CST -----  Type:  Needs Medical Advice    Who Called: pt angelita  Symptoms (please be specific): they would like to have Dr Silva go and see him in [facility] his health is not good and the family is concerned  the hospital stated that he has a UTI and they are concerned       Would the patient rather a call back or a response via [facility]? call  Best Call Back Number: [phone]  Additional Information:

## 2023-02-08 ENCOUNTER — TELEPHONE (OUTPATIENT)
Dept: UROLOGY | Facility: CLINIC | Age: 58
End: 2023-02-08
Payer: MEDICARE

## 2023-02-08 NOTE — TELEPHONE ENCOUNTER
----- Message from Thompson Silva MD sent at 2/8/2023 11:21 AM CST -----  Regarding: RE: ED concern  I can only see him if consult by Dr. Begum.  Will speak to Dr. Begum to make sure that he is okay and go from there.  ----- Message -----  From: Cecelia Milligan MA  Sent: 2/7/2023   2:05 PM CST  To: Thompson Silva MD  Subject: ED concern                                       Hey Dr Silva, pt angelita landry says Mr Light was admitted to the hospital today for UTI and fever and that he won't be released for a few days. She asked if you could go see him the hospital but I told her that was highly unlikely and you will see his ED note /  have him to follow up in clinic after he is discharged from the hospital. Is there anything else you'd like me to advise them?            Stephanie [facility] Staff  Caller: Unspecified (Today,  1:25 PM)  Type:  Needs Medical Advice     Who Called: nicole goldstein   Symptoms (please be specific): they would like to have Dr Silva go and see him in [facility] his health is not good and the family is concerned  the hospital stated that he has a UTI and they are concerned        Would the patient rather a call back or a response via [facility]? call   Best Call Back Number: [phone]   Additional Information:

## 2023-02-10 PROBLEM — R65.20 SEPSIS WITH ENCEPHALOPATHY: Status: RESOLVED | Noted: 2021-06-15 | Resolved: 2023-02-10

## 2023-02-10 PROBLEM — A41.9 SEPSIS WITH ENCEPHALOPATHY: Status: RESOLVED | Noted: 2021-06-15 | Resolved: 2023-02-10

## 2023-02-10 PROBLEM — G93.41 SEPSIS WITH ENCEPHALOPATHY: Status: RESOLVED | Noted: 2021-06-15 | Resolved: 2023-02-10

## 2023-02-14 ENCOUNTER — PATIENT OUTREACH (OUTPATIENT)
Dept: ADMINISTRATIVE | Facility: CLINIC | Age: 58
End: 2023-02-14
Payer: MEDICARE

## 2023-02-28 PROCEDURE — G0179 MD RECERTIFICATION HHA PT: HCPCS | Mod: ,,, | Performed by: PEDIATRICS

## 2023-02-28 PROCEDURE — G0179 PR HOME HEALTH MD RECERTIFICATION: ICD-10-PCS | Mod: ,,, | Performed by: PEDIATRICS

## 2023-03-01 ENCOUNTER — OUTPATIENT CASE MANAGEMENT (OUTPATIENT)
Dept: ADMINISTRATIVE | Facility: OTHER | Age: 58
End: 2023-03-01
Payer: MEDICARE

## 2023-03-01 NOTE — PROGRESS NOTES
Outpatient Care Management  Patient Does Not Consent    Patient: Kuldeep Alamo  MRN:  [id number]  Date of Service:  3/1/2023  Completed by:  Maria Elena Jain LMSW    Chief Complaint   Patient presents with    Social Work Assessment - Low/Mod Risk    OPCM Chart Review    Case Closure     3/1/2023       Patient Summary     Program:  OPCM Low Risk     Consent Received:  Decline SW spoke and completed social assessment with patient. No needs identified at this time. Pt requests insurance case management services. RENETTA will ensure this referral is sent to pt's insurer. Case closed.

## 2023-03-02 ENCOUNTER — EXTERNAL HOME HEALTH (OUTPATIENT)
Dept: HOME HEALTH SERVICES | Facility: HOSPITAL | Age: 58
End: 2023-03-02
Payer: MEDICARE

## 2023-03-08 ENCOUNTER — TELEPHONE (OUTPATIENT)
Dept: FAMILY MEDICINE | Facility: CLINIC | Age: 58
End: 2023-03-08
Payer: MEDICARE

## 2023-03-08 NOTE — TELEPHONE ENCOUNTER
----- Message from Mateo Callahan sent at 3/8/2023  1:02 PM CST -----  Contact: Home Health nurse  .Type:  Needs Medical Advice    Who Called:  Home health nurse Keli  Would the patient rather a call back or a response via [facility]?  Call back  Best Call Back Number: [phone]  Additional Information:  Home health nurse is calling for the status of a power chair and she would like a call back

## 2023-03-09 ENCOUNTER — TELEPHONE (OUTPATIENT)
Dept: FAMILY MEDICINE | Facility: CLINIC | Age: 58
End: 2023-03-09
Payer: MEDICARE

## 2023-03-09 NOTE — TELEPHONE ENCOUNTER
Keli wanted to know have you receive paperwork for [employer] regarding a power chair for patient.

## 2023-03-09 NOTE — TELEPHONE ENCOUNTER
----- Message from Mateo Callahan sent at 3/9/2023 11:00 AM CST -----  Contact: Keli  .Type:  Needs Medical Advice    Who Called: Home Health nurse Keli  Would the patient rather a call back or a response via [facility]?  Call back  Best Call Back Number: [phone]  Additional Information:  Home Health nurse is returning a call back to Shoshana

## 2023-03-13 ENCOUNTER — DOCUMENT SCAN (OUTPATIENT)
Dept: HOME HEALTH SERVICES | Facility: HOSPITAL | Age: 58
End: 2023-03-13
Payer: MEDICARE

## 2023-03-15 ENCOUNTER — DOCUMENT SCAN (OUTPATIENT)
Dept: HOME HEALTH SERVICES | Facility: HOSPITAL | Age: 58
End: 2023-03-15
Payer: MEDICARE

## 2023-03-15 ENCOUNTER — TELEPHONE (OUTPATIENT)
Dept: FAMILY MEDICINE | Facility: CLINIC | Age: 58
End: 2023-03-15
Payer: MEDICARE

## 2023-03-15 PROBLEM — T83.510A URINARY TRACT INFECTION ASSOCIATED WITH CYSTOSTOMY CATHETER: Status: ACTIVE | Noted: 2023-02-03

## 2023-03-15 NOTE — TELEPHONE ENCOUNTER
----- Message from Bernice Cordoba LMSW sent at 3/15/2023  2:06 PM CDT -----  Regarding: Hospital follow up appointment  Patient anticipated to be discharged to home tomorrow 3/16/2023.  Patient will need a hospital follow up appointment please.  Thanks

## 2023-03-16 PROBLEM — N17.9 AKI (ACUTE KIDNEY INJURY): Status: RESOLVED | Noted: 2021-04-18 | Resolved: 2023-03-16

## 2023-03-17 ENCOUNTER — DOCUMENT SCAN (OUTPATIENT)
Dept: HOME HEALTH SERVICES | Facility: HOSPITAL | Age: 58
End: 2023-03-17
Payer: MEDICARE

## 2023-03-21 ENCOUNTER — EXTERNAL HOME HEALTH (OUTPATIENT)
Dept: HOME HEALTH SERVICES | Facility: HOSPITAL | Age: 58
End: 2023-03-21
Payer: MEDICARE

## 2023-03-21 ENCOUNTER — PATIENT OUTREACH (OUTPATIENT)
Dept: HOME HEALTH SERVICES | Facility: HOSPITAL | Age: 58
End: 2023-03-21
Payer: MEDICARE

## 2023-03-23 ENCOUNTER — DOCUMENT SCAN (OUTPATIENT)
Dept: HOME HEALTH SERVICES | Facility: HOSPITAL | Age: 58
End: 2023-03-23
Payer: MEDICARE

## 2023-03-24 DIAGNOSIS — Z89.611 S/P AKA (ABOVE KNEE AMPUTATION) BILATERAL: Primary | ICD-10-CM

## 2023-03-24 DIAGNOSIS — Z89.612 S/P AKA (ABOVE KNEE AMPUTATION) BILATERAL: Primary | ICD-10-CM

## 2023-03-24 DIAGNOSIS — Z74.09 IMPAIRED TRANSFERS: ICD-10-CM

## 2023-03-24 DIAGNOSIS — L89.322 PRESSURE INJURY OF LEFT BUTTOCK, STAGE 2: ICD-10-CM

## 2023-03-24 NOTE — TELEPHONE ENCOUNTER
----- Message from Pillo Helton sent at 3/24/2023  8:43 AM CDT -----  Type:  Needs Medical Advice    Who Called: brandon  Reason:you sent an order for a standard 3 wheelchair but you arent in network with his isurance  Would the patient rather a call back or a response via [facility]? call  Best Call Back Number: [phone]  Additional Information: alex

## 2023-03-24 NOTE — TELEPHONE ENCOUNTER
Spoke with [facility]. They stated that they received the order for pt power chair but that they could not process the order due to Dr. Rock not being in pt network with [facility]. I am reaching out to leadership in regards to Dr. Rock's status with [facility].

## 2023-03-27 ENCOUNTER — TELEPHONE (OUTPATIENT)
Dept: FAMILY MEDICINE | Facility: CLINIC | Age: 58
End: 2023-03-27
Payer: MEDICARE

## 2023-03-27 NOTE — TELEPHONE ENCOUNTER
----- Message from Candice Gonzalez sent at 3/27/2023  2:39 PM CDT -----  Type:  Needs Medical Advice    Who Called:  therapist [facility]  Would the patient rather a call back or a response via [facility]?  call  Best Call Back Number: [phone]  Additional Information:  Pt would like a call back regarding regarding prescription for a wheel chair.

## 2023-04-11 ENCOUNTER — OFFICE VISIT (OUTPATIENT)
Dept: FAMILY MEDICINE | Facility: CLINIC | Age: 58
End: 2023-04-11
Payer: MEDICARE

## 2023-04-11 VITALS
OXYGEN SATURATION: 98 % | TEMPERATURE: 98 F | SYSTOLIC BLOOD PRESSURE: 102 MMHG | HEART RATE: 55 BPM | DIASTOLIC BLOOD PRESSURE: 60 MMHG

## 2023-04-11 DIAGNOSIS — Z09 HOSPITAL DISCHARGE FOLLOW-UP: Primary | ICD-10-CM

## 2023-04-11 DIAGNOSIS — Z79.4 TYPE 2 DIABETES MELLITUS WITH HYPERGLYCEMIA, WITH LONG-TERM CURRENT USE OF INSULIN: ICD-10-CM

## 2023-04-11 DIAGNOSIS — E11.65 TYPE 2 DIABETES MELLITUS WITH HYPERGLYCEMIA, WITH LONG-TERM CURRENT USE OF INSULIN: ICD-10-CM

## 2023-04-11 DIAGNOSIS — Z93.59 SUPRAPUBIC CATHETER: ICD-10-CM

## 2023-04-11 DIAGNOSIS — N39.0 RECURRENT UTI: ICD-10-CM

## 2023-04-11 DIAGNOSIS — S31.829D OPEN WOUND OF LEFT BUTTOCK WITH COMPLICATION, SUBSEQUENT ENCOUNTER: ICD-10-CM

## 2023-04-11 PROCEDURE — 3078F PR MOST RECENT DIASTOLIC BLOOD PRESSURE < 80 MM HG: ICD-10-PCS | Mod: CPTII,S$GLB,, | Performed by: STUDENT IN AN ORGANIZED HEALTH CARE EDUCATION/TRAINING PROGRAM

## 2023-04-11 PROCEDURE — 3074F PR MOST RECENT SYSTOLIC BLOOD PRESSURE < 130 MM HG: ICD-10-PCS | Mod: CPTII,S$GLB,, | Performed by: STUDENT IN AN ORGANIZED HEALTH CARE EDUCATION/TRAINING PROGRAM

## 2023-04-11 PROCEDURE — 3044F HG A1C LEVEL LT 7.0%: CPT | Mod: CPTII,S$GLB,, | Performed by: STUDENT IN AN ORGANIZED HEALTH CARE EDUCATION/TRAINING PROGRAM

## 2023-04-11 PROCEDURE — 1160F RVW MEDS BY RX/DR IN RCRD: CPT | Mod: CPTII,S$GLB,, | Performed by: STUDENT IN AN ORGANIZED HEALTH CARE EDUCATION/TRAINING PROGRAM

## 2023-04-11 PROCEDURE — 4010F PR ACE/ARB THEARPY RXD/TAKEN: ICD-10-PCS | Mod: CPTII,S$GLB,, | Performed by: STUDENT IN AN ORGANIZED HEALTH CARE EDUCATION/TRAINING PROGRAM

## 2023-04-11 PROCEDURE — 1159F PR MEDICATION LIST DOCUMENTED IN MEDICAL RECORD: ICD-10-PCS | Mod: CPTII,S$GLB,, | Performed by: STUDENT IN AN ORGANIZED HEALTH CARE EDUCATION/TRAINING PROGRAM

## 2023-04-11 PROCEDURE — 99215 PR OFFICE/OUTPT VISIT, EST, LEVL V, 40-54 MIN: ICD-10-PCS | Mod: S$GLB,,, | Performed by: STUDENT IN AN ORGANIZED HEALTH CARE EDUCATION/TRAINING PROGRAM

## 2023-04-11 PROCEDURE — 99215 OFFICE O/P EST HI 40 MIN: CPT | Mod: S$GLB,,, | Performed by: STUDENT IN AN ORGANIZED HEALTH CARE EDUCATION/TRAINING PROGRAM

## 2023-04-11 PROCEDURE — 99999 PR PBB SHADOW E&M-EST. PATIENT-LVL V: CPT | Mod: PBBFAC,,, | Performed by: STUDENT IN AN ORGANIZED HEALTH CARE EDUCATION/TRAINING PROGRAM

## 2023-04-11 PROCEDURE — 3044F PR MOST RECENT HEMOGLOBIN A1C LEVEL <7.0%: ICD-10-PCS | Mod: CPTII,S$GLB,, | Performed by: STUDENT IN AN ORGANIZED HEALTH CARE EDUCATION/TRAINING PROGRAM

## 2023-04-11 PROCEDURE — 99499 RISK ADDL DX/OHS AUDIT: ICD-10-PCS | Mod: S$GLB,,, | Performed by: STUDENT IN AN ORGANIZED HEALTH CARE EDUCATION/TRAINING PROGRAM

## 2023-04-11 PROCEDURE — 3074F SYST BP LT 130 MM HG: CPT | Mod: CPTII,S$GLB,, | Performed by: STUDENT IN AN ORGANIZED HEALTH CARE EDUCATION/TRAINING PROGRAM

## 2023-04-11 PROCEDURE — 1160F PR REVIEW ALL MEDS BY PRESCRIBER/CLIN PHARMACIST DOCUMENTED: ICD-10-PCS | Mod: CPTII,S$GLB,, | Performed by: STUDENT IN AN ORGANIZED HEALTH CARE EDUCATION/TRAINING PROGRAM

## 2023-04-11 PROCEDURE — 3078F DIAST BP <80 MM HG: CPT | Mod: CPTII,S$GLB,, | Performed by: STUDENT IN AN ORGANIZED HEALTH CARE EDUCATION/TRAINING PROGRAM

## 2023-04-11 PROCEDURE — 1159F MED LIST DOCD IN RCRD: CPT | Mod: CPTII,S$GLB,, | Performed by: STUDENT IN AN ORGANIZED HEALTH CARE EDUCATION/TRAINING PROGRAM

## 2023-04-11 PROCEDURE — 99499 UNLISTED E&M SERVICE: CPT | Mod: S$GLB,,, | Performed by: STUDENT IN AN ORGANIZED HEALTH CARE EDUCATION/TRAINING PROGRAM

## 2023-04-11 PROCEDURE — 99999 PR PBB SHADOW E&M-EST. PATIENT-LVL V: ICD-10-PCS | Mod: PBBFAC,,, | Performed by: STUDENT IN AN ORGANIZED HEALTH CARE EDUCATION/TRAINING PROGRAM

## 2023-04-11 PROCEDURE — 4010F ACE/ARB THERAPY RXD/TAKEN: CPT | Mod: CPTII,S$GLB,, | Performed by: STUDENT IN AN ORGANIZED HEALTH CARE EDUCATION/TRAINING PROGRAM

## 2023-04-11 RX ORDER — CLOPIDOGREL BISULFATE 75 MG/1
75 TABLET ORAL EVERY MORNING
COMMUNITY
Start: 2023-03-15 | End: 2023-09-15

## 2023-04-12 ENCOUNTER — TELEPHONE (OUTPATIENT)
Dept: UROLOGY | Facility: CLINIC | Age: 58
End: 2023-04-12
Payer: MEDICARE

## 2023-04-12 NOTE — TELEPHONE ENCOUNTER
I attempted to call Evangelina back, no answer. Left voice mail for call back.    ----- Message from Kylah Feliciano sent at 4/12/2023  4:00 PM CDT -----  Type:  Needs Medical Advice    Who Called: Evangelina  Symptoms (please be specific): Blood in urine through sims / Color of Urine [place] / pt was recently in hospital for UTI     Would the patient rather a call back or a response via [facility]?  Call   Best Call Back Number:  [phone]  Additional Information:

## 2023-04-20 ENCOUNTER — PATIENT MESSAGE (OUTPATIENT)
Dept: FAMILY MEDICINE | Facility: CLINIC | Age: 58
End: 2023-04-20
Payer: MEDICARE

## 2023-04-20 ENCOUNTER — TELEPHONE (OUTPATIENT)
Dept: FAMILY MEDICINE | Facility: CLINIC | Age: 58
End: 2023-04-20
Payer: MEDICARE

## 2023-04-20 ENCOUNTER — DOCUMENT SCAN (OUTPATIENT)
Dept: HOME HEALTH SERVICES | Facility: HOSPITAL | Age: 58
End: 2023-04-20
Payer: MEDICARE

## 2023-04-20 NOTE — TELEPHONE ENCOUNTER
----- Message from Elizabeth Woo sent at 4/20/2023  8:38 AM CDT -----  Regarding: Ethel  Contact: Ethel/[phone]  Ethel is requesting a call back regarding the patient went to the ED with blood in his urine. They stopped his Eliquis and his urine cleared up. He restarted the Eliquis and the blood returned to his urine and he stopped and it cleared up.   Would the patient rather a call back or a response via [facility]?  Please call her to discuss how long he should discontinue the medication or change the medication  Best Call Back Number:  Ethel/[phone]  Additional Information:

## 2023-04-21 ENCOUNTER — TELEPHONE (OUTPATIENT)
Dept: FAMILY MEDICINE | Facility: CLINIC | Age: 58
End: 2023-04-21
Payer: MEDICARE

## 2023-04-21 NOTE — TELEPHONE ENCOUNTER
----- Message from Mateo Callahan sent at 4/21/2023  9:42 AM CDT -----  Contact: Laurent  .Type:  Needs Medical Advice    Who Called: Laurent Mar  Would the patient rather a call back or a response via [facility]?  call  Best Call Back Number: [phone]   Additional Information:  Isabela is call for  Barbara Rodriguez regarding a faxed that was sent over to the office.

## 2023-04-24 ENCOUNTER — PATIENT MESSAGE (OUTPATIENT)
Dept: FAMILY MEDICINE | Facility: CLINIC | Age: 58
End: 2023-04-24
Payer: MEDICARE

## 2023-04-24 ENCOUNTER — TELEPHONE (OUTPATIENT)
Dept: FAMILY MEDICINE | Facility: CLINIC | Age: 58
End: 2023-04-24

## 2023-04-24 NOTE — TELEPHONE ENCOUNTER
----- Message from Kaley Sams sent at 4/24/2023 10:50 AM CDT -----  Contact: [employer]  .Type:  Patient Returning Call    Who Called:Shiloh Isaac  Does the patient know what this is regarding?:yes  Would the patient rather a call back or a response via [facility]? call  Best Call Back Number:[phone]  Additional Information:

## 2023-04-26 ENCOUNTER — TELEPHONE (OUTPATIENT)
Dept: FAMILY MEDICINE | Facility: CLINIC | Age: 58
End: 2023-04-26
Payer: MEDICARE

## 2023-04-26 NOTE — TELEPHONE ENCOUNTER
----- Message from Radha English sent at 4/26/2023 12:01 PM CDT -----  .Type:  Needs Medical Advice    Who Called: agustina with [employer]    Would the patient rather a call back or a response via [facility]? Call back  Best Call Back Number:   Additional Information:     Agustina got the order for a scooter and do not process scooter orders and do you want them to process the order for a power wheelchair or forward the order to [facility] that process scooters

## 2023-04-29 PROCEDURE — G0179 PR HOME HEALTH MD RECERTIFICATION: ICD-10-PCS | Mod: ,,, | Performed by: PEDIATRICS

## 2023-04-29 PROCEDURE — G0179 MD RECERTIFICATION HHA PT: HCPCS | Mod: ,,, | Performed by: PEDIATRICS

## 2023-05-02 ENCOUNTER — DOCUMENT SCAN (OUTPATIENT)
Dept: HOME HEALTH SERVICES | Facility: HOSPITAL | Age: 58
End: 2023-05-02
Payer: MEDICARE

## 2023-05-04 ENCOUNTER — TELEPHONE (OUTPATIENT)
Dept: FAMILY MEDICINE | Facility: CLINIC | Age: 58
End: 2023-05-04
Payer: MEDICARE

## 2023-05-04 NOTE — TELEPHONE ENCOUNTER
----- Message from Aram Chavira sent at 5/4/2023  3:27 PM CDT -----        Orders request  Who called:  Agustina of [employer]  Request of orders for: scooter  Reason for request: [employer] does not provide scooters  Call back number: said she does not request call back    Agustina says to send orders to a standard [facility] company

## 2023-05-09 ENCOUNTER — PATIENT OUTREACH (OUTPATIENT)
Dept: HOME HEALTH SERVICES | Facility: HOSPITAL | Age: 58
End: 2023-05-09
Payer: MEDICARE

## 2023-05-09 ENCOUNTER — EXTERNAL HOME HEALTH (OUTPATIENT)
Dept: HOME HEALTH SERVICES | Facility: HOSPITAL | Age: 58
End: 2023-05-09
Payer: MEDICARE

## 2023-05-15 RX ORDER — INSULIN ASPART 100 [IU]/ML
INJECTION, SOLUTION INTRAVENOUS; SUBCUTANEOUS
Qty: 15 ML | Refills: 1 | Status: SHIPPED | OUTPATIENT
Start: 2023-05-15 | End: 2023-06-08 | Stop reason: SDUPTHER

## 2023-05-15 NOTE — TELEPHONE ENCOUNTER
Care Due:                  Date            Visit Type   Department     Provider  --------------------------------------------------------------------------------                                HOSPITAL     DESC FAMILY  Last Visit: 04-      FOLLOW UP    [facility]  Jalyn Rock                               -                              PRIMARY      DESC FAMILY  Next Visit: 10-      CARE ([facility])   [facility] AFSANEH Rock                                                            Last  Test          Frequency    Reason                     Performed    Due Date  --------------------------------------------------------------------------------    HBA1C.......  6 months...  TRAYAGLAR, insulin........  02- 08-    [facility] Embedded Care Due Messages. Reference number: [id number].   5/15/2023 7:58:19 AM CDT

## 2023-05-15 NOTE — TELEPHONE ENCOUNTER
Refill request routed to [facility] Review Pool for Pharmacist Review.

## 2023-05-15 NOTE — TELEPHONE ENCOUNTER
Refill Routing Note   Medication(s) are not appropriate for processing by [facility] for the following reason(s):      Medication outside of protocol: sliding scale insulin OOP    [facility] action(s):  Route Care Due:  Labs due   Medication Therapy Plan: A1C due 8/3/23    Pharmacist review requested: Yes     Appointments  past 12m or future 3m with PCP    Date Provider   Last Visit   4/11/2023 Jalyn Rock, DO   Next Visit   10/10/2023 Jalyn Rock, DO   ED visits in past 90 days: 2        Note composed:2:10 PM 05/15/2023

## 2023-05-16 ENCOUNTER — TELEPHONE (OUTPATIENT)
Dept: UROLOGY | Facility: CLINIC | Age: 58
End: 2023-05-16
Payer: MEDICARE

## 2023-05-16 NOTE — TELEPHONE ENCOUNTER
Spoke to home health nurse and she stated that the patient says he has been complaining of spasms from bladder, states urine is going to his penis, his bag, and the stoma site. She stated they changed his catheter yesterday

## 2023-05-16 NOTE — TELEPHONE ENCOUNTER
----- Message from Rossy Henry sent at 5/16/2023  9:22 AM CDT -----  Type:  Needs Medical Advice    Who Called: [facility]  Symptoms (please be specific):    How long has patient had these symptoms:    Pharmacy name and phone #:    Would the patient rather a call back or a response via [facility]?   Best Call Back Number: [phone]  Additional Information: wants to speak to the office. Pt has been having problems over a month and he still continies to have problems even after changing AC.

## 2023-05-22 ENCOUNTER — TELEPHONE (OUTPATIENT)
Dept: FAMILY MEDICINE | Facility: CLINIC | Age: 58
End: 2023-05-22
Payer: MEDICARE

## 2023-05-22 NOTE — TELEPHONE ENCOUNTER
3 times a week wound care; barrier cream to make sure we prevent further rubbing or skin breakdown

## 2023-05-22 NOTE — TELEPHONE ENCOUNTER
Spoke with Gerda. Gerda states that she saw pt for home health. She states that pt has an inner thigh wound. She states the wound is shallow and red wound. It measures   2 cm x 6 cm x 0.1 cm. Pt states that the pt thinks he got stung by a bee because he saw a bee on the floor by his chair. Nurse says that it doesn;t look like a sting. She states that it looks like his catheter tubing rubbed against his leg and the area burst open.     She stated that she cleaned the wound with saline and put methadex cream on it and covered the area. Nurse would like to know the following: How often would you like her to see pt for wound care? Is there any other medications or methods you want her to use to treat wound?       Please advise.

## 2023-05-22 NOTE — TELEPHONE ENCOUNTER
----- Message from Antwon Kowalski sent at 5/22/2023  1:56 PM CDT -----  Contact: Gerda  Type:  Patient Returning Call    Who Called:Gerda    Who Left Message for Patient: Ansley   Does the patient know what this is regarding?:yes  Would the patient rather a call back or a response via [facility]? Call   Best Call Back Number:  Additional Information:

## 2023-05-22 NOTE — TELEPHONE ENCOUNTER
----- Message from Suzanna Ulrich sent at 5/22/2023 11:11 AM CDT -----  Type: Call Back     Who Called:Gerda with HH     Does the patient know what this is regarding?:wound on leg   Would the patient rather a call back or a response via [facility]? Call   Best Call Back Number:  Additional Information:     Gerda stated the pt has a wound on his leg and needs to know what needs to be done about it

## 2023-05-24 ENCOUNTER — DOCUMENT SCAN (OUTPATIENT)
Dept: HOME HEALTH SERVICES | Facility: HOSPITAL | Age: 58
End: 2023-05-24
Payer: MEDICARE

## 2023-05-24 ENCOUNTER — OFFICE VISIT (OUTPATIENT)
Dept: UROLOGY | Facility: CLINIC | Age: 58
End: 2023-05-24
Payer: MEDICARE

## 2023-05-24 VITALS
DIASTOLIC BLOOD PRESSURE: 63 MMHG | HEART RATE: 58 BPM | BODY MASS INDEX: 85.25 KG/M2 | SYSTOLIC BLOOD PRESSURE: 128 MMHG | HEIGHT: 60 IN

## 2023-05-24 DIAGNOSIS — Z43.5 ENCOUNTER FOR ATTENTION TO CYSTOSTOMY: Primary | ICD-10-CM

## 2023-05-24 DIAGNOSIS — N39.492 POSTURAL URINARY INCONTINENCE: ICD-10-CM

## 2023-05-24 PROCEDURE — 99999 PR PBB SHADOW E&M-EST. PATIENT-LVL IV: CPT | Mod: PBBFAC,,, | Performed by: UROLOGY

## 2023-05-24 PROCEDURE — 3008F PR BODY MASS INDEX (BMI) DOCUMENTED: ICD-10-PCS | Mod: CPTII,S$GLB,, | Performed by: UROLOGY

## 2023-05-24 PROCEDURE — 3078F DIAST BP <80 MM HG: CPT | Mod: CPTII,S$GLB,, | Performed by: UROLOGY

## 2023-05-24 PROCEDURE — 3078F PR MOST RECENT DIASTOLIC BLOOD PRESSURE < 80 MM HG: ICD-10-PCS | Mod: CPTII,S$GLB,, | Performed by: UROLOGY

## 2023-05-24 PROCEDURE — 3044F PR MOST RECENT HEMOGLOBIN A1C LEVEL <7.0%: ICD-10-PCS | Mod: CPTII,S$GLB,, | Performed by: UROLOGY

## 2023-05-24 PROCEDURE — 4010F PR ACE/ARB THEARPY RXD/TAKEN: ICD-10-PCS | Mod: CPTII,S$GLB,, | Performed by: UROLOGY

## 2023-05-24 PROCEDURE — 1159F PR MEDICATION LIST DOCUMENTED IN MEDICAL RECORD: ICD-10-PCS | Mod: CPTII,S$GLB,, | Performed by: UROLOGY

## 2023-05-24 PROCEDURE — 99999 PR PBB SHADOW E&M-EST. PATIENT-LVL IV: ICD-10-PCS | Mod: PBBFAC,,, | Performed by: UROLOGY

## 2023-05-24 PROCEDURE — 3044F HG A1C LEVEL LT 7.0%: CPT | Mod: CPTII,S$GLB,, | Performed by: UROLOGY

## 2023-05-24 PROCEDURE — 3074F PR MOST RECENT SYSTOLIC BLOOD PRESSURE < 130 MM HG: ICD-10-PCS | Mod: CPTII,S$GLB,, | Performed by: UROLOGY

## 2023-05-24 PROCEDURE — 1160F RVW MEDS BY RX/DR IN RCRD: CPT | Mod: CPTII,S$GLB,, | Performed by: UROLOGY

## 2023-05-24 PROCEDURE — 3074F SYST BP LT 130 MM HG: CPT | Mod: CPTII,S$GLB,, | Performed by: UROLOGY

## 2023-05-24 PROCEDURE — 99214 PR OFFICE/OUTPT VISIT, EST, LEVL IV, 30-39 MIN: ICD-10-PCS | Mod: S$GLB,,, | Performed by: UROLOGY

## 2023-05-24 PROCEDURE — 4010F ACE/ARB THERAPY RXD/TAKEN: CPT | Mod: CPTII,S$GLB,, | Performed by: UROLOGY

## 2023-05-24 PROCEDURE — 1160F PR REVIEW ALL MEDS BY PRESCRIBER/CLIN PHARMACIST DOCUMENTED: ICD-10-PCS | Mod: CPTII,S$GLB,, | Performed by: UROLOGY

## 2023-05-24 PROCEDURE — 3008F BODY MASS INDEX DOCD: CPT | Mod: CPTII,S$GLB,, | Performed by: UROLOGY

## 2023-05-24 PROCEDURE — 99214 OFFICE O/P EST MOD 30 MIN: CPT | Mod: S$GLB,,, | Performed by: UROLOGY

## 2023-05-24 PROCEDURE — 1159F MED LIST DOCD IN RCRD: CPT | Mod: CPTII,S$GLB,, | Performed by: UROLOGY

## 2023-05-24 RX ORDER — PREGABALIN 100 MG/1
100 CAPSULE ORAL 2 TIMES DAILY
COMMUNITY
Start: 2023-05-13 | End: 2023-08-27 | Stop reason: DRUGHIGH

## 2023-05-24 NOTE — PATIENT INSTRUCTIONS
Continue home health with catheter change every 6 weeks  Follow-up in office in 6 months for evaluation.

## 2023-05-24 NOTE — PROGRESS NOTES
Subjective:      Patient ID: Kuldeep Alamo is a 58 y.o. male.    Chief Complaint: UTI concerns     59 yo WM with bilateral above knee amputations.  The patient is wheelchair-bound and uses a suprapubic tube for bladder drainage.  The patient has been having his tube change with home health and recently had it changed.  He presents for follow-up.  He is had intermittent hematuria with tube pains but otherwise no urinary problems.    Follow-up  Chronicity: Patient here in follow-up for neurogenic bladder and urge urinary incontinence/stress incontinence with treatment using suprapubic tube. The current episode started more than 1 year ago. The problem occurs constantly. The problem has been gradually improving. Pertinent negatives include no abdominal pain, anorexia, arthralgias, change in bowel habit, chest pain, chills, congestion, coughing, diaphoresis, fatigue, fever, headaches, joint swelling, myalgias, nausea, neck pain, numbness, rash, sore throat, swollen glands, urinary symptoms, vertigo, visual change, vomiting or weakness. Nothing aggravates the symptoms. Treatments tried: SP tube placement with change every 6 weeks. The treatment provided significant relief.   Review of Systems   Constitutional:  Negative for activity change, appetite change, chills, diaphoresis, fatigue, fever and unexpected weight change.   HENT:  Negative for congestion, hearing loss, sinus pressure, sore throat and trouble swallowing.    Eyes:  Negative for photophobia, pain, discharge and visual disturbance.   Respiratory:  Negative for apnea, cough and shortness of breath.    Cardiovascular:  Negative for chest pain, palpitations and leg swelling.   Gastrointestinal:  Negative for abdominal distention, abdominal pain, anal bleeding, anorexia, blood in stool, change in bowel habit, constipation, diarrhea, nausea, rectal pain and vomiting.   Endocrine: Negative for cold intolerance, heat intolerance, polydipsia, polyphagia and  polyuria.   Genitourinary:  Negative for decreased urine volume, difficulty urinating, dysuria, enuresis, flank pain, frequency, genital sores, hematuria, penile discharge, penile pain, penile swelling, scrotal swelling, testicular pain and urgency.   Musculoskeletal:  Negative for arthralgias, back pain, joint swelling, myalgias and neck pain.   Skin:  Negative for color change, pallor, rash and wound.   Allergic/Immunologic: Negative for environmental allergies, food allergies and immunocompromised state.   Neurological:  Negative for dizziness, vertigo, seizures, weakness, numbness and headaches.   Hematological:  Negative for adenopathy. Does not bruise/bleed easily.   Psychiatric/Behavioral: Negative.      Objective:     Physical Exam  Vitals and nursing note reviewed.   Constitutional:       General: He is not in acute distress.     Appearance: Normal appearance. He is well-developed. He is obese. He is not ill-appearing, toxic-appearing or diaphoretic.   HENT:      Head: Normocephalic and atraumatic.      Nose: Nose normal. No congestion or rhinorrhea.      Mouth/Throat:      Mouth: Mucous membranes are moist.      Pharynx: Oropharynx is clear. No oropharyngeal exudate or posterior oropharyngeal erythema.   Eyes:      General: No scleral icterus.        Right eye: No discharge.         Left eye: No discharge.      Extraocular Movements: Extraocular movements intact.      Conjunctiva/sclera: Conjunctivae normal.      Pupils: Pupils are equal, round, and reactive to light.   Cardiovascular:      Rate and Rhythm: Normal rate and regular rhythm.      Heart sounds: Normal heart sounds.   Pulmonary:      Effort: Pulmonary effort is normal.      Breath sounds: Normal breath sounds.   Abdominal:      General: Bowel sounds are normal.      Palpations: Abdomen is soft.      Tenderness: There is no right CVA tenderness or left CVA tenderness.   Genitourinary:     Penis: Normal.       Testes: Normal.      Prostate:  Normal.      Rectum: Normal.   Musculoskeletal:         General: No swelling, tenderness, deformity or signs of injury. Normal range of motion.      Cervical back: Normal range of motion and neck supple.      Right lower leg: No edema.      Left lower leg: No edema.   Skin:     General: Skin is warm and dry.      Capillary Refill: Capillary refill takes less than 2 seconds.   Neurological:      General: No focal deficit present.      Mental Status: He is alert and oriented to person, place, and time.      Deep Tendon Reflexes: Reflexes are normal and symmetric.   Psychiatric:         Mood and Affect: Mood normal.         Behavior: Behavior normal.         Thought Content: Thought content normal.         Judgment: Judgment normal.      Assessment:      1. Encounter for attention to cystostomy    2. Postural urinary incontinence      Plan:     There are no Patient Instructions on file for this visit.

## 2023-05-25 ENCOUNTER — TELEPHONE (OUTPATIENT)
Dept: FAMILY MEDICINE | Facility: CLINIC | Age: 58
End: 2023-05-25
Payer: MEDICARE

## 2023-05-25 DIAGNOSIS — L89.90 PRESSURE INJURY OF SKIN, UNSPECIFIED INJURY STAGE, UNSPECIFIED LOCATION: Primary | ICD-10-CM

## 2023-05-25 NOTE — TELEPHONE ENCOUNTER
----- Message from Jalyn Rock DO sent at 5/25/2023  5:20 PM CDT -----  Contact: mario Fung for verbal order  ----- Message -----  From: Margi Wilburn MA  Sent: 5/25/2023   4:50 PM CDT  To: Jalyn Rock DO      ----- Message -----  From: Stephanie Quintero  Sent: 5/25/2023   8:05 AM CDT  To: Shweta Gunderson Staff    Type:  Needs Medical Advice    Who Called: [facility]   Symptoms (please be specific): they are requesting to get orders for a  xerform to add to the orders he already has for a wound to his left inner thigh     Would the patient rather a call back or a response via [facility]? call  Best Call Back Number:[phone]   Additional Information:

## 2023-05-26 NOTE — TELEPHONE ENCOUNTER
Spoke with pt. Pt states that he is taking his insulin and using it correctly. Informed pt to let us know if she has any questions or concerns.

## 2023-05-26 NOTE — TELEPHONE ENCOUNTER
Home health nurse requesting order for xeroform to add to the orders he already has for a wound to his left inner thigh. Please place order for xeroform. Please advise.

## 2023-05-26 NOTE — TELEPHONE ENCOUNTER
Spoke with Yanna at [facility]. She wanted to let Dr. Rock know that pt blood sugar was 329 on yesterday but was asymptomatic.         Wound care supplies faxed to [facility].

## 2023-05-30 ENCOUNTER — DOCUMENT SCAN (OUTPATIENT)
Dept: HOME HEALTH SERVICES | Facility: HOSPITAL | Age: 58
End: 2023-05-30
Payer: MEDICARE

## 2023-06-01 ENCOUNTER — PATIENT MESSAGE (OUTPATIENT)
Dept: FAMILY MEDICINE | Facility: CLINIC | Age: 58
End: 2023-06-01
Payer: MEDICARE

## 2023-06-01 ENCOUNTER — PATIENT MESSAGE (OUTPATIENT)
Dept: UROLOGY | Facility: CLINIC | Age: 58
End: 2023-06-01
Payer: MEDICARE

## 2023-06-01 DIAGNOSIS — Z79.4 TYPE 2 DIABETES MELLITUS WITH HYPERGLYCEMIA, WITH LONG-TERM CURRENT USE OF INSULIN: Primary | ICD-10-CM

## 2023-06-01 DIAGNOSIS — E11.65 TYPE 2 DIABETES MELLITUS WITH HYPERGLYCEMIA, WITH LONG-TERM CURRENT USE OF INSULIN: Primary | ICD-10-CM

## 2023-06-01 DIAGNOSIS — G45.0 VERTEBROBASILAR OCCLUSIVE DISEASE: ICD-10-CM

## 2023-06-01 DIAGNOSIS — G81.14 LEFT SPASTIC HEMIPARESIS: ICD-10-CM

## 2023-06-01 NOTE — TELEPHONE ENCOUNTER
Please print and fax DME order to [facility]  Pt also should increase to 15 units basaglar at bedtime and 12 units AM

## 2023-06-02 ENCOUNTER — PATIENT MESSAGE (OUTPATIENT)
Dept: FAMILY MEDICINE | Facility: CLINIC | Age: 58
End: 2023-06-02
Payer: MEDICARE

## 2023-06-07 ENCOUNTER — PATIENT MESSAGE (OUTPATIENT)
Dept: FAMILY MEDICINE | Facility: CLINIC | Age: 58
End: 2023-06-07
Payer: MEDICARE

## 2023-06-07 ENCOUNTER — TELEPHONE (OUTPATIENT)
Dept: FAMILY MEDICINE | Facility: CLINIC | Age: 58
End: 2023-06-07
Payer: MEDICARE

## 2023-06-07 NOTE — TELEPHONE ENCOUNTER
Called [facility] [address] and spoke with Evangelina in regards of message on patient. She informed me that pt's wound is now healed, and is calling to get an updated order stating that it's ok for them to reduce their visits with patient to 1 day a week now. Evangelina states that she is ok with a verbal. Please advise pt message.

## 2023-06-07 NOTE — TELEPHONE ENCOUNTER
"----- Message from Aram Chavira sent at 6/7/2023  2:58 PM CDT -----        Orders request  Who called: Evangelina of [facility]  Request of orders for: getting pt's visit back to 1 day a week  Reason for request: pt's wound has been healed  Call back number:  (Evangelina) [phone]  Caller says orders would have to be faxed to office "because they don't pull them from [facility]"       "

## 2023-06-08 RX ORDER — INSULIN ASPART 100 [IU]/ML
INJECTION, SOLUTION INTRAVENOUS; SUBCUTANEOUS
Qty: 15 ML | Refills: 1 | Status: SHIPPED | OUTPATIENT
Start: 2023-06-08 | End: 2023-06-12 | Stop reason: SDUPTHER

## 2023-06-08 RX ORDER — INSULIN GLARGINE 100 [IU]/ML
INJECTION, SOLUTION SUBCUTANEOUS
Qty: 15 ML | Refills: 10 | Status: SHIPPED | OUTPATIENT
Start: 2023-06-08 | End: 2023-06-12 | Stop reason: SDUPTHER

## 2023-06-08 NOTE — TELEPHONE ENCOUNTER
No care due was identified.  [facility] Embedded Care Due Messages. Reference number: [id number].   6/08/2023 10:06:22 AM CDT

## 2023-06-08 NOTE — TELEPHONE ENCOUNTER
Called [facility] and spoke with Evangelina in regards of message on pt. Informed her that Dr. Rock approves for them to do a once a week  visit on patient. She also informed me that pt is needing a med refill on his insulin as well. Please advise message.

## 2023-06-08 NOTE — TELEPHONE ENCOUNTER
----- Message from Stephanie Quintero sent at 6/8/2023  9:45 AM CDT -----  Contact: sacha  Type:  Patient Returning Call    Who Called:[facility] [place]  Who Left Message for Patient:office  Does the patient know what this is regarding?:orders   Would the patient rather a call back or a response via [facility]? call  Best Call Back Number:[phone]  Additional Information:

## 2023-06-09 ENCOUNTER — TELEPHONE (OUTPATIENT)
Dept: FAMILY MEDICINE | Facility: CLINIC | Age: 58
End: 2023-06-09
Payer: MEDICARE

## 2023-06-09 NOTE — TELEPHONE ENCOUNTER
----- Message from Noa Anderson sent at 6/9/2023  3:03 PM CDT -----  Regarding: Jason back  Contact: [phone]  Type:  RX Refill Request    1.  Who Called: PT   Refill or New Rx: Refills   RX Name and Strength: insulin aspart U-100 (NOVOLOG) 100 unit/mL (3 mL) InPn pen 15 mL   How is the patient currently taking it? (ex. 1XDay): INJECT THREE UNITS SUBCUTANEOUSLY THREE TIMES DAILY WITH MEALS, PLUS CORRECTION SCALE MAX TDD 25 UNITS (BULK  Is this a 30 day or 90 day RX: 90   Preferred Pharmacy with phone number: [facility] [place] - [facility] [place] - [address]   Phone:  [phone] Fax:  [phone]      2. insulin (BASAGLAR KWIKPEN U-100 INSULIN) glargine 100 units/mL SubQ pen 15 mL     Calling to get a prior authorization on both medication

## 2023-06-09 NOTE — TELEPHONE ENCOUNTER
Called and spoke with pt in regards of his medications. Informed pt that we are currently working on his medications and are completing the prior authorizations needed for his medication. Pt verbalized understanding of message.

## 2023-06-12 ENCOUNTER — TELEPHONE (OUTPATIENT)
Dept: FAMILY MEDICINE | Facility: CLINIC | Age: 58
End: 2023-06-12
Payer: MEDICARE

## 2023-06-12 DIAGNOSIS — E11.65 TYPE 2 DIABETES MELLITUS WITH HYPERGLYCEMIA, WITH LONG-TERM CURRENT USE OF INSULIN: Primary | ICD-10-CM

## 2023-06-12 DIAGNOSIS — Z79.4 TYPE 2 DIABETES MELLITUS WITH HYPERGLYCEMIA, WITH LONG-TERM CURRENT USE OF INSULIN: Primary | ICD-10-CM

## 2023-06-12 RX ORDER — INSULIN LISPRO 100 [IU]/ML
INJECTION, SOLUTION INTRAVENOUS; SUBCUTANEOUS
Qty: 9 ML | Refills: 3 | Status: SHIPPED | OUTPATIENT
Start: 2023-06-12

## 2023-06-12 RX ORDER — INSULIN GLARGINE 100 [IU]/ML
INJECTION, SOLUTION SUBCUTANEOUS
Qty: 15 ML | Refills: 10 | Status: SHIPPED | OUTPATIENT
Start: 2023-06-12 | End: 2023-06-12

## 2023-06-12 RX ORDER — INSULIN GLARGINE 100 [IU]/ML
10 INJECTION, SOLUTION SUBCUTANEOUS 2 TIMES DAILY
Qty: 18 ML | Refills: 3 | Status: SHIPPED | OUTPATIENT
Start: 2023-06-12 | End: 2023-09-27

## 2023-06-12 RX ORDER — INSULIN ASPART 100 [IU]/ML
INJECTION, SOLUTION INTRAVENOUS; SUBCUTANEOUS
Qty: 15 ML | Refills: 1 | Status: SHIPPED | OUTPATIENT
Start: 2023-06-12 | End: 2023-06-12

## 2023-06-12 NOTE — TELEPHONE ENCOUNTER
----- Message from Aram Chavira sent at 6/12/2023  9:13 AM CDT -----  Type:  RX Refill Request    Who Called:  Elissa of [facility]  Refill or New Rx: new  RX Name and Strength: Humalog; lantus (caller said no dosage)    [facility] ([facility]ays [facility])    Ordering Provider:  Reach pt via:  Best Call Back Number: [phone](call) [phone](fax)  Additional Information: caller said she needs this rx as soon as possible

## 2023-06-12 NOTE — TELEPHONE ENCOUNTER
----- Message from Aram Chavira sent at 6/12/2023  9:13 AM CDT -----  Type:  RX Refill Request    Who Called:  Elissa of [facility]  Refill or New Rx: new  RX Name and Strength: Humalog; lantus (caller said no dosage)    Preferred Pharmacy: [facility] ([facility])    Ordering Provider:  Reach pt via:  Best Call Back Number: [phone](call) [phone](fax)  Additional Information: caller said she needs this rx as soon as possible

## 2023-06-12 NOTE — TELEPHONE ENCOUNTER
Called [facility] and spoke with Sariah in regards of message on pt's medication. Sariah informed me that they never got the script for Humalog and they are needing a script. She also informed me that the Basaglar is also no covered and it is needing an preferred alternative for it as well. She informed me that the following medications are covered by patient's insurance which are Lantus, Toujea, Tresiba, and Levemir. Please advise pt message and please send both scripts to [facility].

## 2023-06-14 ENCOUNTER — DOCUMENT SCAN (OUTPATIENT)
Dept: HOME HEALTH SERVICES | Facility: HOSPITAL | Age: 58
End: 2023-06-14
Payer: MEDICARE

## 2023-06-19 PROBLEM — T83.510A URINARY TRACT INFECTION ASSOCIATED WITH CYSTOSTOMY CATHETER: Status: RESOLVED | Noted: 2023-02-03 | Resolved: 2023-06-19

## 2023-06-19 PROBLEM — N39.0 URINARY TRACT INFECTION ASSOCIATED WITH CYSTOSTOMY CATHETER: Status: RESOLVED | Noted: 2023-02-03 | Resolved: 2023-06-19

## 2023-06-23 ENCOUNTER — TELEPHONE (OUTPATIENT)
Dept: FAMILY MEDICINE | Facility: CLINIC | Age: 58
End: 2023-06-23
Payer: MEDICARE

## 2023-06-28 PROCEDURE — G0179 PR HOME HEALTH MD RECERTIFICATION: ICD-10-PCS | Mod: ,,, | Performed by: STUDENT IN AN ORGANIZED HEALTH CARE EDUCATION/TRAINING PROGRAM

## 2023-06-28 PROCEDURE — G0179 MD RECERTIFICATION HHA PT: HCPCS | Mod: ,,, | Performed by: STUDENT IN AN ORGANIZED HEALTH CARE EDUCATION/TRAINING PROGRAM

## 2023-06-30 ENCOUNTER — DOCUMENT SCAN (OUTPATIENT)
Dept: HOME HEALTH SERVICES | Facility: HOSPITAL | Age: 58
End: 2023-06-30
Payer: MEDICARE

## 2023-06-30 ENCOUNTER — TELEPHONE (OUTPATIENT)
Dept: FAMILY MEDICINE | Facility: CLINIC | Age: 58
End: 2023-06-30
Payer: MEDICARE

## 2023-07-03 ENCOUNTER — DOCUMENT SCAN (OUTPATIENT)
Dept: HOME HEALTH SERVICES | Facility: HOSPITAL | Age: 58
End: 2023-07-03
Payer: MEDICARE

## 2023-07-06 ENCOUNTER — TELEPHONE (OUTPATIENT)
Dept: FAMILY MEDICINE | Facility: CLINIC | Age: 58
End: 2023-07-06
Payer: MEDICARE

## 2023-07-06 ENCOUNTER — DOCUMENT SCAN (OUTPATIENT)
Dept: HOME HEALTH SERVICES | Facility: HOSPITAL | Age: 58
End: 2023-07-06
Payer: MEDICARE

## 2023-07-06 NOTE — TELEPHONE ENCOUNTER
----- Message from Wendy Messer sent at 7/6/2023  2:23 PM CDT -----  Type:orders    Who Called: [facility]albrielleviviana chaparro  Would the patient rather a call back or a response via [facility]? call  Best Call Back Number:  fax   Additional Information: Eladia Villela calling regarding Orders (message) for kajal is calling to ask if provider can add hemiparesis to the order for wheel chair

## 2023-07-06 NOTE — TELEPHONE ENCOUNTER
Agustina from [employer] called in regards of wanting orders for patient's wheelchair. Patient's wheelchair order is needing hemiparesis added to the order. Please advise message.

## 2023-07-07 NOTE — TELEPHONE ENCOUNTER
Called [facility] and spoke with Agustina in regards of message on patient and his wheelchair. Agustina informed me that she is needing updated clinic notes that include that patient has left hemiparesis, a wound on his left bottom, and him not able to functionally reposition himself in his wheelchair.  Please advise patient message.    Are you ok with patient do a virtual visit to address this matter.

## 2023-07-11 ENCOUNTER — DOCUMENT SCAN (OUTPATIENT)
Dept: HOME HEALTH SERVICES | Facility: HOSPITAL | Age: 58
End: 2023-07-11
Payer: MEDICARE

## 2023-07-14 ENCOUNTER — TELEPHONE (OUTPATIENT)
Dept: FAMILY MEDICINE | Facility: CLINIC | Age: 58
End: 2023-07-14
Payer: MEDICARE

## 2023-07-14 NOTE — TELEPHONE ENCOUNTER
----- Message from Rosita Cortes sent at 7/14/2023  2:35 PM CDT -----  Type:  [place] chair     Who Called:sister ([place])  Does the patient know what this is regarding?:power jose chair from [facility]   Would the patient rather a call back or a response via [facility]? Call   Best Call Back Number:[phone]  Additional Information: Please be advised  pt is using a chair that's not fit for him and he keeps falling out of it  Caller stated they have been waiting for 6 months to get this done and no one is responding   Caller stated she called [employer] and they couldn't release an ETA in the equipment

## 2023-07-14 NOTE — TELEPHONE ENCOUNTER
Called and spoke with pt's sister Matthew in regards of message on pt. She was calling to get information on pt's power wheelchair and to see where we are on the process of pt's chair. Informed pt's sister that we are currently working on this matter and we are working [employer] in regards of pt's chair instead of [employer]. I also gave pt's sister Dorinda number as well to call and check on the process of pt's chair as well.

## 2023-07-18 ENCOUNTER — PATIENT MESSAGE (OUTPATIENT)
Dept: FAMILY MEDICINE | Facility: CLINIC | Age: 58
End: 2023-07-18
Payer: MEDICARE

## 2023-07-21 ENCOUNTER — EXTERNAL HOME HEALTH (OUTPATIENT)
Dept: HOME HEALTH SERVICES | Facility: HOSPITAL | Age: 58
End: 2023-07-21
Payer: MEDICARE

## 2023-07-25 ENCOUNTER — DOCUMENT SCAN (OUTPATIENT)
Dept: HOME HEALTH SERVICES | Facility: HOSPITAL | Age: 58
End: 2023-07-25
Payer: MEDICARE

## 2023-07-27 ENCOUNTER — HOSPITAL ENCOUNTER (INPATIENT)
Facility: HOSPITAL | Age: 58
LOS: 2 days | Discharge: HOME HEALTH | DRG: 699 | End: 2023-07-30
Attending: EMERGENCY MEDICINE | Admitting: INTERNAL MEDICINE
Payer: MEDICARE

## 2023-07-27 DIAGNOSIS — R07.9 CHEST PAIN: ICD-10-CM

## 2023-07-27 DIAGNOSIS — R55 SYNCOPE: ICD-10-CM

## 2023-07-27 DIAGNOSIS — G93.40 ACUTE ENCEPHALOPATHY: ICD-10-CM

## 2023-07-27 DIAGNOSIS — B96.89 URINARY TRACT INFECTION DUE TO ESBL KLEBSIELLA: Primary | ICD-10-CM
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
## 2023-07-27 DIAGNOSIS — N39.0 URINARY TRACT INFECTION DUE TO ESBL KLEBSIELLA: Primary | ICD-10-CM

## 2023-07-27 LAB
ALBUMIN SERPL BCP-MCNC: 3.8 G/DL (ref 3.5–5.2)
ALLENS TEST: ABNORMAL
ALP SERPL-CCNC: 99 U/L (ref 55–135)
ALT SERPL W/O P-5'-P-CCNC: 22 U/L (ref 10–44)
ANION GAP SERPL CALC-SCNC: 9 MMOL/L (ref 8–16)
AST SERPL-CCNC: 14 U/L (ref 10–40)
BACTERIA #/AREA URNS AUTO: ABNORMAL /HPF
BASOPHILS # BLD AUTO: 0.08 K/UL (ref 0–0.2)
BASOPHILS NFR BLD: 0.8 % (ref 0–1.9)
BILIRUB SERPL-MCNC: 0.5 MG/DL (ref 0.1–1)
BILIRUB UR QL STRIP: NEGATIVE
BUN SERPL-MCNC: 17 MG/DL (ref 6–20)
CALCIUM SERPL-MCNC: 9.5 MG/DL (ref 8.7–10.5)
CHLORIDE SERPL-SCNC: 105 MMOL/L (ref 95–110)
CK SERPL-CCNC: 78 U/L (ref 20–200)
CLARITY UR REFRACT.AUTO: ABNORMAL
CO2 SERPL-SCNC: 24 MMOL/L (ref 23–29)
COLOR UR AUTO: YELLOW
CREAT SERPL-MCNC: 1 MG/DL (ref 0.5–1.4)
DIFFERENTIAL METHOD: ABNORMAL
EOSINOPHIL # BLD AUTO: 0.3 K/UL (ref 0–0.5)
EOSINOPHIL NFR BLD: 3 % (ref 0–8)
ERYTHROCYTE [DISTWIDTH] IN BLOOD BY AUTOMATED COUNT: 14.6 % (ref 11.5–14.5)
EST. GFR  (NO RACE VARIABLE): >60 ML/MIN/1.73 M^2
GLUCOSE SERPL-MCNC: 236 MG/DL (ref 70–110)
GLUCOSE UR QL STRIP: NEGATIVE
HCO3 UR-SCNC: 26 MMOL/L (ref 24–28)
HCT VFR BLD AUTO: 36.5 % (ref 40–54)
HCV AB SERPL QL IA: NORMAL
HGB BLD-MCNC: 12.5 G/DL (ref 14–18)
HGB UR QL STRIP: ABNORMAL
HIV 1+2 AB+HIV1 P24 AG SERPL QL IA: NORMAL
HYALINE CASTS UR QL AUTO: 0 /LPF
IMM GRANULOCYTES # BLD AUTO: 0.07 K/UL (ref 0–0.04)
IMM GRANULOCYTES NFR BLD AUTO: 0.7 % (ref 0–0.5)
KETONES UR QL STRIP: NEGATIVE
LEUKOCYTE ESTERASE UR QL STRIP: ABNORMAL
LYMPHOCYTES # BLD AUTO: 1.5 K/UL (ref 1–4.8)
LYMPHOCYTES NFR BLD: 15.9 % (ref 18–48)
MAGNESIUM SERPL-MCNC: 2 MG/DL (ref 1.6–2.6)
MCH RBC QN AUTO: 30.8 PG (ref 27–31)
MCHC RBC AUTO-ENTMCNC: 34.2 G/DL (ref 32–36)
MCV RBC AUTO: 90 FL (ref 82–98)
MICROSCOPIC COMMENT: ABNORMAL
MONOCYTES # BLD AUTO: 0.6 K/UL (ref 0.3–1)
MONOCYTES NFR BLD: 6.5 % (ref 4–15)
NEUTROPHILS # BLD AUTO: 7.1 K/UL (ref 1.8–7.7)
NEUTROPHILS NFR BLD: 73.1 % (ref 38–73)
NITRITE UR QL STRIP: NEGATIVE
NRBC BLD-RTO: 0 /100 WBC
PCO2 BLDA: 49.2 MMHG (ref 35–45)
PH SMN: 7.33 [PH] (ref 7.35–7.45)
PH UR STRIP: 6 [PH] (ref 5–8)
PLATELET # BLD AUTO: 231 K/UL (ref 150–450)
PMV BLD AUTO: 10.6 FL (ref 9.2–12.9)
PO2 BLDA: 59 MMHG (ref 40–60)
POC BE: 0 MMOL/L
POC SATURATED O2: 88 % (ref 95–100)
POC TCO2: 27 MMOL/L (ref 24–29)
POTASSIUM SERPL-SCNC: 5.3 MMOL/L (ref 3.5–5.1)
PROT SERPL-MCNC: 7.7 G/DL (ref 6–8.4)
PROT UR QL STRIP: ABNORMAL
RBC # BLD AUTO: 4.06 M/UL (ref 4.6–6.2)
RBC #/AREA URNS AUTO: >100 /HPF (ref 0–4)
SAMPLE: ABNORMAL
SARS-COV-2 RDRP RESP QL NAA+PROBE: NEGATIVE
SITE: ABNORMAL
SODIUM SERPL-SCNC: 138 MMOL/L (ref 136–145)
SP GR UR STRIP: 1.02 (ref 1–1.03)
TROPONIN I SERPL DL<=0.01 NG/ML-MCNC: <0.006 NG/ML (ref 0–0.03)
URN SPEC COLLECT METH UR: ABNORMAL
WBC # BLD AUTO: 9.66 K/UL (ref 3.9–12.7)
WBC #/AREA URNS AUTO: 58 /HPF (ref 0–5)

## 2023-07-27 PROCEDURE — 96365 THER/PROPH/DIAG IV INF INIT: CPT | Mod: 59,HCNC

## 2023-07-27 PROCEDURE — 87186 SC STD MICRODIL/AGAR DIL: CPT | Mod: HCNC

## 2023-07-27 PROCEDURE — 81001 URINALYSIS AUTO W/SCOPE: CPT | Mod: HCNC

## 2023-07-27 PROCEDURE — 86803 HEPATITIS C AB TEST: CPT | Mod: HCNC | Performed by: PHYSICIAN ASSISTANT

## 2023-07-27 PROCEDURE — 93010 ELECTROCARDIOGRAM REPORT: CPT | Mod: HCNC,,, | Performed by: INTERNAL MEDICINE

## 2023-07-27 PROCEDURE — 85025 COMPLETE CBC W/AUTO DIFF WBC: CPT | Mod: HCNC

## 2023-07-27 PROCEDURE — 99285 EMERGENCY DEPT VISIT HI MDM: CPT | Mod: 25,HCNC

## 2023-07-27 PROCEDURE — 83735 ASSAY OF MAGNESIUM: CPT | Mod: HCNC

## 2023-07-27 PROCEDURE — 80053 COMPREHEN METABOLIC PANEL: CPT | Mod: HCNC

## 2023-07-27 PROCEDURE — 82803 BLOOD GASES ANY COMBINATION: CPT

## 2023-07-27 PROCEDURE — 87088 URINE BACTERIA CULTURE: CPT | Mod: HCNC

## 2023-07-27 PROCEDURE — 87077 CULTURE AEROBIC IDENTIFY: CPT | Mod: HCNC

## 2023-07-27 PROCEDURE — 82962 GLUCOSE BLOOD TEST: CPT | Mod: HCNC

## 2023-07-27 PROCEDURE — 63600175 PHARM REV CODE 636 W HCPCS

## 2023-07-27 PROCEDURE — U0002 COVID-19 LAB TEST NON-CDC: HCPCS | Mod: HCNC

## 2023-07-27 PROCEDURE — 93010 EKG 12-LEAD: ICD-10-PCS | Mod: HCNC,,, | Performed by: INTERNAL MEDICINE

## 2023-07-27 PROCEDURE — 87040 BLOOD CULTURE FOR BACTERIA: CPT | Mod: HCNC

## 2023-07-27 PROCEDURE — 96361 HYDRATE IV INFUSION ADD-ON: CPT | Mod: HCNC

## 2023-07-27 PROCEDURE — 84484 ASSAY OF TROPONIN QUANT: CPT | Mod: HCNC

## 2023-07-27 PROCEDURE — 87086 URINE CULTURE/COLONY COUNT: CPT | Mod: HCNC

## 2023-07-27 PROCEDURE — 82550 ASSAY OF CK (CPK): CPT | Mod: HCNC

## 2023-07-27 PROCEDURE — 96366 THER/PROPH/DIAG IV INF ADDON: CPT | Mod: HCNC

## 2023-07-27 PROCEDURE — 87389 HIV-1 AG W/HIV-1&-2 AB AG IA: CPT | Mod: HCNC | Performed by: PHYSICIAN ASSISTANT

## 2023-07-27 PROCEDURE — 99900035 HC TECH TIME PER 15 MIN (STAT)

## 2023-07-27 PROCEDURE — 93005 ELECTROCARDIOGRAM TRACING: CPT

## 2023-07-27 PROCEDURE — 25000003 PHARM REV CODE 250

## 2023-07-27 RX ADMIN — VANCOMYCIN HYDROCHLORIDE 1000 MG: 1 INJECTION, POWDER, LYOPHILIZED, FOR SOLUTION INTRAVENOUS at 09:07

## 2023-07-27 RX ADMIN — PIPERACILLIN SODIUM AND TAZOBACTAM SODIUM 4.5 G: 4; .5 INJECTION, POWDER, FOR SOLUTION INTRAVENOUS at 09:07

## 2023-07-27 RX ADMIN — SODIUM CHLORIDE 1000 ML: 9 INJECTION, SOLUTION INTRAVENOUS at 10:07

## 2023-07-28 PROBLEM — N30.01 ACUTE CYSTITIS WITH HEMATURIA: Status: RESOLVED | Noted: 2018-02-28 | Resolved: 2023-07-28

## 2023-07-28 PROBLEM — R40.20 LOSS OF CONSCIOUSNESS: Status: ACTIVE | Noted: 2023-07-28

## 2023-07-28 PROBLEM — E87.5 HYPERKALEMIA: Status: ACTIVE | Noted: 2023-07-28

## 2023-07-28 PROBLEM — R40.20 LOSS OF CONSCIOUSNESS: Status: RESOLVED | Noted: 2023-07-28 | Resolved: 2023-07-28

## 2023-07-28 PROBLEM — Z93.59 SUPRAPUBIC CATHETER: Status: ACTIVE | Noted: 2023-07-28
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
## 2023-07-28 PROBLEM — Z16.24 MULTIPLE DRUG RESISTANT ORGANISM (MDRO) CULTURE POSITIVE: Status: ACTIVE | Noted: 2023-07-28

## 2023-07-28 LAB
ANION GAP SERPL CALC-SCNC: 9 MMOL/L (ref 8–16)
ASCENDING AORTA: 2.88 CM
AV INDEX (PROSTH): 0.72
AV MEAN GRADIENT: 5 MMHG
AV PEAK GRADIENT: 11 MMHG
AV VALVE AREA: 2.67 CM2
AV VELOCITY RATIO: 0.67
BACTERIA #/AREA URNS AUTO: ABNORMAL /HPF
BASOPHILS # BLD AUTO: 0.05 K/UL (ref 0–0.2)
BASOPHILS NFR BLD: 0.7 % (ref 0–1.9)
BILIRUB UR QL STRIP: NEGATIVE
BNP SERPL-MCNC: 31 PG/ML (ref 0–99)
BSA FOR ECHO PROCEDURE: 1.42 M2
BUN SERPL-MCNC: 16 MG/DL (ref 6–20)
CALCIUM SERPL-MCNC: 9 MG/DL (ref 8.7–10.5)
CHLORIDE SERPL-SCNC: 109 MMOL/L (ref 95–110)
CLARITY UR REFRACT.AUTO: ABNORMAL
CO2 SERPL-SCNC: 22 MMOL/L (ref 23–29)
COLOR UR AUTO: ABNORMAL
CREAT SERPL-MCNC: 1 MG/DL (ref 0.5–1.4)
CV ECHO LV RWT: 0.46 CM
DIFFERENTIAL METHOD: ABNORMAL
DOP CALC AO PEAK VEL: 1.68 M/S
DOP CALC AO VTI: 26.1 CM
DOP CALC LVOT AREA: 3.7 CM2
DOP CALC LVOT DIAMETER: 2.18 CM
DOP CALC LVOT PEAK VEL: 1.12 M/S
DOP CALC LVOT STROKE VOLUME: 69.76 CM3
DOP CALCLVOT PEAK VEL VTI: 18.7 CM
E WAVE DECELERATION TIME: 222.75 MSEC
E/A RATIO: 0.82
E/E' RATIO: 11.43 M/S
ECHO LV POSTERIOR WALL: 0.78 CM (ref 0.6–1.1)
EJECTION FRACTION: 55 %
EOSINOPHIL # BLD AUTO: 0.3 K/UL (ref 0–0.5)
EOSINOPHIL NFR BLD: 4.2 % (ref 0–8)
ERYTHROCYTE [DISTWIDTH] IN BLOOD BY AUTOMATED COUNT: 14.4 % (ref 11.5–14.5)
EST. GFR  (NO RACE VARIABLE): >60 ML/MIN/1.73 M^2
FRACTIONAL SHORTENING: 26 % (ref 28–44)
GLUCOSE SERPL-MCNC: 216 MG/DL (ref 70–110)
GLUCOSE UR QL STRIP: NEGATIVE
HCT VFR BLD AUTO: 33.1 % (ref 40–54)
HGB BLD-MCNC: 11.1 G/DL (ref 14–18)
HGB UR QL STRIP: ABNORMAL
HYALINE CASTS UR QL AUTO: 0 /LPF
IMM GRANULOCYTES # BLD AUTO: 0.05 K/UL (ref 0–0.04)
IMM GRANULOCYTES NFR BLD AUTO: 0.7 % (ref 0–0.5)
INTERVENTRICULAR SEPTUM: 1.01 CM (ref 0.6–1.1)
KETONES UR QL STRIP: NEGATIVE
LA MAJOR: 4.78 CM
LA MINOR: 4.8 CM
LA WIDTH: 3.47 CM
LEFT ATRIUM SIZE: 3.17 CM
LEFT ATRIUM VOLUME INDEX MOD: 33.9 ML/M2
LEFT ATRIUM VOLUME INDEX: 36.4 ML/M2
LEFT ATRIUM VOLUME MOD: 41.64 CM3
LEFT ATRIUM VOLUME: 44.79 CM3
LEFT INTERNAL DIMENSION IN SYSTOLE: 2.47 CM (ref 2.1–4)
LEFT VENTRICLE DIASTOLIC VOLUME INDEX: 37.38 ML/M2
LEFT VENTRICLE DIASTOLIC VOLUME: 45.98 ML
LEFT VENTRICLE MASS INDEX: 67 G/M2
LEFT VENTRICLE SYSTOLIC VOLUME INDEX: 17.6 ML/M2
LEFT VENTRICLE SYSTOLIC VOLUME: 21.63 ML
LEFT VENTRICULAR INTERNAL DIMENSION IN DIASTOLE: 3.36 CM (ref 3.5–6)
LEFT VENTRICULAR MASS: 82.68 G
LEUKOCYTE ESTERASE UR QL STRIP: ABNORMAL
LV LATERAL E/E' RATIO: 10 M/S
LV SEPTAL E/E' RATIO: 13.33 M/S
LYMPHOCYTES # BLD AUTO: 1.4 K/UL (ref 1–4.8)
LYMPHOCYTES NFR BLD: 21 % (ref 18–48)
MCH RBC QN AUTO: 30.5 PG (ref 27–31)
MCHC RBC AUTO-ENTMCNC: 33.5 G/DL (ref 32–36)
MCV RBC AUTO: 91 FL (ref 82–98)
MICROSCOPIC COMMENT: ABNORMAL
MONOCYTES # BLD AUTO: 0.6 K/UL (ref 0.3–1)
MONOCYTES NFR BLD: 8.6 % (ref 4–15)
MV A" WAVE DURATION": 7.61 MSEC
MV PEAK A VEL: 0.98 M/S
MV PEAK E VEL: 0.8 M/S
MV STENOSIS PRESSURE HALF TIME: 64.6 MS
MV VALVE AREA P 1/2 METHOD: 3.41 CM2
NEUTROPHILS # BLD AUTO: 4.4 K/UL (ref 1.8–7.7)
NEUTROPHILS NFR BLD: 64.8 % (ref 38–73)
NITRITE UR QL STRIP: NEGATIVE
NRBC BLD-RTO: 0 /100 WBC
PH UR STRIP: 6 [PH] (ref 5–8)
PLATELET # BLD AUTO: 182 K/UL (ref 150–450)
PMV BLD AUTO: 10.2 FL (ref 9.2–12.9)
POCT GLUCOSE: 180 MG/DL (ref 70–110)
POCT GLUCOSE: 208 MG/DL (ref 70–110)
POCT GLUCOSE: 230 MG/DL (ref 70–110)
POCT GLUCOSE: 240 MG/DL (ref 70–110)
POCT GLUCOSE: 288 MG/DL (ref 70–110)
POTASSIUM SERPL-SCNC: 4.5 MMOL/L (ref 3.5–5.1)
PROT UR QL STRIP: ABNORMAL
PULM VEIN S/D RATIO: 1.33
PV PEAK D VEL: 0.3 M/S
PV PEAK S VEL: 0.4 M/S
RA MAJOR: 4.58 CM
RA PRESSURE: 3 MMHG
RA WIDTH: 3.93 CM
RBC # BLD AUTO: 3.64 M/UL (ref 4.6–6.2)
RBC #/AREA URNS AUTO: >100 /HPF (ref 0–4)
RIGHT VENTRICULAR END-DIASTOLIC DIMENSION: 3.06 CM
SINUS: 3.5 CM
SODIUM SERPL-SCNC: 140 MMOL/L (ref 136–145)
SP GR UR STRIP: 1.01 (ref 1–1.03)
STJ: 2.32 CM
TDI LATERAL: 0.08 M/S
TDI SEPTAL: 0.06 M/S
TDI: 0.07 M/S
TRICUSPID ANNULAR PLANE SYSTOLIC EXCURSION: 1.36 CM
URN SPEC COLLECT METH UR: ABNORMAL
WBC # BLD AUTO: 6.72 K/UL (ref 3.9–12.7)
WBC #/AREA URNS AUTO: >100 /HPF (ref 0–5)
WBC CLUMPS UR QL AUTO: ABNORMAL

## 2023-07-28 PROCEDURE — 97112 NEUROMUSCULAR REEDUCATION: CPT | Mod: HCNC

## 2023-07-28 PROCEDURE — 97165 OT EVAL LOW COMPLEX 30 MIN: CPT | Mod: HCNC

## 2023-07-28 PROCEDURE — 99233 SBSQ HOSP IP/OBS HIGH 50: CPT | Mod: HCNC,,,

## 2023-07-28 PROCEDURE — 85025 COMPLETE CBC W/AUTO DIFF WBC: CPT | Mod: HCNC

## 2023-07-28 PROCEDURE — 36415 COLL VENOUS BLD VENIPUNCTURE: CPT | Mod: HCNC

## 2023-07-28 PROCEDURE — 97161 PT EVAL LOW COMPLEX 20 MIN: CPT | Mod: HCNC

## 2023-07-28 PROCEDURE — 83880 ASSAY OF NATRIURETIC PEPTIDE: CPT | Mod: HCNC

## 2023-07-28 PROCEDURE — 96372 THER/PROPH/DIAG INJ SC/IM: CPT

## 2023-07-28 PROCEDURE — 25000003 PHARM REV CODE 250

## 2023-07-28 PROCEDURE — 97535 SELF CARE MNGMENT TRAINING: CPT | Mod: HCNC

## 2023-07-28 PROCEDURE — 21400001 HC TELEMETRY ROOM: Mod: HCNC

## 2023-07-28 PROCEDURE — 81001 URINALYSIS AUTO W/SCOPE: CPT | Mod: HCNC

## 2023-07-28 PROCEDURE — 99223 1ST HOSP IP/OBS HIGH 75: CPT | Mod: ,,,

## 2023-07-28 PROCEDURE — 94761 N-INVAS EAR/PLS OXIMETRY MLT: CPT

## 2023-07-28 PROCEDURE — 63600175 PHARM REV CODE 636 W HCPCS: Mod: HCNC

## 2023-07-28 PROCEDURE — 87086 URINE CULTURE/COLONY COUNT: CPT | Mod: HCNC

## 2023-07-28 PROCEDURE — 80048 BASIC METABOLIC PNL TOTAL CA: CPT | Mod: HCNC

## 2023-07-28 PROCEDURE — 99223 PR INITIAL HOSPITAL CARE,LEVL III: ICD-10-PCS | Mod: ,,,

## 2023-07-28 PROCEDURE — 99233 PR SUBSEQUENT HOSPITAL CARE,LEVL III: ICD-10-PCS | Mod: HCNC,,,

## 2023-07-28 RX ORDER — NALOXONE HCL 0.4 MG/ML
0.02 VIAL (ML) INJECTION
Status: DISCONTINUED | OUTPATIENT
Start: 2023-07-28 | End: 2023-07-30 | Stop reason: HOSPADM

## 2023-07-28 RX ORDER — PREGABALIN 100 MG/1
100 CAPSULE ORAL 2 TIMES DAILY
Status: DISCONTINUED | OUTPATIENT
Start: 2023-07-28 | End: 2023-07-30 | Stop reason: HOSPADM

## 2023-07-28 RX ORDER — DULOXETIN HYDROCHLORIDE 60 MG/1
60 CAPSULE, DELAYED RELEASE ORAL DAILY
Status: DISCONTINUED | OUTPATIENT
Start: 2023-07-28 | End: 2023-07-30 | Stop reason: HOSPADM

## 2023-07-28 RX ORDER — NAPROXEN SODIUM 220 MG/1
81 TABLET, FILM COATED ORAL DAILY
Status: DISCONTINUED | OUTPATIENT
Start: 2023-07-28 | End: 2023-07-30 | Stop reason: HOSPADM

## 2023-07-28 RX ORDER — OXYCODONE HYDROCHLORIDE 10 MG/1
10 TABLET ORAL 3 TIMES DAILY PRN
Status: DISCONTINUED | OUTPATIENT
Start: 2023-07-28 | End: 2023-07-30 | Stop reason: HOSPADM

## 2023-07-28 RX ORDER — INSULIN ASPART 100 [IU]/ML
0-5 INJECTION, SOLUTION INTRAVENOUS; SUBCUTANEOUS
Status: DISCONTINUED | OUTPATIENT
Start: 2023-07-28 | End: 2023-07-29

## 2023-07-28 RX ORDER — ATORVASTATIN CALCIUM 40 MG/1
80 TABLET, FILM COATED ORAL NIGHTLY
Status: DISCONTINUED | OUTPATIENT
Start: 2023-07-28 | End: 2023-07-30 | Stop reason: HOSPADM

## 2023-07-28 RX ORDER — IBUPROFEN 200 MG
24 TABLET ORAL
Status: DISCONTINUED | OUTPATIENT
Start: 2023-07-28 | End: 2023-07-30 | Stop reason: HOSPADM

## 2023-07-28 RX ORDER — LISINOPRIL 20 MG/1
20 TABLET ORAL DAILY
Status: DISCONTINUED | OUTPATIENT
Start: 2023-07-28 | End: 2023-07-30 | Stop reason: HOSPADM

## 2023-07-28 RX ORDER — ASCORBIC ACID 500 MG
500 TABLET ORAL 2 TIMES DAILY WITH MEALS
Status: DISCONTINUED | OUTPATIENT
Start: 2023-07-28 | End: 2023-07-30 | Stop reason: HOSPADM

## 2023-07-28 RX ORDER — BACLOFEN 10 MG/1
20 TABLET ORAL 4 TIMES DAILY PRN
Status: DISCONTINUED | OUTPATIENT
Start: 2023-07-28 | End: 2023-07-30 | Stop reason: HOSPADM

## 2023-07-28 RX ORDER — MAG HYDROX/ALUMINUM HYD/SIMETH 200-200-20
30 SUSPENSION, ORAL (FINAL DOSE FORM) ORAL 4 TIMES DAILY PRN
Status: DISCONTINUED | OUTPATIENT
Start: 2023-07-28 | End: 2023-07-30 | Stop reason: HOSPADM

## 2023-07-28 RX ORDER — ONDANSETRON 8 MG/1
8 TABLET, ORALLY DISINTEGRATING ORAL EVERY 8 HOURS PRN
Status: DISCONTINUED | OUTPATIENT
Start: 2023-07-28 | End: 2023-07-30 | Stop reason: HOSPADM

## 2023-07-28 RX ORDER — TALC
9 POWDER (GRAM) TOPICAL NIGHTLY PRN
Status: DISCONTINUED | OUTPATIENT
Start: 2023-07-28 | End: 2023-07-30 | Stop reason: HOSPADM

## 2023-07-28 RX ORDER — CLOPIDOGREL BISULFATE 75 MG/1
75 TABLET ORAL EVERY MORNING
Status: DISCONTINUED | OUTPATIENT
Start: 2023-07-28 | End: 2023-07-30 | Stop reason: HOSPADM

## 2023-07-28 RX ORDER — ALPRAZOLAM 1 MG/1
1 TABLET ORAL NIGHTLY
Status: DISCONTINUED | OUTPATIENT
Start: 2023-07-28 | End: 2023-07-30 | Stop reason: HOSPADM

## 2023-07-28 RX ORDER — SPIRONOLACTONE 25 MG/1
25 TABLET ORAL DAILY
Status: DISCONTINUED | OUTPATIENT
Start: 2023-07-28 | End: 2023-07-30 | Stop reason: HOSPADM

## 2023-07-28 RX ORDER — PROPRANOLOL HYDROCHLORIDE 80 MG/1
80 TABLET ORAL 2 TIMES DAILY
Status: DISCONTINUED | OUTPATIENT
Start: 2023-07-28 | End: 2023-07-29

## 2023-07-28 RX ORDER — POLYETHYLENE GLYCOL 3350 17 G/17G
17 POWDER, FOR SOLUTION ORAL DAILY
Status: DISCONTINUED | OUTPATIENT
Start: 2023-07-28 | End: 2023-07-29

## 2023-07-28 RX ORDER — SODIUM CHLORIDE 0.9 % (FLUSH) 0.9 %
10 SYRINGE (ML) INJECTION EVERY 8 HOURS PRN
Status: DISCONTINUED | OUTPATIENT
Start: 2023-07-28 | End: 2023-07-30 | Stop reason: HOSPADM

## 2023-07-28 RX ORDER — IBUPROFEN 200 MG
16 TABLET ORAL
Status: DISCONTINUED | OUTPATIENT
Start: 2023-07-28 | End: 2023-07-30 | Stop reason: HOSPADM

## 2023-07-28 RX ORDER — TAMSULOSIN HYDROCHLORIDE 0.4 MG/1
0.4 CAPSULE ORAL NIGHTLY
Status: DISCONTINUED | OUTPATIENT
Start: 2023-07-28 | End: 2023-07-30 | Stop reason: HOSPADM

## 2023-07-28 RX ORDER — GLUCAGON 1 MG
1 KIT INJECTION
Status: DISCONTINUED | OUTPATIENT
Start: 2023-07-28 | End: 2023-07-30 | Stop reason: HOSPADM

## 2023-07-28 RX ORDER — INSULIN ASPART 100 [IU]/ML
5 INJECTION, SOLUTION INTRAVENOUS; SUBCUTANEOUS
Status: DISCONTINUED | OUTPATIENT
Start: 2023-07-28 | End: 2023-07-29

## 2023-07-28 RX ORDER — CALCIUM CARBONATE 200(500)MG
1000 TABLET,CHEWABLE ORAL 3 TIMES DAILY PRN
Status: DISCONTINUED | OUTPATIENT
Start: 2023-07-28 | End: 2023-07-30 | Stop reason: HOSPADM

## 2023-07-28 RX ORDER — PROCHLORPERAZINE EDISYLATE 5 MG/ML
5 INJECTION INTRAMUSCULAR; INTRAVENOUS EVERY 6 HOURS PRN
Status: DISCONTINUED | OUTPATIENT
Start: 2023-07-28 | End: 2023-07-30 | Stop reason: HOSPADM

## 2023-07-28 RX ORDER — IPRATROPIUM BROMIDE AND ALBUTEROL SULFATE 2.5; .5 MG/3ML; MG/3ML
3 SOLUTION RESPIRATORY (INHALATION) EVERY 4 HOURS PRN
Status: DISCONTINUED | OUTPATIENT
Start: 2023-07-28 | End: 2023-07-30 | Stop reason: HOSPADM

## 2023-07-28 RX ORDER — ACETAMINOPHEN 325 MG/1
650 TABLET ORAL EVERY 6 HOURS PRN
Status: DISCONTINUED | OUTPATIENT
Start: 2023-07-28 | End: 2023-07-30 | Stop reason: HOSPADM

## 2023-07-28 RX ADMIN — TAMSULOSIN HYDROCHLORIDE 0.4 MG: 0.4 CAPSULE ORAL at 03:07

## 2023-07-28 RX ADMIN — PREGABALIN 100 MG: 100 CAPSULE ORAL at 10:07

## 2023-07-28 RX ADMIN — ALPRAZOLAM 1 MG: 1 TABLET ORAL at 10:07

## 2023-07-28 RX ADMIN — INSULIN ASPART 2 UNITS: 100 INJECTION, SOLUTION INTRAVENOUS; SUBCUTANEOUS at 05:07

## 2023-07-28 RX ADMIN — ATORVASTATIN CALCIUM 80 MG: 40 TABLET, FILM COATED ORAL at 03:07

## 2023-07-28 RX ADMIN — POLYETHYLENE GLYCOL 3350 17 G: 17 POWDER, FOR SOLUTION ORAL at 10:07

## 2023-07-28 RX ADMIN — DULOXETINE HYDROCHLORIDE 60 MG: 60 CAPSULE, DELAYED RELEASE ORAL at 10:07

## 2023-07-28 RX ADMIN — INSULIN ASPART 1 UNITS: 100 INJECTION, SOLUTION INTRAVENOUS; SUBCUTANEOUS at 10:07

## 2023-07-28 RX ADMIN — CLOPIDOGREL BISULFATE 75 MG: 75 TABLET ORAL at 07:07

## 2023-07-28 RX ADMIN — TAMSULOSIN HYDROCHLORIDE 0.4 MG: 0.4 CAPSULE ORAL at 10:07

## 2023-07-28 RX ADMIN — INSULIN ASPART 5 UNITS: 100 INJECTION, SOLUTION INTRAVENOUS; SUBCUTANEOUS at 12:07

## 2023-07-28 RX ADMIN — MEROPENEM 1 G: 1 INJECTION, POWDER, FOR SOLUTION INTRAVENOUS at 04:07

## 2023-07-28 RX ADMIN — PREGABALIN 100 MG: 100 CAPSULE ORAL at 09:07

## 2023-07-28 RX ADMIN — INSULIN DETEMIR 8 UNITS: 100 INJECTION, SOLUTION SUBCUTANEOUS at 09:07

## 2023-07-28 RX ADMIN — SODIUM ZIRCONIUM CYCLOSILICATE 5 G: 5 POWDER, FOR SUSPENSION ORAL at 03:07

## 2023-07-28 RX ADMIN — ATORVASTATIN CALCIUM 80 MG: 40 TABLET, FILM COATED ORAL at 10:07

## 2023-07-28 RX ADMIN — INSULIN ASPART 5 UNITS: 100 INJECTION, SOLUTION INTRAVENOUS; SUBCUTANEOUS at 05:07

## 2023-07-28 RX ADMIN — OXYCODONE HYDROCHLORIDE AND ACETAMINOPHEN 500 MG: 500 TABLET ORAL at 10:07

## 2023-07-28 RX ADMIN — OXYCODONE HYDROCHLORIDE 10 MG: 10 TABLET ORAL at 11:07

## 2023-07-28 RX ADMIN — PROPRANOLOL HYDROCHLORIDE 80 MG: 80 TABLET ORAL at 10:07

## 2023-07-28 RX ADMIN — ASPIRIN 81 MG CHEWABLE TABLET 81 MG: 81 TABLET CHEWABLE at 10:07

## 2023-07-28 RX ADMIN — MEROPENEM 1 G: 1 INJECTION, POWDER, FOR SOLUTION INTRAVENOUS at 05:07

## 2023-07-28 RX ADMIN — OXYCODONE HYDROCHLORIDE AND ACETAMINOPHEN 500 MG: 500 TABLET ORAL at 05:07

## 2023-07-28 RX ADMIN — APIXABAN 5 MG: 5 TABLET, FILM COATED ORAL at 10:07

## 2023-07-28 RX ADMIN — ALPRAZOLAM 1 MG: 1 TABLET ORAL at 03:07

## 2023-07-28 RX ADMIN — INSULIN ASPART 5 UNITS: 100 INJECTION, SOLUTION INTRAVENOUS; SUBCUTANEOUS at 08:07

## 2023-07-28 RX ADMIN — INSULIN DETEMIR 8 UNITS: 100 INJECTION, SOLUTION SUBCUTANEOUS at 08:07

## 2023-07-28 NOTE — ASSESSMENT & PLAN NOTE
- K 5.3 on admit, downtrending  - EKG in sinus darien with nonspecific T wave abnormality   - give dose of lokelma   - repeat bmp in am   - on daily PO K+ replacement  per chart review, continue to hold  - Consider D/C at discharge.

## 2023-07-28 NOTE — ASSESSMENT & PLAN NOTE
- K 5.3 on admit   - EKG in sinus darien with nonspecific T wave abnormality   - give dose of lokelma   - repeat bmp in am   - on daily PO K+ replacement  per chart review. Holding now. Consider D/C at discharge.

## 2023-07-28 NOTE — ASSESSMENT & PLAN NOTE
Creatine stable for now. BMP reviewed- noted Estimated Creatinine Clearance: 22.6 mL/min (based on SCr of 1 mg/dL). according to latest data. Monitor UOP and serial BMP and adjust therapy as needed. Renally dose meds.

## 2023-07-28 NOTE — PLAN OF CARE
PT Evaluation completed and PT POC established.    Problem: Physical Therapy  Goal: Physical Therapy Goal  Description: Goals to be met by: 2023     Patient will increase functional independence with mobility by performin. Supine to sit with Modified [place]  2. Sit to supine with Modified [place]  3. Bed to wheelchair transfer with Supervision using LRAD  4. Wheelchair propulsion x100 feet with Supervision using bilateral uppper extremities    Outcome: Ongoing, Progressing

## 2023-07-28 NOTE — ASSESSMENT & PLAN NOTE
Creatine stable for now. BMP reviewed- noted Estimated Creatinine Clearance: 48 mL/min (based on SCr of 1 mg/dL). according to latest data. Monitor UOP and serial BMP and adjust therapy as needed. Renally dose meds.

## 2023-07-28 NOTE — PROGRESS NOTES
Pharmacist Renal Dose Adjustment Note    Kuldeep Alamo is a 58 y.o. male being treated with the medication merrem    Patient Data:    Vital Signs (Most Recent):  Temp: 97.7 °F (36.5 °C) (07/28/23 0056)  Pulse: (!) 52 (07/28/23 0056)  Resp: 20 (07/28/23 0056)  BP: (!) 155/68 (07/28/23 0056)  SpO2: 98 % (07/28/23 0056) Vital Signs (72h Range):  Temp:  [97.7 °F (36.5 °C)-97.8 °F (36.6 °C)]   Pulse:  [50-58]   Resp:  [14-20]   BP: (104-155)/(57-88)   SpO2:  [96 %-98 %]      Recent Labs   Lab 07/27/23 2011   CREATININE 1.0     Serum creatinine: 1 mg/dL 07/27/23 2011  Estimated creatinine clearance: 22.6 mL/min    Meropenem 1 gram every 8 hours will be changed to meropenem 500 mg every 12 hours per pharmacy protocol     Pharmacist's Name: Concepcion Murillo  Pharmacist's Extension: [phone]

## 2023-07-28 NOTE — PROGRESS NOTES
Pharmacist Renal Dose Adjustment Note    Kuldeep Alamo is a 58 y.o. male being treated with the medication merrem    Patient Data:    Vital Signs (Most Recent):  Temp: 97.7 °F (36.5 °C) (07/28/23 0056)  Pulse: (!) 52 (07/28/23 0056)  Resp: 20 (07/28/23 0056)  BP: (!) 155/68 (07/28/23 0056)  SpO2: 98 % (07/28/23 0056) Vital Signs (72h Range):  Temp:  [97.7 °F (36.5 °C)-97.8 °F (36.6 °C)]   Pulse:  [50-58]   Resp:  [14-20]   BP: (104-155)/(57-88)   SpO2:  [96 %-98 %]      Recent Labs   Lab 07/27/23 2011   CREATININE 1.0     Serum creatinine: 1 mg/dL 07/27/23 2011  Estimated creatinine clearance: 22.6 mL/min    Meropenem 1 gram every 8 hours will be changed to meropenem 1 gram every 12 hours per pharmacy protocol     Pharmacist's Name: Concepcion Murillo  Pharmacist's Extension: [phone]

## 2023-07-28 NOTE — ASSESSMENT & PLAN NOTE
MDRO   Suprapubic catheter     - UA grossly infectious: 3+ leukocytes, 58 WBC, 3+ occult blood >100 RBC.   - AMS noted including increased lethargy and weakness on arrival to ED; at time of my exam patient is alert and conversant.   - 0/4 SIRS  - Given vanc/zosyn in ED  - Based on review of prior urine cultures and sensitivities, will start meropenem  - ID consulted     --> Initial urine sample collected prior to suprapubic cath exchange. Repeating UA after cath exchange for clean urine sample (though this will be post-abx admin in ED).

## 2023-07-28 NOTE — NURSING
Report received from ANTOINE Lopez RN. Patient awake, alert, and oriented x 4. Lying in bed-semi fowlers position. NAD noted. Respirations are even and unlabored. Plan of care reviewed. Education provided. Instruction given on use of call light. Bed locked and in lowest position. All safety measures are in place. Communication board updated with direct nursing extension. RN will continue to monitor.

## 2023-07-28 NOTE — ASSESSMENT & PLAN NOTE
S/p AKA bilateral   LOC  - fell from wheelchair at home, unwitnessed  - imaging reviewed - no acute injury   - patient reports possible LOC, suspects maybe from a coughing fit. However, he cannot recall the events surrounding the fall. Denies increased respiratory symptoms of cough, SOB, wheezing. Denies recent URI, fevers, chills, congestion.  - HR 50's, HDS. Satting 98% on room air. VBG reviewed.  - EKG in sinus darien with nonspecific t wave changes   - BNP wnl, echo pending  - tele   - Monitor for increased frequency of coughing, SOB, wheezing. Prn duo nebs.   - PT/OT consulted

## 2023-07-28 NOTE — CONSULTS
Consult for meropenem use, approved - please reconsult with questions or concerns. Will sign off.

## 2023-07-28 NOTE — PROGRESS NOTES
[facility] [address] [place] [address] Medicine  Progress Note    Patient Name: Kuldeep Alamo  MRN: [id number]  Patient Class: IP- Inpatient   Admission Date: 7/27/2023  Length of Stay: 0 days  Attending Physician: Gilbert Sosa MD  Primary Care Provider: Jalyn Rock DO        Subjective:     Principal Problem:Urinary tract infection associated with cystostomy catheter        HPI:  Kuldeep Alamo is a 57 yo male with CHF, CAD s/p CABG 2013, hx CVA, vertebrobasilar occlusive disease, HLD, and DMII, who presents from his home after an unwitnessed fall from his wheelchair. On my exam patient is alert and responsive and able to provide history. He reports he can't recall the events of the fall, but he believes he lost consciousness. Reports that at baseline he will have coughing fits to the point of being unable to catch his breath and will sometimes pass out. He says it's possible that's what happened today. Denies SOB, fevers or chills, abdominal pain, N/V/D, injury or pain.     In the ED: AF. HR 50's, BP stable. SpO2 98% on room air. EKG in sinus darien. CBC with baseline anemia. CMP with K 5.3, Cr 1.0 (baseline), and . BNP and trop wnl. UA grossly infectious with >100 RBC. Culture pending. Blood cultures collected. VBG: pH 7.33, PCO2 49, PO2 59, HCO3 26. CTH and CT c-spine without acute process. CXR with mild perihilar edema and small R pleural effusion. Given IV zosyn, vancomycin, and 1L NS in ED.       Overview/Hospital Course:  Kuldeep Alamo was placed in  observation for complicated UTI with chronic suprapubic catheter. Urine cultures in process. Echo pending for syncope workup. Blood cultures NGTD. Started on IV Meropenem given history of MDRO. Repeat UA grossly infectious w/ positive left shift. ID consulted. PT/OT contacted to assist in wheelchair transfer difficulty.       Interval History: NAEON. AFVSS. Patients appears to be improving overall; no complaints this morning. No abominal TTP,  normal BM overnight, no blood in urine. States minor fatigue upon waking due to waking up at 6am. Continuing Meropenem. No IVF, continue PO intake. ID consulted. Echo ordered due to syncope risk. OT/PT consulted to assist in wheelchair transfer issues.    Review of Systems   Constitutional:  Positive for fatigue. Negative for chills and fever.   HENT:  Negative for ear pain, sore throat and trouble swallowing.    Eyes:  Negative for pain and visual disturbance.   Respiratory:  Positive for shortness of breath (At baseline. Since stroke in 01/2019). Negative for chest tightness and wheezing.    Cardiovascular:  Negative for chest pain and palpitations.   Gastrointestinal:  Negative for abdominal pain, diarrhea, nausea and vomiting.   Genitourinary:  Negative for flank pain and hematuria.        No pelvic pain   Musculoskeletal:  Positive for gait problem (At baseline. Bilateral LE amputatee). Negative for back pain and neck pain.   Skin:  Negative for color change and rash.   Neurological:  Positive for speech difficulty (At baseline. Since stroke in 01/2019). Negative for dizziness, weakness and headaches.   Hematological:  Negative for adenopathy.   Psychiatric/Behavioral:  Negative for confusion, hallucinations and sleep disturbance.    Objective:     Vital Signs (Most Recent):  Temp: 97.6 °F (36.4 °C) (07/28/23 1109)  Pulse: 78 (07/28/23 1132)  Resp: 18 (07/28/23 1120)  BP: (!) 144/70 (07/28/23 1109)  SpO2: 96 % (07/28/23 1109) Vital Signs (24h Range):  Temp:  [96.5 °F (35.8 °C)-97.9 °F (36.6 °C)] 97.6 °F (36.4 °C)  Pulse:  [50-81] 78  Resp:  [14-20] 18  SpO2:  [95 %-100 %] 96 %  BP: (104-155)/(57-88) 144/70     Weight: 77.9 kg (171 lb 11.8 oz)  Body mass index is 88.2 kg/m².    Intake/Output Summary (Last 24 hours) at 7/28/2023 1231  Last data filed at 7/28/2023 0619  Gross per 24 hour   Intake 1350 ml   Output 200 ml   Net 1150 ml         Physical Exam  Vitals and nursing note reviewed.   Constitutional:        General: He is not in acute distress.     Appearance: He is well-developed. He is obese. He is not diaphoretic.   HENT:      Head: Normocephalic and atraumatic.      Mouth/Throat:      Mouth: Mucous membranes are moist.      Pharynx: Oropharynx is clear.   Eyes:      Extraocular Movements: Extraocular movements intact.      Conjunctiva/sclera: Conjunctivae normal.   Cardiovascular:      Rate and Rhythm: Normal rate and regular rhythm.      Heart sounds: Normal heart sounds.   Pulmonary:      Effort: Pulmonary effort is normal. No respiratory distress.      Breath sounds: Rales (Faint bilateral lower lobes) present. No wheezing.   Abdominal:      General: Bowel sounds are normal. There is no distension.      Palpations: Abdomen is soft.      Tenderness: There is no abdominal tenderness.      Comments: Suprapubic catheter in place, c/d/i, urine clear   Musculoskeletal:         General: Deformity present. No swelling or tenderness.      Cervical back: Normal range of motion and neck supple. No tenderness.      Right Lower Extremity: Right leg is amputated above knee.      Left Lower Extremity: Left leg is amputated above knee.   Skin:     General: Skin is warm and dry.      Findings: No erythema.   Neurological:      Mental Status: He is alert and oriented to person, place, and time.      Motor: Weakness (chronic) present.   Psychiatric:         Mood and Affect: Affect is flat.         Speech: Speech is delayed.         Behavior: Behavior normal.         Thought Content: Thought content normal.      Comments: Dysarthria at baseline         Significant Labs: All pertinent labs within the past 24 hours have been reviewed.    CBC:   Recent Labs   Lab 07/27/23 2011 07/28/23  0504   WBC 9.66 6.72   HGB 12.5* 11.1*   HCT 36.5* 33.1*    182     CMP:   Recent Labs   Lab 07/27/23 2011 07/28/23  0504    140   K 5.3* 4.5    109   CO2 24 22*   * 216*   BUN 17 16   CREATININE 1.0 1.0   CALCIUM 9.5 9.0    PROT 7.7  --    ALBUMIN 3.8  --    BILITOT 0.5  --    ALKPHOS 99  --    AST 14  --    ALT 22  --    ANIONGAP 9 9       Significant Imaging: I have reviewed all pertinent imaging results/findings within the past 24 hours.     Imaging Results              X-Ray Chest 1 View (Final result)  Result time 07/27/23 20:05:02      Final result by Reji Fisher MD (07/27/23 20:05:02)                   Impression:      As above.      Electronically signed by: Reji Fisher MD  Date:    07/27/2023  Time:    20:05               Narrative:    EXAMINATION:  XR CHEST 1 VIEW    CLINICAL HISTORY:  Unspecified fall, initial encounter    TECHNIQUE:  Single frontal view of the chest was performed.    COMPARISON:  02/06/2023.    FINDINGS:  Sternotomy wires present.    Underinflated lungs with hypoventilatory change.  Mild perihilar edema.  Small right effusion.    Heart and lungs otherwise appear unchanged when allowing for differences in technique and positioning.                                       CT Head Without Contrast (Final result)  Result time 07/27/23 20:24:20      Final result by Napoleon Kc MD (07/27/23 20:24:20)                   Impression:      1. No acute intracranial process.  2. Mild ethmoid sinus disease.  3. Remote lacunar infarct of the left cerebellum.      Electronically signed by: Napoleon Kc  Date:    07/27/2023  Time:    20:24               Narrative:    EXAMINATION:  CT HEAD WITHOUT CONTRAST    CLINICAL HISTORY:  fall;    TECHNIQUE:  Low dose axial CT images obtained throughout the head without intravenous contrast. Sagittal and coronal reconstructions were performed.    COMPARISON:  02/06/2023    FINDINGS:  Intracranial compartment:    Ventricles and sulci are normal in size for age without evidence of hydrocephalus. No extra-axial blood or fluid collections.    Remote lacunar infarct of the left cerebellum is unchanged.  Mild probable chronic microvascular ischemic changes in the  periventricular white matter.    No parenchymal mass, hemorrhage, edema or major vascular distribution infarct.    Skull/extracranial contents (limited evaluation): No fracture. Mild bilateral ethmoid sinus disease.  Remote fracture of the medial wall the left orbit..    Vascular atherosclerosis with basilar artery stent noted.                                       CT Cervical Spine Without Contrast (Final result)  Result time 07/27/23 20:27:23      Final result by Napoleon Kc MD (07/27/23 20:27:23)                   Impression:      No acute abnormality.      Electronically signed by: Napoleon Kc  Date:    07/27/2023  Time:    20:27               Narrative:    EXAMINATION:  CT CERVICAL SPINE WITHOUT CONTRAST    CLINICAL HISTORY:  fall;    TECHNIQUE:  Low dose axial CT images through the cervical spine, with sagittal and coronal reformations.  Contrast was not administered.    COMPARISON:  None    FINDINGS:  No acute fractures of the cervical spine.  Alignment is satisfactory.    Disc spaces are adequately maintained.    Central canal is adequately maintained.  The neural foramen are adequately maintained.    The posterior elements are intact.    Limited evaluation of the intraspinal contents demonstrates no hematoma or mass.Paraspinal soft tissues exhibit no acute abnormalities.                                         Assessment/Plan:      * Urinary tract infection associated with cystostomy catheter  History of MDRO   Suprapubic catheter   - Initial urine sample collected prior to suprapubic cath exchange. Repeat UA after catheter exchange is grossly infectious  - AMS noted on admission including increased lethargy and weakness on arrival to ED; now at his mental baseline, AAOx3  - 0/4 SIRS at admission   - Given vanc/zosyn in ED  - Based on review of prior urine cultures and sensitivities, will start meropenem  - ID consulted for approval     Hyperkalemia  - K 5.3 on admit, downtrending  - EKG in sinus  darien with nonspecific T wave abnormality   - give dose of lokelma   - repeat bmp in am   - on daily PO K+ replacement  per chart review, continue to hold  - Consider D/C at discharge.     Suprapubic catheter  - Patient with chronic urinary retention and bilateral hydronephrosis requiring chronic suprapubic catheter placement  - Exchanged in the ED  - Continue to monitor    Fall  S/p AKA bilateral   LOC  - fell from wheelchair at home, unwitnessed  - imaging reviewed - no acute injury   - patient reports possible LOC, suspects maybe from a coughing fit. However, he cannot recall the events surrounding the fall. Denies increased respiratory symptoms of cough, SOB, wheezing. Denies recent URI, fevers, chills, congestion.  - HR 50's, HDS. Satting 98% on room air. VBG reviewed.  - EKG in sinus darien with nonspecific t wave changes   - BNP wnl, echo pending  - tele   - Monitor for increased frequency of coughing, SOB, wheezing. Prn duo nebs.   - PT/OT consulted    PAD (peripheral artery disease)  - continue ASA, plavix, statin     Chronic kidney disease, stage III (moderate)  Creatine stable for now. BMP reviewed- noted Estimated Creatinine Clearance: 48 mL/min (based on SCr of 1 mg/dL). according to latest data. Monitor UOP and serial BMP and adjust therapy as needed. Renally dose meds.    Mild recurrent major depression  - continue cymbalta     Anemia  - CBC reviewed-   Lab Results   Component Value Date    HGB 11.1 (L) 07/28/2023    HCT 33.1 (L) 07/28/2023   - Patient's anemia is currently controlled.   - Follow with daily CBC  - Obtain type and screen and transfuse if Hgb <7, Hct <21, or patient is acutely symptomatic    Chronic diastolic CHF (congestive heart failure)  Patient is identified as having Diastolic (HFpEF) heart failure that is Chronic. CHF is currently controlled. Latest ECHO performed and demonstrates- Results for orders placed during the hospital encounter of 01/27/21  Echo Color Flow Doppler?  Yes  Interpretation Summary  · The left ventricle is normal in size with mild concentric hypertrophy and normal systolic function. The estimated ejection fraction is 55%  · Mild mitral regurgitation.  · Mild tricuspid regurgitation.  · Normal right ventricular size with normal right ventricular systolic function.  · There is no pulmonary hypertension.  Continue Beta Blocker, ACE/ARB and Aldactone and monitor clinical status closely. Monitor on telemetry. Patient is off CHF pathway.  Monitor strict Is&Os and daily weights.  Place on fluid restriction of 1.5 L. . Continue to stress to patient importance of self efficacy and  on diet for CHF. Last BNP reviewed- and noted below   - BNP & trop wnl    Panic disorder  - continue home nightly xanax 1mg     Left spastic hemiparesis  - Chronic issue, no acute changes    Hemiplegia of nondominant side, late effect of cerebrovascular disease  L spastic hemiparesis   Vertebrobasilar occlusive disease   - continue eliquis   - continue statin, asa  - continue prn baclofen, BID lyrica     Coronary artery disease involving native coronary artery of native heart without angina pectoris  S/p CABG   - continue asa, statin, plavix     Type 2 diabetes mellitus with hyperglycemia, with long-term current use of insulin  Patient's FSGs are controlled on current medication regimen.  Last A1c reviewed-   Lab Results   Component Value Date    HGBA1C 6.6 (H) 02/04/2023     Most recent fingerstick glucose reviewed-   Recent Labs   Lab 07/27/23 2015 07/28/23  0854 07/28/23  1229   POCTGLUCOSE 230* 180* 208*     Current correctional scale  Low  Adjust (weight based dosing) anti-hyperglycemic dose as follows-   Antihyperglycemics (From admission, onward)    Start     Stop Route Frequency Ordered    07/28/23 0900  insulin detemir U-100 (Levemir) pen 8 Units         -- SubQ 2 times daily 07/28/23 0032    07/28/23 0715  insulin aspart U-100 pen 5 Units         -- SubQ 3 times daily with meals  07/28/23 0032 07/28/23 0128  insulin aspart U-100 pen 0-5 Units         -- SubQ Before meals & nightly PRN 07/28/23 0032      - Hold oral hypoglycemics while patient is in the hospital.    Essential hypertension  - continue lisinopril, aldactone    Mixed hyperlipidemia  - continue statin     VTE Risk Mitigation (From admission, onward)         Ordered     apixaban tablet 5 mg  2 times daily         07/28/23 0032     Reason for No Pharmacological VTE Prophylaxis  Once        Question:  Reasons:  Answer:  Already adequately anticoagulated on oral Anticoagulants    07/28/23 0032     IP VTE HIGH RISK PATIENT  Once         07/28/23 0032     Place sequential compression device  Until discontinued         07/28/23 0032                Discharge Planning   WILTON: 7/30/2023     Code Status: Full Code   Is the patient medically ready for discharge?: No    Reason for patient still in hospital (select all that apply): Patient trending condition, Laboratory test and Treatment  Discharge Plan A: Home Health                  Emily Garcia PA-C  Department of Hospital Medicine   [facility]

## 2023-07-28 NOTE — NURSING
Pt stated that he felt they had trouble placing suprapubic catheter in the ED and that he did not think it was in right.   Red urine draining, bladder scan X3 showed volume of 0mls

## 2023-07-28 NOTE — HPI
Kuldeep Alamo is a 59 yo male with CHF, CAD s/p CABG 2013, hx CVA, vertebrobasilar occlusive disease, HLD, and DMII, who presents from his home after an unwitnessed fall from his wheelchair. On my exam patient is alert and responsive and able to provide history. He reports he can't recall the events of the fall, but he believes he lost consciousness. Reports that at baseline he will have coughing fits to the point of being unable to catch his breath and will sometimes pass out. He says it's possible that's what happened today. Denies SOB, fevers or chills, abdominal pain, N/V/D, injury or pain.     In the ED: AF. HR 50's, BP stable. SpO2 98% on room air. EKG in sinus darien. CBC with baseline anemia. CMP with K 5.3, Cr 1.0 (baseline), and . BNP and trop wnl. UA grossly infectious with >100 RBC. Culture pending. Blood cultures collected. VBG: pH 7.33, PCO2 49, PO2 59, HCO3 26. CTH and CT c-spine without acute process. CXR with mild perihilar edema and small R pleural effusion. Given IV zosyn, vancomycin, and 1L NS in ED.

## 2023-07-28 NOTE — ED NOTES
Received pt AAOx1 and in NAD.  Pt breathing E/U on room air.  Call bell in reach with bed rails up x2.  Pt placed on continuous cardiac, O2, and BP monitoring.  Pt and family advised of plan of care to include all test and procedures. Patient stable at this time; will continue with plan of care.

## 2023-07-28 NOTE — ED PROVIDER NOTES
Encounter Date: 7/27/2023       History     Chief Complaint   Patient presents with    Fall     Pt. Is a 58 yr old male who presents to the ED post unwitnesseed fall between wheelchair and wall.  Baseline non-verbal, he is alert. Skin tear to left hand.  Found around 1600     Pt is a 58 year old male with PMHx significant for HTN, COPD, CAD, suprapubic sims placement, and bilateral AKAs who presents for evaluation of increased fatigue and somnolence. Pt is non verbal at baseline. He was noted by family to have fallen out of his wheelchair today. He was too weak to transfer himself back to his chair. Hx of similar symptoms with prior UTIs and electrolyte abnormalities in particular hypokalemia. HPI and ROS are limited by pt's non verbal status     The history is provided by a relative.   Review of patient's allergies indicates:   Allergen Reactions    Sulfa (sulfonamide antibiotics) Nausea And Vomiting     Past Medical History:   Diagnosis Date    Acute on chronic diastolic CHF (congestive heart failure), NYHA class 3 12/30/2017    CAD (coronary artery disease) 1/7/2013    Cerebral vascular accident     COPD (chronic obstructive pulmonary disease)     Cough syncope 6/15/2017    Diabetes mellitus     Disorder of kidney and ureter     Hyperlipidemia     Hypertension     Laceration of right hand without foreign body 12/23/2019    Patient sustained injury to dorsum of right hand due to ronda. No significant pain. No significant drainage. No fever or chills    Localized edema of right lower leg 12/7/2017    S/P CABG (coronary artery bypass graft) 1/7/2013    Ulcer of right pretibial region, limited to breakdown of skin 7/2/2018    Wound identified 6/18/2018 that is most likely related to tight dressing.     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     Past Surgical History:   Procedure Laterality Date    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    Procedure: AMPUTATION, ABOVE KNEE;  Surgeon:  Ben Goyal MD;  [address]  Service: Vascular;  Laterality: Right;    ABOVE-KNEE AMPUTATION Left 9/22/2021    Procedure: AMPUTATION, ABOVE KNEE;  Surgeon: DOUGLAS Siegel II, MD;  Location: [address] [facility];  Service: Vascular;  Laterality: Left;    ANGIOGRAPHY OF LOWER EXTREMITY Right 2/19/2019    Procedure: Angiogram Extremity Unilateral;  Surgeon: Rudi Galeano MD;  Location: [facility] CATH LAB;  Service: Cardiology;  Laterality: Right;    ANGIOGRAPHY OF LOWER EXTREMITY Right 7/19/2019    Procedure: Angiogram Extremity Unilateral;  Surgeon: Rudi Galeano MD;  Location: [facility] CATH LAB;  Service: Cardiology;  Laterality: Right;    APPLICATION OF WOUND VACUUM-ASSISTED CLOSURE DEVICE Right 7/28/2020    Procedure: APPLICATION, WOUND VAC;  Surgeon: Yolanda Meyers DPM;  Location: [facility] OR;  Service: Podiatry;  Laterality: Right;    BONE BIOPSY Right 7/28/2020    Procedure: BIOPSY, BONE;  Surgeon: Yolanda Meyers DPM;  Location: [facility];  Service: Podiatry;  Laterality: Right;    CARDIAC CATHETERIZATION      CARDIAC CATHETERIZATION  02/2018    stent to right leg x 5 ( 1 stent placed weekly since late jan 2018)    CHOLECYSTECTOMY      CORONARY ARTERY BYPASS GRAFT      2006    CYST REMOVAL      CYSTOSCOPY N/A 2/14/2022    Procedure: CYSTOSCOPY;  Surgeon: Thompson Silva MD;  Location: [facility];  Service: Urology;  Laterality: N/A;    DEBRIDEMENT OF FOOT Right 7/28/2020    Procedure: DEBRIDEMENT, FOOT;  Surgeon: Yolanda Meyers DPM;  Location: [facility];  Service: Podiatry;  Laterality: Right;    PHLEBOGRAPHY Right 2/17/2020    Procedure: Venogram;  Surgeon: Rudi Galeano MD;  Location: [facility] CATH LAB;  Service: Cardiology;  Laterality: Right;  Patient needs anesthesia for sedation     Family History   Problem Relation Age of Onset    Heart disease Mother     Hypertension Mother     Hypertension Father     Cancer Father     Hypertension Sister     Anemia Neg Hx     Arrhythmia Neg Hx     Asthma  Neg Hx     Clotting disorder Neg Hx     Fainting Neg Hx     Heart attack Neg Hx     Heart failure Neg Hx     Hyperlipidemia Neg Hx     Prostate cancer Neg Hx     Kidney disease Neg Hx      Social History     Tobacco Use    Smoking status: Former     Types: Cigarettes     Quit date: 2009     Years since quittin.5     Passive exposure: Past    Smokeless tobacco: Never    Tobacco comments:     quit 9 yrs ago   Substance Use Topics    Alcohol use: Not Currently     Alcohol/week: 12.0 standard drinks     Types: 12 Cans of beer per week     Comment: social on weekends    Drug use: No     Review of Systems   Unable to perform ROS: Patient nonverbal     Physical Exam     Initial Vitals [23 1852]   BP Pulse Resp Temp SpO2   127/88 (!) 58 18 97.8 °F (36.6 °C) 97 %      MAP       --         Physical Exam    Nursing note and vitals reviewed.  Constitutional: No distress.   Chronically ill appearing    HENT:   Head: Normocephalic and atraumatic.   Eyes: EOM are normal. Pupils are equal, round, and reactive to light.   Neck: Neck supple. No tracheal deviation present.   Cardiovascular:  Normal rate, regular rhythm and intact distal pulses.           No murmur heard.  Pulmonary/Chest: Breath sounds normal. No stridor. No respiratory distress. He has no wheezes.   Abdominal: Abdomen is soft. He exhibits no distension. There is no abdominal tenderness.   Musculoskeletal:         General: Normal range of motion.      Cervical back: Neck supple.      Comments: Bilateral AKAs     Neurological:   Somnolent but arousable. Unable to assess orientation as pt non verbal    Skin: Skin is warm and dry. Capillary refill takes less than 2 seconds.       ED Course   Procedures  Labs Reviewed   CBC W/ AUTO DIFFERENTIAL - Abnormal; Notable for the following components:       Result Value    RBC 4.06 (*)     Hemoglobin 12.5 (*)     Hematocrit 36.5 (*)     RDW 14.6 (*)     Immature Granulocytes 0.7 (*)     Immature Grans (Abs) 0.07  (*)     Gran % 73.1 (*)     Lymph % 15.9 (*)     All other components within normal limits   COMPREHENSIVE METABOLIC PANEL - Abnormal; Notable for the following components:    Potassium 5.3 (*)     Glucose 236 (*)     All other components within normal limits   URINALYSIS, REFLEX TO URINE CULTURE - Abnormal; Notable for the following components:    Appearance, UA Hazy (*)     Protein, UA 1+ (*)     Occult Blood UA 3+ (*)     Leukocytes, UA 3+ (*)     All other components within normal limits    Narrative:     Specimen Source->Urine   URINALYSIS MICROSCOPIC - Abnormal; Notable for the following components:    RBC, UA >100 (*)     WBC, UA 58 (*)     All other components within normal limits    Narrative:     Specimen Source->Urine   ISTAT PROCEDURE - Abnormal; Notable for the following components:    POC PH 7.331 (*)     POC PCO2 49.2 (*)     POC SATURATED O2 88 (*)     All other components within normal limits   CULTURE, URINE   HIV 1 / 2 ANTIBODY    Narrative:     Release to patient->Immediate   HEPATITIS C ANTIBODY    Narrative:     Release to patient->Immediate   TROPONIN I   MAGNESIUM   CK   SARS-COV-2 RNA AMPLIFICATION, QUAL   POCT GLUCOSE MONITORING CONTINUOUS     EKG Readings: (Independently Interpreted)   Initial Reading: No STEMI. Rhythm: Normal Sinus Rhythm.     Imaging Results              X-Ray Chest 1 View (Final result)  Result time 07/27/23 20:05:02      Final result by Reji Fisher MD (07/27/23 20:05:02)                   Impression:      As above.      Electronically signed by: Reji Fisher MD  Date:    07/27/2023  Time:    20:05               Narrative:    EXAMINATION:  XR CHEST 1 VIEW    CLINICAL HISTORY:  Unspecified fall, initial encounter    TECHNIQUE:  Single frontal view of the chest was performed.    COMPARISON:  02/06/2023.    FINDINGS:  Sternotomy wires present.    Underinflated lungs with hypoventilatory change.  Mild perihilar edema.  Small right effusion.    Heart and lungs  otherwise appear unchanged when allowing for differences in technique and positioning.                                       CT Head Without Contrast (Final result)  Result time 07/27/23 20:24:20      Final result by Napoleon Kc MD (07/27/23 20:24:20)                   Impression:      1. No acute intracranial process.  2. Mild ethmoid sinus disease.  3. Remote lacunar infarct of the left cerebellum.      Electronically signed by: Napoleon Kc  Date:    07/27/2023  Time:    20:24               Narrative:    EXAMINATION:  CT HEAD WITHOUT CONTRAST    CLINICAL HISTORY:  fall;    TECHNIQUE:  Low dose axial CT images obtained throughout the head without intravenous contrast. Sagittal and coronal reconstructions were performed.    COMPARISON:  02/06/2023    FINDINGS:  Intracranial compartment:    Ventricles and sulci are normal in size for age without evidence of hydrocephalus. No extra-axial blood or fluid collections.    Remote lacunar infarct of the left cerebellum is unchanged.  Mild probable chronic microvascular ischemic changes in the periventricular white matter.    No parenchymal mass, hemorrhage, edema or major vascular distribution infarct.    Skull/extracranial contents (limited evaluation): No fracture. Mild bilateral ethmoid sinus disease.  Remote fracture of the medial wall the left orbit..    Vascular atherosclerosis with basilar artery stent noted.                                       CT Cervical Spine Without Contrast (Final result)  Result time 07/27/23 20:27:23      Final result by Napoleon Kc MD (07/27/23 20:27:23)                   Impression:      No acute abnormality.      Electronically signed by: Napoleon Kc  Date:    07/27/2023  Time:    20:27               Narrative:    EXAMINATION:  CT CERVICAL SPINE WITHOUT CONTRAST    CLINICAL HISTORY:  fall;    TECHNIQUE:  Low dose axial CT images through the cervical spine, with sagittal and coronal reformations.  Contrast was not  administered.    COMPARISON:  None    FINDINGS:  No acute fractures of the cervical spine.  Alignment is satisfactory.    Disc spaces are adequately maintained.    Central canal is adequately maintained.  The neural foramen are adequately maintained.    The posterior elements are intact.    Limited evaluation of the intraspinal contents demonstrates no hematoma or mass.Paraspinal soft tissues exhibit no acute abnormalities.                                       Medications   ALPRAZolam tablet 1 mg (1 mg Oral Given 7/28/23 0358)   apixaban tablet 5 mg (5 mg Oral Given 7/28/23 1021)   ascorbic acid (vitamin C) tablet 500 mg (500 mg Oral Given 7/28/23 1021)   aspirin chewable tablet 81 mg (81 mg Oral Given 7/28/23 1021)   baclofen tablet 20 mg (has no administration in time range)   calcium carbonate 200 mg calcium (500 mg) chewable tablet 1,000 mg (has no administration in time range)   clopidogreL tablet 75 mg (75 mg Oral Given 7/28/23 0716)   DULoxetine DR capsule 60 mg (60 mg Oral Given 7/28/23 1021)   lisinopriL tablet 20 mg (0 mg Oral Hold 7/28/23 1021)   oxyCODONE immediate release tablet Tab 10 mg (10 mg Oral Given 7/28/23 1120)   pregabalin capsule 100 mg (100 mg Oral Given 7/28/23 1021)   propranoloL tablet 80 mg (0 mg Oral Hold 7/28/23 1024)   atorvastatin tablet 80 mg (80 mg Oral Given 7/28/23 0358)   spironolactone tablet 25 mg (0 mg Oral Hold 7/28/23 1021)   tamsulosin 24 hr capsule 0.4 mg (0.4 mg Oral Given 7/28/23 0358)   sodium chloride 0.9% flush 10 mL (has no administration in time range)   melatonin tablet 9 mg (has no administration in time range)   ondansetron disintegrating tablet 8 mg (has no administration in time range)   prochlorperazine injection Soln 5 mg (has no administration in time range)   polyethylene glycol packet 17 g (17 g Oral Given 7/28/23 1020)   acetaminophen tablet 650 mg (has no administration in time range)   aluminum-magnesium hydroxide-simethicone 200-200-20 mg/5 mL  suspension 30 mL (has no administration in time range)   naloxone 0.4 mg/mL injection 0.02 mg (has no administration in time range)   glucose chewable tablet 16 g (has no administration in time range)   glucose chewable tablet 24 g (has no administration in time range)   glucagon (human recombinant) injection 1 mg (has no administration in time range)   insulin aspart U-100 pen 0-5 Units (5 Units Subcutaneous Given 7/28/23 1230)   insulin detemir U-100 (Levemir) pen 8 Units (8 Units Subcutaneous Given 7/28/23 0859)   insulin aspart U-100 pen 5 Units (5 Units Subcutaneous Given 7/28/23 1231)   meropenem (MERREM) 1 g in sodium chloride 0.9 % 100 mL IVPB (MB+) (0 g Intravenous Stopped 7/28/23 0507)   albuterol-ipratropium 2.5 mg-0.5 mg/3 mL nebulizer solution 3 mL (has no administration in time range)   piperacillin-tazobactam (ZOSYN) 4.5 g in dextrose 5 % in water (D5W) 100 mL IVPB (MB+) (0 g Intravenous Stopped 7/27/23 2235)   vancomycin (VANCOCIN) 1,000 mg in dextrose 5 % (D5W) 250 mL IVPB (Vial-Mate) (0 mg Intravenous Stopped 7/27/23 2336)   sodium chloride 0.9% bolus 1,000 mL 1,000 mL (0 mLs Intravenous Stopped 7/28/23 0010)   sodium zirconium cyclosilicate packet 5 g (5 g Oral Given 7/28/23 0358)     Medical Decision Making:   History:   Old Medical Records: I decided to obtain old medical records.  Initial Assessment:   Pt presents for altered mental status and fall   Differential Diagnosis:   ACS, arrhythmia, electrolyte abnormality, sepsis, UTI, pneumonia, anemia, ICH, EDH, SDH, cervical fracture   Clinical Tests:   Lab Tests: Ordered and Reviewed  Radiological Study: Ordered and Reviewed  Medical Tests: Ordered and Reviewed  ED Management:  Sepsis work up initiated upon presentation. Pt started on broad spectrum antibiotics, Labs without leukocytosis or significant electrolyte abnormality. UA concerning for infection. CT head and neck negative for acute process. EKG without ST elevation. Plan to admit for  further management of acute encephalopathy likely 2/2 UTI                        Clinical Impression:   Final diagnoses:  [W19.XXXA] Fall  [G93.40] Acute encephalopathy (Primary)        ED Disposition Condition    Observation Stable                Rodney Edwards Jr., MD  Resident  07/28/23 2032

## 2023-07-28 NOTE — PT/OT/SLP EVAL
"Physical Therapy Co-Evaluation    Patient Name:  Kuldeep Alamo   MRN:  [id number]    Recommendations:     Discharge Recommendations: nursing facility, skilled   Discharge Equipment Recommendations: to be determined by next level of care (will need new wheelchair rec)   Barriers to discharge: Decreased caregiver support    Assessment:     Kuldeep Alamo is a 58 y.o. male admitted with a medical diagnosis of Urinary tract infection associated with cystostomy catheter.  He presents with the following impairments/functional limitations: weakness, impaired endurance, impaired balance, impaired functional mobility.    Pt tolerated EOB sitting with fair sitting balance 1 UE support on rail. Currently requires assist for bed mobility with increased difficulty offloading hips to scoot. Pt will benefit from skilled PT services while in house in order to address the aforementioned deficits.    Rehab Prognosis: Good; patient would benefit from acute skilled PT services to address these deficits and reach maximum level of function.    Recent Surgery: * No surgery found *      Plan:     During this hospitalization, patient to be seen 3 x/week to address the identified rehab impairments via therapeutic activities, therapeutic exercises, wheelchair management/training, neuromuscular re-education and progress toward the following goals:    Plan of Care Expires:  08/28/23    Subjective     "I have a sore on my butt"    Pain/Comfort:  Pain Rating 1: 0/10  Pain Rating Post-Intervention 1: 0/10    Patients cultural, spiritual, Congregational conflicts given the current situation: no    Living Environment:  Per pt, pt resides alone in accessible trailer. Pt reported multiple falls due to "no seatbelt on the w/c and I fall forward when I have the coughing fit"  Prior to admission, patients level of function was mod I w/c.  Equipment used at home: power chair, hospital bed, 3-in-1 commode.  DME owned (not currently used): none.  Upon discharge, " patient will have assistance from unknown.    Objective:     Communicated with RN prior to session.  Patient found HOB elevated with sims catheter  upon PT entry to room.    General Precautions: Standard, fall  Orthopedic Precautions:N/A   Braces: N/A  Respiratory Status: Room air    Exams:  BLE ROM: B AKA  BLE Strength: WFL    Functional Mobility:  Bed Mobility:     Scooting: moderate assistance  Supine to Sit: minimum assistance  Sit to Supine: moderate assistance  Balance:   Fair sitting balance UE support on rail      AM-PAC 6 CLICK MOBILITY  Total Score:11       Treatment & Education:  Educated pt on PT role/POC  Educated pt on importance of OOB activity and daily ambulation   Pt educated on proper body mechanics, safety techniques, and energy conservation with PT facilitation and cueing throughout session   Pt verbalized understanding      Patient left HOB elevated with all lines intact, call button in reach, RN notified, and OT present.    GOALS:   Multidisciplinary Problems       Physical Therapy Goals          Problem: Physical Therapy    Goal Priority Disciplines Outcome Goal Variances Interventions   Physical Therapy Goal     PT, PT/OT Ongoing, Progressing     Description: Goals to be met by: 2023     Patient will increase functional independence with mobility by performin. Supine to sit with Modified [place]  2. Sit to supine with Modified [place]  3. Bed to wheelchair transfer with Supervision using LRAD  4. Wheelchair propulsion x100 feet with Supervision using bilateral uppper extremities                         History:     Past Medical History:   Diagnosis Date    Acute on chronic diastolic CHF (congestive heart failure), NYHA class 3 2017    CAD (coronary artery disease) 2013    Cerebral vascular accident     COPD (chronic obstructive pulmonary disease)     Cough syncope 6/15/2017    Diabetes mellitus     Disorder of kidney and ureter     Hyperlipidemia      Hypertension     Laceration of right hand without foreign body 12/23/2019    Patient sustained injury to dorsum of right hand due to ronda. No significant pain. No significant drainage. No fever or chills    Localized edema of right lower leg 12/7/2017    S/P CABG (coronary artery bypass graft) 1/7/2013    Ulcer of right pretibial region, limited to breakdown of skin 7/2/2018    Wound identified 6/18/2018 that is most likely related to tight dressing.     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       Past Surgical History:   Procedure Laterality Date    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    Procedure: AMPUTATION, ABOVE KNEE;  Surgeon: Ben Goyal MD;  Location: [facility] OR [address] [place] [address];  Service: Vascular;  Laterality: Right;    ABOVE-KNEE AMPUTATION Left 9/22/2021    Procedure: AMPUTATION, ABOVE KNEE;  Surgeon: DOUGLAS Siegel II, MD;  Location: [facility] OR [address] [place] [address];  Service: Vascular;  Laterality: Left;    ANGIOGRAPHY OF LOWER EXTREMITY Right 2/19/2019    Procedure: Angiogram Extremity Unilateral;  Surgeon: Rudi Galeano MD;  Location: [facility] CATH LAB;  Service: Cardiology;  Laterality: Right;    ANGIOGRAPHY OF LOWER EXTREMITY Right 7/19/2019    Procedure: Angiogram Extremity Unilateral;  Surgeon: Rudi Galeano MD;  Location: [facility] CATH LAB;  Service: Cardiology;  Laterality: Right;    APPLICATION OF WOUND VACUUM-ASSISTED CLOSURE DEVICE Right 7/28/2020    Procedure: APPLICATION, WOUND VAC;  Surgeon: Yolanda Meyers DPM;  Location: [facility] OR;  Service: Podiatry;  Laterality: Right;    BONE BIOPSY Right 7/28/2020    Procedure: BIOPSY, BONE;  Surgeon: Yolanda Meyres DPM;  Location: [facility] OR;  Service: Podiatry;  Laterality: Right;    CARDIAC CATHETERIZATION      CARDIAC CATHETERIZATION  02/2018    stent to right leg x 5 ( 1 stent placed weekly since late jan 2018)    CHOLECYSTECTOMY      CORONARY ARTERY BYPASS GRAFT      2006    CYST REMOVAL      CYSTOSCOPY N/A 2/14/2022     Procedure: CYSTOSCOPY;  Surgeon: Thompson Silva MD;  Location: [facility] OR;  Service: Urology;  Laterality: N/A;    DEBRIDEMENT OF FOOT Right 7/28/2020    Procedure: DEBRIDEMENT, FOOT;  Surgeon: Yolanda Meyers DPM;  Location: [facility] OR;  Service: Podiatry;  Laterality: Right;    PHLEBOGRAPHY Right 2/17/2020    Procedure: Venogram;  Surgeon: Rudi Galeano MD;  Location: [facility] CATH LAB;  Service: Cardiology;  Laterality: Right;  Patient needs anesthesia for sedation       Time Tracking:     PT Received On: 07/28/23  PT Start Time: 1405     PT Stop Time: 1424  PT Total Time (min): 19 min     Billable Minutes: Evaluation 9 and Neuromuscular Re-education 10    Co-treatment performed due to patient's multiple deficits requiring two skilled therapists to appropriately and safely assess patient's strength and endurance while facilitating functional tasks in addition to accommodating for patient's activity tolerance.            07/28/2023

## 2023-07-28 NOTE — PLAN OF CARE
Evaluation completed, plan of care established.    Problem: Occupational Therapy  Goal: Occupational Therapy Goal  Description: Goals to be met by: 8/28/23     Patient will increase functional independence with ADLs by performing:    UE Dressing with Modified [place].  LE Dressing with Modified [place].  Grooming while seated with Modified [place].  Toileting from bedside commode with Modified [place] for hygiene and clothing management.   Supine to sit with Modified [place].  Toilet transfer to bedside commode with Modified [place].  Scoot transfer to bedside commode.    Outcome: Ongoing, Progressing

## 2023-07-28 NOTE — ASSESSMENT & PLAN NOTE
History of MDRO   Suprapubic catheter   - Initial urine sample collected prior to suprapubic cath exchange. Repeat UA after catheter exchange is grossly infectious  - AMS noted on admission including increased lethargy and weakness on arrival to ED; now at his mental baseline, AAOx3  - 0/4 SIRS at admission   - Given vanc/zosyn in ED  - Based on review of prior urine cultures and sensitivities, will start meropenem  - ID consulted for approval

## 2023-07-28 NOTE — ASSESSMENT & PLAN NOTE
S/p AKA bilateral   LOC    - fell from wheelchair at home, unwitnessed  - imaging reviewed - no acute injury   - patient reports possible LOC, suspects maybe from a coughing fit. However, he cannot recall the events surrounding the fall. Denies increased respiratory symptoms of cough, SOB, wheezing. Denies recent URI, fevers, chills, congestion.  - HR 50's, HDS. Satting 98% on room air. VBG reviewed.  - EKG in sinus darien with nonspecific t wave changes   - check BNP, echo   - tele   - monitor for increased frequency of coughing, SOB, wheezing. Prn duo nebs.

## 2023-07-28 NOTE — SUBJECTIVE & OBJECTIVE
Past Medical History:   Diagnosis Date    Acute on chronic diastolic CHF (congestive heart failure), NYHA class 3 12/30/2017    CAD (coronary artery disease) 1/7/2013    Cerebral vascular accident     COPD (chronic obstructive pulmonary disease)     Cough syncope 6/15/2017    Diabetes mellitus     Disorder of kidney and ureter     Hyperlipidemia     Hypertension     Laceration of right hand without foreign body 12/23/2019    Patient sustained injury to dorsum of right hand due to ronda. No significant pain. No significant drainage. No fever or chills    Localized edema of right lower leg 12/7/2017    S/P CABG (coronary artery bypass graft) 1/7/2013    Ulcer of right pretibial region, limited to breakdown of skin 7/2/2018    Wound identified 6/18/2018 that is most likely related to tight dressing.     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       Past Surgical History:   Procedure Laterality Date    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    Procedure: AMPUTATION, ABOVE KNEE;  Surgeon: Ben Goyal MD;  Location: [address];  Service: Vascular;  Laterality: Right;    ABOVE-KNEE AMPUTATION Left 9/22/2021    Procedure: AMPUTATION, ABOVE KNEE;  Surgeon: DOUGLAS Siegel II, MD;  Location: [facility] OR [address] [place] [address];  Service: Vascular;  Laterality: Left;    ANGIOGRAPHY OF LOWER EXTREMITY Right 2/19/2019    Procedure: Angiogram Extremity Unilateral;  Surgeon: Rudi Galeano MD;  Location: [facility] CATH LAB;  Service: Cardiology;  Laterality: Right;    ANGIOGRAPHY OF LOWER EXTREMITY Right 7/19/2019    Procedure: Angiogram Extremity Unilateral;  Surgeon: Rudi Galeano MD;  Location: [facility] CATH LAB;  Service: Cardiology;  Laterality: Right;    APPLICATION OF WOUND VACUUM-ASSISTED CLOSURE DEVICE Right 7/28/2020    Procedure: APPLICATION, WOUND VAC;  Surgeon: Yolanda Meyers DPM;  Location: [facility] OR;  Service: Podiatry;  Laterality: Right;    BONE BIOPSY Right 7/28/2020    Procedure: BIOPSY,  "BONE;  Surgeon: Yolanda Meyers DPM;  Location: [facility] OR;  Service: Podiatry;  Laterality: Right;    CARDIAC CATHETERIZATION      CARDIAC CATHETERIZATION  02/2018    stent to right leg x 5 ( 1 stent placed weekly since late jan 2018)    CHOLECYSTECTOMY      CORONARY ARTERY BYPASS GRAFT      2006    CYST REMOVAL      CYSTOSCOPY N/A 2/14/2022    Procedure: CYSTOSCOPY;  Surgeon: Thompson Silva MD;  Location: [facility] OR;  Service: Urology;  Laterality: N/A;    DEBRIDEMENT OF FOOT Right 7/28/2020    Procedure: DEBRIDEMENT, FOOT;  Surgeon: Yolanda Meyers DPM;  Location: [facility] OR;  Service: Podiatry;  Laterality: Right;    PHLEBOGRAPHY Right 2/17/2020    Procedure: Venogram;  Surgeon: Rudi Galeano MD;  Location: [facility] CATH LAB;  Service: Cardiology;  Laterality: Right;  Patient needs anesthesia for sedation       Review of patient's allergies indicates:   Allergen Reactions    Sulfa (sulfonamide antibiotics) Nausea And Vomiting       No current facility-administered medications on file prior to encounter.     Current Outpatient Medications on File Prior to Encounter   Medication Sig    ALPRAZolam (XANAX) 0.5 MG tablet Take 2 tablets (1 mg total) by mouth every evening.    apixaban (ELIQUIS) 5 mg Tab Take 1 tablet (5 mg total) by mouth 2 (two) times daily.    ascorbic acid, vitamin C, (VITAMIN C) 500 MG tablet Take 1 tablet (500 mg total) by mouth 2 (two) times daily with meals.    aspirin 81 MG Chew Take 81 mg by mouth once daily.    baclofen (LIORESAL) 20 MG tablet Take 20 mg by mouth 4 (four) times daily as needed.    BD ULTRA-FINE MICHAEL PEN NEEDLE 32 gauge x 5/32" Ndle Inject 1 each into the skin 5 (five) times daily.    blood sugar diagnostic (BLOOD GLUCOSE TEST) Strp INJECT 1 STRIP INTO THE SKIN 3 (THREE) TIMES DAILY. TEST SUGAR 3 TIMES DAILY. - SUBCUTANEOUS. ICD -10 E11.65 & Z79.4    calcium carbonate (TUMS) 200 mg calcium (500 mg) chewable tablet Take 2 tablets (1,000 mg total) by mouth 3 (three) times daily as " needed for Heartburn.    clopidogreL (PLAVIX) 75 mg tablet Take 75 mg by mouth every morning.    DULoxetine (CYMBALTA) 60 MG capsule Take 60 mg by mouth once daily.    insulin (LANTUS SOLOSTAR U-100 INSULIN) glargine 100 units/mL SubQ pen Inject 10 Units into the skin 2 (two) times a day.    insulin lispro (HUMALOG KWIKPEN INSULIN) 100 unit/mL pen INJECT THREE UNITS SUBCUTANEOUSLY THREE TIMES DAILY WITH MEALS, PLUS CORRECTION SCALE MAX TDD 25 UNITS    lancets (ONETOUCH ULTRASOFT LANCETS) Misc Inject 1 each into the skin 3 (three) times daily before meals.    lisinopriL (PRINIVIL,ZESTRIL) 20 MG tablet Take 20 mg by mouth once daily.    medical supply, miscellaneous (O-2 SUSPENSORY MISC) 2 Liter AS NEEDED (route: Oxygen)    nitroGLYCERIN (NITROSTAT) 0.4 MG SL tablet DISSOLVE 1 TAB UNDER TONGUE EVERY 5 MINUTS AT ONSET OF CHEST PAIN-MAX 3 PER 15 MINUTES--GO TO ER    oxyCODONE (ROXICODONE) 10 mg Tab immediate release tablet Take 10 mg by mouth 3 (three) times daily as needed.    pantoprazole (PROTONIX) 40 MG tablet TAKE 1 TABLET BY MOUTH EVERY DAY (Patient taking differently: Take 40 mg by mouth once daily. Do not crush, break, chew)    potassium chloride (K-TAB) 20 mEq Take 20 mEq by mouth 2 (two) times a day.    pregabalin (LYRICA) 100 MG capsule Take 100 mg by mouth 2 (two) times daily.    propranoloL (INDERAL) 80 MG tablet Take 80 mg by mouth 2 (two) times daily.    rosuvastatin (CRESTOR) 40 MG Tab Take 1 tablet (40 mg total) by mouth every evening.    spironolactone (ALDACTONE) 25 MG tablet Take 25 mg by mouth once daily.    tamsulosin (FLOMAX) 0.4 mg Cap Take 0.4 mg by mouth every evening.     Family History       Problem Relation (Age of Onset)    Cancer Father    Heart disease Mother    Hypertension Mother, Father, Sister          Tobacco Use    Smoking status: Former     Types: Cigarettes     Quit date: 2009     Years since quittin.5     Passive exposure: Past    Smokeless tobacco: Never    Tobacco  comments:     quit 9 yrs ago   Substance and Sexual Activity    Alcohol use: Not Currently     Alcohol/week: 12.0 standard drinks     Types: 12 Cans of beer per week     Comment: social on weekends    Drug use: No    Sexual activity: Never     Review of Systems   Constitutional:  Negative for activity change, chills and fever.   HENT:  Negative for trouble swallowing.    Eyes:  Negative for photophobia and visual disturbance.   Respiratory:  Positive for cough. Negative for chest tightness, shortness of breath (sometimes, at baseline) and wheezing.    Cardiovascular:  Negative for chest pain, palpitations and leg swelling.   Gastrointestinal:  Negative for abdominal pain, constipation, diarrhea, nausea and vomiting.   Genitourinary:  Negative for dysuria, frequency, hematuria and urgency.   Musculoskeletal:  Negative for arthralgias, back pain and gait problem.   Skin:  Negative for color change and rash.   Neurological:  Negative for dizziness, syncope, weakness, light-headedness, numbness and headaches.        Suspected LOC   Psychiatric/Behavioral:  Positive for confusion (surrounding fall). Negative for agitation. The patient is not nervous/anxious.    Objective:     Vital Signs (Most Recent):  Temp: 97.7 °F (36.5 °C) (07/28/23 0056)  Pulse: (!) 52 (07/28/23 0056)  Resp: 20 (07/28/23 0056)  BP: (!) 155/68 (07/28/23 0056)  SpO2: 98 % (07/28/23 0056) Vital Signs (24h Range):  Temp:  [97.7 °F (36.5 °C)-97.8 °F (36.6 °C)] 97.7 °F (36.5 °C)  Pulse:  [50-58] 52  Resp:  [14-20] 20  SpO2:  [96 %-98 %] 98 %  BP: (104-155)/(57-88) 155/68        There is no height or weight on file to calculate BMI.     Physical Exam  Vitals and nursing note reviewed.   Constitutional:       General: He is not in acute distress.     Appearance: He is well-developed. He is obese. He is ill-appearing (chronically).   HENT:      Head: Normocephalic and atraumatic.      Mouth/Throat:      Pharynx: No oropharyngeal exudate.   Eyes:       Conjunctiva/sclera: Conjunctivae normal.      Pupils: Pupils are equal, round, and reactive to light.   Cardiovascular:      Rate and Rhythm: Normal rate and regular rhythm.      Heart sounds: Normal heart sounds.   Pulmonary:      Effort: No respiratory distress.      Breath sounds: Normal breath sounds. No wheezing.      Comments: Open mouthed breathing, mild increased work of breathing (baseline per patient)  Abdominal:      General: Bowel sounds are normal. There is no distension.      Palpations: Abdomen is soft.      Tenderness: There is no abdominal tenderness.   Musculoskeletal:         General: No tenderness. Normal range of motion.      Cervical back: Normal range of motion and neck supple.      Comments: Bilateral AKA   Lymphadenopathy:      Cervical: No cervical adenopathy.   Skin:     General: Skin is warm and dry.      Capillary Refill: Capillary refill takes less than 2 seconds.      Findings: No rash.   Neurological:      Mental Status: He is alert. Mental status is at baseline.      Cranial Nerves: No cranial nerve deficit.      Sensory: No sensory deficit.      Coordination: Coordination normal.   Psychiatric:         Behavior: Behavior normal.         Thought Content: Thought content normal.         Judgment: Judgment normal.            CRANIAL NERVES     CN III, IV, VI   Pupils are equal, round, and reactive to light.     Significant Labs: All pertinent labs within the past 24 hours have been reviewed.  CMP:   Recent Labs   Lab 07/27/23 2011      K 5.3*      CO2 24   *   BUN 17   CREATININE 1.0   CALCIUM 9.5   PROT 7.7   ALBUMIN 3.8   BILITOT 0.5   ALKPHOS 99   AST 14   ALT 22   ANIONGAP 9       Significant Imaging: I have reviewed all pertinent imaging results/findings within the past 24 hours.    Imaging Results              X-Ray Chest 1 View (Final result)  Result time 07/27/23 20:05:02      Final result by Reji Fisher MD (07/27/23 20:05:02)                    Impression:      As above.      Electronically signed by: Reji Fisher MD  Date:    07/27/2023  Time:    20:05               Narrative:    EXAMINATION:  XR CHEST 1 VIEW    CLINICAL HISTORY:  Unspecified fall, initial encounter    TECHNIQUE:  Single frontal view of the chest was performed.    COMPARISON:  02/06/2023.    FINDINGS:  Sternotomy wires present.    Underinflated lungs with hypoventilatory change.  Mild perihilar edema.  Small right effusion.    Heart and lungs otherwise appear unchanged when allowing for differences in technique and positioning.                                       CT Head Without Contrast (Final result)  Result time 07/27/23 20:24:20      Final result by Napoleon Kc MD (07/27/23 20:24:20)                   Impression:      1. No acute intracranial process.  2. Mild ethmoid sinus disease.  3. Remote lacunar infarct of the left cerebellum.      Electronically signed by: Napoleon Kc  Date:    07/27/2023  Time:    20:24               Narrative:    EXAMINATION:  CT HEAD WITHOUT CONTRAST    CLINICAL HISTORY:  fall;    TECHNIQUE:  Low dose axial CT images obtained throughout the head without intravenous contrast. Sagittal and coronal reconstructions were performed.    COMPARISON:  02/06/2023    FINDINGS:  Intracranial compartment:    Ventricles and sulci are normal in size for age without evidence of hydrocephalus. No extra-axial blood or fluid collections.    Remote lacunar infarct of the left cerebellum is unchanged.  Mild probable chronic microvascular ischemic changes in the periventricular white matter.    No parenchymal mass, hemorrhage, edema or major vascular distribution infarct.    Skull/extracranial contents (limited evaluation): No fracture. Mild bilateral ethmoid sinus disease.  Remote fracture of the medial wall the left orbit..    Vascular atherosclerosis with basilar artery stent noted.                                       CT Cervical Spine Without Contrast (Final  result)  Result time 07/27/23 20:27:23      Final result by Napoleon Kc MD (07/27/23 20:27:23)                   Impression:      No acute abnormality.      Electronically signed by: Napoleon Kc  Date:    07/27/2023  Time:    20:27               Narrative:    EXAMINATION:  CT CERVICAL SPINE WITHOUT CONTRAST    CLINICAL HISTORY:  fall;    TECHNIQUE:  Low dose axial CT images through the cervical spine, with sagittal and coronal reformations.  Contrast was not administered.    COMPARISON:  None    FINDINGS:  No acute fractures of the cervical spine.  Alignment is satisfactory.    Disc spaces are adequately maintained.    Central canal is adequately maintained.  The neural foramen are adequately maintained.    The posterior elements are intact.    Limited evaluation of the intraspinal contents demonstrates no hematoma or mass.Paraspinal soft tissues exhibit no acute abnormalities.

## 2023-07-28 NOTE — ASSESSMENT & PLAN NOTE
Patient's FSGs are controlled on current medication regimen.  Last A1c reviewed-   Lab Results   Component Value Date    HGBA1C 6.6 (H) 02/04/2023     Most recent fingerstick glucose reviewed-   Recent Labs   Lab 07/27/23 2015 07/28/23  0854 07/28/23  1229   POCTGLUCOSE 230* 180* 208*     Current correctional scale  Low  Adjust (weight based dosing) anti-hyperglycemic dose as follows-   Antihyperglycemics (From admission, onward)    Start     Stop Route Frequency Ordered    07/28/23 0900  insulin detemir U-100 (Levemir) pen 8 Units         -- SubQ 2 times daily 07/28/23 0032    07/28/23 0715  insulin aspart U-100 pen 5 Units         -- SubQ 3 times daily with meals 07/28/23 0032    07/28/23 0128  insulin aspart U-100 pen 0-5 Units         -- SubQ Before meals & nightly PRN 07/28/23 0032      - Hold oral hypoglycemics while patient is in the hospital.

## 2023-07-28 NOTE — PLAN OF CARE
Problem: Adult Inpatient Plan of Care  Goal: Plan of Care Review  7/28/2023 1135 by Delmy Ritter RN  Outcome: Ongoing, Progressing  7/28/2023 0956 by Delmy Ritter RN  Outcome: Ongoing, Progressing  Goal: Patient-Specific Goal (Individualized)  7/28/2023 1135 by Delmy Ritter RN  Outcome: Ongoing, Progressing  7/28/2023 0956 by Delmy Ritter RN  Outcome: Ongoing, Progressing  Goal: Absence of Hospital-Acquired Illness or Injury  7/28/2023 1135 by Delmy Ritter RN  Outcome: Ongoing, Progressing  7/28/2023 0956 by Delmy Ritter RN  Outcome: Ongoing, Progressing  Goal: Optimal Comfort and Wellbeing  7/28/2023 1135 by Delmy Ritter RN  Outcome: Ongoing, Progressing  7/28/2023 0956 by Delmy Ritter RN  Outcome: Ongoing, Progressing  Goal: Readiness for Transition of Care  7/28/2023 1135 by Delmy Ritter RN  Outcome: Ongoing, Progressing  7/28/2023 0956 by Delmy Ritter RN  Outcome: Ongoing, Progressing

## 2023-07-28 NOTE — ASSESSMENT & PLAN NOTE
Patient is identified as having Diastolic (HFpEF) heart failure that is Chronic. CHF is currently controlled. Latest ECHO performed and demonstrates- Results for orders placed during the hospital encounter of 01/27/21    Echo Color Flow Doppler? Yes    Interpretation Summary  · The left ventricle is normal in size with mild concentric hypertrophy and normal systolic function. The estimated ejection fraction is 55%  · Mild mitral regurgitation.  · Mild tricuspid regurgitation.  · Normal right ventricular size with normal right ventricular systolic function.  · There is no pulmonary hypertension.  . Continue Beta Blocker, ACE/ARB and Aldactone and monitor clinical status closely. Monitor on telemetry. Patient is off CHF pathway.  Monitor strict Is&Os and daily weights.  Place on fluid restriction of 1.5 L. . Continue to stress to patient importance of self efficacy and  on diet for CHF. Last BNP reviewed- and noted below   - Check BNP   - trop wnl

## 2023-07-28 NOTE — PT/OT/SLP EVAL
"Occupational Therapy   Evaluation    Name: Kuldeep Alamo  MRN: [id number]  Admitting Diagnosis: Urinary tract infection associated with cystostomy catheter  Recent Surgery: * No surgery found *      Recommendations:     Discharge Recommendations: nursing facility, skilled  Discharge Equipment Recommendations:  to be determined by next level of care, other (see comments) (Patient would benefit from custom wheelchair)  Barriers to discharge:  Decreased caregiver support    Assessment:     Kuldeep Alamo is a 58 y.o. male with a medical diagnosis of Urinary tract infection associated with cystostomy catheter.  He presents with the following performance deficits affecting function: weakness, impaired endurance, impaired self care skills, impaired functional mobility, impaired cardiopulmonary response to activity, pain, impaired balance.      Rehab Prognosis: Good; patient would benefit from acute skilled OT services to address these deficits and reach maximum level of function.       Plan:     Patient to be seen 3 x/week to address the above listed problems via self-care/home management, therapeutic activities, therapeutic exercises, neuromuscular re-education  Plan of Care Expires: 08/28/23  Plan of Care Reviewed with: patient    Subjective   Chief Complaint: "My wheelchair   Patient/Family Comments/goals: Get a new wheelchair    Occupational Profile:  Living Environment: Patient lives alone, fully accessible; bench in walk-in shower  Previous level of function: Mod I with use of scooter  Roles and Routines: None stated   Equipment Used at Home: 3-in-1 commode, hospital bed, power chair  Assistance upon Discharge: Limited    Pain/Comfort:  Pain Rating 1: 0/10  Pain Rating Post-Intervention 1: 0/10    Patients cultural, spiritual, Caodaism conflicts given the current situation: no    Objective:     Communicated with: Nursing prior to session.  Patient found HOB elevated with sims catheter upon OT entry to " room.    General Precautions: Standard, fall  Orthopedic Precautions: N/A  Braces: N/A  Respiratory Status: Room air    Occupational Performance:    Bed Mobility:    Patient completed Scooting/Bridging with moderate assistance  Patient completed Supine to Sit with minimum assistance  Patient completed Sit to Supine with moderate assistance    Functional Mobility/Transfers:  Patient B AKA; at baseline completes scoot transfers to all surfaces within his home    Activities of Daily Living:  Grooming: contact guard assistance seated edge of bed   Upper Body Dressing: contact guard assistance seated edge of bed; due to impaired dyanmic sitting balance when not utilizing unilateral UE support     Cognitive/Visual Perceptual:  Cognitive/Psychosocial Skills:     -       Oriented to: Person, Place, Time, and Situation   -       Follows Commands/attention:Follows multistep  commands  -       Safety awareness/insight to disability: impaired   -       Mood/Affect/Coping skills/emotional control: Appropriate to situation    Physical Exam:  Postural examination/scapula alignment:    -       Rounded shoulders  -       Forward head  -       Posterior pelvic tilt  Upper Extremity Range of Motion:     -       Right Upper Extremity: WFL  -       Left Upper Extremity: WFL  Upper Extremity Strength:    -       Right Upper Extremity: WFL  -       Left Upper Extremity: WFL   Strength:    -       Left Upper Extremity: WFL    AMPAC 6 Click ADL:  AMPAC Total Score: 18    Treatment & Education:  -Evaluation completed, plan of care established.  -Patient and family educated on roles/goals of OT and POC.  -White board updated.  -Therapist provided time for questions and answered within scope of practice.  -Patient educated on importance of EOB/OOB activity to maximize recovery.     Patient left HOB elevated with all lines intact and call button in reach    GOALS:   Multidisciplinary Problems       Occupational Therapy Goals           Problem: Occupational Therapy    Goal Priority Disciplines Outcome Interventions   Occupational Therapy Goal     OT, PT/OT Ongoing, Progressing    Description: Goals to be met by: 8/28/23     Patient will increase functional independence with ADLs by performing:    UE Dressing with [place].  LE Dressing with [place].  Grooming while seated with [place].  Toileting from bedside commode with [place] for hygiene and clothing management.   Supine to sit with [place].  Toilet transfer to bedside commode with [place].  Scoot transfer to bedside commode.                         History:     Past Medical History:   Diagnosis Date    Acute on chronic diastolic CHF (congestive heart failure), NYHA class 3 12/30/2017    CAD (coronary artery disease) 1/7/2013    Cerebral vascular accident     COPD (chronic obstructive pulmonary disease)     Cough syncope 6/15/2017    Diabetes mellitus     Disorder of kidney and ureter     Hyperlipidemia     Hypertension     Laceration of right hand without foreign body 12/23/2019    Patient sustained injury to dorsum of right hand due to ronda. No significant pain. No significant drainage. No fever or chills    Localized edema of right lower leg 12/7/2017    S/P CABG (coronary artery bypass graft) 1/7/2013    Ulcer of right pretibial region, limited to breakdown of skin 7/2/2018    Wound identified 6/18/2018 that is most likely related to tight dressing.     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         Past Surgical History:   Procedure Laterality Date    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    Procedure: AMPUTATION, ABOVE KNEE;  Surgeon: Ben Goyal MD;  Location: [facility] OR [address] [place] [address];  Service: Vascular;  Laterality: Right;    ABOVE-KNEE AMPUTATION Left 9/22/2021    Procedure: AMPUTATION, ABOVE KNEE;  Surgeon: DOUGLAS Siegel II, MD;  Location: [facility] OR [address] [place] [address];  Service:  Vascular;  Laterality: Left;    ANGIOGRAPHY OF LOWER EXTREMITY Right 2/19/2019    Procedure: Angiogram Extremity Unilateral;  Surgeon: Rudi Galeano MD;  Location: [facility] CATH LAB;  Service: Cardiology;  Laterality: Right;    ANGIOGRAPHY OF LOWER EXTREMITY Right 7/19/2019    Procedure: Angiogram Extremity Unilateral;  Surgeon: Rudi Galeano MD;  Location: [facility] CATH LAB;  Service: Cardiology;  Laterality: Right;    APPLICATION OF WOUND VACUUM-ASSISTED CLOSURE DEVICE Right 7/28/2020    Procedure: APPLICATION, WOUND VAC;  Surgeon: Yolanda Meyers DPM;  Location: [facility] OR;  Service: Podiatry;  Laterality: Right;    BONE BIOPSY Right 7/28/2020    Procedure: BIOPSY, BONE;  Surgeon: Yolanda Meyers DPM;  Location: [facility] OR;  Service: Podiatry;  Laterality: Right;    CARDIAC CATHETERIZATION      CARDIAC CATHETERIZATION  02/2018    stent to right leg x 5 ( 1 stent placed weekly since late jan 2018)    CHOLECYSTECTOMY      CORONARY ARTERY BYPASS GRAFT      2006    CYST REMOVAL      CYSTOSCOPY N/A 2/14/2022    Procedure: CYSTOSCOPY;  Surgeon: Thompson Silva MD;  Location: [facility] OR;  Service: Urology;  Laterality: N/A;    DEBRIDEMENT OF FOOT Right 7/28/2020    Procedure: DEBRIDEMENT, FOOT;  Surgeon: Yolanda Meyers DPM;  Location: [facility] OR;  Service: Podiatry;  Laterality: Right;    PHLEBOGRAPHY Right 2/17/2020    Procedure: Venogram;  Surgeon: Rudi Galeano MD;  Location: [facility] CATH LAB;  Service: Cardiology;  Laterality: Right;  Patient needs anesthesia for sedation       Time Tracking:     OT Date of Treatment: 07/28/23  OT Start Time: 1405  OT Stop Time: 1424  OT Total Time (min): 19 min    Billable Minutes:Evaluation 9  Self Care/Home Management 10    7/28/2023

## 2023-07-28 NOTE — H&P
[facility] - Emergency Dept  [facility] Medicine  History & Physical    Patient Name: Kuldeep Alamo  MRN: [id number]  Patient Class: OP- Observation  Admission Date: 7/27/2023  Attending Physician: Gilbert Sosa MD   Primary Care Provider: Jalyn Rock DO         Patient information was obtained from patient and ER records.     Subjective:     Principal Problem:Acute cystitis with hematuria    Chief Complaint:   Chief Complaint   Patient presents with    Fall     Pt. Is a 58 yr old male who presents to the ED post unwitnesseed fall between wheelchair and wall.  Baseline non-verbal, he is alert. Skin tear to left hand.  Found around 1600        HPI: Kuldeep Alamo is a 59 yo male with CHF, CAD s/p CABG 2013, hx CVA, vertebrobasilar occlusive disease, HLD, and DMII, who presents from his home after an unwitnessed fall from his wheelchair. On my exam patient is alert and responsive and able to provide history. He reports he can't recall the events of the fall, but he believes he lost consciousness. Reports that at baseline he will have coughing fits to the point of being unable to catch his breath and will sometimes pass out. He says it's possible that's what happened today. Denies SOB, fevers or chills, abdominal pain, N/V/D, injury or pain.     In the ED: AF. HR 50's, BP stable. SpO2 98% on room air. EKG in sinus darien. CBC with baseline anemia. CMP with K 5.3, Cr 1.0 (baseline), and . BNP and trop wnl. UA grossly infectious with >100 RBC. Culture pending. Blood cultures collected. VBG: pH 7.33, PCO2 49, PO2 59, HCO3 26. CTH and CT c-spine without acute process. CXR with mild perihilar edema and small R pleural effusion. Given IV zosyn, vancomycin, and 1L NS in ED.       Past Medical History:   Diagnosis Date    Acute on chronic diastolic CHF (congestive heart failure), NYHA class 3 12/30/2017    CAD (coronary artery disease) 1/7/2013    Cerebral vascular accident     COPD (chronic obstructive  pulmonary disease)     Cough syncope 6/15/2017    Diabetes mellitus     Disorder of kidney and ureter     Hyperlipidemia     Hypertension     Laceration of right hand without foreign body 12/23/2019    Patient sustained injury to dorsum of right hand due to ronad. No significant pain. No significant drainage. No fever or chills    Localized edema of right lower leg 12/7/2017    S/P CABG (coronary artery bypass graft) 1/7/2013    Ulcer of right pretibial region, limited to breakdown of skin 7/2/2018    Wound identified 6/18/2018 that is most likely related to tight dressing.    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       Past Surgical History:   Procedure Laterality Date    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    Procedure: AMPUTATION, ABOVE KNEE;  Surgeon: Ben Goyal MD;  Location: [facility] OR [address] [place] [address];  Service: Vascular;  Laterality: Right;    ABOVE-KNEE AMPUTATION Left 9/22/2021    Procedure: AMPUTATION, ABOVE KNEE;  Surgeon: DOUGLAS Siegel II, MD;  Location: [facility] OR [address] [place] [address];  Service: Vascular;  Laterality: Left;    ANGIOGRAPHY OF LOWER EXTREMITY Right 2/19/2019    Procedure: Angiogram Extremity Unilateral;  Surgeon: Rudi Galeano MD;  [facility];  Service: Cardiology;  Laterality: Right;    ANGIOGRAPHY OF LOWER EXTREMITY Right 7/19/2019    Procedure: Angiogram Extremity Unilateral;  Surgeon: Rudi Galeano MD;  [facility];  Service: Cardiology;  Laterality: Right;    APPLICATION OF WOUND VACUUM-ASSISTED CLOSURE DEVICE Right 7/28/2020    Procedure: APPLICATION, WOUND VAC;  Surgeon: Yolanda Meyers DPM;  [facility];  Service: Podiatry;  Laterality: Right;    BONE BIOPSY Right 7/28/2020    Procedure: BIOPSY, BONE;  Surgeon: Yolanda Meyers DPM;  [facility];  Service: Podiatry;  Laterality: Right;    CARDIAC CATHETERIZATION      CARDIAC CATHETERIZATION  02/2018    stent to right leg x 5 ( 1 stent placed  "weekly since late jan 2018)    CHOLECYSTECTOMY      CORONARY ARTERY BYPASS GRAFT      2006    CYST REMOVAL      CYSTOSCOPY N/A 2/14/2022    Procedure: CYSTOSCOPY;  Surgeon: Thompson Silva MD;  Location: [facility] OR;  Service: Urology;  Laterality: N/A;    DEBRIDEMENT OF FOOT Right 7/28/2020    Procedure: DEBRIDEMENT, FOOT;  Surgeon: Yolanda Meyers DPM;  Location: [facility] OR;  Service: Podiatry;  Laterality: Right;    PHLEBOGRAPHY Right 2/17/2020    Procedure: Venogram;  Surgeon: Rudi Galeano MD;  Location: [facility] CATH LAB;  Service: Cardiology;  Laterality: Right;  Patient needs anesthesia for sedation       Review of patient's allergies indicates:   Allergen Reactions    Sulfa (sulfonamide antibiotics) Nausea And Vomiting       No current facility-administered medications on file prior to encounter.     Current Outpatient Medications on File Prior to Encounter   Medication Sig    ALPRAZolam (XANAX) 0.5 MG tablet Take 2 tablets (1 mg total) by mouth every evening.    apixaban (ELIQUIS) 5 mg Tab Take 1 tablet (5 mg total) by mouth 2 (two) times daily.    ascorbic acid, vitamin C, (VITAMIN C) 500 MG tablet Take 1 tablet (500 mg total) by mouth 2 (two) times daily with meals.    aspirin 81 MG Chew Take 81 mg by mouth once daily.    baclofen (LIORESAL) 20 MG tablet Take 20 mg by mouth 4 (four) times daily as needed.    BD ULTRA-FINE MICHAEL PEN NEEDLE 32 gauge x 5/32" Ndle Inject 1 each into the skin 5 (five) times daily.    blood sugar diagnostic (BLOOD GLUCOSE TEST) Strp INJECT 1 STRIP INTO THE SKIN 3 (THREE) TIMES DAILY. TEST SUGAR 3 TIMES DAILY. - SUBCUTANEOUS. ICD -10 E11.65 & Z79.4    calcium carbonate (TUMS) 200 mg calcium (500 mg) chewable tablet Take 2 tablets (1,000 mg total) by mouth 3 (three) times daily as needed for Heartburn.    clopidogreL (PLAVIX) 75 mg tablet Take 75 mg by mouth every morning.    DULoxetine (CYMBALTA) 60 MG capsule Take 60 mg by mouth once daily.    insulin (LANTUS " SOLOSTAR U-100 INSULIN) glargine 100 units/mL SubQ pen Inject 10 Units into the skin 2 (two) times a day.    insulin lispro (HUMALOG KWIKPEN INSULIN) 100 unit/mL pen INJECT THREE UNITS SUBCUTANEOUSLY THREE TIMES DAILY WITH MEALS, PLUS CORRECTION SCALE MAX TDD 25 UNITS    lancets (ONETOUCH ULTRASOFT LANCETS) Misc Inject 1 each into the skin 3 (three) times daily before meals.    lisinopriL (PRINIVIL,ZESTRIL) 20 MG tablet Take 20 mg by mouth once daily.    medical supply, miscellaneous (O-2 SUSPENSORY MISC) 2 Liter AS NEEDED (route: Oxygen)    nitroGLYCERIN (NITROSTAT) 0.4 MG SL tablet DISSOLVE 1 TAB UNDER TONGUE EVERY 5 MINUTS AT ONSET OF CHEST PAIN-MAX 3 PER 15 MINUTES--GO TO ER    oxyCODONE (ROXICODONE) 10 mg Tab immediate release tablet Take 10 mg by mouth 3 (three) times daily as needed.    pantoprazole (PROTONIX) 40 MG tablet TAKE 1 TABLET BY MOUTH EVERY DAY (Patient taking differently: Take 40 mg by mouth once daily. Do not crush, break, chew)    potassium chloride (K-TAB) 20 mEq Take 20 mEq by mouth 2 (two) times a day.    pregabalin (LYRICA) 100 MG capsule Take 100 mg by mouth 2 (two) times daily.    propranoloL (INDERAL) 80 MG tablet Take 80 mg by mouth 2 (two) times daily.    rosuvastatin (CRESTOR) 40 MG Tab Take 1 tablet (40 mg total) by mouth every evening.    spironolactone (ALDACTONE) 25 MG tablet Take 25 mg by mouth once daily.    tamsulosin (FLOMAX) 0.4 mg Cap Take 0.4 mg by mouth every evening.     Family History       Problem Relation (Age of Onset)    Cancer Father    Heart disease Mother    Hypertension Mother, Father, Sister          Tobacco Use    Smoking status: Former     Types: Cigarettes     Quit date: 2009     Years since quittin.5     Passive exposure: Past    Smokeless tobacco: Never    Tobacco comments:     quit 9 yrs ago   Substance and Sexual Activity    Alcohol use: Not Currently     Alcohol/week: 12.0 standard drinks     Types: 12 Cans of beer per week      Comment: social on weekends    Drug use: No    Sexual activity: Never     Review of Systems   Constitutional:  Negative for activity change, chills and fever.   HENT:  Negative for trouble swallowing.    Eyes:  Negative for photophobia and visual disturbance.   Respiratory:  Positive for cough. Negative for chest tightness, shortness of breath (sometimes, at baseline) and wheezing.    Cardiovascular:  Negative for chest pain, palpitations and leg swelling.   Gastrointestinal:  Negative for abdominal pain, constipation, diarrhea, nausea and vomiting.   Genitourinary:  Negative for dysuria, frequency, hematuria and urgency.   Musculoskeletal:  Negative for arthralgias, back pain and gait problem.   Skin:  Negative for color change and rash.   Neurological:  Negative for dizziness, syncope, weakness, light-headedness, numbness and headaches.        Suspected LOC   Psychiatric/Behavioral:  Positive for confusion (surrounding fall). Negative for agitation. The patient is not nervous/anxious.    Objective:     Vital Signs (Most Recent):  Temp: 97.7 °F (36.5 °C) (07/28/23 0056)  Pulse: (!) 52 (07/28/23 0056)  Resp: 20 (07/28/23 0056)  BP: (!) 155/68 (07/28/23 0056)  SpO2: 98 % (07/28/23 0056) Vital Signs (24h Range):  Temp:  [97.7 °F (36.5 °C)-97.8 °F (36.6 °C)] 97.7 °F (36.5 °C)  Pulse:  [50-58] 52  Resp:  [14-20] 20  SpO2:  [96 %-98 %] 98 %  BP: (104-155)/(57-88) 155/68        There is no height or weight on file to calculate BMI.     Physical Exam  Vitals and nursing note reviewed.   Constitutional:       General: He is not in acute distress.     Appearance: He is well-developed. He is obese. He is ill-appearing (chronically).   HENT:      Head: Normocephalic and atraumatic.      Mouth/Throat:      Pharynx: No oropharyngeal exudate.   Eyes:      Conjunctiva/sclera: Conjunctivae normal.      Pupils: Pupils are equal, round, and reactive to light.   Cardiovascular:      Rate and Rhythm: Normal rate and regular rhythm.       Heart sounds: Normal heart sounds.   Pulmonary:      Effort: No respiratory distress.      Breath sounds: Normal breath sounds. No wheezing.      Comments: Open mouthed breathing, mild increased work of breathing (baseline per patient)  Abdominal:      General: Bowel sounds are normal. There is no distension.      Palpations: Abdomen is soft.      Tenderness: There is no abdominal tenderness.   Musculoskeletal:         General: No tenderness. Normal range of motion.      Cervical back: Normal range of motion and neck supple.      Comments: Bilateral AKA   Lymphadenopathy:      Cervical: No cervical adenopathy.   Skin:     General: Skin is warm and dry.      Capillary Refill: Capillary refill takes less than 2 seconds.      Findings: No rash.   Neurological:      Mental Status: He is alert. Mental status is at baseline.      Cranial Nerves: No cranial nerve deficit.      Sensory: No sensory deficit.      Coordination: Coordination normal.   Psychiatric:         Behavior: Behavior normal.         Thought Content: Thought content normal.         Judgment: Judgment normal.            CRANIAL NERVES     CN III, IV, VI   Pupils are equal, round, and reactive to light.     Significant Labs: All pertinent labs within the past 24 hours have been reviewed.  CMP:   Recent Labs   Lab 07/27/23 2011      K 5.3*      CO2 24   *   BUN 17   CREATININE 1.0   CALCIUM 9.5   PROT 7.7   ALBUMIN 3.8   BILITOT 0.5   ALKPHOS 99   AST 14   ALT 22   ANIONGAP 9       Significant Imaging: I have reviewed all pertinent imaging results/findings within the past 24 hours.    Imaging Results              X-Ray Chest 1 View (Final result)  Result time 07/27/23 20:05:02      Final result by Reji Fisher MD (07/27/23 20:05:02)                   Impression:      As above.      Electronically signed by: Reji Fisher MD  Date:    07/27/2023  Time:    20:05               Narrative:    EXAMINATION:  XR CHEST 1  VIEW    CLINICAL HISTORY:  Unspecified fall, initial encounter    TECHNIQUE:  Single frontal view of the chest was performed.    COMPARISON:  02/06/2023.    FINDINGS:  Sternotomy wires present.    Underinflated lungs with hypoventilatory change.  Mild perihilar edema.  Small right effusion.    Heart and lungs otherwise appear unchanged when allowing for differences in technique and positioning.                                       CT Head Without Contrast (Final result)  Result time 07/27/23 20:24:20      Final result by Napoleon Kc MD (07/27/23 20:24:20)                   Impression:      1. No acute intracranial process.  2. Mild ethmoid sinus disease.  3. Remote lacunar infarct of the left cerebellum.      Electronically signed by: Napoleon Kc  Date:    07/27/2023  Time:    20:24               Narrative:    EXAMINATION:  CT HEAD WITHOUT CONTRAST    CLINICAL HISTORY:  fall;    TECHNIQUE:  Low dose axial CT images obtained throughout the head without intravenous contrast. Sagittal and coronal reconstructions were performed.    COMPARISON:  02/06/2023    FINDINGS:  Intracranial compartment:    Ventricles and sulci are normal in size for age without evidence of hydrocephalus. No extra-axial blood or fluid collections.    Remote lacunar infarct of the left cerebellum is unchanged.  Mild probable chronic microvascular ischemic changes in the periventricular white matter.    No parenchymal mass, hemorrhage, edema or major vascular distribution infarct.    Skull/extracranial contents (limited evaluation): No fracture. Mild bilateral ethmoid sinus disease.  Remote fracture of the medial wall the left orbit..    Vascular atherosclerosis with basilar artery stent noted.                                       CT Cervical Spine Without Contrast (Final result)  Result time 07/27/23 20:27:23      Final result by Napoleon Kc MD (07/27/23 20:27:23)                   Impression:      No acute  abnormality.      Electronically signed by: Napoleon Saleem  Date:    07/27/2023  Time:    20:27               Narrative:    EXAMINATION:  CT CERVICAL SPINE WITHOUT CONTRAST    CLINICAL HISTORY:  fall;    TECHNIQUE:  Low dose axial CT images through the cervical spine, with sagittal and coronal reformations.  Contrast was not administered.    COMPARISON:  None    FINDINGS:  No acute fractures of the cervical spine.  Alignment is satisfactory.    Disc spaces are adequately maintained.    Central canal is adequately maintained.  The neural foramen are adequately maintained.    The posterior elements are intact.    Limited evaluation of the intraspinal contents demonstrates no hematoma or mass.Paraspinal soft tissues exhibit no acute abnormalities.                                        Assessment/Plan:     * Acute cystitis with hematuria  MDRO   Suprapubic catheter     - UA grossly infectious: 3+ leukocytes, 58 WBC, 3+ occult blood >100 RBC.   - AMS noted including increased lethargy and weakness on arrival to ED; at time of my exam patient is alert and conversant.   - 0/4 SIRS  - Given vanc/zosyn in ED  - Based on review of prior urine cultures and sensitivities, will start meropenem  - ID consulted     --> Initial urine sample collected prior to suprapubic cath exchange. Repeating UA after cath exchange for clean urine sample (though this will be post-abx admin in ED).     Hyperkalemia  - K 5.3 on admit   - EKG in sinus darien with nonspecific T wave abnormality   - give dose of lokelma   - repeat bmp in am   - on daily PO K+ replacement  per chart review. Holding now. Consider D/C at discharge.       Fall  S/p AKA bilateral   LOC    - fell from wheelchair at home, unwitnessed  - imaging reviewed - no acute injury   - patient reports possible LOC, suspects maybe from a coughing fit. However, he cannot recall the events surrounding the fall. Denies increased respiratory symptoms of cough, SOB, wheezing. Denies recent  URI, fevers, chills, congestion.  - HR 50's, HDS. Satting 98% on room air. VBG reviewed.  - EKG in sinus darien with nonspecific t wave changes   - check BNP, echo   - tele   - monitor for increased frequency of coughing, SOB, wheezing. Prn duo nebs.         PAD (peripheral artery disease)  - continue ASA, plavix, statin         Chronic kidney disease, stage III (moderate)  Creatine stable for now. BMP reviewed- noted Estimated Creatinine Clearance: 22.6 mL/min (based on SCr of 1 mg/dL). according to latest data. Monitor UOP and serial BMP and adjust therapy as needed. Renally dose meds.            Mild recurrent major depression  - continue cymbalta       Anemia  - stable   - monitor CBCs in setting of UTI with gross hematuria       Chronic diastolic CHF (congestive heart failure)  Patient is identified as having Diastolic (HFpEF) heart failure that is Chronic. CHF is currently controlled. Latest ECHO performed and demonstrates- Results for orders placed during the hospital encounter of 01/27/21    Echo Color Flow Doppler? Yes    Interpretation Summary  · The left ventricle is normal in size with mild concentric hypertrophy and normal systolic function. The estimated ejection fraction is 55%  · Mild mitral regurgitation.  · Mild tricuspid regurgitation.  · Normal right ventricular size with normal right ventricular systolic function.  · There is no pulmonary hypertension.  . Continue Beta Blocker, ACE/ARB and Aldactone and monitor clinical status closely. Monitor on telemetry. Patient is off CHF pathway.  Monitor strict Is&Os and daily weights.  Place on fluid restriction of 1.5 L. . Continue to stress to patient importance of self efficacy and  on diet for CHF. Last BNP reviewed- and noted below   - Check BNP   - trop wnl     Panic disorder  - continue home nightly xanax 1mg       Hemiplegia of nondominant side, late effect of cerebrovascular disease  L spastic hemiparesis   Vertebrobasilar occlusive disease      - continue eliquis   - continue statin, asa  - continue prn baclofen, BID lyrica       Coronary artery disease involving native coronary artery of native heart without angina pectoris  S/p CABG     - continue asa, statin, plavix     Type 2 diabetes mellitus with hyperglycemia, with long-term current use of insulin  Patient's FSGs are controlled on current medication regimen.  Last A1c reviewed-   Lab Results   Component Value Date    HGBA1C 6.6 (H) 02/04/2023     Most recent fingerstick glucose reviewed- No results for input(s): POCTGLUCOSE in the last 24 hours.  Current correctional scale  Low  Adjust (weight based dosing) anti-hyperglycemic dose as follows-   Antihyperglycemics (From admission, onward)    Start     Stop Route Frequency Ordered    07/28/23 0900  insulin detemir U-100 (Levemir) pen 8 Units         -- SubQ 2 times daily 07/28/23 0032 07/28/23 0715  insulin aspart U-100 pen 5 Units         -- SubQ 3 times daily with meals 07/28/23 0032 07/28/23 0128  insulin aspart U-100 pen 0-5 Units         -- SubQ Before meals & nightly PRN 07/28/23 0032        Hold Oral hypoglycemics while patient is in the hospital.    Essential hypertension  - continue lisinopril, aldactone      Mixed hyperlipidemia  - continue statin         VTE Risk Mitigation (From admission, onward)         Ordered     apixaban tablet 5 mg  2 times daily         07/28/23 0032     Reason for No Pharmacological VTE Prophylaxis  Once        Question:  Reasons:  Answer:  Already adequately anticoagulated on oral Anticoagulants    07/28/23 0032     IP VTE HIGH RISK PATIENT  Once         07/28/23 0032     Place sequential compression device  Until discontinued         07/28/23 0032                     On 07/28/2023, patient should be placed in hospital observation services under my care in collaboration with Dr. Luis Fernando Pang.      Rian Solorio PA-C  Department of Hospital Medicine  [facility] - Emergency Dept

## 2023-07-28 NOTE — ASSESSMENT & PLAN NOTE
Patient's FSGs are controlled on current medication regimen.  Last A1c reviewed-   Lab Results   Component Value Date    HGBA1C 6.6 (H) 02/04/2023     Most recent fingerstick glucose reviewed- No results for input(s): POCTGLUCOSE in the last 24 hours.  Current correctional scale  Low  Adjust (weight based dosing) anti-hyperglycemic dose as follows-   Antihyperglycemics (From admission, onward)    Start     Stop Route Frequency Ordered    07/28/23 0900  insulin detemir U-100 (Levemir) pen 8 Units         -- SubQ 2 times daily 07/28/23 0032    07/28/23 0715  insulin aspart U-100 pen 5 Units         -- SubQ 3 times daily with meals 07/28/23 0032    07/28/23 0128  insulin aspart U-100 pen 0-5 Units         -- SubQ Before meals & nightly PRN 07/28/23 0032        Hold Oral hypoglycemics while patient is in the hospital.

## 2023-07-28 NOTE — ASSESSMENT & PLAN NOTE
L spastic hemiparesis   Vertebrobasilar occlusive disease     - continue eliquis   - continue statin, asa  - continue prn baclofen, BID lyrica

## 2023-07-28 NOTE — SUBJECTIVE & OBJECTIVE
Interval History: NAEON. AFVSS. Patients appears to be improving overall; no complaints this morning. No abominal TTP, normal BM overnight, no blood in urine. States minor fatigue upon waking due to waking up at 6am. Continuing Meropenem. No IVF, continue PO intake. ID consulted. Echo ordered due to syncope risk. OT/PT consulted to assist in wheelchair transfer issues.    Review of Systems   Constitutional:  Positive for fatigue. Negative for chills and fever.   HENT:  Negative for ear pain, sore throat and trouble swallowing.    Eyes:  Negative for pain and visual disturbance.   Respiratory:  Positive for shortness of breath (At baseline. Since stroke in 01/2019). Negative for chest tightness and wheezing.    Cardiovascular:  Negative for chest pain and palpitations.   Gastrointestinal:  Negative for abdominal pain, diarrhea, nausea and vomiting.   Genitourinary:  Negative for flank pain and hematuria.        No pelvic pain   Musculoskeletal:  Positive for gait problem (At baseline. Bilateral LE amputatee). Negative for back pain and neck pain.   Skin:  Negative for color change and rash.   Neurological:  Positive for speech difficulty (At baseline. Since stroke in 01/2019). Negative for dizziness, weakness and headaches.   Hematological:  Negative for adenopathy.   Psychiatric/Behavioral:  Negative for confusion, hallucinations and sleep disturbance.    Objective:     Vital Signs (Most Recent):  Temp: 97.6 °F (36.4 °C) (07/28/23 1109)  Pulse: 78 (07/28/23 1132)  Resp: 18 (07/28/23 1120)  BP: (!) 144/70 (07/28/23 1109)  SpO2: 96 % (07/28/23 1109) Vital Signs (24h Range):  Temp:  [96.5 °F (35.8 °C)-97.9 °F (36.6 °C)] 97.6 °F (36.4 °C)  Pulse:  [50-81] 78  Resp:  [14-20] 18  SpO2:  [95 %-100 %] 96 %  BP: (104-155)/(57-88) 144/70     Weight: 77.9 kg (171 lb 11.8 oz)  Body mass index is 88.2 kg/m².    Intake/Output Summary (Last 24 hours) at 7/28/2023 1231  Last data filed at 7/28/2023 0619  Gross per 24 hour   Intake  1350 ml   Output 200 ml   Net 1150 ml         Physical Exam  Vitals and nursing note reviewed.   Constitutional:       General: He is not in acute distress.     Appearance: He is well-developed. He is obese. He is not diaphoretic.   HENT:      Head: Normocephalic and atraumatic.      Mouth/Throat:      Mouth: Mucous membranes are moist.      Pharynx: Oropharynx is clear.   Eyes:      Extraocular Movements: Extraocular movements intact.      Conjunctiva/sclera: Conjunctivae normal.   Cardiovascular:      Rate and Rhythm: Normal rate and regular rhythm.      Heart sounds: Normal heart sounds.   Pulmonary:      Effort: Pulmonary effort is normal. No respiratory distress.      Breath sounds: Rales (Faint bilateral lower lobes) present. No wheezing.   Abdominal:      General: Bowel sounds are normal. There is no distension.      Palpations: Abdomen is soft.      Tenderness: There is no abdominal tenderness.      Comments: Suprapubic catheter in place, c/d/i, urine clear   Musculoskeletal:         General: Deformity present. No swelling or tenderness.      Cervical back: Normal range of motion and neck supple. No tenderness.      Right Lower Extremity: Right leg is amputated above knee.      Left Lower Extremity: Left leg is amputated above knee.   Skin:     General: Skin is warm and dry.      Findings: No erythema.   Neurological:      Mental Status: He is alert and oriented to person, place, and time.      Motor: Weakness (chronic) present.   Psychiatric:         Mood and Affect: Affect is flat.         Speech: Speech is delayed.         Behavior: Behavior normal.         Thought Content: Thought content normal.      Comments: Dysarthria at baseline         Significant Labs: All pertinent labs within the past 24 hours have been reviewed.    CBC:   Recent Labs   Lab 07/27/23 2011 07/28/23  0504   WBC 9.66 6.72   HGB 12.5* 11.1*   HCT 36.5* 33.1*    182     CMP:   Recent Labs   Lab 07/27/23 2011 07/28/23  0504     140   K 5.3* 4.5    109   CO2 24 22*   * 216*   BUN 17 16   CREATININE 1.0 1.0   CALCIUM 9.5 9.0   PROT 7.7  --    ALBUMIN 3.8  --    BILITOT 0.5  --    ALKPHOS 99  --    AST 14  --    ALT 22  --    ANIONGAP 9 9       Significant Imaging: I have reviewed all pertinent imaging results/findings within the past 24 hours.     Imaging Results              X-Ray Chest 1 View (Final result)  Result time 07/27/23 20:05:02      Final result by Reji Fisher MD (07/27/23 20:05:02)                   Impression:      As above.      Electronically signed by: Reji Fisher MD  Date:    07/27/2023  Time:    20:05               Narrative:    EXAMINATION:  XR CHEST 1 VIEW    CLINICAL HISTORY:  Unspecified fall, initial encounter    TECHNIQUE:  Single frontal view of the chest was performed.    COMPARISON:  02/06/2023.    FINDINGS:  Sternotomy wires present.    Underinflated lungs with hypoventilatory change.  Mild perihilar edema.  Small right effusion.    Heart and lungs otherwise appear unchanged when allowing for differences in technique and positioning.                                       CT Head Without Contrast (Final result)  Result time 07/27/23 20:24:20      Final result by Napoleon Kc MD (07/27/23 20:24:20)                   Impression:      1. No acute intracranial process.  2. Mild ethmoid sinus disease.  3. Remote lacunar infarct of the left cerebellum.      Electronically signed by: Napoleon Kc  Date:    07/27/2023  Time:    20:24               Narrative:    EXAMINATION:  CT HEAD WITHOUT CONTRAST    CLINICAL HISTORY:  fall;    TECHNIQUE:  Low dose axial CT images obtained throughout the head without intravenous contrast. Sagittal and coronal reconstructions were performed.    COMPARISON:  02/06/2023    FINDINGS:  Intracranial compartment:    Ventricles and sulci are normal in size for age without evidence of hydrocephalus. No extra-axial blood or fluid collections.    Remote  lacunar infarct of the left cerebellum is unchanged.  Mild probable chronic microvascular ischemic changes in the periventricular white matter.    No parenchymal mass, hemorrhage, edema or major vascular distribution infarct.    Skull/extracranial contents (limited evaluation): No fracture. Mild bilateral ethmoid sinus disease.  Remote fracture of the medial wall the left orbit..    Vascular atherosclerosis with basilar artery stent noted.                                       CT Cervical Spine Without Contrast (Final result)  Result time 07/27/23 20:27:23      Final result by Napoleon Kc MD (07/27/23 20:27:23)                   Impression:      No acute abnormality.      Electronically signed by: Napoleon Kc  Date:    07/27/2023  Time:    20:27               Narrative:    EXAMINATION:  CT CERVICAL SPINE WITHOUT CONTRAST    CLINICAL HISTORY:  fall;    TECHNIQUE:  Low dose axial CT images through the cervical spine, with sagittal and coronal reformations.  Contrast was not administered.    COMPARISON:  None    FINDINGS:  No acute fractures of the cervical spine.  Alignment is satisfactory.    Disc spaces are adequately maintained.    Central canal is adequately maintained.  The neural foramen are adequately maintained.    The posterior elements are intact.    Limited evaluation of the intraspinal contents demonstrates no hematoma or mass.Paraspinal soft tissues exhibit no acute abnormalities.

## 2023-07-28 NOTE — HOSPITAL COURSE
Kuldeep Alamo was placed in  observation for complicated UTI associated with chronic suprapubic catheter. Pt was started on IV Meropenem and demonstrated significant improvement throughout admission. ID consulted. Urine cultures + klebsiella pneuomoniae ESBL and pseudomonas aeruginosa. Blood cultures NGTD. Echo with EF 55%, indeterminate LV diastolic function. PT/OT contacted to assist in wheelchair transfer difficulty and recommending SNF, which the patient declines. On my evaluation this morning, the patient reports feeling well and is eager to discharge home. All questions were answered. Patient acknowledged understanding of discharge instructions and feels safe to discharge home. Patient was discharged on 7/30/2023 in stable condition with home health services, urology and PCP follow-up. Education regarding condition provided and return precautions given.

## 2023-07-28 NOTE — PLAN OF CARE
[facility] - Observation 11H  Initial Discharge Assessment       Primary Care Provider: Jalyn Rock DO    Admission Diagnosis: Syncope [R55]  Fall [W19.XXXA]  Acute encephalopathy [G93.40]  Chest pain [R07.9]    Admission Date: 7/27/2023  Expected Discharge Date: 7/30/2023         Payor: [employer] MEDICARE / Plan: [employer] HMO PPO SPECIAL NEEDS / Product Type: Medicare Advantage /     Extended Emergency Contact Information  Primary Emergency Contact: Grisel Chavez  Address: [address]           [place] [address] [place]  Mobile Phone: [phone]  Relation: Sister  Secondary Emergency Contact: Aydee Carbajal   [place]  Mobile Phone: [phone]  Relation: Sister    Discharge Plan A: (P) Home Health  Discharge Plan B: (P) Home      [id number] - [place], [place] - [address]  [address]  [place] [address]  Phone: [phone] Fax: [phone]    [id number] - [place], [place] - [address] [facility] [address]  [address]  [address]  [place] [address]  Phone: [phone] Fax: [phone]    [facility] - [place], [place] - [address]  [address] [place] [id number]  Phone: [phone] Fax: [phone]      Initial Assessment (most recent)       Adult Discharge Assessment - 07/28/23 1341          Discharge Assessment    Assessment Type Discharge Planning Assessment     Confirmed/corrected address, phone number and insurance Yes     Confirmed Demographics Correct on Facesheet     Source of Information patient     People in Home alone     Do you expect to return to your current living situation? Yes     Prior to hospitilization cognitive status: Alert/Oriented     Current cognitive status: Alert/Oriented     Equipment Currently Used at Home wheelchair;grab bar;raised toilet;shower chair     Readmission within 30 days? No     Do you take prescription medications? Yes     Do you have  prescription coverage? Yes     Do you have any problems affording any of your prescribed medications? TBD (P)      Are you on dialysis? No (P)      Do you take coumadin? No (P)      Discharge Plan A Home Health (P)      Discharge Plan B Home (P)                               SW completed Discharge Planning Assessment with patient via bedside. Discharge planning booklet given to patient/family and whiteboard updated with WILTON and phone #. All questions answered.    Patient reported that he will have transportation upon discharge.     Patient reported that he lives alone, and prior to hospitalization he was independent with his ADL's. Patient reported that he uses a wheelchair, and has grab bars, shower chair, and raised toilet. Patient reported that he is not on dialysis and does not go to a Coumadin clinic.      Patient lives in a two story home with a ramp to enter.       Sonia Sanchez LMSW  [facility]  Ext. [phone]

## 2023-07-28 NOTE — ASSESSMENT & PLAN NOTE
- CBC reviewed-   Lab Results   Component Value Date    HGB 11.1 (L) 07/28/2023    HCT 33.1 (L) 07/28/2023   - Patient's anemia is currently controlled.   - Follow with daily CBC  - Obtain type and screen and transfuse if Hgb <7, Hct <21, or patient is acutely symptomatic

## 2023-07-28 NOTE — ASSESSMENT & PLAN NOTE
- Patient with chronic urinary retention and bilateral hydronephrosis requiring chronic suprapubic catheter placement  - Exchanged in the ED  - Continue to monitor

## 2023-07-29 PROBLEM — J44.9 COPD (CHRONIC OBSTRUCTIVE PULMONARY DISEASE): Status: ACTIVE | Noted: 2023-07-29

## 2023-07-29 PROBLEM — K59.00 CONSTIPATION: Status: ACTIVE | Noted: 2023-07-29

## 2023-07-29 LAB
ANION GAP SERPL CALC-SCNC: 9 MMOL/L (ref 8–16)
BACTERIA UR CULT: NO GROWTH
BASOPHILS # BLD AUTO: 0.06 K/UL (ref 0–0.2)
BASOPHILS NFR BLD: 1.1 % (ref 0–1.9)
BUN SERPL-MCNC: 14 MG/DL (ref 6–20)
CALCIUM SERPL-MCNC: 8.9 MG/DL (ref 8.7–10.5)
CHLORIDE SERPL-SCNC: 104 MMOL/L (ref 95–110)
CO2 SERPL-SCNC: 23 MMOL/L (ref 23–29)
CREAT SERPL-MCNC: 1 MG/DL (ref 0.5–1.4)
DIFFERENTIAL METHOD: ABNORMAL
EOSINOPHIL # BLD AUTO: 0.3 K/UL (ref 0–0.5)
EOSINOPHIL NFR BLD: 4.8 % (ref 0–8)
ERYTHROCYTE [DISTWIDTH] IN BLOOD BY AUTOMATED COUNT: 14.6 % (ref 11.5–14.5)
EST. GFR  (NO RACE VARIABLE): >60 ML/MIN/1.73 M^2
ESTIMATED AVG GLUCOSE: 160 MG/DL (ref 68–131)
GLUCOSE SERPL-MCNC: 288 MG/DL (ref 70–110)
HBA1C MFR BLD: 7.2 % (ref 4–5.6)
HCT VFR BLD AUTO: 33.9 % (ref 40–54)
HGB BLD-MCNC: 11 G/DL (ref 14–18)
IMM GRANULOCYTES # BLD AUTO: 0.02 K/UL (ref 0–0.04)
IMM GRANULOCYTES NFR BLD AUTO: 0.4 % (ref 0–0.5)
LYMPHOCYTES # BLD AUTO: 1.5 K/UL (ref 1–4.8)
LYMPHOCYTES NFR BLD: 26.7 % (ref 18–48)
MCH RBC QN AUTO: 30.7 PG (ref 27–31)
MCHC RBC AUTO-ENTMCNC: 32.4 G/DL (ref 32–36)
MCV RBC AUTO: 95 FL (ref 82–98)
MONOCYTES # BLD AUTO: 0.5 K/UL (ref 0.3–1)
MONOCYTES NFR BLD: 9.3 % (ref 4–15)
NEUTROPHILS # BLD AUTO: 3.2 K/UL (ref 1.8–7.7)
NEUTROPHILS NFR BLD: 57.7 % (ref 38–73)
NRBC BLD-RTO: 0 /100 WBC
PLATELET # BLD AUTO: 171 K/UL (ref 150–450)
PMV BLD AUTO: 10.5 FL (ref 9.2–12.9)
POCT GLUCOSE: 162 MG/DL (ref 70–110)
POCT GLUCOSE: 193 MG/DL (ref 70–110)
POCT GLUCOSE: 246 MG/DL (ref 70–110)
POCT GLUCOSE: 323 MG/DL (ref 70–110)
POTASSIUM SERPL-SCNC: 4 MMOL/L (ref 3.5–5.1)
RBC # BLD AUTO: 3.58 M/UL (ref 4.6–6.2)
SODIUM SERPL-SCNC: 136 MMOL/L (ref 136–145)
WBC # BLD AUTO: 5.58 K/UL (ref 3.9–12.7)

## 2023-07-29 PROCEDURE — 63600175 PHARM REV CODE 636 W HCPCS: Mod: HCNC

## 2023-07-29 PROCEDURE — 94640 AIRWAY INHALATION TREATMENT: CPT | Mod: HCNC

## 2023-07-29 PROCEDURE — 25000003 PHARM REV CODE 250: Mod: HCNC

## 2023-07-29 PROCEDURE — 99233 PR SUBSEQUENT HOSPITAL CARE,LEVL III: ICD-10-PCS | Mod: HCNC,,,

## 2023-07-29 PROCEDURE — 96367 TX/PROPH/DG ADDL SEQ IV INF: CPT

## 2023-07-29 PROCEDURE — 83036 HEMOGLOBIN GLYCOSYLATED A1C: CPT | Mod: HCNC

## 2023-07-29 PROCEDURE — 80048 BASIC METABOLIC PNL TOTAL CA: CPT | Mod: HCNC

## 2023-07-29 PROCEDURE — 94761 N-INVAS EAR/PLS OXIMETRY MLT: CPT | Mod: HCNC

## 2023-07-29 PROCEDURE — 25000242 PHARM REV CODE 250 ALT 637 W/ HCPCS: Mod: HCNC

## 2023-07-29 PROCEDURE — 99900035 HC TECH TIME PER 15 MIN (STAT): Mod: HCNC

## 2023-07-29 PROCEDURE — 85025 COMPLETE CBC W/AUTO DIFF WBC: CPT | Mod: HCNC

## 2023-07-29 PROCEDURE — 99233 SBSQ HOSP IP/OBS HIGH 50: CPT | Mod: HCNC,,,

## 2023-07-29 PROCEDURE — 36415 COLL VENOUS BLD VENIPUNCTURE: CPT | Mod: HCNC

## 2023-07-29 PROCEDURE — 21400001 HC TELEMETRY ROOM: Mod: HCNC

## 2023-07-29 RX ORDER — INSULIN ASPART 100 [IU]/ML
9 INJECTION, SOLUTION INTRAVENOUS; SUBCUTANEOUS
Status: DISCONTINUED | OUTPATIENT
Start: 2023-07-29 | End: 2023-07-30 | Stop reason: HOSPADM

## 2023-07-29 RX ORDER — POLYETHYLENE GLYCOL 3350 17 G/17G
17 POWDER, FOR SOLUTION ORAL 2 TIMES DAILY
Status: DISCONTINUED | OUTPATIENT
Start: 2023-07-29 | End: 2023-07-30 | Stop reason: HOSPADM

## 2023-07-29 RX ORDER — FLUTICASONE FUROATE AND VILANTEROL 200; 25 UG/1; UG/1
1 POWDER RESPIRATORY (INHALATION) DAILY
Status: DISCONTINUED | OUTPATIENT
Start: 2023-07-29 | End: 2023-07-30 | Stop reason: HOSPADM

## 2023-07-29 RX ORDER — INSULIN ASPART 100 [IU]/ML
0-10 INJECTION, SOLUTION INTRAVENOUS; SUBCUTANEOUS
Status: DISCONTINUED | OUTPATIENT
Start: 2023-07-29 | End: 2023-07-30 | Stop reason: HOSPADM

## 2023-07-29 RX ORDER — PANTOPRAZOLE SODIUM 40 MG/1
40 TABLET, DELAYED RELEASE ORAL DAILY
Status: DISCONTINUED | OUTPATIENT
Start: 2023-07-30 | End: 2023-07-30 | Stop reason: HOSPADM

## 2023-07-29 RX ORDER — PROPRANOLOL HYDROCHLORIDE 20 MG/1
60 TABLET ORAL 2 TIMES DAILY
Status: DISCONTINUED | OUTPATIENT
Start: 2023-07-29 | End: 2023-07-30

## 2023-07-29 RX ORDER — INSULIN ASPART 100 [IU]/ML
6 INJECTION, SOLUTION INTRAVENOUS; SUBCUTANEOUS
Status: DISCONTINUED | OUTPATIENT
Start: 2023-07-29 | End: 2023-07-29

## 2023-07-29 RX ORDER — IPRATROPIUM BROMIDE AND ALBUTEROL SULFATE 2.5; .5 MG/3ML; MG/3ML
3 SOLUTION RESPIRATORY (INHALATION)
Status: DISCONTINUED | OUTPATIENT
Start: 2023-07-29 | End: 2023-07-30 | Stop reason: HOSPADM

## 2023-07-29 RX ORDER — AMOXICILLIN 250 MG
2 CAPSULE ORAL 2 TIMES DAILY
Status: DISCONTINUED | OUTPATIENT
Start: 2023-07-29 | End: 2023-07-30 | Stop reason: HOSPADM

## 2023-07-29 RX ADMIN — OXYCODONE HYDROCHLORIDE AND ACETAMINOPHEN 500 MG: 500 TABLET ORAL at 05:07

## 2023-07-29 RX ADMIN — INSULIN ASPART 9 UNITS: 100 INJECTION, SOLUTION INTRAVENOUS; SUBCUTANEOUS at 05:07

## 2023-07-29 RX ADMIN — ATORVASTATIN CALCIUM 80 MG: 40 TABLET, FILM COATED ORAL at 09:07

## 2023-07-29 RX ADMIN — OXYCODONE HYDROCHLORIDE AND ACETAMINOPHEN 500 MG: 500 TABLET ORAL at 07:07

## 2023-07-29 RX ADMIN — DULOXETINE HYDROCHLORIDE 60 MG: 60 CAPSULE, DELAYED RELEASE ORAL at 10:07

## 2023-07-29 RX ADMIN — TIOTROPIUM BROMIDE INHALATION SPRAY 2 PUFF: 3.12 SPRAY, METERED RESPIRATORY (INHALATION) at 12:07

## 2023-07-29 RX ADMIN — PREGABALIN 100 MG: 100 CAPSULE ORAL at 09:07

## 2023-07-29 RX ADMIN — INSULIN ASPART 5 UNITS: 100 INJECTION, SOLUTION INTRAVENOUS; SUBCUTANEOUS at 08:07

## 2023-07-29 RX ADMIN — IPRATROPIUM BROMIDE AND ALBUTEROL SULFATE 3 ML: .5; 3 SOLUTION RESPIRATORY (INHALATION) at 08:07

## 2023-07-29 RX ADMIN — SENNOSIDES AND DOCUSATE SODIUM 2 TABLET: 50; 8.6 TABLET ORAL at 09:07

## 2023-07-29 RX ADMIN — POLYETHYLENE GLYCOL 3350 17 G: 17 POWDER, FOR SOLUTION ORAL at 10:07

## 2023-07-29 RX ADMIN — IPRATROPIUM BROMIDE AND ALBUTEROL SULFATE 3 ML: .5; 3 SOLUTION RESPIRATORY (INHALATION) at 12:07

## 2023-07-29 RX ADMIN — INSULIN ASPART 4 UNITS: 100 INJECTION, SOLUTION INTRAVENOUS; SUBCUTANEOUS at 09:07

## 2023-07-29 RX ADMIN — INSULIN ASPART 6 UNITS: 100 INJECTION, SOLUTION INTRAVENOUS; SUBCUTANEOUS at 12:07

## 2023-07-29 RX ADMIN — Medication 9 MG: at 09:07

## 2023-07-29 RX ADMIN — INSULIN ASPART 2 UNITS: 100 INJECTION, SOLUTION INTRAVENOUS; SUBCUTANEOUS at 12:07

## 2023-07-29 RX ADMIN — MEROPENEM 1 G: 1 INJECTION, POWDER, FOR SOLUTION INTRAVENOUS at 05:07

## 2023-07-29 RX ADMIN — SPIRONOLACTONE 25 MG: 25 TABLET, FILM COATED ORAL at 10:07

## 2023-07-29 RX ADMIN — PROPRANOLOL HYDROCHLORIDE 60 MG: 80 TABLET ORAL at 11:07

## 2023-07-29 RX ADMIN — CLOPIDOGREL BISULFATE 75 MG: 75 TABLET ORAL at 07:07

## 2023-07-29 RX ADMIN — FLUTICASONE FUROATE AND VILANTEROL TRIFENATATE 1 PUFF: 200; 25 POWDER RESPIRATORY (INHALATION) at 12:07

## 2023-07-29 RX ADMIN — APIXABAN 5 MG: 5 TABLET, FILM COATED ORAL at 09:07

## 2023-07-29 RX ADMIN — ALPRAZOLAM 1 MG: 1 TABLET ORAL at 09:07

## 2023-07-29 RX ADMIN — POLYETHYLENE GLYCOL 3350 17 G: 17 POWDER, FOR SOLUTION ORAL at 09:07

## 2023-07-29 RX ADMIN — PROPRANOLOL HYDROCHLORIDE 60 MG: 80 TABLET ORAL at 09:07

## 2023-07-29 RX ADMIN — OXYCODONE HYDROCHLORIDE 10 MG: 10 TABLET ORAL at 09:07

## 2023-07-29 RX ADMIN — APIXABAN 5 MG: 5 TABLET, FILM COATED ORAL at 10:07

## 2023-07-29 RX ADMIN — LISINOPRIL 20 MG: 20 TABLET ORAL at 10:07

## 2023-07-29 RX ADMIN — TAMSULOSIN HYDROCHLORIDE 0.4 MG: 0.4 CAPSULE ORAL at 09:07

## 2023-07-29 RX ADMIN — PREGABALIN 100 MG: 100 CAPSULE ORAL at 10:07

## 2023-07-29 RX ADMIN — ASPIRIN 81 MG CHEWABLE TABLET 81 MG: 81 TABLET CHEWABLE at 10:07

## 2023-07-29 RX ADMIN — SENNOSIDES AND DOCUSATE SODIUM 2 TABLET: 50; 8.6 TABLET ORAL at 12:07

## 2023-07-29 RX ADMIN — OXYCODONE HYDROCHLORIDE 10 MG: 10 TABLET ORAL at 11:07

## 2023-07-29 NOTE — ASSESSMENT & PLAN NOTE
- K 5.3 on admit, downtrending  - EKG in sinus darien with nonspecific T wave abnormality   - give dose of lokelma   - repeat bmp in am   - on daily PO K+ replacement  per chart review, continue to hold  - Plan to D/C home med at discharge.

## 2023-07-29 NOTE — ASSESSMENT & PLAN NOTE
S/p AKA bilateral   LOC  - fell from wheelchair at home, unwitnessed  - imaging reviewed - no acute injury   - patient reports possible LOC, suspects maybe from a coughing fit. However, he cannot recall the events surrounding the fall. Denies increased respiratory symptoms of cough, SOB, wheezing. Denies recent URI, fevers, chills, congestion.  - HR 50's, HDS. Satting 98% on room air. VBG reviewed.  - EKG in sinus darien with nonspecific t wave changes   - BNP wnl, echo reviewed and below  - tele   - Monitor for increased frequency of coughing, SOB, wheezing. Prn duo nebs.   - PT/OT consulted and recommending SNF but pt prefers home health    Results for orders placed during the hospital encounter of 07/27/23  Echo  Interpretation Summary  · The left ventricle is normal in size with concentric remodeling and normal systolic function. The estimated ejection fraction is 55%.  · Normal right ventricular size with normal right ventricular systolic function.  · Indeterminate left ventricular diastolic function.  · Mild left atrial enlargement.  · Normal central venous pressure (3 mmHg).  · Small anterior pericardial effusion.

## 2023-07-29 NOTE — NURSING
Report received from SOPHIE Barrientos RN. Patient awake, alert, and oriented x 4. Lying in bed-semi fowlers position. NAD noted. Respirations are even and unlabored. Plan of care reviewed. Education provided. Instruction given on use of call light. Bed locked and in lowest position. All safety measures are in place. Communication board updated with direct nursing extension. RN will continue to monitor.

## 2023-07-29 NOTE — ASSESSMENT & PLAN NOTE
Patient is identified as having Diastolic (HFpEF) heart failure that is Chronic. CHF is currently controlled. Latest ECHO performed and demonstrates- Results for orders placed during the hospital encounter of 01/27/21  Echo Color Flow Doppler? Yes  Interpretation Summary  · The left ventricle is normal in size with mild concentric hypertrophy and normal systolic function. The estimated ejection fraction is 55%  · Mild mitral regurgitation.  · Mild tricuspid regurgitation.  · Normal right ventricular size with normal right ventricular systolic function.  · There is no pulmonary hypertension.  Continue Beta Blocker, ACE/ARB and Aldactone and monitor clinical status closely. Monitor on telemetry. Patient is off CHF pathway.  Monitor strict Is&Os and daily weights.  Place on fluid restriction of 1.5 L. . Continue to stress to patient importance of self efficacy and  on diet for CHF. Last BNP reviewed- and noted below   - BNP & trop wnl

## 2023-07-29 NOTE — PROGRESS NOTES
[facility] - Observation [address] [place] [address] Medicine  Progress Note    Patient Name: Kuldeep Alamo  MRN: [id number]  Patient Class: IP- Inpatient   Admission Date: 7/27/2023  Length of Stay: 1 days  Attending Physician: Gilbert Sosa MD  Primary Care Provider: Jalyn Rock DO        Subjective:     Principal Problem:Urinary tract infection associated with cystostomy catheter        HPI:  Kuldeep Alamo is a 59 yo male with CHF, CAD s/p CABG 2013, hx CVA, vertebrobasilar occlusive disease, HLD, and DMII, who presents from his home after an unwitnessed fall from his wheelchair. On my exam patient is alert and responsive and able to provide history. He reports he can't recall the events of the fall, but he believes he lost consciousness. Reports that at baseline he will have coughing fits to the point of being unable to catch his breath and will sometimes pass out. He says it's possible that's what happened today. Denies SOB, fevers or chills, abdominal pain, N/V/D, injury or pain.     In the ED: AF. HR 50's, BP stable. SpO2 98% on room air. EKG in sinus darien. CBC with baseline anemia. CMP with K 5.3, Cr 1.0 (baseline), and . BNP and trop wnl. UA grossly infectious with >100 RBC. Culture pending. Blood cultures collected. VBG: pH 7.33, PCO2 49, PO2 59, HCO3 26. CTH and CT c-spine without acute process. CXR with mild perihilar edema and small R pleural effusion. Given IV zosyn, vancomycin, and 1L NS in ED.       Overview/Hospital Course:  Kuldeep Alamo was placed in  observation for complicated UTI with chronic suprapubic catheter. Urine cultures +GNR. Echo with EF 55%, indeterminate LV diastolic function. Blood cultures NGTD. Started on IV Meropenem given history of MDRO. ID consulted. PT/OT contacted to assist in wheelchair transfer difficulty and recommending SNF. Plan to discharge home with HH given patient preference.       Interval History: NAEON. AFVSS. Pt reports mild abdominal distention and  generalized discomfort this morning, reports last BM 4 days ago. He also reports mild, chronic SOB which is slightly worse than baseline this morning.       Review of Systems   Constitutional:  Positive for fatigue. Negative for chills and fever.   HENT:  Negative for ear pain, sore throat and trouble swallowing.    Eyes:  Negative for pain and visual disturbance.   Respiratory:  Positive for shortness of breath (baseline, COPD). Negative for chest tightness and wheezing.    Cardiovascular:  Negative for chest pain and palpitations.   Gastrointestinal:  Negative for abdominal pain, diarrhea, nausea and vomiting.   Genitourinary:  Negative for flank pain and hematuria.        No pelvic pain   Musculoskeletal:  Positive for gait problem (bilateral LE amputatee). Negative for back pain and neck pain.   Skin:  Negative for color change and rash.   Neurological:  Positive for speech difficulty (chronic, baseline from stroke in 01/2019). Negative for dizziness, weakness and headaches.   Hematological:  Negative for adenopathy.   Psychiatric/Behavioral:  Negative for confusion, hallucinations and sleep disturbance.      Objective:     Vital Signs (Most Recent):  Temp: 98.4 °F (36.9 °C) (07/29/23 0910)  Pulse: 70 (07/29/23 0910)  Resp: 18 (07/29/23 0910)  BP: 122/68 (07/29/23 0910)  SpO2: 95 % (07/29/23 0910) Vital Signs (24h Range):  Temp:  [97.6 °F (36.4 °C)-98.4 °F (36.9 °C)] 98.4 °F (36.9 °C)  Pulse:  [65-95] 70  Resp:  [17-18] 18  SpO2:  [93 %-96 %] 95 %  BP: (112-144)/(57-70) 122/68     Weight: 77.6 kg (171 lb)  Body mass index is 87.82 kg/m².    Intake/Output Summary (Last 24 hours) at 7/29/2023 1113  Last data filed at 7/29/2023 1100  Gross per 24 hour   Intake --   Output 3150 ml   Net -3150 ml         Physical Exam  Vitals and nursing note reviewed.   Constitutional:       General: He is not in acute distress.     Appearance: He is well-developed. He is obese. He is not diaphoretic.   HENT:      Head:  Normocephalic and atraumatic.      Mouth/Throat:      Mouth: Mucous membranes are moist.      Pharynx: Oropharynx is clear.   Eyes:      Extraocular Movements: Extraocular movements intact.      Conjunctiva/sclera: Conjunctivae normal.   Cardiovascular:      Rate and Rhythm: Normal rate and regular rhythm.      Heart sounds: Normal heart sounds.   Pulmonary:      Effort: Pulmonary effort is normal. No respiratory distress.      Breath sounds: Wheezing and rhonchi present. No rales.   Abdominal:      General: Bowel sounds are normal. There is no distension.      Palpations: Abdomen is soft.      Tenderness: There is no abdominal tenderness.      Comments: Suprapubic catheter in place, c/d/i, urine clear   Musculoskeletal:         General: Deformity present. No swelling or tenderness.      Cervical back: Normal range of motion and neck supple. No tenderness.      Right Lower Extremity: Right leg is amputated above knee.      Left Lower Extremity: Left leg is amputated above knee.   Skin:     General: Skin is warm and dry.      Findings: No erythema.   Neurological:      Mental Status: He is alert and oriented to person, place, and time.      Motor: Weakness (chronic) present.   Psychiatric:         Mood and Affect: Affect is flat.         Speech: Speech is delayed.         Behavior: Behavior normal.         Thought Content: Thought content normal.      Comments: Dysarthria at baseline           Significant Labs: All pertinent labs within the past 24 hours have been reviewed.    Significant Imaging: I have reviewed all pertinent imaging results/findings within the past 24 hours.       Assessment/Plan:      * Urinary tract infection associated with cystostomy catheter  History of MDRO   Suprapubic catheter   - Initial urine sample collected prior to suprapubic cath exchange. Repeat UA after catheter exchange is grossly infectious  - AMS noted on admission including increased lethargy and weakness on arrival to ED; now  at his mental baseline, AAOx3  - 0/4 SIRS at admission   - Given vanc/zosyn in ED  - Based on review of prior urine cultures and sensitivities, will start meropenem  - Urine culture GNR  - Blood cultures NGTD  - ID consulted for approval     Constipation  - Last BM 7/25  - Start aggressive bowel regimen & monitor for improvement     COPD (chronic obstructive pulmonary disease)  Pt with COPD which is currently controlled. Pt is currently off COPD Pathway.   - Does not require supplemental oxygen at baseline  - Satting well on RA  - Afebrile without leukocytosis   - CXR without acute process   - Continue scheduled inhalers  - Continue schedule and PRN DuoNebs and mucinex  - Provide supplemental oxygen via NC [goal 88%-92%]  - Monitor respiratory status closely    Hyperkalemia  - K 5.3 on admit, downtrending  - EKG in sinus darien with nonspecific T wave abnormality   - give dose of lokelma   - repeat bmp in am   - on daily PO K+ replacement  per chart review, continue to hold  - Plan to D/C home med at discharge.     Suprapubic catheter  - Patient with chronic urinary retention and bilateral hydronephrosis requiring chronic suprapubic catheter placement  - Exchanged in the ED  - Continue to monitor    Fall  S/p AKA bilateral   LOC  - fell from wheelchair at home, unwitnessed  - imaging reviewed - no acute injury   - patient reports possible LOC, suspects maybe from a coughing fit. However, he cannot recall the events surrounding the fall. Denies increased respiratory symptoms of cough, SOB, wheezing. Denies recent URI, fevers, chills, congestion.  - HR 50's, HDS. Satting 98% on room air. VBG reviewed.  - EKG in sinus darien with nonspecific t wave changes   - BNP wnl, echo reviewed and below  - tele   - Monitor for increased frequency of coughing, SOB, wheezing. Prn duo nebs.   - PT/OT consulted and recommending SNF but pt prefers home health    Results for orders placed during the hospital encounter of  07/27/23  Echo  Interpretation Summary  · The left ventricle is normal in size with concentric remodeling and normal systolic function. The estimated ejection fraction is 55%.  · Normal right ventricular size with normal right ventricular systolic function.  · Indeterminate left ventricular diastolic function.  · Mild left atrial enlargement.  · Normal central venous pressure (3 mmHg).  · Small anterior pericardial effusion.    PAD (peripheral artery disease)  - continue ASA, plavix, statin     Chronic kidney disease, stage III (moderate)  Creatine stable for now. BMP reviewed- noted Estimated Creatinine Clearance: 48 mL/min (based on SCr of 1 mg/dL). according to latest data. Monitor UOP and serial BMP and adjust therapy as needed. Renally dose meds.    Mild recurrent major depression  - continue cymbalta     Anemia  - CBC reviewed-   Lab Results   Component Value Date    HGB 11.1 (L) 07/28/2023    HCT 33.1 (L) 07/28/2023   - Patient's anemia is currently controlled.   - Follow with daily CBC  - Obtain type and screen and transfuse if Hgb <7, Hct <21, or patient is acutely symptomatic    Chronic diastolic CHF (congestive heart failure)  Patient is identified as having Diastolic (HFpEF) heart failure that is Chronic. CHF is currently controlled. Latest ECHO performed and demonstrates- Results for orders placed during the hospital encounter of 01/27/21  Echo Color Flow Doppler? Yes  Interpretation Summary  · The left ventricle is normal in size with mild concentric hypertrophy and normal systolic function. The estimated ejection fraction is 55%  · Mild mitral regurgitation.  · Mild tricuspid regurgitation.  · Normal right ventricular size with normal right ventricular systolic function.  · There is no pulmonary hypertension.  Continue Beta Blocker, ACE/ARB and Aldactone and monitor clinical status closely. Monitor on telemetry. Patient is off CHF pathway.  Monitor strict Is&Os and daily weights.  Place on fluid  restriction of 1.5 L. . Continue to stress to patient importance of self efficacy and  on diet for CHF. Last BNP reviewed- and noted below   - BNP & trop wnl    Panic disorder  - continue home nightly xanax 1mg     Left spastic hemiparesis  - Chronic issue, no acute changes    Hemiplegia of nondominant side, late effect of cerebrovascular disease  L spastic hemiparesis   Vertebrobasilar occlusive disease   - continue eliquis   - continue statin, asa  - continue prn baclofen, BID lyrica     Coronary artery disease involving native coronary artery of native heart without angina pectoris  S/p CABG   - continue asa, statin, plavix     Type 2 diabetes mellitus with hyperglycemia, with long-term current use of insulin  Patient's FSGs are controlled on current medication regimen.  Last A1c reviewed-   Lab Results   Component Value Date    HGBA1C 7.2 (H) 07/29/2023     Most recent fingerstick glucose reviewed-   Recent Labs   Lab 07/28/23  1229 07/28/23  1732 07/28/23  1940 07/29/23  0730   POCTGLUCOSE 208* 240* 288* 323*     Current correctional scale  Low  Adjust (weight based dosing) anti-hyperglycemic dose as follows-   Antihyperglycemics (From admission, onward)    Start     Stop Route Frequency Ordered    07/29/23 2100  insulin detemir U-100 (Levemir) pen 12 Units         -- SubQ 2 times daily 07/29/23 1209    07/29/23 1645  insulin aspart U-100 pen 9 Units         -- SubQ 3 times daily with meals 07/29/23 1209    07/29/23 0726  insulin aspart U-100 pen 0-10 Units         -- SubQ Before meals & nightly PRN 07/29/23 0726      - Hold oral hypoglycemics while patient is in the hospital.    Hyperlipidemia associated with type 2 diabetes mellitus  - continue statin     VTE Risk Mitigation (From admission, onward)         Ordered     apixaban tablet 5 mg  2 times daily         07/28/23 0032     Reason for No Pharmacological VTE Prophylaxis  Once        Question:  Reasons:  Answer:  Already adequately anticoagulated  on oral Anticoagulants    07/28/23 0032     IP VTE HIGH RISK PATIENT  Once         07/28/23 0032     Place sequential compression device  Until discontinued         07/28/23 0032                Discharge Planning   WILTON: 7/30/2023     Code Status: Full Code   Is the patient medically ready for discharge?: No    Reason for patient still in hospital (select all that apply): Patient trending condition, Laboratory test and Treatment  Discharge Plan A: Home Health                  Emily Garcia PA-C  Department of Hospital Medicine   [facility] - Observation 11H

## 2023-07-29 NOTE — SUBJECTIVE & OBJECTIVE
Interval History: NAEON. AFVSS. Pt reports mild abdominal distention and generalized discomfort this morning, reports last BM 4 days ago. He also reports mild, chronic SOB which is slightly worse than baseline this morning.       Review of Systems   Constitutional:  Positive for fatigue. Negative for chills and fever.   HENT:  Negative for ear pain, sore throat and trouble swallowing.    Eyes:  Negative for pain and visual disturbance.   Respiratory:  Positive for shortness of breath (baseline, COPD). Negative for chest tightness and wheezing.    Cardiovascular:  Negative for chest pain and palpitations.   Gastrointestinal:  Negative for abdominal pain, diarrhea, nausea and vomiting.   Genitourinary:  Negative for flank pain and hematuria.        No pelvic pain   Musculoskeletal:  Positive for gait problem (bilateral LE amputatee). Negative for back pain and neck pain.   Skin:  Negative for color change and rash.   Neurological:  Positive for speech difficulty (chronic, baseline from stroke in 01/2019). Negative for dizziness, weakness and headaches.   Hematological:  Negative for adenopathy.   Psychiatric/Behavioral:  Negative for confusion, hallucinations and sleep disturbance.      Objective:     Vital Signs (Most Recent):  Temp: 98.4 °F (36.9 °C) (07/29/23 0910)  Pulse: 70 (07/29/23 0910)  Resp: 18 (07/29/23 0910)  BP: 122/68 (07/29/23 0910)  SpO2: 95 % (07/29/23 0910) Vital Signs (24h Range):  Temp:  [97.6 °F (36.4 °C)-98.4 °F (36.9 °C)] 98.4 °F (36.9 °C)  Pulse:  [65-95] 70  Resp:  [17-18] 18  SpO2:  [93 %-96 %] 95 %  BP: (112-144)/(57-70) 122/68     Weight: 77.6 kg (171 lb)  Body mass index is 87.82 kg/m².    Intake/Output Summary (Last 24 hours) at 7/29/2023 1113  Last data filed at 7/29/2023 1100  Gross per 24 hour   Intake --   Output 3150 ml   Net -3150 ml         Physical Exam  Vitals and nursing note reviewed.   Constitutional:       General: He is not in acute distress.     Appearance: He is  well-developed. He is obese. He is not diaphoretic.   HENT:      Head: Normocephalic and atraumatic.      Mouth/Throat:      Mouth: Mucous membranes are moist.      Pharynx: Oropharynx is clear.   Eyes:      Extraocular Movements: Extraocular movements intact.      Conjunctiva/sclera: Conjunctivae normal.   Cardiovascular:      Rate and Rhythm: Normal rate and regular rhythm.      Heart sounds: Normal heart sounds.   Pulmonary:      Effort: Pulmonary effort is normal. No respiratory distress.      Breath sounds: Wheezing and rhonchi present. No rales.   Abdominal:      General: Bowel sounds are normal. There is no distension.      Palpations: Abdomen is soft.      Tenderness: There is no abdominal tenderness.      Comments: Suprapubic catheter in place, c/d/i, urine clear   Musculoskeletal:         General: Deformity present. No swelling or tenderness.      Cervical back: Normal range of motion and neck supple. No tenderness.      Right Lower Extremity: Right leg is amputated above knee.      Left Lower Extremity: Left leg is amputated above knee.   Skin:     General: Skin is warm and dry.      Findings: No erythema.   Neurological:      Mental Status: He is alert and oriented to person, place, and time.      Motor: Weakness (chronic) present.   Psychiatric:         Mood and Affect: Affect is flat.         Speech: Speech is delayed.         Behavior: Behavior normal.         Thought Content: Thought content normal.      Comments: Dysarthria at baseline           Significant Labs: All pertinent labs within the past 24 hours have been reviewed.    Significant Imaging: I have reviewed all pertinent imaging results/findings within the past 24 hours.

## 2023-07-29 NOTE — ASSESSMENT & PLAN NOTE
History of MDRO   Suprapubic catheter   - Initial urine sample collected prior to suprapubic cath exchange. Repeat UA after catheter exchange is grossly infectious  - AMS noted on admission including increased lethargy and weakness on arrival to ED; now at his mental baseline, AAOx3  - 0/4 SIRS at admission   - Given vanc/zosyn in ED  - Based on review of prior urine cultures and sensitivities, will start meropenem  - Urine culture GNR  - Blood cultures NGTD  - ID consulted for approval

## 2023-07-29 NOTE — ASSESSMENT & PLAN NOTE
Pt with COPD which is currently controlled. Pt is currently off COPD Pathway.   - Does not require supplemental oxygen at baseline  - Satting well on RA  - Afebrile without leukocytosis   - CXR without acute process   - Continue scheduled inhalers  - Continue schedule and PRN DuoNebs and mucinex  - Provide supplemental oxygen via NC [goal 88%-92%]  - Monitor respiratory status closely

## 2023-07-29 NOTE — ASSESSMENT & PLAN NOTE
Patient's FSGs are controlled on current medication regimen.  Last A1c reviewed-   Lab Results   Component Value Date    HGBA1C 7.2 (H) 07/29/2023     Most recent fingerstick glucose reviewed-   Recent Labs   Lab 07/28/23  1229 07/28/23  1732 07/28/23  1940 07/29/23  0730   POCTGLUCOSE 208* 240* 288* 323*     Current correctional scale  Low  Adjust (weight based dosing) anti-hyperglycemic dose as follows-   Antihyperglycemics (From admission, onward)    Start     Stop Route Frequency Ordered    07/29/23 2100  insulin detemir U-100 (Levemir) pen 12 Units         -- SubQ 2 times daily 07/29/23 1209    07/29/23 1645  insulin aspart U-100 pen 9 Units         -- SubQ 3 times daily with meals 07/29/23 1209    07/29/23 0726  insulin aspart U-100 pen 0-10 Units         -- SubQ Before meals & nightly PRN 07/29/23 0726      - Hold oral hypoglycemics while patient is in the hospital.

## 2023-07-30 VITALS
HEART RATE: 70 BPM | RESPIRATION RATE: 16 BRPM | DIASTOLIC BLOOD PRESSURE: 57 MMHG | OXYGEN SATURATION: 95 % | SYSTOLIC BLOOD PRESSURE: 118 MMHG | WEIGHT: 171 LBS | HEIGHT: 60 IN | TEMPERATURE: 98 F | BODY MASS INDEX: 33.57 KG/M2

## 2023-07-30 LAB
ANION GAP SERPL CALC-SCNC: 10 MMOL/L (ref 8–16)
BACTERIA UR CULT: ABNORMAL
BACTERIA UR CULT: ABNORMAL
BASOPHILS # BLD AUTO: 0.06 K/UL (ref 0–0.2)
BASOPHILS NFR BLD: 1 % (ref 0–1.9)
BUN SERPL-MCNC: 18 MG/DL (ref 6–20)
CALCIUM SERPL-MCNC: 9.1 MG/DL (ref 8.7–10.5)
CHLORIDE SERPL-SCNC: 103 MMOL/L (ref 95–110)
CO2 SERPL-SCNC: 24 MMOL/L (ref 23–29)
CREAT SERPL-MCNC: 1 MG/DL (ref 0.5–1.4)
DIFFERENTIAL METHOD: ABNORMAL
EOSINOPHIL # BLD AUTO: 0.3 K/UL (ref 0–0.5)
EOSINOPHIL NFR BLD: 4.8 % (ref 0–8)
ERYTHROCYTE [DISTWIDTH] IN BLOOD BY AUTOMATED COUNT: 14.4 % (ref 11.5–14.5)
EST. GFR  (NO RACE VARIABLE): >60 ML/MIN/1.73 M^2
GLUCOSE SERPL-MCNC: 179 MG/DL (ref 70–110)
HCT VFR BLD AUTO: 32.3 % (ref 40–54)
HGB BLD-MCNC: 10.5 G/DL (ref 14–18)
IMM GRANULOCYTES # BLD AUTO: 0.03 K/UL (ref 0–0.04)
IMM GRANULOCYTES NFR BLD AUTO: 0.5 % (ref 0–0.5)
LYMPHOCYTES # BLD AUTO: 2.1 K/UL (ref 1–4.8)
LYMPHOCYTES NFR BLD: 33.7 % (ref 18–48)
MCH RBC QN AUTO: 30.8 PG (ref 27–31)
MCHC RBC AUTO-ENTMCNC: 32.5 G/DL (ref 32–36)
MCV RBC AUTO: 95 FL (ref 82–98)
MONOCYTES # BLD AUTO: 0.6 K/UL (ref 0.3–1)
MONOCYTES NFR BLD: 9.6 % (ref 4–15)
NEUTROPHILS # BLD AUTO: 3.2 K/UL (ref 1.8–7.7)
NEUTROPHILS NFR BLD: 50.4 % (ref 38–73)
NRBC BLD-RTO: 0 /100 WBC
PLATELET # BLD AUTO: 187 K/UL (ref 150–450)
PMV BLD AUTO: 10.1 FL (ref 9.2–12.9)
POCT GLUCOSE: 118 MG/DL (ref 70–110)
POCT GLUCOSE: 226 MG/DL (ref 70–110)
POTASSIUM SERPL-SCNC: 4.2 MMOL/L (ref 3.5–5.1)
RBC # BLD AUTO: 3.41 M/UL (ref 4.6–6.2)
SODIUM SERPL-SCNC: 137 MMOL/L (ref 136–145)
WBC # BLD AUTO: 6.27 K/UL (ref 3.9–12.7)

## 2023-07-30 PROCEDURE — 25000242 PHARM REV CODE 250 ALT 637 W/ HCPCS: Mod: HCNC

## 2023-07-30 PROCEDURE — 80048 BASIC METABOLIC PNL TOTAL CA: CPT | Mod: HCNC

## 2023-07-30 PROCEDURE — 99900035 HC TECH TIME PER 15 MIN (STAT): Mod: HCNC

## 2023-07-30 PROCEDURE — 85025 COMPLETE CBC W/AUTO DIFF WBC: CPT | Mod: HCNC

## 2023-07-30 PROCEDURE — 99223 PR INITIAL HOSPITAL CARE,LEVL III: ICD-10-PCS | Mod: HCNC,,, | Performed by: NURSE PRACTITIONER

## 2023-07-30 PROCEDURE — 63600175 PHARM REV CODE 636 W HCPCS: Mod: HCNC

## 2023-07-30 PROCEDURE — 99223 1ST HOSP IP/OBS HIGH 75: CPT | Mod: HCNC,,, | Performed by: NURSE PRACTITIONER

## 2023-07-30 PROCEDURE — 25000003 PHARM REV CODE 250: Mod: HCNC

## 2023-07-30 PROCEDURE — 1111F PR DISCHARGE MEDS RECONCILED W/ CURRENT OUTPATIENT MED LIST: ICD-10-PCS | Mod: HCNC,CPTII,,

## 2023-07-30 PROCEDURE — 94640 AIRWAY INHALATION TREATMENT: CPT | Mod: HCNC

## 2023-07-30 PROCEDURE — 99239 HOSP IP/OBS DSCHRG MGMT >30: CPT | Mod: HCNC,,,

## 2023-07-30 PROCEDURE — 94761 N-INVAS EAR/PLS OXIMETRY MLT: CPT | Mod: HCNC

## 2023-07-30 PROCEDURE — 94640 AIRWAY INHALATION TREATMENT: CPT | Mod: HCNC,XB

## 2023-07-30 PROCEDURE — 36415 COLL VENOUS BLD VENIPUNCTURE: CPT | Mod: HCNC

## 2023-07-30 PROCEDURE — 99239 PR HOSPITAL DISCHARGE DAY,>30 MIN: ICD-10-PCS | Mod: HCNC,,,

## 2023-07-30 PROCEDURE — 1111F DSCHRG MED/CURRENT MED MERGE: CPT | Mod: HCNC,CPTII,,

## 2023-07-30 RX ORDER — CIPROFLOXACIN 500 MG/1
500 TABLET ORAL EVERY 12 HOURS
Qty: 8 TABLET | Refills: 0 | Status: SHIPPED | OUTPATIENT
Start: 2023-07-31 | End: 2023-08-04

## 2023-07-30 RX ORDER — IPRATROPIUM BROMIDE AND ALBUTEROL SULFATE 2.5; .5 MG/3ML; MG/3ML
3 SOLUTION RESPIRATORY (INHALATION)
Qty: 180 ML | Refills: 0 | Status: SHIPPED | OUTPATIENT
Start: 2023-07-30 | End: 2024-07-29

## 2023-07-30 RX ORDER — FLUTICASONE FUROATE, UMECLIDINIUM BROMIDE AND VILANTEROL TRIFENATATE 200; 62.5; 25 UG/1; UG/1; UG/1
1 POWDER RESPIRATORY (INHALATION) DAILY
Qty: 60 EACH | Refills: 1 | Status: SHIPPED | OUTPATIENT
Start: 2023-07-30

## 2023-07-30 RX ORDER — PROPRANOLOL HYDROCHLORIDE 20 MG/1
20 TABLET ORAL 2 TIMES DAILY
Qty: 60 TABLET | Refills: 2 | Status: SHIPPED | OUTPATIENT
Start: 2023-07-30 | End: 2023-08-27 | Stop reason: DRUGHIGH

## 2023-07-30 RX ADMIN — APIXABAN 5 MG: 5 TABLET, FILM COATED ORAL at 09:07

## 2023-07-30 RX ADMIN — INSULIN DETEMIR 15 UNITS: 100 INJECTION, SOLUTION SUBCUTANEOUS at 12:07

## 2023-07-30 RX ADMIN — MEROPENEM 1 G: 1 INJECTION, POWDER, FOR SOLUTION INTRAVENOUS at 05:07

## 2023-07-30 RX ADMIN — FLUTICASONE FUROATE AND VILANTEROL TRIFENATATE 1 PUFF: 200; 25 POWDER RESPIRATORY (INHALATION) at 08:07

## 2023-07-30 RX ADMIN — IPRATROPIUM BROMIDE AND ALBUTEROL SULFATE 3 ML: .5; 3 SOLUTION RESPIRATORY (INHALATION) at 07:07

## 2023-07-30 RX ADMIN — TIOTROPIUM BROMIDE INHALATION SPRAY 2 PUFF: 3.12 SPRAY, METERED RESPIRATORY (INHALATION) at 08:07

## 2023-07-30 RX ADMIN — LISINOPRIL 20 MG: 20 TABLET ORAL at 09:07

## 2023-07-30 RX ADMIN — INSULIN ASPART 2 UNITS: 100 INJECTION, SOLUTION INTRAVENOUS; SUBCUTANEOUS at 12:07

## 2023-07-30 RX ADMIN — PREGABALIN 100 MG: 100 CAPSULE ORAL at 09:07

## 2023-07-30 RX ADMIN — OXYCODONE HYDROCHLORIDE AND ACETAMINOPHEN 500 MG: 500 TABLET ORAL at 05:07

## 2023-07-30 RX ADMIN — SPIRONOLACTONE 25 MG: 25 TABLET, FILM COATED ORAL at 09:07

## 2023-07-30 RX ADMIN — INSULIN ASPART 9 UNITS: 100 INJECTION, SOLUTION INTRAVENOUS; SUBCUTANEOUS at 05:07

## 2023-07-30 RX ADMIN — IPRATROPIUM BROMIDE AND ALBUTEROL SULFATE 3 ML: .5; 3 SOLUTION RESPIRATORY (INHALATION) at 01:07

## 2023-07-30 RX ADMIN — IPRATROPIUM BROMIDE AND ALBUTEROL SULFATE 3 ML: .5; 3 SOLUTION RESPIRATORY (INHALATION) at 08:07

## 2023-07-30 RX ADMIN — CLOPIDOGREL BISULFATE 75 MG: 75 TABLET ORAL at 06:07

## 2023-07-30 RX ADMIN — PANTOPRAZOLE SODIUM 40 MG: 40 TABLET, DELAYED RELEASE ORAL at 09:07

## 2023-07-30 RX ADMIN — INSULIN ASPART 9 UNITS: 100 INJECTION, SOLUTION INTRAVENOUS; SUBCUTANEOUS at 12:07

## 2023-07-30 RX ADMIN — DULOXETINE HYDROCHLORIDE 60 MG: 60 CAPSULE, DELAYED RELEASE ORAL at 09:07

## 2023-07-30 RX ADMIN — OXYCODONE HYDROCHLORIDE AND ACETAMINOPHEN 500 MG: 500 TABLET ORAL at 09:07

## 2023-07-30 RX ADMIN — ASPIRIN 81 MG CHEWABLE TABLET 81 MG: 81 TABLET CHEWABLE at 09:07

## 2023-07-30 NOTE — ASSESSMENT & PLAN NOTE
58 year old multiple co-morbidities and chronic urinary retention/neurogenic bladder with chronic suprapubic catheter, and history of recurrent MDRO UTI presented to ED after unwitnessed fall from wheeelchair.   Admission U/A with >52 WBC.  Admitted for complicated UTI.  He was started on IV meropenem.  ID consulted for outpatient management of ESBL UTI      Initial urine taken from old SPC.  Cx with ESBL Klebsiella and  Pseudomonas.  SPC exchanged in ED.  Repeat U/A after catheter exchange with >100 WBC, culture negative (on meropenem).         Patient reports that usual UTI symptom is urination through penis rather than through SPC.  This was present on admission but has now resolved. Denies fevers, chills, n/v, abdominal pain, flank pain.  Has been afebrile during admission. No leukocytosis. VSS.  Creat 1.0, cr cl 48    Plan/recommendations:  1.  Can continue meropenem while inpatient.  At discharge, transition to ciprofloxacin 500 mg q 12 hours (renally dosed) to complete 7 total days of antibiotics   2.  Discussed with patient side potential side effects of quinolones  He has taken on multiple occasions and tolerates well.  His QTc is wnl.  No history of aneurysm.  Advised to monitor blood sugars for hypoglycemia while on cipro.  Advised to avoid dairy products, antacids, vitamins and minerals.  Do not take within at least 2 hours of ciprofloxacin  3.  SPC changes as per his outpatient Urologist   4.  ID follow up as needed.  Given my contact information/business card.  5.  Will sign off.  Please call with questions or re-consult as needed.     Data reviewed and plan discussed with ID staff, Dr. Marques  Secure chat/Discussed above plan with Primary Team, VIOLETA Drummond

## 2023-07-30 NOTE — HPI
Kuldeep Alamo is a 58 year old man with CHF, CAD s/p CABG 2013, history of vertebrobasilar occlusive disease, history of CVA, DM, bilateral AKA, chronic urinary retention/neurogenic bladder with chronic suprapubic catheter, and history of recurrent MDRO UTI  who presented to ED after unwitnessed fall from wheeelchair. Patient did not recall events of fall.  Denied fevers, chills, abdominal pain, n/v/d, pain.      Afebrile in ED and normotensive with good oxygenation.  No concerns on spinal imaging.  CXR with mild perihilar edema and small right pleural effusion.    No leukocytosis.  U/A with >52 WBC.  Admitted for complicated UTI.  He was started on IV meropenem.  ID consulted for outpatient management of ESBL UTI     Initial urine taken from old SPC.  Cx with ESBL Klebsiella and  Pseudomonas.  SPC exchanged in ED.  Repeat U/A after catheter exchange with >100 WBC, culture negative (on meropenem).

## 2023-07-30 NOTE — PLAN OF CARE
[facility]      HOME HEALTH ORDERS  FACE TO FACE ENCOUNTER    Patient Name: Kuldeep Alamo  YOB: 1965    PCP: Jalyn Rock DO   PCP Address: [address] [place] [address] / Beata RIZZO [facility]  PCP Phone Number: [phone]  PCP Fax: [phone]    Encounter Date: 7/27/23    Admit to Home Health    Diagnoses:  Active Hospital Problems    Diagnosis  POA    *Urinary tract infection associated with cystostomy catheter [T83.510A, N39.0]  Yes    COPD (chronic obstructive pulmonary disease) [J44.9]  Yes    Constipation [K59.00]  Yes    Fall [W19.XXXA]  Yes    Multiple drug resistant organism (MDRO) culture positive [Z16.24]  Yes    Suprapubic catheter [Z93.59]  Not Applicable    Hyperkalemia [E87.5]  Yes    S/P AKA (above knee amputation) bilateral [Z89.611, Z89.612]  Not Applicable    PAD (peripheral artery disease) [I73.9]  Yes    Chronic kidney disease, stage III (moderate) [N18.30]  Yes    Mild recurrent major depression [F33.0]  Yes    Anemia [D64.9]  Yes    Chronic diastolic CHF (congestive heart failure) [I50.32]  Yes    Panic disorder [F41.0]  Yes     Chronic    Left spastic hemiparesis [G81.14]  Yes    Coronary artery disease involving native coronary artery of native heart without angina pectoris [I25.10]  Yes    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]  Yes    Hyperlipidemia associated with type 2 diabetes mellitus [E11.69, E78.5]  Yes    Type 2 diabetes mellitus with hyperglycemia, with long-term current use of insulin [E11.65, Z79.4]  Not Applicable    Hemiplegia of nondominant side, late effect of cerebrovascular disease [I69.959]  Not Applicable      Resolved Hospital Problems    Diagnosis Date Resolved POA    Loss of consciousness [R40.20] 07/28/2023 Yes    Acute cystitis with hematuria [N30.01] 07/28/2023 Yes    Essential hypertension [I10] 07/29/2023 Yes       Follow Up Appointments:  Future Appointments   Date Time Provider Department Center   10/10/2023  1:40 PM Jalyn BANKS  Champagne, DO [facility]   11/28/2023  2:00 PM Thompson Silva MD [facility]       Allergies:  Review of patient's allergies indicates:   Allergen Reactions    Sulfa (sulfonamide antibiotics) Nausea And Vomiting       Medications: Review discharge medications with patient and family and provide education.    Current Facility-Administered Medications   Medication Dose Route Frequency Provider Last Rate Last Admin    acetaminophen tablet 650 mg  650 mg Oral Q6H PRN Rain Solorio PA-C        albuterol-ipratropium 2.5 mg-0.5 mg/3 mL nebulizer solution 3 mL  3 mL Nebulization Q4H PRN Rain Solorio PA-C        albuterol-ipratropium 2.5 mg-0.5 mg/3 mL nebulizer solution 3 mL  3 mL Nebulization Q6H WAKE Emily Garcia PA-C   3 mL at 07/30/23 1347    ALPRAZolam tablet 1 mg  1 mg Oral QHS Rain Solorio PA-C   1 mg at 07/29/23 2136    aluminum-magnesium hydroxide-simethicone 200-200-20 mg/5 mL suspension 30 mL  30 mL Oral QID PRN Rain Solorio PA-C        apixaban tablet 5 mg  5 mg Oral BID Rain Solorio PA-C   5 mg at 07/30/23 0913    ascorbic acid (vitamin C) tablet 500 mg  500 mg Oral BID WM Rain Solorio PA-C   500 mg at 07/30/23 0913    aspirin chewable tablet 81 mg  81 mg Oral Daily Rain Solorio PA-C   81 mg at 07/30/23 0913    atorvastatin tablet 80 mg  80 mg Oral QHS Rain Solorio PA-C   80 mg at 07/29/23 2137    baclofen tablet 20 mg  20 mg Oral QID PRN Rain Solorio PA-C        calcium carbonate 200 mg calcium (500 mg) chewable tablet 1,000 mg  1,000 mg Oral TID PRN Rain Solorio PA-C        clopidogreL tablet 75 mg  75 mg Oral QAM Rain Solorio PA-C   75 mg at 07/30/23 0652    DULoxetine DR capsule 60 mg  60 mg Oral Daily Rain Solorio PA-C   60 mg at 07/30/23 0913    fluticasone furoate-vilanteroL 200-25 mcg/dose diskus inhaler 1 puff  1 puff Inhalation Daily Emily Garcia PA-C   1 puff at 07/30/23 0816    glucagon (human recombinant)  injection 1 mg  1 mg Intramuscular PRN Rain Solorio PA-C        glucose chewable tablet 16 g  16 g Oral PRN Rain Solorio PA-C        glucose chewable tablet 24 g  24 g Oral PRN Rain Solorio PA-C        insulin aspart U-100 pen 0-10 Units  0-10 Units Subcutaneous QID (AC + HS) PRN Emily Garcia PA-C   2 Units at 07/30/23 1235    insulin aspart U-100 pen 9 Units  9 Units Subcutaneous TIDWM Emily Garcia PA-C   9 Units at 07/30/23 1234    insulin detemir U-100 (Levemir) pen 15 Units  15 Units Subcutaneous BID Emily Garcia PA-C   15 Units at 07/30/23 1235    lisinopriL tablet 20 mg  20 mg Oral Daily Emily Garcia PA-C   20 mg at 07/30/23 0913    melatonin tablet 9 mg  9 mg Oral Nightly PRN Rain Solorio PA-C   9 mg at 07/29/23 2137    meropenem (MERREM) 1 g in sodium chloride 0.9 % 100 mL IVPB (MB+)  1 g Intravenous Q12H Rain Solorio PA-C   Stopped at 07/30/23 0656    naloxone 0.4 mg/mL injection 0.02 mg  0.02 mg Intravenous PRN Rain Solorio PA-C        ondansetron disintegrating tablet 8 mg  8 mg Oral Q8H PRN Rain Solorio PA-C        oxyCODONE immediate release tablet Tab 10 mg  10 mg Oral TID PRN Rain Solorio PA-C   10 mg at 07/29/23 2137    pantoprazole EC tablet 40 mg  40 mg Oral Daily Emily Garcia PA-C   40 mg at 07/30/23 0913    polyethylene glycol packet 17 g  17 g Oral BID Emily Garcia PA-C   17 g at 07/29/23 2138    pregabalin capsule 100 mg  100 mg Oral BID Rain Solorio PA-C   100 mg at 07/30/23 0913    prochlorperazine injection Soln 5 mg  5 mg Intravenous Q6H PRN Rain Solorio PA-C        senna-docusate 8.6-50 mg per tablet 2 tablet  2 tablet Oral BID Emily Garcia PA-C   2 tablet at 07/29/23 2137    sodium chloride 0.9% flush 10 mL  10 mL Intravenous Q8H PRN Rain Solorio PA-C        spironolactone tablet 25 mg  25 mg Oral Daily Emily Garcia PA-C   25 mg at 07/30/23 0913    tamsulosin 24 hr capsule 0.4 mg  0.4 mg Oral QHS  "Rain Solorio PA-C   0.4 mg at 07/29/23 2137    tiotropium bromide 2.5 mcg/actuation inhaler 2 puff  2 puff Inhalation Daily Eimly Garcia PA-C   2 puff at 07/30/23 0817     Current Discharge Medication List        START taking these medications    Details   albuterol-ipratropium (DUO-NEB) 2.5 mg-0.5 mg/3 mL nebulizer solution Take 3 mLs by nebulization every 6 (six) hours while awake. Rescue  Qty: 180 mL, Refills: 0      ciprofloxacin HCl (CIPRO) 500 MG tablet Take 1 tablet (500 mg total) by mouth every 12 (twelve) hours. for 4 days  Qty: 8 tablet, Refills: 0      fluticasone-umeclidin-vilanter (TRELEGY ELLIPTA) 200-62.5-25 mcg inhaler Inhale 1 puff into the lungs once daily.  Qty: 60 each, Refills: 1           CONTINUE these medications which have CHANGED    Details   propranoloL (INDERAL) 20 MG tablet Take 1 tablet (20 mg total) by mouth 2 (two) times daily.  Qty: 60 tablet, Refills: 2           CONTINUE these medications which have NOT CHANGED    Details   ALPRAZolam (XANAX) 0.5 MG tablet Take 2 tablets (1 mg total) by mouth every evening.      apixaban (ELIQUIS) 5 mg Tab Take 1 tablet (5 mg total) by mouth 2 (two) times daily.      ascorbic acid, vitamin C, (VITAMIN C) 500 MG tablet Take 1 tablet (500 mg total) by mouth 2 (two) times daily with meals.      aspirin 81 MG Chew Take 81 mg by mouth once daily.      baclofen (LIORESAL) 20 MG tablet Take 20 mg by mouth 4 (four) times daily as needed.      BD ULTRA-FINE MICHAEL PEN NEEDLE 32 gauge x 5/32" Ndle Inject 1 each into the skin 5 (five) times daily.  Qty: 200 each, Refills: 11      blood sugar diagnostic (BLOOD GLUCOSE TEST) Strp INJECT 1 STRIP INTO THE SKIN 3 (THREE) TIMES DAILY. TEST SUGAR 3 TIMES DAILY. - SUBCUTANEOUS. ICD -10 E11.65 & Z79.4  Qty: 300 strip, Refills: 3    Comments: Please fill brand that is covered by insurance and compatible with glucometer.      calcium carbonate (TUMS) 200 mg calcium (500 mg) chewable tablet Take 2 tablets (1,000 " mg total) by mouth 3 (three) times daily as needed for Heartburn.  Qty: 60 tablet, Refills: 0      clopidogreL (PLAVIX) 75 mg tablet Take 75 mg by mouth every morning.      DULoxetine (CYMBALTA) 60 MG capsule Take 60 mg by mouth once daily.      insulin (LANTUS SOLOSTAR U-100 INSULIN) glargine 100 units/mL SubQ pen Inject 10 Units into the skin 2 (two) times a day.  Qty: 18 mL, Refills: 3    Associated Diagnoses: Type 2 diabetes mellitus with hyperglycemia, with long-term current use of insulin      insulin lispro (HUMALOG KWIKPEN INSULIN) 100 unit/mL pen INJECT THREE UNITS SUBCUTANEOUSLY THREE TIMES DAILY WITH MEALS, PLUS CORRECTION SCALE MAX TDD 25 UNITS  Qty: 9 mL, Refills: 3    Associated Diagnoses: Type 2 diabetes mellitus with hyperglycemia, with long-term current use of insulin      lancets (ONETOUCH ULTRASOFT LANCETS) Misc Inject 1 each into the skin 3 (three) times daily before meals.  Qty: 300 each, Refills: 5    Associated Diagnoses: Type 2 diabetes mellitus without complication, without long-term current use of insulin      lisinopriL (PRINIVIL,ZESTRIL) 20 MG tablet Take 20 mg by mouth once daily.      medical supply, miscellaneous (O-2 SUSPENSORY MISC) 2 Liter AS NEEDED (route: Oxygen)      nitroGLYCERIN (NITROSTAT) 0.4 MG SL tablet DISSOLVE 1 TAB UNDER TONGUE EVERY 5 MINUTS AT ONSET OF CHEST PAIN-MAX 3 PER 15 MINUTES--GO TO ER  Qty: 25 tablet, Refills: 11    Comments: .      oxyCODONE (ROXICODONE) 10 mg Tab immediate release tablet Take 10 mg by mouth 3 (three) times daily as needed.      pantoprazole (PROTONIX) 40 MG tablet TAKE 1 TABLET BY MOUTH EVERY DAY  Qty: 90 tablet, Refills: 1      pregabalin (LYRICA) 100 MG capsule Take 100 mg by mouth 2 (two) times daily.      rosuvastatin (CRESTOR) 40 MG Tab Take 1 tablet (40 mg total) by mouth every evening.  Qty: 90 tablet, Refills: 0      spironolactone (ALDACTONE) 25 MG tablet Take 25 mg by mouth once daily.      tamsulosin (FLOMAX) 0.4 mg Cap Take 0.4  mg by mouth every evening.           STOP taking these medications       potassium chloride (K-TAB) 20 mEq Comments:   Reason for Stopping:                 I have seen and examined this patient within the last 30 days. My clinical findings that support the need for the home health skilled services and home bound status are the following:no   Weakness/numbness causing balance and gait disturbance due to Infection and Weakness/Debility making it taxing to leave home.     Diet:   cardiac diet and diabetic diet 2000 calorie    Labs:  None    Referrals/ Consults  Physical Therapy to evaluate and treat. Evaluate for home safety and equipment needs; Establish/upgrade home exercise program. Perform / instruct on therapeutic exercises, gait training, transfer training, and Range of Motion.  Occupational Therapy to evaluate and treat. Evaluate home environment for safety and equipment needs. Perform/Instruct on transfers, ADL training, ROM, and therapeutic exercises.   to evaluate for community resources/long-range planning.  Aide to provide assistance with personal care, ADLs, and vital signs.    Activities:   activity as tolerated    Nursing:   Agency to admit patient within 24 hours of hospital discharge unless specified on physician order or at patient request    SN to complete comprehensive assessment including routine vital signs. Instruct on disease process and s/s of complications to report to MD. Review/verify medication list sent home with the patient at time of discharge  and instruct patient/caregiver as needed. Frequency may be adjusted depending on start of care date.     Skilled nurse to perform up to 3 visits PRN for symptoms related to diagnosis    Notify MD if SBP > 160 or < 90; DBP > 90 or < 50; HR > 120 or < 50; Temp > 101; O2 < 88%    Ok to schedule additional visits based on staff availability and patient request on consecutive days within the home health episode.    When multiple  disciplines ordered:    Start of Care occurs on Sunday - Wednesday schedule remaining discipline evaluations as ordered on separate consecutive days following the start of care.    Thursday SOC -schedule subsequent evaluations Friday and Monday the following week.     Friday - Saturday SOC - schedule subsequent discipline evaluations on consecutive days starting Monday of the following week.    For all post-discharge communication and subsequent orders please contact patient's primary care physician.     Miscellaneous   Catheter Care: Instruct patient/caregiver to empty Sims bag twice daily. Change suprapubic sims every month.  Diabetic Care:   SN to perform and educate Diabetic management with blood glucose monitoring:, Fingerstick blood sugar AC and HS, and Report CBG < 60 or > 350 to physician.    Home Health Aide:  Nursing Twice weekly, Physical Therapy Three times weekly, Occupational Therapy Three times weekly, Medical Social Work Weekly, and Home Health Aide Daily    Wound Care Orders  no    I certify that this patient is confined to his home and needs intermittent skilled nursing care, physical therapy, and occupational therapy.

## 2023-07-30 NOTE — CONSULTS
[facility] - [facility]  Infectious Disease  Consult Note    Patient Name: Kuldeep Alamo  MRN: [id number]  Admission Date: 7/27/2023  Hospital Length of Stay: 2 days  Attending Physician: Gilbert Sosa MD  Primary Care Provider: Jalyn Rock DO     Isolation Status: No active isolations    Patient information was obtained from patient, past medical records, ER records and primary team.      Inpatient consult to Infectious Diseases  Consult performed by: ASH Bills, ANP  Consult ordered by: Emily Garcia PA-C  Reason for consult: Outpatient management for ESBL UTI        Assessment/Plan:     Renal/  * Urinary tract infection associated with cystostomy catheter     58 year old multiple co-morbidities and chronic urinary retention/neurogenic bladder with chronic suprapubic catheter, and history of recurrent MDRO UTI presented to ED after unwitnessed fall from wheeelchair.   Admission U/A with >52 WBC.  Admitted for complicated UTI.  He was started on IV meropenem.  ID consulted for outpatient management of ESBL UTI      Initial urine taken from old SPC.  Cx with ESBL Klebsiella and  Pseudomonas.  SPC exchanged in ED.  Repeat U/A after catheter exchange with >100 WBC, culture negative (on meropenem).         Patient reports that usual UTI symptom is urination through penis rather than through SPC.  This was present on admission but has now resolved. Denies fevers, chills, n/v, abdominal pain, flank pain.  Has been afebrile during admission. No leukocytosis. VSS.  Creat 1.0, cr cl 48    Plan/recommendations:  1.  Can continue meropenem while inpatient.  At discharge, transition to ciprofloxacin 500 mg q 12 hours (renally dosed) to complete 7 total days of antibiotics   2.  Discussed with patient side potential side effects of quinolones  He has taken on multiple occasions and tolerates well.  His QTc is wnl.  No history of aneurysm.  Advised to monitor blood sugars for hypoglycemia while on  cipro.  Advised to avoid dairy products, antacids, vitamins and minerals.  Do not take within at least 2 hours of ciprofloxacin  3.  SPC changes as per his outpatient Urologist   4.  ID follow up as needed.  Given my contact information/business card.  5.  Will sign off.  Please call with questions or re-consult as needed.     Data reviewed and plan discussed with ID staff, Dr. Marques  Secure chat/Discussed above plan with Primary Team, VIOLETA Drummond          Suprapubic catheter  See assessment/plan above.  Changed this admission.  Follow up with Urology as outpatient for routine management        Thank you.   Please Secure Chat for any questions or concerns.  ASH Engle, ANP-C  Infectious Disease    The total time for evaluation and management services performed on 7/27/23 was greater than 75 minutes          Subjective:     Principal Problem: Urinary tract infection associated with cystostomy catheter    HPI: Kuldeep Alamo is a 58 year old man with CHF, CAD s/p CABG 2013, history of vertebrobasilar occlusive disease, history of CVA, DM, bilateral AKA, chronic urinary retention/neurogenic bladder with chronic suprapubic catheter, and history of recurrent MDRO UTI  who presented to ED after unwitnessed fall from wheeelchair. Patient did not recall events of fall.  Denied fevers, chills, abdominal pain, n/v/d, pain.      Afebrile in ED and normotensive with good oxygenation.  No concerns on spinal imaging.  CXR with mild perihilar edema and small right pleural effusion.    No leukocytosis.  U/A with >52 WBC.  Admitted for complicated UTI.  He was started on IV meropenem.  ID consulted for outpatient management of ESBL UTI     Initial urine taken from old SPC.  Cx with ESBL Klebsiella and  Pseudomonas.  SPC exchanged in ED.  Repeat U/A after catheter exchange with >100 WBC, culture negative (on meropenem).                  Past Medical History:   Diagnosis Date    Acute on chronic diastolic CHF (congestive heart  failure), NYHA class 3 12/30/2017    CAD (coronary artery disease) 1/7/2013    Cerebral vascular accident     COPD (chronic obstructive pulmonary disease)     Cough syncope 6/15/2017    Diabetes mellitus     Disorder of kidney and ureter     Hyperlipidemia     Hypertension     Laceration of right hand without foreign body 12/23/2019    Patient sustained injury to dorsum of right hand due to ronda. No significant pain. No significant drainage. No fever or chills    Localized edema of right lower leg 12/7/2017    S/P CABG (coronary artery bypass graft) 1/7/2013    Ulcer of right pretibial region, limited to breakdown of skin 7/2/2018    Wound identified 6/18/2018 that is most likely related to tight dressing.    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       Past Surgical History:   Procedure Laterality Date    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    Procedure: AMPUTATION, ABOVE KNEE;  Surgeon: Ben Goyal MD;  Location: [facility] OR [address] [place] [address];  Service: Vascular;  Laterality: Right;    ABOVE-KNEE AMPUTATION Left 9/22/2021    Procedure: AMPUTATION, ABOVE KNEE;  Surgeon: DOUGLAS Siegel II, MD;  Location: [facility] OR [address] [place] [address];  Service: Vascular;  Laterality: Left;    ANGIOGRAPHY OF LOWER EXTREMITY Right 2/19/2019    Procedure: Angiogram Extremity Unilateral;  Surgeon: Rudi Galeano MD;  Location: [facility] CATH LAB;  Service: Cardiology;  Laterality: Right;    ANGIOGRAPHY OF LOWER EXTREMITY Right 7/19/2019    Procedure: Angiogram Extremity Unilateral;  Surgeon: Rudi Galeano MD;  Location: [facility] CATH LAB;  Service: Cardiology;  Laterality: Right;    APPLICATION OF WOUND VACUUM-ASSISTED CLOSURE DEVICE Right 7/28/2020    Procedure: APPLICATION, WOUND VAC;  Surgeon: Yolanda Meyers DPM;  Location: [facility] OR;  Service: Podiatry;  Laterality: Right;    BONE BIOPSY Right 7/28/2020    Procedure: BIOPSY, BONE;  Surgeon: Yolanda Meyers DPM;  Location: [facility] OR;  Service:  "Podiatry;  Laterality: Right;    CARDIAC CATHETERIZATION      CARDIAC CATHETERIZATION  02/2018    stent to right leg x 5 ( 1 stent placed weekly since late jan 2018)    CHOLECYSTECTOMY      CORONARY ARTERY BYPASS GRAFT      2006    CYST REMOVAL      CYSTOSCOPY N/A 2/14/2022    Procedure: CYSTOSCOPY;  Surgeon: Thompson Silva MD;  Location: [facility] OR;  Service: Urology;  Laterality: N/A;    DEBRIDEMENT OF FOOT Right 7/28/2020    Procedure: DEBRIDEMENT, FOOT;  Surgeon: Yolanda Meyers DPM;  Location: [facility] OR;  Service: Podiatry;  Laterality: Right;    PHLEBOGRAPHY Right 2/17/2020    Procedure: Venogram;  Surgeon: Rudi Galeano MD;  Location: [facility] CATH LAB;  Service: Cardiology;  Laterality: Right;  Patient needs anesthesia for sedation       Review of patient's allergies indicates:   Allergen Reactions    Sulfa (sulfonamide antibiotics) Nausea And Vomiting       Medications:  Medications Prior to Admission   Medication Sig    ALPRAZolam (XANAX) 0.5 MG tablet Take 2 tablets (1 mg total) by mouth every evening.    apixaban (ELIQUIS) 5 mg Tab Take 1 tablet (5 mg total) by mouth 2 (two) times daily.    ascorbic acid, vitamin C, (VITAMIN C) 500 MG tablet Take 1 tablet (500 mg total) by mouth 2 (two) times daily with meals.    aspirin 81 MG Chew Take 81 mg by mouth once daily.    baclofen (LIORESAL) 20 MG tablet Take 20 mg by mouth 4 (four) times daily as needed.    BD ULTRA-FINE MICHAEL PEN NEEDLE 32 gauge x 5/32" Ndle Inject 1 each into the skin 5 (five) times daily.    blood sugar diagnostic (BLOOD GLUCOSE TEST) Strp INJECT 1 STRIP INTO THE SKIN 3 (THREE) TIMES DAILY. TEST SUGAR 3 TIMES DAILY. - SUBCUTANEOUS. ICD -10 E11.65 & Z79.4    calcium carbonate (TUMS) 200 mg calcium (500 mg) chewable tablet Take 2 tablets (1,000 mg total) by mouth 3 (three) times daily as needed for Heartburn.    clopidogreL (PLAVIX) 75 mg tablet Take 75 mg by mouth every morning.    DULoxetine (CYMBALTA) 60 MG capsule Take " 60 mg by mouth once daily.    insulin (LANTUS SOLOSTAR U-100 INSULIN) glargine 100 units/mL SubQ pen Inject 10 Units into the skin 2 (two) times a day.    insulin lispro (HUMALOG KWIKPEN INSULIN) 100 unit/mL pen INJECT THREE UNITS SUBCUTANEOUSLY THREE TIMES DAILY WITH MEALS, PLUS CORRECTION SCALE MAX TDD 25 UNITS    lancets (ONETOUCH ULTRASOFT LANCETS) Misc Inject 1 each into the skin 3 (three) times daily before meals.    lisinopriL (PRINIVIL,ZESTRIL) 20 MG tablet Take 20 mg by mouth once daily.    medical supply, miscellaneous (O-2 SUSPENSORY MISC) 2 Liter AS NEEDED (route: Oxygen)    nitroGLYCERIN (NITROSTAT) 0.4 MG SL tablet DISSOLVE 1 TAB UNDER TONGUE EVERY 5 MINUTS AT ONSET OF CHEST PAIN-MAX 3 PER 15 MINUTES--GO TO ER    oxyCODONE (ROXICODONE) 10 mg Tab immediate release tablet Take 10 mg by mouth 3 (three) times daily as needed.    pantoprazole (PROTONIX) 40 MG tablet TAKE 1 TABLET BY MOUTH EVERY DAY (Patient taking differently: Take 40 mg by mouth once daily. Do not crush, break, chew)    potassium chloride (K-TAB) 20 mEq Take 20 mEq by mouth 2 (two) times a day.    pregabalin (LYRICA) 100 MG capsule Take 100 mg by mouth 2 (two) times daily.    propranoloL (INDERAL) 80 MG tablet Take 80 mg by mouth 2 (two) times daily.    rosuvastatin (CRESTOR) 40 MG Tab Take 1 tablet (40 mg total) by mouth every evening.    spironolactone (ALDACTONE) 25 MG tablet Take 25 mg by mouth once daily.    tamsulosin (FLOMAX) 0.4 mg Cap Take 0.4 mg by mouth every evening.     Antibiotics (From admission, onward)      Start     Stop Route Frequency Ordered    07/28/23 0445  meropenem (MERREM) 1 g in sodium chloride 0.9 % 100 mL IVPB (MB+)         -- IV Every 12 hours (non-standard times) 07/28/23 0216          Antifungals (From admission, onward)      None          Antivirals (From admission, onward)      None             Immunization History   Administered Date(s) Administered    Influenza - Quadrivalent 11/13/2014,  2015    Influenza - Quadrivalent - PF *Preferred* (6 months and older) 2016, 2017, 2020, 2022    PPD Test 2020, 2021, 2023    Pneumococcal Polysaccharide - 23 Valent 06/15/2017    Tdap 2017       Family History       Problem Relation (Age of Onset)    Cancer Father    Heart disease Mother    Hypertension Mother, Father, Sister          Social History     Socioeconomic History    Marital status: Single   Tobacco Use    Smoking status: Former     Current packs/day: 0.00     Types: Cigarettes     Quit date: 2009     Years since quittin.5     Passive exposure: Past    Smokeless tobacco: Never    Tobacco comments:     quit 9 yrs ago   Substance and Sexual Activity    Alcohol use: Not Currently     Alcohol/week: 12.0 standard drinks of alcohol     Types: 12 Cans of beer per week     Comment: social on weekends    Drug use: No    Sexual activity: Never   Social History Narrative    Lives in [place] alone; He has a sister who helps him; He has 4 sisters; He is disabled; He worked [employer] prior to his stroke; 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 He watches soap operas and likes to visit with family and friends. He also has a half-brother         Social Determinants of Health     Financial Resource Strain: Medium Risk (2022)    Overall Financial Resource Strain (CARDIA)     Difficulty of Paying Living Expenses: Somewhat hard   Food Insecurity: Food Insecurity Present (2022)    Hunger Vital Sign     Worried About Running Out of Food in the Last Year: Sometimes true     Ran Out of Food in the Last Year: Sometimes true   Transportation Needs: No Transportation Needs (2022)    PRAPARE - Transportation     Lack of Transportation (Medical): No     Lack of Transportation (Non-Medical): No   Physical Activity: Insufficiently  Active (4/1/2022)    Exercise Vital Sign     Days of Exercise per Week: 7 days     Minutes of Exercise per Session: 10 min   Stress: No Stress Concern Present (4/1/2022)    [employer] [place] [employer] - Occupational Stress Questionnaire     Feeling of Stress : Not at all   Social Connections: Unknown (4/1/2022)    Social Connection and Isolation Panel [NHANES]     Frequency of Communication with Friends and Family: More than three times a week   Housing Stability: Unknown (4/1/2022)    Housing Stability Vital Sign     Unable to Pay for Housing in the Last Year: No     Unstable Housing in the Last Year: No     Review of Systems   Constitutional:  Positive for fatigue. Negative for activity change, appetite change, chills and fever.   HENT:  Negative for congestion and trouble swallowing.    Eyes: Negative.    Respiratory:  Positive for shortness of breath (chronic.). Negative for cough and wheezing.    Cardiovascular:  Negative for chest pain, palpitations and leg swelling.   Gastrointestinal:  Negative for abdominal pain, constipation, diarrhea, nausea and vomiting.   Genitourinary:  Negative for flank pain and hematuria.        SPT   Musculoskeletal:  Positive for gait problem (BL AKA). Negative for back pain and neck pain.   Skin:  Negative for color change, rash and wound.   Neurological:  Positive for speech difficulty (dysarthia from prior stroke). Negative for dizziness, weakness and headaches.   Psychiatric/Behavioral:  Negative for confusion and hallucinations. The patient is not nervous/anxious.      Objective:     Vital Signs (Most Recent):  Temp: 96.6 °F (35.9 °C) (07/30/23 0917)  Pulse: 61 (07/30/23 1121)  Resp: 18 (07/30/23 0917)  BP: 134/60 (07/30/23 0917)  SpO2: 95 % (07/30/23 0917) Vital Signs (24h Range):  Temp:  [96.6 °F (35.9 °C)-98.2 °F (36.8 °C)] 96.6 °F (35.9 °C)  Pulse:  [55-62] 61  Resp:  [18] 18  SpO2:  [90 %-97 %] 95 %  BP: (100-134)/(53-66) 134/60     Weight: 77.6 kg  (171 lb)  Body mass index is 87.82 kg/m².    Estimated Creatinine Clearance: 48 mL/min (based on SCr of 1 mg/dL).     Physical Exam  Vitals and nursing note reviewed.   Constitutional:       General: He is not in acute distress.     Appearance: He is well-developed. He is obese. He is not ill-appearing, toxic-appearing or diaphoretic.   HENT:      Head: Normocephalic and atraumatic.      Mouth/Throat:      Mouth: Mucous membranes are moist.   Eyes:      General: No scleral icterus.     Conjunctiva/sclera: Conjunctivae normal.   Cardiovascular:      Rate and Rhythm: Normal rate and regular rhythm.      Heart sounds: Normal heart sounds.   Pulmonary:      Effort: Pulmonary effort is normal. No respiratory distress.   Abdominal:      General: Bowel sounds are normal. There is no distension.      Palpations: Abdomen is soft.      Tenderness: There is no abdominal tenderness. There is no right CVA tenderness or left CVA tenderness.      Comments: Suprapubic catheter  - site clear   Urine yellow/clear    Musculoskeletal:         General: Deformity present. No swelling or tenderness.      Cervical back: Normal range of motion. No tenderness.      Comments: Well healed bilateral AKA   Skin:     General: Skin is warm and dry.      Findings: No erythema or rash.   Neurological:      Mental Status: He is alert and oriented to person, place, and time.      Comments: Dysarthria    Psychiatric:         Behavior: Behavior normal.         Thought Content: Thought content normal.          Significant Labs: Blood Culture:   Recent Labs   Lab 02/06/23  0253 07/27/23 2032   LABBLOO No growth after 5 days.  No growth after 5 days. No Growth to date  No Growth to date  No Growth to date  No Growth to date  No Growth to date  No Growth to date     CBC:   Recent Labs   Lab 07/29/23  0242 07/30/23  0531   WBC 5.58 6.27   HGB 11.0* 10.5*   HCT 33.9* 32.3*    187     CMP:   Recent Labs   Lab 07/29/23  0242 07/30/23  0531   NA  136 137   K 4.0 4.2    103   CO2 23 24   * 179*   BUN 14 18   CREATININE 1.0 1.0   CALCIUM 8.9 9.1   ANIONGAP 9 10     Urine Culture:   Recent Labs   Lab 02/03/23  1756 03/14/23  1552 04/12/23  1710 07/27/23 2242 07/28/23  0614   LABURIN PROTEUS MIRABILIS  > 100,000 cfu/ml  * Multiple organisms isolated. None in predominance.  Repeat if  clinically necessary. ENTEROBACTER CLOACAE  >100,000 cfu/ml  *  PSEUDOMONAS AERUGINOSA  50,000 - 99,999 cfu/ml  * KLEBSIELLA PNEUMONIAE ESBL  >100,000 cfu/ml  *  PSEUDOMONAS AERUGINOSA  50,000 - 99,999 cfu/ml  * No growth     Urine Studies:   Recent Labs   Lab 04/12/23 1710 07/27/23 2242 07/28/23  0614   COLORU Brown*   < > Brown*   APPEARANCEUA Cloudy*   < > Ex.Turbid   PHUR 6.0   < > 6.0   SPECGRAV 1.020   < > 1.015   PROTEINUA 2+*   < > 2+*   GLUCUA Negative   < > Negative   KETONESU Negative   < > Negative   BILIRUBINUA Negative   < > Negative   OCCULTUA 3+*   < > 3+*   NITRITE Negative   < > Negative   UROBILINOGEN Negative  --   --    LEUKOCYTESUR 1+*   < > 3+*   RBCUA >100*   < > >100*   WBCUA 30*   < > >100*   BACTERIA Few*   < > None   SQUAMEPITHEL 1  --   --    HYALINECASTS 0   < > 0    < > = values in this interval not displayed.       Significant Imaging: I have reviewed all pertinent imaging results/findings within the past 24 hours.

## 2023-07-30 NOTE — PLAN OF CARE
Update at 6:18 PM  Patient does not ride home. All family contacted and unavailable to [facility]. There are no wheelchair van vendors today. Patient does not have trunk stability due to worsening of chronic weakness. Bilateral amputee, lives alone, has ramp to get into house and does not have wheelchair at hospital. CM requested team to enter medical necessity plan of care note. Spoke with patient, confirmed he had access to get into home. Arranged stretcher transportation via [facility], requested pickup time 6:45 PM. Updated team. If family shows up to pickup patient, please call [facility] at ext [phone] option [phone] to cancel ambulance.      Update at 4:25 PM  Called [facility] office ([phone]) left message with answering service,requested return call from on-call nurse. Received call back, updated on discharge. Will contact patient and resume services.    CM received message from Emily Garcia PA-C, patient is discharging home and needs HH. Reviewed chart and recent HH orders located. Placed [facility] referral to [facility] and requested resumption of care. Awaiting response. HH orders faxed to office ([phone]).       Reyna Edmondson RN  Weekend  - [facility]  Spectralink: [phone]

## 2023-07-30 NOTE — PLAN OF CARE
[facility]  Discharge Final Note    Primary Care Provider: Jalyn Rock DO    Expected Discharge Date: 7/30/2023    Patient is ready to discharge from case management standpoint.    Final Discharge Note (most recent)       Final Note - 07/30/23 1628          Final Note    Assessment Type Final Discharge Note     Anticipated Discharge Disposition [place] [facility]     What phone number can be called within the next 1-3 days to see how you are doing after discharge? [phone]     Hospital Resources/Appts/Education Provided Provided patient/caregiver with written discharge plan information;Appointments scheduled by Navigator/Coordinator;Post-Acute resouces added to AVS        Post-Acute Status    Post-Acute Authorization Home [facility]     Home Health Status Set-up Complete/Auth obtained     Discharge Delays None known at this time                   Important Message from Medicare         Contact Info       [facility]   Specialty: Home Health Services, Hospice and Palliative Medicine    [address] [place] [address]   Phone: [phone]       Next Steps: Follow up today    Instructions:  agency will contact patient directly and resume care. Orders faxed to office.        Reyna Edmondson RN  Weekend  - [facility] [place] [place]  Spectralink: [phone]

## 2023-07-30 NOTE — ASSESSMENT & PLAN NOTE
See assessment/plan above.  Changed this admission.  Follow up with Urology as outpatient for routine management

## 2023-07-30 NOTE — SUBJECTIVE & OBJECTIVE
Past Medical History:   Diagnosis Date    Acute on chronic diastolic CHF (congestive heart failure), NYHA class 3 12/30/2017    CAD (coronary artery disease) 1/7/2013    Cerebral vascular accident     COPD (chronic obstructive pulmonary disease)     Cough syncope 6/15/2017    Diabetes mellitus     Disorder of kidney and ureter     Hyperlipidemia     Hypertension     Laceration of right hand without foreign body 12/23/2019    Patient sustained injury to dorsum of right hand due to ronda. No significant pain. No significant drainage. No fever or chills    Localized edema of right lower leg 12/7/2017    S/P CABG (coronary artery bypass graft) 1/7/2013    Ulcer of right pretibial region, limited to breakdown of skin 7/2/2018    Wound identified 6/18/2018 that is most likely related to tight dressing.     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       Past Surgical History:   Procedure Laterality Date    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    Procedure: AMPUTATION, ABOVE KNEE;  Surgeon: Ben Goyal MD;  Location: [address];  Service: Vascular;  Laterality: Right;    ABOVE-KNEE AMPUTATION Left 9/22/2021    Procedure: AMPUTATION, ABOVE KNEE;  Surgeon: DOUGLAS Siegel II, MD;  Location: [facility] OR [address] [place] [address];  Service: Vascular;  Laterality: Left;    ANGIOGRAPHY OF LOWER EXTREMITY Right 2/19/2019    Procedure: Angiogram Extremity Unilateral;  Surgeon: Rudi Galeano MD;  Location: [facility] CATH LAB;  Service: Cardiology;  Laterality: Right;    ANGIOGRAPHY OF LOWER EXTREMITY Right 7/19/2019    Procedure: Angiogram Extremity Unilateral;  Surgeon: Rudi Galeano MD;  Location: [facility] CATH LAB;  Service: Cardiology;  Laterality: Right;    APPLICATION OF WOUND VACUUM-ASSISTED CLOSURE DEVICE Right 7/28/2020    Procedure: APPLICATION, WOUND VAC;  Surgeon: Yolanda Meyers DPM;  Location: [facility] OR;  Service: Podiatry;  Laterality: Right;    BONE BIOPSY Right 7/28/2020    Procedure: BIOPSY,  "BONE;  Surgeon: Yolanda Meyers DPM;  Location: [facility] OR;  Service: Podiatry;  Laterality: Right;    CARDIAC CATHETERIZATION      CARDIAC CATHETERIZATION  02/2018    stent to right leg x 5 ( 1 stent placed weekly since late jan 2018)    CHOLECYSTECTOMY      CORONARY ARTERY BYPASS GRAFT      2006    CYST REMOVAL      CYSTOSCOPY N/A 2/14/2022    Procedure: CYSTOSCOPY;  Surgeon: Thompson Silva MD;  Location: [facility] OR;  Service: Urology;  Laterality: N/A;    DEBRIDEMENT OF FOOT Right 7/28/2020    Procedure: DEBRIDEMENT, FOOT;  Surgeon: Yolanda Meyers DPM;  Location: [facility] OR;  Service: Podiatry;  Laterality: Right;    PHLEBOGRAPHY Right 2/17/2020    Procedure: Venogram;  Surgeon: Rudi Galeano MD;  Location: [facility] CATH LAB;  Service: Cardiology;  Laterality: Right;  Patient needs anesthesia for sedation       Review of patient's allergies indicates:   Allergen Reactions    Sulfa (sulfonamide antibiotics) Nausea And Vomiting       Medications:  Medications Prior to Admission   Medication Sig    ALPRAZolam (XANAX) 0.5 MG tablet Take 2 tablets (1 mg total) by mouth every evening.    apixaban (ELIQUIS) 5 mg Tab Take 1 tablet (5 mg total) by mouth 2 (two) times daily.    ascorbic acid, vitamin C, (VITAMIN C) 500 MG tablet Take 1 tablet (500 mg total) by mouth 2 (two) times daily with meals.    aspirin 81 MG Chew Take 81 mg by mouth once daily.    baclofen (LIORESAL) 20 MG tablet Take 20 mg by mouth 4 (four) times daily as needed.    BD ULTRA-FINE MICHAEL PEN NEEDLE 32 gauge x 5/32" Ndle Inject 1 each into the skin 5 (five) times daily.    blood sugar diagnostic (BLOOD GLUCOSE TEST) Strp INJECT 1 STRIP INTO THE SKIN 3 (THREE) TIMES DAILY. TEST SUGAR 3 TIMES DAILY. - SUBCUTANEOUS. ICD -10 E11.65 & Z79.4    calcium carbonate (TUMS) 200 mg calcium (500 mg) chewable tablet Take 2 tablets (1,000 mg total) by mouth 3 (three) times daily as needed for Heartburn.    clopidogreL (PLAVIX) 75 mg tablet Take 75 mg by mouth every morning. "    DULoxetine (CYMBALTA) 60 MG capsule Take 60 mg by mouth once daily.    insulin (LANTUS SOLOSTAR U-100 INSULIN) glargine 100 units/mL SubQ pen Inject 10 Units into the skin 2 (two) times a day.    insulin lispro (HUMALOG KWIKPEN INSULIN) 100 unit/mL pen INJECT THREE UNITS SUBCUTANEOUSLY THREE TIMES DAILY WITH MEALS, PLUS CORRECTION SCALE MAX TDD 25 UNITS    lancets (ONETOUCH ULTRASOFT LANCETS) Misc Inject 1 each into the skin 3 (three) times daily before meals.    lisinopriL (PRINIVIL,ZESTRIL) 20 MG tablet Take 20 mg by mouth once daily.    medical supply, miscellaneous (O-2 SUSPENSORY MISC) 2 Liter AS NEEDED (route: Oxygen)    nitroGLYCERIN (NITROSTAT) 0.4 MG SL tablet DISSOLVE 1 TAB UNDER TONGUE EVERY 5 MINUTS AT ONSET OF CHEST PAIN-MAX 3 PER 15 MINUTES--GO TO ER    oxyCODONE (ROXICODONE) 10 mg Tab immediate release tablet Take 10 mg by mouth 3 (three) times daily as needed.    pantoprazole (PROTONIX) 40 MG tablet TAKE 1 TABLET BY MOUTH EVERY DAY (Patient taking differently: Take 40 mg by mouth once daily. Do not crush, break, chew)    potassium chloride (K-TAB) 20 mEq Take 20 mEq by mouth 2 (two) times a day.    pregabalin (LYRICA) 100 MG capsule Take 100 mg by mouth 2 (two) times daily.    propranoloL (INDERAL) 80 MG tablet Take 80 mg by mouth 2 (two) times daily.    rosuvastatin (CRESTOR) 40 MG Tab Take 1 tablet (40 mg total) by mouth every evening.    spironolactone (ALDACTONE) 25 MG tablet Take 25 mg by mouth once daily.    tamsulosin (FLOMAX) 0.4 mg Cap Take 0.4 mg by mouth every evening.     Antibiotics (From admission, onward)      Start     Stop Route Frequency Ordered    07/28/23 0445  meropenem (MERREM) 1 g in sodium chloride 0.9 % 100 mL IVPB (MB+)         -- IV Every 12 hours (non-standard times) 07/28/23 0216          Antifungals (From admission, onward)      None          Antivirals (From admission, onward)      None             Immunization History   Administered Date(s) Administered    Influenza  - Quadrivalent 2014, 2015    Influenza - Quadrivalent - PF *Preferred* (6 months and older) 2016, 2017, 2020, 2022    PPD Test 2020, 2021, 2023    Pneumococcal Polysaccharide - 23 Valent 06/15/2017    Tdap 2017       Family History       Problem Relation (Age of Onset)    Cancer Father    Heart disease Mother    Hypertension Mother, Father, Sister          Social History     Socioeconomic History    Marital status: Single   Tobacco Use    Smoking status: Former     Current packs/day: 0.00     Types: Cigarettes     Quit date: 2009     Years since quittin.5     Passive exposure: Past    Smokeless tobacco: Never    Tobacco comments:     quit 9 yrs ago   Substance and Sexual Activity    Alcohol use: Not Currently     Alcohol/week: 12.0 standard drinks of alcohol     Types: 12 Cans of beer per week     Comment: social on weekends    Drug use: No    Sexual activity: Never   Social History Narrative    Lives in [place] alone; He has a sister who helps him; He has 4 sisters; He is disabled; He worked [employer] prior to his stroke; 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 He watches soap operas and likes to visit with family and friends. He also has a half-brother         Social Determinants of Health     Financial Resource Strain: Medium Risk (2022)    Overall Financial Resource Strain (CARDIA)     Difficulty of Paying Living Expenses: Somewhat hard   Food Insecurity: Food Insecurity Present (2022)    Hunger Vital Sign     Worried About Running Out of Food in the Last Year: Sometimes true     Ran Out of Food in the Last Year: Sometimes true   Transportation Needs: No Transportation Needs (2022)    PRAPARE - Transportation     Lack of Transportation (Medical): No     Lack of Transportation (Non-Medical): No   Physical Activity:  Insufficiently Active (4/1/2022)    Exercise Vital Sign     Days of Exercise per Week: 7 days     Minutes of Exercise per Session: 10 min   Stress: No Stress Concern Present (4/1/2022)    [employer] [place] [employer] - Occupational Stress Questionnaire     Feeling of Stress : Not at all   Social Connections: Unknown (4/1/2022)    Social Connection and Isolation Panel [NHANES]     Frequency of Communication with Friends and Family: More than three times a week   Housing Stability: Unknown (4/1/2022)    Housing Stability Vital Sign     Unable to Pay for Housing in the Last Year: No     Unstable Housing in the Last Year: No     Review of Systems   Constitutional:  Positive for fatigue. Negative for activity change, appetite change, chills and fever.   HENT:  Negative for congestion and trouble swallowing.    Eyes: Negative.    Respiratory:  Positive for shortness of breath (chronic.). Negative for cough and wheezing.    Cardiovascular:  Negative for chest pain, palpitations and leg swelling.   Gastrointestinal:  Negative for abdominal pain, constipation, diarrhea, nausea and vomiting.   Genitourinary:  Negative for flank pain and hematuria.        SPT   Musculoskeletal:  Positive for gait problem (BL AKA). Negative for back pain and neck pain.   Skin:  Negative for color change, rash and wound.   Neurological:  Positive for speech difficulty (dysarthia from prior stroke). Negative for dizziness, weakness and headaches.   Psychiatric/Behavioral:  Negative for confusion and hallucinations. The patient is not nervous/anxious.      Objective:     Vital Signs (Most Recent):  Temp: 96.6 °F (35.9 °C) (07/30/23 0917)  Pulse: 61 (07/30/23 1121)  Resp: 18 (07/30/23 0917)  BP: 134/60 (07/30/23 0917)  SpO2: 95 % (07/30/23 0917) Vital Signs (24h Range):  Temp:  [96.6 °F (35.9 °C)-98.2 °F (36.8 °C)] 96.6 °F (35.9 °C)  Pulse:  [55-62] 61  Resp:  [18] 18  SpO2:  [90 %-97 %] 95 %  BP: (100-134)/(53-66) 134/60     Weight:  77.6 kg (171 lb)  Body mass index is 87.82 kg/m².    Estimated Creatinine Clearance: 48 mL/min (based on SCr of 1 mg/dL).     Physical Exam  Vitals and nursing note reviewed.   Constitutional:       General: He is not in acute distress.     Appearance: He is well-developed. He is obese. He is not ill-appearing, toxic-appearing or diaphoretic.   HENT:      Head: Normocephalic and atraumatic.      Mouth/Throat:      Mouth: Mucous membranes are moist.   Eyes:      General: No scleral icterus.     Conjunctiva/sclera: Conjunctivae normal.   Cardiovascular:      Rate and Rhythm: Normal rate and regular rhythm.      Heart sounds: Normal heart sounds.   Pulmonary:      Effort: Pulmonary effort is normal. No respiratory distress.   Abdominal:      General: Bowel sounds are normal. There is no distension.      Palpations: Abdomen is soft.      Tenderness: There is no abdominal tenderness. There is no right CVA tenderness or left CVA tenderness.      Comments: Suprapubic catheter  - site clear   Urine yellow/clear    Musculoskeletal:         General: Deformity present. No swelling or tenderness.      Cervical back: Normal range of motion. No tenderness.      Comments: Well healed bilateral AKA   Skin:     General: Skin is warm and dry.      Findings: No erythema or rash.   Neurological:      Mental Status: He is alert and oriented to person, place, and time.      Comments: Dysarthria    Psychiatric:         Behavior: Behavior normal.         Thought Content: Thought content normal.          Significant Labs: Blood Culture:   Recent Labs   Lab 02/06/23  0253 07/27/23 2032   LABBLOO No growth after 5 days.  No growth after 5 days. No Growth to date  No Growth to date  No Growth to date  No Growth to date  No Growth to date  No Growth to date     CBC:   Recent Labs   Lab 07/29/23 0242 07/30/23  0531   WBC 5.58 6.27   HGB 11.0* 10.5*   HCT 33.9* 32.3*    187     CMP:   Recent Labs   Lab 07/29/23  0242 07/30/23  0531     137   K 4.0 4.2    103   CO2 23 24   * 179*   BUN 14 18   CREATININE 1.0 1.0   CALCIUM 8.9 9.1   ANIONGAP 9 10     Urine Culture:   Recent Labs   Lab 02/03/23  1756 03/14/23  1552 04/12/23  1710 07/27/23 2242 07/28/23  0614   LABURIN PROTEUS MIRABILIS  > 100,000 cfu/ml  * Multiple organisms isolated. None in predominance.  Repeat if  clinically necessary. ENTEROBACTER CLOACAE  >100,000 cfu/ml  *  PSEUDOMONAS AERUGINOSA  50,000 - 99,999 cfu/ml  * KLEBSIELLA PNEUMONIAE ESBL  >100,000 cfu/ml  *  PSEUDOMONAS AERUGINOSA  50,000 - 99,999 cfu/ml  * No growth     Urine Studies:   Recent Labs   Lab 04/12/23 1710 07/27/23 2242 07/28/23  0614   COLORU Brown*   < > Brown*   APPEARANCEUA Cloudy*   < > Ex.Turbid   PHUR 6.0   < > 6.0   SPECGRAV 1.020   < > 1.015   PROTEINUA 2+*   < > 2+*   GLUCUA Negative   < > Negative   KETONESU Negative   < > Negative   BILIRUBINUA Negative   < > Negative   OCCULTUA 3+*   < > 3+*   NITRITE Negative   < > Negative   UROBILINOGEN Negative  --   --    LEUKOCYTESUR 1+*   < > 3+*   RBCUA >100*   < > >100*   WBCUA 30*   < > >100*   BACTERIA Few*   < > None   SQUAMEPITHEL 1  --   --    HYALINECASTS 0   < > 0    < > = values in this interval not displayed.       Significant Imaging: I have reviewed all pertinent imaging results/findings within the past 24 hours.

## 2023-07-31 NOTE — DISCHARGE SUMMARY
[facility]ation [address] [place] [address] Medicine  Discharge Summary      Patient Name: Kuldeep Alamo  MRN: [id number]  RIO: [id number]  Patient Class: IP- Inpatient  Admission Date: 7/27/2023  Hospital Length of Stay: 2 days  Discharge Date and Time: 7/30/2023  8:00 PM  Attending Physician: No att. providers found   Discharging Provider: Emily Garcia PA-C  Primary Care Provider: [facility] Medicine Team: [facility] [place] [facility]  Emily Garcia PA-C  Primary Care Team: [facility] MED     HPI:   Kuldeep Alamo is a 57 yo male with CHF, CAD s/p CABG 2013, hx CVA, vertebrobasilar occlusive disease, HLD, and DMII, who presents from his home after an unwitnessed fall from his wheelchair. On my exam patient is alert and responsive and able to provide history. He reports he can't recall the events of the fall, but he believes he lost consciousness. Reports that at baseline he will have coughing fits to the point of being unable to catch his breath and will sometimes pass out. He says it's possible that's what happened today. Denies SOB, fevers or chills, abdominal pain, N/V/D, injury or pain.     In the ED: AF. HR 50's, BP stable. SpO2 98% on room air. EKG in sinus darien. CBC with baseline anemia. CMP with K 5.3, Cr 1.0 (baseline), and . BNP and trop wnl. UA grossly infectious with >100 RBC. Culture pending. Blood cultures collected. VBG: pH 7.33, PCO2 49, PO2 59, HCO3 26. CTH and CT c-spine without acute process. CXR with mild perihilar edema and small R pleural effusion. Given IV zosyn, vancomycin, and 1L NS in ED.       * No surgery found *      Hospital Course:   Kuldeep Alamo was placed in  observation for complicated UTI associated with chronic suprapubic catheter. Pt was started on IV Meropenem and demonstrated significant improvement throughout admission. ID consulted. Urine cultures + klebsiella pneuomoniae ESBL and pseudomonas aeruginosa. Blood cultures NGTD. Echo with EF 55%, indeterminate LV  diastolic function. PT/OT contacted to assist in wheelchair transfer difficulty and recommending SNF, which the patient declines. On my evaluation this morning, the patient reports feeling well and is eager to discharge home. All questions were answered. Patient acknowledged understanding of discharge instructions and feels safe to discharge home. Patient was discharged on 7/30/2023 in stable condition with home health services, urology and PCP follow-up. Education regarding condition provided and return precautions given.        Goals of Care Treatment Preferences:  Code Status: Full Code      Consults:   Consults (From admission, onward)        Status Ordering Provider     Inpatient consult to Infectious Diseases  Once        Provider:  (Not yet assigned)    Completed SEYMOUR WALKER     Inpatient consult to Infectious Diseases  Once        Provider:  (Not yet assigned)    Completed AISHA FERRELL          No new Assessment & Plan notes have been filed under this hospital service since the last note was generated.  Service: Hospital Medicine    Final Active Diagnoses:    Diagnosis Date Noted POA    PRINCIPAL PROBLEM:  Urinary tract infection associated with cystostomy catheter [T83.510A, N39.0] 02/03/2023 Yes    COPD (chronic obstructive pulmonary disease) [J44.9] 07/29/2023 Yes    Constipation [K59.00] 07/29/2023 Yes    Fall [W19.XXXA] 07/28/2023 Yes    Multiple drug resistant organism (MDRO) culture positive [Z16.24] 07/28/2023 Yes    Suprapubic catheter [Z93.59] 07/28/2023 Not Applicable    Hyperkalemia [E87.5] 07/28/2023 Yes    S/P AKA (above knee amputation) bilateral [Z89.611, Z89.612]  Not Applicable    PAD (peripheral artery disease) [I73.9] 05/27/2021 Yes    Chronic kidney disease, stage III (moderate) [N18.30] 03/11/2021 Yes    Mild recurrent major depression [F33.0] 01/20/2021 Yes    Anemia [D64.9] 05/07/2019 Yes    Chronic diastolic CHF (congestive heart failure) [I50.32] 12/30/2017  "Yes    Panic disorder [F41.0] 06/14/2016 Yes     Chronic    Left spastic hemiparesis [G81.14] 06/23/2014 Yes    Coronary artery disease involving native coronary artery of native heart without angina pectoris [I25.10] 01/07/2013 Yes    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] 01/07/2013 Yes    Hyperlipidemia associated with type 2 diabetes mellitus [E11.69, E78.5] 07/03/2012 Yes    Type 2 diabetes mellitus with hyperglycemia, with long-term current use of insulin [E11.65, Z79.4] 07/03/2012 Not Applicable    Hemiplegia of nondominant side, late effect of cerebrovascular disease [I69.959] 11/21/2011 Not Applicable      Problems Resolved During this Admission:    Diagnosis Date Noted Date Resolved POA    Loss of consciousness [R40.20] 07/28/2023 07/28/2023 Yes    Acute cystitis with hematuria [N30.01] 02/28/2018 07/28/2023 Yes    Essential hypertension [I10] 07/03/2012 07/29/2023 Yes       Discharged Condition: good    Disposition: Home-Health Care [facility]    Follow Up:   Follow-up Information     [facility] Follow up today.    Specialties: Home Health Services, Hospice and Palliative Medicine  Why:  agency will contact patient directly and resume care. Orders faxed to office.  Contact information:  [address] [place] [id number]  [phone]                       Patient Instructions:      NEBULIZER FOR HOME USE     Order Specific Question Answer Comments   Height: 3' 1" (0.94 m)    Weight: 77.6 kg (171 lb)    Does patient have medical equipment at home? 3-in-1 commode    Does patient have medical equipment at home? hospital bed    Does patient have medical equipment at home? power chair    Length of need (1-99 months): 99      Ambulatory referral/consult to Urology   Standing Status: Future   Referral Priority: Urgent Referral Type: Consultation   Referral Reason: Specialty Services Required   Requested Specialty: Urology   Number of Visits Requested: 1     Ambulatory referral/consult " to Internal Medicine   Standing Status: Future   Referral Priority: Urgent Referral Type: Consultation   Referral Reason: Specialty Services Required   Requested Specialty: Internal Medicine   Number of Visits Requested: 1     Notify your health care provider if you experience any of the following:  temperature >100.4     Notify your health care provider if you experience any of the following:  severe uncontrolled pain     Notify your health care provider if you experience any of the following:  persistent dizziness, light-headedness, or visual disturbances     Notify your health care provider if you experience any of the following:  increased confusion or weakness     Activity as tolerated       Significant Diagnostic Studies: N/A    Pending Diagnostic Studies:     None         Medications:  Reconciled Home Medications:      Medication List      START taking these medications    albuterol-ipratropium 2.5 mg-0.5 mg/3 mL nebulizer solution  Commonly known as: DUO-NEB  Use 1 vial (3 mLs) by nebulization every 6 (six) hours while awake. Rescue     ciprofloxacin HCl 500 MG tablet  Commonly known as: CIPRO  Take 1 tablet (500 mg total) by mouth every 12 (twelve) hours. for 4 days     TRELEGY ELLIPTA 200-62.5-25 mcg inhaler  Generic drug: fluticasone-umeclidin-vilanter  Inhale 1 puff into the lungs once daily.        CHANGE how you take these medications    pantoprazole 40 MG tablet  Commonly known as: PROTONIX  TAKE 1 TABLET BY MOUTH EVERY DAY  What changed:   · when to take this  · additional instructions     propranoloL 20 MG tablet  Commonly known as: INDERAL  Take 1 tablet (20 mg total) by mouth 2 (two) times daily.  What changed:   · medication strength  · how much to take        CONTINUE taking these medications    ALPRAZolam 0.5 MG tablet  Commonly known as: XANAX  Take 2 tablets (1 mg total) by mouth every evening.     apixaban 5 mg Tab  Commonly known as: ELIQUIS  Take 1 tablet (5 mg total) by mouth 2 (two)  "times daily.     ascorbic acid (vitamin C) 500 MG tablet  Commonly known as: VITAMIN C  Take 1 tablet (500 mg total) by mouth 2 (two) times daily with meals.     aspirin 81 MG Chew  Take 81 mg by mouth once daily.     baclofen 20 MG tablet  Commonly known as: LIORESAL  Take 20 mg by mouth 4 (four) times daily as needed.     BD ULTRA-FINE MICHAEL PEN NEEDLE 32 gauge x 5/32" Ndle  Generic drug: pen needle, diabetic  Inject 1 each into the skin 5 (five) times daily.     blood sugar diagnostic Strp  Commonly known as: BLOOD GLUCOSE TEST  INJECT 1 STRIP INTO THE SKIN 3 (THREE) TIMES DAILY. TEST SUGAR 3 TIMES DAILY. - SUBCUTANEOUS. ICD -10 E11.65 & Z79.4     calcium carbonate 200 mg calcium (500 mg) chewable tablet  Commonly known as: TUMS  Take 2 tablets (1,000 mg total) by mouth 3 (three) times daily as needed for Heartburn.     clopidogreL 75 mg tablet  Commonly known as: PLAVIX  Take 75 mg by mouth every morning.     DULoxetine 60 MG capsule  Commonly known as: CYMBALTA  Take 60 mg by mouth once daily.     insulin glargine 100 units/mL SubQ pen  Commonly known as: LANTUS SOLOSTAR U-100 INSULIN  Inject 10 Units into the skin 2 (two) times a day.     insulin lispro 100 unit/mL pen  Commonly known as: HumaLOG KwikPen Insulin  INJECT THREE UNITS SUBCUTANEOUSLY THREE TIMES DAILY WITH MEALS, PLUS CORRECTION SCALE MAX TDD 25 UNITS     lancets Misc  Commonly known as: ONETOUCH ULTRASOFT LANCETS  Inject 1 each into the skin 3 (three) times daily before meals.     lisinopriL 20 MG tablet  Commonly known as: PRINIVIL,ZESTRIL  Take 20 mg by mouth once daily.     nitroGLYCERIN 0.4 MG SL tablet  Commonly known as: NITROSTAT  DISSOLVE 1 TAB UNDER TONGUE EVERY 5 MINUTS AT ONSET OF CHEST PAIN-MAX 3 PER 15 MINUTES--GO TO ER     O-2 SUSPENSORY MISC  2 Liter AS NEEDED (route: Oxygen)     oxyCODONE 10 mg Tab immediate release tablet  Commonly known as: ROXICODONE  Take 10 mg by mouth 3 (three) times daily as needed.     pregabalin 100 MG " capsule  Commonly known as: LYRICA  Take 100 mg by mouth 2 (two) times daily.     rosuvastatin 40 MG Tab  Commonly known as: CRESTOR  Take 1 tablet (40 mg total) by mouth every evening.     spironolactone 25 MG tablet  Commonly known as: ALDACTONE  Take 25 mg by mouth once daily.     tamsulosin 0.4 mg Cap  Commonly known as: FLOMAX  Take 0.4 mg by mouth every evening.        STOP taking these medications    potassium chloride 20 mEq  Commonly known as: K-TAB            Indwelling Lines/Drains at time of discharge:   Lines/Drains/Airways     Drain  Duration                Suprapubic Catheter 07/28/23 0152 silicone coated 22 Fr. 3 days                Time spent on the discharge of patient: 36 minutes         Emily Garcia PA-C  Department of Hospital Medicine  [facility] - Observation 11H

## 2023-08-01 ENCOUNTER — TELEPHONE (OUTPATIENT)
Dept: FAMILY MEDICINE | Facility: CLINIC | Age: 58
End: 2023-08-01
Payer: MEDICARE

## 2023-08-01 LAB
BACTERIA BLD CULT: NORMAL
BACTERIA BLD CULT: NORMAL

## 2023-08-01 NOTE — TELEPHONE ENCOUNTER
----- Message from Alberto Garcia sent at 8/1/2023 10:36 AM CDT -----  Type:  Needs Medical Advice    Who Called: Almaz/jeradcris  Would the patient rather a call back or a response via [facility]? call  Best Call Back Number: [phone]  Additional Information: He was discharged from [facility] on 7/30 and she is calling back to see if he should be scheduling a hospital fu.

## 2023-08-01 NOTE — TELEPHONE ENCOUNTER
Pt niece is wanting to know if the pt needs to come in for a hospital follow up, also they are wanting a referral to a different urologist, jamel advise, okay to scheduled with DK?

## 2023-08-04 ENCOUNTER — OFFICE VISIT (OUTPATIENT)
Dept: FAMILY MEDICINE | Facility: CLINIC | Age: 58
End: 2023-08-04
Payer: MEDICARE

## 2023-08-04 ENCOUNTER — TELEPHONE (OUTPATIENT)
Dept: FAMILY MEDICINE | Facility: CLINIC | Age: 58
End: 2023-08-04
Payer: MEDICARE

## 2023-08-04 DIAGNOSIS — E78.2 MIXED HYPERLIPIDEMIA: ICD-10-CM

## 2023-08-04 DIAGNOSIS — N39.0 URINARY TRACT INFECTION DUE TO ESBL KLEBSIELLA: ICD-10-CM

## 2023-08-04 DIAGNOSIS — N18.30 STAGE 3 CHRONIC KIDNEY DISEASE, UNSPECIFIED WHETHER STAGE 3A OR 3B CKD: ICD-10-CM

## 2023-08-04 DIAGNOSIS — Z79.4 TYPE 2 DIABETES MELLITUS WITH HYPERGLYCEMIA, WITH LONG-TERM CURRENT USE OF INSULIN: ICD-10-CM

## 2023-08-04 DIAGNOSIS — B96.89 URINARY TRACT INFECTION DUE TO ESBL KLEBSIELLA: ICD-10-CM

## 2023-08-04 DIAGNOSIS — Z09 HOSPITAL DISCHARGE FOLLOW-UP: Primary | ICD-10-CM

## 2023-08-04 DIAGNOSIS — Z12.5 SCREENING FOR MALIGNANT NEOPLASM OF PROSTATE: ICD-10-CM

## 2023-08-04 DIAGNOSIS — I10 ESSENTIAL HYPERTENSION: ICD-10-CM

## 2023-08-04 DIAGNOSIS — E11.65 TYPE 2 DIABETES MELLITUS WITH HYPERGLYCEMIA, WITH LONG-TERM CURRENT USE OF INSULIN: ICD-10-CM

## 2023-08-04 DIAGNOSIS — Z13.29 SCREENING FOR THYROID DISORDER: ICD-10-CM

## 2023-08-04 PROCEDURE — 4010F PR ACE/ARB THEARPY RXD/TAKEN: ICD-10-PCS | Mod: HCNC,CPTII,95, | Performed by: PEDIATRICS

## 2023-08-04 PROCEDURE — 3051F HG A1C>EQUAL 7.0%<8.0%: CPT | Mod: HCNC,CPTII,95, | Performed by: PEDIATRICS

## 2023-08-04 PROCEDURE — 4010F ACE/ARB THERAPY RXD/TAKEN: CPT | Mod: HCNC,CPTII,95, | Performed by: PEDIATRICS

## 2023-08-04 PROCEDURE — 1111F PR DISCHARGE MEDS RECONCILED W/ CURRENT OUTPATIENT MED LIST: ICD-10-PCS | Mod: HCNC,CPTII,95, | Performed by: PEDIATRICS

## 2023-08-04 PROCEDURE — 99214 OFFICE O/P EST MOD 30 MIN: CPT | Mod: HCNC,95,, | Performed by: PEDIATRICS

## 2023-08-04 PROCEDURE — 99214 PR OFFICE/OUTPT VISIT, EST, LEVL IV, 30-39 MIN: ICD-10-PCS | Mod: HCNC,95,, | Performed by: PEDIATRICS

## 2023-08-04 PROCEDURE — 1159F PR MEDICATION LIST DOCUMENTED IN MEDICAL RECORD: ICD-10-PCS | Mod: HCNC,CPTII,95, | Performed by: PEDIATRICS

## 2023-08-04 PROCEDURE — 3051F PR MOST RECENT HEMOGLOBIN A1C LEVEL 7.0 - < 8.0%: ICD-10-PCS | Mod: HCNC,CPTII,95, | Performed by: PEDIATRICS

## 2023-08-04 PROCEDURE — 1111F DSCHRG MED/CURRENT MED MERGE: CPT | Mod: HCNC,CPTII,95, | Performed by: PEDIATRICS

## 2023-08-04 PROCEDURE — 1159F MED LIST DOCD IN RCRD: CPT | Mod: HCNC,CPTII,95, | Performed by: PEDIATRICS

## 2023-08-04 NOTE — PROGRESS NOTES
"Subjective:      Patient ID: Kuldeep Alamo is a 58 y.o. male.    Chief Complaint: Hospital Follow Up    Patient virtually today for ER follow up from UTI on 7/27, has finshed cipro this morning as prescribed when dischared from ER. Urine looks a lot clearer. And he feels normal, has home health. No complaints today, feels like he is back to normal.      Review of Systems   Constitutional:  Negative for activity change and unexpected weight change.   HENT:  Negative for hearing loss, rhinorrhea and trouble swallowing.    Eyes:  Negative for discharge and visual disturbance.   Respiratory:  Negative for chest tightness and wheezing.    Cardiovascular:  Negative for chest pain and palpitations.   Gastrointestinal:  Negative for blood in stool, constipation, diarrhea and vomiting.   Endocrine: Negative for polydipsia and polyuria.   Genitourinary:  Negative for difficulty urinating, hematuria and urgency.   Musculoskeletal:  Negative for arthralgias, joint swelling and neck pain.   Neurological:  Negative for weakness and headaches.   Psychiatric/Behavioral:  Negative for confusion and dysphoric mood.         Current Outpatient Medications on File Prior to Visit   Medication Sig    albuterol-ipratropium (DUO-NEB) 2.5 mg-0.5 mg/3 mL nebulizer solution Use 1 vial (3 mLs) by nebulization every 6 (six) hours while awake. Rescue    ALPRAZolam (XANAX) 0.5 MG tablet Take 2 tablets (1 mg total) by mouth every evening.    apixaban (ELIQUIS) 5 mg Tab Take 1 tablet (5 mg total) by mouth 2 (two) times daily.    ascorbic acid, vitamin C, (VITAMIN C) 500 MG tablet Take 1 tablet (500 mg total) by mouth 2 (two) times daily with meals.    aspirin 81 MG Chew Take 81 mg by mouth once daily.    baclofen (LIORESAL) 20 MG tablet Take 20 mg by mouth 4 (four) times daily as needed.    BD ULTRA-FINE MICHAEL PEN NEEDLE 32 gauge x 5/32" Ndle Inject 1 each into the skin 5 (five) times daily.    blood sugar diagnostic (BLOOD GLUCOSE TEST) Strp INJECT " 1 STRIP INTO THE SKIN 3 (THREE) TIMES DAILY. TEST SUGAR 3 TIMES DAILY. - SUBCUTANEOUS. ICD -10 E11.65 & Z79.4    calcium carbonate (TUMS) 200 mg calcium (500 mg) chewable tablet Take 2 tablets (1,000 mg total) by mouth 3 (three) times daily as needed for Heartburn.    ciprofloxacin HCl (CIPRO) 500 MG tablet Take 1 tablet (500 mg total) by mouth every 12 (twelve) hours. for 4 days    clopidogreL (PLAVIX) 75 mg tablet Take 75 mg by mouth every morning.    DULoxetine (CYMBALTA) 60 MG capsule Take 60 mg by mouth once daily.    fluticasone-umeclidin-vilanter (TRELEGY ELLIPTA) 200-62.5-25 mcg inhaler Inhale 1 puff into the lungs once daily.    insulin (LANTUS SOLOSTAR U-100 INSULIN) glargine 100 units/mL SubQ pen Inject 10 Units into the skin 2 (two) times a day.    insulin lispro (HUMALOG KWIKPEN INSULIN) 100 unit/mL pen INJECT THREE UNITS SUBCUTANEOUSLY THREE TIMES DAILY WITH MEALS, PLUS CORRECTION SCALE MAX TDD 25 UNITS    lancets (ONETOUCH ULTRASOFT LANCETS) Misc Inject 1 each into the skin 3 (three) times daily before meals.    lisinopriL (PRINIVIL,ZESTRIL) 20 MG tablet Take 20 mg by mouth once daily.    medical supply, miscellaneous (O-2 SUSPENSORY MISC) 2 Liter AS NEEDED (route: Oxygen)    nitroGLYCERIN (NITROSTAT) 0.4 MG SL tablet DISSOLVE 1 TAB UNDER TONGUE EVERY 5 MINUTS AT ONSET OF CHEST PAIN-MAX 3 PER 15 MINUTES--GO TO ER    oxyCODONE (ROXICODONE) 10 mg Tab immediate release tablet Take 10 mg by mouth 3 (three) times daily as needed.    pantoprazole (PROTONIX) 40 MG tablet TAKE 1 TABLET BY MOUTH EVERY DAY (Patient taking differently: Take 40 mg by mouth once daily. Do not crush, break, chew)    pregabalin (LYRICA) 100 MG capsule Take 100 mg by mouth 2 (two) times daily.    propranoloL (INDERAL) 20 MG tablet Take 1 tablet (20 mg total) by mouth 2 (two) times daily.    spironolactone (ALDACTONE) 25 MG tablet Take 25 mg by mouth once daily.    tamsulosin (FLOMAX) 0.4 mg Cap Take 0.4 mg by mouth every evening.     rosuvastatin (CRESTOR) 40 MG Tab Take 1 tablet (40 mg total) by mouth every evening.     No current facility-administered medications on file prior to visit.        Objective:     There were no vitals filed for this visit.   Physical Exam  Constitutional:       Appearance: Normal appearance.   HENT:      Head: Normocephalic and atraumatic.      Nose: Nose normal.   Eyes:      Extraocular Movements: Extraocular movements intact.      Conjunctiva/sclera: Conjunctivae normal.      Pupils: Pupils are equal, round, and reactive to light.   Pulmonary:      Effort: Pulmonary effort is normal. No respiratory distress.   Musculoskeletal:      Cervical back: Normal range of motion.   Neurological:      Mental Status: He is alert and oriented to person, place, and time.   Psychiatric:         Mood and Affect: Mood normal.         Behavior: Behavior normal.         Cognition and Memory: Cognition and memory normal.        Assessment:         1. Hospital discharge follow-up    2. Type 2 diabetes mellitus with hyperglycemia, with long-term current use of insulin    3. Stage 3 chronic kidney disease, unspecified whether stage 3a or 3b CKD    4. Essential hypertension    5. Mixed hyperlipidemia    6. Screening for malignant neoplasm of prostate    7. Screening for thyroid disorder          Plan:   1. Hospital discharge follow-up    2. Type 2 diabetes mellitus with hyperglycemia, with long-term current use of insulin  - Hemoglobin A1C; Future  - Comprehensive Metabolic Panel; Future    3. Stage 3 chronic kidney disease, unspecified whether stage 3a or 3b CKD  - TSH; Future  - T4, Free; Future  - PSA, Screening; Future  - Lipid Panel; Future  - Hemoglobin A1C; Future  - Comprehensive Metabolic Panel; Future  - CBC Without Differential; Future    4. Essential hypertension  - TSH; Future  - T4, Free; Future  - PSA, Screening; Future  - Lipid Panel; Future  - Hemoglobin A1C; Future  - Comprehensive Metabolic Panel; Future  - CBC Without  Differential; Future    5. Mixed hyperlipidemia  - Lipid Panel; Future    6. Screening for malignant neoplasm of prostate  - PSA, Screening; Future    7. Screening for thyroid disorder  - TSH; Future  - T4, Free; Future       All labs and imaging reviewed from hospital stay.  Has home health monitoring wounds and ongoing care.  Will follow up w/ [facility] w/ labs.  Follow up in about 2 months (around 10/4/2023).       Nahid Broussard NP   [facility]  8/4/23

## 2023-08-04 NOTE — TELEPHONE ENCOUNTER
Called Dorinda and anish for [facility] in regards of me faxing pt's paperwork over today to patient.

## 2023-08-04 NOTE — TELEPHONE ENCOUNTER
----- Message from Shweta Jimenez sent at 8/4/2023 10:32 AM CDT -----  Type:  Patient Returning Call    Who Called:Laurent / Moriah   Would the patient rather a call back or a response via [facility]? Call back   Best Call Back Number:[phone]   Additional Information: checking on status of paper work that was sent over

## 2023-08-07 ENCOUNTER — DOCUMENT SCAN (OUTPATIENT)
Dept: HOME HEALTH SERVICES | Facility: HOSPITAL | Age: 58
End: 2023-08-07
Payer: MEDICARE

## 2023-08-08 ENCOUNTER — DOCUMENT SCAN (OUTPATIENT)
Dept: HOME HEALTH SERVICES | Facility: HOSPITAL | Age: 58
End: 2023-08-08
Payer: MEDICARE

## 2023-08-27 PROCEDURE — G0179 MD RECERTIFICATION HHA PT: HCPCS | Mod: ,,, | Performed by: STUDENT IN AN ORGANIZED HEALTH CARE EDUCATION/TRAINING PROGRAM

## 2023-08-27 PROCEDURE — G0179 PR HOME HEALTH MD RECERTIFICATION: ICD-10-PCS | Mod: ,,, | Performed by: STUDENT IN AN ORGANIZED HEALTH CARE EDUCATION/TRAINING PROGRAM

## 2023-08-28 ENCOUNTER — TELEPHONE (OUTPATIENT)
Dept: UROLOGY | Facility: CLINIC | Age: 58
End: 2023-08-28
Payer: MEDICARE

## 2023-08-28 NOTE — TELEPHONE ENCOUNTER
I attempted to call pt, no answer - left result note below and offered him this Thursday on the nurse schedule if he can make it.     ----- Message from Thompson Silva MD sent at 8/27/2023  9:19 AM CDT -----  Pt needs catheter change and repeat urine C+S from new bag.

## 2023-08-29 ENCOUNTER — TELEPHONE (OUTPATIENT)
Dept: FAMILY MEDICINE | Facility: CLINIC | Age: 58
End: 2023-08-29
Payer: MEDICARE

## 2023-08-29 NOTE — TELEPHONE ENCOUNTER
Patient hospital follow up appointment has been made for   Sep 6, 2023 to see IZZY Broussard for his hospital follow up. Thanks

## 2023-09-06 ENCOUNTER — TELEPHONE (OUTPATIENT)
Dept: FAMILY MEDICINE | Facility: CLINIC | Age: 58
End: 2023-09-06
Payer: MEDICARE

## 2023-09-06 NOTE — TELEPHONE ENCOUNTER
----- Message from Mateo Callahan sent at 9/6/2023  9:29 AM CDT -----  Contact: pt  .Type:  Needs Medical Advice    Who Called: pt. Jerzy Almaz  Symptoms (please be specific): hospital f/u   Would the patient rather a call back or a response via [facility]?  Call back  Best Call Back Number:  [phone]     Additional Information: Pt. Cant not fit in his jeradcris call and they have to find another ride for him they are asking for a later appt. Time today. Pt. Appt. Is for 10:00 a.m. today

## 2023-09-06 NOTE — TELEPHONE ENCOUNTER
Spoke with patient angelita in regards to message- states she is unable to bring him today at his appt time- asking to reschedule for a virtual appt- appt reschedule for tomorrow with IZZY Broussard.

## 2023-09-07 ENCOUNTER — TELEPHONE (OUTPATIENT)
Dept: FAMILY MEDICINE | Facility: CLINIC | Age: 58
End: 2023-09-07
Payer: MEDICARE

## 2023-09-07 ENCOUNTER — OFFICE VISIT (OUTPATIENT)
Dept: FAMILY MEDICINE | Facility: CLINIC | Age: 58
End: 2023-09-07
Payer: MEDICARE

## 2023-09-07 DIAGNOSIS — N39.0 RECURRENT UTI: Primary | ICD-10-CM

## 2023-09-07 PROCEDURE — 99214 PR OFFICE/OUTPT VISIT, EST, LEVL IV, 30-39 MIN: ICD-10-PCS | Mod: HCNC,95,, | Performed by: PEDIATRICS

## 2023-09-07 PROCEDURE — 1159F MED LIST DOCD IN RCRD: CPT | Mod: HCNC,CPTII,95, | Performed by: PEDIATRICS

## 2023-09-07 PROCEDURE — 4010F PR ACE/ARB THEARPY RXD/TAKEN: ICD-10-PCS | Mod: HCNC,CPTII,95, | Performed by: PEDIATRICS

## 2023-09-07 PROCEDURE — 99214 OFFICE O/P EST MOD 30 MIN: CPT | Mod: HCNC,95,, | Performed by: PEDIATRICS

## 2023-09-07 PROCEDURE — 1160F PR REVIEW ALL MEDS BY PRESCRIBER/CLIN PHARMACIST DOCUMENTED: ICD-10-PCS | Mod: HCNC,CPTII,95, | Performed by: PEDIATRICS

## 2023-09-07 PROCEDURE — 4010F ACE/ARB THERAPY RXD/TAKEN: CPT | Mod: HCNC,CPTII,95, | Performed by: PEDIATRICS

## 2023-09-07 PROCEDURE — 1160F RVW MEDS BY RX/DR IN RCRD: CPT | Mod: HCNC,CPTII,95, | Performed by: PEDIATRICS

## 2023-09-07 PROCEDURE — 1159F PR MEDICATION LIST DOCUMENTED IN MEDICAL RECORD: ICD-10-PCS | Mod: HCNC,CPTII,95, | Performed by: PEDIATRICS

## 2023-09-07 PROCEDURE — 3051F PR MOST RECENT HEMOGLOBIN A1C LEVEL 7.0 - < 8.0%: ICD-10-PCS | Mod: HCNC,CPTII,95, | Performed by: PEDIATRICS

## 2023-09-07 PROCEDURE — 3051F HG A1C>EQUAL 7.0%<8.0%: CPT | Mod: HCNC,CPTII,95, | Performed by: PEDIATRICS

## 2023-09-07 NOTE — TELEPHONE ENCOUNTER
Spoke with pt in regards of his virtual apt. Informed pt to stay on his virtual and NP Bobo will be with him shortly. Pt verbalized understanding of message.

## 2023-09-07 NOTE — TELEPHONE ENCOUNTER
----- Message from Suzanna Ulrich sent at 9/7/2023 10:03 AM CDT -----  Type:  Call back     Who Called:pt daughter     Does the patient know what this is regarding?:virtual appointment   Would the patient rather a call back or a response [facility]? Call   Best Call Back Number:[phone]  Additional Information:

## 2023-09-07 NOTE — PROGRESS NOTES
Subjective:      Patient ID: Kuldeep Alamo is a 58 y.o. male.    Chief Complaint: No chief complaint on file.    Patient reports weakness and still feeling uti symptoms since hospital visit and taking cipro. Reports it is not working and he still feels ill. Has been taking antibiotics as prescribed.      Review of Systems   Constitutional:  Positive for activity change. Negative for chills, fatigue, fever and unexpected weight change.   HENT:  Positive for hearing loss. Negative for congestion, ear pain, postnasal drip, rhinorrhea, sinus pressure, sinus pain, sneezing, sore throat and trouble swallowing.    Eyes:  Positive for visual disturbance. Negative for discharge.   Respiratory:  Negative for cough, chest tightness, shortness of breath and wheezing.    Cardiovascular:  Negative for chest pain and palpitations.   Gastrointestinal:  Positive for constipation. Negative for blood in stool, diarrhea, nausea and vomiting.   Endocrine: Positive for polydipsia. Negative for polyuria.   Genitourinary:  Positive for hematuria and urgency. Negative for difficulty urinating.   Musculoskeletal:  Positive for neck pain. Negative for arthralgias and joint swelling.   Skin:  Negative for rash.   Neurological:  Negative for dizziness, weakness and headaches.   Psychiatric/Behavioral:  Positive for dysphoric mood. Negative for confusion.         Current Outpatient Medications on File Prior to Visit   Medication Sig    albuterol (PROVENTIL/VENTOLIN HFA) 90 mcg/actuation inhaler Inhale 2 puffs into the lungs 4 (four) times daily as needed.    albuterol-ipratropium (DUO-NEB) 2.5 mg-0.5 mg/3 mL nebulizer solution Use 1 vial (3 mLs) by nebulization every 6 (six) hours while awake. Rescue    ALPRAZolam (XANAX) 0.5 MG tablet Take 2 tablets (1 mg total) by mouth every evening.    apixaban (ELIQUIS) 5 mg Tab Take 1 tablet (5 mg total) by mouth 2 (two) times daily.    ascorbic acid, vitamin C, (VITAMIN C) 500 MG tablet Take 1 tablet  "(500 mg total) by mouth 2 (two) times daily with meals.    aspirin 81 MG Chew Take 81 mg by mouth once daily.    baclofen (LIORESAL) 20 MG tablet Take 20 mg by mouth 4 (four) times daily as needed.    BD ULTRA-FINE MICHAEL PEN NEEDLE 32 gauge x 5/32" Ndle Inject 1 each into the skin 5 (five) times daily.    blood sugar diagnostic (BLOOD GLUCOSE TEST) Strp INJECT 1 STRIP INTO THE SKIN 3 (THREE) TIMES DAILY. TEST SUGAR 3 TIMES DAILY. - SUBCUTANEOUS. ICD -10 E11.65 & Z79.4    calcium carbonate (TUMS) 200 mg calcium (500 mg) chewable tablet Take 2 tablets (1,000 mg total) by mouth 3 (three) times daily as needed for Heartburn.    [] ciprofloxacin HCl (CIPRO) 500 MG tablet Take 1 tablet (500 mg total) by mouth 2 (two) times daily. for 10 days    clopidogreL (PLAVIX) 75 mg tablet Take 75 mg by mouth every morning.    DULoxetine (CYMBALTA) 60 MG capsule Take 60 mg by mouth once daily.    fluticasone-umeclidin-vilanter (TRELEGY ELLIPTA) 200-62.5-25 mcg inhaler Inhale 1 puff into the lungs once daily.    insulin (LANTUS SOLOSTAR U-100 INSULIN) glargine 100 units/mL SubQ pen Inject 10 Units into the skin 2 (two) times a day.    insulin lispro (HUMALOG KWIKPEN INSULIN) 100 unit/mL pen INJECT THREE UNITS SUBCUTANEOUSLY THREE TIMES DAILY WITH MEALS, PLUS CORRECTION SCALE MAX TDD 25 UNITS (Patient taking differently: Inject 3 Units into the skin 3 (three) times daily with meals. PLUS CORRECTION SCALE MAX TDD 25 UNITS)    lancets (ONETOUCH ULTRASOFT LANCETS) Misc Inject 1 each into the skin 3 (three) times daily before meals.    lisinopriL (PRINIVIL,ZESTRIL) 20 MG tablet Take 20 mg by mouth once daily.    medical supply, miscellaneous (O-2 SUSPENSORY MISC) 2 Liter AS NEEDED (route: Oxygen)    nitroGLYCERIN (NITROSTAT) 0.4 MG SL tablet DISSOLVE 1 TAB UNDER TONGUE EVERY 5 MINUTS AT ONSET OF CHEST PAIN-MAX 3 PER 15 MINUTES--GO TO ER (Patient taking differently: Place 0.4 mg under the tongue every 5 (five) minutes as needed for " Chest pain. MAX 3 PER 15 MINUTES--GO TO ER)    oxyCODONE (ROXICODONE) 10 mg Tab immediate release tablet Take 10 mg by mouth 3 (three) times daily as needed.    pantoprazole (PROTONIX) 40 MG tablet TAKE 1 TABLET BY MOUTH EVERY DAY (Patient taking differently: Take 40 mg by mouth once daily. Do not crush, break, chew)    pregabalin (LYRICA) 200 MG Cap Take 200 mg by mouth 2 (two) times daily.    propranoloL (INDERAL) 80 MG tablet Take 80 mg by mouth 2 (two) times daily.    rosuvastatin (CRESTOR) 40 MG Tab Take 10 mg by mouth every evening.    spironolactone (ALDACTONE) 25 MG tablet Take 25 mg by mouth once daily.    tamsulosin (FLOMAX) 0.4 mg Cap Take 0.4 mg by mouth every evening.     No current facility-administered medications on file prior to visit.        Objective:     There were no vitals filed for this visit.   Physical Exam  Constitutional:       Appearance: Normal appearance.   HENT:      Head: Normocephalic and atraumatic.      Nose: Nose normal.   Eyes:      Extraocular Movements: Extraocular movements intact.      Conjunctiva/sclera: Conjunctivae normal.      Pupils: Pupils are equal, round, and reactive to light.   Pulmonary:      Effort: Pulmonary effort is normal. No respiratory distress.   Musculoskeletal:      Cervical back: Normal range of motion.   Neurological:      Mental Status: He is alert and oriented to person, place, and time.   Psychiatric:         Mood and Affect: Mood normal.         Behavior: Behavior normal.         Cognition and Memory: Cognition and memory normal.        Assessment:         1. Recurrent UTI          Plan:   1. Recurrent UTI  - Urine culture; Future  - Urinalysis; Future         Will do urine tomorrow at hospital.  Discussed if symptoms worsen, go to ER instead.  Follow up if symptoms worsen or fail to improve.       Nahid Broussard NP   [facility]  9/7/23

## 2023-09-08 RX ORDER — AMOXICILLIN AND CLAVULANATE POTASSIUM 875; 125 MG/1; MG/1
1 TABLET, FILM COATED ORAL EVERY 12 HOURS
Qty: 14 TABLET | Refills: 0 | Status: ON HOLD | OUTPATIENT
Start: 2023-09-08 | End: 2023-09-15

## 2023-09-11 ENCOUNTER — TELEPHONE (OUTPATIENT)
Dept: FAMILY MEDICINE | Facility: CLINIC | Age: 58
End: 2023-09-11
Payer: MEDICARE

## 2023-09-11 NOTE — TELEPHONE ENCOUNTER
Called and spoke with Evangelina from [facility]  in [place] in regards of message on patient. Evangelina informed me that she was dropping off patient's urine to lab , since he has no transportation. Informed Evangelina that I didn't have a problem with that at all. She states that she was get leeting us know what she was doing with patient and given us a head up as well.

## 2023-09-11 NOTE — TELEPHONE ENCOUNTER
----- Message from Stephanie Quintero sent at 9/11/2023  3:04 PM CDT -----  Contact: sacha  Type:  Needs Medical Advice    Who Called: [place]     Would the patient rather a call back or a response via [facility]? call  Best Call Back Number: [phone]  Additional Information: pt is scheduled for 09/12/23 for labs and the pt does not have transportation  but the nurse is with the pt  and she would like to know if she can collect the urine and drop it off to the lab

## 2023-09-12 ENCOUNTER — TELEPHONE (OUTPATIENT)
Dept: FAMILY MEDICINE | Facility: CLINIC | Age: 58
End: 2023-09-12
Payer: MEDICARE

## 2023-09-13 ENCOUNTER — OFFICE VISIT (OUTPATIENT)
Dept: FAMILY MEDICINE | Facility: CLINIC | Age: 58
End: 2023-09-13
Payer: MEDICARE

## 2023-09-13 DIAGNOSIS — Z93.59 SUPRAPUBIC CATHETER: ICD-10-CM

## 2023-09-13 DIAGNOSIS — N39.0 URINARY TRACT INFECTION ASSOCIATED WITH CYSTOSTOMY CATHETER, INITIAL ENCOUNTER: ICD-10-CM

## 2023-09-13 DIAGNOSIS — I25.10 CORONARY ARTERY DISEASE INVOLVING NATIVE CORONARY ARTERY OF NATIVE HEART WITHOUT ANGINA PECTORIS: ICD-10-CM

## 2023-09-13 DIAGNOSIS — I73.9 PAD (PERIPHERAL ARTERY DISEASE): ICD-10-CM

## 2023-09-13 DIAGNOSIS — E11.69 HYPERLIPIDEMIA ASSOCIATED WITH TYPE 2 DIABETES MELLITUS: ICD-10-CM

## 2023-09-13 DIAGNOSIS — N39.492 POSTURAL URINARY INCONTINENCE: ICD-10-CM

## 2023-09-13 DIAGNOSIS — N18.30 STAGE 3 CHRONIC KIDNEY DISEASE, UNSPECIFIED WHETHER STAGE 3A OR 3B CKD: ICD-10-CM

## 2023-09-13 DIAGNOSIS — E11.65 TYPE 2 DIABETES MELLITUS WITH HYPERGLYCEMIA, WITH LONG-TERM CURRENT USE OF INSULIN: ICD-10-CM

## 2023-09-13 DIAGNOSIS — Z12.11 SCREENING FOR MALIGNANT NEOPLASM OF COLON: ICD-10-CM

## 2023-09-13 DIAGNOSIS — E55.9 VITAMIN D DEFICIENCY: ICD-10-CM

## 2023-09-13 DIAGNOSIS — Z43.5 ENCOUNTER FOR ATTENTION TO CYSTOSTOMY: ICD-10-CM

## 2023-09-13 DIAGNOSIS — I50.32 CHRONIC DIASTOLIC CHF (CONGESTIVE HEART FAILURE): ICD-10-CM

## 2023-09-13 DIAGNOSIS — I69.959 HEMIPLEGIA OF NONDOMINANT SIDE, LATE EFFECT OF CEREBROVASCULAR DISEASE: ICD-10-CM

## 2023-09-13 DIAGNOSIS — T83.510A URINARY TRACT INFECTION ASSOCIATED WITH CYSTOSTOMY CATHETER, INITIAL ENCOUNTER: ICD-10-CM

## 2023-09-13 DIAGNOSIS — I48.20 CHRONIC ATRIAL FIBRILLATION: ICD-10-CM

## 2023-09-13 DIAGNOSIS — I25.119 ATHEROSCLEROSIS OF NATIVE CORONARY ARTERY WITH ANGINA PECTORIS, UNSPECIFIED WHETHER NATIVE OR TRANSPLANTED HEART: ICD-10-CM

## 2023-09-13 DIAGNOSIS — Z00.00 ENCOUNTER FOR PREVENTIVE HEALTH EXAMINATION: Primary | ICD-10-CM

## 2023-09-13 DIAGNOSIS — H91.90 HEARING LOSS, UNSPECIFIED HEARING LOSS TYPE, UNSPECIFIED LATERALITY: ICD-10-CM

## 2023-09-13 DIAGNOSIS — J44.9 CHRONIC OBSTRUCTIVE PULMONARY DISEASE, UNSPECIFIED COPD TYPE: ICD-10-CM

## 2023-09-13 DIAGNOSIS — Z79.4 TYPE 2 DIABETES MELLITUS WITH HYPERGLYCEMIA, WITH LONG-TERM CURRENT USE OF INSULIN: ICD-10-CM

## 2023-09-13 DIAGNOSIS — Z23 NEED FOR VACCINATION: ICD-10-CM

## 2023-09-13 DIAGNOSIS — K21.9 GASTROESOPHAGEAL REFLUX DISEASE WITHOUT ESOPHAGITIS: ICD-10-CM

## 2023-09-13 DIAGNOSIS — F33.0 MILD RECURRENT MAJOR DEPRESSION: ICD-10-CM

## 2023-09-13 DIAGNOSIS — N39.0 RECURRENT UTI: ICD-10-CM

## 2023-09-13 DIAGNOSIS — E78.5 HYPERLIPIDEMIA ASSOCIATED WITH TYPE 2 DIABETES MELLITUS: ICD-10-CM

## 2023-09-13 LAB
BACTERIA #/AREA URNS AUTO: ABNORMAL /HPF
BILIRUB UR QL STRIP: NEGATIVE
CLARITY UR REFRACT.AUTO: ABNORMAL
COLOR UR AUTO: YELLOW
GLUCOSE UR QL STRIP: NEGATIVE
HGB UR QL STRIP: ABNORMAL
HYALINE CASTS UR QL AUTO: 0 /LPF
KETONES UR QL STRIP: NEGATIVE
LEUKOCYTE ESTERASE UR QL STRIP: ABNORMAL
MICROSCOPIC COMMENT: ABNORMAL
NITRITE UR QL STRIP: POSITIVE
PH UR STRIP: 6 [PH] (ref 5–8)
PROT UR QL STRIP: ABNORMAL
RBC #/AREA URNS AUTO: >100 /HPF (ref 0–4)
SP GR UR STRIP: 1.02 (ref 1–1.03)
URN SPEC COLLECT METH UR: ABNORMAL
WBC #/AREA URNS AUTO: >100 /HPF (ref 0–5)
WBC CLUMPS UR QL AUTO: ABNORMAL
YEAST UR QL AUTO: ABNORMAL

## 2023-09-13 PROCEDURE — 3078F PR MOST RECENT DIASTOLIC BLOOD PRESSURE < 80 MM HG: ICD-10-PCS | Mod: HCNC,CPTII,S$GLB, | Performed by: PEDIATRICS

## 2023-09-13 PROCEDURE — 3008F PR BODY MASS INDEX (BMI) DOCUMENTED: ICD-10-PCS | Mod: HCNC,CPTII,S$GLB, | Performed by: PEDIATRICS

## 2023-09-13 PROCEDURE — 1160F RVW MEDS BY RX/DR IN RCRD: CPT | Mod: HCNC,CPTII,S$GLB, | Performed by: PEDIATRICS

## 2023-09-13 PROCEDURE — G0009 ADMIN PNEUMOCOCCAL VACCINE: HCPCS | Mod: HCNC,S$GLB,, | Performed by: PEDIATRICS

## 2023-09-13 PROCEDURE — G0439 PPPS, SUBSEQ VISIT: HCPCS | Mod: HCNC,S$GLB,, | Performed by: PEDIATRICS

## 2023-09-13 PROCEDURE — G0439 PR MEDICARE ANNUAL WELLNESS SUBSEQUENT VISIT: ICD-10-PCS | Mod: HCNC,S$GLB,, | Performed by: PEDIATRICS

## 2023-09-13 PROCEDURE — 1159F MED LIST DOCD IN RCRD: CPT | Mod: HCNC,CPTII,S$GLB, | Performed by: PEDIATRICS

## 2023-09-13 PROCEDURE — 90694 FLU VACCINE - QUADRIVALENT - ADJUVANTED: ICD-10-PCS | Mod: HCNC,S$GLB,, | Performed by: PEDIATRICS

## 2023-09-13 PROCEDURE — 90677 PNEUMOCOCCAL CONJUGATE VACCINE 20-VALENT: ICD-10-PCS | Mod: HCNC,S$GLB,, | Performed by: PEDIATRICS

## 2023-09-13 PROCEDURE — 1160F PR REVIEW ALL MEDS BY PRESCRIBER/CLIN PHARMACIST DOCUMENTED: ICD-10-PCS | Mod: HCNC,CPTII,S$GLB, | Performed by: PEDIATRICS

## 2023-09-13 PROCEDURE — 3051F HG A1C>EQUAL 7.0%<8.0%: CPT | Mod: HCNC,CPTII,S$GLB, | Performed by: PEDIATRICS

## 2023-09-13 PROCEDURE — 4010F PR ACE/ARB THEARPY RXD/TAKEN: ICD-10-PCS | Mod: HCNC,CPTII,S$GLB, | Performed by: PEDIATRICS

## 2023-09-13 PROCEDURE — G0009 PNEUMOCOCCAL CONJUGATE VACCINE 20-VALENT: ICD-10-PCS | Mod: HCNC,S$GLB,, | Performed by: PEDIATRICS

## 2023-09-13 PROCEDURE — G9919 SCRN ND POS ND PROV OF REC: HCPCS | Mod: HCNC,CPTII,S$GLB, | Performed by: PEDIATRICS

## 2023-09-13 PROCEDURE — 90677 PCV20 VACCINE IM: CPT | Mod: HCNC,S$GLB,, | Performed by: PEDIATRICS

## 2023-09-13 PROCEDURE — 3074F SYST BP LT 130 MM HG: CPT | Mod: HCNC,CPTII,S$GLB, | Performed by: PEDIATRICS

## 2023-09-13 PROCEDURE — 1159F PR MEDICATION LIST DOCUMENTED IN MEDICAL RECORD: ICD-10-PCS | Mod: HCNC,CPTII,S$GLB, | Performed by: PEDIATRICS

## 2023-09-13 PROCEDURE — 81001 URINALYSIS AUTO W/SCOPE: CPT | Mod: HCNC | Performed by: PEDIATRICS

## 2023-09-13 PROCEDURE — 99999 PR PBB SHADOW E&M-EST. PATIENT-LVL V: CPT | Mod: PBBFAC,HCNC,, | Performed by: PEDIATRICS

## 2023-09-13 PROCEDURE — 3078F DIAST BP <80 MM HG: CPT | Mod: HCNC,CPTII,S$GLB, | Performed by: PEDIATRICS

## 2023-09-13 PROCEDURE — G9919 PR SCREENING AND POSITIVE: ICD-10-PCS | Mod: HCNC,CPTII,S$GLB, | Performed by: PEDIATRICS

## 2023-09-13 PROCEDURE — G0008 ADMIN INFLUENZA VIRUS VAC: HCPCS | Mod: HCNC,S$GLB,, | Performed by: PEDIATRICS

## 2023-09-13 PROCEDURE — 3074F PR MOST RECENT SYSTOLIC BLOOD PRESSURE < 130 MM HG: ICD-10-PCS | Mod: HCNC,CPTII,S$GLB, | Performed by: PEDIATRICS

## 2023-09-13 PROCEDURE — 90694 VACC AIIV4 NO PRSRV 0.5ML IM: CPT | Mod: HCNC,S$GLB,, | Performed by: PEDIATRICS

## 2023-09-13 PROCEDURE — 3008F BODY MASS INDEX DOCD: CPT | Mod: HCNC,CPTII,S$GLB, | Performed by: PEDIATRICS

## 2023-09-13 PROCEDURE — G0008 FLU VACCINE - QUADRIVALENT - ADJUVANTED: ICD-10-PCS | Mod: HCNC,S$GLB,, | Performed by: PEDIATRICS

## 2023-09-13 PROCEDURE — 4010F ACE/ARB THERAPY RXD/TAKEN: CPT | Mod: HCNC,CPTII,S$GLB, | Performed by: PEDIATRICS

## 2023-09-13 PROCEDURE — 3051F PR MOST RECENT HEMOGLOBIN A1C LEVEL 7.0 - < 8.0%: ICD-10-PCS | Mod: HCNC,CPTII,S$GLB, | Performed by: PEDIATRICS

## 2023-09-13 PROCEDURE — 99999 PR PBB SHADOW E&M-EST. PATIENT-LVL V: ICD-10-PCS | Mod: PBBFAC,HCNC,, | Performed by: PEDIATRICS

## 2023-09-13 NOTE — PROGRESS NOTES
"  Kuldeep Alamo presented for a  Medicare AWV and comprehensive Health Risk Assessment today. The following components were reviewed and updated:    Medical history  Family History  Social history  Allergies and Current Medications  Health Risk Assessment  Health Maintenance  Care Team         ** See Completed Assessments for Annual Wellness Visit within the encounter summary.**         The following assessments were completed:  Living Situation  CAGE  Depression Screening  Timed Get Up and Go  Whisper Test  Cognitive Function Screening  Nutrition Screening  ADL Screening  PAQ Screening    Patient does not have Rx for Opioids  Patient does not use substance     Vitals:    09/13/23 1316   BP: 104/70   Pulse: (!) 58   Temp: 97.9 °F (36.6 °C)   TempSrc: Oral   SpO2: 96%   Height: 3' 1" (0.94 m)     Body mass index is 87.82 kg/m².  Physical Exam  Constitutional:       General: He is not in acute distress.     Appearance: Normal appearance.      Comments: Wheelchair bound   HENT:      Head: Normocephalic and atraumatic.      Right Ear: External ear normal.      Left Ear: External ear normal.      Nose: Nose normal.      Mouth/Throat:      Mouth: Mucous membranes are moist.      Pharynx: Oropharynx is clear.   Eyes:      Extraocular Movements: Extraocular movements intact.      Conjunctiva/sclera: Conjunctivae normal.      Pupils: Pupils are equal, round, and reactive to light.   Cardiovascular:      Rate and Rhythm: Normal rate and regular rhythm.      Pulses: Normal pulses.      Heart sounds: Normal heart sounds.   Pulmonary:      Effort: Pulmonary effort is normal. No respiratory distress.      Breath sounds: Normal breath sounds. No wheezing.   Abdominal:      General: Abdomen is flat. Bowel sounds are normal.   Musculoskeletal:         General: No swelling. Normal range of motion.      Cervical back: Normal range of motion and neck supple.      Right Lower Extremity: Right leg is amputated above knee.      Left Lower " Extremity: Left leg is amputated above knee.   Skin:     General: Skin is warm and dry.      Findings: No rash.   Neurological:      Mental Status: He is alert and oriented to person, place, and time.      Gait: Gait normal.   Psychiatric:         Mood and Affect: Mood normal.         Behavior: Behavior normal.               Diagnoses and health risks identified today and associated recommendations/orders:    1. Encounter for preventive health examination  Chart reviewed. Problem list updated. Discussed current medical diagnosis, current medications, medical/surgical/family/social history; updated provider list; documented vital signs; identified any cognitive impairment; and updated risk factor list. Addressed any outstanding health maintenance. Provided patient with personalized health advice. Continue to follow up with PCP and any specialists.       2. Hemiplegia of nondominant side, late effect of cerebrovascular disease  Chronic; stable on current treatment plan; follow up as scheduled.      3. Mild recurrent major depression  Chronic; stable on current treatment plan; follow up as scheduled.    4. Chronic obstructive pulmonary disease, unspecified COPD type  Chronic; stable on current treatment plan; follow up as scheduled.    5. Chronic diastolic CHF (congestive heart failure)  Chronic; stable on current treatment plan; follow up as scheduled.    6. Chronic atrial fibrillation  Chronic; stable on current treatment plan; follow up as scheduled.    7. Type 2 diabetes mellitus with hyperglycemia, with long-term current use of insulin  Chronic; stable on current treatment plan; follow up as scheduled.    8. Atherosclerosis of native coronary artery with angina pectoris, unspecified whether native or transplanted heart  Chronic; stable on current treatment plan; follow up as scheduled.    9. Stage 3 chronic kidney disease, unspecified whether stage 3a or 3b CKD  Chronic; stable on current treatment plan; follow up  as scheduled.    10. Hyperlipidemia associated with type 2 diabetes mellitus  Chronic; stable on current treatment plan; follow up as scheduled.    11. Hearing loss, unspecified hearing loss type, unspecified laterality  Chronic; stable on current treatment plan; follow up as scheduled.  - Ambulatory referral/consult to ENT; Future  - Ambulatory referral/consult to Audiology; Future    12. Need for vaccination  Chronic; stable on current treatment plan; follow up as scheduled.  - Influenza (FLUAD) - Quadrivalent (Adjuvanted) *Preferred* (65+) (PF)  - (In Office Administered) Pneumococcal Conjugate Vaccine (20 Valent) (IM)    13. Screening for malignant neoplasm of colon  Chronic; stable on current treatment plan; follow up as scheduled.  - Cologuard Screening (Multitarget Stool DNA); Future  - Cologuard Screening (Multitarget Stool DNA)    14. Recurrent UTI  Contacted urologist to get recommended treatment.  Sees Dr. Silva for urology.  - Urinalysis  - Urine Culture High Risk    15. Gastroesophageal reflux disease without esophagitis  Chronic; stable on current treatment plan; follow up as scheduled.    16. Vitamin D deficiency  Chronic; stable on current treatment plan; follow up as scheduled.    17. Coronary artery disease involving native coronary artery of native heart without angina pectoris  Chronic; stable on current treatment plan; follow up as scheduled.    18. PAD (peripheral artery disease)  Chronic; stable on current treatment plan; follow up as scheduled.    19. Encounter for attention to cystostomy  Chronic; stable on current treatment plan; follow up as scheduled.  Urine specimen obtained from catheter tubing today.  Will repeat culture and ua.    20. Postural urinary incontinence  Chronic; stable on current treatment plan; follow up as scheduled.    21. Urinary tract infection associated with cystostomy catheter, initial encounter  Chronic; stable on current treatment plan; follow up as  scheduled.    22. Suprapubic catheter  Chronic; stable on current treatment plan; follow up as scheduled.      Provided Kuldeep with a 5-10 year written screening schedule and personal prevention plan. Recommendations were developed using the USPSTF age appropriate recommendations. Education, counseling, and referrals were provided as needed. After Visit Summary printed and given to patient which includes a list of additional screenings\tests needed.    Follow up in about 1 year (around 9/13/2024), or if symptoms worsen or fail to improve.      Nahid Broussard NP   [facility]  9/13/23             I offered to discuss advanced care planning, including how to pick a person who would make decisions for you if you were unable to make them for yourself, called a health care power of , and what kind of decisions you might make such as use of life sustaining treatments such as ventilators and tube feeding when faced with a life limiting illness recorded on a living will that they will need to know. (How you want to be cared for as you near the end of your natural life)     X Patient is interested in learning more about how to make advanced directives.  I provided them paperwork and offered to discuss this with them.    30 minutes spent on acute portion of visit.  Reviewed urine results, indicating uti and awaiting culture results still.  Patient feels better since taking abx for uti, still feels weak but better overall. Feels like pressure in bladder is better than it was previously. Does not have fever, chills.  Urine collected from tubing of suprapubic catheter by [facility] Shweta Levine  Will retest urine since abx have been completed.

## 2023-09-13 NOTE — PATIENT INSTRUCTIONS
Counseling and Referral of Other Preventative  (Italic type indicates deductible and co-insurance are waived)    Patient Name: Kuldeep Alamo  Today's Date: 9/15/2023    Health Maintenance       Date Due Completion Date    Colorectal Cancer Screening 05/08/2020 5/8/2019    Diabetes Urine Screening 07/12/2022 7/12/2021    PROSTATE-SPECIFIC ANTIGEN 01/10/2023 1/10/2022    Eye Exam 12/05/2023 (Originally 12/22/2022) 12/22/2021    Override on 8/1/2016: Done    Shingles Vaccine (1 of 2) 12/18/2024 (Originally 2/16/2013) ---    Lipid Panel 11/07/2023 11/7/2022    Hemoglobin A1c 01/29/2024 7/29/2023    Foot Exam 07/27/2024 7/27/2023    Override on 6/2/2020: Done    Override on 4/29/2019: Done    Override on 6/15/2017: Done    High Dose Statin 09/13/2024 9/13/2023    TETANUS VACCINE 09/20/2027 9/20/2017    [facility]        Orders Placed This Encounter   Procedures    Cologuard Screening (Multitarget Stool DNA)    Urine Culture High Risk    Influenza (FLUAD) - Quadrivalent (Adjuvanted) *Preferred* (65+) (PF)    (In Office Administered) Pneumococcal Conjugate Vaccine (20 Valent) (IM)    Urinalysis    Urinalysis Microscopic    Ambulatory referral/consult to ENT    Ambulatory referral/consult to Audiology       The following information is provided to all patients.  This information is to help you find resources for any of the problems found today that may be affecting your health:                Living healthy guide: www.[facility].[place].gov      Understanding Diabetes: www.diabetes.org      Eating healthy: www.cdc.gov/healthyweight      CDC home safety checklist: www.cdc.gov/steadi/patient.html      Agency on Aging: www.goea.[place].gov      Alcoholics anonymous (AA): www.aa.org      Physical Activity: www.james.nih.gov/yf4zfmp      Tobacco use: www.quitwithusla.org     Counseling and Referral of Other Preventative  (Italic type indicates deductible and co-insurance are waived)    Patient Name: Kuldeep  Jasmeet  Today's Date: 9/15/2023    Health Maintenance       Date Due Completion Date    Colorectal Cancer Screening 05/08/2020 5/8/2019    Diabetes Urine Screening 07/12/2022 7/12/2021    PROSTATE-SPECIFIC ANTIGEN 01/10/2023 1/10/2022    Eye Exam 12/05/2023 (Originally 12/22/2022) 12/22/2021    Override on 8/1/2016: Done    Shingles Vaccine (1 of 2) 12/18/2024 (Originally 2/16/2013) ---    Lipid Panel 11/07/2023 11/7/2022    Hemoglobin A1c 01/29/2024 7/29/2023    Foot Exam 07/27/2024 7/27/2023    Override on 6/2/2020: Done    Override on 4/29/2019: Done    Override on 6/15/2017: Done    High Dose Statin 09/13/2024 9/13/2023    TETANUS VACCINE 09/20/2027 9/20/2017    [facility] 9[facility]20/2017: [facility]        Orders Placed This Encounter   Procedures    Cologuard Screening (Multitarget Stool DNA)    Urine Culture High Risk    Influenza (FLUAD) - Quadrivalent (Adjuvanted) *Preferred* (65+) (PF)    (In Office Administered) Pneumococcal Conjugate Vaccine (20 Valent) (IM)    Urinalysis    Urinalysis Microscopic    Ambulatory referral/consult to ENT    Ambulatory referral/consult to Audiology       The following information is provided to all patients.  This information is to help you find resources for any of the problems found today that may be affecting your health:                Living healthy guide: www.[facility].[place].gov      Understanding Diabetes: www.diabetes.org      Eating healthy: www.cdc.gov/healthyweight      CDC home safety checklist: www.cdc.gov/steadi/patient.html      Agency on Aging: www.goea.[place].gov      Alcoholics anonymous (AA): www.aa.org      Physical Activity: www.jamse.nih.gov/vy9dcsr      Tobacco use: www.quitwithusla.org

## 2023-09-15 ENCOUNTER — TELEPHONE (OUTPATIENT)
Dept: FAMILY MEDICINE | Facility: CLINIC | Age: 58
End: 2023-09-15
Payer: MEDICARE

## 2023-09-15 ENCOUNTER — PATIENT MESSAGE (OUTPATIENT)
Dept: UROLOGY | Facility: CLINIC | Age: 58
End: 2023-09-15
Payer: MEDICARE

## 2023-09-15 VITALS
TEMPERATURE: 98 F | OXYGEN SATURATION: 96 % | DIASTOLIC BLOOD PRESSURE: 70 MMHG | SYSTOLIC BLOOD PRESSURE: 104 MMHG | HEIGHT: 69 IN | HEART RATE: 58 BPM | BODY MASS INDEX: 25.25 KG/M2

## 2023-09-15 PROBLEM — B96.89 URINARY TRACT INFECTION DUE TO ESBL KLEBSIELLA: Status: ACTIVE | Noted: 2023-09-15

## 2023-09-15 PROBLEM — N39.0 URINARY TRACT INFECTION DUE TO ESBL KLEBSIELLA: Status: ACTIVE | Noted: 2023-09-15

## 2023-09-15 NOTE — TELEPHONE ENCOUNTER
Contacted patient regarding ua and culture results. Recommend patient go to ER for iv antibiotics because of sensitivity results. Patient verbalized understanding, states he will go.

## 2023-09-18 ENCOUNTER — TELEPHONE (OUTPATIENT)
Dept: FAMILY MEDICINE | Facility: CLINIC | Age: 58
End: 2023-09-18
Payer: MEDICARE

## 2023-09-18 ENCOUNTER — DOCUMENT SCAN (OUTPATIENT)
Dept: HOME HEALTH SERVICES | Facility: HOSPITAL | Age: 58
End: 2023-09-18
Payer: MEDICARE

## 2023-09-18 NOTE — TELEPHONE ENCOUNTER
----- Message from Bernice Cordoba LMSW sent at 9/18/2023 11:19 AM CDT -----  Regarding: Hospital follow up appointment  Patient discharged to home today. Please contact patient with a hospital follow up appointment.    Thanks

## 2023-09-21 ENCOUNTER — DOCUMENT SCAN (OUTPATIENT)
Dept: HOME HEALTH SERVICES | Facility: HOSPITAL | Age: 58
End: 2023-09-21
Payer: MEDICARE

## 2023-09-26 ENCOUNTER — PATIENT OUTREACH (OUTPATIENT)
Dept: ADMINISTRATIVE | Facility: HOSPITAL | Age: 58
End: 2023-09-26
Payer: MEDICARE

## 2023-09-26 ENCOUNTER — PATIENT MESSAGE (OUTPATIENT)
Dept: ADMINISTRATIVE | Facility: HOSPITAL | Age: 58
End: 2023-09-26
Payer: MEDICARE

## 2023-09-26 NOTE — PROGRESS NOTES
are Everywhere updates requested and reviewed.  Immunizations reconciled. Media reports reviewed.  Duplicate HM overrides and  orders removed.  Overdue HM topic chart audit and/or requested.  Overdue lab testing linked to upcoming lab appointments if applies.           Health Maintenance Due   Topic Date Due    Colorectal Cancer Screening  2020    Diabetes Urine Screening  2022    PROSTATE-SPECIFIC ANTIGEN  01/10/2023

## 2023-09-27 DIAGNOSIS — E11.65 TYPE 2 DIABETES MELLITUS WITH HYPERGLYCEMIA, WITH LONG-TERM CURRENT USE OF INSULIN: ICD-10-CM

## 2023-09-27 DIAGNOSIS — Z79.4 TYPE 2 DIABETES MELLITUS WITH HYPERGLYCEMIA, WITH LONG-TERM CURRENT USE OF INSULIN: ICD-10-CM

## 2023-09-27 RX ORDER — INSULIN GLARGINE 100 [IU]/ML
15 INJECTION, SOLUTION SUBCUTANEOUS 2 TIMES DAILY
Qty: 27 ML | Refills: 3 | Status: SHIPPED | OUTPATIENT
Start: 2023-09-27 | End: 2024-09-26

## 2023-09-27 NOTE — TELEPHONE ENCOUNTER
No care due was identified.  Health [facility] Embedded Care Due Messages. Reference number: [id number].   9/27/2023 7:04:51 AM CDT

## 2023-09-28 ENCOUNTER — TELEPHONE (OUTPATIENT)
Dept: FAMILY MEDICINE | Facility: CLINIC | Age: 58
End: 2023-09-28
Payer: MEDICARE

## 2023-09-28 NOTE — TELEPHONE ENCOUNTER
Called and spoke with Dorcas with [facility] in regards of message on patient. Dorcas states that patient noticed a small blood clot on today, and that patient is not an easy insert when doing his cath. Advised Dorcas that she need to reach out to patient Urologist for this matter.

## 2023-09-28 NOTE — TELEPHONE ENCOUNTER
----- Message from Perla Obrien sent at 9/28/2023  3:36 PM CDT -----  Type:  Needs Medical Advice    Who Called: Dawn w/ [facility]   Symptoms (please be specific): Residue of a blood clot in catheter bag    Would the patient rather a call back or a response via [facility]? Call back  Best Call Back Number: [phone]  Additional Information: Please be advised,caller stated pt recently had an IV for an antibiotic and wants to know if they need to collect a urine sample, Nurse is waiting at the pt's home now for the verbal confirmation

## 2023-09-29 ENCOUNTER — TELEPHONE (OUTPATIENT)
Dept: UROLOGY | Facility: CLINIC | Age: 58
End: 2023-09-29
Payer: MEDICARE

## 2023-09-29 NOTE — TELEPHONE ENCOUNTER
----- Message from Idalmis Colvin MA sent at 9/28/2023  4:24 PM CDT -----    ----- Message -----  From: Suzanna Ulrich  Sent: 9/28/2023   4:23 PM CDT  To: Shira [facility]ERON Staff    Type:  Needs Medical Advice    Who Called: Dawn with [facility]  Symptoms (please be specific): Blood Clot in catheter bag    How long has patient had these symptoms:  Today     Would the patient rather a call back or a response [facility]? Call   Best Call Back Number:   Additional Information:

## 2023-09-29 NOTE — TELEPHONE ENCOUNTER
I returned Dorcas's call from . I noted that the patient is on Eliquis and told her that one singular small clot in the bag does not require intervention. I told her to please notify us for multiple clots, large clots or lukasz hematuria. I thanked her for the notification.

## 2023-10-03 ENCOUNTER — OUTPATIENT CASE MANAGEMENT (OUTPATIENT)
Dept: ADMINISTRATIVE | Facility: OTHER | Age: 58
End: 2023-10-03
Payer: MEDICARE

## 2023-10-03 ENCOUNTER — DOCUMENT SCAN (OUTPATIENT)
Dept: HOME HEALTH SERVICES | Facility: HOSPITAL | Age: 58
End: 2023-10-03
Payer: MEDICARE

## 2023-10-03 ENCOUNTER — DOCUMENT SCAN (OUTPATIENT)
Dept: HOME HEALTH SERVICES | Facility: HOSPITAL | Age: 58
End: 2023-10-03

## 2023-10-06 ENCOUNTER — DOCUMENT SCAN (OUTPATIENT)
Dept: HOME HEALTH SERVICES | Facility: HOSPITAL | Age: 58
End: 2023-10-06
Payer: MEDICARE

## 2023-10-09 ENCOUNTER — OUTPATIENT CASE MANAGEMENT (OUTPATIENT)
Dept: ADMINISTRATIVE | Facility: OTHER | Age: 58
End: 2023-10-09
Payer: MEDICARE

## 2023-10-11 ENCOUNTER — DOCUMENT SCAN (OUTPATIENT)
Dept: HOME HEALTH SERVICES | Facility: HOSPITAL | Age: 58
End: 2023-10-11
Payer: MEDICARE

## 2023-10-12 ENCOUNTER — EXTERNAL HOME HEALTH (OUTPATIENT)
Dept: HOME HEALTH SERVICES | Facility: HOSPITAL | Age: 58
End: 2023-10-12
Payer: MEDICARE

## 2023-10-13 ENCOUNTER — TELEPHONE (OUTPATIENT)
Dept: FAMILY MEDICINE | Facility: CLINIC | Age: 58
End: 2023-10-13

## 2023-10-13 NOTE — TELEPHONE ENCOUNTER
----- Message from Safia Cole sent at 10/13/2023  3:07 PM CDT -----  Type:  Needs Medical Advice    Who Called:  Pt Destinee Jones    Pharm[facility] phone [facility]   [facility] [address] [place]  [address]  #[phone]  Would the patient rather a call back or a response via [facility]?  call  Best Call Back Number:  [phone] or  [phone]  Additional Information: Pt is requesting to be prescribed antibiotics for his UTI.

## 2023-10-17 ENCOUNTER — TELEPHONE (OUTPATIENT)
Dept: FAMILY MEDICINE | Facility: CLINIC | Age: 58
End: 2023-10-17
Payer: MEDICARE

## 2023-10-17 NOTE — TELEPHONE ENCOUNTER
----- Message from Radha English sent at 10/17/2023  2:22 PM CDT -----  .Type:  Needs Medical Advice    Who Called: Gerda with [facility]    Would the patient rather a call back or a response via [facility]? Call back  Best Call Back Number:   Additional Information:     Gerda would like a call back concerning a abnormal glucose reading, his meter read high it didn't give a number please call back as soon a possible

## 2023-10-17 NOTE — TELEPHONE ENCOUNTER
Called and spoke with Gerda with [facility]  in regards of message on patient. Gerda informed me that pt's blood sugar  this morning was 447. Gerda informed me that patient took 30 units of his Lancets and 30 units of his Humalog and checked his sugar an hour later and checked his sugar, pt's meter would not read due to sugar being to high. I informed Dr. Rock of patient issue, Per Dr. Rock patient needs to go to the ER to seek medical treatment. Advise Gerda of Dr. Rock's advise, and Gerda verbalized understanding of message.

## 2023-10-23 ENCOUNTER — TELEPHONE (OUTPATIENT)
Dept: INTERNAL MEDICINE | Facility: CLINIC | Age: 58
End: 2023-10-23
Payer: MEDICARE

## 2023-10-23 NOTE — TELEPHONE ENCOUNTER
----- Message from Shagufta Lucero sent at 10/23/2023 10:14 AM CDT -----  Needs advice from nurse:      Who Called:niece-Ms Raman  Regarding:needs to schedule f/u  Would the patient rather a call back or VIA [facility]?  Best Call Back number:  Additional Info:

## 2023-10-25 PROBLEM — R74.8 ELEVATED CPK: Status: ACTIVE | Noted: 2023-10-25

## 2023-10-25 PROBLEM — G93.40 ACUTE ENCEPHALOPATHY: Status: ACTIVE | Noted: 2023-10-25

## 2023-10-29 PROBLEM — D72.829 LEUKOCYTOSIS: Status: RESOLVED | Noted: 2019-10-04 | Resolved: 2023-10-29

## 2023-10-30 ENCOUNTER — TELEPHONE (OUTPATIENT)
Dept: FAMILY MEDICINE | Facility: CLINIC | Age: 58
End: 2023-10-30
Payer: MEDICARE

## 2023-10-30 PROBLEM — N17.9 AKI (ACUTE KIDNEY INJURY): Status: RESOLVED | Noted: 2021-04-18 | Resolved: 2023-10-30

## 2023-10-30 PROBLEM — R74.8 ELEVATED CPK: Status: RESOLVED | Noted: 2023-10-25 | Resolved: 2023-10-30

## 2023-10-30 PROBLEM — T83.510A URINARY TRACT INFECTION ASSOCIATED WITH CYSTOSTOMY CATHETER: Status: RESOLVED | Noted: 2023-02-03 | Resolved: 2023-10-30

## 2023-10-30 PROBLEM — N39.0 URINARY TRACT INFECTION ASSOCIATED WITH CYSTOSTOMY CATHETER: Status: RESOLVED | Noted: 2023-02-03 | Resolved: 2023-10-30

## 2023-10-30 PROBLEM — G93.40 ACUTE ENCEPHALOPATHY: Status: RESOLVED | Noted: 2023-10-25 | Resolved: 2023-10-30

## 2023-10-30 PROBLEM — R53.1 GENERALIZED WEAKNESS: Status: RESOLVED | Noted: 2023-02-04 | Resolved: 2023-10-30

## 2023-10-30 NOTE — TELEPHONE ENCOUNTER
Spoke with pt's niece in regards of pt needing a hospital follow up apt. Pt's niece made pt's apt for 11/9/2023 for 11:20 am with Dr. Rock.

## 2023-10-30 NOTE — TELEPHONE ENCOUNTER
----- Message from Bernice Cordoba LMSW sent at 10/30/2023 10:56 AM CDT -----  Regarding: Hospital follow up appointment  Good morning, patient discharged to home. Please contact patient with a one week hospital follow up appointment.    Thanks

## 2023-10-31 ENCOUNTER — CLINICAL SUPPORT (OUTPATIENT)
Dept: OTOLARYNGOLOGY | Facility: CLINIC | Age: 58
End: 2023-10-31
Payer: MEDICARE

## 2023-10-31 ENCOUNTER — NURSE TRIAGE (OUTPATIENT)
Dept: ADMINISTRATIVE | Facility: CLINIC | Age: 58
End: 2023-10-31
Payer: MEDICARE

## 2023-10-31 ENCOUNTER — PES CALL (OUTPATIENT)
Dept: HOME HEALTH SERVICES | Facility: CLINIC | Age: 58
End: 2023-10-31
Payer: MEDICARE

## 2023-10-31 ENCOUNTER — OFFICE VISIT (OUTPATIENT)
Dept: OTOLARYNGOLOGY | Facility: CLINIC | Age: 58
End: 2023-10-31
Payer: MEDICARE

## 2023-10-31 VITALS — SYSTOLIC BLOOD PRESSURE: 168 MMHG | DIASTOLIC BLOOD PRESSURE: 70 MMHG

## 2023-10-31 DIAGNOSIS — H90.A32 MIXED CONDUCTIVE AND SENSORINEURAL HEARING LOSS OF LEFT EAR WITH RESTRICTED HEARING OF RIGHT EAR: Primary | ICD-10-CM

## 2023-10-31 DIAGNOSIS — H91.90 HEARING LOSS, UNSPECIFIED HEARING LOSS TYPE, UNSPECIFIED LATERALITY: ICD-10-CM

## 2023-10-31 DIAGNOSIS — H90.6 MIXED HEARING LOSS, BILATERAL: ICD-10-CM

## 2023-10-31 DIAGNOSIS — H61.21 EXCESSIVE CERUMEN IN EAR CANAL, RIGHT: ICD-10-CM

## 2023-10-31 DIAGNOSIS — H91.8X3 ASYMMETRICAL HEARING LOSS: ICD-10-CM

## 2023-10-31 DIAGNOSIS — H61.21 IMPACTED CERUMEN OF RIGHT EAR: ICD-10-CM

## 2023-10-31 DIAGNOSIS — H93.13 TINNITUS OF BOTH EARS: Primary | ICD-10-CM

## 2023-10-31 PROCEDURE — 99999 PR PBB SHADOW E&M-EST. PATIENT-LVL II: CPT | Mod: PBBFAC,HCNC,,

## 2023-10-31 PROCEDURE — 3077F PR MOST RECENT SYSTOLIC BLOOD PRESSURE >= 140 MM HG: ICD-10-PCS | Mod: CPTII,S$PBB,, | Performed by: NURSE PRACTITIONER

## 2023-10-31 PROCEDURE — 99214 OFFICE O/P EST MOD 30 MIN: CPT | Mod: 25,S$PBB,, | Performed by: NURSE PRACTITIONER

## 2023-10-31 PROCEDURE — 69210 REMOVE IMPACTED EAR WAX UNI: CPT | Mod: S$PBB,,, | Performed by: NURSE PRACTITIONER

## 2023-10-31 PROCEDURE — 92557 PR COMPREHENSIVE HEARING TEST: ICD-10-PCS | Mod: S$GLB,,,

## 2023-10-31 PROCEDURE — 1111F PR DISCHARGE MEDS RECONCILED W/ CURRENT OUTPATIENT MED LIST: ICD-10-PCS | Mod: CPTII,S$PBB,, | Performed by: NURSE PRACTITIONER

## 2023-10-31 PROCEDURE — 1111F DSCHRG MED/CURRENT MED MERGE: CPT | Mod: CPTII,S$PBB,, | Performed by: NURSE PRACTITIONER

## 2023-10-31 PROCEDURE — 69210 EAR CERUMEN REMOVAL: ICD-10-PCS | Mod: S$PBB,,, | Performed by: NURSE PRACTITIONER

## 2023-10-31 PROCEDURE — 3078F DIAST BP <80 MM HG: CPT | Mod: CPTII,S$PBB,, | Performed by: NURSE PRACTITIONER

## 2023-10-31 PROCEDURE — 3051F HG A1C>EQUAL 7.0%<8.0%: CPT | Mod: CPTII,S$PBB,, | Performed by: NURSE PRACTITIONER

## 2023-10-31 PROCEDURE — 3051F PR MOST RECENT HEMOGLOBIN A1C LEVEL 7.0 - < 8.0%: ICD-10-PCS | Mod: CPTII,S$PBB,, | Performed by: NURSE PRACTITIONER

## 2023-10-31 PROCEDURE — 1160F PR REVIEW ALL MEDS BY PRESCRIBER/CLIN PHARMACIST DOCUMENTED: ICD-10-PCS | Mod: CPTII,S$PBB,, | Performed by: NURSE PRACTITIONER

## 2023-10-31 PROCEDURE — 4010F ACE/ARB THERAPY RXD/TAKEN: CPT | Mod: CPTII,S$PBB,, | Performed by: NURSE PRACTITIONER

## 2023-10-31 PROCEDURE — 3078F PR MOST RECENT DIASTOLIC BLOOD PRESSURE < 80 MM HG: ICD-10-PCS | Mod: CPTII,S$PBB,, | Performed by: NURSE PRACTITIONER

## 2023-10-31 PROCEDURE — 1160F RVW MEDS BY RX/DR IN RCRD: CPT | Mod: CPTII,S$PBB,, | Performed by: NURSE PRACTITIONER

## 2023-10-31 PROCEDURE — 99214 PR OFFICE/OUTPT VISIT, EST, LEVL IV, 30-39 MIN: ICD-10-PCS | Mod: 25,S$PBB,, | Performed by: NURSE PRACTITIONER

## 2023-10-31 PROCEDURE — 92567 TYMPANOMETRY: CPT | Mod: S$GLB,,,

## 2023-10-31 PROCEDURE — 92567 PR TYMPA2METRY: ICD-10-PCS | Mod: S$GLB,,,

## 2023-10-31 PROCEDURE — 92557 COMPREHENSIVE HEARING TEST: CPT | Mod: S$GLB,,,

## 2023-10-31 PROCEDURE — 99999 PR PBB SHADOW E&M-EST. PATIENT-LVL IV: ICD-10-PCS | Mod: PBBFAC,HCNC,, | Performed by: NURSE PRACTITIONER

## 2023-10-31 PROCEDURE — 99999 PR PBB SHADOW E&M-EST. PATIENT-LVL IV: CPT | Mod: PBBFAC,HCNC,, | Performed by: NURSE PRACTITIONER

## 2023-10-31 PROCEDURE — 99999 PR PBB SHADOW E&M-EST. PATIENT-LVL II: ICD-10-PCS | Mod: PBBFAC,HCNC,,

## 2023-10-31 PROCEDURE — 1159F MED LIST DOCD IN RCRD: CPT | Mod: CPTII,S$PBB,, | Performed by: NURSE PRACTITIONER

## 2023-10-31 PROCEDURE — 4010F PR ACE/ARB THEARPY RXD/TAKEN: ICD-10-PCS | Mod: CPTII,S$PBB,, | Performed by: NURSE PRACTITIONER

## 2023-10-31 PROCEDURE — 1159F PR MEDICATION LIST DOCUMENTED IN MEDICAL RECORD: ICD-10-PCS | Mod: CPTII,S$PBB,, | Performed by: NURSE PRACTITIONER

## 2023-10-31 PROCEDURE — 3077F SYST BP >= 140 MM HG: CPT | Mod: CPTII,S$PBB,, | Performed by: NURSE PRACTITIONER

## 2023-10-31 NOTE — PROGRESS NOTES
Chief Complaint   Patient presents with    Hearing Loss   .     HPI:  Kuldeep Alamo is a very pleasant 58 y.o. male referred to me by Nahid Broussard in consultation for evaluation of hearing loss.  He reports hearing loss that has been gradually progressing over the last several months.  He has noted a difference in hearing between the ears, with the right ear being the worse hearing ear. He has noted tinnitus in both ears. He has not had any recent issues with ear pain or ear drainage. He denies a family history of hearing loss, and has not had any previous otologic surgery. He has a history of significant loud noise exposure. He denies issues with dizziness.      Past Medical History:   Diagnosis Date    Acute on chronic diastolic CHF (congestive heart failure), NYHA class 3 2017    CAD (coronary artery disease) 2013    Cerebral vascular accident     Chronic atrial fibrillation 2023    COPD (chronic obstructive pulmonary disease)     Cough syncope 6/15/2017    Diabetes mellitus     Disorder of kidney and ureter     Hyperlipidemia     Hypertension     Laceration of right hand without foreign body 2019    Patient sustained injury to dorsum of right hand due to ronda. No significant pain. No significant drainage. No fever or chills    Localized edema of right lower leg 2017    S/P CABG (coronary artery bypass graft) 2013    Ulcer of right pretibial region, limited to breakdown of skin 2018    Wound identified 2018 that is most likely related to tight dressing.     Urinary tract infection     Vertebrobasilar occlusive disease 2013     Social History     Socioeconomic History    Marital status: Single   Tobacco Use    Smoking status: Former     Current packs/day: 0.00     Types: Cigarettes     Quit date: 2009     Years since quittin.7     Passive exposure: Past    Smokeless tobacco: Never    Tobacco comments:     quit 9 yrs ago   Substance and Sexual Activity     Alcohol use: Not Currently     Alcohol/week: 12.0 standard drinks of alcohol     Types: 12 Cans of beer per week     Comment: social on weekends    Drug use: No    Sexual activity: Never   Social History Narrative    Lives in [place] alone; He has a sister who helps him; He has 4 sisters; He is disabled; He worked [employer] prior to his stroke; 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 He watches soap operas and likes to visit with family and friends. He also has a half-brother         Social Determinants of Health     Financial Resource Strain: Medium Risk (9/13/2023)    Overall Financial Resource Strain (CARDIA)     Difficulty of Paying Living Expenses: Somewhat hard   Food Insecurity: Food Insecurity Present (9/13/2023)    Hunger Vital Sign     Worried About Running Out of Food in the Last Year: Sometimes true     Ran Out of Food in the Last Year: Sometimes true   Transportation Needs: No Transportation Needs (9/13/2023)    PRAPARE - Transportation     Lack of Transportation (Medical): No     Lack of Transportation (Non-Medical): No   Physical Activity: Inactive (9/13/2023)    Exercise Vital Sign     Days of Exercise per Week: 0 days     Minutes of Exercise per Session: 0 min   Stress: No Stress Concern Present (9/13/2023)    [place] [employer] - Occupational Stress Questionnaire     Feeling of Stress : Not at all   Social Connections: Socially Isolated (9/13/2023)    Social Connection and Isolation Panel [NHANES]     Frequency of Communication with Friends and Family: Once a week     Frequency of Social Gatherings with Friends and Family: Once a week     Attends Tenriism Services: Never     Active Member of Clubs or Organizations: No     Attends Club or Organization Meetings: Never     Marital Status: Never    Housing Stability: Unknown (9/13/2023)    Housing Stability  Vital Sign     Unable to Pay for Housing in the Last Year: No     Unstable Housing in the Last Year: No      Past Surgical History:   Procedure Laterality Date    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    Procedure: AMPUTATION, ABOVE KNEE;  Surgeon: Ben Goyal MD;  Location: [facility] OR [address] [place] [address];  Service: Vascular;  Laterality: Right;    ABOVE-KNEE AMPUTATION Left 9/22/2021    Procedure: AMPUTATION, ABOVE KNEE;  Surgeon: DOUGLAS Siegel II, MD;  [facility];  Service: Vascular;  Laterality: Left;    ANGIOGRAPHY OF LOWER EXTREMITY Right 2/19/2019    Procedure: Angiogram Extremity Unilateral;  Surgeon: Rudi Galeano MD;  Location: [facility] CATH LAB;  Service: Cardiology;  Laterality: Right;    ANGIOGRAPHY OF LOWER EXTREMITY Right 7/19/2019    Procedure: Angiogram Extremity Unilateral;  Surgeon: Rudi Galeano MD;  Location: [facility] CATH LAB;  Service: Cardiology;  Laterality: Right;    APPLICATION OF WOUND VACUUM-ASSISTED CLOSURE DEVICE Right 7/28/2020    Procedure: APPLICATION, WOUND VAC;  Surgeon: Yolanda Meyers DPM;  Location: [facility] OR;  Service: Podiatry;  Laterality: Right;    BONE BIOPSY Right 7/28/2020    Procedure: BIOPSY, BONE;  Surgeon: Yolanda Meyers DPM;  Location: [facility] OR;  Service: Podiatry;  Laterality: Right;    CARDIAC CATHETERIZATION      CARDIAC CATHETERIZATION  02/2018    stent to right leg x 5 ( 1 stent placed weekly since late jan 2018)    CHOLECYSTECTOMY      CORONARY ARTERY BYPASS GRAFT      2006    CYST REMOVAL      CYSTOSCOPY N/A 2/14/2022    Procedure: CYSTOSCOPY;  Surgeon: Thompson Silva MD;  Location: [facility];  Service: Urology;  Laterality: N/A;    DEBRIDEMENT OF FOOT Right 7/28/2020    Procedure: DEBRIDEMENT, FOOT;  Surgeon: Yolanda Meyers DPM;  Location: [facility] OR;  Service: Podiatry;  Laterality: Right;    PHLEBOGRAPHY Right 2/17/2020    Procedure: Venogram;  Surgeon: Rudi Galeano MD;  Location: [facility] CATH LAB;  Service: Cardiology;  Laterality: Right;   Patient needs anesthesia for sedation     Family History   Problem Relation Age of Onset    Heart disease Mother     Hypertension Mother     Hypertension Father     Cancer Father     Hypertension Sister     Anemia Neg Hx     Arrhythmia Neg Hx     Asthma Neg Hx     Clotting disorder Neg Hx     Fainting Neg Hx     Heart attack Neg Hx     Heart failure Neg Hx     Hyperlipidemia Neg Hx     Prostate cancer Neg Hx     Kidney disease Neg Hx          Review of Systems  General: negative for chills, fever or weight loss  Psychological: negative for mood changes or depression  Ophthalmic: negative for blurry vision, photophobia or eye pain  ENT: see HPI  Respiratory: no cough, shortness of breath, or wheezing  Cardiovascular: no chest pain or dyspnea on exertion  Gastrointestinal: no abdominal pain, change in bowel habits, or black/ bloody stools  Musculoskeletal: negative for gait disturbance or muscular weakness  Neurological: no syncope or seizures; no ataxia  Dermatological: negative for puritis,  rash and jaundice  Hematologic/lymphatic: no easy bruising, no new lumps or bumps      Physical Exam:    Vitals:    10/31/23 1526   BP: (!) 168/70       Constitutional: Well appearing / communicating without difficutly.  NAD.  Eyes: EOM I Bilaterally  Head/Face: Normocephalic.  Negative paranasal sinus pressure/tenderness.  Salivary glands WNL.  House Brackmann I Bilaterally.    Right Ear: Auricle normal appearance. External Auditory Canal with cerumen impaction. EAC within normal limits no lesions or masses,TM w/o masses/lesions/perforations. TM mobility noted.   Left Ear: Auricle normal appearance. External Auditory Canal within normal limits no lesions or masses,TM w/o masses/lesions/perforations. TM mobility noted.  Rinne Air conduction >bone conduction bilaterally, Cho midline.   Nose: No gross nasal septal deviation. Inferior Turbinates 3+ bilaterally. No septal perforation. No masses/lesions. External nasal skin appears  normal without masses/lesions.  Oral Cavity: Gingiva/lips within normal limits.  Dentition/gingiva healthy appearing. Mucus membranes moist. Floor of mouth soft, no masses palpated. Oral Tongue mobile. Hard Palate appears normal.    Oropharynx: Base of tongue appears normal. No masses/lesions noted. Tonsillar fossa/pharyngeal wall without lesions. Posterior oropharynx WNL.  Soft palate without masses. Midline uvula.   Neck/Lymphatic: No LAD I-VI bilaterally.  No thyromegaly.  No masses noted on exam.      Ear Cerumen Removal    Date/Time: 10/31/2023 3:00 PM    Performed by: Nathalia Glass NP  Authorized by: Nathalia Glass NP    Consent Done?:  Yes (Verbal)  Location details:  Right ear  Procedure type: curette    Procedure type comment:  Suction  Cerumen  Removal Results:  Cerumen completely removed  Patient tolerance:  Patient tolerated the procedure well with no immediate complications    Diagnostic studies:  Audiogram interpreted personally by me and discussed in detail with the patient today.   Pure tone testing revealed mild sloping to moderate mixed hearing loss in the right ear and severe mixed hearing loss in the left ear. Speech reception thresholds were obtained at 35 dBHL in the right ear and 75 dBHL in the left ear. Speech discrimination scores were 100% in the right ear and 80% in the left ear. Tympanometry revealed Type As tympanograms in both ears.          Assessment:    ICD-10-CM ICD-9-CM    1. Mixed conductive and sensorineural hearing loss of left ear with restricted hearing of right ear  H90.A32 389.22 CT Temporal Bone without contrast      2. Hearing loss, unspecified hearing loss type, unspecified laterality  H91.90 389.9 Ambulatory referral/consult to ENT      3. Impacted cerumen of right ear  H61.21 380.4 Ear Cerumen Removal        The primary encounter diagnosis was Mixed conductive and sensorineural hearing loss of left ear with restricted hearing of right ear. Diagnoses of Hearing  loss, unspecified hearing loss type, unspecified laterality and Impacted cerumen of right ear were also pertinent to this visit.      Plan:  Orders Placed This Encounter   Procedures    Ear Cerumen Removal    CT Temporal Bone without contrast     -Cerumen impaction:  Removed today without difficulty.  I would recommend the use of a wax softening drop, either over the counter Debrox or mineral oil, on a weekly basis.  I also instructed the patient to avoid Qtips.    We reviewed the patient's recent audiogram and hearing loss in detail.   We also discussed the use hearing protection when exposed to loud noise, including lawn equipment. Recommend audiogram in 1 year.     -We also discussed that he is a good candidate for hearing aids if CT scan is normal.     Thank you kindly for allowing me to participate in the patient's care.       Nathalia Glass NP

## 2023-10-31 NOTE — PROCEDURES
Ear Cerumen Removal    Date/Time: 10/31/2023 3:00 PM    Performed by: Nathalia Glass NP  Authorized by: Nathalia Glass NP    Consent Done?:  Yes (Verbal)  Location details:  Right ear  Procedure type: curette    Procedure type comment:  Suction  Cerumen  Removal Results:  Cerumen completely removed  Patient tolerance:  Patient tolerated the procedure well with no immediate complications

## 2023-10-31 NOTE — TELEPHONE ENCOUNTER
227/116 HR at 58    Reason for Disposition   [1] Systolic BP  >= 200 OR Diastolic >= 120 AND [2] having NO cardiac or neurologic symptoms    Additional Information   Negative: Difficult to awaken or acting confused (e.g., disoriented, slurred speech)   Negative: SEVERE difficulty breathing (e.g., struggling for each breath, speaks in single words)   Negative: [1] Weakness of the face, arm or leg on one side of the body AND [2] new-onset   Negative: [1] Numbness (i.e., loss of sensation) of the face, arm or leg on one side of the body AND [2] new-onset   Negative: [1] Chest pain lasts > 5 minutes AND [2] history of heart disease (i.e., heart attack, bypass surgery, angina, angioplasty, CHF)   Negative: [1] Chest pain AND [2] took nitrogylcerin AND [3] pain was not relieved   Negative: Sounds like a life-threatening emergency to the triager   Negative: Symptom is main concern (e.g., headache, chest pain)   Negative: Low blood pressure is main concern   Negative: [1] Systolic BP  >= 160 OR Diastolic >= 100 AND [2] cardiac (e.g., breathing difficulty, chest pain) or neurologic symptoms (e.g., new-onset blurred or double vision, unsteady gait)     201/97 eradit beat   Commented on: [1] Pregnant 20 or more weeks (or postpartum < 6 weeks) AND [2] new hand or face swelling     N/A   Commented on: [1] Pregnant 20 or more weeks (or postpartum < 6 weeks) AND [2] Systolic BP >= 160 OR Diastolic >= 110     N/A    Protocols used: Blood Pressure - High-A-

## 2023-10-31 NOTE — TELEPHONE ENCOUNTER
Patient is calling with concerns of the elevation of his B/P He speaks of taking additional B/P medication that is ordered by Dr. Galeano but all of his medications are not noted on the med rec. Patient also states he is feeling more and more anxious and he doesn't have the medication to help that.  His last B/P is227/116 HR at 58 and his machine said his HR was eradict. Patient is advised to the ED

## 2023-11-01 ENCOUNTER — PES CALL (OUTPATIENT)
Dept: HOME HEALTH SERVICES | Facility: CLINIC | Age: 58
End: 2023-11-01
Payer: MEDICARE

## 2023-11-01 ENCOUNTER — TELEPHONE (OUTPATIENT)
Dept: FAMILY MEDICINE | Facility: CLINIC | Age: 58
End: 2023-11-01
Payer: MEDICARE

## 2023-11-01 NOTE — TELEPHONE ENCOUNTER
Informed pt sister neptali that I spoke to ENT dept and they said they didn't find any phones. When he was leaving he said it was in his bag. Pt sister verbalized understanding

## 2023-11-01 NOTE — TELEPHONE ENCOUNTER
----- Message from Wendy Messer sent at 11/1/2023  1:07 PM CDT -----  Type:  Lost and found  Who Called: sister elpidio  Would the patient rather a call back or a response via [facility]? call  Best Call Back Number:   Additional Information:   patient's sister is calling to see if patient left his phone in office yesterday by the hearing station        Dupixent Pregnancy And Lactation Text: This medication likely crosses the placenta but the risk for the fetus is uncertain. This medication is excreted in breast milk.

## 2023-11-01 NOTE — PROGRESS NOTES
"Kuldeep Alamo, a 58 y.o. male was seen today in the clinic for an audiologic evaluation. The patient's primary complaint was reduced hearing perception.  Mr. Alamo did not report any difference between ears. He indicated he experiences "ringing" with laterality unspecified. Noise exposure reported includes music, air boats and heavy construction equipment. No family history of hearing loss was reported.    Otoscopy revealed excessive cerumen in the right ear and minimal cerumen in the left ear. Mr. Alamo was seen for cerumen management prior to audiological evaluation. Pure tone testing revealed mild sloping to moderate mixed hearing loss in the right ear and severe mixed hearing loss in the left ear. Speech reception thresholds were obtained at 35 dBHL in the right ear and 75 dBHL in the left ear. Speech discrimination scores were 100% in the right ear and 80% in the left ear. Tympanometry revealed Type As tympanograms in both ears.    Recommendations:  Otologic evaluation due to asymmetric hearing loss  Follow up audiologic evaluation if treatment of conductive hearing loss occurs with subsequent annual audiogram  Hearing protection in noise  Hearing aid consultation after medical clearance (consider repeat air conduction testing to determine if conductive loss stability)        "

## 2023-11-14 ENCOUNTER — DOCUMENT SCAN (OUTPATIENT)
Dept: HOME HEALTH SERVICES | Facility: HOSPITAL | Age: 58
End: 2023-11-14
Payer: MEDICARE

## 2023-11-15 ENCOUNTER — PATIENT MESSAGE (OUTPATIENT)
Dept: ADMINISTRATIVE | Facility: OTHER | Age: 58
End: 2023-11-15
Payer: MEDICARE

## 2023-11-21 ENCOUNTER — DOCUMENT SCAN (OUTPATIENT)
Dept: HOME HEALTH SERVICES | Facility: HOSPITAL | Age: 58
End: 2023-11-21
Payer: MEDICARE

## 2023-12-01 ENCOUNTER — DOCUMENT SCAN (OUTPATIENT)
Dept: HOME HEALTH SERVICES | Facility: HOSPITAL | Age: 58
End: 2023-12-01
Payer: MEDICARE

## 2023-12-04 ENCOUNTER — DOCUMENT SCAN (OUTPATIENT)
Dept: HOME HEALTH SERVICES | Facility: HOSPITAL | Age: 58
End: 2023-12-04
Payer: MEDICARE

## 2023-12-08 ENCOUNTER — DOCUMENT SCAN (OUTPATIENT)
Dept: HOME HEALTH SERVICES | Facility: HOSPITAL | Age: 58
End: 2023-12-08
Payer: MEDICARE

## 2023-12-11 ENCOUNTER — EXTERNAL HOME HEALTH (OUTPATIENT)
Dept: HOME HEALTH SERVICES | Facility: HOSPITAL | Age: 58
End: 2023-12-11
Payer: MEDICARE

## 2023-12-18 PROBLEM — N39.0 URINARY TRACT INFECTION DUE TO ESBL KLEBSIELLA: Status: RESOLVED | Noted: 2023-09-15 | Resolved: 2023-12-18

## 2023-12-18 PROBLEM — B96.89 URINARY TRACT INFECTION DUE TO ESBL KLEBSIELLA: Status: RESOLVED | Noted: 2023-09-15 | Resolved: 2023-12-18

## 2023-12-27 RX ORDER — PREGABALIN 100 MG/1
100 CAPSULE ORAL 2 TIMES DAILY
Qty: 60 CAPSULE | Refills: 2 | Status: SHIPPED | OUTPATIENT
Start: 2023-12-27 | End: 2024-03-26

## 2023-12-27 NOTE — TELEPHONE ENCOUNTER
----- Message from Shagufta Muniz sent at 2023  2:36 PM CST -----  Type:  Needs Medical Advice    Who Called: pt  Symptoms (please be specific): pt needs a new prescription for his gabapentin [facility]   please     Pharmacy name and phone #:  [id number] [place] - [address] AT [facility]   Phone: [phone]  Fax: [phone]      Would the patient rather a call back or a response via [facility]? call  Best Call Back Number:     Additional Information:

## 2023-12-27 NOTE — TELEPHONE ENCOUNTER
Care Due:                  Date            Visit Type   Department     Provider  --------------------------------------------------------------------------------                                HOSPITAL     [facility]  Last Visit: 04-      FOLLOW UP    [facility]  Jalyn Rock  Next Visit: None Scheduled  None         None Found                                                            Last  Test          Frequency    Reason                     Performed    Due Date  --------------------------------------------------------------------------------    HBA1C.......  6 months...  insulin..................  07- 01-    [facility] Embedded Care Due Messages. Reference number: [id number].   12/27/2023 4:20:50 PM CST

## 2023-12-27 NOTE — TELEPHONE ENCOUNTER
Please advise patient message. I have looked through patient's medication list, and I don't see medication no where on his med list. Please advise message.

## 2023-12-27 NOTE — TELEPHONE ENCOUNTER
Called and spoke with pt in regards of message. Pt informed me that was the wrong medication he needed a med refill on. Pt states that he needs a med refill on the [facility]. Please advise patient message.

## 2023-12-31 PROCEDURE — G0179 MD RECERTIFICATION HHA PT: HCPCS | Mod: ,,, | Performed by: STUDENT IN AN ORGANIZED HEALTH CARE EDUCATION/TRAINING PROGRAM

## 2024-01-14 ENCOUNTER — DOCUMENT SCAN (OUTPATIENT)
Dept: HOME HEALTH SERVICES | Facility: HOSPITAL | Age: 59
End: 2024-01-14
Payer: MEDICARE

## 2024-01-26 NOTE — PROGRESS NOTES
Subjective:       Patient ID: Kuldeep Alamo is a 54 y.o. male.    Chief Complaint: Annual Exam    54 yr old pleasant white male with CVA, DM II, right leg ulcer, HTN, HLD, CAD, and other co morbidities presents today as new patient to me and for establishing primary care and also his annual wellness check and lab work. His main concerns is diabetes management. His sugars running around 200 s.     CVA - with deficits - on wheelchair - on ASA      DM II - currently uncontrolled - on metformin 850 BID - compliant - lab due - A1C                   6.6 (H)             08/07/2018    - foot n eye screen UTD      HTN - controlled - on lisinopril, coreg, clonidine - compliant - no side effects      HLD - LDLCALC                  117.4               12/06/2016                - on statin - lab due    CAD s/p CABG - follows Dr. Cesar, Cardiology - no CP or SOB - on ranexa      Wound right leg - follows podiatry and wound care in [place] - no new issues       History as below - reviewed     Diabetes   He presents for his follow-up diabetic visit. He has type 2 diabetes mellitus. His disease course has been worsening. Pertinent negatives for hypoglycemia include no dizziness, headaches, mood changes, nervousness/anxiousness, seizures, sleepiness, speech difficulty or tremors. Pertinent negatives for diabetes include no blurred vision, no chest pain, no foot ulcerations, no polyuria, no weakness and no weight loss. There are no hypoglycemic complications. Symptoms are stable. Diabetic complications include a CVA. Pertinent negatives for diabetic complications include no autonomic neuropathy, heart disease, impotence, peripheral neuropathy or PVD. Risk factors for coronary artery disease include dyslipidemia, diabetes mellitus, obesity, male sex and sedentary lifestyle. Current diabetic treatment includes oral agent (monotherapy). He is compliant with treatment most of the time. He is following a diabetic and generally healthy    CHIEF COMPLAINT: palpitations    HPI:  79 yo male with a PAF s/p GIORGIO/DCCV x2 in 2011, 2015, 2018, hypertension, asthma, thoracic aortic aneurysm s/p surgical repair and bioprosthetic AVR in 2018 referred by outpatient cardiologist (Dr. Palla) to ED for palpitations and presyncope. Pt reports worsening sx over the past week and significant lightheadedness today. Denies chest pain or SOB. He has no hx coronary disease - ischemic evaluation/cath in 2018 unremarkable. He exercises regularly without limiting sx.      ECG noted to have frequent PVCs.       PAST MEDICAL / SURGICAL HISTORY:  PAST MEDICAL & SURGICAL HISTORY:  AF (atrial fibrillation)      HTN (hypertension)      BPH (Benign Prostatic Hyperplasia)      HLD (hyperlipidemia)      Thoracic aortic aneurysm without rupture      Sepsis due to Escherichia coli      UTI (urinary tract infection)      S/p tibial fracture      Atrial fibrillation status post cardioversion          SOCIAL HISTORY:   Alcohol: Denied  Smoking: Nonsmoker  Drug Use: Denied  Marital Status:     FAMILY HISTORY: FAMILY HISTORY:  No pertinent family history in first degree relatives        MEDICATIONS  (STANDING):    MEDICATIONS  (PRN):      Allergies    No Known Allergies    Intolerances        REVIEW OF SYSTEMS:  CONSTITUTIONAL: No weakness, fevers or chills  Eyes: No visual changes  NECK: No pain or stiffness  RESPIRATORY: No cough, wheezing, hemoptysis; No shortness of breath  CARDIOVASCULAR: No chest pain, +palpitations  GASTROINTESTINAL: No abdominal pain. No nausea, vomiting, or hematemesis; No diarrhea or constipation. No melena or hematochezia.  GENITOURINARY: No dysuria, frequency or hematuria  NEUROLOGICAL: No numbness.  SKIN: No itching or rash  All other review of systems is negative unless indicated above    VITAL SIGNS:   Vital Signs Last 24 Hrs  T(C): 36.3 (26 Jan 2024 15:48), Max: 36.8 (26 Jan 2024 13:10)  T(F): 97.4 (26 Jan 2024 15:48), Max: 98.2 (26 Jan 2024 13:10)  HR: 56 (26 Jan 2024 15:48) (55 - 56)  BP: 151/78 (26 Jan 2024 15:48) (145/74 - 151/78)  BP(mean): 97 (26 Jan 2024 15:48) (96 - 97)  RR: 19 (26 Jan 2024 15:48) (18 - 19)  SpO2: 96% (26 Jan 2024 15:48) (96% - 97%)    Parameters below as of 26 Jan 2024 15:48  Patient On (Oxygen Delivery Method): room air        I&O's Summary      PHYSICAL EXAM:  Constitutional: NAD, awake and alert  HEENT:  EOMI,  Pupils round, No oral cyanosis.  Pulmonary: Non-labored, breath sounds are clear bilaterally, No wheezing, rales or rhonchi  Cardiovascular: irregular, no murmur   Gastrointestinal: Bowel Sounds present, soft, nontender.   Lymph: No peripheral edema. No cervical lymphadenopathy.  Neurological: Alert, no focal deficits  Skin: No rashes.  Psych:  Mood & affect appropriate    LABS:                        15.4   4.17  )-----------( 219      ( 26 Jan 2024 14:07 )             45.0     26 Jan 2024 14:07    139    |  109    |  20     ----------------------------<  116    4.1     |  30     |  1.07     Ca    9.0        26 Jan 2024 14:07  Mg     2.4       26 Jan 2024 14:07    TPro  7.7    /  Alb  3.9    /  TBili  0.8    /  DBili  x      /  AST  19     /  ALT  37     /  AlkPhos  80     26 Jan 2024 14:07    PT/INR - ( 26 Jan 2024 14:07 )   PT: 10.6 sec;   INR: 0.94 ratio         PTT - ( 26 Jan 2024 14:07 )  PTT:31.2 sec           diet. Meal planning includes avoidance of concentrated sweets. He rarely participates in exercise. There is no change in his home blood glucose trend. An ACE inhibitor/angiotensin II receptor blocker is being taken. He sees a podiatrist.Eye exam is current.   Hypertension   This is a chronic problem. The current episode started more than 1 year ago. The problem has been gradually improving since onset. The problem is controlled. Pertinent negatives include no anxiety, blurred vision, chest pain, headaches, malaise/fatigue or palpitations. There are no associated agents to hypertension. Risk factors for coronary artery disease include diabetes mellitus, male gender, obesity, sedentary lifestyle and dyslipidemia. Past treatments include angiotensin blockers and beta blockers. The current treatment provides moderate improvement. There are no compliance problems.  Hypertensive end-organ damage includes CAD/MI and CVA. There is no history of PVD. There is no history of chronic renal disease, hyperaldosteronism, hyperparathyroidism, a hypertension causing med, pheochromocytoma or a thyroid problem.   Hyperlipidemia   This is a chronic problem. The current episode started more than 1 year ago. The problem is controlled. Recent lipid tests were reviewed and are normal. He has no history of chronic renal disease. There are no known factors aggravating his hyperlipidemia. Pertinent negatives include no chest pain or myalgias. Current antihyperlipidemic treatment includes statins. The current treatment provides moderate improvement of lipids. Compliance problems include adherence to exercise.  Risk factors for coronary artery disease include diabetes mellitus, dyslipidemia, hypertension, male sex, obesity and a sedentary lifestyle.     Review of Systems   Constitutional: Negative.  Negative for activity change, diaphoresis, malaise/fatigue, unexpected weight change and weight loss.   HENT: Negative.  Negative for congestion,  ear pain, mouth sores, rhinorrhea and voice change.    Eyes: Negative.  Negative for blurred vision, pain, discharge and visual disturbance.   Respiratory: Negative.  Negative for apnea, cough and wheezing.    Cardiovascular: Negative.  Negative for chest pain and palpitations.   Gastrointestinal: Negative.  Negative for abdominal distention, anal bleeding, diarrhea and vomiting.   Endocrine: Negative.  Negative for cold intolerance and polyuria.   Genitourinary: Negative.  Negative for decreased urine volume, difficulty urinating, discharge, frequency, impotence and scrotal swelling.   Musculoskeletal: Negative.  Negative for back pain, myalgias and neck stiffness.   Skin: Positive for rash and wound. Negative for color change.   Allergic/Immunologic: Negative.  Negative for environmental allergies and immunocompromised state.   Neurological: Negative.  Negative for dizziness, tremors, seizures, speech difficulty, weakness, light-headedness and headaches.   Hematological: Negative.    Psychiatric/Behavioral: Negative.  Negative for agitation, dysphoric mood and suicidal ideas. The patient is not nervous/anxious.        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 01/15/2012    Erectile dysfunction 01/16/2014    Left spastic hemiparesis 06/23/2014    Panic disorder 06/14/2016    Mobility impaired 08/30/2016    Left hip pain  11/29/2016    Closed compression fracture of first lumbar vertebra 02/01/2017    Localized edema of right lower leg 12/07/2017    Non-pressure chronic ulcer of other part of right lower leg with fat layer exposed 12/14/2017    Critical lower limb ischemia 12/29/2017    Chronic diastolic CHF (congestive heart failure) 12/30/2017    PAD (peripheral artery disease) 01/04/2018    A-V fistula 01/12/2018    Nocturia 02/28/2018    Urinary frequency 02/28/2018    Benign prostatic hyperplasia with nocturia 02/28/2018    Acute cystitis with hematuria 02/28/2018    Cerebral infarction due to thrombosis of precerebral artery 03/05/2018    Acute urinary retention 03/27/2018    Anemia 05/07/2019     Resolved Ambulatory Problems     Diagnosis Date Noted    Left hemiparesis 06/23/2014    Ataxia 06/23/2014    Edema of left lower extremity 09/13/2014    Sebaceous cyst 11/29/2016    Laceration of left leg 11/29/2016    Neck mass 01/17/2017    Cough syncope 06/15/2017    Abnormal nuclear stress test 07/25/2017    Stasis ulcer 11/10/2017    Venous stasis ulcer of right ankle with fat layer exposed without varicose veins 12/07/2017    Traumatic open wound of right lower leg 12/14/2017    Ulcer of right pretibial region, limited to breakdown of skin 07/02/2018     Past Medical History:   Diagnosis Date    Acute on chronic diastolic CHF (congestive heart failure), NYHA class 3 12/30/2017    CAD (coronary artery disease) 1/7/2013    Cerebral vascular accident     COPD (chronic obstructive pulmonary disease)     Cough syncope 6/15/2017    Diabetes mellitus     Hyperlipidemia     Hypertension     S/P CABG (coronary artery bypass graft) 1/7/2013    Ulcer of right pretibial region, limited to breakdown of skin 7/2/2018   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     Past Surgical History:   Procedure Laterality Date    Angiogram Extremity Unilateral Right 2/19/2019     Performed [facility] Rud[facility]ano [facility] at [facility] CATH [facility]    Angiogram Extremity Unilateral Right 1/12/2018    Per[facility] by [facility] CATH [facility]    Angiogram Extremity Unilateral Left 1/11/2018    Per[facility] CATH [facility]    Aortogram With Runoff Left 12/29/2017    Per[facility] CATH [facility]    CARDIAC CATHETERIZATION      CARDIAC CATHETERIZATION  02/2018    stent to right leg x 5 ( 1 stent placed weekly since late jan 2018)    CHOLECYSTECTOMY      CORONARY ARTERY BYPASS GRAFT      2006    CYST REMOVAL      CYSTOSCOPY W/BLADDER BIOPSY,POSSIBLE FULGURATION-BLADDER N/A 3/5/2018    Performed by Thompson Silva [facility] at [facility] OR    CYSTOSCOPY WITH RETROGRADE PYELOGRAM Bilateral 3/5/2018    [facility] Thompson [facility] OR    EXCISION-MASS-NECK  1/23/2017    Per[facility] by VÍCTOR Cross MD at [facility] OR    HEART CATH-LEFT Right 7/25/2017    Per[facility] CATH LAB    PTA, Femoral Artery  2/19/2019    [facility] CATH [facility]    Stent MERVAT peripheral Right 1/3/2018    [facility] CATH LAB     Family History   Problem Relation Age of Onset    Heart disease Mother     Hypertension Mother     Hypertension Father     Cancer Father     Hypertension Sister     Anemia Neg Hx     Arrhythmia Neg Hx     Asthma Neg Hx     Clotting disorder Neg Hx     Fainting Neg Hx     Heart attack Neg Hx     Heart failure Neg Hx     Hyperlipidemia Neg Hx     Prostate cancer Neg Hx     Kidney disease Neg Hx      Social History     Socioeconomic History    Marital status: Single     Spouse name: Not on file    Number of children: Not on file    Years of education: Not on file    Highest education level: Not on file   Occupational History    Not on file   Social Needs    Financial resource strain: Not on file    Food insecurity:     Worry:  Not on file     Inability: Not on file    Transportation needs:     Medical: Not on file     Non-medical: Not on file   Tobacco Use    Smoking status: Former Smoker     Last attempt to quit: 1/21/2009     Years since quitting: 10.2    Smokeless tobacco: Never Used    Tobacco comment: quit 9 yrs ago   Substance and Sexual Activity    Alcohol use: Yes     Alcohol/week: 12.0 oz     Types: 20 Cans of beer per week     Comment: not drinking during lent    Drug use: No    Sexual activity: Never   Lifestyle    Physical activity:     Days per week: Not on file     Minutes per session: Not on file    Stress: Not on file   Relationships    Social connections:     Talks on phone: Not on file     Gets together: Not on file     Attends Jehovah's witness service: Not on file     Active member of club or organization: Not on file     Attends meetings of clubs or organizations: Not on file     Relationship status: Not on file   Other Topics Concern    Not on file   Social History Narrative    Lives in [place] alone; He has a sister who helps him; He has 4 sisters; He is disabled; He worked [employer] prior to his stroke; 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 He watches soap operas and likes to visit with family and friends. He also has a half-brother         Review of patient's allergies indicates:   Allergen Reactions    Sulfa (sulfonamide antibiotics) Nausea And Vomiting     Current Outpatient Medications on File Prior to Visit   Medication Sig Dispense Refill    ALPRAZolam (XANAX) 0.5 MG tablet Take 1 tablet (0.5 mg total) by mouth 3 (three) times daily as needed for Anxiety. 30 tablet 2    aspirin 81 MG Chew Take 1 tablet by mouth once daily.       atorvastatin (LIPITOR) 40 MG tablet Take 1 tablet (40 mg total) by mouth once daily. 90 tablet 3    baclofen (LIORESAL) 20 MG tablet TAKE 1.5 TABLETS (30 MG  TOTAL) BY MOUTH 3 (THREE) TIMES DAILY. 135 tablet 5    blood sugar diagnostic Strp Test sugar once daily as needed 100 strip 3    carvedilol (COREG) 25 MG tablet TAKE 1 TABLET (25 MG TOTAL) BY MOUTH 2 (TWO) TIMES DAILY WITH MEALS. 60 tablet 11    cloNIDine (CATAPRES) 0.1 MG tablet TAKE 1 TABLET (0.1 MG TOTAL) BY MOUTH EVERY 6 (SIX) HOURS AS NEEDED. If bp 160/100 or greater 30 tablet 5    clopidogrel (PLAVIX) 75 mg tablet TAKE 1 TABLET BY MOUTH ONCE A DAY 30 tablet 0    fenofibrate 160 MG Tab TAKE 1 TABLET (160 MG TOTAL) BY MOUTH ONCE DAILY. 30 tablet 1    FLUoxetine (PROZAC) 20 MG capsule TAKE 1 CAPSULE (20 MG TOTAL) BY MOUTH ONCE DAILY. 30 capsule 11    furosemide (LASIX) 80 MG tablet TK 1 T PO BID  0    gabapentin (NEURONTIN) 100 MG capsule Take 100 mg by mouth 3 (three) times daily.      lisinopril (PRINIVIL,ZESTRIL) 40 MG tablet TAKE 1 TABLET (40 MG TOTAL) BY MOUTH ONCE DAILY. 30 tablet 0    melatonin 10 mg Tab Take 1 tablet by mouth nightly as needed.      metFORMIN (GLUCOPHAGE) 850 MG tablet Take 1 tablet (850 mg total) by mouth daily with breakfast. (Patient taking differently: Take 850 mg by mouth 2 (two) times daily with meals. ) 60 tablet 0    nitroGLYCERIN (NITROSTAT) 0.4 MG SL tablet DISSOLVE 1 TAB UNDER TONGUE EVERY 5 MINUTS AT ONSET OF CHEST PAIN-MAX 3 PER 15 MINUTES--GO TO ER 25 tablet 11    oxyCODONE-acetaminophen (PERCOCET) 5-325 mg per tablet Take 1 tablet by mouth 2 (two) times daily.      pantoprazole (PROTONIX) 40 MG tablet Take 1 tablet (40 mg total) by mouth once daily. 30 tablet 11    RANEXA 1,000 mg Tb12 1,000 mg 2 (two) times daily.       ranitidine (ZANTAC) 150 MG tablet TAKE 1 TABLET (150 MG TOTAL) BY MOUTH 2 (TWO) TIMES DAILY. 60 tablet 11    SANTYL ointment       tamsulosin (FLOMAX) 0.4 mg Cap TAKE 1 CAPSULE (0.4 MG TOTAL) BY MOUTH EVERY EVENING. 30 capsule 0    triamcinolone acetonide 0.1% (KENALOG) 0.1 % Lotn Apply topically 2 (two) times daily.      blood-glucose  meter kit Use as instructed 1 each 0    dutasteride (AVODART) 0.5 mg capsule Take 1 capsule (0.5 mg total) by mouth once daily. 30 capsule 0    lancets 30 gauge Misc 1 lancet by Misc.(Non-Drug; Combo Route) route once daily. 100 each 3     No current facility-administered medications on file prior to visit.        Objective:       Vitals:    05/07/19 1516   BP: 126/66   Pulse: 74       Physical Exam   Constitutional: He is oriented to person, place, and time. He appears well-developed and well-nourished.   HENT:   Head: Normocephalic and atraumatic.   Right Ear: External ear normal.   Left Ear: External ear normal.   Nose: Nose normal.   Mouth/Throat: Oropharynx is clear and moist. No oropharyngeal exudate.   Eyes: Pupils are equal, round, and reactive to light. Conjunctivae and EOM are normal. Right eye exhibits no discharge. Left eye exhibits no discharge. No scleral icterus.   Neck: Normal range of motion. Neck supple. No JVD present. No tracheal deviation present. No thyromegaly present.   Cardiovascular: Normal rate, regular rhythm, normal heart sounds and intact distal pulses. Exam reveals no gallop and no friction rub.   No murmur heard.  Pulmonary/Chest: Effort normal and breath sounds normal. No stridor. No respiratory distress. He has no wheezes. He has no rales. He exhibits no tenderness.   Abdominal: Soft. Bowel sounds are normal. He exhibits no distension and no mass. There is no tenderness. There is no rebound and no guarding. No hernia.   Musculoskeletal: Normal range of motion. He exhibits no edema or tenderness.   Lymphadenopathy:     He has no cervical adenopathy.   Neurological: He is alert and oriented to person, place, and time. He has normal reflexes. He displays normal reflexes. No cranial nerve deficit. He exhibits abnormal muscle tone. Coordination normal.   Skin: Skin is warm and dry. Capillary refill takes less than 2 seconds. Rash noted. There is erythema. No pallor.   Peeling skin B/L  legs and also 4 cm diabetes ulcer wound right leg covered in bandage   Psychiatric: He has a normal mood and affect. His behavior is normal. Judgment and thought content normal.       Assessment:       1. Routine general medical examination at a health care facility    2. Type 2 diabetes mellitus without complication, without long-term current use of insulin    3. Essential hypertension    4. Dyslipidemia    5. Encounter for screening for malignant neoplasm of prostate    6. PAD (peripheral artery disease)    7. Coronary artery disease involving native coronary artery without angina pectoris, unspecified whether native or transplanted heart    8. Chronic diastolic CHF (congestive heart failure)    9. Encounter for FIT (fecal immunochemical test) screening        Plan:           Kuldeep was seen today for annual exam.    Diagnoses and all orders for this visit:    Routine general medical examination at a health care facility  -     CBC auto differential; Future  -     Comprehensive metabolic panel; Future  -     Lipid panel; Future    Type 2 diabetes mellitus without complication, without long-term current use of insulin  -     CBC auto differential; Future  -     Comprehensive metabolic panel; Future  -     Lipid panel; Future  -     Hemoglobin A1c; Future  -     Urinalysis; Future  -     Microalbumin/creatinine urine ratio; Future  -     glipiZIDE (GLUCOTROL) 10 MG tablet; Take 1 tablet (10 mg total) by mouth 2 (two) times daily before meals.    Essential hypertension  -     CBC auto differential; Future  -     Comprehensive metabolic panel; Future  -     Lipid panel; Future    Dyslipidemia  -     CBC auto differential; Future  -     Comprehensive metabolic panel; Future  -     Lipid panel; Future    Encounter for screening for malignant neoplasm of prostate  -     PSA, Screening; Future    PAD (peripheral artery disease)    Coronary artery disease involving native coronary artery without angina pectoris, unspecified  whether native or transplanted heart    Chronic diastolic CHF (congestive heart failure)    Encounter for FIT (fecal immunochemical test) screening  -     Fecal Immunochemical Test (iFOBT); Future    Anemia, unspecified type      Wellness check  -normal exam  -labs    DM II  -uncontrolled  -continue metformin and add glipizide 10 mg BID    HTN  -controlled    HLD  -continue statin    Right lef wound  -follow wound care    CAD  -controlled  -follow Cardiology    Spent adequate time in obtaining history and explaining differentials    40 minutes spent during this visit of which greater than 50% devoted to face-face counseling and coordination of care regarding diagnosis and management plan    Follow up in about 3 months (around 8/7/2019), or if symptoms worsen or fail to improve.

## 2024-02-03 ENCOUNTER — DOCUMENT SCAN (OUTPATIENT)
Dept: HOME HEALTH SERVICES | Facility: HOSPITAL | Age: 59
End: 2024-02-03
Payer: MEDICARE

## 2024-02-03 NOTE — PROGRESS NOTES
See wound care assessment documentation in chart review. Scanned under media tab.        Forceps were not used

## 2024-02-06 ENCOUNTER — EXTERNAL HOME HEALTH (OUTPATIENT)
Dept: HOME HEALTH SERVICES | Facility: HOSPITAL | Age: 59
End: 2024-02-06
Payer: MEDICARE

## 2024-02-12 ENCOUNTER — TELEPHONE (OUTPATIENT)
Dept: UROLOGY | Facility: CLINIC | Age: 59
End: 2024-02-12
Payer: MEDICARE

## 2024-02-12 NOTE — TELEPHONE ENCOUNTER
----- Message from Suzanna Ulrich sent at 2/12/2024 11:08 AM CST -----  Type:  Orders     Who Called:Valorie with [facility] medical     Does the patient know what this is regarding?:Orders for bed side drainage bag   Would the patient rather a call back or a response via [facility]? Call   Best Call Back Number:  Additional Information:     Valorie stated a order was faxed over for a bed side drainage bag

## 2024-02-12 NOTE — TELEPHONE ENCOUNTER
Called and spoke to Valorie Saucedo from [facility] in regards to order faxed for bedside drainage bag for  BANDARJuan Alamo. Notified her that Dr. Silva will not be available in Clinic until Wednesday, and we would bring the order to his attention.

## 2024-02-20 ENCOUNTER — TELEPHONE (OUTPATIENT)
Dept: FAMILY MEDICINE | Facility: CLINIC | Age: 59
End: 2024-02-20
Payer: MEDICARE

## 2024-02-20 NOTE — TELEPHONE ENCOUNTER
Tried calling patient niece in regards of message on patient. Unable to reach patient's niece , due to phone number on message being a survey gift card number.

## 2024-02-20 NOTE — TELEPHONE ENCOUNTER
----- Message from Rossy Henry sent at 2/19/2024 12:30 PM CST -----  .Type:  Needs Medical Advice    Who Called: pt's angelita Muse  Symptoms (please be specific):    How long has patient had these symptoms:    Pharmacy name and phone #:    Would the patient rather a call back or a response via [facility]?   Best Call Back Number: [phone]  Additional Information: needs auth for new diabetic meter

## 2024-02-29 PROCEDURE — G0179 MD RECERTIFICATION HHA PT: HCPCS | Mod: ,,, | Performed by: STUDENT IN AN ORGANIZED HEALTH CARE EDUCATION/TRAINING PROGRAM

## 2024-03-27 DIAGNOSIS — E11.9 TYPE 2 DIABETES MELLITUS WITHOUT COMPLICATION, UNSPECIFIED WHETHER LONG TERM INSULIN USE: ICD-10-CM

## 2024-04-04 ENCOUNTER — EXTERNAL HOME HEALTH (OUTPATIENT)
Dept: HOME HEALTH SERVICES | Facility: HOSPITAL | Age: 59
End: 2024-04-04
Payer: MEDICARE

## 2024-04-08 DIAGNOSIS — E11.65 TYPE 2 DIABETES MELLITUS WITH HYPERGLYCEMIA, WITH LONG-TERM CURRENT USE OF INSULIN: ICD-10-CM

## 2024-04-08 DIAGNOSIS — Z79.4 TYPE 2 DIABETES MELLITUS WITH HYPERGLYCEMIA, WITH LONG-TERM CURRENT USE OF INSULIN: ICD-10-CM

## 2024-04-08 RX ORDER — INSULIN GLARGINE 100 [IU]/ML
15 INJECTION, SOLUTION SUBCUTANEOUS 2 TIMES DAILY
Qty: 27 ML | Refills: 3 | Status: SHIPPED | OUTPATIENT
Start: 2024-04-08 | End: 2025-04-08

## 2024-04-08 RX ORDER — INSULIN LISPRO 100 [IU]/ML
INJECTION, SOLUTION INTRAVENOUS; SUBCUTANEOUS
Qty: 9 ML | Refills: 3 | Status: SHIPPED | OUTPATIENT
Start: 2024-04-08 | End: 2024-04-09

## 2024-04-08 NOTE — TELEPHONE ENCOUNTER
Spoke with pt's niece. She states that pt is completely out of his insulin medications. She states that the pharmacy was supposed to call us about the medications. Informed niece that Dr. Rock was out of the office today and that I would send the message the provider covering her messages. Please advise.   V-Y Flap Text: The defect edges were debeveled with a #15 scalpel blade.  Given the location of the defect, shape of the defect and the proximity to free margins a V-Y flap was deemed most appropriate.  Using a sterile surgical marker, an appropriate advancement flap was drawn incorporating the defect and placing the expected incisions within the relaxed skin tension lines where possible.    The area thus outlined was incised deep to adipose tissue with a #15 scalpel blade.  The skin margins were undermined to an appropriate distance in all directions utilizing iris scissors.

## 2024-04-08 NOTE — TELEPHONE ENCOUNTER
----- Message from Maria Elena Garcia sent at 4/8/2024 12:14 PM CDT -----  Type:  Needs Medical Advice    Who Called: Almaz (pt niece)  Would the patient rather a call back or a response via [facility]? Call   Best Call Back Number: [phone]  Additional Information:   Almaz requesting a call regarding the pt insulin

## 2024-04-08 NOTE — TELEPHONE ENCOUNTER
Care Due:                  Date            Visit Type   Department     Provider  --------------------------------------------------------------------------------                                HOSPITAL     DESC FAMILY  Last Visit: 04-      FOLLOW UP    [facility]  Jalyn Rock  Next Visit: None Scheduled  None         None Found                                                            Last  Test          Frequency    Reason                     Performed    Due Date  --------------------------------------------------------------------------------    Office Visit  15 months..  insulin..................  04- 07-    HBA1C.......  6 months...  insulin..................  07- 01-    Health [facility] Embedded Care Due Messages. Reference number: [id number].   4/08/2024 3:56:48 PM CDT

## 2024-04-09 DIAGNOSIS — E11.65 TYPE 2 DIABETES MELLITUS WITH HYPERGLYCEMIA, WITH LONG-TERM CURRENT USE OF INSULIN: ICD-10-CM

## 2024-04-09 DIAGNOSIS — Z79.4 TYPE 2 DIABETES MELLITUS WITH HYPERGLYCEMIA, WITH LONG-TERM CURRENT USE OF INSULIN: ICD-10-CM

## 2024-04-09 RX ORDER — INSULIN LISPRO 100 [IU]/ML
INJECTION, SOLUTION INTRAVENOUS; SUBCUTANEOUS
Qty: 9 ML | Refills: 3 | Status: SHIPPED | OUTPATIENT
Start: 2024-04-09

## 2024-04-09 RX ORDER — INSULIN LISPRO 100 [IU]/ML
INJECTION, SOLUTION INTRAVENOUS; SUBCUTANEOUS
Qty: 9 ML | Refills: 3 | Status: SHIPPED | OUTPATIENT
Start: 2024-04-09 | End: 2024-04-09 | Stop reason: SDUPTHER

## 2024-04-10 NOTE — TELEPHONE ENCOUNTER
[facility] stated insulin lispro is being filled and was covered by insurance for $0 copay. I spoke to patient sister, Grisel, and informed her of this information. Unable to reach patient on any number in chart.

## 2024-04-17 PROBLEM — J18.9 PNEUMONIA OF RIGHT LOWER LOBE DUE TO INFECTIOUS ORGANISM: Status: ACTIVE | Noted: 2024-04-17

## 2024-04-17 PROBLEM — R00.0 SINUS TACHYCARDIA: Status: ACTIVE | Noted: 2024-04-17

## 2024-04-17 PROBLEM — R31.0 GROSS HEMATURIA: Status: RESOLVED | Noted: 2022-06-28 | Resolved: 2024-04-17

## 2024-04-17 PROBLEM — E87.5 HYPERKALEMIA: Status: RESOLVED | Noted: 2023-07-28 | Resolved: 2024-04-17

## 2024-04-17 PROBLEM — G93.41 ACUTE METABOLIC ENCEPHALOPATHY: Status: RESOLVED | Noted: 2023-02-06 | Resolved: 2024-04-17

## 2024-04-17 PROBLEM — R55 SYNCOPE: Status: ACTIVE | Noted: 2023-07-28

## 2024-04-17 PROBLEM — S42.032A CLOSED DISPLACED FRACTURE OF ACROMIAL END OF LEFT CLAVICLE: Status: ACTIVE | Noted: 2024-04-17

## 2024-04-18 PROBLEM — R00.0 SINUS TACHYCARDIA: Status: RESOLVED | Noted: 2024-04-17 | Resolved: 2024-04-18

## 2024-04-20 PROBLEM — R55 SYNCOPE: Status: RESOLVED | Noted: 2023-07-28 | Resolved: 2024-04-20

## 2024-04-23 ENCOUNTER — PATIENT OUTREACH (OUTPATIENT)
Dept: ADMINISTRATIVE | Facility: CLINIC | Age: 59
End: 2024-04-23
Payer: MEDICARE

## 2024-04-23 NOTE — PROGRESS NOTES
C3 nurse attempted to contact Kuldeep Alamo  for a TCC post hospital discharge follow up call. No answer. No voicemail available.The patient does not have a scheduled HOSFU appointment. Message sent to PCP staff for assistance with scheduling visit with patient.

## 2024-04-24 NOTE — PROGRESS NOTES
2nd Attempt made to reach patient for TCC call. The patient is unable to conduct the call @ this time. The patient requested a callback.    Pt advised stuck outside in mobility chair. Pt confirms assistance is being provided.

## 2024-04-30 ENCOUNTER — PATIENT MESSAGE (OUTPATIENT)
Dept: ADMINISTRATIVE | Facility: HOSPITAL | Age: 59
End: 2024-04-30
Payer: MEDICARE

## 2024-04-30 ENCOUNTER — PATIENT OUTREACH (OUTPATIENT)
Dept: ADMINISTRATIVE | Facility: HOSPITAL | Age: 59
End: 2024-04-30
Payer: MEDICARE

## 2024-04-30 NOTE — PROGRESS NOTES
Population Health Chart Review & Patient Outreach Details      Additional [facility] Health Notes:               Updates Requested / Reviewed:      Updated Care Coordination Note, Care Everywhere, Care Team Updated, and Immunizations Reconciliation Completed or Queried: [facility] Topics Overdue:      [facility] Score: 4     Colon Cancer Screening  Urine Screening  Eye Exam  Uncontrolled BP    RSV Vaccine                  Health Maintenance Topic(s) Outreach Outcomes & Actions Taken:    Lab(s) - Outreach Outcomes & Actions Taken  :      Primary Care Appt - Outreach Outcomes & Actions Taken  :

## 2024-05-01 ENCOUNTER — TELEPHONE (OUTPATIENT)
Dept: UROLOGY | Facility: CLINIC | Age: 59
End: 2024-05-01
Payer: MEDICARE

## 2024-05-01 NOTE — TELEPHONE ENCOUNTER
----- Message from Nikki Zarco sent at 5/1/2024  4:28 PM CDT -----  Regarding: soon appt  Type:  Sooner Apoointment Request    Caller is requesting a sooner appointment.  Caller declined first available appointment listed below.  Caller will not accept being placed on the waitlist and is requesting a message be sent to doctor.  Name of Caller:Kuldeep  When is the first available appointment?Nov 26  Symptoms:urine bloody  Would the patient rather a call back or a response via [facility]? Call  Best Call Back Number:[phone]  Additional Information:

## 2024-05-02 ENCOUNTER — TELEPHONE (OUTPATIENT)
Dept: FAMILY MEDICINE | Facility: CLINIC | Age: 59
End: 2024-05-02
Payer: MEDICARE

## 2024-05-02 NOTE — TELEPHONE ENCOUNTER
Returned call and spoke to patient, he stated he didn't call but he had an appointment with a provider(wasn't specific on who)

## 2024-05-02 NOTE — TELEPHONE ENCOUNTER
Spoke with pt in regards of his message. Pt called in regards of him needing a hospital follow up apt. Pt made his apt for 5/8/2024 for 11:20 am with Dr. Rock.

## 2024-05-02 NOTE — TELEPHONE ENCOUNTER
----- Message from Nikki Zarco sent at 5/1/2024  4:24 PM CDT -----  Regarding: hfu  Type:  Follow up     Who Called: pt  Would the patient rather a call back or a response via [facility]? Call  Best Call Back Number: [phone]  Additional Information: released from the hospital a week ago and needs a follow up appt

## 2024-05-09 ENCOUNTER — DOCUMENT SCAN (OUTPATIENT)
Dept: HOME HEALTH SERVICES | Facility: HOSPITAL | Age: 59
End: 2024-05-09
Payer: MEDICARE

## 2024-05-13 ENCOUNTER — DOCUMENT SCAN (OUTPATIENT)
Dept: HOME HEALTH SERVICES | Facility: HOSPITAL | Age: 59
End: 2024-05-13
Payer: MEDICARE

## 2024-05-14 ENCOUNTER — EXTERNAL HOME HEALTH (OUTPATIENT)
Dept: HOME HEALTH SERVICES | Facility: HOSPITAL | Age: 59
End: 2024-05-14
Payer: MEDICARE

## 2024-05-16 ENCOUNTER — DOCUMENT SCAN (OUTPATIENT)
Dept: HOME HEALTH SERVICES | Facility: HOSPITAL | Age: 59
End: 2024-05-16
Payer: MEDICARE

## 2024-05-17 ENCOUNTER — TELEPHONE (OUTPATIENT)
Dept: FAMILY MEDICINE | Facility: CLINIC | Age: 59
End: 2024-05-17
Payer: MEDICARE

## 2024-05-17 NOTE — TELEPHONE ENCOUNTER
----- Message from Amna Patel sent at 5/17/2024 12:48 PM CDT -----  Type:  Needs Medical Advice    Who Called: pt  [id number] [place] [facility] [address] [facility] [facility]  Would the patient rather a call back or a response via [facility]? call  Best Call Back Number: [phone]  Additional Information: pt is having another uti would like to see if antibiotics can be called in amoxicillin-clav 875-125mg

## 2024-05-21 ENCOUNTER — LAB VISIT (OUTPATIENT)
Dept: LAB | Facility: HOSPITAL | Age: 59
End: 2024-05-21
Attending: UROLOGY
Payer: MEDICARE

## 2024-05-21 DIAGNOSIS — N39.0 URINARY TRACT INFECTION, SITE NOT SPECIFIED: ICD-10-CM

## 2024-05-21 DIAGNOSIS — T83.510A: ICD-10-CM

## 2024-05-21 DIAGNOSIS — N39.0 URINARY TRACT INFECTION, SITE NOT SPECIFIED: Primary | ICD-10-CM

## 2024-05-21 PROCEDURE — 87086 URINE CULTURE/COLONY COUNT: CPT | Mod: HCNC

## 2024-05-21 PROCEDURE — 87077 CULTURE AEROBIC IDENTIFY: CPT | Mod: 59,HCNC

## 2024-05-21 PROCEDURE — 87186 SC STD MICRODIL/AGAR DIL: CPT | Mod: HCNC

## 2024-05-21 PROCEDURE — 87088 URINE BACTERIA CULTURE: CPT | Mod: HCNC

## 2024-05-21 NOTE — TELEPHONE ENCOUNTER
I called [facility]  and spoke to Sandra, gave her verbal order for UA and Urine culture to be collected from SPT catheter to be collected prior to starting Augmentin. Called patient at [phone] to tell him to not start the antibiotics until sample is collected but the number is not in service. Called [phone] and spoke to patient notifying him to not start abx until after sample collected. Sandra with  states they will go collect sample today. Patient verbalized understanding of the plan.

## 2024-05-24 ENCOUNTER — PATIENT MESSAGE (OUTPATIENT)
Dept: UROLOGY | Facility: CLINIC | Age: 59
End: 2024-05-24
Payer: MEDICARE

## 2024-05-24 DIAGNOSIS — N39.0 URINARY TRACT INFECTION ASSOCIATED WITH CYSTOSTOMY CATHETER, INITIAL ENCOUNTER: Primary | ICD-10-CM

## 2024-05-24 DIAGNOSIS — T83.510A URINARY TRACT INFECTION ASSOCIATED WITH CYSTOSTOMY CATHETER, INITIAL ENCOUNTER: Primary | ICD-10-CM

## 2024-05-24 LAB
BACTERIA UR CULT: ABNORMAL
BACTERIA UR CULT: ABNORMAL

## 2024-05-24 RX ORDER — ERTAPENEM 1 G/1
1 INJECTION, POWDER, LYOPHILIZED, FOR SOLUTION INTRAMUSCULAR; INTRAVENOUS DAILY
Qty: 10 G | Refills: 0 | Status: SHIPPED | OUTPATIENT
Start: 2024-05-24 | End: 2024-05-29

## 2024-05-24 NOTE — ASSESSMENT & PLAN NOTE
Wound is improved since most recent vascular intervention.  The wound bed is mostly granulating.  There is minimal slough present.  Edema is fairly well controlled.  Continue current management.  
before meals and at bedtime

## 2024-05-27 ENCOUNTER — TELEPHONE (OUTPATIENT)
Dept: FAMILY MEDICINE | Facility: CLINIC | Age: 59
End: 2024-05-27
Payer: MEDICARE

## 2024-05-27 NOTE — TELEPHONE ENCOUNTER
----- Message from Noa Anderson sent at 5/27/2024  2:53 PM CDT -----  Regarding: Refills  Contact: [phone]  Type:  RX Refill Request    Who Called: PT   Refill or New Rx: Refills   RX Name and Strength: oxyCODONE (ROXICODONE) 10 mg Tab immediate release tablet 30 tablet  How is the patient currently taking it? (ex. 1XDay):  Take 1 tablet (10 mg total) by mouth 3 (three) times daily as needed for Pain.   Is this a 30 day or 90 day RX: 30   Preferred Pharmacy with phone number: [facility]/pharmacy #[id number] - [place] - [address] AT [address] [facility] Phone: [phone] Fax: [phone]

## 2024-05-28 ENCOUNTER — TELEPHONE (OUTPATIENT)
Dept: FAMILY MEDICINE | Facility: CLINIC | Age: 59
End: 2024-05-28
Payer: MEDICARE

## 2024-05-28 NOTE — TELEPHONE ENCOUNTER
----- Message from Antwon Kowalski sent at 5/28/2024  1:47 PM CDT -----  Contact: Almaz Corbett  Type:  Patient Returning Call    Who Called:Almaz  Who Left Message for Patient:Shoshana Ag MA  Does the patient know what this is regarding?:yes  Would the patient rather a call back or a response via [facility]? call  Best Call Back Number:[phone]  Additional Information:

## 2024-05-28 NOTE — TELEPHONE ENCOUNTER
Called and spoke with pt's niece in regards of message on patient. Pt's niece Almaz informed me that pt needs a refill on his Oxycodone 10 mg . They are wanting to know can you refill medication since Dr. Galeano is out of the office. Please advise message.

## 2024-05-28 NOTE — TELEPHONE ENCOUNTER
Pt will need to call insurance and see who is covered, PM outside [facility] would be better as [facility] not really do chronic opoid medications either

## 2024-05-28 NOTE — TELEPHONE ENCOUNTER
----- Message from Candice Gonzalez sent at 5/28/2024 11:07 AM CDT -----  Type:  Needs Medical Advice    Who Called:  Pt jeradcris Muse    Would the patient rather a call back or a response via [facility]?  call  Best Call Back Number:   [phone]  Additional Information:  Jerzy would like a call back regarding pt medication   oxyCODONE (ROXICODONE) 10 mg Tab immediate release tablet [[id number]    Jerzy would like to know whether Dr Rock can have it refilled.  Jerzy will explain in detail once phone call has been received.     [facility]/pharmacy #[id number] - [place] - [address] AT [facility]   Phone: [phone]  Fax: [phone]

## 2024-05-28 NOTE — TELEPHONE ENCOUNTER
Poke with pt's angelita Muse in regards of this matter, and she verbalized understanding of message.

## 2024-05-28 NOTE — TELEPHONE ENCOUNTER
Called and spoke with pt's niece in regards of Dr. Rock's message. Pt's niece verbalized understanding of message, but informed me that patient does not have a pain management provider and that Dr. Galeano has been filling patient medication for a while. Please advise .

## 2024-05-29 DIAGNOSIS — N39.0 URINARY TRACT INFECTION ASSOCIATED WITH CYSTOSTOMY CATHETER, INITIAL ENCOUNTER: Primary | ICD-10-CM

## 2024-05-29 DIAGNOSIS — T83.510A URINARY TRACT INFECTION ASSOCIATED WITH CYSTOSTOMY CATHETER, INITIAL ENCOUNTER: Primary | ICD-10-CM

## 2024-05-31 ENCOUNTER — TELEPHONE (OUTPATIENT)
Dept: UROLOGY | Facility: CLINIC | Age: 59
End: 2024-05-31
Payer: MEDICARE

## 2024-05-31 NOTE — TELEPHONE ENCOUNTER
Spoke to Indu at [facility] who says she has been in touch with [facility] and everything is set up for home infusion. I called pt's sister Grisel and informed her of situation. Indu states she will call sister Grisel and set up midline insertion.

## 2024-06-04 NOTE — TELEPHONE ENCOUNTER
Patient set to start iv abx on 6/5, target completion date 6/15. Repeat urine culture after completion?

## 2024-06-12 ENCOUNTER — TELEPHONE (OUTPATIENT)
Dept: UROLOGY | Facility: CLINIC | Age: 59
End: 2024-06-12
Payer: MEDICARE

## 2024-06-12 NOTE — TELEPHONE ENCOUNTER
Sarah from [facility] called requesting to extend patient's iv's past 72 hours as long as they remain free of any s/s of infection. I gave them a verbal order from Dr. Silva permitting this. I asked them to please call the office and dc iv if redness, swelling or drainage begins from the iv site.

## 2024-06-12 NOTE — TELEPHONE ENCOUNTER
----- Message from Idalmis Colvin MA sent at 6/12/2024  2:42 PM CDT -----    ----- Message -----  From: Perla Obrien  Sent: 6/12/2024   2:41 PM CDT  To: Jose F SHEL[facility] Staff    Type:  Needs Medical Advice    Who Called: Sarah de paz/ [facility]   Symptoms (please be specific): order for it to be okay to remove IV when it will be done for Saturday   Would the patient rather a call back or a response via [facility]? Call back   Best Call Back Number: [phone]

## 2024-06-13 ENCOUNTER — TELEPHONE (OUTPATIENT)
Dept: UROLOGY | Facility: CLINIC | Age: 59
End: 2024-06-13

## 2024-06-13 ENCOUNTER — DOCUMENT SCAN (OUTPATIENT)
Dept: HOME HEALTH SERVICES | Facility: HOSPITAL | Age: 59
End: 2024-06-13
Payer: MEDICARE

## 2024-06-13 NOTE — TELEPHONE ENCOUNTER
Spoke to Sandra at [facility], gave her verbal order to please collect urine culture with next visit on 6/15 since catheter was just changed three days ago. IV abx will be complete by that time. I will continue to follow for culture results.

## 2024-06-15 ENCOUNTER — LAB VISIT (OUTPATIENT)
Dept: LAB | Facility: HOSPITAL | Age: 59
End: 2024-06-15
Attending: UROLOGY
Payer: MEDICARE

## 2024-06-15 DIAGNOSIS — N39.0 RECURRENT UTI: Primary | ICD-10-CM

## 2024-06-15 LAB
BACTERIA #/AREA URNS HPF: ABNORMAL /HPF
BILIRUB UR QL STRIP: NEGATIVE
CLARITY UR: CLEAR
COLOR UR: YELLOW
GLUCOSE UR QL STRIP: ABNORMAL
HGB UR QL STRIP: ABNORMAL
HYALINE CASTS #/AREA URNS LPF: 0 /LPF
KETONES UR QL STRIP: NEGATIVE
LEUKOCYTE ESTERASE UR QL STRIP: ABNORMAL
MICROSCOPIC COMMENT: ABNORMAL
NITRITE UR QL STRIP: NEGATIVE
PH UR STRIP: 6 [PH] (ref 5–8)
PROT UR QL STRIP: ABNORMAL
RBC #/AREA URNS HPF: 0 /HPF (ref 0–4)
SP GR UR STRIP: 1.01 (ref 1–1.03)
URN SPEC COLLECT METH UR: ABNORMAL
UROBILINOGEN UR STRIP-ACNC: NEGATIVE EU/DL
WBC #/AREA URNS HPF: 20 /HPF (ref 0–5)
YEAST URNS QL MICRO: ABNORMAL

## 2024-06-15 PROCEDURE — 87106 FUNGI IDENTIFICATION YEAST: CPT | Mod: HCNC

## 2024-06-15 PROCEDURE — 87088 URINE BACTERIA CULTURE: CPT | Mod: HCNC

## 2024-06-15 PROCEDURE — 87086 URINE CULTURE/COLONY COUNT: CPT | Mod: HCNC

## 2024-06-15 PROCEDURE — 81000 URINALYSIS NONAUTO W/SCOPE: CPT | Mod: HCNC

## 2024-06-17 LAB — BACTERIA UR CULT: ABNORMAL

## 2024-06-18 ENCOUNTER — TELEPHONE (OUTPATIENT)
Dept: FAMILY MEDICINE | Facility: CLINIC | Age: 59
End: 2024-06-18
Payer: MEDICARE

## 2024-06-18 NOTE — TELEPHONE ENCOUNTER
Spoke with [facility] in regards to message- patient need refill on his BH Concepcion needles- I ask what pharmacy to send- she states she not sure she will find out in the morning and call office back.

## 2024-06-18 NOTE — TELEPHONE ENCOUNTER
----- Message from Shagufta Muniz sent at 6/18/2024  4:45 PM CDT -----  Type:  Needs Medical Advice    Who Called: [facility]  Symptoms (please be specific): pt needs insulin supplies needles for his pen    Would the patient rather a call back or a response via [facility]? call  Best Call Back Number: [phone]  Additional Information:

## 2024-07-01 ENCOUNTER — DOCUMENT SCAN (OUTPATIENT)
Dept: HOME HEALTH SERVICES | Facility: HOSPITAL | Age: 59
End: 2024-07-01
Payer: MEDICARE

## 2024-07-02 ENCOUNTER — DOCUMENT SCAN (OUTPATIENT)
Dept: HOME HEALTH SERVICES | Facility: HOSPITAL | Age: 59
End: 2024-07-02
Payer: MEDICARE

## 2024-07-03 ENCOUNTER — DOCUMENT SCAN (OUTPATIENT)
Dept: HOME HEALTH SERVICES | Facility: HOSPITAL | Age: 59
End: 2024-07-03
Payer: MEDICARE

## 2024-07-03 DIAGNOSIS — E11.65 TYPE 2 DIABETES MELLITUS WITH HYPERGLYCEMIA, WITH LONG-TERM CURRENT USE OF INSULIN: ICD-10-CM

## 2024-07-03 DIAGNOSIS — Z79.4 TYPE 2 DIABETES MELLITUS WITH HYPERGLYCEMIA, WITH LONG-TERM CURRENT USE OF INSULIN: ICD-10-CM

## 2024-07-03 RX ORDER — INSULIN LISPRO 100 [IU]/ML
INJECTION, SOLUTION INTRAVENOUS; SUBCUTANEOUS
Qty: 9 ML | Refills: 3 | Status: SHIPPED | OUTPATIENT
Start: 2024-07-03

## 2024-07-03 NOTE — TELEPHONE ENCOUNTER
----- Message from Cheyenne Mcneill sent at 7/3/2024  4:00 PM CDT -----  Type:  RX Refill Request    Who Called: pt  Refill or New Rx:refill  RX Name and Strength:insulin lispro (HUMALOG KWIKPEN INSULIN) 100 unit/mL pen  Preferred Pharmacy with phone number:[facility]/pharmacy #[id number] - [place] - [address] AT [facility]  Local or Mail Order:local  Ordering Provider:local  Would the patient rather a call back or a response via [facility]? call  Best Call Back Number:[phone]  Additional Information:

## 2024-07-03 NOTE — TELEPHONE ENCOUNTER
----- Message from Cheyenne Osito sent at 7/3/2024  3:55 PM CDT -----  Type:  Call    Who Called:Ina/[facility]  Does the patient know what this is regarding?:blood sugar  Would the patient rather a call back or a response via [facility]? call  Best Call Back Number:[phone]  Additional Information: Blood sugar is uncontrolled even with the use of the sliding scale.  Please take a look at meds and maybe make a change.  It is funning in 300-400.

## 2024-07-03 NOTE — TELEPHONE ENCOUNTER
No care due was identified.  [facility] Embedded Care Due Messages. Reference number: [id number].   7/03/2024 4:14:26 PM CDT

## 2024-07-03 NOTE — TELEPHONE ENCOUNTER
Called and spoke with Ina with [facility]  in regards of message on patient. Ina informed me that pt's Blood sugar is uncontrolled even with the use of the sliding scale.  Please take a look at meds and maybe make a change.  It is funning in 300-400.  Ina informed me that pt told her even with sliding scale he has been taken more insulin than he should . Please advise message and med refill request.

## 2024-07-15 NOTE — TELEPHONE ENCOUNTER
"----- Message from Nikki Zarco sent at 7/15/2024  3:46 PM CDT -----  Regarding: refill  Type:  RX Refill Request    Who Called: pt   Refill or New Rx:refill   RX Name and Strength:BD ULTRA-FINE MICHAEL PEN NEEDLE 32 gauge x 5/32" Ndle  Preferred Pharmacy with phone number:[facility]/pharmacy #[id number] - [place] - [address] AT [address] [facility]  [address] [facility]  [place] [address]  Phone: [phone] Fax: [phone]  Local or Mail Order:Local   Ordering Provider:Champagne  Would the patient rather a call back or a response via [facility]? Call  Best Call Back Number:[phone]  Additional Information:  "

## 2024-07-15 NOTE — TELEPHONE ENCOUNTER
Care Due:                  Date            Visit Type   Department     Provider  --------------------------------------------------------------------------------                                HOSPITAL     DESC FAMILY  Last Visit: 04-      FOLLOW UP    [facility]  Jalyn AFSANEH Rock  Next Visit: None Scheduled  None         None Found                                                            Last  Test          Frequency    Reason                     Performed    Due Date  --------------------------------------------------------------------------------    Office Visit  15 months..  insulin..................  04- 07-    Health [facility] Embedded Care Due Messages. Reference number: [id number].   7/15/2024 4:03:44 PM CDT

## 2024-07-16 RX ORDER — PEN NEEDLE, DIABETIC 31 GX5/16"
1 NEEDLE, DISPOSABLE MISCELLANEOUS
Qty: 200 EACH | Refills: 11 | Status: SHIPPED | OUTPATIENT
Start: 2024-07-16

## 2024-07-22 PROBLEM — N39.0 URINARY TRACT INFECTION ASSOCIATED WITH CYSTOSTOMY CATHETER: Status: RESOLVED | Noted: 2023-02-03 | Resolved: 2024-07-22

## 2024-07-22 PROBLEM — T83.510A URINARY TRACT INFECTION ASSOCIATED WITH CYSTOSTOMY CATHETER: Status: RESOLVED | Noted: 2023-02-03 | Resolved: 2024-07-22

## 2024-07-22 PROBLEM — J18.9 PNEUMONIA OF RIGHT LOWER LOBE DUE TO INFECTIOUS ORGANISM: Status: RESOLVED | Noted: 2024-04-17 | Resolved: 2024-07-22

## 2024-08-02 ENCOUNTER — EXTERNAL HOME HEALTH (OUTPATIENT)
Dept: HOME HEALTH SERVICES | Facility: HOSPITAL | Age: 59
End: 2024-08-02
Payer: MEDICARE

## 2024-08-07 DIAGNOSIS — E11.65 TYPE 2 DIABETES MELLITUS WITH HYPERGLYCEMIA, WITH LONG-TERM CURRENT USE OF INSULIN: ICD-10-CM

## 2024-08-07 DIAGNOSIS — Z79.4 TYPE 2 DIABETES MELLITUS WITH HYPERGLYCEMIA, WITH LONG-TERM CURRENT USE OF INSULIN: ICD-10-CM

## 2024-08-07 RX ORDER — INSULIN LISPRO 100 [IU]/ML
INJECTION, SOLUTION INTRAVENOUS; SUBCUTANEOUS
Qty: 9 ML | Refills: 3 | Status: SHIPPED | OUTPATIENT
Start: 2024-08-07

## 2024-08-16 ENCOUNTER — TELEPHONE (OUTPATIENT)
Dept: FAMILY MEDICINE | Facility: CLINIC | Age: 59
End: 2024-08-16
Payer: MEDICARE

## 2024-08-16 NOTE — TELEPHONE ENCOUNTER
Called and spoke with Sugey from [facility] in regards of message on pt. Sugey informed me that pt has somewhat of an abrasion on his left butt cheek. Sugey is wanting to know can they use border foam on area and check it week by week for patient. Sugey also informed me that pt currently does not have insurance and he is in need of a med refill on his Humalog. Sugey informed em that pt is in between insurance coverage right now.  Please advise message.

## 2024-08-16 NOTE — TELEPHONE ENCOUNTER
----- Message from Nikki Zarco sent at 8/16/2024 10:43 AM CDT -----  Regarding: home health  Type:  Home Health     Who Called: Home Health   Would the patient rather a call back or a response via [facility]? Call  Best Call Back Number: [phone]  Additional Information: stated pt has a a wound on his left butt and one of the nurses will send a epic message with recommendations

## 2024-08-16 NOTE — TELEPHONE ENCOUNTER
Spoke with Tavo the nurse inform her verbally that per  she can use border foam on pt and check wound week by week/ I inform Sugey of dr instructions for insulin coverage till pt gets insurance that  recommends he takes Reli on OTC until insurance is covered but Sugey was not comfortable with giving OTC insulin so I call family members and pt to inform them of drs rec but no answer lvm for them to contact office.

## 2024-08-19 ENCOUNTER — PATIENT OUTREACH (OUTPATIENT)
Dept: ADMINISTRATIVE | Facility: HOSPITAL | Age: 59
End: 2024-08-19
Payer: MEDICARE

## 2024-08-19 DIAGNOSIS — Z79.4 TYPE 2 DIABETES MELLITUS WITH HYPERGLYCEMIA, WITH LONG-TERM CURRENT USE OF INSULIN: Primary | ICD-10-CM

## 2024-08-19 DIAGNOSIS — E11.65 TYPE 2 DIABETES MELLITUS WITH HYPERGLYCEMIA, WITH LONG-TERM CURRENT USE OF INSULIN: Primary | ICD-10-CM

## 2024-08-19 NOTE — PROGRESS NOTES
08/19/2024  VB chart audit performed. Care Everywhere updates requested and reviewed  Overdue HM topic chart audit and/or requested. LINKS triggered and reconciled. Media reviewed Lvm/portal sent regarding overdue health topics

## 2024-08-19 NOTE — PROGRESS NOTES
Health Maintenance Topic(s) Outreach Outcomes & Actions Taken:    Lab(s) - Outreach Outcomes & Actions Taken  : Overdue Lab(s) Ordered

## 2024-08-27 ENCOUNTER — PATIENT OUTREACH (OUTPATIENT)
Dept: ADMINISTRATIVE | Facility: HOSPITAL | Age: 59
End: 2024-08-27
Payer: MEDICARE

## 2024-09-17 ENCOUNTER — TELEPHONE (OUTPATIENT)
Dept: FAMILY MEDICINE | Facility: CLINIC | Age: 59
End: 2024-09-17
Payer: MEDICARE

## 2024-09-17 NOTE — TELEPHONE ENCOUNTER
----- Message from Shagufta Muniz sent at 9/17/2024  2:37 PM CDT -----  Type:  Patient Returning Call    [facility]  Who Left Message for Patient: office  Does the patient know what this is regarding?Epic message   Would the patient rather a call back or a response via [facility]? call  Best Call Back Number:[phone]  Additional Information:

## 2024-09-18 ENCOUNTER — TELEPHONE (OUTPATIENT)
Dept: FAMILY MEDICINE | Facility: CLINIC | Age: 59
End: 2024-09-18
Payer: MEDICARE

## 2024-09-18 NOTE — TELEPHONE ENCOUNTER
Called and spoke with pt in regards of his message. Pt called in regards of needing an apt for his left arm pain and swelling that he is having. Pt made his apt for 9/25/2024 for 11:20 am to come into the office for this matter. Are you ok with patient being in the 11:20 am time slot for this matter?

## 2024-09-18 NOTE — TELEPHONE ENCOUNTER
----- Message from Candice Gonzalez sent at 9/18/2024  1:07 PM CDT -----  Type:  Sooner Apoointment Request    Caller is requesting a sooner appointment.  Caller declined first available appointment listed below.  Caller will not accept being placed on the waitlist and is requesting a message be sent to doctor.  Name of Caller: Kuldeep Alamo  When is the first available appointment? 3/23/24  Symptoms: FU of L Arm  Would the patient rather a call back or a response via [facility]?  call  Best Call Back Number:  [phone]  Additional Information:  Pt was scheduled for a virtual appt on 9/12/24 but never received the call.

## 2024-09-25 ENCOUNTER — OFFICE VISIT (OUTPATIENT)
Dept: FAMILY MEDICINE | Facility: CLINIC | Age: 59
End: 2024-09-25
Payer: MEDICARE

## 2024-09-25 VITALS
TEMPERATURE: 98 F | DIASTOLIC BLOOD PRESSURE: 82 MMHG | WEIGHT: 173.06 LBS | HEART RATE: 71 BPM | HEIGHT: 60 IN | OXYGEN SATURATION: 96 % | BODY MASS INDEX: 33.98 KG/M2 | SYSTOLIC BLOOD PRESSURE: 130 MMHG

## 2024-09-25 DIAGNOSIS — M79.89 LEFT ARM SWELLING: Primary | ICD-10-CM

## 2024-09-25 DIAGNOSIS — S42.002S CLOSED DISPLACED FRACTURE OF LEFT CLAVICLE, UNSPECIFIED PART OF CLAVICLE, SEQUELA: ICD-10-CM

## 2024-09-25 PROCEDURE — 99215 OFFICE O/P EST HI 40 MIN: CPT | Mod: S$GLB,,, | Performed by: STUDENT IN AN ORGANIZED HEALTH CARE EDUCATION/TRAINING PROGRAM

## 2024-09-25 PROCEDURE — 3008F BODY MASS INDEX DOCD: CPT | Mod: CPTII,S$GLB,, | Performed by: STUDENT IN AN ORGANIZED HEALTH CARE EDUCATION/TRAINING PROGRAM

## 2024-09-25 PROCEDURE — 99999 PR PBB SHADOW E&M-EST. PATIENT-LVL III: CPT | Mod: PBBFAC,,, | Performed by: STUDENT IN AN ORGANIZED HEALTH CARE EDUCATION/TRAINING PROGRAM

## 2024-09-25 PROCEDURE — 3075F SYST BP GE 130 - 139MM HG: CPT | Mod: CPTII,S$GLB,, | Performed by: STUDENT IN AN ORGANIZED HEALTH CARE EDUCATION/TRAINING PROGRAM

## 2024-09-25 PROCEDURE — 4010F ACE/ARB THERAPY RXD/TAKEN: CPT | Mod: CPTII,S$GLB,, | Performed by: STUDENT IN AN ORGANIZED HEALTH CARE EDUCATION/TRAINING PROGRAM

## 2024-09-25 PROCEDURE — 3046F HEMOGLOBIN A1C LEVEL >9.0%: CPT | Mod: CPTII,S$GLB,, | Performed by: STUDENT IN AN ORGANIZED HEALTH CARE EDUCATION/TRAINING PROGRAM

## 2024-09-25 PROCEDURE — 1159F MED LIST DOCD IN RCRD: CPT | Mod: CPTII,S$GLB,, | Performed by: STUDENT IN AN ORGANIZED HEALTH CARE EDUCATION/TRAINING PROGRAM

## 2024-09-25 PROCEDURE — 3079F DIAST BP 80-89 MM HG: CPT | Mod: CPTII,S$GLB,, | Performed by: STUDENT IN AN ORGANIZED HEALTH CARE EDUCATION/TRAINING PROGRAM

## 2024-09-25 NOTE — PROGRESS NOTES
Subjective:      Patient ID: Kuldeep Alamo is a 59 y.o. male.    Chief Complaint: Arm Pain (Pt's left arm is swollen )    Complains of L arm swelling since he fell and fractured his clavicle  Had gone to the ED for this early August. US Upper Left Extremity 8/9/24 showed no thrombus   Denies any pain in his arm  Has not tried elevating the arm      Arm Pain   His dominant hand is their right hand. The incident occurred more than 1 week ago. The pain is at a severity of 0/10. Pertinent negatives include no chest pain, numbness or tingling. He has tried nothing for the symptoms.     Review of Systems   Constitutional:  Negative for chills and fever.   Cardiovascular:  Negative for chest pain.   Gastrointestinal:  Positive for constipation. Negative for abdominal pain, diarrhea, nausea and vomiting.   Genitourinary:  Negative for dysuria.   Musculoskeletal:  Positive for arthralgias and back pain.   Neurological:  Negative for tingling and numbness.        Objective:     Vitals:    09/25/24 1030   BP: 130/82   Pulse: 71   Temp: 97.9 °F (36.6 °C)      Physical Exam  HENT:      Head: Normocephalic.      Mouth/Throat:      Mouth: Mucous membranes are moist.      Pharynx: Oropharynx is clear.   Eyes:      Conjunctiva/sclera: Conjunctivae normal.   Cardiovascular:      Rate and Rhythm: Normal rate and regular rhythm.      Pulses: Normal pulses.      Heart sounds: Normal heart sounds.   Pulmonary:      Effort: Pulmonary effort is normal.      Breath sounds: Normal breath sounds.   Abdominal:      Palpations: Abdomen is soft.      Tenderness: There is no abdominal tenderness.   Genitourinary:     Comments: Suprapubic catheter in place  Musculoskeletal:      Left upper arm: Swelling and edema present.      Left forearm: Swelling, edema and tenderness present.      Right Lower Extremity: Right leg is amputated above knee.      Left Lower Extremity: Left leg is amputated above knee.   Neurological:      General: No focal  deficit present.      Mental Status: He is alert. Mental status is at baseline.        Assessment:         1. Left arm swelling    2. Closed displaced fracture of left clavicle, unspecified part of clavicle, sequela          Plan:     Problem List Items Addressed This Visit    None  Visit Diagnoses       Left arm swelling    -  Primary    Closed displaced fracture of left clavicle, unspecified part of clavicle, sequela                 Left arm swelling likley related to clavicle fracture from april; pt advised to f/u with ortho, he sees OSH   In meantime advised compression sleeve and elevation as much as possible   Pt also has trouble with glucose meters and gets readings all over the place; he has hx lows and highs; will try to get DEXCOM CGM approved to help get better glycemic management         Jalyn Rock DO   [facility]  9/25/24      I spent a total of 41 minutes on the day of the visit.This includes face to face time and non-face to face time preparing to see the patient (eg, review of tests), obtaining and/or reviewing separately obtained history, documenting clinical information in the electronic or other health record, independently interpreting results and communicating results to the patient/family/caregiver, or care coordinator.

## 2024-10-12 ENCOUNTER — EXTERNAL HOME HEALTH (OUTPATIENT)
Dept: HOME HEALTH SERVICES | Facility: HOSPITAL | Age: 59
End: 2024-10-12
Payer: MEDICARE

## 2024-10-16 DIAGNOSIS — Z79.4 TYPE 2 DIABETES MELLITUS WITH HYPERGLYCEMIA, WITH LONG-TERM CURRENT USE OF INSULIN: ICD-10-CM

## 2024-10-16 DIAGNOSIS — E11.65 TYPE 2 DIABETES MELLITUS WITH HYPERGLYCEMIA, WITH LONG-TERM CURRENT USE OF INSULIN: ICD-10-CM

## 2024-10-16 RX ORDER — INSULIN LISPRO 100 [IU]/ML
INJECTION, SOLUTION INTRAVENOUS; SUBCUTANEOUS
Qty: 9 ML | Refills: 3 | Status: SHIPPED | OUTPATIENT
Start: 2024-10-16

## 2024-10-16 RX ORDER — PREGABALIN 100 MG/1
100 CAPSULE ORAL 2 TIMES DAILY
Qty: 60 CAPSULE | Refills: 2 | Status: SHIPPED | OUTPATIENT
Start: 2024-10-16 | End: 2025-01-14

## 2024-10-16 NOTE — TELEPHONE ENCOUNTER
Care Due:                  Date            Visit Type   Department     Provider  --------------------------------------------------------------------------------                                EP -                              PRIMARY      DESC FAMILY  Last Visit: 09-      [facility]   [facility]  Jalynjuan carlos Rock  Next Visit: None Scheduled  None         None Found                                                            Last  Test          Frequency    Reason                     Performed    Due Date  --------------------------------------------------------------------------------    HBA1C.......  6 months...  insulin..................  07- 01-    [facility] Embedded Care Due Messages. Reference number: [id number].   10/16/2024 1:51:11 PM CDT

## 2024-10-16 NOTE — TELEPHONE ENCOUNTER
----- Message from Antwon sent at 10/15/2024  1:08 PM CDT -----  Contact: Grisel  Type: Requesting to speak with nurse        Who Called: PT spouse   Regarding: they need home health more than outpatient therapy. They need someone to change his catheters    Would the patient rather a call back or a response via [facility]? Call back  Best Call Back Number: [phone]

## 2024-10-16 NOTE — TELEPHONE ENCOUNTER
Called and spoke with pt's sister in regards of message on patient. Pt's sister was calling to see why patient's HH services were stopped. Patient's sister informed she found out that since pt is getting PT outside of the home , he either has to do either one or the other services due to his insurance. Patient's sister wants orders placed for HH instead since pt is needing his cathter changed and he can do PT at home with HH instead.  Please advise message and place HH orders in system for patient. Patient is also needing medication refills as well. Please advise message.

## 2024-10-21 NOTE — TELEPHONE ENCOUNTER
----- Message from Pillo Helton sent at 11/10/2022 12:12 PM CST -----  Type:  Needs Medical Advice    Who Called: sis  Reason:returning call  Would the patient rather a call back or a response via [facility]? garrison  Best Call Back Number:   [phone]  Additional Information: none      
Called and spoke with pt's sister in regards of message on pt. Informed pt's sister Patsi that we did get her call yesterday in regards of pt needing an order for a new power chair. Informed Patsi that we have faxed over order to [facility] for pt in regards of his chair. Pt's sister verbalized understanding of message.   
no
no

## 2024-10-23 ENCOUNTER — DOCUMENT SCAN (OUTPATIENT)
Dept: HOME HEALTH SERVICES | Facility: HOSPITAL | Age: 59
End: 2024-10-23
Payer: MEDICARE

## 2024-10-28 ENCOUNTER — TELEPHONE (OUTPATIENT)
Dept: FAMILY MEDICINE | Facility: CLINIC | Age: 59
End: 2024-10-28
Payer: MEDICARE

## 2024-11-13 ENCOUNTER — TELEPHONE (OUTPATIENT)
Dept: FAMILY MEDICINE | Facility: CLINIC | Age: 59
End: 2024-11-13
Payer: MEDICARE

## 2024-11-13 NOTE — TELEPHONE ENCOUNTER
Called and spoke with pt's sister in regards of message on patient. Informed pt's sister that patient needs to reach out to his Urologist in regards of this matter, so that he can be seen for a catcher change and follow up. Pt's sister verbalized understanding of message.

## 2024-11-13 NOTE — TELEPHONE ENCOUNTER
----- Message from Antwon sent at 11/13/2024 10:02 AM CST -----  Contact: pt  Type: Requesting to speak with nurse        Who Called: PT sister   Regarding: catheter needs to be changed. Home health care needed.   Would the patient rather a call back or a response via [facility]? Call back  Best Call Back Number: [phone]  Additional Information:

## 2024-11-14 ENCOUNTER — OFFICE VISIT (OUTPATIENT)
Dept: OPTOMETRY | Facility: CLINIC | Age: 59
End: 2024-11-14
Payer: MEDICARE

## 2024-11-14 DIAGNOSIS — H25.13 NUCLEAR SCLEROSIS OF BOTH EYES: ICD-10-CM

## 2024-11-14 DIAGNOSIS — H40.013 OPEN ANGLE WITH BORDERLINE FINDINGS OF BOTH EYES: ICD-10-CM

## 2024-11-14 DIAGNOSIS — H35.033 HYPERTENSIVE RETINOPATHY OF BOTH EYES: ICD-10-CM

## 2024-11-14 DIAGNOSIS — H52.7 REFRACTIVE ERROR: ICD-10-CM

## 2024-11-14 DIAGNOSIS — E11.9 TYPE 2 DIABETES MELLITUS WITHOUT RETINOPATHY: Primary | ICD-10-CM

## 2024-11-14 PROCEDURE — 99999 PR PBB SHADOW E&M-EST. PATIENT-LVL I: CPT | Mod: PBBFAC,,, | Performed by: OPTOMETRIST

## 2024-11-14 PROCEDURE — 3046F HEMOGLOBIN A1C LEVEL >9.0%: CPT | Mod: CPTII,S$GLB,, | Performed by: OPTOMETRIST

## 2024-11-14 PROCEDURE — 1159F MED LIST DOCD IN RCRD: CPT | Mod: CPTII,S$GLB,, | Performed by: OPTOMETRIST

## 2024-11-14 PROCEDURE — 4010F ACE/ARB THERAPY RXD/TAKEN: CPT | Mod: CPTII,S$GLB,, | Performed by: OPTOMETRIST

## 2024-11-14 PROCEDURE — 92015 DETERMINE REFRACTIVE STATE: CPT | Mod: S$GLB,,, | Performed by: OPTOMETRIST

## 2024-11-14 PROCEDURE — 92014 COMPRE OPH EXAM EST PT 1/>: CPT | Mod: S$GLB,,, | Performed by: OPTOMETRIST

## 2024-11-14 NOTE — PROGRESS NOTES
HPI    CC: 58 yo M presents today for diabetic eye exam. Pt reports noticeable   vision changes. Pt did not bring prescription glasses with him today. Pt   denies eye pain, floaters and flashes.    ARA: 12/22/2021    (+) Changes in vision   (-) Pain  (-) Irritation   (-) Itching   (-) Flashes  (-) Floaters  (+) Glasses wearer  (-) CL wearer  (-) Uses eye gtts    Does patient want a refraction today? yes    (-) Eye injury  (-) Eye surgery   (+)POHx   Combined form of age-related cataract, both eyes  Dry eye syndrome of both eyes  Myopia with astigmatism and presbyopia, bilateral  (-)FOHx    (+)DM  Hemoglobin A1C       Date                     Value               Ref Range             Status                07/08/2024               9.8 (H)             4.0 - 5.6 %           Final                  04/17/2024               8.7 (H)             4.0 - 5.6 %           Final                  07/29/2023               7.2 (H)             4.0 - 5.6 %           Final                  05/15/2020               7.5                 %                     Final                   Last edited by Amna Trevino, OD on 11/14/2024 10:03 AM.            Assessment /Plan     For exam results, see Encounter Report.    Type 2 diabetes mellitus without retinopathy    Hypertensive retinopathy of both eyes    Nuclear sclerosis of both eyes    Open angle with borderline findings of both eyes  -     OCT, Optic Nerve - OU - Both Eyes; Future    Refractive error      No retinopathy noted, OU. Continue proper BS control and annual diabetic eye exams. Monitor yearly.      2. Vessel changes, OU. Discussed importance of BP control, taking medications as directed, and following-up with primary care. If changes in vision noted, RTC. Monitor yearly unless changes noted sooner.      3. Educated pt on findings. Mildly visually significant, however, no need for removal at this time. Monitor yearly.      4. Educated pt on findings. Large c/d OD>OS. IOP WNL. Will  order baseline RNFL OCT. Will call pt with results. Determine further management/treatment at that time. Monitor.     5. Updated SRx. Mild change from habitual. Monitor yearly.        RTC for RNFL OCT as directed.

## 2024-11-21 ENCOUNTER — TELEPHONE (OUTPATIENT)
Dept: FAMILY MEDICINE | Facility: CLINIC | Age: 59
End: 2024-11-21
Payer: MEDICARE

## 2024-11-21 DIAGNOSIS — Z93.59 SUPRAPUBIC CATHETER: Primary | ICD-10-CM

## 2024-11-21 DIAGNOSIS — L89.322 PRESSURE INJURY OF LEFT BUTTOCK, STAGE 2: ICD-10-CM

## 2024-11-21 NOTE — TELEPHONE ENCOUNTER
----- Message from Noa sent at 11/21/2024  9:43 AM CST -----  Regarding: Call back  Contact: [phone] or [phone]  Who Called: PT     Type:  Needs Medical Advice    Who Called: PT   Symptoms (please be specific): rash on his bottom for sitting on his wheel chair skin look red since is open skin  Pharmacy name and phone #:  [id number] - [place] - [address] AT [facility] Phone: [phone] Fax: [phone]  Would the patient rather a call back or a response via [facility]? Call back   Best Call Back Number:  [phone] or [phone]  Additional Information: patient will need orders for home health

## 2024-11-21 NOTE — TELEPHONE ENCOUNTER
----- Message from Antwon sent at 11/21/2024 12:57 PM CST -----  Contact: pt sister  Type:  Patient Returning Call    Who Called:pt's sister ELHAM BARKER  Who Left Message for Patient: Shoshana  Does the patient know what this is regarding?: yes  Would the patient rather a call back or a response via [facility]? Call   Best Call Back Number:[phone]  Additional Information: please around 3 or 4:00

## 2024-11-21 NOTE — TELEPHONE ENCOUNTER
Called and spoke with pt's sister Matthew in regards of message on patient. Pt's sister informed me that patient has a skin break or decubitus ulcer on his butt checks. Pt's sister informed me that it's larger on the left than on the right. Pt' sister informed me that she had been using Marla's butt pasta on patient's butt. She also informed me that pt does not get out of his power wheelchair at all. She states that pt sleeps in chair and etc all day. Patient's sister is wanted to know what can she do to help treat pt's wounds. She is also wanting orders placed for HH as well. Patient was getting HH ,but that stopped. Please advise patient message.

## 2024-11-22 ENCOUNTER — TELEPHONE (OUTPATIENT)
Dept: FAMILY MEDICINE | Facility: CLINIC | Age: 59
End: 2024-11-22
Payer: MEDICARE

## 2024-11-22 NOTE — TELEPHONE ENCOUNTER
Called and spoke with pt's sister Pasti in regards of NP David [facility] for [facility]. Patient's sister Pasti verbalized understanding of message.

## 2024-11-22 NOTE — TELEPHONE ENCOUNTER
Called and spoke with pt's sister in regards of message on patient. Pt's sister informed me that [facility] informed her that patient needs to be seen in the clinic before HH can start their services on the patient at all.  Informed pt's sister that I don't have any sooner appointments anytime soon due to the holidays coming up.      Patient's sister is wanting to know what can she do to help treat the  skin break or decubitus ulcer on his butt cheeks. Please advise message.

## 2024-11-22 NOTE — TELEPHONE ENCOUNTER
----- Message from Radha sent at 11/22/2024  2:21 PM CST -----  .Type:  Needs Medical Advice    Who Called: pt sister Shirlene    Would the patient rather a call back or a response via [facility]? Call back  Best Call Back Number:   Additional Information:     Pt sister stated pt need to be seen by a doctor as soon as possible before home health can see him please call pt sister back

## 2024-12-04 ENCOUNTER — OFFICE VISIT (OUTPATIENT)
Dept: FAMILY MEDICINE | Facility: CLINIC | Age: 59
End: 2024-12-04
Payer: MEDICARE

## 2024-12-04 VITALS
HEART RATE: 100 BPM | OXYGEN SATURATION: 97 % | DIASTOLIC BLOOD PRESSURE: 76 MMHG | WEIGHT: 173.06 LBS | HEIGHT: 60 IN | BODY MASS INDEX: 33.98 KG/M2 | TEMPERATURE: 99 F | SYSTOLIC BLOOD PRESSURE: 124 MMHG

## 2024-12-04 DIAGNOSIS — E11.65 TYPE 2 DIABETES MELLITUS WITH HYPERGLYCEMIA, WITH LONG-TERM CURRENT USE OF INSULIN: ICD-10-CM

## 2024-12-04 DIAGNOSIS — L89.310 PRESSURE INJURY OF RIGHT BUTTOCK, UNSTAGEABLE: Primary | ICD-10-CM

## 2024-12-04 DIAGNOSIS — G89.29 OTHER CHRONIC PAIN: ICD-10-CM

## 2024-12-04 DIAGNOSIS — L89.320 PRESSURE INJURY OF LEFT BUTTOCK, UNSTAGEABLE: ICD-10-CM

## 2024-12-04 DIAGNOSIS — Z93.59 SUPRAPUBIC CATHETER: ICD-10-CM

## 2024-12-04 DIAGNOSIS — Z79.4 TYPE 2 DIABETES MELLITUS WITH HYPERGLYCEMIA, WITH LONG-TERM CURRENT USE OF INSULIN: ICD-10-CM

## 2024-12-04 DIAGNOSIS — Z12.5 SCREENING FOR MALIGNANT NEOPLASM OF PROSTATE: ICD-10-CM

## 2024-12-04 DIAGNOSIS — Z23 FLU VACCINE NEED: ICD-10-CM

## 2024-12-04 PROCEDURE — 99215 OFFICE O/P EST HI 40 MIN: CPT | Mod: 25,S$GLB,, | Performed by: NURSE PRACTITIONER

## 2024-12-04 PROCEDURE — 99999 PR PBB SHADOW E&M-EST. PATIENT-LVL III: CPT | Mod: PBBFAC,,, | Performed by: NURSE PRACTITIONER

## 2024-12-04 PROCEDURE — 3008F BODY MASS INDEX DOCD: CPT | Mod: CPTII,S$GLB,, | Performed by: NURSE PRACTITIONER

## 2024-12-04 PROCEDURE — 1160F RVW MEDS BY RX/DR IN RCRD: CPT | Mod: CPTII,S$GLB,, | Performed by: NURSE PRACTITIONER

## 2024-12-04 PROCEDURE — G0008 ADMIN INFLUENZA VIRUS VAC: HCPCS | Mod: S$GLB,,, | Performed by: NURSE PRACTITIONER

## 2024-12-04 PROCEDURE — 90656 IIV3 VACC NO PRSV 0.5 ML IM: CPT | Mod: S$GLB,,, | Performed by: NURSE PRACTITIONER

## 2024-12-04 PROCEDURE — 3078F DIAST BP <80 MM HG: CPT | Mod: CPTII,S$GLB,, | Performed by: NURSE PRACTITIONER

## 2024-12-04 PROCEDURE — 3046F HEMOGLOBIN A1C LEVEL >9.0%: CPT | Mod: CPTII,S$GLB,, | Performed by: NURSE PRACTITIONER

## 2024-12-04 PROCEDURE — 1159F MED LIST DOCD IN RCRD: CPT | Mod: CPTII,S$GLB,, | Performed by: NURSE PRACTITIONER

## 2024-12-04 PROCEDURE — 4010F ACE/ARB THERAPY RXD/TAKEN: CPT | Mod: CPTII,S$GLB,, | Performed by: NURSE PRACTITIONER

## 2024-12-04 PROCEDURE — 3074F SYST BP LT 130 MM HG: CPT | Mod: CPTII,S$GLB,, | Performed by: NURSE PRACTITIONER

## 2024-12-04 NOTE — PROGRESS NOTES
" Patient ID: Kuldeep Alamo is a 59 y.o. male.     Chief Complaint: Wound Check (Pt has a wound on his butt and lower back pain )      HPI:  Mr Alamo presents to clinic today for assessment of chandra buttock wounds to get certified for HH. He has multiple chronic illnesses including uncontrolled DM, HTN, chronic vascular insufficiency, Chandra AKA, W/C dependency, S/P CVA. He reports he has had several wounds to buttocks over the years most recently had one to left buttock that wa healed by HH in the summer. The current wounds are chandra with left > right. He does not pictures of wounds and reports no one has looked at them. He denies any known purulent drainage, does have some serosanguinous drainage on clothing. He denies fever. He does repot they are painful; He endorses that he sits in his wheelchair for > 12 hours daily. He can transfer but "its too much trouble" to move to different positions throughout the day. He is continent of stool, and has catheter,. He refuses to have the wounds looked at today. He is requesting refills of Pain meds and Xanax. He reports compliance with medications.         Review of Systems  Review of Systems   Constitutional:  Negative for chills, diaphoresis, fever, malaise/fatigue and weight loss.        Chronic pain    HENT:  Negative for congestion, hearing loss and sinus pain.    Eyes:  Negative for blurred vision, double vision and discharge.   Respiratory:  Positive for cough and sputum production. Negative for shortness of breath.         Chronic cough due to COPD    Cardiovascular:  Negative for chest pain, palpitations and orthopnea.   Gastrointestinal:  Negative for constipation, diarrhea, heartburn and nausea.   Genitourinary:         Pos for Suprapubic cath    Musculoskeletal:  Positive for back pain, joint pain and myalgias.   Skin:  Negative for itching and rash.   Neurological:  Negative for dizziness, weakness and headaches.   Endo/Heme/Allergies:  Does not bruise/bleed " "easily.   Psychiatric/Behavioral:  Negative for depression. The patient is nervous/anxious.        Currently Medications  Current Outpatient Medications on File Prior to Visit   Medication Sig Dispense Refill    albuterol (PROVENTIL/VENTOLIN HFA) 90 mcg/actuation inhaler Inhale 2 puffs into the lungs 4 (four) times daily as needed.      albuterol-ipratropium (DUO-NEB) 2.5 mg-0.5 mg/3 mL nebulizer solution Use 1 vial (3 mLs) by nebulization every 6 (six) hours while awake. Rescue 180 mL 0    ALPRAZolam (XANAX) 0.5 MG tablet Take 2 tablets (1 mg total) by mouth every evening.      apixaban (ELIQUIS) 5 mg Tab Take 1 tablet (5 mg total) by mouth 2 (two) times daily.      ascorbic acid, vitamin C, (VITAMIN C) 500 MG tablet Take 1 tablet (500 mg total) by mouth 2 (two) times daily with meals.      aspirin 81 MG Chew Take 81 mg by mouth once daily.      BD ULTRA-FINE MICHAEL PEN NEEDLE 32 gauge x 5/32" Ndle Inject 1 each into the skin 5 (five) times daily. 200 each 11    blood sugar diagnostic (BLOOD GLUCOSE TEST) Strp INJECT 1 STRIP INTO THE SKIN 3 (THREE) TIMES DAILY. TEST SUGAR 3 TIMES DAILY. - SUBCUTANEOUS. ICD -10 E11.65 & Z79.4 300 strip 3    calcium carbonate (TUMS) 200 mg calcium (500 mg) chewable tablet Take 2 tablets (1,000 mg total) by mouth 3 (three) times daily as needed for Heartburn. 60 tablet 0    DULoxetine (CYMBALTA) 60 MG capsule Take 60 mg by mouth once daily.      fluticasone propionate (FLONASE) 50 mcg/actuation nasal spray 2 sprays by Each Nostril route 2 (two) times daily as needed.      fluticasone-umeclidin-vilanter (TRELEGY ELLIPTA) 200-62.5-25 mcg inhaler Inhale 1 puff into the lungs daily as needed.      insulin (LANTUS SOLOSTAR U-100 INSULIN) glargine 100 units/mL SubQ pen Inject 15 Units into the skin 2 (two) times a day. 27 mL 3    insulin lispro (HUMALOG KWIKPEN INSULIN) 100 unit/mL pen INJECT THREE UNITS SUBCUTANEOUSLY THREE TIMES DAILY WITH MEALS, PLUS CORRECTION SCALE MAX TDD 25 UNITS 9 mL " 3    lancets (ONETOUCH ULTRASOFT LANCETS) Misc Inject 1 each into the skin 3 (three) times daily before meals. 300 each 5    lisinopriL (PRINIVIL,ZESTRIL) 40 MG tablet Take 40 mg by mouth once daily.      medical supply, miscellaneous (O-2 SUSPENSORY MISC) 2 Liter AS NEEDED (route: Oxygen)      nitroGLYCERIN (NITROSTAT) 0.4 MG SL tablet DISSOLVE 1 TAB UNDER TONGUE EVERY 5 MINUTS AT ONSET OF CHEST PAIN-MAX 3 PER 15 MINUTES--GO TO ER (Patient taking differently: Place 0.4 mg under the tongue every 5 (five) minutes as needed for Chest pain. MAX 3 PER 15 MINUTES--GO TO ER) 25 tablet 11    NOVOLOG FLEXPEN U-100 INSULIN 100 unit/mL (3 mL) InPn pen Inject 8 Units into the skin 3 (three) times daily with meals.      nystatin (MYCOSTATIN) ointment Apply topically 2 (two) times daily as needed.      oxyCODONE (ROXICODONE) 10 mg Tab immediate release tablet Take 1 tablet (10 mg total) by mouth 3 (three) times daily as needed for Pain. 30 tablet 0    OZEMPIC 0.25 mg or 0.5 mg (2 mg/3 mL) pen injector SMARTSI.25 Milligram(s) SUB-Q Once a Week      pantoprazole (PROTONIX) 40 MG tablet TAKE 1 TABLET BY MOUTH EVERY DAY (Patient taking differently: Take 40 mg by mouth once daily. Do not crush, break, chew) 90 tablet 1    pregabalin (LYRICA) 100 MG capsule Take 1 capsule (100 mg total) by mouth 2 (two) times daily. 60 capsule 2    propranoloL (INDERAL) 80 MG tablet Take 0.5 tablets (40 mg total) by mouth 2 (two) times daily.      rosuvastatin (CRESTOR) 40 MG Tab Take 10 mg by mouth every evening.      tamsulosin (FLOMAX) 0.4 mg Cap Take 0.4 mg by mouth every evening.      baclofen (LIORESAL) 20 MG tablet Take 0.5 tablets (10 mg total) by mouth 3 (three) times daily. 45 tablet 2     No current facility-administered medications on file prior to visit.       Allergies  Review of patient's allergies indicates:   Allergen Reactions    Sulfa (sulfonamide antibiotics) Nausea And Vomiting        Health Maintenance  Health Maintenance  Topics with due status: Not Due       Topic Last Completion Date    TETANUS VACCINE 04/17/2024    Lipid Panel 07/08/2024    Foot Exam 09/25/2024    Eye Exam 11/14/2024          PMH:  Past Medical History:   Diagnosis Date    Acute on chronic diastolic CHF (congestive heart failure), NYHA class 3 12/30/2017    CAD (coronary artery disease) 1/7/2013    Cerebral vascular accident     Chronic atrial fibrillation 9/13/2023    COPD (chronic obstructive pulmonary disease)     Cough syncope 6/15/2017    Diabetes mellitus     Disorder of kidney and ureter     Hyperlipidemia     Hypertension     Laceration of right hand without foreign body 12/23/2019    Patient sustained injury to dorsum of right hand due to ronda. No significant pain. No significant drainage. No fever or chills    Localized edema of right lower leg 12/7/2017    S/P CABG (coronary artery bypass graft) 1/7/2013    Ulcer of right pretibial region, limited to breakdown of skin 7/2/2018    Wound identified 6/18/2018 that is most likely related to tight dressing.     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      Past Surgical History:   Procedure Laterality Date    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    Procedure: AMPUTATION, ABOVE KNEE;  Surgeon: Ben Goyal MD;  Location: [address];  Service: Vascular;  Laterality: Right;    ABOVE-KNEE AMPUTATION Left 9/22/2021    Procedure: AMPUTATION, ABOVE KNEE;  Surgeon: DOUGLAS Siegel II, MD;  Location: [facility] OR [address] [place] [address];  Service: Vascular;  Laterality: Left;    ANGIOGRAPHY OF LOWER EXTREMITY Right 2/19/2019    Procedure: Angiogram Extremity Unilateral;  Surgeon: Rudi Galeano MD;  Location: [facility] CATH LAB;  Service: Cardiology;  Laterality: Right;    ANGIOGRAPHY OF LOWER EXTREMITY Right 7/19/2019    Procedure: Angiogram Extremity Unilateral;  Surgeon: Rudi Galeano MD;  Location: [facility] CATH LAB;  Service: Cardiology;  Laterality: Right;    APPLICATION OF WOUND  "VACUUM-ASSISTED CLOSURE DEVICE Right 7/28/2020    Procedure: APPLICATION, WOUND VAC;  Surgeon: Yolanda Meyers DPM;  Location: [facility] OR;  Service: Podiatry;  Laterality: Right;    BONE BIOPSY Right 7/28/2020    Procedure: BIOPSY, BONE;  Surgeon: Yolanda Meyers DPM;  Location: [facility] OR;  Service: Podiatry;  Laterality: Right;    CARDIAC CATHETERIZATION      CARDIAC CATHETERIZATION  02/2018    stent to right leg x 5 ( 1 stent placed weekly since late jan 2018)    CHOLECYSTECTOMY      CORONARY ARTERY BYPASS GRAFT      2006    CYST REMOVAL      CYSTOSCOPY N/A 2/14/2022    Procedure: CYSTOSCOPY;  Surgeon: Thompson Silva MD;  Location: [facility] OR;  Service: Urology;  Laterality: N/A;    DEBRIDEMENT OF FOOT Right 7/28/2020    Procedure: DEBRIDEMENT, FOOT;  Surgeon: Yolanda Meyers DPM;  Location: [facility] OR;  Service: Podiatry;  Laterality: Right;    PHLEBOGRAPHY Right 2/17/2020    Procedure: Venogram;  Surgeon: Rudi Galeano MD;  Location: [facility] CATH LAB;  Service: Cardiology;  Laterality: Right;  Patient needs anesthesia for sedation       Physical  Exam  Vitals:    12/04/24 0956   BP: 124/76   Pulse: 100   Temp: 99 °F (37.2 °C)   TempSrc: Temporal   SpO2: 97%   Weight: 78.5 kg (173 lb 1 oz)   Height: 3' 5" (1.041 m)      Body mass index is 72.38 kg/m².  Wt Readings from Last 3 Encounters:   12/04/24 78.5 kg (173 lb 1 oz)   09/25/24 78.5 kg (173 lb 1 oz)   08/09/24 78.5 kg (173 lb)       Physical Exam  Constitutional:       Appearance: Normal appearance.   HENT:      Head: Normocephalic.      Mouth/Throat:      Mouth: Mucous membranes are moist.      Pharynx: Oropharynx is clear.   Eyes:      Pupils: Pupils are equal, round, and reactive to light.   Cardiovascular:      Rate and Rhythm: Normal rate and regular rhythm.      Pulses: Normal pulses.   Pulmonary:      Effort: Pulmonary effort is normal.      Comments: Coarse BS with non productive cough   Genitourinary:     Comments: Suprapubic Cath intact, Clear yellow urine in " bag   Musculoskeletal:      Cervical back: Normal range of motion.      Comments: Chandra AKA, In wheelchair, nml ROM of UEs, pain with ROM of neck, left shoulder and low back    Skin:     General: Skin is warm and dry.      Capillary Refill: Capillary refill takes less than 2 seconds.      Comments: Multiple stages of bruises of UE, skin tear with bandage left forearm     Unable to assess buttock wounds due to patient condition in wheel cahir   Neurological:      General: No focal deficit present.      Mental Status: He is alert and oriented to person, place, and time.   Psychiatric:         Mood and Affect: Mood is anxious.         Speech: Speech normal.         Judgment: Judgment normal.             Assessment/Plan:    1. Pressure injury of right buttock, unstageable   HH ordered last week, History of pressure sore healed a few months ago   Follow up with HH and by virtual visit in one month to see improvement of chandra wounds  Encouraged pt to change positions frequently, not sit in WC all day to get pressure relief  Increase protein intake for wound healing       2. Pressure injury of left buttock, unstageable  -     CBC auto differential; Future; Expected date: 12/04/2024    3. Type 2 diabetes mellitus with hyperglycemia, with long-term current use of insulin  Overview:  Insulin Dependant x many years  Chandra AKA, follows with cards/ vascular for complications due to DM  Uncontrolled recent A1C 8.7 (7/2024)  Reports compliance with meds but not diet  BG 12/4/24 232 per pt home fasting   Due labs and UM, sent to lab after visit today   Did not get CGM     Orders:  -     HEMOGLOBIN A1C; Future; Expected date: 12/04/2024  -     Comprehensive metabolic panel; Future; Expected date: 12/04/2024  -     Microalbumin/Creatinine Ratio, Urine; Future    4. Suprapubic catheter  Overview:  Follows with Dr Silva next qppt 2/2025  Needs Cath to be changed, HH able to do and will send request   No signs of infection, adequate UO        5. 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 Flu vaccine need  -     influenza (Flulaval, Fluzone, Fluarix) 45 mcg/0.5 mL IM vaccine (> or = 6 mo) 0.5 mL    7. Screening for malignant neoplasm of prostate  -     PSA, Screening; Future; Expected date: 12/04/2024           Follow up in about 1 month (around 1/4/2025) for Virtual Visit, discuss labs and f/u on wounds . Or prn if needed    Keisha Dixon NP  [facility]

## 2024-12-11 ENCOUNTER — TELEPHONE (OUTPATIENT)
Dept: FAMILY MEDICINE | Facility: CLINIC | Age: 59
End: 2024-12-11
Payer: MEDICARE

## 2024-12-11 NOTE — TELEPHONE ENCOUNTER
Called and spoke with pt's sister in regards of message on patient. Pt's sister informed me that she is calling because HH still has not been to pt's home yet. Informed pt's sister that I will look into this matter and will get back in touch with her tomorrow. Pt's sister verbalized understanding of message.

## 2024-12-11 NOTE — TELEPHONE ENCOUNTER
----- Message from Yoselyn sent at 12/11/2024  2:21 PM CST -----  Type:  Needs Medical Advice/home health     Who Called: sister     Would the patient rather a call back or a response via [facility]? call  Best Call Back Number: [phone]  Additional Information: requesting a call back to discuss home health orders hasn't been received

## 2024-12-12 ENCOUNTER — TELEPHONE (OUTPATIENT)
Dept: FAMILY MEDICINE | Facility: CLINIC | Age: 59
End: 2024-12-12
Payer: MEDICARE

## 2024-12-12 DIAGNOSIS — E66.01 MORBID OBESITY: ICD-10-CM

## 2024-12-12 DIAGNOSIS — Z93.59 SUPRAPUBIC CATHETER: Primary | ICD-10-CM

## 2024-12-12 DIAGNOSIS — L89.302 PRESSURE INJURY OF BUTTOCK, STAGE 2, UNSPECIFIED LATERALITY: ICD-10-CM

## 2024-12-12 NOTE — TELEPHONE ENCOUNTER
Called [facility]  in regards of patient to see why services have not been started for patient. Spoke with Nicole in regards of the matter and she informed me that the patient's order for HH from 11/21/2024 will not work. The order for HH and clinical notes need to match with the name of the same provider due to insurance reasons.     Since you saw patient on 12/4/2024 can you please place an order for HH services for patient please, so that everything matches for insurance reasons. Please review message.

## 2024-12-12 NOTE — TELEPHONE ENCOUNTER
Called pt's sister and informed her that I spoke with [facility] in regards of why patient has not been seen for treatment. Informed pt's sister that Nicole informed me that the order for HH must come from the provider that saw that patient on 12/4/2024. Informed pt's sister that I am working on this matter for the patient right now. Pt sister verbalized understanding of message.

## 2024-12-12 NOTE — TELEPHONE ENCOUNTER
Spoke with Nicole from [facility] to see if they got order on patient. Nicole informed me that on het end see she's a blank HH order. She informed me that order needs more detail for example such as re- certify hh etc.

## 2024-12-12 NOTE — TELEPHONE ENCOUNTER
----- Message from Shaneka sent at 12/12/2024 12:00 PM CST -----  Type: HH     Who Called: Pt's sister   Who Left Message for Patient:Shoshana  Does the patient know what this is regarding?: caller states she hasn't had any updates about HH for pt   Would the patient rather a call back or a response via [facility]? Call   Best Call Back Number: [phone] Grisel   Additional Information:

## 2024-12-17 ENCOUNTER — TELEPHONE (OUTPATIENT)
Dept: FAMILY MEDICINE | Facility: CLINIC | Age: 59
End: 2024-12-17
Payer: MEDICARE

## 2024-12-17 PROCEDURE — G0180 MD CERTIFICATION HHA PATIENT: HCPCS | Mod: ,,, | Performed by: STUDENT IN AN ORGANIZED HEALTH CARE EDUCATION/TRAINING PROGRAM

## 2024-12-17 NOTE — TELEPHONE ENCOUNTER
----- Message from Cheyenne sent at 12/17/2024 12:04 PM CST -----  Type:   Call    [facility]  Does the patient know what this is regarding?:Pat  Would the patient rather a call back or a response via [facility]? call  Best Call Back Number:[phone]  Additional Information: Need a medical social worker pat, you can send using EPIC or Verbal.  [facility].

## 2024-12-17 NOTE — TELEPHONE ENCOUNTER
Called [facility]  and spoke with Gina in regards of message on patient. Informed Gina that I was calling to give a verbal order for pt to get medical social worker jolynn. Gina took my verbal order to get services started for patient.

## 2024-12-17 NOTE — TELEPHONE ENCOUNTER
Are you ok with me calling to give a verbal in regards of this matter for patient. Please advise message.

## 2024-12-19 NOTE — PROGRESS NOTES
Transitional Care Note  Subjective:       Patient ID: Kuldeep Alamo is a 54 y.o. male.  Chief Complaint: Follow-up    Family and/or Caretaker present at visit?  Yes.  Diagnostic tests reviewed/disposition: I have reviewed all completed as well as pending diagnostic tests at the time of discharge.  Disease/illness education: yes  Home health/community services discussion/referrals: Patient does not have home health established from hospital visit.  They do need home health.  If needed, we will set up home health for the patient.   Establishment or re-establishment of referral orders for community resources: No other necessary community resources.   Discussion with other health care providers: No discussion with other health care providers necessary.   54 yr old pleasant white male with CVA, DM II, right leg ulcer, HTN, HLD, CAD, and other co morbidities presents today for hospital dischare= follow up. He recently stayed in hospital for JACK. His kidney functiosn are back to normal now and cultures negative. He also would like to try hyperbarics here at wound care.    CVA - with deficits - on wheelchair - on ASA      DM II - currently uncontrolled - on metformin 850 BID - compliant - A1C                   6.3 (H)             10/04/2019             - foot n eye screen UTD      HTN - controlled - on lisinopril, coreg, clonidine - compliant - no side effects      HLD -   LDLCALC                  85.6                05/14/2019                        - on statin - lab due    CAD s/p CABG - follows Dr. Cesar, Cardiology - no CP or SOB - on ranexa      Wound right leg - follows podiatry and wound care in [place] - no new issues       History as below - reviewed     Diabetes   He presents for his follow-up diabetic visit. He has type 2 diabetes mellitus. His disease course has been worsening. Pertinent negatives for hypoglycemia include no dizziness, headaches, mood changes, nervousness/anxiousness, seizures, sleepiness,  speech difficulty or tremors. Pertinent negatives for diabetes include no blurred vision, no chest pain, no foot ulcerations, no polyuria, no weakness and no weight loss. There are no hypoglycemic complications. Symptoms are stable. Diabetic complications include a CVA. Pertinent negatives for diabetic complications include no autonomic neuropathy, heart disease, impotence, peripheral neuropathy or PVD. Risk factors for coronary artery disease include dyslipidemia, diabetes mellitus, obesity, male sex and sedentary lifestyle. Current diabetic treatment includes oral agent (monotherapy). He is compliant with treatment most of the time. He is following a diabetic and generally healthy diet. Meal planning includes avoidance of concentrated sweets. He rarely participates in exercise. There is no change in his home blood glucose trend. An ACE inhibitor/angiotensin II receptor blocker is being taken. He sees a podiatrist.Eye exam is current.   Hypertension   This is a chronic problem. The current episode started more than 1 year ago. The problem has been gradually improving since onset. The problem is controlled. Pertinent negatives include no anxiety, blurred vision, chest pain, headaches, malaise/fatigue or palpitations. There are no associated agents to hypertension. Risk factors for coronary artery disease include diabetes mellitus, male gender, obesity, sedentary lifestyle and dyslipidemia. Past treatments include angiotensin blockers and beta blockers. The current treatment provides moderate improvement. There are no compliance problems.  Hypertensive end-organ damage includes CAD/MI and CVA. There is no history of PVD. There is no history of chronic renal disease, hyperaldosteronism, hyperparathyroidism, a hypertension causing med, pheochromocytoma or a thyroid problem.   Hyperlipidemia   This is a chronic problem. The current episode started more than 1 year ago. The problem is controlled. Recent lipid tests were  reviewed and are normal. He has no history of chronic renal disease. There are no known factors aggravating his hyperlipidemia. Pertinent negatives include no chest pain or myalgias. Current antihyperlipidemic treatment includes statins. The current treatment provides moderate improvement of lipids. Compliance problems include adherence to exercise.  Risk factors for coronary artery disease include diabetes mellitus, dyslipidemia, hypertension, male sex, obesity and a sedentary lifestyle.     Review of Systems   Constitutional: Negative.  Negative for activity change, diaphoresis, malaise/fatigue, unexpected weight change and weight loss.   HENT: Negative.  Negative for congestion, ear pain, mouth sores, rhinorrhea and voice change.    Eyes: Negative.  Negative for blurred vision, pain, discharge and visual disturbance.   Respiratory: Negative.  Negative for apnea, cough and wheezing.    Cardiovascular: Negative.  Negative for chest pain and palpitations.   Gastrointestinal: Negative.  Negative for abdominal distention, anal bleeding, diarrhea and vomiting.   Endocrine: Negative.  Negative for cold intolerance and polyuria.   Genitourinary: Negative.  Negative for decreased urine volume, difficulty urinating, discharge, frequency, impotence and scrotal swelling.   Musculoskeletal: Negative.  Negative for back pain, myalgias and neck stiffness.   Skin: Positive for rash and wound. Negative for color change.   Allergic/Immunologic: Negative.  Negative for environmental allergies and immunocompromised state.   Neurological: Negative.  Negative for dizziness, tremors, seizures, speech difficulty, weakness, light-headedness and headaches.   Hematological: Negative.    Psychiatric/Behavioral: Negative.  Negative for agitation, dysphoric mood and suicidal ideas. The patient is not nervous/anxious.        PMH/PSH/FH/SH/MED/ALLERGY reviewed    Objective:       Vitals:    10/09/19 1510   BP: (!) 92/56   Pulse: 70   Temp: 98.7  °F (37.1 °C)       Physical Exam   Constitutional: He is oriented to person, place, and time. He appears well-developed and well-nourished.   HENT:   Head: Normocephalic and atraumatic.   Right Ear: External ear normal.   Left Ear: External ear normal.   Nose: Nose normal.   Mouth/Throat: Oropharynx is clear and moist. No oropharyngeal exudate.   Eyes: Pupils are equal, round, and reactive to light. Conjunctivae and EOM are normal. Right eye exhibits no discharge. Left eye exhibits no discharge. No scleral icterus.   Neck: Normal range of motion. Neck supple. No JVD present. No tracheal deviation present. No thyromegaly present.   Cardiovascular: Normal rate, regular rhythm, normal heart sounds and intact distal pulses. Exam reveals no gallop and no friction rub.   No murmur heard.  Pulmonary/Chest: Effort normal and breath sounds normal. No stridor. No respiratory distress. He has no wheezes. He has no rales. He exhibits no tenderness.   Abdominal: Soft. Bowel sounds are normal. He exhibits no distension and no mass. There is no tenderness. There is no rebound and no guarding. No hernia.   Musculoskeletal: Normal range of motion. He exhibits no edema or tenderness.   Lymphadenopathy:     He has no cervical adenopathy.   Neurological: He is alert and oriented to person, place, and time. He has normal reflexes. He displays normal reflexes. No cranial nerve deficit. He exhibits abnormal muscle tone. Coordination normal.   Skin: Skin is warm and dry. Capillary refill takes less than 2 seconds. Rash noted. There is erythema. No pallor.   Peeling skin B/L legs and also 4 cm diabetes ulcer wound right leg covered in bandage   Psychiatric: He has a normal mood and affect. His behavior is normal. Judgment and thought content normal.       Assessment:       1. Hospital discharge follow-up    2. Type 2 diabetes mellitus with complication, without long-term current use of insulin    3. Hemiplegia of nondominant side, late  effect of cerebrovascular disease    4. Essential hypertension    5. PAD (peripheral artery disease)    6. Coronary artery disease involving native coronary artery without angina pectoris, unspecified whether native or transplanted heart    7. Diabetic ulcer of toe of right foot associated with diabetes mellitus due to underlying condition, limited to breakdown of skin    8. Venous stasis ulcer of other part of right lower leg, unspecified ulcer stage, unspecified whether varicose veins present        Plan:           Kuldeep was seen today for follow-up.    Diagnoses and all orders for this visit:    Hospital discharge follow-up  -     CBC auto differential; Future  -     Comprehensive metabolic panel; Future    Type 2 diabetes mellitus with complication, without long-term current use of insulin  -     CBC auto differential; Future  -     Comprehensive metabolic panel; Future  -     Lipid panel; Future  -     Hemoglobin A1c; Future    Hemiplegia of nondominant side, late effect of cerebrovascular disease    Essential hypertension  -     CBC auto differential; Future  -     Comprehensive metabolic panel; Future  -     Lipid panel; Future    PAD (peripheral artery disease)    Coronary artery disease involving native coronary artery without angina pectoris, unspecified whether native or transplanted heart    Diabetic ulcer of toe of right foot associated with diabetes mellitus due to underlying condition, limited to breakdown of skin  -     Ambulatory referral to Wound Clinic    Venous stasis ulcer of other part of right lower leg, unspecified ulcer stage, unspecified whether varicose veins present  -     Ambulatory referral to Wound Clinic          DM II  -uncontrolled  -continue metformin and add glipizide 10 mg BID    HTN  -controlled    HLD  -continue statin    Right leg wound  -refer [facility] for hyperbarics    CAD  -controlled  -follow Cardiology    Spent adequate time in obtaining history and explaining  differentials    40 minutes spent during this visit of which greater than 50% devoted to face-face counseling and coordination of care regarding diagnosis and management plan    RTC 3 m or prn   Resident

## 2024-12-27 ENCOUNTER — PATIENT MESSAGE (OUTPATIENT)
Dept: OPTOMETRY | Facility: CLINIC | Age: 59
End: 2024-12-27
Payer: MEDICARE

## 2025-02-05 ENCOUNTER — EXTERNAL HOME HEALTH (OUTPATIENT)
Dept: HOME HEALTH SERVICES | Facility: HOSPITAL | Age: 60
End: 2025-02-05
Payer: MEDICARE

## 2025-02-13 ENCOUNTER — DOCUMENT SCAN (OUTPATIENT)
Dept: HOME HEALTH SERVICES | Facility: HOSPITAL | Age: 60
End: 2025-02-13
Payer: MEDICARE

## 2025-02-13 DIAGNOSIS — Z89.612 S/P AKA (ABOVE KNEE AMPUTATION) BILATERAL: Primary | ICD-10-CM

## 2025-02-13 DIAGNOSIS — Z99.3 WHEELCHAIR DEPENDENCE: ICD-10-CM

## 2025-02-13 DIAGNOSIS — Z89.611 S/P AKA (ABOVE KNEE AMPUTATION) BILATERAL: Primary | ICD-10-CM

## 2025-02-20 ENCOUNTER — DOCUMENT SCAN (OUTPATIENT)
Dept: HOME HEALTH SERVICES | Facility: HOSPITAL | Age: 60
End: 2025-02-20
Payer: MEDICARE

## 2025-02-25 ENCOUNTER — TELEPHONE (OUTPATIENT)
Dept: FAMILY MEDICINE | Facility: CLINIC | Age: 60
End: 2025-02-25
Payer: MEDICARE

## 2025-02-25 ENCOUNTER — TELEPHONE (OUTPATIENT)
Dept: UROLOGY | Facility: CLINIC | Age: 60
End: 2025-02-25
Payer: MEDICARE

## 2025-02-25 NOTE — TELEPHONE ENCOUNTER
Called and spoke with pt's sister Grisel in regards of message on patient.  Pt's sister informed me that her brother was taking Ozempic 0.25 mg, but it was prescribed by Dr. Galeano,who is no longer in the office anymore. Pt's sister is wanting to know if you can prescribed medication for patient. Pt's sister is also looking for some type of device to check pt's blood sugar as well. Pt's sister informed me that pt told her he was to get a Dexcom or some sort of device to check his blood sugars. Please advise patient message.

## 2025-02-25 NOTE — TELEPHONE ENCOUNTER
Called and spoke with pt's sister in regards of Dr. Rock's message. Informed pt's sister that pt will need to complete an e-visit for this matter. Pt's sister informed me that pt has been having issues with his phone and login into the patient portal, so an e-visit my be an issue for the patient.

## 2025-02-25 NOTE — TELEPHONE ENCOUNTER
----- Message from Cheyenne sent at 2/25/2025  9:30 AM CST -----  Type:  Sooner Appointment RequestCaller is requesting a sooner appointment.  Caller declined first available appointment listed below.  Caller will not accept being placed on the waitlist and is requesting a message be sent to doctor.Name of Caller:Shirley is the first available appointment?n/aSymptoms:SP Tube changeWould the patient rather a call back or a response via [facility]? [facility] Call Back Number:[phone] Information:

## 2025-02-25 NOTE — TELEPHONE ENCOUNTER
----- Message from Rosita sent at 2/24/2025 11:29 AM CST -----  Type:  call back Who Called:ella Does the patient know what this is regarding?:would like to know if he's still taking OZEMPIC 0.25 mg or 0.5 mg (2 mg/3 mL) pen injector if he is pt will need a refill Would the patient rather a call back or a response via [facility]? [facility] Call Back Number: [phone] Information:

## 2025-02-25 NOTE — TELEPHONE ENCOUNTER
I called and spoke with pt's sister, got patient in tomorrow to see Dr. Silva which she confirmed.

## 2025-02-26 ENCOUNTER — OFFICE VISIT (OUTPATIENT)
Dept: UROLOGY | Facility: CLINIC | Age: 60
End: 2025-02-26
Payer: MEDICARE

## 2025-02-26 VITALS
HEART RATE: 70 BPM | WEIGHT: 171.94 LBS | HEIGHT: 60 IN | DIASTOLIC BLOOD PRESSURE: 65 MMHG | SYSTOLIC BLOOD PRESSURE: 125 MMHG | BODY MASS INDEX: 33.76 KG/M2

## 2025-02-26 DIAGNOSIS — Z43.5 ENCOUNTER FOR ATTENTION TO CYSTOSTOMY: Primary | ICD-10-CM

## 2025-02-26 PROCEDURE — 99499 UNLISTED E&M SERVICE: CPT | Mod: S$GLB,,, | Performed by: UROLOGY

## 2025-02-26 PROCEDURE — 99999 PR PBB SHADOW E&M-EST. PATIENT-LVL IV: CPT | Mod: PBBFAC,,, | Performed by: UROLOGY

## 2025-02-26 PROCEDURE — 51702 INSERT TEMP BLADDER CATH: CPT | Mod: S$GLB,,, | Performed by: UROLOGY

## 2025-02-26 RX ORDER — HYDROCHLOROTHIAZIDE 25 MG/1
25 TABLET ORAL
COMMUNITY
Start: 2024-09-02

## 2025-02-26 RX ORDER — CIPROFLOXACIN 500 MG/1
500 TABLET ORAL 2 TIMES DAILY
Qty: 10 TABLET | Refills: 0 | Status: SHIPPED | OUTPATIENT
Start: 2025-02-26 | End: 2025-03-03

## 2025-02-26 RX ORDER — EVOLOCUMAB 140 MG/ML
INJECTION, SOLUTION SUBCUTANEOUS
COMMUNITY
Start: 2024-09-03

## 2025-02-26 NOTE — PROCEDURES
Bladder Catheterization    Date/Time: 2/26/2025 11:00 AM  Location procedure was performed: [facility]    Performed by: Thompson Silva MD  Authorized by: Thompson Silva MD  Assisting provider: Thompson Silva MD  Pre-operative diagnosis: NGB  Post-operative diagnosis: NGB  Consent Done: Not Needed  Indications: neurogenic bladder  Local anesthesia used: no    Anesthesia:  Local anesthesia used: no    Patient sedated: no  Preparation: Patient was prepped and draped in the usual sterile fashion.  Description of findings: Normal SP tube site   Catheter insertion: indwelling  Catheter type: Isbell  Catheter size: 22 Fr  Complicated insertion: no  Altered anatomy: no  Bladder irrigation: no  Number of attempts: 1  Urine volume: 5 ml  Urine characteristics: yellow and clear  Technical procedures used: none  Significant surgical tasks conducted by the assistant(s): none  Complications: No  Estimated blood loss (mL): 0  Specimens: No  Implants: No  Patient tolerance: Patient tolerated the procedure well with no immediate complications

## 2025-02-27 RX ORDER — SEMAGLUTIDE 0.68 MG/ML
0.5 INJECTION, SOLUTION SUBCUTANEOUS
Qty: 3 ML | Refills: 2 | Status: SHIPPED | OUTPATIENT
Start: 2025-02-27

## 2025-02-27 NOTE — TELEPHONE ENCOUNTER
Called and spoke with pt's sister Grisel in regards of NP Eben message. Informed pt's sister that pt's medication was increased to 0.5 mg and it was sent to the [facility] in [place] for pt. Informed pt's sister that pt must get labs done in April, so that we can check his A1C to see where his level is at. Pt's sister verbalized understanding of message.

## 2025-03-12 ENCOUNTER — DOCUMENT SCAN (OUTPATIENT)
Dept: HOME HEALTH SERVICES | Facility: HOSPITAL | Age: 60
End: 2025-03-12
Payer: MEDICARE

## 2025-03-24 ENCOUNTER — TELEPHONE (OUTPATIENT)
Dept: FAMILY MEDICINE | Facility: CLINIC | Age: 60
End: 2025-03-24
Payer: MEDICARE

## 2025-03-24 NOTE — TELEPHONE ENCOUNTER
----- Message from Antwon sent at 3/24/2025 10:15 AM CDT -----  Contact: pt sister  Type:  Sooner Apoointment RequestCaller is requesting a sooner appointment.  Caller declined first available appointment listed below.  Caller will not accept being placed on the waitlist and is requesting a message be sent to doctor.Name of Caller:pt sister (Grisel)When is the first available appointment? N/aSymptoms: f/u Would the patient rather a call back or a response via [facility]?  [facility] Call Back Number: [phone] Information: Needs the diabetic machince to check his sugar and referral for pain medicine

## 2025-03-24 NOTE — TELEPHONE ENCOUNTER
----- Message from Cait sent at 3/24/2025  1:29 PM CDT -----  Type:  Needs Medical AdviceWho Called: Patient's Juani Call Back Number: [phone] Information: Patient is requesting a call back from Shoshana about the appt that she made earlier with her. That appt time does not work and she states she needs to speak to her about changing it.

## 2025-03-24 NOTE — TELEPHONE ENCOUNTER
Patient's sister called to rescheduled pt's apt for another day. Pt's sister rescheduled apt for 4/1/2025 for 11:40 am due to transportation issues.

## 2025-03-24 NOTE — TELEPHONE ENCOUNTER
Called and spoke with pt's sister in regards of message on patient. Pt's sister states that pt needs an appointment to come into the clinic and be seen for a follow up, and to see about him getting his Dexcom setup and for med refill. Pt's sister made pt an apt for this Thursday for 3/27/2025 for 1:40 pm for this matter.

## 2025-03-24 NOTE — TELEPHONE ENCOUNTER
----- Message from Laya sent at 3/21/2025  1:11 PM CDT -----  Name of Who is Calling:jordon (Pt's sis)What is the request in detail:Pt is requesting a call back regarding medication (s) because the pt had eczema and Pt's sis stated he is a hard transfer from location to another.Please contact to further discuss and advise.  Can the clinic reply by [facility]:call What Number to Call Back if not in [facility]:[phone]

## 2025-04-09 ENCOUNTER — PROCEDURE VISIT (OUTPATIENT)
Dept: UROLOGY | Facility: CLINIC | Age: 60
End: 2025-04-09
Payer: MEDICARE

## 2025-04-09 VITALS — OXYGEN SATURATION: 96 % | HEART RATE: 71 BPM | SYSTOLIC BLOOD PRESSURE: 124 MMHG | DIASTOLIC BLOOD PRESSURE: 74 MMHG

## 2025-04-09 DIAGNOSIS — R33.9 URINARY RETENTION: ICD-10-CM

## 2025-04-09 DIAGNOSIS — Z93.59 SUPRAPUBIC CATHETER: ICD-10-CM

## 2025-04-09 DIAGNOSIS — Z43.5 ENCOUNTER FOR ATTENTION TO CYSTOSTOMY: Primary | ICD-10-CM

## 2025-04-09 PROCEDURE — 99499 UNLISTED E&M SERVICE: CPT | Mod: S$GLB,,, | Performed by: UROLOGY

## 2025-04-09 PROCEDURE — 51705 CHANGE OF BLADDER TUBE: CPT | Mod: S$GLB,,, | Performed by: UROLOGY

## 2025-04-09 RX ORDER — CIPROFLOXACIN 500 MG/1
500 TABLET ORAL 2 TIMES DAILY
Qty: 30 TABLET | Refills: 0 | Status: SHIPPED | OUTPATIENT
Start: 2025-04-09 | End: 2025-04-24

## 2025-04-09 NOTE — PATIENT INSTRUCTIONS
Take Cipro 500 mg twice daily for 3 days after each suprapubic tube change  Follow up in 6 weeks for suprapubic tube change

## 2025-04-09 NOTE — PROCEDURES
Bladder Catheterization    Date/Time: 4/9/2025 9:30 AM  Location procedure was performed: [facility]    Performed by: Thompson Silva MD  Authorized by: Thompson Silva MD  Assisting provider: Thompson Silva MD  Pre-operative diagnosis: Urinary retention  Post-operative diagnosis: Urinary retention  Consent Done: Not Needed  Indications: catheter change  Local anesthesia used: no    Anesthesia:  Local anesthesia used: no    Patient sedated: no  Preparation: Patient was prepped and draped in the usual sterile fashion.  Description of findings: Suprapubic tube site appears normal with no erythema or drainage   Catheter insertion: indwelling  Catheter type: Isbell  Catheter size: 22 Fr  Complicated insertion: no  Altered anatomy: no  Number of attempts: 1  Urine volume: 10 ml  Urine characteristics: clear and blood-tinged  Complications: No  Estimated blood loss (mL): 0  Specimens: No  Implants: No  Patient tolerance: Patient tolerated the procedure well with no immediate complications

## 2025-04-10 ENCOUNTER — EXTERNAL HOME HEALTH (OUTPATIENT)
Dept: HOME HEALTH SERVICES | Facility: HOSPITAL | Age: 60
End: 2025-04-10
Payer: MEDICARE

## 2025-04-14 ENCOUNTER — PATIENT OUTREACH (OUTPATIENT)
Dept: ADMINISTRATIVE | Facility: HOSPITAL | Age: 60
End: 2025-04-14
Payer: MEDICARE

## 2025-04-14 DIAGNOSIS — E11.65 TYPE 2 DIABETES MELLITUS WITH HYPERGLYCEMIA, WITH LONG-TERM CURRENT USE OF INSULIN: Primary | ICD-10-CM

## 2025-04-14 DIAGNOSIS — Z79.4 TYPE 2 DIABETES MELLITUS WITH HYPERGLYCEMIA, WITH LONG-TERM CURRENT USE OF INSULIN: Primary | ICD-10-CM

## 2025-04-14 NOTE — PROGRESS NOTES
Health Maintenance Topic(s) Outreach Outcomes & Actions Taken:    Lab(s) - Outreach Outcomes & Actions Taken  : No answer, Portal message sent

## 2025-04-15 ENCOUNTER — PATIENT OUTREACH (OUTPATIENT)
Dept: ADMINISTRATIVE | Facility: HOSPITAL | Age: 60
End: 2025-04-15
Payer: MEDICARE

## 2025-04-15 DIAGNOSIS — E11.65 TYPE 2 DIABETES MELLITUS WITH HYPERGLYCEMIA, WITH LONG-TERM CURRENT USE OF INSULIN: ICD-10-CM

## 2025-04-15 DIAGNOSIS — Z79.4 TYPE 2 DIABETES MELLITUS WITH HYPERGLYCEMIA, WITH LONG-TERM CURRENT USE OF INSULIN: Primary | ICD-10-CM

## 2025-04-15 DIAGNOSIS — Z79.4 TYPE 2 DIABETES MELLITUS WITH HYPERGLYCEMIA, WITH LONG-TERM CURRENT USE OF INSULIN: ICD-10-CM

## 2025-04-15 DIAGNOSIS — E11.65 TYPE 2 DIABETES MELLITUS WITH HYPERGLYCEMIA, WITH LONG-TERM CURRENT USE OF INSULIN: Primary | ICD-10-CM

## 2025-04-15 RX ORDER — INSULIN GLARGINE 100 [IU]/ML
20 INJECTION, SOLUTION SUBCUTANEOUS 2 TIMES DAILY
Qty: 36 ML | Refills: 3 | Status: SHIPPED | OUTPATIENT
Start: 2025-04-15 | End: 2026-04-15

## 2025-04-15 NOTE — PROGRESS NOTES
Health Maintenance Topic(s) Outreach Outcomes & Actions Taken:    Lab(s) - Outreach Outcomes & Actions Taken  : Overdue Lab(s) Ordered    No answer, Portal message sent

## 2025-04-29 ENCOUNTER — TELEPHONE (OUTPATIENT)
Dept: FAMILY MEDICINE | Facility: CLINIC | Age: 60
End: 2025-04-29
Payer: MEDICARE

## 2025-04-29 NOTE — TELEPHONE ENCOUNTER
Called and spoke with Gerda with [facility]  in regards of Dr. Rock's message, and gave a verbal order to treat pt's scratch on his left arm.

## 2025-04-29 NOTE — TELEPHONE ENCOUNTER
----- Message from Omarantin sent at 4/29/2025 11:18 AM CDT -----  Type : Patient Call  Who Called : Gerda with [facility] in [place]   Does the patient know what this is regarding?: Nurse is requesting orders be put in for wound care (saline and brian , xeroform with bandage on top.) due to a scratch on the pt left arm. Scratch is about 1 cm. Please Advise   Would the patient rather a call back or a response via [facility]? Call   [facility] Call Back Number: [phone]  Additional Information:

## 2025-04-29 NOTE — TELEPHONE ENCOUNTER
Called and spoke with Gerda with [facility]  in regards of message on patient. Gerda informed me that pt has a scratch on his left arm that is about 1 cm. Gerda wants to know can you please place an order for wound care for pt's scratch. Gerda wants to know can you place an order for saline and brian , xeroform with bandage on top. Please advise pt message.

## 2025-05-13 RX ORDER — CIPROFLOXACIN 500 MG/1
TABLET, FILM COATED ORAL
Qty: 30 TABLET | Refills: 0 | Status: SHIPPED | OUTPATIENT
Start: 2025-05-13

## 2025-05-15 ENCOUNTER — TELEPHONE (OUTPATIENT)
Dept: FAMILY MEDICINE | Facility: CLINIC | Age: 60
End: 2025-05-15
Payer: MEDICARE

## 2025-05-15 DIAGNOSIS — E78.5 HYPERLIPIDEMIA ASSOCIATED WITH TYPE 2 DIABETES MELLITUS: ICD-10-CM

## 2025-05-15 DIAGNOSIS — E11.69 HYPERLIPIDEMIA ASSOCIATED WITH TYPE 2 DIABETES MELLITUS: ICD-10-CM

## 2025-05-15 DIAGNOSIS — I10 PRIMARY HYPERTENSION: ICD-10-CM

## 2025-05-15 DIAGNOSIS — I25.10 CORONARY ARTERY DISEASE INVOLVING NATIVE CORONARY ARTERY OF NATIVE HEART WITHOUT ANGINA PECTORIS: ICD-10-CM

## 2025-05-15 DIAGNOSIS — Z79.4 TYPE 2 DIABETES MELLITUS WITH HYPERGLYCEMIA, WITH LONG-TERM CURRENT USE OF INSULIN: ICD-10-CM

## 2025-05-15 DIAGNOSIS — I50.32 CHRONIC DIASTOLIC CHF (CONGESTIVE HEART FAILURE): ICD-10-CM

## 2025-05-15 DIAGNOSIS — I73.9 PAD (PERIPHERAL ARTERY DISEASE): Primary | ICD-10-CM

## 2025-05-15 DIAGNOSIS — E11.65 TYPE 2 DIABETES MELLITUS WITH HYPERGLYCEMIA, WITH LONG-TERM CURRENT USE OF INSULIN: ICD-10-CM

## 2025-05-15 NOTE — TELEPHONE ENCOUNTER
----- Message from Karla sent at 5/15/2025  8:28 AM CDT -----  Type:  requesting a call Who Called: Sister (christina)Would the patient rather a call back or a response via [facility]? Call [facility] Call Back Number: [phone] Information: regarding medication

## 2025-05-15 NOTE — TELEPHONE ENCOUNTER
Called and spoke with pt's sister in regards of pt's medication. Informed pt's sister Grisel that pt needs an apt to come into the office for a medication refill and be seen. Pt was last seen in the clinic on 12/4/2024 by IZZY Dixon.     Pt's sister made an apt for 5/27/2025 for 10:00 am with Dr. Rock.  Are you wanting pt to get lab done before his apt with you? If so, please place lab orders in system for patient. Thanks

## 2025-05-22 RX ORDER — CIPROFLOXACIN 500 MG/1
TABLET, FILM COATED ORAL
Qty: 30 TABLET | Refills: 0 | Status: SHIPPED | OUTPATIENT
Start: 2025-05-22

## 2025-05-23 ENCOUNTER — RESULTS FOLLOW-UP (OUTPATIENT)
Dept: FAMILY MEDICINE | Facility: CLINIC | Age: 60
End: 2025-05-23

## 2025-05-27 ENCOUNTER — TELEPHONE (OUTPATIENT)
Dept: FAMILY MEDICINE | Facility: CLINIC | Age: 60
End: 2025-05-27
Payer: MEDICARE

## 2025-05-27 NOTE — TELEPHONE ENCOUNTER
Called and spoke with pt's sister in regards of pt needing to reschedule her apt. Pt's sister rescheduled pt's apt for 6/18/2025 for 11:40 am with Dr. Rock. Informed pt's sister that once pt is done with Dr. Leon visit he can come straight up to the clinic for his office visit. Pt's sister verbalized understanding of message.

## 2025-05-27 NOTE — TELEPHONE ENCOUNTER
----- Message from Noa sent at 5/27/2025  8:05 AM CDT -----  Regarding: Call back  Contact: [phone]  Who Called: PT Patient called in requesting to speak with you. Did not specify why. Please advise.

## 2025-06-12 ENCOUNTER — EXTERNAL HOME HEALTH (OUTPATIENT)
Dept: HOME HEALTH SERVICES | Facility: HOSPITAL | Age: 60
End: 2025-06-12
Payer: MEDICARE

## 2025-06-12 ENCOUNTER — DOCUMENT SCAN (OUTPATIENT)
Dept: HOME HEALTH SERVICES | Facility: HOSPITAL | Age: 60
End: 2025-06-12
Payer: MEDICARE

## 2025-06-13 DIAGNOSIS — E11.65 TYPE 2 DIABETES MELLITUS WITH HYPERGLYCEMIA, WITH LONG-TERM CURRENT USE OF INSULIN: Primary | ICD-10-CM

## 2025-06-13 DIAGNOSIS — Z79.4 TYPE 2 DIABETES MELLITUS WITH HYPERGLYCEMIA, WITH LONG-TERM CURRENT USE OF INSULIN: Primary | ICD-10-CM

## 2025-06-13 NOTE — TELEPHONE ENCOUNTER
No care due was identified.  [facility] Embedded Care Due Messages. Reference number: [id number].   6/13/2025 10:15:36 AM CDT

## 2025-06-16 RX ORDER — SEMAGLUTIDE 0.68 MG/ML
0.5 INJECTION, SOLUTION SUBCUTANEOUS
Qty: 3 ML | Refills: 2 | Status: SHIPPED | OUTPATIENT
Start: 2025-06-16 | End: 2025-06-18

## 2025-06-18 ENCOUNTER — PROCEDURE VISIT (OUTPATIENT)
Dept: UROLOGY | Facility: CLINIC | Age: 60
End: 2025-06-18
Payer: MEDICARE

## 2025-06-18 ENCOUNTER — OFFICE VISIT (OUTPATIENT)
Dept: FAMILY MEDICINE | Facility: CLINIC | Age: 60
End: 2025-06-18
Payer: MEDICARE

## 2025-06-18 VITALS
OXYGEN SATURATION: 96 % | DIASTOLIC BLOOD PRESSURE: 72 MMHG | HEIGHT: 60 IN | WEIGHT: 171.94 LBS | TEMPERATURE: 98 F | SYSTOLIC BLOOD PRESSURE: 128 MMHG | HEART RATE: 69 BPM | BODY MASS INDEX: 33.76 KG/M2

## 2025-06-18 VITALS — SYSTOLIC BLOOD PRESSURE: 115 MMHG | HEART RATE: 73 BPM | DIASTOLIC BLOOD PRESSURE: 73 MMHG

## 2025-06-18 DIAGNOSIS — I48.20 CHRONIC ATRIAL FIBRILLATION: ICD-10-CM

## 2025-06-18 DIAGNOSIS — Z89.612 S/P AKA (ABOVE KNEE AMPUTATION) BILATERAL: ICD-10-CM

## 2025-06-18 DIAGNOSIS — L89.310 PRESSURE INJURY OF RIGHT BUTTOCK, UNSTAGEABLE: Primary | ICD-10-CM

## 2025-06-18 DIAGNOSIS — I50.32 CHRONIC DIASTOLIC CHF (CONGESTIVE HEART FAILURE): ICD-10-CM

## 2025-06-18 DIAGNOSIS — J44.9 CHRONIC OBSTRUCTIVE PULMONARY DISEASE, UNSPECIFIED COPD TYPE: ICD-10-CM

## 2025-06-18 DIAGNOSIS — E66.01 MORBID OBESITY: ICD-10-CM

## 2025-06-18 DIAGNOSIS — Z43.5 ENCOUNTER FOR ATTENTION TO CYSTOSTOMY: ICD-10-CM

## 2025-06-18 DIAGNOSIS — G81.14 LEFT SPASTIC HEMIPARESIS: ICD-10-CM

## 2025-06-18 DIAGNOSIS — E78.5 HYPERLIPIDEMIA ASSOCIATED WITH TYPE 2 DIABETES MELLITUS: ICD-10-CM

## 2025-06-18 DIAGNOSIS — Z93.59 SUPRAPUBIC CATHETER: Primary | ICD-10-CM

## 2025-06-18 DIAGNOSIS — Z89.611 S/P AKA (ABOVE KNEE AMPUTATION) BILATERAL: ICD-10-CM

## 2025-06-18 DIAGNOSIS — E11.69 HYPERLIPIDEMIA ASSOCIATED WITH TYPE 2 DIABETES MELLITUS: ICD-10-CM

## 2025-06-18 PROCEDURE — 3008F BODY MASS INDEX DOCD: CPT | Mod: CPTII,HCNC,S$GLB, | Performed by: STUDENT IN AN ORGANIZED HEALTH CARE EDUCATION/TRAINING PROGRAM

## 2025-06-18 PROCEDURE — 99999 PR PBB SHADOW E&M-EST. PATIENT-LVL III: CPT | Mod: PBBFAC,HCNC,, | Performed by: STUDENT IN AN ORGANIZED HEALTH CARE EDUCATION/TRAINING PROGRAM

## 2025-06-18 PROCEDURE — 1160F RVW MEDS BY RX/DR IN RCRD: CPT | Mod: CPTII,HCNC,S$GLB, | Performed by: STUDENT IN AN ORGANIZED HEALTH CARE EDUCATION/TRAINING PROGRAM

## 2025-06-18 PROCEDURE — 3078F DIAST BP <80 MM HG: CPT | Mod: CPTII,HCNC,S$GLB, | Performed by: STUDENT IN AN ORGANIZED HEALTH CARE EDUCATION/TRAINING PROGRAM

## 2025-06-18 PROCEDURE — 99215 OFFICE O/P EST HI 40 MIN: CPT | Mod: HCNC,S$GLB,, | Performed by: STUDENT IN AN ORGANIZED HEALTH CARE EDUCATION/TRAINING PROGRAM

## 2025-06-18 PROCEDURE — 4010F ACE/ARB THERAPY RXD/TAKEN: CPT | Mod: CPTII,HCNC,S$GLB, | Performed by: STUDENT IN AN ORGANIZED HEALTH CARE EDUCATION/TRAINING PROGRAM

## 2025-06-18 PROCEDURE — 3074F SYST BP LT 130 MM HG: CPT | Mod: CPTII,HCNC,S$GLB, | Performed by: STUDENT IN AN ORGANIZED HEALTH CARE EDUCATION/TRAINING PROGRAM

## 2025-06-18 PROCEDURE — 1159F MED LIST DOCD IN RCRD: CPT | Mod: CPTII,HCNC,S$GLB, | Performed by: STUDENT IN AN ORGANIZED HEALTH CARE EDUCATION/TRAINING PROGRAM

## 2025-06-18 PROCEDURE — 3052F HG A1C>EQUAL 8.0%<EQUAL 9.0%: CPT | Mod: CPTII,HCNC,S$GLB, | Performed by: STUDENT IN AN ORGANIZED HEALTH CARE EDUCATION/TRAINING PROGRAM

## 2025-06-18 PROCEDURE — G2211 COMPLEX E/M VISIT ADD ON: HCPCS | Mod: HCNC,S$GLB,, | Performed by: STUDENT IN AN ORGANIZED HEALTH CARE EDUCATION/TRAINING PROGRAM

## 2025-06-18 RX ORDER — SEMAGLUTIDE 1.34 MG/ML
1 INJECTION, SOLUTION SUBCUTANEOUS
Qty: 9 ML | Refills: 3 | Status: SHIPPED | OUTPATIENT
Start: 2025-06-18 | End: 2026-06-18

## 2025-06-18 NOTE — PROGRESS NOTES
Subjective:      Patient ID: Kuldeep Alamo is a 60 y.o. male.    Chief Complaint: Follow-up (Pt here for a medication follow up )    History of Present Illness    CHIEF COMPLAINT:  Patient presents today for follow up    DIABETES:  He reports morning glucose level of 300 mg/dL with recent A1C of 8.6%, indicating suboptimal diabetes management. He is currently on prednisone, which is contributing to elevated blood sugar. His Lantus has been increased to 20 units twice daily during prednisone treatment. He continues Ozempic 0.5 mg and Humalog 10 units with meals. He reports no weight loss despite Ozempic.    ECZEMA:  He reports recurrent eczema with recent flare-up characterized by white spots spreading across affected regions in private areas. He is currently taking prednisone for management. He expresses frustration with ongoing skin condition and limited access to dermatological care due to no available dermatologists in [place] and lack of transportation.    RIGHT ARM PAIN AND SWELLING:  He reports persistent right arm swelling and pain with significant functional limitation. He has minimal use of the affected arm and cannot raise it above his head. The symptoms are chronic and impact daily activities and mobility.      ROS:  General: -fever, -chills, -fatigue, -weight gain, -weight loss  Eyes: -vision changes, -redness, -discharge  ENT: -ear pain, -nasal congestion, -sore throat  Cardiovascular: -chest pain, -palpitations, -lower extremity edema  Respiratory: -cough, -shortness of breath  Gastrointestinal: -abdominal pain, -nausea, -vomiting, -diarrhea, -constipation, -blood in stool  Genitourinary: -dysuria, -hematuria, -frequency  Musculoskeletal: -joint pain, -muscle pain, +upper extremity swelling, +limb pain, +limited movement  Skin: -rash, +lesion  Neurological: -headache, -dizziness, -numbness, -tingling  Psychiatric: -anxiety, -depression, -sleep difficulty          Objective:     Vitals:     06/18/25 1127   BP: 128/72   Pulse: 69   Temp: 98.1 °F (36.7 °C)      Physical Exam  Vitals reviewed.   Constitutional:       Appearance: Normal appearance. He is obese.   HENT:      Head: Normocephalic and atraumatic.   Eyes:      Conjunctiva/sclera: Conjunctivae normal.   Cardiovascular:      Rate and Rhythm: Normal rate and regular rhythm.      Heart sounds: Normal heart sounds.   Pulmonary:      Effort: Pulmonary effort is normal.      Breath sounds: Normal breath sounds.   Abdominal:      Palpations: Abdomen is soft.      Tenderness: There is no abdominal tenderness.   Musculoskeletal:         General: Normal range of motion.      Cervical back: Normal range of motion.      Right lower leg: No edema.      Left lower leg: No edema.      Right Lower Extremity: Right leg is amputated above knee.      Left Lower Extremity: Left leg is amputated above knee.   Neurological:      Mental Status: He is alert. Mental status is at baseline.   Psychiatric:         Mood and Affect: Mood normal.         Behavior: Behavior normal.        Assessment:         1. Pressure injury of right buttock, unstageable    2. Hyperlipidemia associated with type 2 diabetes mellitus    3. Morbid obesity    4. Chronic diastolic CHF (congestive heart failure)    5. Chronic atrial fibrillation    6. Left spastic hemiparesis    7. S/P AKA (above knee amputation) bilateral    8. Chronic obstructive pulmonary disease, unspecified COPD type          Plan:   Assessment & Plan      TYPE 2 DIABETES MELLITUS WITH OTHER SPECIFIED COMPLICATION:  - Monitored the patient's blood sugar, which was 300 this morning, indicating hyperglycemia.  - A1C is 8.6, which is elevated but not severely so.  - All other labs is within normal limits, suggesting controlled lipid levels.  - Due to steroid use affecting glucose levels, increased Lantus to 20 units twice daily while on prednisone and prescribed Humalog 10 units with meals.  - Increased Ozempic from 0.5 mg to 1  mg for better glycemic control.  - Ordered continuous glucose monitor to be sent to DME supplier.  - Educated the patient about the impact of prednisone on glucose levels.    MORBID OBESITY:  - Evaluated the patient's weight management, noting no weight loss despite Ozempic therapy.  - Increased Ozempic from 0.5 mg to 1 mg to aid in weight management as well as improve glycemic control as noted above.    LEFT SPASTIC HEMIPARESIS:  - Monitored the patient's left arm, which is swollen and painful due to disuse associated with spastic hemiplegia.  - Advised to elevate the arm with pillows to improve circulation.    NUMMULAR DERMATITIS (ECZEMA):  - Monitored patient's eczema recurrence, with breakouts in genital areas and white spots throughout the body.  - Prescribed prednisone for treatment.    LOCALIZED EDEMA:  - Recommend using a compression sleeve as an additional measure to manage swelling.    ADVERSE EFFECT OF GLUCOCORTICOIDS:  - Monitored patient's glucose, which was elevated to 300 due to prednisone therapy.  - Assessed the need for insulin regimen adjustment during corticosteroid treatment.    LONG TERM USE OF INSULIN:  - Patient's current insulin regimen includes Lantus and Humalog.  - Due to concurrent prednisone therapy, increased Lantus to 20 units twice daily with instructions to return to usual dose after completing the course.    UNAVAILABILITY AND INACCESSIBILITY OF HEALTH-CARE FACILITIES:  - Patient cannot access a dermatologist in [facility] due to lack of local providers.  - Transportation limitations prevent the patient from seeking care outside their immediate area.    OTHER PROBLEMS RELATED TO MEDICAL FACILITIES AND HEALTH CARE:  - Pain management referral has been placed but not yet fulfilled.  - Discussed current status with the patient and will follow up on the referral.        Problem List Items Addressed This Visit          Neuro    Left spastic hemiparesis       Pulmonary    COPD  (chronic obstructive pulmonary disease)       Cardiac/Vascular    Hyperlipidemia associated with type 2 diabetes mellitus    Relevant Medications    semaglutide (OZEMPIC) 1 mg/dose (4 mg/3 mL)    Chronic diastolic CHF (congestive heart failure)    Chronic atrial fibrillation       Endocrine    Morbid obesity       Orthopedic    S/P AKA (above knee amputation) bilateral    Pressure injury of right buttock, unstageable - Primary               Jalyn SHELBYJuan Rock   [facility]   6/18/25       This note was generated with the assistance of ambient listening technology. Verbal consent was obtained by the patient and accompanying visitor(s) for the recording of patient appointment to facilitate this note. I attest to having reviewed and edited the generated note for accuracy, though some syntax or spelling errors may persist. Please contact the author of this note for any clarification.     I spent a total of 41 minutes on the day of the visit.This includes face to face time and non-face to face time preparing to see the patient (eg, review of tests), obtaining and/or reviewing separately obtained history, documenting clinical information in the electronic or other health record, independently interpreting results and communicating results to the patient/family/caregiver, or care coordinator.

## 2025-06-18 NOTE — PROCEDURES
Bladder Catheterization    Date/Time: 6/18/2025 9:30 AM  Location procedure was performed: [facility]    Performed by: Thompson Silva MD  Authorized by: Thompson Silva MD  Assisting provider: Thompson Silva MD  Pre-operative diagnosis: Neurogenic bladder  Post-operative diagnosis: Neurogenic bladder  Consent Done: Not Needed  Indications: catheter change  Local anesthesia used: no    Anesthesia:  Local anesthesia used: no    Patient sedated: no  Preparation: Patient was prepped and draped in the usual sterile fashion.  Description of findings: SP tube site without erythema or inflammation   Catheter insertion: indwelling  Catheter type: Isbell  Catheter size: 22 Fr  Complicated insertion: no  Altered anatomy: no  Bladder irrigation: no  Number of attempts: 1  Urine volume: 10 ml  Urine characteristics: clear and yellow  Technical procedures used: none  Significant surgical tasks conducted by the assistant(s): none  Complications: No  Estimated blood loss (mL): 0  Specimens: No  Implants: No  Patient tolerance: Patient tolerated the procedure well with no immediate complications

## 2025-06-23 DIAGNOSIS — Z00.00 ENCOUNTER FOR MEDICARE ANNUAL WELLNESS EXAM: ICD-10-CM

## 2025-06-26 ENCOUNTER — TELEPHONE (OUTPATIENT)
Dept: UROLOGY | Facility: CLINIC | Age: 60
End: 2025-06-26
Payer: MEDICARE

## 2025-06-26 NOTE — TELEPHONE ENCOUNTER
Copied from CRM #[id number]. Topic: General Inquiry - Patient Advice  >> Jun 26, 2025  1:36 PM Cait wrote:  Type:  Needs Medical Advice    Who Called: Patient's wife    Best Call Back Number: [phone]    Additional Information: Patient is requesting a call back in regards to an antibiotic that she was told would be sent to the pharmacy

## 2025-07-02 ENCOUNTER — DOCUMENT SCAN (OUTPATIENT)
Dept: HOME HEALTH SERVICES | Facility: HOSPITAL | Age: 60
End: 2025-07-02
Payer: MEDICARE

## 2025-07-15 ENCOUNTER — PATIENT MESSAGE (OUTPATIENT)
Dept: ADMINISTRATIVE | Facility: HOSPITAL | Age: 60
End: 2025-07-15
Payer: MEDICARE

## 2025-07-21 ENCOUNTER — OFFICE VISIT (OUTPATIENT)
Dept: FAMILY MEDICINE | Facility: CLINIC | Age: 60
End: 2025-07-21
Payer: MEDICARE

## 2025-07-21 VITALS
OXYGEN SATURATION: 95 % | WEIGHT: 171 LBS | HEIGHT: 60 IN | BODY MASS INDEX: 33.57 KG/M2 | DIASTOLIC BLOOD PRESSURE: 70 MMHG | SYSTOLIC BLOOD PRESSURE: 126 MMHG | TEMPERATURE: 99 F | HEART RATE: 71 BPM

## 2025-07-21 DIAGNOSIS — Z99.3 DEPENDENCE ON WHEELCHAIR: ICD-10-CM

## 2025-07-21 DIAGNOSIS — I69.959 HEMIPLEGIA OF NONDOMINANT SIDE, LATE EFFECT OF CEREBROVASCULAR DISEASE: ICD-10-CM

## 2025-07-21 DIAGNOSIS — Z89.612 S/P AKA (ABOVE KNEE AMPUTATION) BILATERAL: ICD-10-CM

## 2025-07-21 DIAGNOSIS — I25.10 CORONARY ARTERY DISEASE INVOLVING NATIVE CORONARY ARTERY OF NATIVE HEART WITHOUT ANGINA PECTORIS: ICD-10-CM

## 2025-07-21 DIAGNOSIS — Z89.611 S/P AKA (ABOVE KNEE AMPUTATION) BILATERAL: ICD-10-CM

## 2025-07-21 DIAGNOSIS — E11.65 TYPE 2 DIABETES MELLITUS WITH HYPERGLYCEMIA, WITH LONG-TERM CURRENT USE OF INSULIN: ICD-10-CM

## 2025-07-21 DIAGNOSIS — Z93.59 SUPRAPUBIC CATHETER: ICD-10-CM

## 2025-07-21 DIAGNOSIS — F33.0 MILD RECURRENT MAJOR DEPRESSION: ICD-10-CM

## 2025-07-21 DIAGNOSIS — E11.69 HYPERLIPIDEMIA ASSOCIATED WITH TYPE 2 DIABETES MELLITUS: ICD-10-CM

## 2025-07-21 DIAGNOSIS — J44.9 CHRONIC OBSTRUCTIVE PULMONARY DISEASE, UNSPECIFIED COPD TYPE: ICD-10-CM

## 2025-07-21 DIAGNOSIS — Z12.11 SCREENING FOR COLON CANCER: ICD-10-CM

## 2025-07-21 DIAGNOSIS — E78.5 HYPERLIPIDEMIA ASSOCIATED WITH TYPE 2 DIABETES MELLITUS: ICD-10-CM

## 2025-07-21 DIAGNOSIS — K21.9 GASTROESOPHAGEAL REFLUX DISEASE, UNSPECIFIED WHETHER ESOPHAGITIS PRESENT: ICD-10-CM

## 2025-07-21 DIAGNOSIS — Z79.4 TYPE 2 DIABETES MELLITUS WITH HYPERGLYCEMIA, WITH LONG-TERM CURRENT USE OF INSULIN: ICD-10-CM

## 2025-07-21 DIAGNOSIS — Z00.00 ENCOUNTER FOR MEDICARE ANNUAL WELLNESS EXAM: Primary | ICD-10-CM

## 2025-07-21 DIAGNOSIS — I73.9 PAD (PERIPHERAL ARTERY DISEASE): ICD-10-CM

## 2025-07-21 DIAGNOSIS — I48.20 CHRONIC ATRIAL FIBRILLATION: ICD-10-CM

## 2025-07-21 DIAGNOSIS — R63.8 INCREASED BMI: ICD-10-CM

## 2025-07-21 DIAGNOSIS — E55.9 VITAMIN D DEFICIENCY: ICD-10-CM

## 2025-07-21 DIAGNOSIS — I50.32 CHRONIC DIASTOLIC CHF (CONGESTIVE HEART FAILURE): ICD-10-CM

## 2025-07-21 PROCEDURE — 99999 PR PBB SHADOW E&M-EST. PATIENT-LVL IV: CPT | Mod: PBBFAC,HCNC,, | Performed by: NURSE PRACTITIONER

## 2025-07-21 PROCEDURE — 3074F SYST BP LT 130 MM HG: CPT | Mod: CPTII,HCNC,S$GLB, | Performed by: NURSE PRACTITIONER

## 2025-07-21 PROCEDURE — G0439 PPPS, SUBSEQ VISIT: HCPCS | Mod: HCNC,S$GLB,, | Performed by: NURSE PRACTITIONER

## 2025-07-21 PROCEDURE — G9919 SCRN ND POS ND PROV OF REC: HCPCS | Mod: CPTII,HCNC,S$GLB, | Performed by: NURSE PRACTITIONER

## 2025-07-21 PROCEDURE — 1159F MED LIST DOCD IN RCRD: CPT | Mod: CPTII,HCNC,S$GLB, | Performed by: NURSE PRACTITIONER

## 2025-07-21 PROCEDURE — 3078F DIAST BP <80 MM HG: CPT | Mod: CPTII,HCNC,S$GLB, | Performed by: NURSE PRACTITIONER

## 2025-07-21 PROCEDURE — 3052F HG A1C>EQUAL 8.0%<EQUAL 9.0%: CPT | Mod: CPTII,HCNC,S$GLB, | Performed by: NURSE PRACTITIONER

## 2025-07-21 PROCEDURE — 4010F ACE/ARB THERAPY RXD/TAKEN: CPT | Mod: CPTII,HCNC,S$GLB, | Performed by: NURSE PRACTITIONER

## 2025-07-21 PROCEDURE — 1160F RVW MEDS BY RX/DR IN RCRD: CPT | Mod: CPTII,HCNC,S$GLB, | Performed by: NURSE PRACTITIONER

## 2025-07-21 RX ORDER — PREGABALIN 100 MG/1
100 CAPSULE ORAL 2 TIMES DAILY
Qty: 60 CAPSULE | Refills: 2 | Status: SHIPPED | OUTPATIENT
Start: 2025-07-21 | End: 2025-10-19

## 2025-07-21 RX ORDER — PREDNISONE 20 MG/1
20 TABLET ORAL DAILY
COMMUNITY

## 2025-07-21 NOTE — PATIENT INSTRUCTIONS
Counseling and Referral of Other Preventative  (Italic type indicates deductible and co-insurance are waived)    Patient Name: Kuldeep Alamo  Today's Date: 7/21/2025    Health Maintenance       Date Due Completion Date    Colorectal Cancer Screening 05/08/2020 5/8/2019    Diabetes Urine Screening 07/12/2022 7/12/2021    Shingles Vaccine (1 of 2) 07/23/2025 (Originally 2/16/2013) ---    RSV Vaccine (Age 60+ and Pregnant patients) (1 - Risk 60-74 years 1-dose series) 07/24/2025 (Originally 2/16/2023) ---    Hemoglobin A1c 08/23/2025 5/23/2025    Influenza Vaccine (1) 09/01/2025 12/4/2024    Diabetic Eye Exam 11/14/2025 11/14/2024    Override on 8/1/2016: Done    PROSTATE-SPECIFIC ANTIGEN 12/04/2025 12/4/2024    Lipid Panel 05/23/2026 5/23/2025    Foot Exam 06/18/2026 6/18/2025    Override on 6/2/2020: Done    Override on 4/29/2019: Done    Override on 6/15/2017: Done    TETANUS VACCINE 04/17/2034 4/17/2024    Override on 9/20/2017: Done ([facility])        Orders Placed This Encounter   Procedures    Cologuard Screening (Multitarget Stool DNA)       The following information is provided to all patients.  This information is to help you find resources for any of the problems found today that may be affecting your health:                  Living healthy guide: www.[facility].[place].gov      Understanding Diabetes: www.diabetes.org      Eating healthy: www.cdc.gov/healthyweight      CDC home safety checklist: www.cdc.gov/steadi/patient.html      Agency on Aging: www.goea.[place].gov      Alcoholics anonymous (AA): www.aa.org      Physical Activity: www.james.nih.gov/rj4afcm      Tobacco use: www.quitwithusla.org

## 2025-07-21 NOTE — PROGRESS NOTES
"  Kuldeep Alamo presented for a  Medicare AWV and comprehensive Health Risk Assessment today. The following components were reviewed and updated:    Medical history  Family History  Social history  Allergies and Current Medications  Health Risk Assessment  Health Maintenance  Care Team         ** See Completed Assessments for Annual Wellness Visit within the encounter summary.**         The following assessments were completed:  Living Situation  CAGE  Depression Screening  Timed Get Up and Go  Whisper Test  Cognitive Function Screening    Nutrition Screening  ADL Screening  PAQ Screening      Opioid documentation for eAWV      Patient does not have a current opioid prescription.        Review for Substance Use Disorders: Patient does not use substance      Current Medications[1]       Vitals:    07/21/25 1048   BP: 126/70   Pulse: 71   Temp: 98.5 °F (36.9 °C)   SpO2: 95%   Weight: 77.6 kg (171 lb)   Height: 3' 5" (1.041 m)   PainSc: 0-No pain      Physical Exam  Vitals and nursing note reviewed.   Constitutional:       General: He is not in acute distress.     Appearance: Normal appearance. He is not ill-appearing.   HENT:      Head: Normocephalic and atraumatic.      Mouth/Throat:      Mouth: Mucous membranes are moist.   Eyes:      General: No scleral icterus.        Right eye: No discharge.         Left eye: No discharge.      Extraocular Movements: Extraocular movements intact.      Conjunctiva/sclera: Conjunctivae normal.   Cardiovascular:      Rate and Rhythm: Normal rate.   Pulmonary:      Effort: Pulmonary effort is normal. No respiratory distress.   Genitourinary:     Comments: +suprapubic catheter   Musculoskeletal:      Cervical back: Normal range of motion.      Comments: Bilat AKA  In motorized scooter    Skin:     General: Skin is warm and dry.      Findings: No rash.   Neurological:      Mental Status: He is alert and oriented to person, place, and time.   Psychiatric:         Mood and Affect: Mood " normal.         Behavior: Behavior normal. Behavior is cooperative.         Cognition and Memory: Cognition and memory normal.               Diagnoses and health risks identified today and associated recommendations/orders:    1. Encounter for Medicare annual wellness exam  - Chart reviewed. Problem list updated. Discussed current medical diagnosis, current medications, medical/surgical/family/social history; updated provider list; documented vital signs; identified any cognitive impairment; and updated risk factor list. Addressed any outstanding health maintenance. Provided patient with personalized health advice. Continue to follow up with PCP and any specialists.   - Referral to Enhanced Annual Wellness Visit (eAWV) W+1    2. Hemiplegia of nondominant side, late effect of cerebrovascular disease  Chronic; stable on current treatment plan; follow up with PCP  - uses motorized scooter for ambulation    3. Mild recurrent major depression  Chronic; stable on current treatment plan; follow up with PCP  - continue taking cymbalta    4. Chronic obstructive pulmonary disease, unspecified COPD type  Chronic. Continue taking trelegy inhaler; follow up with pcp     5. Chronic atrial fibrillation  Chronic; continue taking eliquis and beta blocker; follow up with pcp    6. Chronic diastolic CHF (congestive heart failure)  Chronic; continue current medications; follow up with cardiology and pcp  - continue low sodium diet    7. S/P AKA (above knee amputation) bilateral  Chronic; continue motorized scooter to assist with ambulation; follow up with pcp    8. Hyperlipidemia associated with type 2 diabetes mellitus  Chronic; continue current treatment plan; follow up with cardiology and pcp  - continue taking statin and repatha; continue ozempic and insulin    9. PAD (peripheral artery disease)  Chronic; continue repatha and statin; follow up with pcp     10. Suprapubic catheter  Chronic; continue current treatment plan; follow up  with urology     11. Type 2 diabetes mellitus with hyperglycemia, with long-term current use of insulin  Chronic; continue insuling and ozempic; HgA1c elevated; follow up with pcp ; recommend diabetic diet   - pregabalin (LYRICA) 100 MG capsule; Take 1 capsule (100 mg total) by mouth 2 (two) times daily.  Dispense: 60 capsule; Refill: 2    12. Vitamin D deficiency  Chronic; continue current treatment plan; follow up with pcp    13. Coronary artery disease involving native coronary artery of native heart without angina pectoris  Chronic; continue repatha and statin; follow up with cardiology     14. Gastroesophageal reflux disease, unspecified whether esophagitis present  Chronic; continu ppi; follow up with pcp    15. Screening for colon cancer  Due for cologuard; agrees to order; order placed   - Cologuard Screening (Multitarget Stool DNA); Future  - Cologuard Screening (Multitarget Stool DNA)    16. Dependence on wheelchair  Dependent on motorized scooter for ambulation; continue    17. Increased BMI  Unable to get accurate weight as patient is bilat AKA and in motorized scooter with inability to get onto scale. BMI is increased, but accurate BMI not possible.       Provided Kuldeep with a 5-10 year written screening schedule and personal prevention plan. Recommendations were developed using the USPSTF age appropriate recommendations. Education, counseling, and referrals were provided as needed. After Visit Summary printed and given to patient which includes a list of additional screenings\tests needed.    Follow up in about 1 year (around 7/21/2026) for your next medicare wellness visit.    Rosalba Greenfield, STACEYP-C    Advance Care Planning     I offered to discuss advanced care planning, including how to pick a person who would make decisions for you if you were unable to make them for yourself, called a health care power of , and what kind of decisions you might make such as use of life sustaining treatments  "such as ventilators and tube feeding when faced with a life limiting illness recorded on a living will that they will need to know. (How you want to be cared for as you near the end of your natural life)     X Patient is interested in learning more about how to make advanced directives.  I provided them paperwork and offered to discuss this with them.       [1]   Current Outpatient Medications:     albuterol (PROVENTIL/VENTOLIN HFA) 90 mcg/actuation inhaler, Inhale 2 puffs into the lungs 4 (four) times daily as needed., Disp: , Rfl:     albuterol-ipratropium (DUO-NEB) 2.5 mg-0.5 mg/3 mL nebulizer solution, Use 1 vial (3 mLs) by nebulization every 6 (six) hours while awake. Rescue, Disp: 180 mL, Rfl: 0    ALPRAZolam (XANAX) 0.5 MG tablet, Take 2 tablets (1 mg total) by mouth every evening., Disp: , Rfl:     apixaban (ELIQUIS) 5 mg Tab, Take 1 tablet (5 mg total) by mouth 2 (two) times daily., Disp: , Rfl:     ascorbic acid, vitamin C, (VITAMIN C) 500 MG tablet, Take 1 tablet (500 mg total) by mouth 2 (two) times daily with meals., Disp: , Rfl:     aspirin 81 MG Chew, Take 81 mg by mouth once daily., Disp: , Rfl:     baclofen (LIORESAL) 20 MG tablet, Take 0.5 tablets (10 mg total) by mouth 3 (three) times daily., Disp: 45 tablet, Rfl: 2    BD ULTRA-FINE MICHAEL PEN NEEDLE 32 gauge x 5/32" Ndle, Inject 1 each into the skin 5 (five) times daily., Disp: 200 each, Rfl: 11    blood sugar diagnostic (BLOOD GLUCOSE TEST) Strp, INJECT 1 STRIP INTO THE SKIN 3 (THREE) TIMES DAILY. TEST SUGAR 3 TIMES DAILY. - SUBCUTANEOUS. ICD -10 E11.65 & Z79.4, Disp: 300 strip, Rfl: 3    calcium carbonate (TUMS) 200 mg calcium (500 mg) chewable tablet, Take 2 tablets (1,000 mg total) by mouth 3 (three) times daily as needed for Heartburn., Disp: 60 tablet, Rfl: 0    DULoxetine (CYMBALTA) 60 MG capsule, Take 60 mg by mouth once daily., Disp: , Rfl:     fluticasone propionate (FLONASE) 50 mcg/actuation nasal spray, 2 sprays by Each Nostril route 2 " (two) times daily as needed., Disp: , Rfl:     fluticasone-umeclidin-vilanter (TRELEGY ELLIPTA) 200-62.5-25 mcg inhaler, Inhale 1 puff into the lungs daily as needed., Disp: , Rfl:     insulin glargine U-100, Lantus, (LANTUS SOLOSTAR U-100 INSULIN) 100 unit/mL (3 mL) InPn pen, Inject 20 Units into the skin 2 (two) times a day., Disp: 36 mL, Rfl: 3    insulin lispro (HUMALOG KWIKPEN INSULIN) 100 unit/mL pen, INJECT THREE UNITS SUBCUTANEOUSLY THREE TIMES DAILY WITH MEALS, PLUS CORRECTION SCALE MAX TDD 25 UNITS, Disp: 9 mL, Rfl: 3    lancets (ONETOUCH ULTRASOFT LANCETS) Misc, Inject 1 each into the skin 3 (three) times daily before meals., Disp: 300 each, Rfl: 5    lisinopriL (PRINIVIL,ZESTRIL) 40 MG tablet, Take 40 mg by mouth once daily., Disp: , Rfl:     medical supply, miscellaneous (O-2 SUSPENSORY MISC), 2 Liter AS NEEDED (route: Oxygen), Disp: , Rfl:     nitroGLYCERIN (NITROSTAT) 0.4 MG SL tablet, DISSOLVE 1 TAB UNDER TONGUE EVERY 5 MINUTS AT ONSET OF CHEST PAIN-MAX 3 PER 15 MINUTES--GO TO ER, Disp: 25 tablet, Rfl: 11    NOVOLOG FLEXPEN U-100 INSULIN 100 unit/mL (3 mL) InPn pen, Inject 8 Units into the skin 3 (three) times daily with meals., Disp: , Rfl:     nystatin (MYCOSTATIN) ointment, Apply topically 2 (two) times daily as needed., Disp: , Rfl:     pantoprazole (PROTONIX) 40 MG tablet, TAKE 1 TABLET BY MOUTH EVERY DAY, Disp: 90 tablet, Rfl: 1    predniSONE (DELTASONE) 20 MG tablet, Take 20 mg by mouth once daily., Disp: , Rfl:     propranoloL (INDERAL) 80 MG tablet, Take 0.5 tablets (40 mg total) by mouth 2 (two) times daily., Disp: , Rfl:     REPATHA SURECLICK 140 mg/mL PnIj, INJECT 140MG (SUBCUTANEOUS) ONCE EVERY 14DAYS., Disp: , Rfl:     rosuvastatin (CRESTOR) 40 MG Tab, Take 10 mg by mouth every evening., Disp: , Rfl:     semaglutide (OZEMPIC) 1 mg/dose (4 mg/3 mL), Inject 1 mg into the skin every 7 days., Disp: 9 mL, Rfl: 3    tamsulosin (FLOMAX) 0.4 mg Cap, Take 0.4 mg by mouth every evening., Disp: ,  Rfl:     pregabalin (LYRICA) 100 MG capsule, Take 1 capsule (100 mg total) by mouth 2 (two) times daily., Disp: 60 capsule, Rfl: 2

## 2025-07-30 ENCOUNTER — TELEPHONE (OUTPATIENT)
Dept: UROLOGY | Facility: CLINIC | Age: 60
End: 2025-07-30
Payer: MEDICARE

## 2025-07-30 NOTE — TELEPHONE ENCOUNTER
Copied from CRM #[id number]. Topic: Appointments - Appointment Rescheduling  >> Jul 29, 2025 10:12 AM Vilma wrote:  .Type: Patient Call Back    Who called: christina fabian     What is the request in detail: pt needs to rs upcoming procedure     Can the clinic reply by [facility]? Call back     Would the patient rather a call back or a response via [facility]?  Call back     Best call back number: .[phone]      Additional Information:

## 2025-08-22 DIAGNOSIS — Z79.4 TYPE 2 DIABETES MELLITUS WITH HYPERGLYCEMIA, WITH LONG-TERM CURRENT USE OF INSULIN: ICD-10-CM

## 2025-08-22 DIAGNOSIS — E11.65 TYPE 2 DIABETES MELLITUS WITH HYPERGLYCEMIA, WITH LONG-TERM CURRENT USE OF INSULIN: ICD-10-CM

## 2025-08-25 DIAGNOSIS — E11.65 TYPE 2 DIABETES MELLITUS WITH HYPERGLYCEMIA, WITH LONG-TERM CURRENT USE OF INSULIN: ICD-10-CM

## 2025-08-25 DIAGNOSIS — Z79.4 TYPE 2 DIABETES MELLITUS WITH HYPERGLYCEMIA, WITH LONG-TERM CURRENT USE OF INSULIN: ICD-10-CM

## 2025-08-25 RX ORDER — INSULIN LISPRO 100 [IU]/ML
INJECTION, SOLUTION INTRAVENOUS; SUBCUTANEOUS
Qty: 15 EACH | Refills: 1 | OUTPATIENT
Start: 2025-08-25

## 2025-08-26 DIAGNOSIS — Z79.4 TYPE 2 DIABETES MELLITUS WITH HYPERGLYCEMIA, WITH LONG-TERM CURRENT USE OF INSULIN: ICD-10-CM

## 2025-08-26 DIAGNOSIS — E11.65 TYPE 2 DIABETES MELLITUS WITH HYPERGLYCEMIA, WITH LONG-TERM CURRENT USE OF INSULIN: ICD-10-CM

## 2025-08-26 RX ORDER — INSULIN LISPRO 100 [IU]/ML
INJECTION, SOLUTION INTRAVENOUS; SUBCUTANEOUS
Qty: 9 ML | Refills: 3 | Status: SHIPPED | OUTPATIENT
Start: 2025-08-26 | End: 2025-08-27

## 2025-08-27 RX ORDER — INSULIN ASPART 100 [IU]/ML
3 INJECTION, SOLUTION INTRAVENOUS; SUBCUTANEOUS 3 TIMES DAILY
Qty: 30 ML | Refills: 0 | Status: SHIPPED | OUTPATIENT
Start: 2025-08-27

## 2025-09-04 ENCOUNTER — DOCUMENT SCAN (OUTPATIENT)
Dept: HOME HEALTH SERVICES | Facility: HOSPITAL | Age: 60
End: 2025-09-04
Payer: MEDICARE

## (undated) DEVICE — BANDAGE ACE DOUBLE STER 6IN

## (undated) DEVICE — GAUZE SPONGE 4X4 12PLY

## (undated) DEVICE — SUT 3-0 12-18IN SILK

## (undated) DEVICE — BLADE SAW STRNM 8.66X40.34X.6

## (undated) DEVICE — DRAPE PLASTIC U 60X72

## (undated) DEVICE — STOCKINET TUBULAR 1 PLY 6X60IN

## (undated) DEVICE — TRAY MINOR GEN SURG

## (undated) DEVICE — COVER INSTR ELASTIC BAND 40X20

## (undated) DEVICE — SET MICROPUNCTURE

## (undated) DEVICE — TOURNIQUET SB QC DP 34X4IN

## (undated) DEVICE — DRESSING TRANS 4X4 TEGADERM

## (undated) DEVICE — SOL NS 1000CC

## (undated) DEVICE — SEE MEDLINE ITEM 146298

## (undated) DEVICE — BANDAGE ACE ELASTIC 6"

## (undated) DEVICE — SUT SILK 2-0 SH 18IN BLACK

## (undated) DEVICE — SPONGE DERMACEA GAUZE 4X4

## (undated) DEVICE — SUT MONOCRYL 2-0 CT-1 VIL

## (undated) DEVICE — SOL 9P NACL IRR PIC IL

## (undated) DEVICE — PAD ABD 8X10 STERILE

## (undated) DEVICE — SPONGE DERMACEA 4X4IN 12PLY

## (undated) DEVICE — SUT BONE WAX 2.5 GRMS 12/BX

## (undated) DEVICE — SYS LABEL CORRECT MED

## (undated) DEVICE — SUT 2/0 36IN COATED VICRYL

## (undated) DEVICE — SEE MEDLINE ITEM 146345

## (undated) DEVICE — BLADE HEAVY DUTY SAGITTAL

## (undated) DEVICE — ELECTRODE REM PLYHSV RETURN 9

## (undated) DEVICE — SPONGE LAP 18X18 PREWASHED

## (undated) DEVICE — APPLICATOR CHLORAPREP ORN 26ML

## (undated) DEVICE — STAPLER SKIN PROXIMATE WIDE

## (undated) DEVICE — Device

## (undated) DEVICE — SUT 2-0 12-18IN SILK

## (undated) DEVICE — BANDAGE KERLIX AMD

## (undated) DEVICE — SEE MEDLINE ITEM 154981

## (undated) DEVICE — SEE MEDLINE ITEM 152523

## (undated) DEVICE — GAUZE FLUFF XXLG 36X36 2 PLY

## (undated) DEVICE — COVERS PROBE NR-48 STERILE

## (undated) DEVICE — COVER LIGHT HANDLE 80/CA

## (undated) DEVICE — SEE MEDLINE ITEM 152622

## (undated) DEVICE — SET DECANTER MEDICHOICE

## (undated) DEVICE — DRAPE INCISE IOBAN 2 23X17IN